# Patient Record
Sex: FEMALE | Race: WHITE | NOT HISPANIC OR LATINO | Employment: OTHER | URBAN - METROPOLITAN AREA
[De-identification: names, ages, dates, MRNs, and addresses within clinical notes are randomized per-mention and may not be internally consistent; named-entity substitution may affect disease eponyms.]

---

## 2017-01-31 ENCOUNTER — HOSPITAL ENCOUNTER (OUTPATIENT)
Dept: RADIOLOGY | Facility: MEDICAL CENTER | Age: 82
End: 2017-01-31
Attending: ORTHOPAEDIC SURGERY
Payer: MEDICARE

## 2017-01-31 DIAGNOSIS — M25.511 RIGHT SHOULDER PAIN, UNSPECIFIED CHRONICITY: ICD-10-CM

## 2017-01-31 PROCEDURE — 73221 MRI JOINT UPR EXTREM W/O DYE: CPT | Mod: RT

## 2017-03-09 ENCOUNTER — HOSPITAL ENCOUNTER (OUTPATIENT)
Dept: LAB | Facility: MEDICAL CENTER | Age: 82
End: 2017-03-09
Attending: FAMILY MEDICINE
Payer: MEDICARE

## 2017-03-09 LAB
APPEARANCE UR: ABNORMAL
BACTERIA #/AREA URNS HPF: ABNORMAL /HPF
BILIRUB UR QL STRIP.AUTO: NEGATIVE
COLOR UR AUTO: YELLOW
CULTURE IF INDICATED INDCX: YES UA CULTURE
EPITHELIAL CELLS 1715: ABNORMAL /HPF
GLUCOSE UR STRIP.AUTO-MCNC: NEGATIVE MG/DL
GRANULAR CASTS  1716G: ABNORMAL /LPF
HYALINE CAST   1831: ABNORMAL /LPF
KETONES UR STRIP.AUTO-MCNC: NEGATIVE MG/DL
LEUKOCYTE ESTERASE UR QL STRIP.AUTO: ABNORMAL
MICRO URNS: ABNORMAL
MUCOUS THREADS URNS QL MICRO: ABNORMAL /HPF
NITRITE UR QL STRIP.AUTO: NEGATIVE
PH UR: 6 [PH]
PROT UR QL STRIP: NEGATIVE MG/DL
RBC #/AREA URNS HPF: ABNORMAL /HPF
RBC UR QL AUTO: NEGATIVE
SP GR UR STRIP.AUTO: 1.01
WBC #/AREA URNS HPF: >150 /HPF

## 2017-03-09 PROCEDURE — 87086 URINE CULTURE/COLONY COUNT: CPT

## 2017-03-09 PROCEDURE — 87186 SC STD MICRODIL/AGAR DIL: CPT

## 2017-03-09 PROCEDURE — 87077 CULTURE AEROBIC IDENTIFY: CPT

## 2017-03-09 PROCEDURE — 81001 URINALYSIS AUTO W/SCOPE: CPT

## 2017-03-11 LAB
BACTERIA UR CULT: ABNORMAL
SIGNIFICANT IND 70042: ABNORMAL
SOURCE SOURCE: ABNORMAL

## 2017-04-10 ENCOUNTER — HOSPITAL ENCOUNTER (OUTPATIENT)
Facility: MEDICAL CENTER | Age: 82
End: 2017-04-10
Attending: ORTHOPAEDIC SURGERY | Admitting: ORTHOPAEDIC SURGERY
Payer: MEDICARE

## 2017-05-01 VITALS — WEIGHT: 129.41 LBS | HEIGHT: 66 IN | BODY MASS INDEX: 20.8 KG/M2

## 2017-05-01 DIAGNOSIS — Z01.812 PRE-OPERATIVE LABORATORY EXAMINATION: ICD-10-CM

## 2017-05-01 DIAGNOSIS — Z01.810 PRE-OPERATIVE CARDIOVASCULAR EXAMINATION: ICD-10-CM

## 2017-05-01 LAB
ANION GAP SERPL CALC-SCNC: 9 MMOL/L (ref 0–11.9)
APPEARANCE UR: ABNORMAL
BACTERIA #/AREA URNS HPF: ABNORMAL /HPF
BILIRUB UR QL STRIP.AUTO: NEGATIVE
BUN SERPL-MCNC: 30 MG/DL (ref 8–22)
CALCIUM SERPL-MCNC: 9.1 MG/DL (ref 8.5–10.5)
CHLORIDE SERPL-SCNC: 105 MMOL/L (ref 96–112)
CO2 SERPL-SCNC: 23 MMOL/L (ref 20–33)
COLOR UR: ABNORMAL
CREAT SERPL-MCNC: 0.96 MG/DL (ref 0.5–1.4)
EPI CELLS #/AREA URNS HPF: ABNORMAL /HPF
ERYTHROCYTE [DISTWIDTH] IN BLOOD BY AUTOMATED COUNT: 48 FL (ref 35.9–50)
GFR SERPL CREATININE-BSD FRML MDRD: 56 ML/MIN/1.73 M 2
GLUCOSE SERPL-MCNC: 85 MG/DL (ref 65–99)
GLUCOSE UR STRIP.AUTO-MCNC: NEGATIVE MG/DL
HCT VFR BLD AUTO: 40.3 % (ref 37–47)
HGB BLD-MCNC: 13.4 G/DL (ref 12–16)
KETONES UR STRIP.AUTO-MCNC: NEGATIVE MG/DL
LEUKOCYTE ESTERASE UR QL STRIP.AUTO: ABNORMAL
MCH RBC QN AUTO: 30.8 PG (ref 27–33)
MCHC RBC AUTO-ENTMCNC: 33.3 G/DL (ref 33.6–35)
MCV RBC AUTO: 92.6 FL (ref 81.4–97.8)
MICRO URNS: ABNORMAL
NITRITE UR QL STRIP.AUTO: POSITIVE
PH UR STRIP.AUTO: 6 [PH]
PLATELET # BLD AUTO: 384 K/UL (ref 164–446)
PMV BLD AUTO: 9.5 FL (ref 9–12.9)
POTASSIUM SERPL-SCNC: 3.7 MMOL/L (ref 3.6–5.5)
PROT UR QL STRIP: NEGATIVE MG/DL
RBC # BLD AUTO: 4.35 M/UL (ref 4.2–5.4)
RBC # URNS HPF: ABNORMAL /HPF
RBC UR QL AUTO: ABNORMAL
SCCMEC + MECA PNL NOSE NAA+PROBE: NEGATIVE
SCCMEC + MECA PNL NOSE NAA+PROBE: NEGATIVE
SODIUM SERPL-SCNC: 137 MMOL/L (ref 135–145)
SP GR UR STRIP.AUTO: 1.01
WBC # BLD AUTO: 8 K/UL (ref 4.8–10.8)
WBC #/AREA URNS HPF: >150 /HPF

## 2017-05-01 PROCEDURE — 81001 URINALYSIS AUTO W/SCOPE: CPT

## 2017-05-01 PROCEDURE — 87641 MR-STAPH DNA AMP PROBE: CPT

## 2017-05-01 PROCEDURE — 85027 COMPLETE CBC AUTOMATED: CPT

## 2017-05-01 PROCEDURE — 87186 SC STD MICRODIL/AGAR DIL: CPT

## 2017-05-01 PROCEDURE — 36415 COLL VENOUS BLD VENIPUNCTURE: CPT

## 2017-05-01 PROCEDURE — 93005 ELECTROCARDIOGRAM TRACING: CPT

## 2017-05-01 PROCEDURE — 93010 ELECTROCARDIOGRAM REPORT: CPT | Performed by: INTERNAL MEDICINE

## 2017-05-01 PROCEDURE — 87640 STAPH A DNA AMP PROBE: CPT | Mod: 59

## 2017-05-01 PROCEDURE — 87086 URINE CULTURE/COLONY COUNT: CPT

## 2017-05-01 PROCEDURE — 87077 CULTURE AEROBIC IDENTIFY: CPT

## 2017-05-01 PROCEDURE — 80048 BASIC METABOLIC PNL TOTAL CA: CPT

## 2017-05-01 RX ORDER — HYDROCODONE BITARTRATE AND ACETAMINOPHEN 5; 325 MG/1; MG/1
1-2 TABLET ORAL EVERY 4 HOURS PRN
Status: ON HOLD | COMMUNITY
End: 2017-11-11

## 2017-05-01 NOTE — OR NURSING
PreAdmit Appointment: Patient instructed to continue regularly prescribed medications through day before surgery. Instructed to take the following medications the day of surgery with a sip of water per Anesthesia protocol: Gabapentin, Levothyroxine, Norco if needed, Ultram if needed. CHG scrub given to patient along with instructions.

## 2017-05-02 LAB — EKG IMPRESSION: NORMAL

## 2017-05-03 LAB
BACTERIA UR CULT: ABNORMAL
BACTERIA UR CULT: ABNORMAL
SIGNIFICANT IND 70042: ABNORMAL
SITE SITE: ABNORMAL
SOURCE SOURCE: ABNORMAL

## 2017-05-08 DIAGNOSIS — Z01.812 PRE-OPERATIVE LABORATORY EXAMINATION: ICD-10-CM

## 2017-05-08 LAB
APPEARANCE UR: ABNORMAL
BACTERIA #/AREA URNS HPF: ABNORMAL /HPF
BILIRUB UR QL STRIP.AUTO: NEGATIVE
COLOR UR: ABNORMAL
EPI CELLS #/AREA URNS HPF: ABNORMAL /HPF
GLUCOSE UR STRIP.AUTO-MCNC: NEGATIVE MG/DL
KETONES UR STRIP.AUTO-MCNC: NEGATIVE MG/DL
LEUKOCYTE ESTERASE UR QL STRIP.AUTO: ABNORMAL
MICRO URNS: ABNORMAL
NITRITE UR QL STRIP.AUTO: POSITIVE
PH UR STRIP.AUTO: 6.5 [PH]
PROT UR QL STRIP: NEGATIVE MG/DL
RBC # URNS HPF: ABNORMAL /HPF
RBC UR QL AUTO: ABNORMAL
SP GR UR STRIP.AUTO: 1.01
WBC #/AREA URNS HPF: >150 /HPF

## 2017-05-08 PROCEDURE — 87186 SC STD MICRODIL/AGAR DIL: CPT

## 2017-05-08 PROCEDURE — 87086 URINE CULTURE/COLONY COUNT: CPT

## 2017-05-08 PROCEDURE — 81001 URINALYSIS AUTO W/SCOPE: CPT

## 2017-05-08 PROCEDURE — 87077 CULTURE AEROBIC IDENTIFY: CPT

## 2017-05-22 ENCOUNTER — HOSPITAL ENCOUNTER (OUTPATIENT)
Dept: LAB | Facility: MEDICAL CENTER | Age: 82
End: 2017-05-22
Attending: UROLOGY
Payer: MEDICARE

## 2017-05-22 PROCEDURE — 87086 URINE CULTURE/COLONY COUNT: CPT

## 2017-05-25 LAB
BACTERIA UR CULT: NORMAL
SIGNIFICANT IND 70042: NORMAL
SITE SITE: NORMAL
SOURCE SOURCE: NORMAL

## 2017-06-21 ENCOUNTER — HOSPITAL ENCOUNTER (OUTPATIENT)
Facility: MEDICAL CENTER | Age: 82
End: 2017-06-21
Attending: ORTHOPAEDIC SURGERY | Admitting: ORTHOPAEDIC SURGERY
Payer: MEDICARE

## 2017-06-26 VITALS — WEIGHT: 125.66 LBS | BODY MASS INDEX: 20.2 KG/M2 | HEIGHT: 66 IN

## 2017-06-26 DIAGNOSIS — Z01.812 PRE-OPERATIVE LABORATORY EXAMINATION: ICD-10-CM

## 2017-06-26 LAB
ANION GAP SERPL CALC-SCNC: 10 MMOL/L (ref 0–11.9)
APPEARANCE UR: ABNORMAL
BACTERIA #/AREA URNS HPF: ABNORMAL /HPF
BILIRUB UR QL STRIP.AUTO: NEGATIVE
BUN SERPL-MCNC: 40 MG/DL (ref 8–22)
CALCIUM SERPL-MCNC: 8.8 MG/DL (ref 8.5–10.5)
CHLORIDE SERPL-SCNC: 104 MMOL/L (ref 96–112)
CO2 SERPL-SCNC: 22 MMOL/L (ref 20–33)
COLOR UR: ABNORMAL
CREAT SERPL-MCNC: 1.06 MG/DL (ref 0.5–1.4)
EPI CELLS #/AREA URNS HPF: ABNORMAL /HPF
ERYTHROCYTE [DISTWIDTH] IN BLOOD BY AUTOMATED COUNT: 49.8 FL (ref 35.9–50)
GFR SERPL CREATININE-BSD FRML MDRD: 50 ML/MIN/1.73 M 2
GLUCOSE SERPL-MCNC: 83 MG/DL (ref 65–99)
GLUCOSE UR STRIP.AUTO-MCNC: NEGATIVE MG/DL
HCT VFR BLD AUTO: 40.8 % (ref 37–47)
HGB BLD-MCNC: 13.6 G/DL (ref 12–16)
KETONES UR STRIP.AUTO-MCNC: NEGATIVE MG/DL
LEUKOCYTE ESTERASE UR QL STRIP.AUTO: ABNORMAL
MCH RBC QN AUTO: 31 PG (ref 27–33)
MCHC RBC AUTO-ENTMCNC: 33.3 G/DL (ref 33.6–35)
MCV RBC AUTO: 92.9 FL (ref 81.4–97.8)
MICRO URNS: ABNORMAL
MUCOUS THREADS #/AREA URNS HPF: ABNORMAL /HPF
NITRITE UR QL STRIP.AUTO: POSITIVE
PH UR STRIP.AUTO: 7 [PH]
PLATELET # BLD AUTO: 347 K/UL (ref 164–446)
PMV BLD AUTO: 9.2 FL (ref 9–12.9)
POTASSIUM SERPL-SCNC: 4 MMOL/L (ref 3.6–5.5)
PROT UR QL STRIP: NEGATIVE MG/DL
RBC # BLD AUTO: 4.39 M/UL (ref 4.2–5.4)
RBC # URNS HPF: ABNORMAL /HPF
RBC UR QL AUTO: ABNORMAL
SODIUM SERPL-SCNC: 136 MMOL/L (ref 135–145)
SP GR UR STRIP.AUTO: 1.01
WBC # BLD AUTO: 9.2 K/UL (ref 4.8–10.8)
WBC #/AREA URNS HPF: ABNORMAL /HPF

## 2017-06-26 PROCEDURE — 36415 COLL VENOUS BLD VENIPUNCTURE: CPT

## 2017-06-26 PROCEDURE — 85027 COMPLETE CBC AUTOMATED: CPT

## 2017-06-26 PROCEDURE — 87086 URINE CULTURE/COLONY COUNT: CPT

## 2017-06-26 PROCEDURE — 87641 MR-STAPH DNA AMP PROBE: CPT

## 2017-06-26 PROCEDURE — 80048 BASIC METABOLIC PNL TOTAL CA: CPT

## 2017-06-26 PROCEDURE — 81001 URINALYSIS AUTO W/SCOPE: CPT

## 2017-06-26 PROCEDURE — 87640 STAPH A DNA AMP PROBE: CPT

## 2017-06-26 PROCEDURE — 87077 CULTURE AEROBIC IDENTIFY: CPT

## 2017-06-26 PROCEDURE — 87186 SC STD MICRODIL/AGAR DIL: CPT

## 2017-06-26 RX ORDER — COVID-19 ANTIGEN TEST
KIT MISCELLANEOUS PRN
COMMUNITY
End: 2018-01-12

## 2017-06-26 NOTE — OR NURSING
"Preadmit appointment: \" Preparing for your Procedure information\" sheet given to patient with verbal and written instructions. Patient instructed to continue prescribed medications through the day before surgery, instructed to take the following medications the day of surgery per anesthesia protocol: Levothyroxine, Gabapentin if needed, Hydrocodone if needed.   "

## 2017-06-27 LAB
SCCMEC + MECA PNL NOSE NAA+PROBE: NEGATIVE
SCCMEC + MECA PNL NOSE NAA+PROBE: NEGATIVE

## 2017-06-28 ENCOUNTER — HOSPITAL ENCOUNTER (OUTPATIENT)
Facility: MEDICAL CENTER | Age: 82
End: 2017-06-28
Attending: UROLOGY
Payer: MEDICARE

## 2017-06-28 PROCEDURE — 87077 CULTURE AEROBIC IDENTIFY: CPT

## 2017-06-28 PROCEDURE — 87086 URINE CULTURE/COLONY COUNT: CPT

## 2017-06-28 PROCEDURE — 87186 SC STD MICRODIL/AGAR DIL: CPT

## 2017-07-01 LAB
BACTERIA UR CULT: ABNORMAL
BACTERIA UR CULT: ABNORMAL
SIGNIFICANT IND 70042: ABNORMAL
SOURCE SOURCE: ABNORMAL

## 2017-07-20 ENCOUNTER — HOSPITAL ENCOUNTER (OUTPATIENT)
Facility: MEDICAL CENTER | Age: 82
End: 2017-07-20
Attending: UROLOGY
Payer: MEDICARE

## 2017-07-20 PROCEDURE — 87077 CULTURE AEROBIC IDENTIFY: CPT

## 2017-07-20 PROCEDURE — 87086 URINE CULTURE/COLONY COUNT: CPT

## 2017-07-20 PROCEDURE — 87186 SC STD MICRODIL/AGAR DIL: CPT

## 2017-07-22 LAB
BACTERIA UR CULT: ABNORMAL
SIGNIFICANT IND 70042: ABNORMAL
SOURCE SOURCE: ABNORMAL

## 2017-07-28 ENCOUNTER — HOSPITAL ENCOUNTER (OUTPATIENT)
Dept: LAB | Facility: MEDICAL CENTER | Age: 82
End: 2017-07-28
Attending: FAMILY MEDICINE
Payer: MEDICARE

## 2017-07-28 LAB
25(OH)D3 SERPL-MCNC: 40 NG/ML (ref 30–100)
ALBUMIN SERPL BCP-MCNC: 4.1 G/DL (ref 3.2–4.9)
ALBUMIN/GLOB SERPL: 1.3 G/DL
ALP SERPL-CCNC: 52 U/L (ref 30–99)
ALT SERPL-CCNC: 24 U/L (ref 2–50)
ANION GAP SERPL CALC-SCNC: 12 MMOL/L (ref 0–11.9)
APPEARANCE UR: ABNORMAL
AST SERPL-CCNC: 35 U/L (ref 12–45)
BACTERIA #/AREA URNS HPF: ABNORMAL /HPF
BASOPHILS # BLD AUTO: 1 % (ref 0–1.8)
BASOPHILS # BLD: 0.07 K/UL (ref 0–0.12)
BILIRUB SERPL-MCNC: 0.4 MG/DL (ref 0.1–1.5)
BILIRUB UR QL STRIP.AUTO: NEGATIVE
BUN SERPL-MCNC: 23 MG/DL (ref 8–22)
CALCIUM SERPL-MCNC: 10 MG/DL (ref 8.5–10.5)
CHLORIDE SERPL-SCNC: 107 MMOL/L (ref 96–112)
CHOLEST SERPL-MCNC: 208 MG/DL (ref 100–199)
CO2 SERPL-SCNC: 20 MMOL/L (ref 20–33)
COLOR UR: YELLOW
CREAT SERPL-MCNC: 0.93 MG/DL (ref 0.5–1.4)
CRP SERPL HS-MCNC: 3 MG/L (ref 0–7.5)
EOSINOPHIL # BLD AUTO: 0.4 K/UL (ref 0–0.51)
EOSINOPHIL NFR BLD: 5.6 % (ref 0–6.9)
EPI CELLS #/AREA URNS HPF: ABNORMAL /HPF
ERYTHROCYTE [DISTWIDTH] IN BLOOD BY AUTOMATED COUNT: 49.7 FL (ref 35.9–50)
EST. AVERAGE GLUCOSE BLD GHB EST-MCNC: 105 MG/DL
GFR SERPL CREATININE-BSD FRML MDRD: 58 ML/MIN/1.73 M 2
GLOBULIN SER CALC-MCNC: 3.2 G/DL (ref 1.9–3.5)
GLUCOSE SERPL-MCNC: 67 MG/DL (ref 65–99)
GLUCOSE UR STRIP.AUTO-MCNC: NEGATIVE MG/DL
HBA1C MFR BLD: 5.3 % (ref 0–5.6)
HCT VFR BLD AUTO: 42.4 % (ref 37–47)
HDLC SERPL-MCNC: 55 MG/DL
HGB BLD-MCNC: 14.1 G/DL (ref 12–16)
IMM GRANULOCYTES # BLD AUTO: 0.02 K/UL (ref 0–0.11)
IMM GRANULOCYTES NFR BLD AUTO: 0.3 % (ref 0–0.9)
KETONES UR STRIP.AUTO-MCNC: NEGATIVE MG/DL
LDLC SERPL CALC-MCNC: 131 MG/DL
LEUKOCYTE ESTERASE UR QL STRIP.AUTO: ABNORMAL
LYMPHOCYTES # BLD AUTO: 2.65 K/UL (ref 1–4.8)
LYMPHOCYTES NFR BLD: 36.8 % (ref 22–41)
MCH RBC QN AUTO: 31.3 PG (ref 27–33)
MCHC RBC AUTO-ENTMCNC: 33.3 G/DL (ref 33.6–35)
MCV RBC AUTO: 94 FL (ref 81.4–97.8)
MICRO URNS: ABNORMAL
MONOCYTES # BLD AUTO: 0.56 K/UL (ref 0–0.85)
MONOCYTES NFR BLD AUTO: 7.8 % (ref 0–13.4)
NEUTROPHILS # BLD AUTO: 3.5 K/UL (ref 2–7.15)
NEUTROPHILS NFR BLD: 48.5 % (ref 44–72)
NITRITE UR QL STRIP.AUTO: POSITIVE
NRBC # BLD AUTO: 0 K/UL
NRBC BLD AUTO-RTO: 0 /100 WBC
PH UR STRIP.AUTO: 6 [PH]
PLATELET # BLD AUTO: 313 K/UL (ref 164–446)
PMV BLD AUTO: 10.2 FL (ref 9–12.9)
POTASSIUM SERPL-SCNC: 3.9 MMOL/L (ref 3.6–5.5)
PROT SERPL-MCNC: 7.3 G/DL (ref 6–8.2)
PROT UR QL STRIP: NEGATIVE MG/DL
RBC # BLD AUTO: 4.51 M/UL (ref 4.2–5.4)
RBC # URNS HPF: ABNORMAL /HPF
RBC UR QL AUTO: ABNORMAL
SODIUM SERPL-SCNC: 139 MMOL/L (ref 135–145)
SP GR UR STRIP.AUTO: 1.01
TRIGL SERPL-MCNC: 112 MG/DL (ref 0–149)
TSH SERPL DL<=0.005 MIU/L-ACNC: 0.09 UIU/ML (ref 0.3–3.7)
URATE SERPL-MCNC: 9 MG/DL (ref 1.9–8.2)
WBC # BLD AUTO: 7.2 K/UL (ref 4.8–10.8)
WBC #/AREA URNS HPF: ABNORMAL /HPF

## 2017-07-28 PROCEDURE — 82306 VITAMIN D 25 HYDROXY: CPT | Mod: GA

## 2017-07-28 PROCEDURE — 80053 COMPREHEN METABOLIC PANEL: CPT

## 2017-07-28 PROCEDURE — 81001 URINALYSIS AUTO W/SCOPE: CPT

## 2017-07-28 PROCEDURE — 83036 HEMOGLOBIN GLYCOSYLATED A1C: CPT

## 2017-07-28 PROCEDURE — 84443 ASSAY THYROID STIM HORMONE: CPT

## 2017-07-28 PROCEDURE — 80061 LIPID PANEL: CPT

## 2017-07-28 PROCEDURE — 85025 COMPLETE CBC W/AUTO DIFF WBC: CPT

## 2017-07-28 PROCEDURE — 84550 ASSAY OF BLOOD/URIC ACID: CPT

## 2017-07-28 PROCEDURE — 86141 C-REACTIVE PROTEIN HS: CPT

## 2017-07-28 PROCEDURE — 36415 COLL VENOUS BLD VENIPUNCTURE: CPT

## 2017-08-11 ENCOUNTER — HOSPITAL ENCOUNTER (OUTPATIENT)
Facility: MEDICAL CENTER | Age: 82
End: 2017-08-11
Attending: UROLOGY
Payer: MEDICARE

## 2017-08-11 PROCEDURE — 87086 URINE CULTURE/COLONY COUNT: CPT

## 2017-08-13 ENCOUNTER — HOSPITAL ENCOUNTER (EMERGENCY)
Facility: MEDICAL CENTER | Age: 82
End: 2017-08-13
Attending: EMERGENCY MEDICINE
Payer: MEDICARE

## 2017-08-13 VITALS
HEART RATE: 72 BPM | WEIGHT: 119 LBS | OXYGEN SATURATION: 96 % | RESPIRATION RATE: 17 BRPM | TEMPERATURE: 98 F | BODY MASS INDEX: 19.13 KG/M2 | HEIGHT: 66 IN | SYSTOLIC BLOOD PRESSURE: 119 MMHG | DIASTOLIC BLOOD PRESSURE: 62 MMHG

## 2017-08-13 DIAGNOSIS — R10.84 GENERALIZED ABDOMINAL PAIN: ICD-10-CM

## 2017-08-13 DIAGNOSIS — K52.9 COLITIS: ICD-10-CM

## 2017-08-13 LAB
ALBUMIN SERPL BCP-MCNC: 3.3 G/DL (ref 3.2–4.9)
ALBUMIN/GLOB SERPL: 1.3 G/DL
ALP SERPL-CCNC: 40 U/L (ref 30–99)
ALT SERPL-CCNC: 50 U/L (ref 2–50)
ANION GAP SERPL CALC-SCNC: 11 MMOL/L (ref 0–11.9)
AST SERPL-CCNC: 62 U/L (ref 12–45)
BACTERIA UR CULT: NORMAL
BASOPHILS # BLD AUTO: 0.4 % (ref 0–1.8)
BASOPHILS # BLD: 0.03 K/UL (ref 0–0.12)
BILIRUB SERPL-MCNC: 0.9 MG/DL (ref 0.1–1.5)
BUN SERPL-MCNC: 33 MG/DL (ref 8–22)
CALCIUM SERPL-MCNC: 7.2 MG/DL (ref 8.4–10.2)
CHLORIDE SERPL-SCNC: 108 MMOL/L (ref 96–112)
CO2 SERPL-SCNC: 15 MMOL/L (ref 20–33)
CREAT SERPL-MCNC: 1.04 MG/DL (ref 0.5–1.4)
EOSINOPHIL # BLD AUTO: 0.09 K/UL (ref 0–0.51)
EOSINOPHIL NFR BLD: 1.2 % (ref 0–6.9)
ERYTHROCYTE [DISTWIDTH] IN BLOOD BY AUTOMATED COUNT: 52.2 FL (ref 35.9–50)
GFR SERPL CREATININE-BSD FRML MDRD: 51 ML/MIN/1.73 M 2
GLOBULIN SER CALC-MCNC: 2.6 G/DL (ref 1.9–3.5)
GLUCOSE SERPL-MCNC: 83 MG/DL (ref 65–99)
HCT VFR BLD AUTO: 39.2 % (ref 37–47)
HGB BLD-MCNC: 12.8 G/DL (ref 12–16)
IMM GRANULOCYTES # BLD AUTO: 0.01 K/UL (ref 0–0.11)
IMM GRANULOCYTES NFR BLD AUTO: 0.1 % (ref 0–0.9)
LACTATE BLD-SCNC: 1.97 MMOL/L (ref 0.5–2)
LIPASE SERPL-CCNC: 13 U/L (ref 7–58)
LYMPHOCYTES # BLD AUTO: 1.99 K/UL (ref 1–4.8)
LYMPHOCYTES NFR BLD: 25.8 % (ref 22–41)
MCH RBC QN AUTO: 31.8 PG (ref 27–33)
MCHC RBC AUTO-ENTMCNC: 32.7 G/DL (ref 33.6–35)
MCV RBC AUTO: 97.5 FL (ref 81.4–97.8)
MONOCYTES # BLD AUTO: 0.56 K/UL (ref 0–0.85)
MONOCYTES NFR BLD AUTO: 7.3 % (ref 0–13.4)
NEUTROPHILS # BLD AUTO: 5.02 K/UL (ref 2–7.15)
NEUTROPHILS NFR BLD: 65.2 % (ref 44–72)
NRBC # BLD AUTO: 0 K/UL
NRBC BLD AUTO-RTO: 0 /100 WBC
PLATELET # BLD AUTO: 313 K/UL (ref 164–446)
PMV BLD AUTO: 9.2 FL (ref 9–12.9)
POTASSIUM SERPL-SCNC: 3.6 MMOL/L (ref 3.6–5.5)
PROT SERPL-MCNC: 5.9 G/DL (ref 6–8.2)
RBC # BLD AUTO: 4.02 M/UL (ref 4.2–5.4)
SIGNIFICANT IND 70042: NORMAL
SODIUM SERPL-SCNC: 134 MMOL/L (ref 135–145)
SOURCE SOURCE: NORMAL
SPECIMEN DRAWN FROM PATIENT: NORMAL
WBC # BLD AUTO: 7.7 K/UL (ref 4.8–10.8)

## 2017-08-13 PROCEDURE — 700111 HCHG RX REV CODE 636 W/ 250 OVERRIDE (IP): Performed by: EMERGENCY MEDICINE

## 2017-08-13 PROCEDURE — 96376 TX/PRO/DX INJ SAME DRUG ADON: CPT

## 2017-08-13 PROCEDURE — 36415 COLL VENOUS BLD VENIPUNCTURE: CPT

## 2017-08-13 PROCEDURE — 96375 TX/PRO/DX INJ NEW DRUG ADDON: CPT

## 2017-08-13 PROCEDURE — 700102 HCHG RX REV CODE 250 W/ 637 OVERRIDE(OP)

## 2017-08-13 PROCEDURE — A9270 NON-COVERED ITEM OR SERVICE: HCPCS

## 2017-08-13 PROCEDURE — 96374 THER/PROPH/DIAG INJ IV PUSH: CPT

## 2017-08-13 PROCEDURE — 83605 ASSAY OF LACTIC ACID: CPT

## 2017-08-13 PROCEDURE — A9270 NON-COVERED ITEM OR SERVICE: HCPCS | Performed by: EMERGENCY MEDICINE

## 2017-08-13 PROCEDURE — 700105 HCHG RX REV CODE 258: Performed by: EMERGENCY MEDICINE

## 2017-08-13 PROCEDURE — 83690 ASSAY OF LIPASE: CPT

## 2017-08-13 PROCEDURE — 80053 COMPREHEN METABOLIC PANEL: CPT

## 2017-08-13 PROCEDURE — 85025 COMPLETE CBC W/AUTO DIFF WBC: CPT

## 2017-08-13 PROCEDURE — 700102 HCHG RX REV CODE 250 W/ 637 OVERRIDE(OP): Performed by: EMERGENCY MEDICINE

## 2017-08-13 PROCEDURE — 99285 EMERGENCY DEPT VISIT HI MDM: CPT

## 2017-08-13 PROCEDURE — 96361 HYDRATE IV INFUSION ADD-ON: CPT

## 2017-08-13 RX ORDER — SODIUM CHLORIDE 9 MG/ML
1000 INJECTION, SOLUTION INTRAVENOUS ONCE
Status: COMPLETED | OUTPATIENT
Start: 2017-08-13 | End: 2017-08-13

## 2017-08-13 RX ORDER — ONDANSETRON 2 MG/ML
4 INJECTION INTRAMUSCULAR; INTRAVENOUS ONCE
Status: COMPLETED | OUTPATIENT
Start: 2017-08-13 | End: 2017-08-13

## 2017-08-13 RX ORDER — DICYCLOMINE HCL 20 MG
20 TABLET ORAL EVERY 6 HOURS
Qty: 20 TAB | Refills: 0 | Status: SHIPPED | OUTPATIENT
Start: 2017-08-13 | End: 2017-12-27

## 2017-08-13 RX ADMIN — VANCOMYCIN 125 MG: KIT at 18:48

## 2017-08-13 RX ADMIN — ONDANSETRON 4 MG: 2 INJECTION INTRAMUSCULAR; INTRAVENOUS at 17:43

## 2017-08-13 RX ADMIN — HYDROMORPHONE HYDROCHLORIDE 0.5 MG: 1 INJECTION, SOLUTION INTRAMUSCULAR; INTRAVENOUS; SUBCUTANEOUS at 19:39

## 2017-08-13 RX ADMIN — HYDROMORPHONE HYDROCHLORIDE 0.5 MG: 1 INJECTION, SOLUTION INTRAMUSCULAR; INTRAVENOUS; SUBCUTANEOUS at 17:43

## 2017-08-13 RX ADMIN — LIDOCAINE HYDROCHLORIDE 30 ML: 20 SOLUTION OROPHARYNGEAL at 19:56

## 2017-08-13 RX ADMIN — SODIUM CHLORIDE 1000 ML: 9 INJECTION, SOLUTION INTRAVENOUS at 17:43

## 2017-08-13 ASSESSMENT — PAIN SCALES - GENERAL
PAINLEVEL_OUTOF10: 3
PAINLEVEL_OUTOF10: 0

## 2017-08-13 NOTE — ED AVS SNAPSHOT
8/13/2017    Elizabeth Celis  [address] [place] [address]    Dear Elizabeth:    [facility] wants to ensure your discharge home is safe and you or your loved ones have had all of your questions answered regarding your care after you leave the hospital.    Below is a list of resources and contact information should you have any questions regarding your hospital stay, follow-up instructions, or active medical symptoms.    Questions or Concerns Regarding… Contact   Medical Questions Related to Your Discharge  (7 days a week, 8am-5pm) Contact a Nurse Care Coordinator   [phone]   Medical Questions Not Related to Your Discharge  (24 hours a day / 7 days a week)  Contact the Nurse Health Line   [phone]    Medications or Discharge Instructions Refer to your discharge packet   or contact your [facility] Primary Care Provider   [phone]   Follow-up Appointment(s) Schedule your appointment via ITS Compliance   or contact Scheduling [phone]   Billing Review your statement via ITS Compliance  or contact Billing [phone]   Medical Records Review your records via ITS Compliance   or contact Medical Records [phone]     You may receive a telephone call within two days of discharge. This call is to make certain you understand your discharge instructions and have the opportunity to have any questions answered. You can also easily access your medical information, test results and upcoming appointments via the ITS Compliance free online health management tool. You can learn more and sign up at [employer]/ITS Compliance. For assistance setting up your ITS Compliance account, please call [phone].    Once again, we want to ensure your discharge home is safe and that you have a clear understanding of any next steps in your care. If you have any questions or concerns, please do not hesitate to contact us, we are here for you. Thank you for choosing [facility] for your healthcare needs.    Sincerely,    Your [facility] Healthcare Team

## 2017-08-13 NOTE — ED AVS SNAPSHOT
STEERads Access Code: Activation code not generated  Current STEERads Status: Patient Declined    [employer]  A secure, online tool to manage your health information     [employer]’s STEERads® is a secure, online tool that connects you to your personalized health information from the privacy of your home -- day or night - making it very easy for you to manage your healthcare. Once the activation process is completed, you can even access your medical information using the STEERads jenny, which is available for free in the Apple Jenny store or Google Play store.     STEERads provides the following levels of access (as shown below):   My Chart Features   [facility] Primary Care Doctor [facility]  Specialists [facility]  Urgent  Care Non-[facility]  Primary Care  Doctor   Email your healthcare team securely and privately 24/7 X X X X   Manage appointments: schedule your next appointment; view details of past/upcoming appointments X      Request prescription refills. X      View recent personal medical records, including lab and immunizations X X X X   View health record, including health history, allergies, medications X X X X   Read reports about your outpatient visits, procedures, consult and ER notes X X X X   See your discharge summary, which is a recap of your hospital and/or ER visit that includes your diagnosis, lab results, and care plan. X X       How to register for STEERads:  1. Go to  [email or web address].  2. Click on the Sign Up Now box, which takes you to the New Member Sign Up page. You will need to provide the following information:  a. Enter your STEERads Access Code exactly as it appears at the top of this page. (You will not need to use this code after you’ve completed the sign-up process. If you do not sign up before the expiration date, you must request a new code.)   b. Enter your date of birth.   c. Enter your home email address.   d. Click Submit, and follow the next screen’s instructions.  3. Create a STEERads ID.  This will be your [employer] login ID and cannot be changed, so think of one that is secure and easy to remember.  4. Create a [employer] password. You can change your password at any time.  5. Enter your Password Reset Question and Answer. This can be used at a later time if you forget your password.   6. Enter your e-mail address. This allows you to receive e-mail notifications when new information is available in [employer].  7. Click Sign Up. You can now view your health information.    For assistance activating your [employer] account, call [phone]

## 2017-08-13 NOTE — ED AVS SNAPSHOT
Home Care Instructions                                                                                                                Elizabeth Celis   MRN: [id number]    Department:  [facility], Emergency Dept   Date of Visit:  8/13/2017            [facility], Emergency Dept    [address] [place] [address]    Phone:  [phone]      You were seen by     Juan Carlos Bhat M.D.      Your Diagnosis Was     Generalized abdominal pain     R10.84       These are the medications you received during your hospitalization from 08/13/2017 1651 to 08/13/2017 2040     Date/Time Order Dose Route Action    08/13/2017 1743 NS infusion 1,000 mL 1,000 mL Intravenous New Bag    08/13/2017 1743 ondansetron (ZOFRAN) syringe/vial injection 4 mg 4 mg Intravenous Given    08/13/2017 1743 HYDROmorphone (DILAUDID) injection 0.5 mg 0.5 mg Intravenous Given    08/13/2017 1848 vancomycin 50 mg/ml (FIRST-VANCOMYCIN) oral solution 125 mg 125 mg Oral Given    08/13/2017 1848 vancomycin 50 mg/ml (FIRST-VANCOMYCIN) oral solution 125 mg 125 mg Oral Given    08/13/2017 1939 HYDROmorphone (DILAUDID) injection 0.5 mg 0.5 mg Intravenous Given    08/13/2017 1956 hyoscyamine-maalox plus-lidocaine viscous (GI COCKTAIL) oral susp 30 mL 30 mL Oral Given      Medication Information     Review all of your home medications and newly ordered medications with your primary doctor and/or pharmacist as soon as possible. Follow medication instructions as directed by your doctor and/or pharmacist.     Please keep your complete medication list with you and share with your physician. Update the information when medications are discontinued, doses are changed, or new medications (including over-the-counter products) are added; and carry medication information at all times in the event of emergency situations.               Medication List      ASK your doctor about these medications        Instructions    Morning Afternoon Evening Bedtime    ALEVE 220 MG Caps   Generic drug:  Naproxen Sodium        Take  by mouth as needed.                        citalopram 20 MG Tabs   Commonly known as:  CELEXA        Take 20 mg by mouth every day.   Dose:  20 mg                        Diclofenac Sodium 1 % Gel        Apply  to skin as directed as needed.                        EYE MOISTURIZING RELIEF OP        by Ophthalmic route as needed.                        furosemide 40 MG Tabs   Commonly known as:  LASIX        Take 40 mg by mouth every day.   Dose:  40 mg                        gabapentin 300 MG Caps   Commonly known as:  NEURONTIN        Take 300 mg by mouth 3 times a day as needed. Indications: Neurogenic Pain   Dose:  300 mg                        hydrocodone-acetaminophen 5-325 MG Tabs per tablet   Commonly known as:  NORCO        Take 1-2 Tabs by mouth every four hours as needed.   Dose:  1-2 Tab                        levothyroxine 112 MCG Tabs   Commonly known as:  SYNTHROID        Take 112 mcg by mouth Every morning on an empty stomach.   Dose:  112 mcg                        MULTIVITAMIN ADULT PO        Take  by mouth every day.                        PROLIA 60 MG/ML Soln   Generic drug:  denosumab        Inject 60 mg as instructed Once.   Dose:  60 mg                        RESTASIS OP        by Ophthalmic route 2 Times a Day. Both eyes                        spironolactone 25 MG Tabs   Commonly known as:  ALDACTONE        Take 25 mg by mouth every day.   Dose:  25 mg                        trazodone 50 MG Tabs   Commonly known as:  DESYREL        Take 50 mg by mouth every bedtime.   Dose:  50 mg                                Procedures and tests performed during your visit     CBC WITH DIFFERENTIAL    COMP METABOLIC PANEL    ESTIMATED GFR    IV Saline Lock    LACTIC ACID    LIPASE        Discharge Instructions       Colitis  Colitis is inflammation of the colon. Colitis can be a short-term or long-standing  (chronic) illness. Crohn's disease and ulcerative colitis are 2 types of colitis which are chronic. They usually require lifelong treatment.  CAUSES   There are many different causes of colitis, including:  · Viruses.  · Germs (bacteria).  · Medicine reactions.  SYMPTOMS   · Diarrhea.  · Intestinal bleeding.  · Pain.  · Fever.  · Throwing up (vomiting).  · Tiredness (fatigue).  · Weight loss.  · Bowel blockage.  DIAGNOSIS   The diagnosis of colitis is based on examination and stool or blood tests. X-rays, CT scan, and colonoscopy may also be needed.  TREATMENT   Treatment may include:  · Fluids given through the vein (intravenously).  · Bowel rest (nothing to eat or drink for a period of time).  · Medicine for pain and diarrhea.  · Medicines (antibiotics) that kill germs.  · Cortisone medicines.  · Surgery.  HOME CARE INSTRUCTIONS   · Get plenty of rest.  · Drink enough water and fluids to keep your urine clear or pale yellow.  · Eat a well-balanced diet.  · Call your caregiver for follow-up as recommended.  SEEK IMMEDIATE MEDICAL CARE IF:   · You develop chills.  · You have an oral temperature above 102° F (38.9° C), not controlled by medicine.  · You have extreme weakness, fainting, or dehydration.  · You have repeated vomiting.  · You develop severe belly (abdominal) pain or are passing bloody or tarry stools.  MAKE SURE YOU:   · Understand these instructions.  · Will watch your condition.  · Will get help right away if you are not doing well or get worse.     This information is not intended to replace advice given to you by your health care provider. Make sure you discuss any questions you have with your health care provider.     Document Released: 01/25/2006 Document Revised: 03/11/2013 Document Reviewed: 04/11/2016  [employer] Interactive Patient Education ©2016 [employer]            Patient Information     Patient Information    Following emergency treatment: all patient requiring follow-up care must return  either to a private physician or a clinic if your condition worsens before you are able to obtain further medical attention, please return to the emergency room.     Billing Information    At [facility], we work to make the billing process streamlined for our patients.  Our Representatives are here to answer any questions you may have regarding your hospital bill.  If you have insurance coverage and have supplied your insurance information to us, we will submit a claim to your insurer on your behalf.  Should you have any questions regarding your bill, we can be reached online or by phone as follows:  Online: You are able pay your bills online or live chat with our representatives about any billing questions you may have. We are here to help Monday - Friday from 8:00am to 7:30pm and 9:00am - 12:00pm on Saturdays.  Please visit [email or web address]  for more information.   Phone:  [phone] or [phone]    Please note that your emergency physician, surgeon, pathologist, radiologist, anesthesiologist, and other specialists are not employed by [facility] and will therefore bill separately for their services.  Please contact them directly for any questions concerning their bills at the numbers below:     Emergency Physician Services:  [phone]  [facility]:  [phone]  [facility]:  [phone]  [facility]:  [phone]    1. Your final bill may vary from the amount quoted upon discharge if all procedures are not complete at that time, or if your doctor has additional procedures of which we are not aware. You will receive an additional bill if you return to the Emergency Department at [facility] for suture removal regardless of the facility of which the sutures were placed.     2. Please arrange for settlement of this account at the emergency registration.    3. All self-pay accounts are due in full at the time of  treatment.  If you are unable to meet this obligation then payment is expected within 4-5 days.     4. If you have had radiology studies (CT, X-ray, Ultrasound, MRI), you have received a preliminary result during your emergency department visit. Please contact the radiology department [phone] to receive a copy of your final result. Please discuss the Final result with your primary physician or with the follow up physician provided.     Crisis Hotline:  [place] Crisis Hotline:  9-358-LBXYLRD or 1-108.445.7430  [place] Crisis Hotline:    1-922.797.9782 or 718-505-6168         ED Discharge Follow Up Questions    1. In order to provide you with very good care, we would like to follow up with a phone call in the next few days.  May we have your permission to contact you?     YES /  NO    2. What is the best phone number to call you? (       )_____-__________    3. What is the best time to call you?      Morning  /  Afternoon  /  Evening                   Patient Signature:  ____________________________________________________________    Date:  ____________________________________________________________      Your appointments     Aug 14, 2017 10:15 AM   BONE DENSITY (DEXASCAN) with CALLY HOUSER BD 1   [facility]    [facility] [place]  [address]  [address]  [place] [id number] [phone]           No calcium supplements 24 hours prior to exam, including antacids or multivitamins w/calcium.  Pt may eat and drink normally.  No injection procedures prior to Dexa on the same day.  No barium contrast, no CTs with IV or oral contrast, no Pet/CTs and no Nuc Med injections for 7 days prior to a Dexa (including Barium Swallow, Upper GI, Small Bowel Series).  If scheduling a Dexa on the same day as a CT with IV or oral contrast, any test with barium, Pet/CT or a Nuc Med with injection, always schedule the Dexa before the other study.

## 2017-08-14 ENCOUNTER — HOSPITAL ENCOUNTER (OUTPATIENT)
Dept: RADIOLOGY | Facility: MEDICAL CENTER | Age: 82
End: 2017-08-14
Attending: FAMILY MEDICINE
Payer: MEDICARE

## 2017-08-14 ENCOUNTER — PATIENT OUTREACH (OUTPATIENT)
Dept: HEALTH INFORMATION MANAGEMENT | Facility: OTHER | Age: 82
End: 2017-08-14

## 2017-08-14 DIAGNOSIS — M81.0 SENILE OSTEOPOROSIS: ICD-10-CM

## 2017-08-14 PROCEDURE — 77080 DXA BONE DENSITY AXIAL: CPT

## 2017-08-14 NOTE — ED NOTES
Pt pain free at this time, resting in [facility] reporting no distress. Provided names of possible rides home, will search for their phone numbers.

## 2017-08-14 NOTE — ED NOTES
Pt being treated with antibiotics for urinary E. Coli past 9 months. On Friday she began to experience n/v/d, burning epigastric pain, bloating. Pt has been unable to keep down any medication for her shoulder pain, usually takes Aleve, Gabapentin. Not keeping down much food/fluid. Lips dry.   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

## 2017-08-14 NOTE — ED PROVIDER NOTES
"ED Provider Note    CHIEF COMPLAINT  Chief Complaint   Patient presents with   • Abdominal Pain     x 48 hrs   • Nausea/Vomiting/Diarrhea     x 48 hrs       HPI  Elizabeth Celis is a 81 y.o. female who presents stating that the she had abdominal pain for the last 2 days with some vomiting but mostly diarrhea and had about 2 stools today and about 2-3 stools yesterday that were completely loose and runny. She has had a history of C. diff colitis in the past and recently this past Tuesday finished a weeklong intramuscular antibiotic regimen as she was failing oral management for E. coli urinary infection. She denies any urinary symptoms today and says that she feels overall better and the urinary tract but now feels like her GI tract is out of balance. Denies any other complaints such as fever    REVIEW OF SYSTEMS  See HPI for further details. All other systems are negative.     PAST MEDICAL HISTORY  Past Medical History   Diagnosis Date   • Elbow fracture, left    • Hypothyroid    • Depression    • History of anemia    • Stroke (CMS-HCC) 1990's?     \"mini\" no residual symptoms   • C. 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 difficile diarrhea 3/2016     fecal transplant       FAMILY HISTORY  Family History   Problem Relation Age of Onset   • Non-contributory Mother    • Non-contributory Father    • Anesthesia Paternal Grandfather        SOCIAL HISTORY   reports that she quit smoking about 32 years ago. Her smoking use included Cigarettes. She has a 20 pack-year smoking history. She has never used smokeless tobacco. She reports that she drinks alcohol. She reports " that she does not use illicit drugs.    SURGICAL HISTORY  Past Surgical History   Procedure Laterality Date   • Blepharoplasty  3/31/2011     Performed by ORACIO DIAZ at [facility]   • Cholecystectomy  2000   • Colonoscopy - endo N/A 3/7/2016     Procedure: COLONOSCOPY - ENDO;  Surgeon: Estiven Ayers M.D.;  Location: ENDOSCOPY [facility];  Service:    • Fecal transplant N/A 3/7/2016     Procedure: FECAL TRANSPLANT;  Surgeon: Estiven Ayers M.D.;  Location: ENDOSCOPY [facility];  Service:    • Hysterectomy radical  1985   • Other Bilateral 2010, 2008     feet- repair torn tendons   • Other orthopedic surgery Left 1970s     femur fx   • Other orthopedic surgery Left 2006     finger- removal of digit Left middle finger   • Other orthopedic surgery Left 2012     Left Elbow fx repair       CURRENT MEDICATIONS  Home Medications     Reviewed by Brooklynn Dc R.N. (Registered Nurse) on 08/13/17 at 1705  Med List Status: Not Addressed    Medication Last Dose Status    citalopram (CELEXA) 20 MG Tab  Active    CycloSPORINE (RESTASIS OP)  Active    denosumab (PROLIA) 60 MG/ML Solution  Active    Diclofenac Sodium 1 % Gel  Active    furosemide (LASIX) 40 MG Tab  Active    gabapentin (NEURONTIN) 300 MG Cap  Active    hydrocodone-acetaminophen (NORCO) 5-325 MG Tab per tablet  Active    levothyroxine (SYNTHROID) 112 MCG Tab  Active    Multiple Vitamins-Minerals (MULTIVITAMIN ADULT PO)  Active    Naproxen Sodium (ALEVE) 220 MG Cap  Active    spironolactone (ALDACTONE) 25 MG Tab  Active    Tetrahydrozoline-PEG (EYE MOISTURIZING RELIEF OP)  Active    trazodone (DESYREL) 50 MG Tab  Active                ALLERGIES  Allergies   Allergen Reactions   • Azithromycin Unspecified     Matti Johnsons syndrome   • Erythromycin Unspecified     Matti Johnsons syndrome   • Nitrofurantoin Vomiting and Nausea   • Pcn [Penicillins] Anaphylaxis, Shortness of Breath and Swelling   • Sulfa Drugs Swelling   •  "Cefuroxime    • Ciprofloxacin    • Clindamycin    • Codeine Itching   • Daptomycin      Matti colleen syndrome     • Flagyl [Metronidazole] Vomiting and Nausea   • Morphine Itching   • Premarin Unspecified     burning   • Rifampin      Matti colleen syndrome       PHYSICAL EXAM  VITAL SIGNS: /62 mmHg  Pulse 72  Temp(Src) 36.7 °C (98 °F)  Resp 18  Ht 1.676 m (5' 5.98\")  Wt 53.978 kg (119 lb)  BMI 19.22 kg/m2  SpO2 96%   Constitutional: Elderly female, No acute distress, Non-toxic appearance.   HENT: Normocephalic, Atraumatic, Bilateral external ears normal, Oropharynx is clear mucous membranes are mildly dry. No oral exudates or nasal discharge.   Eyes: Pupils are equal round and reactive, EOMI, Conjunctiva normal, No discharge.   Neck: Normal range of motion, No tenderness, Supple, No stridor. No meningismus.  Lymphatic: No lymphadenopathy noted.   Cardiovascular: Regular rate and rhythm without murmur rub or gallop.  Thorax & Lungs: Clear breath sounds bilaterally without wheezes, rhonchi or rales. There is no chest wall tenderness.   Abdomen: Soft with minimal diffuse discomfort on exam, the abdomen is mildly distended. There is no rebound or guarding. No organomegaly is appreciated. Bowel sounds are hyperactive with no high-pitched tinkles.  Skin: Normal without rash.   Back: No CVA or spinal tenderness.   Extremities: Intact distal pulses, No edema, No tenderness, No cyanosis, No clubbing. Capillary refill is less than 2 seconds.  Musculoskeletal: Good range of motion in all major joints. No tenderness to palpation or major deformities noted.   Neurologic: Alert & oriented x 3, Normal motor function, Normal sensory function, No focal deficits noted. Reflexes are normal.  Psychiatric: Affect normal, Judgment normal, Mood normal. There is no suicidal ideation or patient reported hallucinations.       COURSE & MEDICAL DECISION MAKING  Pertinent Labs & Imaging studies reviewed. (See chart for " details)  Patient presents with symptoms consistent with colitis versus gastroenteritis and less likely surgical illness given her benign abdomen on exam.    The patient's white blood cell count is normal with no significant shift. Hemoglobin is normal as well. She does have a mildly low bicarbonate 15 and couple with dry mucous membranes and posterior 1 L normal saline for rehydration. BUN was elevated at 33 consistent with dehydration. Lipase is normal and liver function was unremarkable    Patient was given small dose of opiate for pain relief. This seemed to help her quite a bit. We will transition her to Bentyl for bowel cramping as I believe her vital signs and laboratory evaluation    There is a chance that she has C. diff colitis and I sent her stool sample off for evaluation and this is pending. I don't think she requires admission to the hospital but rather requires 10 days of vancomycin therapy with follow-up to her doctor at the end of this treatment.    Patient is amenable to this plan of care and discharged in stable condition    FINAL IMPRESSION  1. Generalized abdominal pain    2. Colitis             Electronically signed by: Juan Carlos Bhat, 8/13/2017 8:31 PM

## 2017-08-14 NOTE — PROGRESS NOTES
· Placed discharge outreach phone call to patient s/p ER discharge 8/13/17.  Left voicemail providing my contact information and instructions to call with any questions or concerns.

## 2017-08-14 NOTE — DISCHARGE INSTRUCTIONS
Colitis  Colitis is inflammation of the colon. Colitis can be a short-term or long-standing (chronic) illness. Crohn's disease and ulcerative colitis are 2 types of colitis which are chronic. They usually require lifelong treatment.  CAUSES   There are many different causes of colitis, including:  · Viruses.  · Germs (bacteria).  · Medicine reactions.  SYMPTOMS   · Diarrhea.  · Intestinal bleeding.  · Pain.  · Fever.  · Throwing up (vomiting).  · Tiredness (fatigue).  · Weight loss.  · Bowel blockage.  DIAGNOSIS   The diagnosis of colitis is based on examination and stool or blood tests. X-rays, CT scan, and colonoscopy may also be needed.  TREATMENT   Treatment may include:  · Fluids given through the vein (intravenously).  · Bowel rest (nothing to eat or drink for a period of time).  · Medicine for pain and diarrhea.  · Medicines (antibiotics) that kill germs.  · Cortisone medicines.  · Surgery.  HOME CARE INSTRUCTIONS   · Get plenty of rest.  · Drink enough water and fluids to keep your urine clear or pale yellow.  · Eat a well-balanced diet.  · Call your caregiver for follow-up as recommended.  SEEK IMMEDIATE MEDICAL CARE IF:   · You develop chills.  · You have an oral temperature above 102° F (38.9° C), not controlled by medicine.  · You have extreme weakness, fainting, or dehydration.  · You have repeated vomiting.  · You develop severe belly (abdominal) pain or are passing bloody or tarry stools.  MAKE SURE YOU:   · Understand these instructions.  · Will watch your condition.  · Will get help right away if you are not doing well or get worse.     This information is not intended to replace advice given to you by your health care provider. Make sure you discuss any questions you have with your health care provider.     Document Released: 01/25/2006 Document Revised: 03/11/2013 Document Reviewed: 04/11/2016  [employer] Patient Education ©2016 [employer]

## 2017-08-14 NOTE — ED NOTES
Patient verbalized understanding of discharge instructions including order not to drive or drink alcohol for 6-12 hrs following narcotic use, no questions at this time. VS stable, patient will be wheeled to exit with d/c instructions and rx in hand.

## 2017-08-25 ENCOUNTER — HOSPITAL ENCOUNTER (OUTPATIENT)
Dept: LAB | Facility: MEDICAL CENTER | Age: 82
End: 2017-08-25
Attending: FAMILY MEDICINE
Payer: MEDICARE

## 2017-08-25 LAB
APPEARANCE UR: CLEAR
BACTERIA #/AREA URNS HPF: NEGATIVE /HPF
BILIRUB UR QL STRIP.AUTO: NEGATIVE
CAOX CRY #/AREA URNS HPF: ABNORMAL /HPF
COLOR UR: YELLOW
CULTURE IF INDICATED INDCX: YES UA CULTURE
EPI CELLS #/AREA URNS HPF: ABNORMAL /HPF
GLUCOSE UR STRIP.AUTO-MCNC: NEGATIVE MG/DL
HYALINE CASTS #/AREA URNS LPF: ABNORMAL /LPF
KETONES UR STRIP.AUTO-MCNC: NEGATIVE MG/DL
LEUKOCYTE ESTERASE UR QL STRIP.AUTO: ABNORMAL
MICRO URNS: ABNORMAL
NITRITE UR QL STRIP.AUTO: NEGATIVE
PH UR STRIP.AUTO: 6.5 [PH]
PROT UR QL STRIP: NEGATIVE MG/DL
RBC # URNS HPF: ABNORMAL /HPF
RBC UR QL AUTO: NEGATIVE
SP GR UR STRIP.AUTO: 1.01
TRANS CELLS #/AREA URNS HPF: ABNORMAL /HPF
UROBILINOGEN UR STRIP.AUTO-MCNC: 0.2 MG/DL
WBC #/AREA URNS HPF: ABNORMAL /HPF

## 2017-08-25 PROCEDURE — 87086 URINE CULTURE/COLONY COUNT: CPT

## 2017-08-25 PROCEDURE — 81001 URINALYSIS AUTO W/SCOPE: CPT

## 2017-08-27 LAB
BACTERIA UR CULT: NORMAL
SIGNIFICANT IND 70042: NORMAL
SOURCE SOURCE: NORMAL

## 2017-11-11 ENCOUNTER — HOSPITAL ENCOUNTER (INPATIENT)
Facility: MEDICAL CENTER | Age: 82
LOS: 3 days | DRG: 690 | End: 2017-11-14
Attending: EMERGENCY MEDICINE | Admitting: FAMILY MEDICINE
Payer: MEDICARE

## 2017-11-11 ENCOUNTER — RESOLUTE PROFESSIONAL BILLING HOSPITAL PROF FEE (OUTPATIENT)
Dept: HOSPITALIST | Facility: MEDICAL CENTER | Age: 82
End: 2017-11-11
Payer: MEDICARE

## 2017-11-11 ENCOUNTER — APPOINTMENT (OUTPATIENT)
Dept: RADIOLOGY | Facility: MEDICAL CENTER | Age: 82
DRG: 690 | End: 2017-11-11
Attending: EMERGENCY MEDICINE
Payer: MEDICARE

## 2017-11-11 DIAGNOSIS — E87.6 HYPOKALEMIA: ICD-10-CM
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
## 2017-11-11 DIAGNOSIS — I73.00 RAYNAUD'S DISEASE WITHOUT GANGRENE: ICD-10-CM

## 2017-11-11 DIAGNOSIS — R53.1 WEAKNESS: ICD-10-CM

## 2017-11-11 DIAGNOSIS — I82.501 LEG DVT (DEEP VENOUS THROMBOEMBOLISM), CHRONIC, RIGHT (HCC): ICD-10-CM

## 2017-11-11 DIAGNOSIS — N18.30 CKD (CHRONIC KIDNEY DISEASE) STAGE 3, GFR 30-59 ML/MIN (HCC): ICD-10-CM

## 2017-11-11 DIAGNOSIS — N39.0 URINARY TRACT INFECTION WITHOUT HEMATURIA, SITE UNSPECIFIED: ICD-10-CM

## 2017-11-11 DIAGNOSIS — I95.89 OTHER SPECIFIED HYPOTENSION: ICD-10-CM

## 2017-11-11 DIAGNOSIS — N30.00 ACUTE CYSTITIS WITHOUT HEMATURIA: ICD-10-CM

## 2017-11-11 DIAGNOSIS — R79.89 PRERENAL AZOTEMIA: ICD-10-CM

## 2017-11-11 PROBLEM — E86.0 DEHYDRATION: Status: ACTIVE | Noted: 2017-11-11

## 2017-11-11 PROBLEM — R60.0 LEG EDEMA, RIGHT: Status: ACTIVE | Noted: 2017-11-11

## 2017-11-11 LAB
ANION GAP SERPL CALC-SCNC: 10 MMOL/L (ref 0–11.9)
APPEARANCE UR: ABNORMAL
BACTERIA #/AREA URNS HPF: ABNORMAL /HPF
BASOPHILS # BLD AUTO: 0 % (ref 0–1.8)
BASOPHILS # BLD: 0 K/UL (ref 0–0.12)
BILIRUB UR QL STRIP.AUTO: NEGATIVE
BUN SERPL-MCNC: 27 MG/DL (ref 8–22)
C DIFF DNA SPEC QL NAA+PROBE: NEGATIVE
C DIFF TOX A+B STL QL IA: NEGATIVE
C DIFF TOX GENS STL QL NAA+PROBE: NORMAL
CALCIUM SERPL-MCNC: 8.2 MG/DL (ref 8.4–10.2)
CHLORIDE SERPL-SCNC: 104 MMOL/L (ref 96–112)
CO2 SERPL-SCNC: 18 MMOL/L (ref 20–33)
COLOR UR: YELLOW
CREAT SERPL-MCNC: 1.18 MG/DL (ref 0.5–1.4)
CULTURE IF INDICATED INDCX: YES UA CULTURE
DOHLE BOD BLD QL SMEAR: NORMAL
EKG IMPRESSION: NORMAL
EOSINOPHIL # BLD AUTO: 0 K/UL (ref 0–0.51)
EOSINOPHIL NFR BLD: 0 % (ref 0–6.9)
EPI CELLS #/AREA URNS HPF: ABNORMAL /HPF
ERYTHROCYTE [DISTWIDTH] IN BLOOD BY AUTOMATED COUNT: 43.5 FL (ref 35.9–50)
GFR SERPL CREATININE-BSD FRML MDRD: 44 ML/MIN/1.73 M 2
GLUCOSE SERPL-MCNC: 98 MG/DL (ref 65–99)
GLUCOSE UR STRIP.AUTO-MCNC: NEGATIVE MG/DL
HCT VFR BLD AUTO: 34.5 % (ref 37–47)
HGB BLD-MCNC: 12 G/DL (ref 12–16)
KETONES UR STRIP.AUTO-MCNC: NEGATIVE MG/DL
LACTATE BLD-SCNC: 1.5 MMOL/L (ref 0.5–2)
LEUKOCYTE ESTERASE UR QL STRIP.AUTO: ABNORMAL
LG PLATELETS BLD QL SMEAR: NORMAL
LYMPHOCYTES # BLD AUTO: 0.5 K/UL (ref 1–4.8)
LYMPHOCYTES NFR BLD: 3 % (ref 22–41)
MANUAL DIFF BLD: ABNORMAL
MCH RBC QN AUTO: 31 PG (ref 27–33)
MCHC RBC AUTO-ENTMCNC: 34.8 G/DL (ref 33.6–35)
MCV RBC AUTO: 89.1 FL (ref 81.4–97.8)
METAMYELOCYTES NFR BLD MANUAL: 2 %
MICRO URNS: ABNORMAL
MONOCYTES # BLD AUTO: 0.67 K/UL (ref 0–0.85)
MONOCYTES NFR BLD AUTO: 4 % (ref 0–13.4)
MUCOUS THREADS #/AREA URNS HPF: ABNORMAL /HPF
NEUTROPHILS # BLD AUTO: 15.29 K/UL (ref 2–7.15)
NEUTROPHILS NFR BLD: 42 % (ref 44–72)
NEUTS BAND NFR BLD MANUAL: 49 % (ref 0–10)
NITRITE UR QL STRIP.AUTO: POSITIVE
NRBC # BLD AUTO: 0 K/UL
NRBC BLD AUTO-RTO: 0 /100 WBC
PH UR STRIP.AUTO: 5.5 [PH]
PLATELET # BLD AUTO: 290 K/UL (ref 164–446)
PLATELET BLD QL SMEAR: NORMAL
PMV BLD AUTO: 8.9 FL (ref 9–12.9)
POTASSIUM SERPL-SCNC: 3.4 MMOL/L (ref 3.6–5.5)
PROT UR QL STRIP: 30 MG/DL
RBC # BLD AUTO: 3.87 M/UL (ref 4.2–5.4)
RBC # URNS HPF: ABNORMAL /HPF
RBC BLD AUTO: NORMAL
RBC UR QL AUTO: ABNORMAL
SODIUM SERPL-SCNC: 132 MMOL/L (ref 135–145)
SP GR UR STRIP.AUTO: <=1.005
SPECIMEN DRAWN FROM PATIENT: NORMAL
TOXIC GRANULES BLD QL SMEAR: SLIGHT
WBC # BLD AUTO: 16.8 K/UL (ref 4.8–10.8)
WBC #/AREA URNS HPF: ABNORMAL /HPF
WBC STL QL MICRO: NORMAL

## 2017-11-11 PROCEDURE — 700111 HCHG RX REV CODE 636 W/ 250 OVERRIDE (IP): Performed by: EMERGENCY MEDICINE

## 2017-11-11 PROCEDURE — 36415 COLL VENOUS BLD VENIPUNCTURE: CPT

## 2017-11-11 PROCEDURE — 700102 HCHG RX REV CODE 250 W/ 637 OVERRIDE(OP): Performed by: HOSPITALIST

## 2017-11-11 PROCEDURE — 80048 BASIC METABOLIC PNL TOTAL CA: CPT

## 2017-11-11 PROCEDURE — 87493 C DIFF AMPLIFIED PROBE: CPT

## 2017-11-11 PROCEDURE — 99222 1ST HOSP IP/OBS MODERATE 55: CPT | Performed by: FAMILY MEDICINE

## 2017-11-11 PROCEDURE — 51701 INSERT BLADDER CATHETER: CPT

## 2017-11-11 PROCEDURE — A9270 NON-COVERED ITEM OR SERVICE: HCPCS | Performed by: HOSPITALIST

## 2017-11-11 PROCEDURE — 70450 CT HEAD/BRAIN W/O DYE: CPT

## 2017-11-11 PROCEDURE — 85007 BL SMEAR W/DIFF WBC COUNT: CPT

## 2017-11-11 PROCEDURE — 93005 ELECTROCARDIOGRAM TRACING: CPT | Performed by: EMERGENCY MEDICINE

## 2017-11-11 PROCEDURE — 700111 HCHG RX REV CODE 636 W/ 250 OVERRIDE (IP): Performed by: HOSPITALIST

## 2017-11-11 PROCEDURE — 87899 AGENT NOS ASSAY W/OPTIC: CPT

## 2017-11-11 PROCEDURE — 89055 LEUKOCYTE ASSESSMENT FECAL: CPT

## 2017-11-11 PROCEDURE — 71010 DX-CHEST-PORTABLE (1 VIEW): CPT

## 2017-11-11 PROCEDURE — 96365 THER/PROPH/DIAG IV INF INIT: CPT

## 2017-11-11 PROCEDURE — 87077 CULTURE AEROBIC IDENTIFY: CPT

## 2017-11-11 PROCEDURE — 81001 URINALYSIS AUTO W/SCOPE: CPT

## 2017-11-11 PROCEDURE — A9270 NON-COVERED ITEM OR SERVICE: HCPCS | Performed by: FAMILY MEDICINE

## 2017-11-11 PROCEDURE — 770006 HCHG ROOM/CARE - MED/SURG/GYN SEMI*

## 2017-11-11 PROCEDURE — 83605 ASSAY OF LACTIC ACID: CPT

## 2017-11-11 PROCEDURE — 700105 HCHG RX REV CODE 258: Performed by: HOSPITALIST

## 2017-11-11 PROCEDURE — 99285 EMERGENCY DEPT VISIT HI MDM: CPT

## 2017-11-11 PROCEDURE — 87186 SC STD MICRODIL/AGAR DIL: CPT

## 2017-11-11 PROCEDURE — 85027 COMPLETE CBC AUTOMATED: CPT

## 2017-11-11 PROCEDURE — 700102 HCHG RX REV CODE 250 W/ 637 OVERRIDE(OP): Performed by: FAMILY MEDICINE

## 2017-11-11 PROCEDURE — 87086 URINE CULTURE/COLONY COUNT: CPT

## 2017-11-11 PROCEDURE — 87046 STOOL CULTR AEROBIC BACT EA: CPT

## 2017-11-11 PROCEDURE — 87324 CLOSTRIDIUM AG IA: CPT

## 2017-11-11 PROCEDURE — 700105 HCHG RX REV CODE 258: Performed by: EMERGENCY MEDICINE

## 2017-11-11 PROCEDURE — 87045 FECES CULTURE AEROBIC BACT: CPT

## 2017-11-11 RX ORDER — CEFTRIAXONE 1 G/1
1 INJECTION, POWDER, FOR SOLUTION INTRAMUSCULAR; INTRAVENOUS ONCE
Status: COMPLETED | OUTPATIENT
Start: 2017-11-11 | End: 2017-11-11

## 2017-11-11 RX ORDER — SODIUM CHLORIDE 9 MG/ML
1000 INJECTION, SOLUTION INTRAVENOUS ONCE
Status: COMPLETED | OUTPATIENT
Start: 2017-11-11 | End: 2017-11-11

## 2017-11-11 RX ORDER — POTASSIUM CHLORIDE 20 MEQ/1
20 TABLET, EXTENDED RELEASE ORAL ONCE
Status: DISPENSED | OUTPATIENT
Start: 2017-11-11 | End: 2017-11-12

## 2017-11-11 RX ORDER — LEVOTHYROXINE SODIUM 112 UG/1
112 TABLET ORAL
Status: DISCONTINUED | OUTPATIENT
Start: 2017-11-11 | End: 2017-11-14 | Stop reason: HOSPADM

## 2017-11-11 RX ORDER — POTASSIUM CHLORIDE 20 MEQ/1
60 TABLET, EXTENDED RELEASE ORAL ONCE
Status: COMPLETED | OUTPATIENT
Start: 2017-11-11 | End: 2017-11-11

## 2017-11-11 RX ORDER — TRAZODONE HYDROCHLORIDE 50 MG/1
50 TABLET ORAL
Status: DISCONTINUED | OUTPATIENT
Start: 2017-11-11 | End: 2017-11-14 | Stop reason: HOSPADM

## 2017-11-11 RX ORDER — GABAPENTIN 300 MG/1
300 CAPSULE ORAL 3 TIMES DAILY PRN
Status: DISCONTINUED | OUTPATIENT
Start: 2017-11-11 | End: 2017-11-14 | Stop reason: HOSPADM

## 2017-11-11 RX ORDER — HEPARIN SODIUM 5000 [USP'U]/ML
5000 INJECTION, SOLUTION INTRAVENOUS; SUBCUTANEOUS EVERY 12 HOURS
Status: DISCONTINUED | OUTPATIENT
Start: 2017-11-11 | End: 2017-11-14 | Stop reason: HOSPADM

## 2017-11-11 RX ORDER — CEFTRIAXONE 2 G/1
2 INJECTION, POWDER, FOR SOLUTION INTRAMUSCULAR; INTRAVENOUS DAILY
Status: DISCONTINUED | OUTPATIENT
Start: 2017-11-11 | End: 2017-11-11

## 2017-11-11 RX ORDER — HYDROCODONE BITARTRATE AND ACETAMINOPHEN 5; 325 MG/1; MG/1
1-2 TABLET ORAL EVERY 4 HOURS PRN
Status: DISCONTINUED | OUTPATIENT
Start: 2017-11-11 | End: 2017-11-12

## 2017-11-11 RX ORDER — DICYCLOMINE HCL 20 MG
20 TABLET ORAL EVERY 6 HOURS
Status: DISCONTINUED | OUTPATIENT
Start: 2017-11-11 | End: 2017-11-14 | Stop reason: HOSPADM

## 2017-11-11 RX ORDER — CITALOPRAM 20 MG/1
20 TABLET ORAL DAILY
Status: DISCONTINUED | OUTPATIENT
Start: 2017-11-11 | End: 2017-11-14 | Stop reason: HOSPADM

## 2017-11-11 RX ORDER — WITCH HAZEL 50 %
2 PADS, MEDICATED (EA) TOPICAL
Status: DISCONTINUED | OUTPATIENT
Start: 2017-11-11 | End: 2017-11-14 | Stop reason: HOSPADM

## 2017-11-11 RX ORDER — SODIUM CHLORIDE 9 MG/ML
INJECTION, SOLUTION INTRAVENOUS CONTINUOUS
Status: DISCONTINUED | OUTPATIENT
Start: 2017-11-11 | End: 2017-11-14 | Stop reason: HOSPADM

## 2017-11-11 RX ADMIN — CEFTRIAXONE SODIUM 1000 MG: 1 INJECTION, POWDER, FOR SOLUTION INTRAMUSCULAR; INTRAVENOUS at 10:13

## 2017-11-11 RX ADMIN — POTASSIUM CHLORIDE 60 MEQ: 1500 TABLET, EXTENDED RELEASE ORAL at 09:34

## 2017-11-11 RX ADMIN — HEPARIN SODIUM 5000 UNITS: 5000 INJECTION, SOLUTION INTRAVENOUS; SUBCUTANEOUS at 12:31

## 2017-11-11 RX ADMIN — HEPARIN SODIUM 5000 UNITS: 5000 INJECTION, SOLUTION INTRAVENOUS; SUBCUTANEOUS at 21:13

## 2017-11-11 RX ADMIN — SODIUM CHLORIDE 1000 ML: 900 INJECTION, SOLUTION INTRAVENOUS at 03:10

## 2017-11-11 RX ADMIN — SODIUM CHLORIDE 1000 ML: 9 INJECTION, SOLUTION INTRAVENOUS at 05:18

## 2017-11-11 RX ADMIN — GABAPENTIN 300 MG: 300 CAPSULE ORAL at 15:51

## 2017-11-11 RX ADMIN — LEVOTHYROXINE SODIUM 112 MCG: 112 TABLET ORAL at 09:33

## 2017-11-11 RX ADMIN — CEFTRIAXONE SODIUM 1 G: 1 INJECTION, POWDER, FOR SOLUTION INTRAMUSCULAR; INTRAVENOUS at 04:44

## 2017-11-11 RX ADMIN — SODIUM CHLORIDE: 9 INJECTION, SOLUTION INTRAVENOUS at 09:32

## 2017-11-11 RX ADMIN — CITALOPRAM HYDROBROMIDE 20 MG: 20 TABLET ORAL at 09:34

## 2017-11-11 RX ADMIN — HYDROCODONE BITARTRATE AND ACETAMINOPHEN 1 TABLET: 5; 325 TABLET ORAL at 17:23

## 2017-11-11 RX ADMIN — LACTOBACILLUS TAB 2 TABLET: TAB at 12:31

## 2017-11-11 RX ADMIN — TRAZODONE HYDROCHLORIDE 50 MG: 50 TABLET ORAL at 21:13

## 2017-11-11 RX ADMIN — HYDROCODONE BITARTRATE AND ACETAMINOPHEN 1 TABLET: 5; 325 TABLET ORAL at 12:31

## 2017-11-11 RX ADMIN — LACTOBACILLUS TAB 2 TABLET: TAB at 17:23

## 2017-11-11 ASSESSMENT — PAIN SCALES - WONG BAKER: WONGBAKER_NUMERICALRESPONSE: HURTS A LITTLE MORE

## 2017-11-11 ASSESSMENT — PAIN SCALES - GENERAL
PAINLEVEL_OUTOF10: 0
PAINLEVEL_OUTOF10: 7
PAINLEVEL_OUTOF10: 8

## 2017-11-11 ASSESSMENT — LIFESTYLE VARIABLES
TOTAL SCORE: 0
DO YOU DRINK ALCOHOL: YES
EVER FELT BAD OR GUILTY ABOUT YOUR DRINKING: NO
EVER HAD A DRINK FIRST THING IN THE MORNING TO STEADY YOUR NERVES TO GET RID OF A HANGOVER: NO
HAVE PEOPLE ANNOYED YOU BY CRITICIZING YOUR DRINKING: NO
CONSUMPTION TOTAL: NEGATIVE
HAVE YOU EVER FELT YOU SHOULD CUT DOWN ON YOUR DRINKING: NO
TOTAL SCORE: 0
ON A TYPICAL DAY WHEN YOU DRINK ALCOHOL HOW MANY DRINKS DO YOU HAVE: 1
TOTAL SCORE: 0
AVERAGE NUMBER OF DAYS PER WEEK YOU HAVE A DRINK CONTAINING ALCOHOL: 5
HOW MANY TIMES IN THE PAST YEAR HAVE YOU HAD 5 OR MORE DRINKS IN A DAY: 0

## 2017-11-11 ASSESSMENT — ENCOUNTER SYMPTOMS
VOMITING: 0
CHILLS: 1
MYALGIAS: 1
NAUSEA: 0
FEVER: 0

## 2017-11-11 NOTE — DISCHARGE PLANNING
Medical Social Work    Referral: SHEFALI reviewed the chart this AM.      Intervention: No SS or DC needs at this time.      Plan: SHEFALI Vicenta available to assist with any d.c planning.     Care Transition Team Assessment         Readmission Evaluation  Is this a readmission?: No              Discharge Preparedness  What is your plan after discharge?: Uncertain - pending medical team collaboration  What are your discharge supports?: Child, Other (comment) (Friend)                        Advance Directive  Advance Directive?: None

## 2017-11-11 NOTE — ED NOTES
Straight cath performed using sterile technique, pt cleaned and new diapers placed, repositioned in bed for comfort. No more needs stated, will cont. Monitoring.

## 2017-11-11 NOTE — PROGRESS NOTES
Patient arrived to floor via cart from ED and was transferred to bed with slide board.  Patient resting comfortably in bed and denies needs.  Patient verbalized understanding of needing to provide stool sample to r/o c-diff.  Bed alarm remains on and call light is in reach.

## 2017-11-11 NOTE — ED PROVIDER NOTES
ER Provider Note         CHIEF COMPLAINT  Chief Complaint   Patient presents with   • Fall     off cammode in bathroom       HPI  Elizabeth Celis is a 81 y.o. female who presents to the Emergency Department Fall from the commode in the bathroom. The patient says she was transferring over to  the bathroom and her legs felt weak and she fell. She landed on her bottom and then rolled back and struck her head. She did not lose consciousness but did feel nauseated afterwards. The patient does have a history of urinary tract infections. The patient denies any vomiting. The patient does have a remote history of C. diff and has recently been on antibiotics. The patient says she's been having one slightly soft stool per day. The patient also mentions a decreased fluid intake. The patient denies any chest pain or palpitations associated with the fall. The patient says that she has been feeling slightly more weak over the past 2 days. The patient denies any back pain or neck pain. The patient denies any numbness or weakness in her legs at this time but says she feels globally low in energy.    REVIEW OF SYSTEMS  See HPI for further details. All other systems are negative.     PAST MEDICAL HISTORY   has a past medical history of Arthritis; C. difficile diarrhea (2/16); C. difficile diarrhea (3/2016); Cancer (CMS-HCC) (1947); Chronic back pain; Depression; Elbow fracture, left; Heart burn; History of anemia; Hypothyroid; MRSA (methicillin resistant Staphylococcus aureus); MRSA (methicillin resistant Staphylococcus aureus) (2012); Pain; Stroke (CMS-HCC) (1990's?); and Urinary incontinence.    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    SOCIAL HISTORY  Social History   Substance Use Topics   • Smoking status: Former Smoker     Packs/day: 1.00     Years: 20.00     Types: Cigarettes     Quit date: 1/1/1985   • Smokeless tobacco: Never Used   • Alcohol use Yes      Comment: 1-2 per week      History   Drug Use No       FAMILY HISTORY  Family History   Problem Relation Age of Onset   • Non-contributory Mother    • Non-contributory Father    • Anesthesia Paternal Grandfather        CURRENT MEDICATIONS  Home Medications    **Home medications have not yet been reviewed for this encounter**         ALLERGIES  Allergies   Allergen Reactions   • Azithromycin Unspecified     Matti Johnsons syndrome   • Erythromycin Unspecified     Matti Johnsons syndrome   • Nitrofurantoin Vomiting and Nausea   • Pcn [Penicillins] Anaphylaxis, Shortness of Breath and Swelling   • Sulfa Drugs Swelling   • Cefuroxime    • Ciprofloxacin    • Clindamycin    • Codeine Itching   • Daptomycin      Matti colleen syndrome     • Flagyl [Metronidazole] Vomiting and Nausea   • Morphine Itching   • Premarin Unspecified     burning   • Rifampin      Matti colleen syndrome       PHYSICAL EXAM  VITAL SIGNS: /46   Pulse 75   Temp 36.6 °C (97.8 °F)   Resp (!) 27   Wt 62.5 kg (137 lb 14.4 oz)   SpO2 98%   BMI 22.27 kg/m²      Constitutional: Alert in no apparent distress.  HENT:Contusion noted to the back of the head., Bilateral external ears normal, Nose normal.   Eyes: Pupils are equal and reactive, Conjunctiva normal, Non-icteric.   Neck: Normal range of motion, No tenderness, Supple, No stridor.   Lymphatic: No lymphadenopathy noted.   Cardiovascular: Regular rate and rhythm, no murmurs.   Thorax & Lungs: Normal breath sounds, No respiratory distress, No wheezing, No chest tenderness.   Abdomen: Bowel sounds normal, Soft, No tenderness, No masses, No pulsatile masses. No peritoneal signs.  Skin: Warm, Dry, No erythema, No rash.    Back: No bony tenderness, No CVA tenderness.   Extremities: Intact distal pulses, No edema, No tenderness, No cyanosis.  Musculoskeletal: Good range of motion in all major joints. No tenderness to palpation or major deformities noted. 5/5 strength in flexion and extension of hips, knees as well as 5 out of 5 strength in dorsiflexion and plantar flexion. The patient has sensation intact to light touch throughout his lower extremities as well as sensation to light touch in the saddle region.  Neurologic: Alert , Normal motor function, Normal sensory function, No focal deficits noted.   Psychiatric: Affect normal, Judgment normal, Mood normal.     DIAGNOSTIC STUDIES / PROCEDURES    EKG Interpretation:  Interpreted by me  Normal sinus rhythm with no ST-T wave changes and no ectopy.       LABS  Results for orders placed or performed during the hospital encounter of 11/11/17   CBC WITH DIFFERENTIAL   Result Value Ref Range    WBC 16.8 (H) 4.8 - 10.8 K/uL    RBC 3.87 (L) 4.20 - 5.40 M/uL    Hemoglobin 12.0 12.0 - 16.0 g/dL    Hematocrit 34.5 (L) 37.0 - 47.0 %    MCV 89.1 81.4 - 97.8 fL    MCH 31.0 27.0 - 33.0 pg    MCHC 34.8 33.6 - 35.0 g/dL    RDW 43.5 35.9 - 50.0 fL    Platelet Count 290 164 - 446 K/uL    MPV 8.9 (L) 9.0 - 12.9 fL    Neutrophils-Polys 42.00 (L) 44.00 - 72.00 %    Lymphocytes 3.00 (L) 22.00 - 41.00 %    Monocytes 4.00 0.00 - 13.40 %    Eosinophils 0.00 0.00 - 6.90 %    Basophils 0.00 0.00 - 1.80 %    Nucleated RBC 0.00 /100 WBC    Neutrophils (Absolute) 15.29 (H) 2.00 - 7.15 K/uL    Lymphs (Absolute) 0.50 (L) 1.00 - 4.80 K/uL    Monos (Absolute) 0.67 0.00 - 0.85 K/uL    Eos (Absolute) 0.00 0.00 - 0.51 K/uL    Baso (Absolute) 0.00 0.00 - 0.12 K/uL    NRBC (Absolute) 0.00 K/uL   BASIC METABOLIC PANEL   Result Value Ref Range    Sodium 132 (L) 135 - 145 mmol/L    Potassium 3.4 (L) 3.6 - 5.5 mmol/L    Chloride 104 96 - 112 mmol/L    Co2 18 (L) 20 - 33 mmol/L    Glucose 98 65 - 99 mg/dL    Bun 27 (H) 8 - 22  mg/dL    Creatinine 1.18 0.50 - 1.40 mg/dL    Calcium 8.2 (L) 8.4 - 10.2 mg/dL    Anion Gap 10.0 0.0 - 11.9   LACTIC ACID   Result Value Ref Range    Lactic Acid 1.50 0.50 - 2.00 mmol/L    Specimen Venous    URINALYSIS CULTURE, IF INDICATED   Result Value Ref Range    Color Yellow     Character Hazy (A)     Specific Gravity <=1.005 <1.035    Ph 5.5 5.0 - 8.0    Glucose Negative Negative mg/dL    Ketones Negative Negative mg/dL    Protein 30 (A) Negative mg/dL    Bilirubin Negative Negative    Nitrite Positive (A) Negative    Leukocyte Esterase Moderate (A) Negative    Occult Blood Moderate (A) Negative    Micro Urine Req Microscopic     Culture Indicated Yes UA Culture   ESTIMATED GFR   Result Value Ref Range    GFR If  53 (A) >60 mL/min/1.73 m 2    GFR If Non  44 (A) >60 mL/min/1.73 m 2   DIFFERENTIAL MANUAL   Result Value Ref Range    Bands-Stabs 49.00 (H) 0.00 - 10.00 %    Metamyelocytes 2.00 %    Manual Diff Status PERFORMED    PLATELET ESTIMATE   Result Value Ref Range    Plt Estimation Normal    MORPHOLOGY   Result Value Ref Range    RBC Morphology Normal     Large Platelets 1+     Toxic Gran Slight     Dohle Bodies Few    URINE MICROSCOPIC (W/UA)   Result Value Ref Range    WBC 20-50 (A) /hpf    RBC 2-5 (A) /hpf    Bacteria Many (A) None /hpf    Epithelial Cells Rare Few /hpf    Mucous Threads Few /hpf       All labs reviewed by me.    RADIOLOGY  CT-HEAD W/O   Final Result      1.  No acute intracranial findings.      2.  Diffuse atrophy         DX-CHEST-PORTABLE (1 VIEW)   Final Result      Blunting of the costophrenic angles, possibly related to scarring or small pleural effusions.          The radiologist's interpretation of all radiological studies have been reviewed by me.    COURSE & MEDICAL DECISION MAKING  Pertinent Labs & Imaging studies reviewed. (See chart for details)    This is a 81 y.o. female that presents a fall and striking her head in the bathroom. The patient  is mildly hypotensive at this time. I'm concerned this could represent sepsis given her hypotension versus urinary tract infection versus dehydration secondary to volume loss from loose stools and poor by mouth intake. Given the patient's trauma to the back of her head and CT scan her head. Given her fall and also obtain an EKG to assure there is no arrhythmia related to her fall. Additionally placed on the cardiac monitor. I will check her CBC looked for evidence of infection in the form of leukocytosis or anemia. Father will look at the patient's electrolytes for a stretcher arrangement as a constant patient's weakness. No obvious motor weakness on exam. Suggested a chest x-ray looking for evidence of pneumonia given the patient's cough and generalized weakness. At this point the patient has an unimpressive stool history. She does have a bowel movement in the emergency department I will send it for culture.     3:00- AM - Patient seen and examined at bedside.    3:40 AM  Patient found to have no pneumonia on her x-ray of her chest. In the position the patient is found to have a mild hypokalemia as well as mild AKA. Fluids are given to the patient and her blood pressure is now stable. In addition the patient's lactate is within normal limits. On reassessment the patient's mental status is good and CT had as well as urinalysis is pending.    4:35 AM  The patient was found to have a UTI as well as a negative CT head. I had a discussion with the pharmacist about antibiotic choice as well as the hospitalist given the patient's weakness a GI and UTI and need for admission. At this point the hospitals I agreed that we should give the patient ceftriaxone given the unspecified reaction that she had to cephalosporin in the past. We will treat the allergic reaction if need be but the patient does have a very limited antibiotic choice at this time. She will be admitted to the hospitalist in guarded condition. I will defer  treatment for the patient's hypokalemia to the hospitalist. EKG nonischemic        FINAL IMPRESSION  1. Acute cystitis without hematuria    2. Other specified hypotension    3. Weakness    4. Hypokalemia              Electronically signed by: Jacoby Garcia, 11/11/2017

## 2017-11-11 NOTE — PROGRESS NOTES
[facility] Hospitalist Progress Note    Date of Service: 2017    Chief Complaint  81 y.o. female admitted 2017 with fall from commode and finding of UTI.    Interval Problem Update  Feeling chilled.  Hands turning purpule.    Consultants/Specialty        Review of Systems   Constitutional: Positive for chills. Negative for fever.   Gastrointestinal: Negative for nausea and vomiting.   Musculoskeletal: Positive for myalgias.   All other systems reviewed and are negative.     Physical Exam  Laboratory/Imaging   Hemodynamics  Temp (24hrs), Av.4 °C (97.5 °F), Min:36.2 °C (97.2 °F), Max:36.6 °C (97.8 °F)   Temperature: 36.2 °C (97.2 °F)  Pulse  Av.3  Min: 64  Max: 90 Heart Rate (Monitored): 71  Blood Pressure : 103/51, NIBP: 103/49      Respiratory      Respiration: 20, Pulse Oximetry: 99 %             Fluids    Intake/Output Summary (Last 24 hours) at 17 1152  Last data filed at 17 0415   Gross per 24 hour   Intake                0 ml   Output               30 ml   Net              -30 ml       Nutrition  Orders Placed This Encounter   Procedures   • DIET ORDER     Standing Status:   Standing     Number of Occurrences:   1     Order Specific Question:   Diet:     Answer:   Cardiac [6]     Physical Exam  Nursing note and vitals reviewed.  Constitutional: She is oriented to person, place, and time. She appears well-developed and   well-nourished.   HENT:   Head: Normocephalic and atraumatic.   Right Ear: External ear normal.   Left Ear: External ear normal.   Nose: Nose normal.   Mouth/Throat: Oropharynx is small with Mallanpati score of 2-3.  Mucosa is clear and moist.   Eyes: Conjunctivae and extraocular motions are normal. Pupils are equal, round, and reactive to light.   Neck: Normal range of motion. Neck supple.   Cardiovascular: Normal rate, regular rhythm, normal heart sounds and intact distal pulses. Bilateral hand cyanosis.   Pulmonary/Chest: Effort normal and breath sounds normal.    Abdominal: Soft. Bowel sounds are normal.   Musculoskeletal: Normal range of motion.   Neurological: She is alert and oriented to person, place, and time.   Skin: Skin is cool and dry.        Recent Labs      11/11/17   0300   WBC  16.8*   RBC  3.87*   HEMOGLOBIN  12.0   HEMATOCRIT  34.5*   MCV  89.1   MCH  31.0   MCHC  34.8   RDW  43.5   PLATELETCT  290   MPV  8.9*     Recent Labs      11/11/17   0300   SODIUM  132*   POTASSIUM  3.4*   CHLORIDE  104   CO2  18*   GLUCOSE  98   BUN  27*   CREATININE  1.18   CALCIUM  8.2*                      Assessment/Plan     Falls, initial encounter   Assessment & Plan    PT eval and increase activity.        CKD (chronic kidney disease) stage 3, GFR 30-59 ml/min   Assessment & Plan    Avoid nephrotoxic med's, cont IVF and follow.        Raynaud disease   Assessment & Plan    Avoid cold (i.e.  DC cold fluids).        UTI (urinary tract infection)   Assessment & Plan    IV ROCEPHIN.  Follow UCX.        Dehydration   Assessment & Plan    DC Lasix and start IVF.        Hypokalemia- (present on admission)   Assessment & Plan    Supplement and check Mg.        Hyponatremia- (present on admission)   Assessment & Plan    Follow c NS IVF.          Stable issues - med hx (chronic back pain, RA, depression, hypothyroid),    Preventives - IS, Vax, stool soft, DVTP.    Dispo - complex/guarded.      Reviewed items::  EKG reviewed, Radiology images reviewed, Labs reviewed and Medications reviewed  Rao catheter::  No Rao  DVT prophylaxis pharmacological::  Heparin  Ulcer Prophylaxis::  Not indicated  Antibiotics:  Treating active infection/contamination beyond 24 hours perioperative coverage

## 2017-11-11 NOTE — H&P
DATE OF ADMISSION:  11/11/2017    HISTORY OF PRESENT ILLNESS:  This is an 81-year-old female who had come to the   emergency room after a fall from a commode.  Patient is seen at the bedside,   she was found to be very poor historian.  However, history was taken from her   and also one of the ED physicians.  Apparently, patient was trying to    the bathroom from a sitting position and she stated that her legs gave out.    She stated that she fell on her bottom.  Patient also states that she has had   weakness and fatigue for the past couple of days as well.  Patient also states   that she has had fever and fever is subjective.  In the ED, patient was   diagnosed with UTI, being admitted to the hospital for further management.    PAST MEDICAL HISTORY:  Patient has a past medical history of C. diff colitis   and also hypothyroidism, hyponatremia, hypokalemia, depression, rheumatoid   arthritis.    SOCIAL HISTORY:  Patient denies any tobacco or alcohol use.    FAMILY HISTORY:  Reviewed, not significant.    MEDICATIONS:  On the day of admission, medications are multivitamin 1 tab p.o.   daily, naproxen p.r.n., Restasis ophthalmic drops 2 times a day both eyes,   Prolia injection 60 mg instructed as once, furosemide 40 mg p.o. daily,   spironolactone 25 mg p.o. daily, Bentyl 20 mg p.o. q. 6 hours, Norco p.r.n.,   Celexa 20 mg p.o. daily, trazodone 50 mg p.o. at bedtime, levothyroxine 112   mcg p.o. daily, Neurontin 300 mg p.o. 3 times a day.    PHYSICAL EXAMINATION:  VITAL SIGNS:  Temperature of 36.6, pulse of 75, respiratory rate of 27, blood   pressure 108/48, and O2 saturation of 98%.  GENERAL APPEARANCE:  Patient is awake, pleasant, in no acute distress.  HEAD AND NECK:  Neck is supple.  Lips are dry.  No jugular venous distention   noted.  CARDIOVASCULAR:  S1, S2 regular.  LUNGS:  Decreased breath sounds, otherwise clear.  ABDOMEN:  Soft.  There is mild tenderness noted with deep palpation.  No   rebound or  guarding noted.  EXTREMITIES:  Lower extremity, there is abrasion noted both legs.    LABORATORY DATA:  WBC of 16.8, H and H of 12 and 34.5, platelets of 190,000.    Sodium is 132, potassium of 3.4, chloride 104, bicarbonate of 18, glucose of   98, BUN of 27, creatinine of 1.18.  UA also shows wbc of 2250, leukocyte   esterase is moderate, nitrite is positive.  Head CT was done, no acute   intracranial findings, diffuse atrophy noted.  Chest x-ray was also done and   blunting of the costophrenic angles, possibly related to scarring with small   pleural effusion.    ASSESSMENT AND PLAN:  This is an 81-year-old female with urinary tract   infection:  1.  Rocephin IV daily.    For dehydration:  At this time, I will hold Lasix.    For hypothyroidism:  Continue levothyroxine.    For deep venous thrombosis prophylaxis, patient is on compression boot.    For hypokalemia:  We will give the patient K-Dur 20 p.o. x1.       ____________________________________     Hannah Macias MD    [facility]    DD:  11/11/2017 06:03:38  DT:  11/11/2017 07:34:59    D#:  [id number]  Job#:  [id number]

## 2017-11-12 ENCOUNTER — APPOINTMENT (OUTPATIENT)
Dept: RADIOLOGY | Facility: MEDICAL CENTER | Age: 82
DRG: 690 | End: 2017-11-12
Attending: HOSPITALIST
Payer: MEDICARE

## 2017-11-12 PROBLEM — E83.42 HYPOMAGNESEMIA: Status: ACTIVE | Noted: 2017-11-12

## 2017-11-12 PROBLEM — R79.89 PRERENAL AZOTEMIA: Status: ACTIVE | Noted: 2017-11-12

## 2017-11-12 LAB
ALBUMIN SERPL BCP-MCNC: 2 G/DL (ref 3.2–4.9)
ALBUMIN/GLOB SERPL: 0.7 G/DL
ALP SERPL-CCNC: 143 U/L (ref 30–99)
ALT SERPL-CCNC: 27 U/L (ref 2–50)
ANION GAP SERPL CALC-SCNC: 6 MMOL/L (ref 0–11.9)
AST SERPL-CCNC: 35 U/L (ref 12–45)
BASOPHILS # BLD AUTO: 0 % (ref 0–1.8)
BASOPHILS # BLD: 0 K/UL (ref 0–0.12)
BILIRUB SERPL-MCNC: 0.3 MG/DL (ref 0.1–1.5)
BUN SERPL-MCNC: 28 MG/DL (ref 8–22)
CALCIUM SERPL-MCNC: 7.5 MG/DL (ref 8.4–10.2)
CHLORIDE SERPL-SCNC: 111 MMOL/L (ref 96–112)
CO2 SERPL-SCNC: 17 MMOL/L (ref 20–33)
CREAT SERPL-MCNC: 1.19 MG/DL (ref 0.5–1.4)
E COLI SXT1+2 STL IA: NORMAL
EOSINOPHIL # BLD AUTO: 0.5 K/UL (ref 0–0.51)
EOSINOPHIL NFR BLD: 3 % (ref 0–6.9)
ERYTHROCYTE [DISTWIDTH] IN BLOOD BY AUTOMATED COUNT: 48.5 FL (ref 35.9–50)
GFR SERPL CREATININE-BSD FRML MDRD: 43 ML/MIN/1.73 M 2
GLOBULIN SER CALC-MCNC: 2.7 G/DL (ref 1.9–3.5)
GLUCOSE SERPL-MCNC: 105 MG/DL (ref 65–99)
HCT VFR BLD AUTO: 30.9 % (ref 37–47)
HGB BLD-MCNC: 10.1 G/DL (ref 12–16)
LYMPHOCYTES # BLD AUTO: 1.49 K/UL (ref 1–4.8)
LYMPHOCYTES NFR BLD: 9 % (ref 22–41)
MAGNESIUM SERPL-MCNC: 1.7 MG/DL (ref 1.5–2.5)
MANUAL DIFF BLD: ABNORMAL
MCH RBC QN AUTO: 30.6 PG (ref 27–33)
MCHC RBC AUTO-ENTMCNC: 32.7 G/DL (ref 33.6–35)
MCV RBC AUTO: 93.6 FL (ref 81.4–97.8)
MONOCYTES # BLD AUTO: 0.83 K/UL (ref 0–0.85)
MONOCYTES NFR BLD AUTO: 5 % (ref 0–13.4)
NEUTROPHILS # BLD AUTO: 13.78 K/UL (ref 2–7.15)
NEUTROPHILS NFR BLD: 65 % (ref 44–72)
NEUTS BAND NFR BLD MANUAL: 18 % (ref 0–10)
NRBC # BLD AUTO: 0 K/UL
NRBC BLD AUTO-RTO: 0 /100 WBC
PLATELET # BLD AUTO: 286 K/UL (ref 164–446)
PMV BLD AUTO: 9.5 FL (ref 9–12.9)
POTASSIUM SERPL-SCNC: 4.1 MMOL/L (ref 3.6–5.5)
PROT SERPL-MCNC: 4.7 G/DL (ref 6–8.2)
RBC # BLD AUTO: 3.3 M/UL (ref 4.2–5.4)
SIGNIFICANT IND 70042: NORMAL
SITE SITE: NORMAL
SODIUM SERPL-SCNC: 134 MMOL/L (ref 135–145)
SOURCE SOURCE: NORMAL
WBC # BLD AUTO: 16.6 K/UL (ref 4.8–10.8)

## 2017-11-12 PROCEDURE — 700105 HCHG RX REV CODE 258: Performed by: HOSPITALIST

## 2017-11-12 PROCEDURE — 83735 ASSAY OF MAGNESIUM: CPT

## 2017-11-12 PROCEDURE — A9270 NON-COVERED ITEM OR SERVICE: HCPCS | Performed by: FAMILY MEDICINE

## 2017-11-12 PROCEDURE — 700102 HCHG RX REV CODE 250 W/ 637 OVERRIDE(OP): Performed by: FAMILY MEDICINE

## 2017-11-12 PROCEDURE — A9270 NON-COVERED ITEM OR SERVICE: HCPCS | Performed by: HOSPITALIST

## 2017-11-12 PROCEDURE — 93971 EXTREMITY STUDY: CPT | Mod: RT

## 2017-11-12 PROCEDURE — 80053 COMPREHEN METABOLIC PANEL: CPT

## 2017-11-12 PROCEDURE — 85027 COMPLETE CBC AUTOMATED: CPT

## 2017-11-12 PROCEDURE — 770001 HCHG ROOM/CARE - MED/SURG/GYN PRIV*

## 2017-11-12 PROCEDURE — 700111 HCHG RX REV CODE 636 W/ 250 OVERRIDE (IP): Performed by: HOSPITALIST

## 2017-11-12 PROCEDURE — 700102 HCHG RX REV CODE 250 W/ 637 OVERRIDE(OP): Performed by: HOSPITALIST

## 2017-11-12 PROCEDURE — 36415 COLL VENOUS BLD VENIPUNCTURE: CPT

## 2017-11-12 PROCEDURE — 85007 BL SMEAR W/DIFF WBC COUNT: CPT

## 2017-11-12 PROCEDURE — 99233 SBSQ HOSP IP/OBS HIGH 50: CPT | Performed by: HOSPITALIST

## 2017-11-12 RX ORDER — MAGNESIUM SULFATE HEPTAHYDRATE 40 MG/ML
2 INJECTION, SOLUTION INTRAVENOUS ONCE
Status: COMPLETED | OUTPATIENT
Start: 2017-11-12 | End: 2017-11-12

## 2017-11-12 RX ORDER — ONDANSETRON 2 MG/ML
4 INJECTION INTRAMUSCULAR; INTRAVENOUS EVERY 6 HOURS PRN
Status: DISCONTINUED | OUTPATIENT
Start: 2017-11-12 | End: 2017-11-14 | Stop reason: HOSPADM

## 2017-11-12 RX ORDER — HYDROCODONE BITARTRATE AND ACETAMINOPHEN 5; 325 MG/1; MG/1
1-2 TABLET ORAL EVERY 6 HOURS PRN
Status: DISCONTINUED | OUTPATIENT
Start: 2017-11-12 | End: 2017-11-14

## 2017-11-12 RX ORDER — ACETAMINOPHEN 325 MG/1
650 TABLET ORAL EVERY 6 HOURS PRN
Status: DISCONTINUED | OUTPATIENT
Start: 2017-11-12 | End: 2017-11-14 | Stop reason: HOSPADM

## 2017-11-12 RX ORDER — ONDANSETRON 4 MG/1
4 TABLET, ORALLY DISINTEGRATING ORAL EVERY 6 HOURS PRN
Status: DISCONTINUED | OUTPATIENT
Start: 2017-11-12 | End: 2017-11-14 | Stop reason: HOSPADM

## 2017-11-12 RX ADMIN — ONDANSETRON 4 MG: 2 INJECTION, SOLUTION INTRAMUSCULAR; INTRAVENOUS at 18:06

## 2017-11-12 RX ADMIN — SODIUM CHLORIDE: 9 INJECTION, SOLUTION INTRAVENOUS at 03:14

## 2017-11-12 RX ADMIN — CITALOPRAM HYDROBROMIDE 20 MG: 20 TABLET ORAL at 08:27

## 2017-11-12 RX ADMIN — CEFTRIAXONE SODIUM 2000 MG: 2 INJECTION, POWDER, FOR SOLUTION INTRAMUSCULAR; INTRAVENOUS at 08:27

## 2017-11-12 RX ADMIN — HEPARIN SODIUM 5000 UNITS: 5000 INJECTION, SOLUTION INTRAVENOUS; SUBCUTANEOUS at 08:27

## 2017-11-12 RX ADMIN — TRAZODONE HYDROCHLORIDE 50 MG: 50 TABLET ORAL at 20:25

## 2017-11-12 RX ADMIN — LEVOTHYROXINE SODIUM 112 MCG: 112 TABLET ORAL at 06:09

## 2017-11-12 RX ADMIN — DICYCLOMINE HYDROCHLORIDE 20 MG: 20 TABLET ORAL at 16:37

## 2017-11-12 RX ADMIN — HEPARIN SODIUM 5000 UNITS: 5000 INJECTION, SOLUTION INTRAVENOUS; SUBCUTANEOUS at 20:25

## 2017-11-12 RX ADMIN — HYDROCODONE BITARTRATE AND ACETAMINOPHEN 1 TABLET: 5; 325 TABLET ORAL at 08:31

## 2017-11-12 RX ADMIN — MAGNESIUM SULFATE IN WATER 2 G: 40 INJECTION, SOLUTION INTRAVENOUS at 16:31

## 2017-11-12 RX ADMIN — LACTOBACILLUS TAB 2 TABLET: TAB at 16:37

## 2017-11-12 RX ADMIN — LACTOBACILLUS TAB 2 TABLET: TAB at 08:27

## 2017-11-12 RX ADMIN — LACTOBACILLUS TAB 2 TABLET: TAB at 12:20

## 2017-11-12 ASSESSMENT — ENCOUNTER SYMPTOMS
FEVER: 0
NAUSEA: 0
WEAKNESS: 1
VOMITING: 0
ABDOMINAL PAIN: 0
CHILLS: 0
DIARRHEA: 0

## 2017-11-12 ASSESSMENT — PAIN SCALES - GENERAL
PAINLEVEL_OUTOF10: 0
PAINLEVEL_OUTOF10: 5
PAINLEVEL_OUTOF10: 0

## 2017-11-12 NOTE — PROGRESS NOTES
Straight cath done with assistance of charge nurse u.  Patient tolerated well, states her bladder feels better. 550 ml dione urine out with straight cath.

## 2017-11-12 NOTE — PROGRESS NOTES
[facility]    Date of Service: 2017    Chief Complaint  81 y.o. female admitted 2017 with GLF and UTI.    Interval Problem Update  Feeling better.  Warm liquids helping c her hand not turning purple.    Consultants/Specialty      Review of Systems   Constitutional: Negative for chills and fever.   Gastrointestinal: Negative for abdominal pain, diarrhea, nausea and vomiting.   Neurological: Positive for weakness.   All other systems reviewed and are negative.     Physical Exam  Laboratory/Imaging   Hemodynamics  Temp (24hrs), Av.8 °C (98.2 °F), Min:36.7 °C (98 °F), Max:36.8 °C (98.3 °F)   Temperature: 36.8 °C (98.3 °F)  Pulse  Av.8  Min: 64  Max: 95    Blood Pressure : 110/49      Respiratory      Respiration: 18, Pulse Oximetry: 98 %             Fluids    Intake/Output Summary (Last 24 hours) at 17 1415  Last data filed at 17 1009   Gross per 24 hour   Intake              240 ml   Output              750 ml   Net             -510 ml       Nutrition  Orders Placed This Encounter   Procedures   • DIET ORDER     Standing Status:   Standing     Number of Occurrences:   1     Order Specific Question:   Diet:     Answer:   Cardiac [6]     Physical Exam  Nursing note and vitals reviewed.  Constitutional: She is oriented to person, place, and time. She appears well-developed and   well-nourished.   HENT:   Head: Normocephalic and atraumatic.   Right Ear: External ear normal.   Left Ear: External ear normal.   Nose: Nose normal.   Mouth/Throat: Oropharynx is small with Mallanpati score of 3-4.  Mucosa is clear and moist.   Eyes: Conjunctivae and extraocular motions are normal. Pupils are equal, round, and reactive to light.   Neck: Normal range of motion. Neck supple.   Cardiovascular: Normal rate, regular rhythm, normal heart sounds and intact distal pulses.    Pulmonary/Chest: Effort normal and breath sounds normal.   Abdominal: Soft. Bowel sounds are normal.    Musculoskeletal: Normal range of motion.   Neurological: She is alert and oriented to person, place, and time.   Skin: Skin is warm and dry.       Recent Labs      11/11/17   0300  11/12/17   0452   WBC  16.8*  16.6*   RBC  3.87*  3.30*   HEMOGLOBIN  12.0  10.1*   HEMATOCRIT  34.5*  30.9*   MCV  89.1  93.6   MCH  31.0  30.6   MCHC  34.8  32.7*   RDW  43.5  48.5   PLATELETCT  290  286   MPV  8.9*  9.5     Recent Labs      11/11/17   0300  11/12/17   0452   SODIUM  132*  134*   POTASSIUM  3.4*  4.1   CHLORIDE  104  111   CO2  18*  17*   GLUCOSE  98  105*   BUN  27*  28*   CREATININE  1.18  1.19   CALCIUM  8.2*  7.5*                      Assessment/Plan     Prerenal azotemia   Assessment & Plan    Follow c NS IVF.        Hypomagnesemia   Assessment & Plan    Supplement and follow.        Leg edema, right   Assessment & Plan    Await DVT eval.        Falls, initial encounter   Assessment & Plan    PT eval and increase activity.        CKD (chronic kidney disease) stage 3, GFR 30-59 ml/min   Assessment & Plan    Avoid nephrotoxic med's, cont IVF and follow.        Raynaud disease   Assessment & Plan    Improving c warm fluids.        UTI (urinary tract infection)   Assessment & Plan    IV ROCEPHIN.  Await UCX.        Dehydration   Assessment & Plan    Cont IVF and follow.        Hyponatremia- (present on admission)   Assessment & Plan    Improving.  Follow c NS IVF.          Stable issues - med hx (chronic back pain RA, depression, hypothyroid and recent ERV for colitis),    Preventives - IS, Vax, stool soft, DVTP.    Dispo - complex/guarded.      Reviewed items::  EKG reviewed, Radiology images reviewed, Labs reviewed and Medications reviewed  Rao catheter::  No Rao  DVT prophylaxis pharmacological::  Heparin  Ulcer Prophylaxis::  Not indicated

## 2017-11-12 NOTE — PROGRESS NOTES
Patient had incontinent stool and patient unsure if she urinated.  Assisted patient to the bed pan.  Patient urinated 100.  Bladder scan showed 553ml.  Dr. Macias was notified; order received to straight cath.

## 2017-11-13 PROBLEM — E83.42 HYPOMAGNESEMIA: Status: RESOLVED | Noted: 2017-11-12 | Resolved: 2017-11-13

## 2017-11-13 PROBLEM — I82.501 LEG DVT (DEEP VENOUS THROMBOEMBOLISM), CHRONIC, RIGHT (HCC): Status: ACTIVE | Noted: 2017-11-13

## 2017-11-13 LAB
BACTERIA UR CULT: ABNORMAL
BACTERIA UR CULT: ABNORMAL
ERYTHROCYTE [DISTWIDTH] IN BLOOD BY AUTOMATED COUNT: 48.3 FL (ref 35.9–50)
HCT VFR BLD AUTO: 29.6 % (ref 37–47)
HGB BLD-MCNC: 10 G/DL (ref 12–16)
MCH RBC QN AUTO: 31.2 PG (ref 27–33)
MCHC RBC AUTO-ENTMCNC: 33.8 G/DL (ref 33.6–35)
MCV RBC AUTO: 92.2 FL (ref 81.4–97.8)
PLATELET # BLD AUTO: 329 K/UL (ref 164–446)
PMV BLD AUTO: 8.8 FL (ref 9–12.9)
RBC # BLD AUTO: 3.21 M/UL (ref 4.2–5.4)
SIGNIFICANT IND 70042: ABNORMAL
SITE SITE: ABNORMAL
SOURCE SOURCE: ABNORMAL
WBC # BLD AUTO: 14.1 K/UL (ref 4.8–10.8)

## 2017-11-13 PROCEDURE — 97165 OT EVAL LOW COMPLEX 30 MIN: CPT

## 2017-11-13 PROCEDURE — 97161 PT EVAL LOW COMPLEX 20 MIN: CPT

## 2017-11-13 PROCEDURE — A9270 NON-COVERED ITEM OR SERVICE: HCPCS | Performed by: FAMILY MEDICINE

## 2017-11-13 PROCEDURE — 700105 HCHG RX REV CODE 258: Performed by: HOSPITALIST

## 2017-11-13 PROCEDURE — 85027 COMPLETE CBC AUTOMATED: CPT

## 2017-11-13 PROCEDURE — G8988 SELF CARE GOAL STATUS: HCPCS | Mod: CI

## 2017-11-13 PROCEDURE — 36415 COLL VENOUS BLD VENIPUNCTURE: CPT

## 2017-11-13 PROCEDURE — G8987 SELF CARE CURRENT STATUS: HCPCS | Mod: CJ

## 2017-11-13 PROCEDURE — G8978 MOBILITY CURRENT STATUS: HCPCS | Mod: CJ

## 2017-11-13 PROCEDURE — 700102 HCHG RX REV CODE 250 W/ 637 OVERRIDE(OP): Performed by: FAMILY MEDICINE

## 2017-11-13 PROCEDURE — 99232 SBSQ HOSP IP/OBS MODERATE 35: CPT | Performed by: HOSPITALIST

## 2017-11-13 PROCEDURE — 700111 HCHG RX REV CODE 636 W/ 250 OVERRIDE (IP): Performed by: HOSPITALIST

## 2017-11-13 PROCEDURE — G8979 MOBILITY GOAL STATUS: HCPCS | Mod: CI

## 2017-11-13 PROCEDURE — 700102 HCHG RX REV CODE 250 W/ 637 OVERRIDE(OP): Performed by: HOSPITALIST

## 2017-11-13 PROCEDURE — 97535 SELF CARE MNGMENT TRAINING: CPT

## 2017-11-13 PROCEDURE — 770006 HCHG ROOM/CARE - MED/SURG/GYN SEMI*

## 2017-11-13 PROCEDURE — A9270 NON-COVERED ITEM OR SERVICE: HCPCS | Performed by: HOSPITALIST

## 2017-11-13 RX ORDER — CALCIUM CARBONATE 500 MG/1
500 TABLET, CHEWABLE ORAL PRN
Status: DISCONTINUED | OUTPATIENT
Start: 2017-11-13 | End: 2017-11-14 | Stop reason: HOSPADM

## 2017-11-13 RX ORDER — CALCIUM CARBONATE 500 MG/1
1000 TABLET, CHEWABLE ORAL PRN
Status: DISCONTINUED | OUTPATIENT
Start: 2017-11-13 | End: 2017-11-14 | Stop reason: HOSPADM

## 2017-11-13 RX ADMIN — LEVOTHYROXINE SODIUM 112 MCG: 112 TABLET ORAL at 06:28

## 2017-11-13 RX ADMIN — HEPARIN SODIUM 5000 UNITS: 5000 INJECTION, SOLUTION INTRAVENOUS; SUBCUTANEOUS at 20:02

## 2017-11-13 RX ADMIN — HEPARIN SODIUM 5000 UNITS: 5000 INJECTION, SOLUTION INTRAVENOUS; SUBCUTANEOUS at 09:11

## 2017-11-13 RX ADMIN — DICYCLOMINE HYDROCHLORIDE 20 MG: 20 TABLET ORAL at 18:18

## 2017-11-13 RX ADMIN — CEFTRIAXONE SODIUM 2000 MG: 2 INJECTION, POWDER, FOR SOLUTION INTRAMUSCULAR; INTRAVENOUS at 09:09

## 2017-11-13 RX ADMIN — ONDANSETRON 4 MG: 2 INJECTION, SOLUTION INTRAMUSCULAR; INTRAVENOUS at 20:00

## 2017-11-13 RX ADMIN — CITALOPRAM HYDROBROMIDE 20 MG: 20 TABLET ORAL at 09:10

## 2017-11-13 RX ADMIN — DICYCLOMINE HYDROCHLORIDE 20 MG: 20 TABLET ORAL at 12:51

## 2017-11-13 RX ADMIN — ASPIRIN 81 MG: 81 TABLET, COATED ORAL at 12:51

## 2017-11-13 RX ADMIN — DICYCLOMINE HYDROCHLORIDE 20 MG: 20 TABLET ORAL at 23:05

## 2017-11-13 RX ADMIN — ONDANSETRON 4 MG: 2 INJECTION, SOLUTION INTRAMUSCULAR; INTRAVENOUS at 10:47

## 2017-11-13 RX ADMIN — SODIUM CHLORIDE: 9 INJECTION, SOLUTION INTRAVENOUS at 18:18

## 2017-11-13 RX ADMIN — CALCIUM CARBONATE (ANTACID) CHEW TAB 500 MG 1000 MG: 500 CHEW TAB at 23:03

## 2017-11-13 RX ADMIN — LACTOBACILLUS TAB 2 TABLET: TAB at 18:18

## 2017-11-13 RX ADMIN — LACTOBACILLUS TAB 2 TABLET: TAB at 12:51

## 2017-11-13 RX ADMIN — LACTOBACILLUS TAB 2 TABLET: TAB at 09:09

## 2017-11-13 RX ADMIN — HYDROCODONE BITARTRATE AND ACETAMINOPHEN 1 TABLET: 5; 325 TABLET ORAL at 20:05

## 2017-11-13 RX ADMIN — TRAZODONE HYDROCHLORIDE 50 MG: 50 TABLET ORAL at 20:05

## 2017-11-13 RX ADMIN — DICYCLOMINE HYDROCHLORIDE 20 MG: 20 TABLET ORAL at 06:28

## 2017-11-13 ASSESSMENT — GAIT ASSESSMENTS
DEVIATION: SHUFFLED GAIT
ASSISTIVE DEVICE: FRONT WHEEL WALKER
GAIT LEVEL OF ASSIST: CONTACT GUARD ASSIST
DISTANCE (FEET): 150

## 2017-11-13 ASSESSMENT — ENCOUNTER SYMPTOMS
ABDOMINAL PAIN: 0
FEVER: 0
VOMITING: 0
NAUSEA: 0
WEAKNESS: 1
CHILLS: 0
DIARRHEA: 0
SHORTNESS OF BREATH: 0

## 2017-11-13 ASSESSMENT — PAIN SCALES - GENERAL: PAINLEVEL_OUTOF10: 8

## 2017-11-13 ASSESSMENT — ACTIVITIES OF DAILY LIVING (ADL): TOILETING: INDEPENDENT

## 2017-11-13 NOTE — THERAPY
"Physical Therapy Evaluation completed.   Bed Mobility:  Supine to Sit:  (NT, pt sitting up in chair)  Transfers: Sit to Stand: Minimal Assist  Gait: Level Of Assist: Contact Guard Assist with Front-Wheel Walker       Plan of Care: Will benefit from Physical Therapy 5 times per week  Discharge Recommendations: Equipment: Will Continue to Assess for Equipment Needs. Post-acute therapy Discharge to a transitional care facility for continued skilled therapy services.    See \"Rehab Therapy-Acute\" Patient Summary Report for complete documentation.     "

## 2017-11-13 NOTE — CARE PLAN
Problem: Infection  Goal: Will remain free from infection  On IV ancef. Awaiting Final urine CX.     Problem: Bowel/Gastric:  Goal: Normal bowel function is maintained or improved  LBM today.

## 2017-11-13 NOTE — PROGRESS NOTES
Pt resting in bed. No signs of distress noted. VSS, slight temp. Denies pain. Discussed POC for the day. IV site intact. CLIP. Bed alarm on.

## 2017-11-13 NOTE — CARE PLAN
Problem: Nutritional:  Goal: Achieve adequate nutritional intake  Patient will consume >50% of meals  Outcome: PROGRESSING AS EXPECTED  PO intake >50% x 2 recorded meals so far

## 2017-11-13 NOTE — PROGRESS NOTES
[facility]ist Progress Note    Date of Service: 2017    Chief Complaint  81 y.o. female admitted 2017 with GLF and UTI.    Interval Problem Update  Feeling better.  Warm liquids helping c her hand not turning purple.  Anxious to leave the hospital.    Consultants/Specialty      Review of Systems   Constitutional: Negative for chills and fever.   Respiratory: Negative for shortness of breath.    Cardiovascular: Negative for chest pain.   Gastrointestinal: Negative for abdominal pain, diarrhea, nausea and vomiting.   Neurological: Positive for weakness.   All other systems reviewed and are negative.     Physical Exam  Laboratory/Imaging   Hemodynamics  Temp (24hrs), Av °C (98.6 °F), Min:36.7 °C (98 °F), Max:37.3 °C (99.2 °F)   Temperature: 36.8 °C (98.3 °F)  Pulse  Av.2  Min: 64  Max: 95    Blood Pressure : 114/44      Respiratory      Respiration: 18, Pulse Oximetry: 92 %             Fluids    Intake/Output Summary (Last 24 hours) at 17 1240  Last data filed at 17 0930   Gross per 24 hour   Intake             1140 ml   Output              850 ml   Net              290 ml       Nutrition  Orders Placed This Encounter   Procedures   • DIET ORDER     Standing Status:   Standing     Number of Occurrences:   1     Order Specific Question:   Diet:     Answer:   Cardiac [6]     Physical Exam    Nursing note and vitals reviewed.  Constitutional: She is oriented to person, place, and time. She appears well-developed and   well-nourished.   HENT:   Head: Normocephalic and atraumatic.   Right Ear: External ear normal.   Left Ear: External ear normal.   Nose: Nose normal.   Eyes: Conjunctivae and extraocular motions are normal.    Neck: Normal range of motion. Neck supple.   Cardiovascular: Normal rate.    Pulmonary/Chest: Effort normal.   Abdominal: Soft. Bowel sounds are normal.   Musculoskeletal: Normal range of motion.   Neurological: She is alert and oriented to person, place, and time.    Skin: Skin is warm and dry.           Recent Labs      11/11/17   0300  11/12/17   0452  11/13/17   0527   WBC  16.8*  16.6*  14.1*   RBC  3.87*  3.30*  3.21*   HEMOGLOBIN  12.0  10.1*  10.0*   HEMATOCRIT  34.5*  30.9*  29.6*   MCV  89.1  93.6  92.2   MCH  31.0  30.6  31.2   MCHC  34.8  32.7*  33.8   RDW  43.5  48.5  48.3   PLATELETCT  290  286  329   MPV  8.9*  9.5  8.8*     Recent Labs      11/11/17   0300  11/12/17   0452   SODIUM  132*  134*   POTASSIUM  3.4*  4.1   CHLORIDE  104  111   CO2  18*  17*   GLUCOSE  98  105*   BUN  27*  28*   CREATININE  1.18  1.19   CALCIUM  8.2*  7.5*                      Assessment/Plan     Leg DVT (deep venous thromboembolism), chronic, right (CMS-HCC)   Assessment & Plan    Non-obstructive.  In light of increased falls, no anticoag except low dose ASA.        Prerenal azotemia   Assessment & Plan    W/O change.  Follow c NS IVF.        Falls, initial encounter   Assessment & Plan    PT eval and increase activity.        CKD (chronic kidney disease) stage 3, GFR 30-59 ml/min   Assessment & Plan    Avoid nephrotoxic med's, cont IVF and follow.        Raynaud disease   Assessment & Plan    Improving c warm fluids.        UTI (urinary tract infection)   Assessment & Plan    IV ROCEPHIN.  Await UCX.        Dehydration   Assessment & Plan    Cont IVF and follow.        Hyponatremia- (present on admission)   Assessment & Plan    Improving.  Follow c NS IVF.          Stable issues - med hx (chronic back pain RA, depression, hypothyroid and recent ERV for colitis), preventives.    Dispo - complex/guarded.      Reviewed items::  Labs reviewed and Medications reviewed  Rao catheter::  No Rao  DVT prophylaxis pharmacological::  Heparin  Ulcer Prophylaxis::  Not indicated

## 2017-11-13 NOTE — DISCHARGE PLANNING
Medical Social Work     SW intern gave patient choice form for SNF. Patient plans to return home not SNF. PT will try to accommodate friend to stay with her before signing choice form. SW to follow up in 1 hour.     This note has been reviewed by Michelle BARRIOS.

## 2017-11-13 NOTE — DIETARY
NUTRITION SERVICES - Poor PO PTA Consult - Pt is a 82 yo female who comes in with an admitting Dx of UTI and dehydration. H/o hypothyroid, anemia, and heart burn. BMI = 22.27.    Pertinent Labs: WBC 14.1 (H)    Pertinent Meds: Rocephin, (PRN: Norco, Zofran)    Fluids: NS infusion @ 60mL/hr (1440mL per 24 hours)    GI: last BM on 11/13 per chart review    Skin: no skin breakdown noted in the chart    UBW = 120-125# (long term stable wt, per pt report)    The pt was seen today to discuss poor PO PTA per the nutrition admit screening. The pt confirms having a poor appetite for the week prior to admission d/t having low energy and not feeling well. When asked about wt loss during this time, the pt denies knowledge of wt loss. The pt states that she has a good appetite and remarks that 'this is the best hospital food that she has had'. ADL records reflect PO intake >50% x 2 meals so far. The pt was encouraged to continue eating well while in the hospital and to contact nutrition services if she would like to make any changes to her meals.    Plan/Rec:  1) Continue to record PO intake as % of meals eaten  2) Nutrition rep to see pt daily for meal preferences  3) Monitor for consistent PO intake    RD monitoring.

## 2017-11-14 VITALS
BODY MASS INDEX: 22.16 KG/M2 | RESPIRATION RATE: 18 BRPM | HEART RATE: 71 BPM | SYSTOLIC BLOOD PRESSURE: 127 MMHG | DIASTOLIC BLOOD PRESSURE: 56 MMHG | TEMPERATURE: 98.1 F | OXYGEN SATURATION: 92 % | WEIGHT: 137.9 LBS | HEIGHT: 66 IN

## 2017-11-14 LAB
BACTERIA STL CULT: NORMAL
E COLI SXT1+2 STL IA: NORMAL
SIGNIFICANT IND 70042: NORMAL
SITE SITE: NORMAL
SOURCE SOURCE: NORMAL

## 2017-11-14 PROCEDURE — 99239 HOSP IP/OBS DSCHRG MGMT >30: CPT | Performed by: HOSPITALIST

## 2017-11-14 PROCEDURE — 97535 SELF CARE MNGMENT TRAINING: CPT

## 2017-11-14 PROCEDURE — A9270 NON-COVERED ITEM OR SERVICE: HCPCS | Performed by: HOSPITALIST

## 2017-11-14 PROCEDURE — A9270 NON-COVERED ITEM OR SERVICE: HCPCS | Performed by: FAMILY MEDICINE

## 2017-11-14 PROCEDURE — 700105 HCHG RX REV CODE 258: Performed by: HOSPITALIST

## 2017-11-14 PROCEDURE — 700102 HCHG RX REV CODE 250 W/ 637 OVERRIDE(OP): Performed by: FAMILY MEDICINE

## 2017-11-14 PROCEDURE — G8988 SELF CARE GOAL STATUS: HCPCS | Mod: CI

## 2017-11-14 PROCEDURE — 97112 NEUROMUSCULAR REEDUCATION: CPT

## 2017-11-14 PROCEDURE — 700102 HCHG RX REV CODE 250 W/ 637 OVERRIDE(OP): Performed by: HOSPITALIST

## 2017-11-14 PROCEDURE — 700111 HCHG RX REV CODE 636 W/ 250 OVERRIDE (IP): Performed by: HOSPITALIST

## 2017-11-14 PROCEDURE — G8989 SELF CARE D/C STATUS: HCPCS | Mod: CI

## 2017-11-14 RX ORDER — WITCH HAZEL 50 %
2 PADS, MEDICATED (EA) TOPICAL
COMMUNITY
Start: 2017-11-14 | End: 2017-12-27

## 2017-11-14 RX ORDER — HYDROCODONE BITARTRATE AND ACETAMINOPHEN 5; 325 MG/1; MG/1
1-2 TABLET ORAL EVERY 8 HOURS PRN
Status: DISCONTINUED | OUTPATIENT
Start: 2017-11-14 | End: 2017-11-14 | Stop reason: HOSPADM

## 2017-11-14 RX ORDER — ACETAMINOPHEN 325 MG/1
650 TABLET ORAL EVERY 6 HOURS PRN
Qty: 30 TAB | Refills: 0 | COMMUNITY
Start: 2017-11-14 | End: 2018-01-12

## 2017-11-14 RX ORDER — ASPIRIN 81 MG/1
81 TABLET ORAL DAILY
Qty: 30 TAB | COMMUNITY
Start: 2017-11-14 | End: 2017-12-27

## 2017-11-14 RX ADMIN — ASPIRIN 81 MG: 81 TABLET, COATED ORAL at 07:39

## 2017-11-14 RX ADMIN — LEVOTHYROXINE SODIUM 112 MCG: 112 TABLET ORAL at 06:25

## 2017-11-14 RX ADMIN — HEPARIN SODIUM 5000 UNITS: 5000 INJECTION, SOLUTION INTRAVENOUS; SUBCUTANEOUS at 07:37

## 2017-11-14 RX ADMIN — CITALOPRAM HYDROBROMIDE 20 MG: 20 TABLET ORAL at 07:39

## 2017-11-14 RX ADMIN — DICYCLOMINE HYDROCHLORIDE 20 MG: 20 TABLET ORAL at 06:25

## 2017-11-14 RX ADMIN — CEFTRIAXONE SODIUM 2000 MG: 2 INJECTION, POWDER, FOR SOLUTION INTRAMUSCULAR; INTRAVENOUS at 09:51

## 2017-11-14 RX ADMIN — LACTOBACILLUS TAB 2 TABLET: TAB at 07:39

## 2017-11-14 ASSESSMENT — PAIN SCALES - GENERAL: PAINLEVEL_OUTOF10: 0

## 2017-11-14 NOTE — THERAPY
"Occupational Therapy Evaluation completed.   Functional Status:  A&Ox4. Motivated for activity. B hands with RA. Sup<>sit with Sup. STS with SBA. Dons and ties shoes with SBA. Grooming with SBA. Walks ~100' with SBA, FWW.       Plan of Care: Plan to complete next treatment by 11/14.  Discharge Recommendations: Equipment: TBD. Pt has Life Alert, 4WW, SPC, sh seat, grab bars, LH shoe horn, reacher, sock-aide, button hook. Discharge to home with home health for additional skilled therapy services.  Lives alone. Friend able to stay with pt upon d/c.   See \"Rehab Therapy-Acute\" Patient Summary Report for complete documentation.    "

## 2017-11-14 NOTE — CARE PLAN
Problem: Safety  Goal: Will remain free from injury  Able to demo call light & within reach, non-skid socks in place, personal belongings within reach, room floor free of clutter, bed alarm on through out shift    Problem: Pain Management  Goal: Pain level will decrease to patient's comfort goal  PRN norco given - see MAR

## 2017-11-14 NOTE — DISCHARGE PLANNING
SHEFALI met with this patient to complete IMM and HH choice.  Patient chose [facility].  SHEFALI faxed completed choice form to NAYLA Olson for referral follow up.

## 2017-11-14 NOTE — DISCHARGE INSTRUCTIONS
Discharge Instructions    Discharged to home by car with friend. Discharged via wheelchair, hospital escort: Yes.  Special equipment needed: Walker    Be sure to schedule a follow-up appointment with your primary care doctor or any specialists as instructed.     Discharge Plan:   Diet Plan: Discussed  Activity Level: Discussed  Confirmed Follow up Appointment: Patient to Call and Schedule Appointment  Confirmed Symptoms Management: Discussed  Medication Reconciliation Updated: Yes  Influenza Vaccine Indication: Not indicated: Previously immunized this influenza season and > 8 years of age    I understand that a diet low in cholesterol, fat, and sodium is recommended for good health. Unless I have been given specific instructions below for another diet, I accept this instruction as my diet prescription.   Other diet: Regular, no cold liquids    --gradually increase in activity.     Special Instructions: None    · Is patient discharged on Warfarin / Coumadin?   No     · Is patient Post Blood Transfusion?  No    Depression / Suicide Risk    As you are discharged from [facility], it is important to learn how to keep safe from harming yourself.    Recognize the warning signs:  · Abrupt changes in personality, positive or negative- including increase in energy   · Giving away possessions  · Change in eating patterns- significant weight changes-  positive or negative  · Change in sleeping patterns- unable to sleep or sleeping all the time   · Unwillingness or inability to communicate  · Depression  · Unusual sadness, discouragement and loneliness  · Talk of wanting to die  · Neglect of personal appearance   · Rebelliousness- reckless behavior  · Withdrawal from people/activities they love  · Confusion- inability to concentrate     If you or a loved one observes any of these behaviors or has concerns about self-harm, here's what you can do:  · Talk about it- your feelings and reasons for harming  yourself  · Remove any means that you might use to hurt yourself (examples: pills, rope, extension cords, firearm)  · Get professional help from the community (Mental Health, Substance Abuse, psychological counseling)  · Do not be alone:Call your Safe Contact- someone whom you trust who will be there for you.  · Call your local CRISIS HOTLINE 549-4040 or 724-099-3096  · Call your local Children's Mobile Crisis Response Team [place] (652) 861-5400 or [employer]  · Call the toll free National Suicide Prevention Hotlines   · National Suicide Prevention Lifeline 793-861-ARXD (5791)  · [employer] Hope Line Network 800-SUICIDE (299-1374)      Urinary Tract Infection    A urinary tract infection (UTI) can occur any place along the urinary tract. The tract includes the kidneys, ureters, bladder, and urethra. A type of germ called bacteria often causes a UTI. UTIs are often helped with antibiotic medicine.   HOME CARE   · If given, take antibiotics as told by your doctor. Finish them even if you start to feel better.  · Drink enough fluids to keep your pee (urine) clear or pale yellow.  · Avoid tea, drinks with caffeine, and bubbly (carbonated) drinks.  · Pee often. Avoid holding your pee in for a long time.  · Pee before and after having sex (intercourse).  · Wipe from front to back after you poop (bowel movement) if you are a woman. Use each tissue only once.  GET HELP RIGHT AWAY IF:   · You have back pain.  · You have lower belly (abdominal) pain.  · You have chills.  · You feel sick to your stomach (nauseous).  · You throw up (vomit).  · Your burning or discomfort with peeing does not go away.  · You have a fever.  · Your symptoms are not better in 3 days.  MAKE SURE YOU:   · Understand these instructions.  · Will watch your condition.  · Will get help right away if you are not doing well or get worse.     This information is not intended to replace advice given to you by your health care provider. Make  sure you discuss any questions you have with your health care provider.     Document Released: 06/05/2009 Document Revised: 01/08/2016 Document Reviewed: 07/18/2013  [employer] Interactive Patient Education ©2016 [employer]

## 2017-11-14 NOTE — PROGRESS NOTES
Bedside report completed. Assumed pt care. Pt A&O 4, resting in bed comfortably with no signs of labored breathing on RA. Pt is medical. Pt call light within reach, bed in low position, upper bed rails up, non skid socks in place. Pt denies any pain or other distress at this time. Patient was updated on the plan of care for the day. All fall precautions in place. Will continue to monitor.

## 2017-11-14 NOTE — PROGRESS NOTES
Discharge instructions given to patient at bedside, verbalizes understanding and states plans for follow-up with doctors. New and home medication review, post-discharge activity level and worsening of symptoms needing follow-up care discussed. IV cathlon removed. All belongings accounted for, all questions answered at this time. Patient wheelchair escorted out by this RN to friends car.

## 2017-11-14 NOTE — FACE TO FACE
Face to Face Supporting Documentation - Home Health    The encounter with this patient was in whole or in part the primary reason for home health admission.    Date of encounter:   Patient:                    MRN:                       YOB: 2017  Elizabeth Celis  [id number]  1935     Home health to see patient for:  Skilled Nursing care for assessment, interventions & education    Skilled need for:  New Onset Medical Diagnosis UTI, falls    Skilled nursing interventions to include:  Comment: Pt education, PT/OT    Homebound status evidenced by:  Needs the assistance of another person in order to leave the home. Leaving home requires a considerable and taxing effort. There is a normal inability to leave the home.    Community Physician to provide follow up care: Christen Shields M.D.     Optional Interventions? No      I certify the face to face encounter for this home health care referral meets the CMS requirements and the encounter/clinical assessment with the patient was, in whole, or in part, for the medical condition(s) listed above, which is the primary reason for home health care. Based on my clinical findings: the service(s) are medically necessary, support the need for home health care, and the homebound criteria are met.  I certify that this patient has had a face to face encounter by myself.  Josh Norton M.D. - NPI: [id number]

## 2017-11-14 NOTE — DISCHARGE PLANNING
SW intern met with patient bedside and reported to this SW that this Patient states that she is not going to go to SNF.  She states that her friend Monica Ng will be staying with her for the first night or two and that her friend will provide support after that if needed.

## 2017-11-15 NOTE — DISCHARGE SUMMARY
DATE OF ADMISSION:  11/11/2017    DATE OF DISCHARGE:  11/14/2017    DISCHARGE DIAGNOSES:  Urinary tract infection, azotemia, nonocclusive right   lower extremity deep vein thrombosis, ground level fall, hyponatremia, and   hypokalemia.    OTHER DIAGNOSES:  Recent emergency room visit for colitis, hypothyroidism,   depression, rheumatoid arthritis, chronic back pain, and chronic kidney   disease, stage III.    ADMISSION HISTORY:  Please refer to admission note of 11/11/2017, for details.    HOSPITAL COURSE:  The patient is an 81-year-old female with a history of   chronic kidney disease, stage III and recent ER visit for colitis.  Patient   now presents with episode of ground level fall off of her commode.  Upon   admission, the patient was evaluated with EKG with findings of sinus rhythm.    Chest x-ray showed coastal blunting, otherwise no acute process.  Head CT   showed atrophy, but no acute process.  Urinary analysis was indicative for   infection.  Patient was empirically started on ceftriaxone.  Patient's   electrolytes were repleted without difficulty.  Patient's C. diff toxin study   was negative.  Patient's urine culture grew out E. coli that was sensitive to   the ceftriaxone.  Because of her debility, PT/OT evaluation was obtained.  It   was recommended that patient would benefit from skilled nursing facility.    However, patient was adamant about not going to a skilled nursing facility.    She states that she had adequate support at home.  With this in mind, the   patient was provided home health.  At the time of the dictation, patient was   feeling much better, eating and drinking well, having good bowel movements,   and was hemodynamically stable.    CONSULTATIONS OBTAINED:  None.    PROCEDURES:  Chest x-ray and head CT on 11/11/2010, right lower extremity   ultrasound on 11/12/2017.    DISCHARGE MEDICATIONS:  1.  Acetaminophen 650 mg q. 6 hours p.r.n. for moderate pain.  2.  Lactinex 2 tabs t.i.d.  3.   Aspirin 81 mg daily.  4.  Citalopram 20 mg daily.  5.  Cyclosporine ophthalmic b.i.d. to both eyes.  6.  Denosumab 60 mg per home regimen.  7.  Diclofenac sodium gel to skin p.r.n.  8.  Dicyclomine 20 mg q. 6h.  9.  Gabapentin ____ mg t.i.d.  10.  Levothyroxine 112 mcg daily.  11.  Multivitamin with minerals 1 tab daily.  12.  Naproxen 220 mg p.r.n.  13.  Tetrahydrozoline ophthalmic p.r.n.  14.  Trazodone 50 mg at bedtime.    DISCHARGE DIET:  Regular diet.    DISCHARGE ACTIVITY:  Gradual increase in activity.    DISCHARGE FOLLOWUP:  With Dr. Christen Shields, the patient's primary care   provider in approximately 2-3 weeks.    Total discharge time process 34 minutes.       ____________________________________     MD ELIGIO WHITE / [facility]    DD:  11/14/2017 14:05:27  DT:  11/14/2017 22:30:52    D#:  [id number]  Job#:  [id number]    cc: Milly Shields MD

## 2017-11-17 NOTE — DISCHARGE PLANNING
Spoke with Mikayla at [facility], referral was never received.  Referral has been resent to [facility]. CCT [facility] has been advised.

## 2017-12-04 ENCOUNTER — HOSPITAL ENCOUNTER (OUTPATIENT)
Facility: MEDICAL CENTER | Age: 82
End: 2017-12-04
Attending: FAMILY MEDICINE
Payer: MEDICARE

## 2017-12-04 LAB
APPEARANCE UR: CLEAR
BILIRUB UR QL STRIP.AUTO: NEGATIVE
COLOR UR: YELLOW
CULTURE IF INDICATED INDCX: NO UA CULTURE
GLUCOSE UR STRIP.AUTO-MCNC: NEGATIVE MG/DL
KETONES UR STRIP.AUTO-MCNC: NEGATIVE MG/DL
LEUKOCYTE ESTERASE UR QL STRIP.AUTO: NEGATIVE
MICRO URNS: NORMAL
NITRITE UR QL STRIP.AUTO: NEGATIVE
PH UR STRIP.AUTO: 6.5 [PH]
PROT UR QL STRIP: NEGATIVE MG/DL
RBC UR QL AUTO: NEGATIVE
SP GR UR STRIP.AUTO: 1.01
UROBILINOGEN UR STRIP.AUTO-MCNC: 0.2 MG/DL

## 2017-12-04 PROCEDURE — 81003 URINALYSIS AUTO W/O SCOPE: CPT

## 2017-12-04 PROCEDURE — 87493 C DIFF AMPLIFIED PROBE: CPT

## 2017-12-04 PROCEDURE — 87324 CLOSTRIDIUM AG IA: CPT

## 2017-12-05 LAB
C DIFF DNA SPEC QL NAA+PROBE: NEGATIVE
C DIFF TOX A+B STL QL IA: POSITIVE
C DIFF TOX GENS STL QL NAA+PROBE: NORMAL

## 2017-12-27 DIAGNOSIS — Z01.812 PRE-OPERATIVE LABORATORY EXAMINATION: ICD-10-CM

## 2017-12-27 PROCEDURE — 85027 COMPLETE CBC AUTOMATED: CPT

## 2017-12-27 PROCEDURE — 36415 COLL VENOUS BLD VENIPUNCTURE: CPT

## 2017-12-27 PROCEDURE — 87086 URINE CULTURE/COLONY COUNT: CPT

## 2017-12-27 PROCEDURE — 80048 BASIC METABOLIC PNL TOTAL CA: CPT

## 2017-12-27 PROCEDURE — 81003 URINALYSIS AUTO W/O SCOPE: CPT

## 2017-12-27 PROCEDURE — 87641 MR-STAPH DNA AMP PROBE: CPT

## 2017-12-27 PROCEDURE — 87640 STAPH A DNA AMP PROBE: CPT | Mod: 59

## 2017-12-27 NOTE — OR NURSING
"Preadmit appointment: \" Preparing for your Procedure information\" sheet given to patient with verbal and written instructions. Patient instructed to continue prescribed medications through the day before surgery, instructed to take the following medications the day of surgery per anesthesia protocol: Gabapentin if needed, Levothyroxine  "

## 2017-12-27 NOTE — DISCHARGE PLANNING
DISCHARGE PLANNING NOTE - TOTAL JOINT     Procedure: Procedure(s):  SHOULDER ARTHROPLASTY TOTAL - REVERSE  Procedure Date: 1/9/2018  Insurance:  Payor: MEDICARE / Plan: MEDICARE PART A & B / Product Type: *No Product type* /   Equipment currently available at home? cane  Steps into the home? N/A  Steps within the home? N/A  Toilet height? N/A  Type of shower? N/A  Who will be with you during your recovery? daughter  Is Outpatient Physical Therapy set up after surgery? Yes   Did you take the Total Joint Class and where? Yes, at [facility]     Plan:

## 2017-12-28 LAB
ANION GAP SERPL CALC-SCNC: 12 MMOL/L (ref 0–11.9)
APPEARANCE UR: CLEAR
BILIRUB UR QL STRIP.AUTO: NEGATIVE
BUN SERPL-MCNC: 53 MG/DL (ref 8–22)
CALCIUM SERPL-MCNC: 11 MG/DL (ref 8.5–10.5)
CHLORIDE SERPL-SCNC: 101 MMOL/L (ref 96–112)
CO2 SERPL-SCNC: 21 MMOL/L (ref 20–33)
COLOR UR: YELLOW
CREAT SERPL-MCNC: 1.49 MG/DL (ref 0.5–1.4)
ERYTHROCYTE [DISTWIDTH] IN BLOOD BY AUTOMATED COUNT: 47.7 FL (ref 35.9–50)
GFR SERPL CREATININE-BSD FRML MDRD: 34 ML/MIN/1.73 M 2
GLUCOSE SERPL-MCNC: 80 MG/DL (ref 65–99)
GLUCOSE UR STRIP.AUTO-MCNC: NEGATIVE MG/DL
HCT VFR BLD AUTO: 36.5 % (ref 37–47)
HGB BLD-MCNC: 12.2 G/DL (ref 12–16)
KETONES UR STRIP.AUTO-MCNC: NEGATIVE MG/DL
LEUKOCYTE ESTERASE UR QL STRIP.AUTO: NEGATIVE
MCH RBC QN AUTO: 30.5 PG (ref 27–33)
MCHC RBC AUTO-ENTMCNC: 33.4 G/DL (ref 33.6–35)
MCV RBC AUTO: 91.3 FL (ref 81.4–97.8)
MICRO URNS: NORMAL
NITRITE UR QL STRIP.AUTO: NEGATIVE
PH UR STRIP.AUTO: 6 [PH]
PLATELET # BLD AUTO: 433 K/UL (ref 164–446)
PMV BLD AUTO: 9.1 FL (ref 9–12.9)
POTASSIUM SERPL-SCNC: 3.9 MMOL/L (ref 3.6–5.5)
PROT UR QL STRIP: NEGATIVE MG/DL
RBC # BLD AUTO: 4 M/UL (ref 4.2–5.4)
RBC UR QL AUTO: NEGATIVE
SCCMEC + MECA PNL NOSE NAA+PROBE: NEGATIVE
SCCMEC + MECA PNL NOSE NAA+PROBE: NEGATIVE
SODIUM SERPL-SCNC: 134 MMOL/L (ref 135–145)
SP GR UR STRIP.AUTO: 1.01
UROBILINOGEN UR STRIP.AUTO-MCNC: 0.2 MG/DL
WBC # BLD AUTO: 6.5 K/UL (ref 4.8–10.8)

## 2017-12-30 LAB
BACTERIA UR CULT: NORMAL
SIGNIFICANT IND 70042: NORMAL
SITE SITE: NORMAL
SOURCE SOURCE: NORMAL

## 2018-01-09 ENCOUNTER — APPOINTMENT (OUTPATIENT)
Dept: RADIOLOGY | Facility: MEDICAL CENTER | Age: 83
DRG: 483 | End: 2018-01-09
Attending: ORTHOPAEDIC SURGERY
Payer: MEDICARE

## 2018-01-09 ENCOUNTER — HOSPITAL ENCOUNTER (INPATIENT)
Facility: MEDICAL CENTER | Age: 83
LOS: 1 days | DRG: 483 | End: 2018-01-10
Attending: ORTHOPAEDIC SURGERY | Admitting: ORTHOPAEDIC SURGERY
Payer: MEDICARE

## 2018-01-09 PROCEDURE — 700111 HCHG RX REV CODE 636 W/ 250 OVERRIDE (IP): Performed by: ORTHOPAEDIC SURGERY

## 2018-01-09 PROCEDURE — 700111 HCHG RX REV CODE 636 W/ 250 OVERRIDE (IP)

## 2018-01-09 PROCEDURE — 700105 HCHG RX REV CODE 258: Performed by: ORTHOPAEDIC SURGERY

## 2018-01-09 PROCEDURE — 160029 HCHG SURGERY MINUTES - 1ST 30 MINS LEVEL 4: Performed by: ORTHOPAEDIC SURGERY

## 2018-01-09 PROCEDURE — 160002 HCHG RECOVERY MINUTES (STAT): Performed by: ORTHOPAEDIC SURGERY

## 2018-01-09 PROCEDURE — 73020 X-RAY EXAM OF SHOULDER: CPT | Mod: RT

## 2018-01-09 PROCEDURE — 770006 HCHG ROOM/CARE - MED/SURG/GYN SEMI*

## 2018-01-09 PROCEDURE — 700101 HCHG RX REV CODE 250

## 2018-01-09 PROCEDURE — A4450 NON-WATERPROOF TAPE: HCPCS | Performed by: ORTHOPAEDIC SURGERY

## 2018-01-09 PROCEDURE — 500122 HCHG BOVIE, BLADE: Performed by: ORTHOPAEDIC SURGERY

## 2018-01-09 PROCEDURE — 160009 HCHG ANES TIME/MIN: Performed by: ORTHOPAEDIC SURGERY

## 2018-01-09 PROCEDURE — 700101 HCHG RX REV CODE 250: Performed by: ORTHOPAEDIC SURGERY

## 2018-01-09 PROCEDURE — 500891 HCHG PACK, ORTHO MAJOR: Performed by: ORTHOPAEDIC SURGERY

## 2018-01-09 PROCEDURE — 94760 N-INVAS EAR/PLS OXIMETRY 1: CPT

## 2018-01-09 PROCEDURE — 160048 HCHG OR STATISTICAL LEVEL 1-5: Performed by: ORTHOPAEDIC SURGERY

## 2018-01-09 PROCEDURE — 160041 HCHG SURGERY MINUTES - EA ADDL 1 MIN LEVEL 4: Performed by: ORTHOPAEDIC SURGERY

## 2018-01-09 PROCEDURE — A9270 NON-COVERED ITEM OR SERVICE: HCPCS | Performed by: ORTHOPAEDIC SURGERY

## 2018-01-09 PROCEDURE — 700102 HCHG RX REV CODE 250 W/ 637 OVERRIDE(OP): Performed by: ORTHOPAEDIC SURGERY

## 2018-01-09 PROCEDURE — 160022 HCHG BLOCK: Performed by: ORTHOPAEDIC SURGERY

## 2018-01-09 PROCEDURE — 502000 HCHG MISC OR IMPLANTS RC 0278: Performed by: ORTHOPAEDIC SURGERY

## 2018-01-09 PROCEDURE — 160035 HCHG PACU - 1ST 60 MINS PHASE I: Performed by: ORTHOPAEDIC SURGERY

## 2018-01-09 PROCEDURE — 500562 HCHG FIBERWIRE: Performed by: ORTHOPAEDIC SURGERY

## 2018-01-09 PROCEDURE — 0RRJ00Z REPLACEMENT OF RIGHT SHOULDER JOINT WITH REVERSE BALL AND SOCKET SYNTHETIC SUBSTITUTE, OPEN APPROACH: ICD-10-PCS | Performed by: ORTHOPAEDIC SURGERY

## 2018-01-09 PROCEDURE — L3670 SO ACRO/CLAV CAN WEB PRE OTS: HCPCS | Performed by: ORTHOPAEDIC SURGERY

## 2018-01-09 PROCEDURE — 501838 HCHG SUTURE GENERAL: Performed by: ORTHOPAEDIC SURGERY

## 2018-01-09 PROCEDURE — A6222 GAUZE <=16 IN NO W/SAL W/O B: HCPCS | Performed by: ORTHOPAEDIC SURGERY

## 2018-01-09 PROCEDURE — 700105 HCHG RX REV CODE 258

## 2018-01-09 DEVICE — IMPLANTABLE DEVICE: Type: IMPLANTABLE DEVICE | Status: FUNCTIONAL

## 2018-01-09 RX ORDER — SCOLOPAMINE TRANSDERMAL SYSTEM 1 MG/1
1 PATCH, EXTENDED RELEASE TRANSDERMAL
Status: DISCONTINUED | OUTPATIENT
Start: 2018-01-09 | End: 2018-01-10 | Stop reason: HOSPADM

## 2018-01-09 RX ORDER — VANCOMYCIN HCL 900 MCG/MG
POWDER (GRAM) MISCELLANEOUS
Status: DISCONTINUED | OUTPATIENT
Start: 2018-01-09 | End: 2018-01-09 | Stop reason: HOSPADM

## 2018-01-09 RX ORDER — ACETAMINOPHEN 500 MG
1000 TABLET ORAL EVERY 6 HOURS
Status: DISCONTINUED | OUTPATIENT
Start: 2018-01-09 | End: 2018-01-10 | Stop reason: HOSPADM

## 2018-01-09 RX ORDER — GABAPENTIN 300 MG/1
300 CAPSULE ORAL 3 TIMES DAILY PRN
Status: DISCONTINUED | OUTPATIENT
Start: 2018-01-09 | End: 2018-01-10 | Stop reason: HOSPADM

## 2018-01-09 RX ORDER — CITALOPRAM 20 MG/1
20 TABLET ORAL DAILY
Status: DISCONTINUED | OUTPATIENT
Start: 2018-01-09 | End: 2018-01-10 | Stop reason: HOSPADM

## 2018-01-09 RX ORDER — TRAZODONE HYDROCHLORIDE 50 MG/1
50 TABLET ORAL
Status: DISCONTINUED | OUTPATIENT
Start: 2018-01-09 | End: 2018-01-10 | Stop reason: HOSPADM

## 2018-01-09 RX ORDER — POLYETHYLENE GLYCOL 3350 17 G/17G
1 POWDER, FOR SOLUTION ORAL 2 TIMES DAILY PRN
Status: DISCONTINUED | OUTPATIENT
Start: 2018-01-09 | End: 2018-01-10 | Stop reason: HOSPADM

## 2018-01-09 RX ORDER — BISACODYL 10 MG
10 SUPPOSITORY, RECTAL RECTAL
Status: DISCONTINUED | OUTPATIENT
Start: 2018-01-09 | End: 2018-01-10 | Stop reason: HOSPADM

## 2018-01-09 RX ORDER — DEXAMETHASONE SODIUM PHOSPHATE 4 MG/ML
4 INJECTION, SOLUTION INTRA-ARTICULAR; INTRALESIONAL; INTRAMUSCULAR; INTRAVENOUS; SOFT TISSUE
Status: DISCONTINUED | OUTPATIENT
Start: 2018-01-09 | End: 2018-01-10 | Stop reason: HOSPADM

## 2018-01-09 RX ORDER — SPIRONOLACTONE 25 MG/1
25 TABLET ORAL DAILY
Status: DISCONTINUED | OUTPATIENT
Start: 2018-01-09 | End: 2018-01-10 | Stop reason: HOSPADM

## 2018-01-09 RX ORDER — DOCUSATE SODIUM 100 MG/1
100 CAPSULE, LIQUID FILLED ORAL 2 TIMES DAILY
Status: DISCONTINUED | OUTPATIENT
Start: 2018-01-09 | End: 2018-01-10 | Stop reason: HOSPADM

## 2018-01-09 RX ORDER — FUROSEMIDE 20 MG/1
40 TABLET ORAL DAILY
COMMUNITY
End: 2019-03-27 | Stop reason: CLARIF

## 2018-01-09 RX ORDER — LEVOTHYROXINE SODIUM 112 UG/1
112 TABLET ORAL
Status: DISCONTINUED | OUTPATIENT
Start: 2018-01-10 | End: 2018-01-10 | Stop reason: HOSPADM

## 2018-01-09 RX ORDER — POLYVINYL ALCOHOL 14 MG/ML
1-2 SOLUTION/ DROPS OPHTHALMIC
Status: DISCONTINUED | OUTPATIENT
Start: 2018-01-09 | End: 2018-01-10 | Stop reason: HOSPADM

## 2018-01-09 RX ORDER — DIPHENHYDRAMINE HYDROCHLORIDE 50 MG/ML
25 INJECTION INTRAMUSCULAR; INTRAVENOUS EVERY 6 HOURS PRN
Status: DISCONTINUED | OUTPATIENT
Start: 2018-01-09 | End: 2018-01-10 | Stop reason: HOSPADM

## 2018-01-09 RX ORDER — LIDOCAINE HYDROCHLORIDE 10 MG/ML
INJECTION, SOLUTION INFILTRATION; PERINEURAL
Status: COMPLETED
Start: 2018-01-09 | End: 2018-01-09

## 2018-01-09 RX ORDER — HALOPERIDOL 5 MG/ML
1 INJECTION INTRAMUSCULAR EVERY 6 HOURS PRN
Status: DISCONTINUED | OUTPATIENT
Start: 2018-01-09 | End: 2018-01-10 | Stop reason: HOSPADM

## 2018-01-09 RX ORDER — ONDANSETRON 2 MG/ML
4 INJECTION INTRAMUSCULAR; INTRAVENOUS EVERY 4 HOURS PRN
Status: DISCONTINUED | OUTPATIENT
Start: 2018-01-09 | End: 2018-01-10 | Stop reason: HOSPADM

## 2018-01-09 RX ORDER — AMOXICILLIN 250 MG
1 CAPSULE ORAL NIGHTLY
Status: DISCONTINUED | OUTPATIENT
Start: 2018-01-09 | End: 2018-01-10 | Stop reason: HOSPADM

## 2018-01-09 RX ORDER — ALUMINUM ZIRCONIUM OCTACHLOROHYDREX GLY 16 G/100G
4 GEL TOPICAL DAILY
Status: DISCONTINUED | OUTPATIENT
Start: 2018-01-09 | End: 2018-01-09

## 2018-01-09 RX ORDER — OXYCODONE HYDROCHLORIDE 5 MG/1
5 TABLET ORAL
Status: DISCONTINUED | OUTPATIENT
Start: 2018-01-09 | End: 2018-01-10 | Stop reason: HOSPADM

## 2018-01-09 RX ORDER — DEXTROSE MONOHYDRATE, SODIUM CHLORIDE, AND POTASSIUM CHLORIDE 50; 1.49; 4.5 G/1000ML; G/1000ML; G/1000ML
INJECTION, SOLUTION INTRAVENOUS CONTINUOUS
Status: DISCONTINUED | OUTPATIENT
Start: 2018-01-09 | End: 2018-01-10 | Stop reason: HOSPADM

## 2018-01-09 RX ORDER — AMOXICILLIN 250 MG
1 CAPSULE ORAL
Status: DISCONTINUED | OUTPATIENT
Start: 2018-01-09 | End: 2018-01-10 | Stop reason: HOSPADM

## 2018-01-09 RX ORDER — SPIRONOLACTONE 25 MG/1
25 TABLET ORAL DAILY
COMMUNITY
End: 2019-06-12 | Stop reason: SDUPTHER

## 2018-01-09 RX ORDER — ENEMA 19; 7 G/133ML; G/133ML
1 ENEMA RECTAL
Status: DISCONTINUED | OUTPATIENT
Start: 2018-01-09 | End: 2018-01-10 | Stop reason: HOSPADM

## 2018-01-09 RX ORDER — CYCLOSPORINE 0.5 MG/ML
1 EMULSION OPHTHALMIC 2 TIMES DAILY
Status: DISCONTINUED | OUTPATIENT
Start: 2018-01-09 | End: 2018-01-09

## 2018-01-09 RX ORDER — FUROSEMIDE 20 MG/1
20 TABLET ORAL DAILY
Status: DISCONTINUED | OUTPATIENT
Start: 2018-01-09 | End: 2018-01-10 | Stop reason: HOSPADM

## 2018-01-09 RX ORDER — SODIUM CHLORIDE, SODIUM LACTATE, POTASSIUM CHLORIDE, CALCIUM CHLORIDE 600; 310; 30; 20 MG/100ML; MG/100ML; MG/100ML; MG/100ML
INJECTION, SOLUTION INTRAVENOUS
Status: DISCONTINUED | OUTPATIENT
Start: 2018-01-09 | End: 2018-01-10 | Stop reason: HOSPADM

## 2018-01-09 RX ORDER — OXYCODONE HYDROCHLORIDE 10 MG/1
10 TABLET ORAL
Status: DISCONTINUED | OUTPATIENT
Start: 2018-01-09 | End: 2018-01-10 | Stop reason: HOSPADM

## 2018-01-09 RX ORDER — MIDAZOLAM HYDROCHLORIDE 1 MG/ML
INJECTION INTRAMUSCULAR; INTRAVENOUS
Status: DISPENSED
Start: 2018-01-09 | End: 2018-01-09

## 2018-01-09 RX ADMIN — TRAZODONE HYDROCHLORIDE 50 MG: 50 TABLET ORAL at 21:50

## 2018-01-09 RX ADMIN — VANCOMYCIN 1 G: 1 INJECTION, SOLUTION INTRAVENOUS at 09:23

## 2018-01-09 RX ADMIN — VANCOMYCIN HYDROCHLORIDE 800 MG: 5 INJECTION, POWDER, LYOPHILIZED, FOR SOLUTION INTRAVENOUS at 21:50

## 2018-01-09 RX ADMIN — POTASSIUM CHLORIDE, DEXTROSE MONOHYDRATE AND SODIUM CHLORIDE: 150; 5; 450 INJECTION, SOLUTION INTRAVENOUS at 13:49

## 2018-01-09 RX ADMIN — LIDOCAINE HYDROCHLORIDE 0.2 ML: 10 INJECTION, SOLUTION INFILTRATION; PERINEURAL at 09:15

## 2018-01-09 RX ADMIN — SODIUM CHLORIDE, SODIUM LACTATE, POTASSIUM CHLORIDE, CALCIUM CHLORIDE: 600; 310; 30; 20 INJECTION, SOLUTION INTRAVENOUS at 09:15

## 2018-01-09 RX ADMIN — ONDANSETRON 4 MG: 2 INJECTION INTRAMUSCULAR; INTRAVENOUS at 22:01

## 2018-01-09 RX ADMIN — DOCUSATE SODIUM AND SENNOSIDES 1 TABLET: 8.6; 5 TABLET, FILM COATED ORAL at 21:50

## 2018-01-09 RX ADMIN — ACETAMINOPHEN 1000 MG: 500 TABLET, COATED ORAL at 17:28

## 2018-01-09 ASSESSMENT — LIFESTYLE VARIABLES
DO YOU DRINK ALCOHOL: YES
EVER_SMOKED: YES
HAVE YOU EVER FELT YOU SHOULD CUT DOWN ON YOUR DRINKING: NO
ON A TYPICAL DAY WHEN YOU DRINK ALCOHOL HOW MANY DRINKS DO YOU HAVE: 1
TOTAL SCORE: 0
TOTAL SCORE: 0
AVERAGE NUMBER OF DAYS PER WEEK YOU HAVE A DRINK CONTAINING ALCOHOL: 7
EVER FELT BAD OR GUILTY ABOUT YOUR DRINKING: NO
TOTAL SCORE: 0
HAVE PEOPLE ANNOYED YOU BY CRITICIZING YOUR DRINKING: NO
EVER HAD A DRINK FIRST THING IN THE MORNING TO STEADY YOUR NERVES TO GET RID OF A HANGOVER: NO
CONSUMPTION TOTAL: NEGATIVE
HOW MANY TIMES IN THE PAST YEAR HAVE YOU HAD 5 OR MORE DRINKS IN A DAY: 0

## 2018-01-09 ASSESSMENT — PAIN SCALES - GENERAL
PAINLEVEL_OUTOF10: 4
PAINLEVEL_OUTOF10: 0
PAINLEVEL_OUTOF10: ASSUMED PAIN PRESENT
PAINLEVEL_OUTOF10: 0
PAINLEVEL_OUTOF10: ASSUMED PAIN PRESENT
PAINLEVEL_OUTOF10: 0
PAINLEVEL_OUTOF10: ASSUMED PAIN PRESENT
PAINLEVEL_OUTOF10: 0

## 2018-01-09 NOTE — PROGRESS NOTES
Pt from PACU @ 1300. Awake. No signs of distress noted. VSS. DSG to right shoulder CDI. Able to wiggle fingers, states arm is numb. Discussed POC for the day. Pt very [place]. This RN and her daughter unable to insert hearing aid. Pt able to make needs known to staff.

## 2018-01-09 NOTE — OR NURSING
1132 To PACU from OR via bed, sleeping, respirations spontaneous and non-labored via LMA.Icepack applied over c/d/i right shoulder surgical dressings.  1154 LMA dc'd at this time. VSS  1200 PT denies pain and nausea at this time. VSS  1210 Imaging at bedside   1215 Pt meets criteria to transfer to room at this time. VSS   1230 Pt resting with eyes closed. Pt denies pain and nausea

## 2018-01-09 NOTE — OR NURSING
5929 PT TO PRE OP TO ASSUME CARE.  9120 Patient allergies and NPO status verified, home medication reconciliation completed and belongings secured. Patient verbalizes understanding of pain scale, expected course of stay and plan of care. Surgical site verified with patient. IV access established. Sequentials placed on legs

## 2018-01-09 NOTE — OP REPORT
DATE OF SERVICE:  01/09/2018    PREOPERATIVE DIAGNOSES:  Right shoulder rotator cuff tear arthropathy,   rheumatoid arthritis.    POSTOPERATIVE DIAGNOSES:  Right shoulder rotator cuff tear arthropathy,   rheumatoid arthritis.    PROCEDURE:  Right reverse total shoulder arthroplasty.    SURGEON:  Daniella Camargo MD    ASSISTANT:  Austen Mandujano CFA    ANESTHESIOLOGIST:  Jignesh Bustamante MD    TYPE OF ANESTHESIA:  General, with preoperative interscalene nerve block.    IV FLUID:  1 liter crystalloid.    ESTIMATED BLOOD LOSS:  100 mL.    DRAINS:  None.    SPECIMENS:  None.    COMPLICATIONS:  None.    IMPLANTS:  A Tornier size 4B Ascend Flex stem with +6 high offset metaphyseal   tray, 9 mm polyethylene insert, 25 mm baseplate with 4 screws (30 mm central   screw), 36 mm standard glenosphere.    REASON FOR PROCEDURE:  The patient is an 82-year-old female with severe   rheumatoid arthritis, with a rotator cuff tear, pain, and weakness in the   right shoulder that has failed nonoperative treatment measures.  We decided to   proceed with a reverse total shoulder arthroplasty.    OPERATION:  The patient was given a right interscalene nerve block by the   anesthesiologist before surgery.  Once back in the operating room, a breathing   tube was placed.  She was given 1 g of vancomycin.  The right upper extremity   was then prepped with ChloraPrep and draped in standard sterile fashion.  She   was placed in the beach-chair position and the right upper extremity was   placed into the spider articulating arm sharma.  A deltopectoral incision was   made.  The cephalic vein was identified and retracted laterally.  The thin,   but intact subscapularis tendon was tenotomized.  This was tagged with a   FiberWire suture.  There was no superior rotator cuff.  The humeral head was   subluxated into the wound.  A saw blade was then used to make an anatomical   humeral neck cut.  The central awl was used.  The bone was noted to be    significantly soft.  The sounders were used followed by the broaches and a 4B   was set into place with the planer used.  Next, cup protector was placed.    Retractors were then placed circumferentially around the glenoid.  The glenoid   was noted to have significant surrounding thin osteophytes, which were   removed with an osteotome.  The central drill was then used.  A 25 mm   baseplate was prepared for and the  was used first followed by the   central drill and the through and through drill with the central set screw   measuring 30 mm in length.  The wound was irrigated copiously with dilute   Betadine as well as sterile saline.  A 25 mm standard base plate was placed   with a 30 mm central screw with an excellent bite noted.  Three peripheral   screws were then placed superiorly, inferiorly, and posteriorly.  There was   little to no bone anteriorly due to the osteophyte that was removed.  Next, a   36 mm glenosphere was placed.  This was after using the [employer] reamers to   clear off any surrounding bone and soft tissue from the baseplate.  The 36 mm   glenosphere was impacted with the set screw tightened.  Next, attention was   turned to the proximal humerus.  The cup protector was removed and the   polyethylenes were trialed.  Both the 6 and 9 were too loose, and thus the   standard high offset metaphyseal plate was changed out for a +6.  This allowed   for a 9 mm polyethylene to have appropriate soft tissue tension and stability   with range of motion for this patient.  The shoulder was dislocated with the   proximal humeral trial components removed.  Two drill holes were placed   through the lesser tuberosity with copious irrigation of the humeral canal   with dilute Betadine as well as sterile saline.  A size 4B Ascend Flex stem   was opened with a +6 high offset metaphyseal tray dialed in to appropriate   eccentricity.  The metaphyseal tray and stem were then impacted on the back   table and  dropped into the proximal humeral canal.  A +9 polyethylene implant   was then impacted.  The shoulder was reduced.  It was taken through a range of   motion with good soft tissue tension noted.  What remained of the thin, but   intact subscapularis tendon was then repaired using multiple modified Miki   Judah sutures with the FiberWire sutures had been placed through the lesser   tuberosity.  Next, the wound was irrigated copiously with dilute Betadine as   well as sterile saline.  A total of 1 gram of vancomycin powder was placed in   the deep and superficial soft tissues.  The deltopectoral interval was closed   with 0 Vicryl.  A 2-0 Vicryl was placed in the subcutaneous tissue and staples   on the skin.  A sterile dressing was applied.  All drapes were removed.  The   arm was carefully placed into a shoulder abduction sling.  Patient was placed   supine on a stretcher and taken to recovery room, in stable condition.       ____________________________________     MD KATHY ELLISON / [facility]    DD:  01/09/2018 11:49:35  DT:  01/09/2018 12:22:23    D#:  [id number]  Job#:  [id number]

## 2018-01-09 NOTE — OR SURGEON
Immediate Post OP Note    PreOp Diagnosis: RIGHT RC tear arthropathy, OA    PostOp Diagnosis: SAME     Procedure(s): RIGHT   SHOULDER ARTHROPLASTY TOTAL - REVERSE - Wound Class: Clean    Surgeon(s):  Daniella Camargo M.D.    Anesthesiologist/Type of Anesthesia:  Anesthesiologist: Jignesh Bustamante M.D./General    Surgical Staff:  Circulator: Tata Guerrero R.N.  Limb Martinez: Josesito Stewart  Scrub Person: Lucy Aguilar  Private Scrub: SERGEY PerezNJANICE    Specimens:  * No specimens in log *    Estimated Blood Loss: 100 cc    Findings: as above    Complications: none    Kary        1/9/2018 11:42 AM Daniella Camargo

## 2018-01-10 VITALS
BODY MASS INDEX: 19.38 KG/M2 | WEIGHT: 120.59 LBS | TEMPERATURE: 98.1 F | HEIGHT: 66 IN | DIASTOLIC BLOOD PRESSURE: 43 MMHG | HEART RATE: 65 BPM | SYSTOLIC BLOOD PRESSURE: 100 MMHG | RESPIRATION RATE: 16 BRPM | OXYGEN SATURATION: 96 %

## 2018-01-10 LAB
ANION GAP SERPL CALC-SCNC: 7 MMOL/L (ref 0–11.9)
BUN SERPL-MCNC: 35 MG/DL (ref 8–22)
CALCIUM SERPL-MCNC: 8.4 MG/DL (ref 8.4–10.2)
CHLORIDE SERPL-SCNC: 109 MMOL/L (ref 96–112)
CO2 SERPL-SCNC: 18 MMOL/L (ref 20–33)
CREAT SERPL-MCNC: 1.28 MG/DL (ref 0.5–1.4)
GLUCOSE SERPL-MCNC: 121 MG/DL (ref 65–99)
HCT VFR BLD AUTO: 26.5 % (ref 37–47)
HGB BLD-MCNC: 8.9 G/DL (ref 12–16)
POTASSIUM SERPL-SCNC: 4.1 MMOL/L (ref 3.6–5.5)
SODIUM SERPL-SCNC: 134 MMOL/L (ref 135–145)

## 2018-01-10 PROCEDURE — 36415 COLL VENOUS BLD VENIPUNCTURE: CPT

## 2018-01-10 PROCEDURE — 97162 PT EVAL MOD COMPLEX 30 MIN: CPT

## 2018-01-10 PROCEDURE — 80048 BASIC METABOLIC PNL TOTAL CA: CPT

## 2018-01-10 PROCEDURE — 700112 HCHG RX REV CODE 229: Performed by: ORTHOPAEDIC SURGERY

## 2018-01-10 PROCEDURE — G8987 SELF CARE CURRENT STATUS: HCPCS | Mod: CK

## 2018-01-10 PROCEDURE — A9270 NON-COVERED ITEM OR SERVICE: HCPCS | Performed by: ORTHOPAEDIC SURGERY

## 2018-01-10 PROCEDURE — 97166 OT EVAL MOD COMPLEX 45 MIN: CPT

## 2018-01-10 PROCEDURE — 700101 HCHG RX REV CODE 250

## 2018-01-10 PROCEDURE — 85014 HEMATOCRIT: CPT

## 2018-01-10 PROCEDURE — 700111 HCHG RX REV CODE 636 W/ 250 OVERRIDE (IP)

## 2018-01-10 PROCEDURE — 700102 HCHG RX REV CODE 250 W/ 637 OVERRIDE(OP): Performed by: ORTHOPAEDIC SURGERY

## 2018-01-10 PROCEDURE — G8980 MOBILITY D/C STATUS: HCPCS | Mod: CJ

## 2018-01-10 PROCEDURE — G8989 SELF CARE D/C STATUS: HCPCS | Mod: CK

## 2018-01-10 PROCEDURE — 85018 HEMOGLOBIN: CPT

## 2018-01-10 PROCEDURE — G8979 MOBILITY GOAL STATUS: HCPCS | Mod: CJ

## 2018-01-10 PROCEDURE — G8988 SELF CARE GOAL STATUS: HCPCS | Mod: CK

## 2018-01-10 PROCEDURE — G8978 MOBILITY CURRENT STATUS: HCPCS | Mod: CJ

## 2018-01-10 RX ORDER — LIDOCAINE HYDROCHLORIDE 10 MG/ML
INJECTION, SOLUTION INFILTRATION; PERINEURAL
Status: COMPLETED
Start: 2018-01-10 | End: 2018-01-10

## 2018-01-10 RX ADMIN — ACETAMINOPHEN 1000 MG: 500 TABLET, COATED ORAL at 12:42

## 2018-01-10 RX ADMIN — DOCUSATE SODIUM 100 MG: 100 CAPSULE, LIQUID FILLED ORAL at 08:49

## 2018-01-10 RX ADMIN — ASPIRIN TAB DELAYED RELEASE 81 MG 81 MG: 81 TABLET DELAYED RESPONSE at 00:53

## 2018-01-10 RX ADMIN — CITALOPRAM HYDROBROMIDE 20 MG: 20 TABLET ORAL at 08:49

## 2018-01-10 RX ADMIN — ACETAMINOPHEN 1000 MG: 500 TABLET, COATED ORAL at 00:53

## 2018-01-10 RX ADMIN — OXYCODONE HYDROCHLORIDE 5 MG: 5 TABLET ORAL at 06:26

## 2018-01-10 RX ADMIN — ACETAMINOPHEN 1000 MG: 500 TABLET, COATED ORAL at 06:26

## 2018-01-10 RX ADMIN — LEVOTHYROXINE SODIUM 112 MCG: 112 TABLET ORAL at 06:26

## 2018-01-10 ASSESSMENT — PAIN SCALES - GENERAL
PAINLEVEL_OUTOF10: 6
PAINLEVEL_OUTOF10: 0

## 2018-01-10 ASSESSMENT — COGNITIVE AND FUNCTIONAL STATUS - GENERAL
DRESSING REGULAR UPPER BODY CLOTHING: A LOT
DAILY ACTIVITIY SCORE: 16
TOILETING: A LOT
SUGGESTED CMS G CODE MODIFIER DAILY ACTIVITY: CK
DRESSING REGULAR LOWER BODY CLOTHING: A LOT
HELP NEEDED FOR BATHING: A LOT

## 2018-01-10 ASSESSMENT — GAIT ASSESSMENTS
ASSISTIVE DEVICE: HEMI-WALKER
GAIT LEVEL OF ASSIST: CONTACT GUARD ASSIST
DISTANCE (FEET): 150
DEVIATION: SHUFFLED GAIT

## 2018-01-10 ASSESSMENT — ACTIVITIES OF DAILY LIVING (ADL): TOILETING: INDEPENDENT

## 2018-01-10 NOTE — DISCHARGE INSTRUCTIONS
Discharge Instructions    Discharged to home by car with relative. Discharged via wheelchair, hospital escort: Yes.  Special equipment needed: Mihai-Walker    Be sure to schedule a follow-up appointment with your primary care doctor or any specialists as instructed.     Discharge Plan:   Diet Plan: Discussed  Activity Level: Discussed  Confirmed Follow up Appointment: Patient to Call and Schedule Appointment  Medication Reconciliation Updated: No (Comments)  Influenza Vaccine Indication: Patient Refuses    I understand that a diet low in cholesterol, fat, and sodium is recommended for good health. Unless I have been given specific instructions below for another diet, I accept this instruction as my diet prescription.   Other diet: regular    Special Instructions: None    · Is patient discharged on Warfarin / Coumadin?   No     · Is patient Post Blood Transfusion?  No    Depression / Suicide Risk    As you are discharged from this [facility] facility, it is important to learn how to keep safe from harming yourself.    Recognize the warning signs:  · Abrupt changes in personality, positive or negative- including increase in energy   · Giving away possessions  · Change in eating patterns- significant weight changes-  positive or negative  · Change in sleeping patterns- unable to sleep or sleeping all the time   · Unwillingness or inability to communicate  · Depression  · Unusual sadness, discouragement and loneliness  · Talk of wanting to die  · Neglect of personal appearance   · Rebelliousness- reckless behavior  · Withdrawal from people/activities they love  · Confusion- inability to concentrate     If you or a loved one observes any of these behaviors or has concerns about self-harm, here's what you can do:  · Talk about it- your feelings and reasons for harming yourself  · Remove any means that you might use to hurt yourself (examples: pills, rope, extension cords, firearm)  · Get professional help from the  community (Mental Health, Substance Abuse, psychological counseling)  · Do not be alone:Call your Safe Contact- someone whom you trust who will be there for you.  · Call your local CRISIS HOTLINE 249-4485 or 450-954-6668  · Call your local Children's Mobile Crisis Response Team [place] (850) 345-4498 or www.SeeControl  · Call the toll free National Suicide Prevention Hotlines   · National Suicide Prevention Lifeline 653-699-COEE (0743)  · National Hope Line Network 800-SUICIDE (438-7443)    Post-Operative Shoulder Instructions       Dressing and wound care: Keep your shoulder dressing clean and dry after surgery.  Be aware that some leaking of blood or fluid from your dressing can occur and is normal. You may remove your dressing 3 days after the operation.  Notice that you have a single incision and/or several small incisions that have been closed with stitches.  Cover each of these incisions with a light dressing or band-aids.  This keeps the surgical incisions clean, as well as preventing your clothes from spotting with blood or fluid.  Change band-aids or light dressing daily.     Shower / bathing: Keep the shoulder dry until staples out.     ** If you have had a shoulder replacement, then please wait until your staples are removed at 7-14 days post-op before allowing your incision to get wet.       Ice: Apply an ice pack to your shoulder (15 minutes on the shoulder, 15 minutes off the shoulder), as you feel necessary to help with the pain and swelling.         Sling / Shoulder Immobilizer: The sling should be on at all times, except when bathing and doing your demonstrated exercises.     Activity: It is important to move your shoulder, as well as your elbow, wrist, and hand several times daily, starting the day after surgery.  You may do pendulum exercises with your operative arm starting the day after surgery.  Pendulum (dangling [place]) exercises are encouraged 2-3 times daily.  The sling will  need to be removed for pendulum exercises.     Pain medication: Take your pain medicine, as needed and prescribed.  Do not drive or operate machinery while taking narcotic pain medication.   You may start or resume anti-inflammatory medication (i.e. ibuprofen, naproxen) anytime after surgery, which should be taken with food to avoid stomach irritation.     Problems:     If you are having problems with your shoulder (unexpected pain, excessive bleeding or discharge from your incisions, fevers/chills) do not hesitate to call the office or visit the nearest emergency room for evaluation.     Follow-up:     Make sure that you have an appointment 7-14 days following surgery.  Your stitches will be removed, and your procedure/rehabilitation will be discussed at that time.   Physical therapy may be prescribed at that time.     Shoulder Joint Replacement, Care After  Refer to this sheet in the next few weeks. These instructions provide you with information on caring for yourself after your procedure. Your health care provider may also give you more specific instructions. Your treatment has been planned according to current medical practices, but problems sometimes occur. Call your health care provider if you have any problems or questions after your procedure.  WHAT TO EXPECT AFTER THE PROCEDURE  After your procedure, your arm and shoulder will typically be stiff and bruised. This will improve over time.  HOME CARE INSTRUCTIONS   · You may resume your normal diet and activities as directed by your surgeon.  · You should regain full use of your shoulder in 6 weeks.  · Your arm will be in a sling. You will need to wear this for 4-6 weeks after surgery.  · Wear the sling every night for at least the first month, or as instructed by your surgeon.  · Do not use your arm to push yourself up in bed or from a chair. This requires too much muscle.  · Follow the program of home exercises suggested. Do the exercises 4-5 times a day  for a month or as directed.  · Try not to overuse your shoulder. Overusing the shoulder is easy to do if this is the first time you have been pain free in a long time. Early overuse of the shoulder may result in later problems.  · Do not lift anything heavier than a cup of coffee for the first 6 weeks after surgery.  · Ask for help. Your health care provider may be able to suggest a clinic or agency for this if you do not have home support.  · Do not participate in contact sports or do any heavy lifting (more than 10 lb [4.5 kg]) for at least 6 months, or as directed.  · Apply ice to the injured area for the first 2 days after surgery:  ¨ Put ice in a plastic bag.  ¨ Place a towel between your skin and the bag.  ¨ Leave the ice on for 20 minutes, 2-3 times a day.  · Change dressings if necessary or as directed.  · Only take over-the-counter or prescription medicines for pain, discomfort, or fever as directed by your health care provider.  · Keep all follow-up appointments as directed.  SEEK MEDICAL CARE IF:  · You have redness, swelling, or increasing pain in the wound.  · You see pus coming from the wound.  · You have a fever.  · You notice a bad smell coming from the wound or dressing.  · The edges of the wound break open after sutures or staples have been removed.  · You have increasing pain with movement of the shoulder.  SEEK IMMEDIATE MEDICAL CARE IF:   · You develop a rash.  · You have chest pain or shortness of breath.  · You have any reaction or side effects to medicine given.  MAKE SURE YOU:  · Understand these instructions.  · Will watch your condition.  · Will get help right away if you are not doing well or get worse.     This information is not intended to replace advice given to you by your health care provider. Make sure you discuss any questions you have with your health care provider.     Document Released: 07/07/2006 Document Revised: 12/23/2014 Document Reviewed: 07/17/2014  [employer]  Patient Education ©2016 [employer]

## 2018-01-10 NOTE — DIETARY
"Nutrition Services: Nutrition Admit Screen for poor oral intake PTA.    Dx: Rotator cuff arthropathy  PMHx: GERD, Skin Ca, Gout, Hypothyroid, Osteoporosis, Rheumatoid Arthritis, C.Diff  Diet Order: Regular, Finger Foods.  Recorded PO intake Breakfast 1/10 = 50-75%  Ht: 5'6\", Wt: 54.7 kg, BMI: 19.46  Medications and Labs reviewed  Skin: No skin breakdown noted.  Pt with surgical incision    Pt states she was not eating as well recently.  Feels she is doing well with meals currently.  Daughter at bedside and states she is planning to cook for her mother when she returns home.    PLAN/RECOMMEND -  Continue Regular diet with finger foods during admission.  Nutrition services to work with pt as needed for menu orders and food preferences.  RD available prn.  "

## 2018-01-10 NOTE — PROGRESS NOTES
Patient without new complaints. VSS  Received a block this morning.   RIGHT shoulder dsg dci  Sling on  Stable, POD #1  D/C home today with daughter  Wound care discussed  F/U 10 days  Take probiotic and resume all meds

## 2018-01-10 NOTE — THERAPY
Occupational Therapy Evaluation completed.     Pt and daughter were educated in detail on ADL's after Reverse Total Shoulder surgery.   Patient specifically educated on surgeon's post-operative precautions including pendulum exercises and PROM until cleared by physician.    Summary of education completed:  How to adjust sling/immobilizer for best fit.  · To keep sling positioned so hand is level or slightly above elbow to reduce pull of gravity on arm and maintain shoulder in neutral positioning.  How to don & doff sling/immobilizer safely and appropriately for dressing and bathing.  How to dress upper body while maintaining post surgical precautions.  How to shower safely.  · How to appropriately cover incision for showering if needed prior to removal of staples.    · Proper positioning of arm during showering.   · Encouraged use of hand held shower (using the non-operative extremity) to keep water away from the incision site.    · Safe shower and toilet transfers.  Proper positioning while sleeping either in bed or recliner  · Encouraged patient to support arm with pillows under forearm when sitting to reduce strain on the neck from the weight of the arm and immobilizer.   Proper bed mobility and transfer techniques while maintaining NWB on surgical arm.  Proper positioning of arm for gentle wrist/hand ROM and passive elbow extension.    Pain management education - around the clock.    Pt and her daughter verbalized and demonstrated understanding of education provided.    Pt is an 83 y/o female, admit for a reverse TSA. Pt lives alone , however, daughter will be staying with her to assist. Pt presents with decreased I with ADLS and functional mobility. Currently requires Max A for ADLS and CGA for functional transfers. Pt and daughter were educated regarding Codman's Pendulum exercises, ADLS and joint protection . Pt and her daughter were able to verbalize understanding and return demonstrate technique. Pt will be  seen for initial evaluation and treatment only, will be d/c this date.

## 2018-01-10 NOTE — DISCHARGE PLANNING
Care Transition Team Assessment    Information Source  Orientation : Oriented x 4  Information Given By: Patient  Informant's Name: Elizabeth  Who is responsible for making decisions for patient? : Patient    Readmission Evaluation  Is this a readmission?: No    Elopement Risk  Legal Hold: No  Ambulatory or Self Mobile in Wheelchair: No-Not an Elopement Risk  Elopement Risk: Not at Risk for Elopement    Interdisciplinary Discharge Planning  Does Admitting Nurse Feel This Could be a Complex Discharge?: No  Lives with - Patient's Self Care Capacity: Alone and Able to Care For Self  Patient or legal guardian wants to designate a caregiver (see row info): No  Support Systems: Family Member(s)  Housing / Facility: [address]  Do You Take your Prescribed Medications Regularly: Yes  Able to Return to Previous ADL's: Yes  Mobility Issues: No  Prior Services: None  Patient Expects to be Discharged to:: home with daughter to help  Assistance Needed: No  Durable Medical Equipment: Not Applicable    Discharge Preparedness  What is your plan after discharge?: Home with help  Prior Functional Level: Independent with Activities of Daily Living    Functional Assesment  Prior Functional Level: Independent with Activities of Daily Living    Finances  Financial Barriers to Discharge: No  Prescription Coverage: Yes    Vision / Hearing Impairment  Vision Impairment : Yes  Right Eye Vision: Impaired, Wears Glasses  Left Eye Vision: Impaired, Wears Glasses  Hearing Impairment: Both Ears, Hearing Device(s) Available    Values / Beliefs / Concerns  Spiritual Requests During Hospitalization: No    Advance Directive  Advance Directive?: None    Domestic Abuse  Have you ever been the victim of abuse or violence?: No  Physical Abuse or Sexual Abuse: No  Verbal Abuse or Emotional Abuse: No  Possible Abuse Reported to:: Not Applicable    Psychological Assessment  History of Substance Abuse: None  History of Psychiatric Problems: No    Discharge  Risks or Barriers  Discharge risks or barriers?: No    Anticipated Discharge Information  Anticipated discharge disposition: Home

## 2018-01-10 NOTE — PROGRESS NOTES
A&Ox4, R arm in immobilizer, able to move fingers, +pulse, Cap refill less than 3sec, numbness from elbow up, pain 0/10. Tolerating diet, -N/V, 96% on RA, IS encouraged.

## 2018-01-10 NOTE — DISCHARGE PLANNING
SHEFALI nofied by Floor RN that this will need a jo ann-walker for discharge. SHEFALI met with this patient and [facility] was chosen.   SHEFALI is awaiting and order from Dr. Camargo.

## 2018-01-10 NOTE — THERAPY
"Physical Therapy Evaluation completed.   Bed Mobility:  Supine to Sit: Contact Guard Assist (HOB elevated)  Transfers: Sit to Stand: Contact Guard Assist  Gait: Level Of Assist: Contact Guard Assist with Mihai-Walker       Plan of Care: Patient with no further skilled PT needs in the acute care setting at this time. Pt has some balance deficits at baseline, uses FWW for mobility but will be unable to use FWW now due to NWB R UE. Gait training performed with hemiwalker, pt did well, CGA. Pt will have assist of dtr at home who will be staying with her x 2 months. Reviewed how to re-adjust immobilizer/sling, exercises were reviewed by OT earlier. No further acute PT needs, DC PT.    Discharge Recommendations: Equipment: Mihai-Walker. Post-acute therapy Discharge to home with outpatient or home health for additional skilled therapy services.    See \"Rehab Therapy-Acute\" Patient Summary Report for complete documentation.     "

## 2018-01-10 NOTE — PROGRESS NOTES
Discharging patient home per physician order.  Discharged with daughter.  Demonstrated understanding of discharge instructions, follow up appointments, home medications, prescriptions, home care for surgical wound, and nursing care instructions for total shoulder.  Ambulating standby assistance with jo ann-walker, voiding without difficulty, pain well controlled, tolerating oral medications, oxygen saturation greater than 90% , tolerating diet.   Educational handouts for total shoulder given and discussed.  Verbalized understanding of discharge instructions and educational handouts.  All questions answered.  Belongings with patient at time of discharge.  IV removed prior to DC.

## 2018-01-10 NOTE — DISCHARGE PLANNING
SHEFALI called [facility] and was able to confirm that they received the order and that the  is out for delivery with the [facility]

## 2018-01-10 NOTE — PROGRESS NOTES
Total Joint Replacement Program Rounding     Inpatient bedside rounding completed to address quality of care provided by total joint replacement program IDT and overall patient experience at [facility].    Performance Measures addressed: None.   Patient Satisfaction addressed: Good pain control s/p reblocking of RUE this morning.   Additional notes: Pt uses a cane at home but may need a quad cane or hemiwalker now after surgery (will defer to PT recommendation). Pt requesting to have Medicare cover the cost of the device if possible. She will need a DME order from MD.  RN and LSW updated and aware.

## 2018-01-12 ENCOUNTER — APPOINTMENT (OUTPATIENT)
Dept: RADIOLOGY | Facility: MEDICAL CENTER | Age: 83
DRG: 372 | End: 2018-01-12
Attending: EMERGENCY MEDICINE
Payer: MEDICARE

## 2018-01-12 ENCOUNTER — HOSPITAL ENCOUNTER (INPATIENT)
Facility: MEDICAL CENTER | Age: 83
LOS: 10 days | DRG: 372 | End: 2018-01-22
Attending: EMERGENCY MEDICINE | Admitting: INTERNAL MEDICINE
Payer: MEDICARE

## 2018-01-12 ENCOUNTER — RESOLUTE PROFESSIONAL BILLING HOSPITAL PROF FEE (OUTPATIENT)
Dept: HOSPITALIST | Facility: MEDICAL CENTER | Age: 83
End: 2018-01-12
Payer: MEDICARE

## 2018-01-12 DIAGNOSIS — R19.7 DIARRHEA OF PRESUMED INFECTIOUS ORIGIN: ICD-10-CM

## 2018-01-12 DIAGNOSIS — R53.1 WEAKNESS: ICD-10-CM

## 2018-01-12 DIAGNOSIS — A04.72 C. DIFFICILE COLITIS: ICD-10-CM

## 2018-01-12 DIAGNOSIS — K52.9 COLITIS: ICD-10-CM

## 2018-01-12 PROBLEM — D64.9 ANEMIA: Status: ACTIVE | Noted: 2018-01-12

## 2018-01-12 PROBLEM — Z98.890 H/O SHOULDER SURGERY: Status: ACTIVE | Noted: 2018-01-12

## 2018-01-12 LAB
ALBUMIN SERPL BCP-MCNC: 2.5 G/DL (ref 3.2–4.9)
ALBUMIN/GLOB SERPL: 0.8 G/DL
ALP SERPL-CCNC: 140 U/L (ref 30–99)
ALT SERPL-CCNC: 58 U/L (ref 2–50)
ANION GAP SERPL CALC-SCNC: 13 MMOL/L (ref 0–11.9)
APPEARANCE UR: ABNORMAL
APTT PPP: 29.6 SEC (ref 24.7–36)
AST SERPL-CCNC: 59 U/L (ref 12–45)
BACTERIA #/AREA URNS HPF: ABNORMAL /HPF
BASOPHILS # BLD AUTO: 0 % (ref 0–1.8)
BASOPHILS # BLD: 0 K/UL (ref 0–0.12)
BILIRUB SERPL-MCNC: 1 MG/DL (ref 0.1–1.5)
BILIRUB UR QL STRIP.AUTO: NEGATIVE
BNP SERPL-MCNC: 372 PG/ML (ref 0–100)
BUN SERPL-MCNC: 30 MG/DL (ref 8–22)
CALCIUM SERPL-MCNC: 8.2 MG/DL (ref 8.4–10.2)
CASTS 1761B: ABNORMAL /LPF
CASTS URNS QL MICRO: ABNORMAL /LPF
CHLORIDE SERPL-SCNC: 105 MMOL/L (ref 96–112)
CO2 SERPL-SCNC: 15 MMOL/L (ref 20–33)
COLOR UR: YELLOW
CREAT SERPL-MCNC: 1.09 MG/DL (ref 0.5–1.4)
CULTURE IF INDICATED INDCX: NO UA CULTURE
EOSINOPHIL # BLD AUTO: 0 K/UL (ref 0–0.51)
EOSINOPHIL NFR BLD: 0 % (ref 0–6.9)
EPI CELLS #/AREA URNS HPF: ABNORMAL /HPF
ERYTHROCYTE [DISTWIDTH] IN BLOOD BY AUTOMATED COUNT: 48.4 FL (ref 35.9–50)
GLOBULIN SER CALC-MCNC: 3.2 G/DL (ref 1.9–3.5)
GLUCOSE SERPL-MCNC: 90 MG/DL (ref 65–99)
GLUCOSE UR STRIP.AUTO-MCNC: NEGATIVE MG/DL
HCT VFR BLD AUTO: 27.2 % (ref 37–47)
HGB BLD-MCNC: 9.1 G/DL (ref 12–16)
INR PPP: 1.28 (ref 0.87–1.13)
KETONES UR STRIP.AUTO-MCNC: NEGATIVE MG/DL
LACTATE BLD-SCNC: 2.56 MMOL/L (ref 0.5–2)
LEUKOCYTE ESTERASE UR QL STRIP.AUTO: NEGATIVE
LIPASE SERPL-CCNC: <10 U/L (ref 7–58)
LYMPHOCYTES # BLD AUTO: 0.77 K/UL (ref 1–4.8)
LYMPHOCYTES NFR BLD: 5 % (ref 22–41)
MANUAL DIFF BLD: ABNORMAL
MCH RBC QN AUTO: 31 PG (ref 27–33)
MCHC RBC AUTO-ENTMCNC: 33.5 G/DL (ref 33.6–35)
MCV RBC AUTO: 92.5 FL (ref 81.4–97.8)
METAMYELOCYTES NFR BLD MANUAL: 2 %
MICRO URNS: ABNORMAL
MONOCYTES # BLD AUTO: 0.31 K/UL (ref 0–0.85)
MONOCYTES NFR BLD AUTO: 2 % (ref 0–13.4)
MUCOUS THREADS #/AREA URNS HPF: ABNORMAL /HPF
NEUTROPHILS # BLD AUTO: 13.92 K/UL (ref 2–7.15)
NEUTROPHILS NFR BLD: 55 % (ref 44–72)
NEUTS BAND NFR BLD MANUAL: 36 % (ref 0–10)
NITRITE UR QL STRIP.AUTO: NEGATIVE
NRBC # BLD AUTO: 0 K/UL
NRBC BLD-RTO: 0 /100 WBC
PH UR STRIP.AUTO: 5.5 [PH]
PLATELET # BLD AUTO: 304 K/UL (ref 164–446)
PLATELET BLD QL SMEAR: NORMAL
PMV BLD AUTO: 9.1 FL (ref 9–12.9)
POTASSIUM SERPL-SCNC: 3.8 MMOL/L (ref 3.6–5.5)
PROT SERPL-MCNC: 5.7 G/DL (ref 6–8.2)
PROT UR QL STRIP: NEGATIVE MG/DL
PROTHROMBIN TIME: 15.6 SEC (ref 12–14.6)
RBC # BLD AUTO: 2.94 M/UL (ref 4.2–5.4)
RBC # URNS HPF: ABNORMAL /HPF
RBC BLD AUTO: NORMAL
RBC UR QL AUTO: NEGATIVE
SODIUM SERPL-SCNC: 133 MMOL/L (ref 135–145)
SP GR UR STRIP.AUTO: 1.01
SPECIMEN DRAWN FROM PATIENT: ABNORMAL
TROPONIN I SERPL-MCNC: <0.02 NG/ML (ref 0–0.04)
UNIDENT CRYS URNS QL MICRO: ABNORMAL /HPF
WBC # BLD AUTO: 15.3 K/UL (ref 4.8–10.8)
WBC STL QL MICRO: NORMAL

## 2018-01-12 PROCEDURE — 94760 N-INVAS EAR/PLS OXIMETRY 1: CPT

## 2018-01-12 PROCEDURE — 83690 ASSAY OF LIPASE: CPT

## 2018-01-12 PROCEDURE — 83605 ASSAY OF LACTIC ACID: CPT

## 2018-01-12 PROCEDURE — 700105 HCHG RX REV CODE 258: Performed by: INTERNAL MEDICINE

## 2018-01-12 PROCEDURE — 85610 PROTHROMBIN TIME: CPT

## 2018-01-12 PROCEDURE — 99285 EMERGENCY DEPT VISIT HI MDM: CPT

## 2018-01-12 PROCEDURE — 74176 CT ABD & PELVIS W/O CONTRAST: CPT

## 2018-01-12 PROCEDURE — 770006 HCHG ROOM/CARE - MED/SURG/GYN SEMI*

## 2018-01-12 PROCEDURE — 87046 STOOL CULTR AEROBIC BACT EA: CPT

## 2018-01-12 PROCEDURE — 83880 ASSAY OF NATRIURETIC PEPTIDE: CPT

## 2018-01-12 PROCEDURE — 87899 AGENT NOS ASSAY W/OPTIC: CPT

## 2018-01-12 PROCEDURE — 87040 BLOOD CULTURE FOR BACTERIA: CPT | Mod: 91

## 2018-01-12 PROCEDURE — 81001 URINALYSIS AUTO W/SCOPE: CPT

## 2018-01-12 PROCEDURE — 700102 HCHG RX REV CODE 250 W/ 637 OVERRIDE(OP)

## 2018-01-12 PROCEDURE — A9270 NON-COVERED ITEM OR SERVICE: HCPCS | Performed by: INTERNAL MEDICINE

## 2018-01-12 PROCEDURE — 85007 BL SMEAR W/DIFF WBC COUNT: CPT

## 2018-01-12 PROCEDURE — 87324 CLOSTRIDIUM AG IA: CPT

## 2018-01-12 PROCEDURE — 700105 HCHG RX REV CODE 258: Performed by: EMERGENCY MEDICINE

## 2018-01-12 PROCEDURE — 87493 C DIFF AMPLIFIED PROBE: CPT

## 2018-01-12 PROCEDURE — 93005 ELECTROCARDIOGRAM TRACING: CPT | Performed by: EMERGENCY MEDICINE

## 2018-01-12 PROCEDURE — 87045 FECES CULTURE AEROBIC BACT: CPT

## 2018-01-12 PROCEDURE — 89055 LEUKOCYTE ASSESSMENT FECAL: CPT

## 2018-01-12 PROCEDURE — 700102 HCHG RX REV CODE 250 W/ 637 OVERRIDE(OP): Performed by: INTERNAL MEDICINE

## 2018-01-12 PROCEDURE — 96361 HYDRATE IV INFUSION ADD-ON: CPT

## 2018-01-12 PROCEDURE — 99223 1ST HOSP IP/OBS HIGH 75: CPT | Mod: AI | Performed by: INTERNAL MEDICINE

## 2018-01-12 PROCEDURE — 700111 HCHG RX REV CODE 636 W/ 250 OVERRIDE (IP): Performed by: EMERGENCY MEDICINE

## 2018-01-12 PROCEDURE — 84484 ASSAY OF TROPONIN QUANT: CPT

## 2018-01-12 PROCEDURE — 80053 COMPREHEN METABOLIC PANEL: CPT

## 2018-01-12 PROCEDURE — A9270 NON-COVERED ITEM OR SERVICE: HCPCS

## 2018-01-12 PROCEDURE — 85027 COMPLETE CBC AUTOMATED: CPT

## 2018-01-12 PROCEDURE — 96365 THER/PROPH/DIAG IV INF INIT: CPT

## 2018-01-12 PROCEDURE — 36415 COLL VENOUS BLD VENIPUNCTURE: CPT

## 2018-01-12 PROCEDURE — 85730 THROMBOPLASTIN TIME PARTIAL: CPT

## 2018-01-12 PROCEDURE — 71045 X-RAY EXAM CHEST 1 VIEW: CPT

## 2018-01-12 RX ORDER — BISACODYL 10 MG
10 SUPPOSITORY, RECTAL RECTAL
Status: DISCONTINUED | OUTPATIENT
Start: 2018-01-12 | End: 2018-01-12

## 2018-01-12 RX ORDER — POLYVINYL ALCOHOL 14 MG/ML
1-2 SOLUTION/ DROPS OPHTHALMIC
Status: DISCONTINUED | OUTPATIENT
Start: 2018-01-12 | End: 2018-01-22 | Stop reason: HOSPADM

## 2018-01-12 RX ORDER — SODIUM CHLORIDE 9 MG/ML
1000 INJECTION, SOLUTION INTRAVENOUS ONCE
Status: COMPLETED | OUTPATIENT
Start: 2018-01-12 | End: 2018-01-12

## 2018-01-12 RX ORDER — TRAZODONE HYDROCHLORIDE 50 MG/1
50 TABLET ORAL
Status: DISCONTINUED | OUTPATIENT
Start: 2018-01-12 | End: 2018-01-22 | Stop reason: HOSPADM

## 2018-01-12 RX ORDER — ONDANSETRON 2 MG/ML
4 INJECTION INTRAMUSCULAR; INTRAVENOUS EVERY 4 HOURS PRN
Status: DISCONTINUED | OUTPATIENT
Start: 2018-01-12 | End: 2018-01-22 | Stop reason: HOSPADM

## 2018-01-12 RX ORDER — CYCLOSPORINE 0.5 MG/ML
1 EMULSION OPHTHALMIC 2 TIMES DAILY
Status: ON HOLD | COMMUNITY
End: 2020-01-10

## 2018-01-12 RX ORDER — AMOXICILLIN 250 MG
2 CAPSULE ORAL 2 TIMES DAILY
Status: DISCONTINUED | OUTPATIENT
Start: 2018-01-12 | End: 2018-01-12

## 2018-01-12 RX ORDER — CITALOPRAM 20 MG/1
20 TABLET ORAL
Status: DISCONTINUED | OUTPATIENT
Start: 2018-01-12 | End: 2018-01-22 | Stop reason: HOSPADM

## 2018-01-12 RX ORDER — ONDANSETRON 4 MG/1
4 TABLET, ORALLY DISINTEGRATING ORAL EVERY 4 HOURS PRN
Status: DISCONTINUED | OUTPATIENT
Start: 2018-01-12 | End: 2018-01-22 | Stop reason: HOSPADM

## 2018-01-12 RX ORDER — MORPHINE SULFATE 4 MG/ML
2 INJECTION, SOLUTION INTRAMUSCULAR; INTRAVENOUS
Status: DISCONTINUED | OUTPATIENT
Start: 2018-01-12 | End: 2018-01-22 | Stop reason: HOSPADM

## 2018-01-12 RX ORDER — SODIUM CHLORIDE 9 MG/ML
INJECTION, SOLUTION INTRAVENOUS CONTINUOUS
Status: DISCONTINUED | OUTPATIENT
Start: 2018-01-12 | End: 2018-01-21

## 2018-01-12 RX ORDER — HYDROCODONE BITARTRATE AND ACETAMINOPHEN 10; 325 MG/1; MG/1
0.5 TABLET ORAL EVERY 4 HOURS PRN
Status: DISCONTINUED | OUTPATIENT
Start: 2018-01-12 | End: 2018-01-22 | Stop reason: HOSPADM

## 2018-01-12 RX ORDER — HYDROCODONE BITARTRATE AND ACETAMINOPHEN 10; 325 MG/1; MG/1
0.5 TABLET ORAL EVERY 4 HOURS PRN
Status: ON HOLD | COMMUNITY
End: 2019-02-11

## 2018-01-12 RX ORDER — POLYETHYLENE GLYCOL 3350 17 G/17G
1 POWDER, FOR SOLUTION ORAL
Status: DISCONTINUED | OUTPATIENT
Start: 2018-01-12 | End: 2018-01-12

## 2018-01-12 RX ORDER — CYCLOSPORINE 0.5 MG/ML
1 EMULSION OPHTHALMIC 2 TIMES DAILY
Status: DISCONTINUED | OUTPATIENT
Start: 2018-01-12 | End: 2018-01-12

## 2018-01-12 RX ORDER — OXYCODONE HYDROCHLORIDE 5 MG/1
2.5 TABLET ORAL
Status: DISCONTINUED | OUTPATIENT
Start: 2018-01-12 | End: 2018-01-22 | Stop reason: HOSPADM

## 2018-01-12 RX ORDER — OXYCODONE HYDROCHLORIDE 5 MG/1
5 TABLET ORAL
Status: DISCONTINUED | OUTPATIENT
Start: 2018-01-12 | End: 2018-01-22 | Stop reason: HOSPADM

## 2018-01-12 RX ORDER — LEVOTHYROXINE SODIUM 112 UG/1
112 TABLET ORAL
Status: DISCONTINUED | OUTPATIENT
Start: 2018-01-13 | End: 2018-01-22 | Stop reason: HOSPADM

## 2018-01-12 RX ORDER — ACETAMINOPHEN 325 MG/1
650 TABLET ORAL EVERY 6 HOURS PRN
Status: DISCONTINUED | OUTPATIENT
Start: 2018-01-12 | End: 2018-01-22 | Stop reason: HOSPADM

## 2018-01-12 RX ORDER — L. ACIDOPHILUS/L.BULGARICUS 100MM CELL
1 GRANULES IN PACKET (EA) ORAL DAILY
Status: DISCONTINUED | OUTPATIENT
Start: 2018-01-12 | End: 2018-01-22 | Stop reason: HOSPADM

## 2018-01-12 RX ADMIN — SODIUM CHLORIDE: 9 INJECTION, SOLUTION INTRAVENOUS at 21:12

## 2018-01-12 RX ADMIN — VANCOMYCIN 125 MG: KIT at 21:13

## 2018-01-12 RX ADMIN — VANCOMYCIN 125 MG: KIT at 23:43

## 2018-01-12 RX ADMIN — CITALOPRAM HYDROBROMIDE 20 MG: 20 TABLET ORAL at 21:15

## 2018-01-12 RX ADMIN — TRAZODONE HYDROCHLORIDE 50 MG: 50 TABLET ORAL at 21:15

## 2018-01-12 RX ADMIN — SODIUM CHLORIDE 1000 ML: 9 INJECTION, SOLUTION INTRAVENOUS at 17:45

## 2018-01-12 RX ADMIN — LACTOBACILLUS ACIDOPHILUS / LACTOBACILLUS BULGARICUS 1 PACKET: 100 MILLION CFU STRENGTH GRANULES at 21:16

## 2018-01-12 RX ADMIN — VANCOMYCIN HYDROCHLORIDE 1300 MG: 5 INJECTION, POWDER, LYOPHILIZED, FOR SOLUTION INTRAVENOUS at 19:18

## 2018-01-12 RX ADMIN — HYDROCODONE BITARTRATE AND ACETAMINOPHEN 0.5 TABLET: 10; 325 TABLET ORAL at 21:14

## 2018-01-12 ASSESSMENT — ENCOUNTER SYMPTOMS
VOMITING: 1
NAUSEA: 1
DEPRESSION: 0
NERVOUS/ANXIOUS: 0
FLANK PAIN: 0
DIARRHEA: 1
DIZZINESS: 0
SENSORY CHANGE: 0
FEVER: 0
SHORTNESS OF BREATH: 0
COUGH: 0
MEMORY LOSS: 0
SPEECH CHANGE: 0
BLOOD IN STOOL: 0
HEARTBURN: 1
WEAKNESS: 1
HEADACHES: 0
BACK PAIN: 0
MYALGIAS: 1
ABDOMINAL PAIN: 1
DIAPHORESIS: 0
LOSS OF CONSCIOUSNESS: 0
FOCAL WEAKNESS: 0
PALPITATIONS: 0
CHILLS: 0

## 2018-01-12 ASSESSMENT — COPD QUESTIONNAIRES
COPD SCREENING SCORE: 4
DO YOU EVER COUGH UP ANY MUCUS OR PHLEGM?: NO/ONLY WITH OCCASIONAL COLDS OR INFECTIONS
HAVE YOU SMOKED AT LEAST 100 CIGARETTES IN YOUR ENTIRE LIFE: YES
DURING THE PAST 4 WEEKS HOW MUCH DID YOU FEEL SHORT OF BREATH: NONE/LITTLE OF THE TIME

## 2018-01-12 ASSESSMENT — LIFESTYLE VARIABLES
TOTAL SCORE: 0
EVER HAD A DRINK FIRST THING IN THE MORNING TO STEADY YOUR NERVES TO GET RID OF A HANGOVER: NO
CONSUMPTION TOTAL: NEGATIVE
EVER_SMOKED: YES
AVERAGE NUMBER OF DAYS PER WEEK YOU HAVE A DRINK CONTAINING ALCOHOL: 5
EVER_SMOKED: YES
HOW MANY TIMES IN THE PAST YEAR HAVE YOU HAD 5 OR MORE DRINKS IN A DAY: 0
ON A TYPICAL DAY WHEN YOU DRINK ALCOHOL HOW MANY DRINKS DO YOU HAVE: 1
TOTAL SCORE: 0
TOTAL SCORE: 0
EVER FELT BAD OR GUILTY ABOUT YOUR DRINKING: NO
ALCOHOL_USE: YES
HAVE PEOPLE ANNOYED YOU BY CRITICIZING YOUR DRINKING: NO
HAVE YOU EVER FELT YOU SHOULD CUT DOWN ON YOUR DRINKING: NO

## 2018-01-12 ASSESSMENT — PATIENT HEALTH QUESTIONNAIRE - PHQ9
SUM OF ALL RESPONSES TO PHQ9 QUESTIONS 1 AND 2: 0
1. LITTLE INTEREST OR PLEASURE IN DOING THINGS: NOT AT ALL
2. FEELING DOWN, DEPRESSED, IRRITABLE, OR HOPELESS: NOT AT ALL
SUM OF ALL RESPONSES TO PHQ QUESTIONS 1-9: 0

## 2018-01-12 ASSESSMENT — PAIN SCALES - GENERAL
PAINLEVEL_OUTOF10: 6
PAINLEVEL_OUTOF10: 0

## 2018-01-13 LAB
ALBUMIN SERPL BCP-MCNC: 2 G/DL (ref 3.2–4.9)
ALBUMIN/GLOB SERPL: 0.7 G/DL
ALP SERPL-CCNC: 130 U/L (ref 30–99)
ALT SERPL-CCNC: 53 U/L (ref 2–50)
ANION GAP SERPL CALC-SCNC: 7 MMOL/L (ref 0–11.9)
AST SERPL-CCNC: 47 U/L (ref 12–45)
BASOPHILS # BLD AUTO: 0 % (ref 0–1.8)
BASOPHILS # BLD: 0 K/UL (ref 0–0.12)
BILIRUB SERPL-MCNC: 0.9 MG/DL (ref 0.1–1.5)
BUN SERPL-MCNC: 33 MG/DL (ref 8–22)
C DIFF DNA SPEC QL NAA+PROBE: NEGATIVE
C DIFF TOX A+B STL QL IA: POSITIVE
C DIFF TOX GENS STL QL NAA+PROBE: NORMAL
CALCIUM SERPL-MCNC: 7.7 MG/DL (ref 8.4–10.2)
CHLORIDE SERPL-SCNC: 110 MMOL/L (ref 96–112)
CO2 SERPL-SCNC: 16 MMOL/L (ref 20–33)
CREAT SERPL-MCNC: 1.29 MG/DL (ref 0.5–1.4)
EOSINOPHIL # BLD AUTO: 0.27 K/UL (ref 0–0.51)
EOSINOPHIL NFR BLD: 2 % (ref 0–6.9)
ERYTHROCYTE [DISTWIDTH] IN BLOOD BY AUTOMATED COUNT: 49.4 FL (ref 35.9–50)
GLOBULIN SER CALC-MCNC: 2.8 G/DL (ref 1.9–3.5)
GLUCOSE SERPL-MCNC: 93 MG/DL (ref 65–99)
HCT VFR BLD AUTO: 26.3 % (ref 37–47)
HGB BLD-MCNC: 8.5 G/DL (ref 12–16)
LACTATE BLD-SCNC: 1.15 MMOL/L (ref 0.5–2)
LYMPHOCYTES # BLD AUTO: 2.26 K/UL (ref 1–4.8)
LYMPHOCYTES NFR BLD: 17 % (ref 22–41)
MANUAL DIFF BLD: NORMAL
MCH RBC QN AUTO: 29.6 PG (ref 27–33)
MCHC RBC AUTO-ENTMCNC: 32.3 G/DL (ref 33.6–35)
MCV RBC AUTO: 91.6 FL (ref 81.4–97.8)
METAMYELOCYTES NFR BLD MANUAL: 1 %
MONOCYTES # BLD AUTO: 0.27 K/UL (ref 0–0.85)
MONOCYTES NFR BLD AUTO: 2 % (ref 0–13.4)
NEUTROPHILS # BLD AUTO: 10.37 K/UL (ref 2–7.15)
NEUTROPHILS NFR BLD: 71 % (ref 44–72)
NEUTS BAND NFR BLD MANUAL: 7 % (ref 0–10)
NRBC # BLD AUTO: 0 K/UL
NRBC BLD-RTO: 0 /100 WBC
PLATELET # BLD AUTO: 347 K/UL (ref 164–446)
PLATELET BLD QL SMEAR: NORMAL
PMV BLD AUTO: 9 FL (ref 9–12.9)
POTASSIUM SERPL-SCNC: 3.7 MMOL/L (ref 3.6–5.5)
PROT SERPL-MCNC: 4.8 G/DL (ref 6–8.2)
RBC # BLD AUTO: 2.87 M/UL (ref 4.2–5.4)
RBC BLD AUTO: NORMAL
SODIUM SERPL-SCNC: 133 MMOL/L (ref 135–145)
SPECIMEN DRAWN FROM PATIENT: NORMAL
WBC # BLD AUTO: 13.3 K/UL (ref 4.8–10.8)

## 2018-01-13 PROCEDURE — A9270 NON-COVERED ITEM OR SERVICE: HCPCS | Performed by: INTERNAL MEDICINE

## 2018-01-13 PROCEDURE — 700111 HCHG RX REV CODE 636 W/ 250 OVERRIDE (IP): Performed by: INTERNAL MEDICINE

## 2018-01-13 PROCEDURE — 700102 HCHG RX REV CODE 250 W/ 637 OVERRIDE(OP): Performed by: INTERNAL MEDICINE

## 2018-01-13 PROCEDURE — 770006 HCHG ROOM/CARE - MED/SURG/GYN SEMI*

## 2018-01-13 PROCEDURE — 85007 BL SMEAR W/DIFF WBC COUNT: CPT

## 2018-01-13 PROCEDURE — G8988 SELF CARE GOAL STATUS: HCPCS | Mod: CJ

## 2018-01-13 PROCEDURE — 700105 HCHG RX REV CODE 258: Performed by: FAMILY MEDICINE

## 2018-01-13 PROCEDURE — G8987 SELF CARE CURRENT STATUS: HCPCS | Mod: CL

## 2018-01-13 PROCEDURE — 99233 SBSQ HOSP IP/OBS HIGH 50: CPT | Performed by: FAMILY MEDICINE

## 2018-01-13 PROCEDURE — 97167 OT EVAL HIGH COMPLEX 60 MIN: CPT

## 2018-01-13 PROCEDURE — 85027 COMPLETE CBC AUTOMATED: CPT

## 2018-01-13 PROCEDURE — 80053 COMPREHEN METABOLIC PANEL: CPT

## 2018-01-13 PROCEDURE — 83605 ASSAY OF LACTIC ACID: CPT

## 2018-01-13 RX ADMIN — ENOXAPARIN SODIUM 40 MG: 100 INJECTION SUBCUTANEOUS at 08:09

## 2018-01-13 RX ADMIN — VANCOMYCIN 125 MG: KIT at 17:19

## 2018-01-13 RX ADMIN — CITALOPRAM HYDROBROMIDE 20 MG: 20 TABLET ORAL at 20:11

## 2018-01-13 RX ADMIN — ONDANSETRON 4 MG: 4 TABLET, ORALLY DISINTEGRATING ORAL at 20:11

## 2018-01-13 RX ADMIN — HYDROCODONE BITARTRATE AND ACETAMINOPHEN 0.5 TABLET: 10; 325 TABLET ORAL at 20:12

## 2018-01-13 RX ADMIN — LACTOBACILLUS ACIDOPHILUS / LACTOBACILLUS BULGARICUS 1 PACKET: 100 MILLION CFU STRENGTH GRANULES at 08:09

## 2018-01-13 RX ADMIN — SODIUM CHLORIDE: 9 INJECTION, SOLUTION INTRAVENOUS at 16:09

## 2018-01-13 RX ADMIN — VANCOMYCIN 125 MG: KIT at 06:04

## 2018-01-13 RX ADMIN — LEVOTHYROXINE SODIUM 112 MCG: 112 TABLET ORAL at 06:03

## 2018-01-13 RX ADMIN — TRAZODONE HYDROCHLORIDE 50 MG: 50 TABLET ORAL at 20:11

## 2018-01-13 RX ADMIN — VANCOMYCIN 125 MG: KIT at 11:24

## 2018-01-13 ASSESSMENT — PAIN SCALES - GENERAL
PAINLEVEL_OUTOF10: 0
PAINLEVEL_OUTOF10: 5
PAINLEVEL_OUTOF10: 0

## 2018-01-13 ASSESSMENT — COGNITIVE AND FUNCTIONAL STATUS - GENERAL
DRESSING REGULAR LOWER BODY CLOTHING: TOTAL
DRESSING REGULAR UPPER BODY CLOTHING: A LOT
DAILY ACTIVITIY SCORE: 12
EATING MEALS: A LITTLE
SUGGESTED CMS G CODE MODIFIER DAILY ACTIVITY: CL
PERSONAL GROOMING: A LITTLE
HELP NEEDED FOR BATHING: A LOT
TOILETING: TOTAL

## 2018-01-13 ASSESSMENT — ENCOUNTER SYMPTOMS
PALPITATIONS: 0
BLURRED VISION: 0
BRUISES/BLEEDS EASILY: 0
MYALGIAS: 0
HEARTBURN: 0
HEADACHES: 0
NAUSEA: 0
FALLS: 1
FEVER: 0
DIZZINESS: 0
DOUBLE VISION: 0
COUGH: 0
CHILLS: 0
DEPRESSION: 0

## 2018-01-13 ASSESSMENT — ACTIVITIES OF DAILY LIVING (ADL): TOILETING: REQUIRES ASSIST

## 2018-01-13 NOTE — CARE PLAN
Problem: Safety  Goal: Will remain free from injury  Outcome: PROGRESSING AS EXPECTED    Intervention: Provide assistance with mobility  Generalized weakness, s/p shoulder arthroplasty with sling, call light within reach, bed alarm on at all times.      Problem: Infection  Goal: Will remain free from infection  Outcome: PROGRESSING AS EXPECTED    Intervention: Implement standard precautions and perform hand washing before and after patient contact  C diff precaution, on vanco po as ordered, special precaution in placed.      Problem: Pain Management  Goal: Pain level will decrease to patient's comfort goal  Outcome: PROGRESSING AS EXPECTED    Intervention: Educate and implement non-pharmacologic comfort measures. Examples: relaxation, distration, play therapy, activity therapy, massage, etc.  Pain assessment q 2 hours, medicated as needed, comfort measures provided.

## 2018-01-13 NOTE — CARE PLAN
Problem: Nutritional:  Goal: Achieve adequate nutritional intake  Diet advancement + PO intake >50%  Outcome: PROGRESSING AS EXPECTED  Clear liquid diet x1 day, PO intake <25% x1 meal recorded so far

## 2018-01-13 NOTE — ASSESSMENT & PLAN NOTE
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 diff colitis she will need a two-week course occurring straightened and a 4 week taper for total 6 weeks

## 2018-01-13 NOTE — PROGRESS NOTES
Alert and oriented  to person,place and time. on room air sat 96%. Complained of general body  pain 6/10 at this time.PRN pain med and due medication given per MAR. States understanding of POC.

## 2018-01-13 NOTE — ED PROVIDER NOTES
ED Provider Note    CHIEF COMPLAINT  Chief Complaint   Patient presents with   • Weakness     shoulder surgery 1/9/2018   • Diarrhea       HPI  Elizabethwilner Celis is a 82 y.o. female who presents complaining of weakness and diarrhea today. She is brought in by ambulance with her daughter. The patient had right shoulder replacement on Tuesday of last week. She has been doing well at home until today where she was more fatigued than usual. Her daughter states that she slept all day and when they got her up to go to the bathroom, her legs gave out beneath her. The patient did not fall. She has had some diarrhea and a history of C. difficile. She is not currently on any antibiotics at home. She feels generally weak. She denies any trouble with speech or unilateral weakness or numbness. She denies any chest pain, coughing, sore throat or shortness of breath. She has not had any leg swelling. She has been drinking water at home. She denies any abdominal pain at this time.    REVIEW OF SYSTEMS  HEENT:  No ear pain, congestion or sore throat   EYES: no discharge redness or vision changes  CARDIAC: no chest pain, palpitations    PULMONARY: no dyspnea, cough or congestion   GI: See history of present illness  : no dysuria, back pain or hematuria   Neuro: no weakness, numbness aphasia or headache  Musculoskeletal: no swelling deformity or pain no joint swelling  Endocrine: no fevers, sweating, weight loss   SKIN: no rash, erythema or contusions     See history of present illness all other systems are negative    PAST MEDICAL HISTORY  Past Medical History:   Diagnosis Date   • Arthritis     RA- hands, feet, shoulder   • C. difficile diarrhea 2/16   • C. 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    \"mini\" no residual symptoms   • Urinary incontinence     occasional       FAMILY HISTORY  Family History   Problem Relation Age of Onset   • Non-contributory Mother    • Non-contributory Father    • Anesthesia Paternal Grandfather      death       SOCIAL HISTORY  Social History     Social History   • Marital status:      Spouse name: N/A   • Number of children: N/A   • Years of education: N/A     Social History Main Topics   • Smoking status: Former Smoker     Packs/day: 1.00     Years: 20.00     Types: Cigarettes     Quit date: 1/1/1985   • Smokeless tobacco: Never Used   • Alcohol use Yes      Comment: 5 per week   • Drug use: No   • Sexual activity: Not on file     Other Topics Concern   • Not on file     Social History Narrative   • No narrative on file       SURGICAL HISTORY  Past Surgical History:   Procedure Laterality Date   • SHOULDER ARTHROPLASTY TOTAL Right 1/9/2018    Procedure: SHOULDER ARTHROPLASTY TOTAL - REVERSE;  Surgeon: Daniella Camargo M.D.;  Location: SURGERY [facility];  Service: Orthopedics   • COLONOSCOPY - ENDO N/A 3/7/2016    Procedure: COLONOSCOPY - ENDO;  Surgeon: Estiven Ayers M.D.;  Location: ENDOSCOPY [facility];  Service:    • FECAL TRANSPLANT N/A 3/7/2016    Procedure: FECAL TRANSPLANT;  Surgeon: Estiven Ayers M.D.;  Location: ENDOSCOPY [facility];  Service:    • OTHER ORTHOPEDIC SURGERY Left 2012    Left Elbow fx repair   • BLEPHAROPLASTY  3/31/2011    Performed by ORACIO DIAZ at [facility]   • FOOT SURGERY Right 2010   • OTHER ORTHOPEDIC SURGERY Left 2006    finger- removal of digit Left middle finger   • CHOLECYSTECTOMY  2000   • HYSTERECTOMY LAPAROSCOPY  2000   • HYSTERECTOMY RADICAL  1985   • OTHER Bilateral 2010, 2008    feet- repair torn tendons   • OTHER ORTHOPEDIC " "SURGERY Left 1970s    femur fx       CURRENT MEDICATIONS  Home Medications     Reviewed by Sandra Terry (Pharmacy Tech) on 01/12/18 at 1718  Med List Status: Complete   Medication Last Dose Status   citalopram (CELEXA) 20 MG Tab 1/11/2018 Active   cyclosporin (RESTASIS) 0.05 % ophthalmic emulsion 1/12/2018 Active   denosumab (PROLIA) 60 MG/ML Solution >6months Active   furosemide (LASIX) 20 MG Tab 1weekago Active   hydrocodone/acetaminophen (NORCO)  MG Tab 1/12/2018 Active   levothyroxine (SYNTHROID) 112 MCG Tab 1/12/2018 Active   Probiotic Cap 1/11/2018 Active   spironolactone (ALDACTONE) 25 MG Tab 1/8/2018 Active   trazodone (DESYREL) 50 MG Tab 1/8/2018 Active                ALLERGIES  Allergies   Allergen Reactions   • Azithromycin Unspecified     Matti Johnsons syndrome   • Erythromycin Unspecified     Matti Johnsons syndrome   • Nitrofurantoin Vomiting and Nausea   • Pcn [Penicillins] Anaphylaxis, Shortness of Breath and Swelling   • Sulfa Drugs Swelling   • Cefuroxime    • Ciprofloxacin    • Clindamycin    • Codeine Itching   • Daptomycin      Matti colleen syndrome     • Flagyl [Metronidazole] Vomiting and Nausea   • Morphine Itching   • Premarin Unspecified     burning   • Rifampin      Matti colleen syndrome       PHYSICAL EXAM  VITAL SIGNS: /46   Pulse 98   Temp 36.4 °C (97.6 °F)   Resp 16   Ht 1.676 m (5' 6\")   Wt 53.5 kg (118 lb)   SpO2 90%   BMI 19.05 kg/m²   Constitutional: Well developed, Well nourished, No acute distress, Non-toxic appearance.   HEENT: Normocephalic, Atraumatic,  external ears normal, pharynx pink,  Mucous  Membranes, dry with cracked lips, No rhinorrhea or mucosal edema  Eyes: PERRL, EOMI, Conjunctiva normal, No discharge.   Neck: Normal range of motion, No tenderness, Supple, No stridor.   Lymphatic: No lymphadenopathy    Cardiovascular: Regular Rate and Rhythm, No murmurs,  rubs, or gallops.   Thorax & Lungs: Lungs clear to auscultation bilaterally, " No respiratory distress, No wheezes, rhales or rhonchi, No chest wall tenderness.   Abdomen: Bowel sounds normal, Soft, non tender, non distended,  No pulsatile masses., no rebound guarding or peritoneal signs.   Skin: Warm, Dry, No erythema, No rash,   Back:  No CVA tenderness,  No spinal tenderness, bony crepitance step offs or instability.   Extremities: Equal, intact distal pulses, No cyanosis, clubbing or edema,  No tenderness.   Musculoskeletal: Good range of motion in all major joints. No tenderness to palpation or major deformities noted.   Neurologic: Alert & oriented x 3, Cranial nerves II-XII intact, Equal strength and sensation upper and lower extremities bilaterally,  No focal deficits noted.   Psychiatric: Affect normal, Judgment normal, Mood normal. No suicidal or homicidal ideation    EKG  EKG Interpretation    Interpreted by emergency department physician    Rhythm: normal sinus   Rate: 93 beats per minute  Axis: normal  Ectopy: none  Conduction: normal  ST Segments: normal  T Waves: normal  Q Waves: none    Clinical Impression: no acute changes    Unchanged from 11/11/2017    RADIOLOGY/PROCEDURES  CT-RENAL COLIC EVALUATION(A/P W/O)   Final Result      1.  Diffuse colonic wall thickening suggesting inflammatory or infectious colitis.   2.  No evidence of bowel obstruction or perforation.   3.  Minimal RIGHT pleural effusion with associated atelectasis.   4.  Probable chronic burst fracture of L1 with severe loss of height.      DX-CHEST-PORTABLE (1 VIEW)   Final Result      No acute cardiac or pulmonary abnormalities are identified.            COURSE & MEDICAL DECISION MAKING  Pertinent Labs & Imaging studies reviewed. (See chart for details)  Differential diagnosis: Dehydration, C. diff colitis, UTI    5:16 PM  an IV was started and the patient is given fluids for rehydration.    Results for orders placed or performed during the hospital encounter of 01/12/18   CBC WITH DIFFERENTIAL   Result Value  Ref Range    WBC 15.3 (H) 4.8 - 10.8 K/uL    RBC 2.94 (L) 4.20 - 5.40 M/uL    Hemoglobin 9.1 (L) 12.0 - 16.0 g/dL    Hematocrit 27.2 (L) 37.0 - 47.0 %    MCV 92.5 81.4 - 97.8 fL    MCH 31.0 27.0 - 33.0 pg    MCHC 33.5 (L) 33.6 - 35.0 g/dL    RDW 48.4 35.9 - 50.0 fL    Platelet Count 304 164 - 446 K/uL    MPV 9.1 9.0 - 12.9 fL    Nucleated RBC 0.00 /100 WBC    NRBC (Absolute) 0.00 K/uL    Neutrophils-Polys 55.00 44.00 - 72.00 %    Lymphocytes 5.00 (L) 22.00 - 41.00 %    Monocytes 2.00 0.00 - 13.40 %    Eosinophils 0.00 0.00 - 6.90 %    Basophils 0.00 0.00 - 1.80 %    Neutrophils (Absolute) 13.92 (H) 2.00 - 7.15 K/uL    Lymphs (Absolute) 0.77 (L) 1.00 - 4.80 K/uL    Monos (Absolute) 0.31 0.00 - 0.85 K/uL    Eos (Absolute) 0.00 0.00 - 0.51 K/uL    Baso (Absolute) 0.00 0.00 - 0.12 K/uL   COMP METABOLIC PANEL   Result Value Ref Range    Sodium 133 (L) 135 - 145 mmol/L    Potassium 3.8 3.6 - 5.5 mmol/L    Chloride 105 96 - 112 mmol/L    Co2 15 (L) 20 - 33 mmol/L    Anion Gap 13.0 (H) 0.0 - 11.9    Glucose 90 65 - 99 mg/dL    Bun 30 (H) 8 - 22 mg/dL    Creatinine 1.09 0.50 - 1.40 mg/dL    Calcium 8.2 (L) 8.4 - 10.2 mg/dL    AST(SGOT) 59 (H) 12 - 45 U/L    ALT(SGPT) 58 (H) 2 - 50 U/L    Alkaline Phosphatase 140 (H) 30 - 99 U/L    Total Bilirubin 1.0 0.1 - 1.5 mg/dL    Albumin 2.5 (L) 3.2 - 4.9 g/dL    Total Protein 5.7 (L) 6.0 - 8.2 g/dL    Globulin 3.2 1.9 - 3.5 g/dL    A-G Ratio 0.8 g/dL   LIPASE   Result Value Ref Range    Lipase <10 7 - 58 U/L   PROTHROMBIN TIME   Result Value Ref Range    PT 15.6 (H) 12.0 - 14.6 sec    INR 1.28 (H) 0.87 - 1.13   APTT   Result Value Ref Range    APTT 29.6 24.7 - 36.0 sec   TROPONIN   Result Value Ref Range    Troponin I <0.02 0.00 - 0.04 ng/mL   BTYPE NATRIURETIC PEPTIDE   Result Value Ref Range    B Natriuretic Peptide 372 (H) 0 - 100 pg/mL   URINALYSIS CULTURE, IF INDICATED   Result Value Ref Range    Color Yellow     Character Hazy (A)     Specific Gravity 1.015 <1.035    Ph 5.5 5.0  - 8.0    Glucose Negative Negative mg/dL    Ketones Negative Negative mg/dL    Protein Negative Negative mg/dL    Bilirubin Negative Negative    Nitrite Negative Negative    Leukocyte Esterase Negative Negative    Occult Blood Negative Negative    Micro Urine Req Microscopic     Culture Indicated No UA Culture   STOOL WBC'S   Result Value Ref Range    Stool WBC's None seen None seen   URINE MICROSCOPIC (W/UA)   Result Value Ref Range    RBC Rare /hpf    Bacteria Rare (A) None /hpf    Epithelial Cells Few Few /hpf    Mucous Threads Few /hpf    Urine Crystals Mod Amorphous /hpf    Urine Casts 0-2 Hyaline /lpf    Urine Casts 0-2 Granular /lpf   ESTIMATED GFR   Result Value Ref Range    GFR If  58 (A) >60 mL/min/1.73 m 2    GFR If Non  48 (A) >60 mL/min/1.73 m 2   DIFFERENTIAL MANUAL   Result Value Ref Range    Bands-Stabs 36.00 (H) 0.00 - 10.00 %    Metamyelocytes 2.00 %    Manual Diff Status PERFORMED    PLATELET ESTIMATE   Result Value Ref Range    Plt Estimation Normal    MORPHOLOGY   Result Value Ref Range    RBC Morphology Normal       5:56 PM  the patient appears dehydrated on her lab work as well as clinically. Her white blood cell count is elevated. We will start her on vancomycin to cover C. difficile and stool cultures have been sent. CT is pending.      5:56 PM  I spoke with the hospitalist  who has accepted the patient for admission.        FINAL IMPRESSION  1. Diarrhea of presumed infectious origin    2. Weakness    3. Colitis          PLAN/DISPOSITION  Admit          Electronically signed by: Smita Cherry, 1/12/2018 5:01 PM

## 2018-01-13 NOTE — THERAPY
"Occupational Therapy Evaluation completed.   Functional Status:  Pt is an 83 y/o female, admit with vomiting and diarrhea. Pt presents with significant weakness, decreased activity tolerance and pain . Pt with recent R TSA. Pt requires Mod to MAX a to complete functional transfers and Max A to total A for ADLS. Pt will benefit from Acute OT services to increase functional I and safety prior to d/c home.   Plan of Care: Will benefit from Occupational Therapy 3 times per week  Discharge Recommendations:  Equipment: Will Continue to Assess for Equipment Needs. Post-acute therapy Discharge to a transitional care facility for continued skilled therapy services.    See \"Rehab Therapy-Acute\" Patient Summary Report for complete documentation.    "

## 2018-01-13 NOTE — PROGRESS NOTES
Anjelica from Lab called with critical result ofstool for c diff positive at 1040. Critical lab result read back to Anjelica.   Dr. Payton notified of critical lab result at 1045.  Critical lab result read back by Dr. Payton no order made patient on po vanco q 6 hours.

## 2018-01-13 NOTE — H&P
Hospital Medicine History and Physical    Date of Service  1/12/2018    Chief Complaint  Chief Complaint   Patient presents with   • Weakness     shoulder surgery 1/9/2018   • Diarrhea       History of Presenting Illness  82 y.o. female with a past medical history of C. diff colitis in 2015 status post fecal transplant, chronic back pain, arthritis, status post shoulder arthroplasty postop day #2 presented 1/12/2018 with generalized weakness and diarrhea ×2 days. Patient does report mild generalized abdominal pain with diarrhea. Reports nausea and vomiting ×2 episodes.  Patient was back in by her daughter today, after she was noted to have increasing generalized weakness with inability to ambulate.    Patient is at high risk for recurrent C. diff colitis given her prior history of C diff 2015, which lasted over 6 months requiring fecal transplant at the time.    In the emergency room, patient has mild complaints of pain. Otherwise appears comfortable.                  Primary Care Physician  Christen Shields M.D.    Consultants  none    Code Status  Code: Full code    Review of Systems  Review of Systems   Constitutional: Negative for chills, diaphoresis, fever and malaise/fatigue.   HENT: Positive for hearing loss. Negative for congestion.    Respiratory: Negative for cough and shortness of breath.    Cardiovascular: Negative for chest pain, palpitations and leg swelling.   Gastrointestinal: Positive for abdominal pain, diarrhea, heartburn, nausea and vomiting. Negative for blood in stool and melena.   Genitourinary: Negative for dysuria, flank pain and urgency.   Musculoskeletal: Positive for joint pain and myalgias. Negative for back pain.   Neurological: Positive for weakness. Negative for dizziness, sensory change, speech change, focal weakness, loss of consciousness and headaches.   Psychiatric/Behavioral: Negative for depression and memory loss. The patient is not nervous/anxious.           Past Medical  "History  Past Medical History:   Diagnosis Date   • C. difficile diarrhea 3/2016    fecal transplant   • C. 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    \"mini\" no residual symptoms   • Urinary incontinence     occasional       Surgical History  Past Surgical History:   Procedure Laterality Date   • SHOULDER ARTHROPLASTY TOTAL Right 1/9/2018    Procedure: SHOULDER ARTHROPLASTY TOTAL - REVERSE;  Surgeon: Daniella Camargo M.D.;  Location: SURGERY [facility];  Service: Orthopedics   • COLONOSCOPY - ENDO N/A 3/7/2016    Procedure: COLONOSCOPY - ENDO;  Surgeon: Estiven Ayers M.D.;  Location: ENDOSCOPY [facility];  Service:    • FECAL TRANSPLANT N/A 3/7/2016    Procedure: FECAL TRANSPLANT;  Surgeon: Estiven Ayers M.D.;  Location: ENDOSCOPY [facility];  Service:    • OTHER ORTHOPEDIC SURGERY Left 2012    Left Elbow fx repair   • BLEPHAROPLASTY  3/31/2011    Performed by ORACIO DIAZ at [facility]   • FOOT SURGERY Right 2010   • OTHER ORTHOPEDIC SURGERY Left 2006    finger- removal of digit Left middle finger   • CHOLECYSTECTOMY  2000   • HYSTERECTOMY LAPAROSCOPY  2000   • HYSTERECTOMY RADICAL  1985   • OTHER Bilateral 2010, 2008    feet- repair torn tendons   • OTHER ORTHOPEDIC SURGERY Left 1970s    femur fx       Medications  No current facility-administered medications on file prior to encounter.      Current Outpatient Prescriptions on File Prior to Encounter   Medication Sig Dispense Refill   • furosemide (LASIX) 20 MG Tab Take  by mouth every day.     • spironolactone " (ALDACTONE) 25 MG Tab Take 25 mg by mouth every day.     • denosumab (PROLIA) 60 MG/ML Solution Inject 60 mg as instructed Once. Twice a year     • citalopram (CELEXA) 20 MG Tab Take 20 mg by mouth every bedtime.     • trazodone (DESYREL) 50 MG Tab Take 50 mg by mouth every bedtime.     • levothyroxine (SYNTHROID) 112 MCG Tab Take 112 mcg by mouth Every morning on an empty stomach.         Family History  Family History   Problem Relation Age of Onset   • Non-contributory Mother    • Non-contributory Father    • Anesthesia Paternal Grandfather      death       Social History  Social History   Substance Use Topics   • Smoking status: Former Smoker     Packs/day: 1.00     Years: 20.00     Types: Cigarettes     Quit date: 1985   • Smokeless tobacco: Never Used   • Alcohol use Yes      Comment: 5 per week       Allergies  Allergies   Allergen Reactions   • Azithromycin Unspecified     Matti Johnsons syndrome   • Erythromycin Unspecified     Matti Johnsons syndrome   • Nitrofurantoin Vomiting and Nausea   • Pcn [Penicillins] Anaphylaxis, Shortness of Breath and Swelling   • Sulfa Drugs Swelling   • Cefuroxime    • Ciprofloxacin    • Clindamycin    • Codeine Itching   • Daptomycin      Matti colleen syndrome     • Flagyl [Metronidazole] Vomiting and Nausea   • Morphine Itching   • Premarin Unspecified     burning   • Rifampin      Matti colleen syndrome        Physical Exam  Laboratory   Hemodynamics  Temp (24hrs), Av.4 °C (97.6 °F), Min:36.4 °C (97.6 °F), Max:36.4 °C (97.6 °F)   Temperature: 36.4 °C (97.6 °F)  Pulse  Av  Min: 98  Max: 98    Blood Pressure : 107/46      Respiratory      Respiration: 16, Pulse Oximetry: 90 %             Physical Exam   Constitutional: She is oriented to person, place, and time. She appears well-nourished. No distress.   Thin, elderly   HENT:   Head: Normocephalic and atraumatic.   Nose: Nose normal.   Eyes: EOM are normal. Pupils are equal, round, and reactive to light.  Right eye exhibits no discharge. Left eye exhibits no discharge. No scleral icterus.   Neck: Neck supple. No JVD present. No thyromegaly present.   Cardiovascular: Normal rate and intact distal pulses.    No murmur heard.  Pulmonary/Chest: Breath sounds normal. No respiratory distress. She has no wheezes.   Abdominal: Soft. Bowel sounds are normal. She exhibits no distension and no mass. There is tenderness.   Musculoskeletal: She exhibits no edema or tenderness.        Arms:  Neurological: She is alert and oriented to person, place, and time. No cranial nerve deficit. She exhibits normal muscle tone. Coordination normal.   Skin: Skin is warm and dry. No rash noted. She is not diaphoretic. No erythema. No pallor.   Psychiatric: She has a normal mood and affect. Her behavior is normal. Judgment and thought content normal.   Nursing note and vitals reviewed.        Assessment/Plan  * Diarrhea   Assessment & Plan    The patient will be admitted to these medical inpatient unit  History of C. diff colitis with recent antibiotic exposure  High risk for recurrence  Patient will be placed on contact isolation  Will follow up C. diff PCR  Follow-up stool cultures  Patient has been started on vancomycin  Continue IV fluid hydration  Pain control as needed  Urinalysis is negative   follow-up lactic acid        Dehydration   Assessment & Plan    Continue IV fluid hydration  We'll hold diuretics        Weakness   Assessment & Plan    Secondary to above  Monitor        H/O shoulder surgery   Assessment & Plan    Postop day #2  PT/OT evaluation  Consult ortho as needed.  Wound care for dressing changes        Anemia   Assessment & Plan    Secondary to chronic disease  We'll follow-up iron panel  Appears to be near baseline        Hyponatremia   Assessment & Plan    Plan as above  Mild            I anticipate this patient will require at least two midnights for appropriate medical management, necessitating inpatient  admission.    Prophylaxis: lovenox    Recent Labs      01/10/18   0511  01/12/18   1650   WBC   --   15.3*   RBC   --   2.94*   HEMOGLOBIN  8.9*  9.1*   HEMATOCRIT  26.5*  27.2*   MCV   --   92.5   MCH   --   31.0   MCHC   --   33.5*   RDW   --   48.4   PLATELETCT   --   304   MPV   --   9.1     Recent Labs      01/10/18   0511  01/12/18   1650   SODIUM  134*  133*   POTASSIUM  4.1  3.8   CHLORIDE  109  105   CO2  18*  15*   GLUCOSE  121*  90   BUN  35*  30*   CREATININE  1.28  1.09   CALCIUM  8.4  8.2*     Recent Labs      01/10/18   0511  01/12/18   1650   ALTSGPT   --   58*   ASTSGOT   --   59*   ALKPHOSPHAT   --   140*   TBILIRUBIN   --   1.0   LIPASE   --   <10   GLUCOSE  121*  90     Recent Labs      01/12/18   1727   APTT  29.6   INR  1.28*     Recent Labs      01/12/18   1650   BNPBTYPENAT  372*         Lab Results   Component Value Date    TROPONINI <0.02 01/12/2018       Imaging  CT-RENAL COLIC EVALUATION(A/P W/O)   Final Result      1.  Diffuse colonic wall thickening suggesting inflammatory or infectious colitis.   2.  No evidence of bowel obstruction or perforation.   3.  Minimal RIGHT pleural effusion with associated atelectasis.   4.  Probable chronic burst fracture of L1 with severe loss of height.      DX-CHEST-PORTABLE (1 VIEW)   Final Result      No acute cardiac or pulmonary abnormalities are identified.

## 2018-01-13 NOTE — FLOWSHEET NOTE
01/12/18 2614   Type of Assessment   Assessment Yes   Patient History   Pulmonary Diagnosis none   Surgical Procedures none   Home O2 No   Home Treatments/Frequency No   COPD Risk Screening   Do you have a history of COPD? No   COPD Population Screener   During the past 4 weeks, how much did you feel short of breath? 0   Do you ever cough up any mucus or phlegm? 0   In the past 12 months, you do less than you used to because of your breathing problems 0   Have you smoked at least 100 cigarettes in your entire life? 2   How old are you? 2   COPD Screening Score 4   Smoking History   Have you Ever Smoked Yes   Have you Smoked in the Last 12 Mos No   Confirm Quit Date 01/01/83   Level Of Consciousness   Level of Consciousness Alert   Respiratory WDL   Respiratory (WDL) X   Chest Exam   Respiration 18   Pulse 95   Breath Sounds   RUL Breath Sounds Clear   RML Breath Sounds Clear   RLL Breath Sounds Diminished   SHANTANU Breath Sounds Clear   LLL Breath Sounds Diminished   Oximetry   #Pulse Oximetry (Single Determination) Yes   Oxygen   Home O2 Use Prior To Admission? No   Pulse Oximetry 96 %   O2 Daily Delivery Respiratory  Room Air with O2 Available   Room Air Challenge Pass

## 2018-01-13 NOTE — PROGRESS NOTES
[facility] Hospitalist Progress Note    Date of Service: 2018    Chief Complaint  82 y.o. female admitted 2018 with C. diff colitis.    Interval Problem Update  Having abdominal pain. No nausea or vomiting. Still having loose stools. Denies any chest pain or shortness of breath.    Consultants/Specialty  None  Discuss plan of care with RN.    Disposition  To be determined        Review of Systems   Constitutional: Positive for malaise/fatigue. Negative for chills and fever.   HENT: Negative for hearing loss and tinnitus.    Eyes: Negative for blurred vision and double vision.   Respiratory: Negative for cough.    Cardiovascular: Negative for chest pain and palpitations.   Gastrointestinal: Negative for heartburn and nausea.   Genitourinary: Negative for dysuria and urgency.   Musculoskeletal: Positive for falls. Negative for myalgias.   Skin: Negative for rash.   Neurological: Negative for dizziness and headaches.   Endo/Heme/Allergies: Does not bruise/bleed easily.   Psychiatric/Behavioral: Negative for depression and suicidal ideas.      Physical Exam  Laboratory/Imaging   Hemodynamics  Temp (24hrs), Av.8 °C (98.3 °F), Min:36.4 °C (97.6 °F), Max:37.1 °C (98.8 °F)   Temperature: 37.1 °C (98.8 °F)  Pulse  Av  Min: 67  Max: 102    Blood Pressure : 125/46      Respiratory      Respiration: 18, Pulse Oximetry: 93 %, O2 Daily Delivery Respiratory : Room Air with O2 Available        RUL Breath Sounds: Clear, RML Breath Sounds: Clear, RLL Breath Sounds: Diminished, SHANTANU Breath Sounds: Clear, LLL Breath Sounds: Diminished    Fluids    Intake/Output Summary (Last 24 hours) at 18 1406  Last data filed at 18 1000   Gross per 24 hour   Intake              300 ml   Output                0 ml   Net              300 ml       Nutrition  Orders Placed This Encounter   Procedures   • DIET ORDER     Standing Status:   Standing     Number of Occurrences:   1     Order Specific Question:   Diet:     Answer:    Clear Liquid [10]     Physical Exam   Constitutional: She is oriented to person, place, and time. She appears well-developed. No distress.   HENT:   Head: Normocephalic and atraumatic.   Eyes: Pupils are equal, round, and reactive to light. No scleral icterus.   Neck: Neck supple. No thyromegaly present.   Cardiovascular: Normal rate and regular rhythm.    Pulmonary/Chest: Effort normal and breath sounds normal. No respiratory distress.   Abdominal: Soft. There is tenderness (there is mild tenderness on the epigastric area and upper quadrants as well). There is no rebound.   Musculoskeletal: She exhibits no edema.   Neurological: She is alert and oriented to person, place, and time.   Skin: Skin is warm. No erythema.   Psychiatric: She has a normal mood and affect. Her behavior is normal.       Recent Labs      01/12/18   1650  01/13/18   0428   WBC  15.3*  13.3*   RBC  2.94*  2.87*   HEMOGLOBIN  9.1*  8.5*   HEMATOCRIT  27.2*  26.3*   MCV  92.5  91.6   MCH  31.0  29.6   MCHC  33.5*  32.3*   RDW  48.4  49.4   PLATELETCT  304  347   MPV  9.1  9.0     Recent Labs      01/12/18   1650  01/13/18   0429   SODIUM  133*  133*   POTASSIUM  3.8  3.7   CHLORIDE  105  110   CO2  15*  16*   GLUCOSE  90  93   BUN  30*  33*   CREATININE  1.09  1.29   CALCIUM  8.2*  7.7*     Recent Labs      01/12/18   1727   APTT  29.6   INR  1.28*     Recent Labs      01/12/18   1650   BNPBTYPENAT  372*              Assessment/Plan     * Diarrhea   Assessment & Plan    The patient will be admitted to these medical inpatient unit  History of C. diff colitis in the past status post fecal implantation. Also patient has a history of recent antibiotic use.  C. diff PCR was positive.  Patient was on oral vancomycin.  IV fluids and encourage oral hydration.        Dehydration   Assessment & Plan    On IV fluids.        Weakness   Assessment & Plan    Secondary to above  Will likely benefit from PT/OT.        H/O shoulder surgery   Assessment & Plan     PT/OT evaluation  Wound care.        Anemia   Assessment & Plan    Monitor H&H.        Hyponatremia   Assessment & Plan    This is likely secondary to dehydration. When IV fluids with normal saline.            Reviewed items::  Labs reviewed, Medications reviewed and Radiology images reviewed  Rao catheter::  No Rao  DVT prophylaxis - mechanical:  SCDs  Antibiotics:  Treating active infection/contamination beyond 24 hours perioperative coverage

## 2018-01-13 NOTE — PROGRESS NOTES
2 RN skin assessment done; skin WDL.except  bruises on both shin  and surgical dressing on the right chest.

## 2018-01-13 NOTE — ED NOTES
Chief Complaint   Patient presents with   • Weakness     shoulder surgery 1/9/2018   • Diarrhea   Taken care of by daughter after her shoulder surgery, she is weak and he legs gave out thi am.

## 2018-01-13 NOTE — DIETARY
NUTRITION SERVICES - Poor PO PTA - Pt is a 81 yo female who comes in with an admitting Dx of diarrhea, dehydration, H/O shoulder surgery, hyponatremia, and anemia. H/o stroke, hypothyroid, and C diff diarrhea. BMI = 21.78.    Pertinent Labs: Sodium 133 (L), BUN 33 (H), Calcium 7.7 (L, adjusted = 9.3 WNL), Albumin 2.0 (L), AST 47 (H), ALT 53 (H), Alk Phos 130 (H)    Pertinent Meds: Lovenox, Lactobacillus granules, Synthroid, Trazodone, Vancomycin, (PRN: Norco, Zofran, Roxicodone, Morphine)    Fluids: NS infusion @ 75mL/hr    GI: last BM on 1/13    Skin: no skin breakdown, only surgical incision x1 (R shoulder), pt is POD #2 for arthroplasty    Diet order: clear liquid    Weight: 134# (61.2kg)    UBW = 120-125#, long term wt per the pt    The pt was seen today to interview for poor PO PTA per the nutrition admit screening. This writer is familiar with the pt from past interviews in November and during her last admission a few days ago. The pt confirms not eating well over the prior few days d/t weakness and diarrhea. She states that her daughter prepared dinner (pork chops, apple sauce) the night she went home from the hospital (on or around 1/10) and that she was able to tolerate this meal, but since then she has had only a smoothie because she has felt so poorly. Wt loss during this time is unknown. She confirms that her UBW is 120-125#. The pt was finishing lunch when this writer visited. Per the CNA, only a few bites of each meal item was eaten. Appetite continues to be poor. Discussed purpose of clear liquid diet and that she will likely be upgraded as she is feeling better and tolerating PO intake. The pt verbalized understanding of this.    Plan/Rec:   1) Continue to encourage good PO intake  2) Advance diet as medically feasible  3) Fluids per MD discretion    RD monitoring.

## 2018-01-13 NOTE — CARE PLAN
Problem: Safety  Goal: Will remain free from injury  Outcome: PROGRESSING AS EXPECTED  Hourly rounding.  Non-skid socks. Bed locked & in low position. Personal belongings within reach. .      Problem: Mobility  Goal: Risk for activity intolerance will decrease  Outcome: PROGRESSING AS EXPECTED  Patient instructed to turn & reposition every 2 hours,kept dry and clean. place pillows on bony prominences to prevent skin breakdown.

## 2018-01-13 NOTE — PROGRESS NOTES
Bedside report given to  Gricelda LAUREN.Pt  Resting in the bed.Safety precautions in place and all the lines remain patent.

## 2018-01-14 LAB
ALBUMIN SERPL BCP-MCNC: 1.7 G/DL (ref 3.2–4.9)
ALBUMIN/GLOB SERPL: 0.7 G/DL
ALP SERPL-CCNC: 133 U/L (ref 30–99)
ALT SERPL-CCNC: 45 U/L (ref 2–50)
ANION GAP SERPL CALC-SCNC: 7 MMOL/L (ref 0–11.9)
AST SERPL-CCNC: 48 U/L (ref 12–45)
BASOPHILS # BLD AUTO: 0 % (ref 0–1.8)
BASOPHILS # BLD: 0 K/UL (ref 0–0.12)
BILIRUB SERPL-MCNC: 0.4 MG/DL (ref 0.1–1.5)
BUN SERPL-MCNC: 29 MG/DL (ref 8–22)
CALCIUM SERPL-MCNC: 7.1 MG/DL (ref 8.4–10.2)
CHLORIDE SERPL-SCNC: 110 MMOL/L (ref 96–112)
CO2 SERPL-SCNC: 15 MMOL/L (ref 20–33)
CREAT SERPL-MCNC: 0.94 MG/DL (ref 0.5–1.4)
E COLI SXT1+2 STL IA: NORMAL
EOSINOPHIL # BLD AUTO: 0.17 K/UL (ref 0–0.51)
EOSINOPHIL NFR BLD: 1 % (ref 0–6.9)
ERYTHROCYTE [DISTWIDTH] IN BLOOD BY AUTOMATED COUNT: 49.2 FL (ref 35.9–50)
GLOBULIN SER CALC-MCNC: 2.6 G/DL (ref 1.9–3.5)
GLUCOSE SERPL-MCNC: 121 MG/DL (ref 65–99)
HCT VFR BLD AUTO: 26.1 % (ref 37–47)
HGB BLD-MCNC: 8.6 G/DL (ref 12–16)
LYMPHOCYTES # BLD AUTO: 0.66 K/UL (ref 1–4.8)
LYMPHOCYTES NFR BLD: 4 % (ref 22–41)
MANUAL DIFF BLD: ABNORMAL
MCH RBC QN AUTO: 29.8 PG (ref 27–33)
MCHC RBC AUTO-ENTMCNC: 33 G/DL (ref 33.6–35)
MCV RBC AUTO: 90.3 FL (ref 81.4–97.8)
MONOCYTES # BLD AUTO: 0.5 K/UL (ref 0–0.85)
MONOCYTES NFR BLD AUTO: 3 % (ref 0–13.4)
NEUTROPHILS # BLD AUTO: 15.27 K/UL (ref 2–7.15)
NEUTROPHILS NFR BLD: 81 % (ref 44–72)
NEUTS BAND NFR BLD MANUAL: 11 % (ref 0–10)
NRBC # BLD AUTO: 0.02 K/UL
NRBC BLD-RTO: 0.1 /100 WBC
PLATELET # BLD AUTO: 390 K/UL (ref 164–446)
PLATELET BLD QL SMEAR: NORMAL
PMV BLD AUTO: 9.2 FL (ref 9–12.9)
POLYCHROMASIA BLD QL SMEAR: NORMAL
POTASSIUM SERPL-SCNC: 3.3 MMOL/L (ref 3.6–5.5)
PROT SERPL-MCNC: 4.3 G/DL (ref 6–8.2)
RBC # BLD AUTO: 2.89 M/UL (ref 4.2–5.4)
RBC BLD AUTO: PRESENT
SIGNIFICANT IND 70042: NORMAL
SITE SITE: NORMAL
SMUDGE CELLS BLD QL SMEAR: NORMAL
SODIUM SERPL-SCNC: 132 MMOL/L (ref 135–145)
SOURCE SOURCE: NORMAL
WBC # BLD AUTO: 16.6 K/UL (ref 4.8–10.8)

## 2018-01-14 PROCEDURE — 87177 OVA AND PARASITES SMEARS: CPT

## 2018-01-14 PROCEDURE — 770006 HCHG ROOM/CARE - MED/SURG/GYN SEMI*

## 2018-01-14 PROCEDURE — 85007 BL SMEAR W/DIFF WBC COUNT: CPT

## 2018-01-14 PROCEDURE — A9270 NON-COVERED ITEM OR SERVICE: HCPCS | Performed by: INTERNAL MEDICINE

## 2018-01-14 PROCEDURE — 99233 SBSQ HOSP IP/OBS HIGH 50: CPT | Performed by: FAMILY MEDICINE

## 2018-01-14 PROCEDURE — 85027 COMPLETE CBC AUTOMATED: CPT

## 2018-01-14 PROCEDURE — 700102 HCHG RX REV CODE 250 W/ 637 OVERRIDE(OP): Performed by: INTERNAL MEDICINE

## 2018-01-14 PROCEDURE — 700111 HCHG RX REV CODE 636 W/ 250 OVERRIDE (IP): Performed by: INTERNAL MEDICINE

## 2018-01-14 PROCEDURE — 700105 HCHG RX REV CODE 258: Performed by: FAMILY MEDICINE

## 2018-01-14 PROCEDURE — 80053 COMPREHEN METABOLIC PANEL: CPT

## 2018-01-14 RX ADMIN — SODIUM CHLORIDE: 9 INJECTION, SOLUTION INTRAVENOUS at 05:32

## 2018-01-14 RX ADMIN — CITALOPRAM HYDROBROMIDE 20 MG: 20 TABLET ORAL at 20:22

## 2018-01-14 RX ADMIN — HYDROCODONE BITARTRATE AND ACETAMINOPHEN 0.5 TABLET: 10; 325 TABLET ORAL at 20:29

## 2018-01-14 RX ADMIN — VANCOMYCIN 125 MG: KIT at 05:32

## 2018-01-14 RX ADMIN — VANCOMYCIN 125 MG: KIT at 00:11

## 2018-01-14 RX ADMIN — LACTOBACILLUS ACIDOPHILUS / LACTOBACILLUS BULGARICUS 1 PACKET: 100 MILLION CFU STRENGTH GRANULES at 08:38

## 2018-01-14 RX ADMIN — VANCOMYCIN 125 MG: KIT at 17:00

## 2018-01-14 RX ADMIN — VANCOMYCIN 125 MG: KIT at 12:56

## 2018-01-14 RX ADMIN — HYDROCODONE BITARTRATE AND ACETAMINOPHEN 0.5 TABLET: 10; 325 TABLET ORAL at 13:01

## 2018-01-14 RX ADMIN — TRAZODONE HYDROCHLORIDE 50 MG: 50 TABLET ORAL at 20:22

## 2018-01-14 RX ADMIN — SODIUM CHLORIDE: 9 INJECTION, SOLUTION INTRAVENOUS at 18:50

## 2018-01-14 RX ADMIN — ENOXAPARIN SODIUM 40 MG: 100 INJECTION SUBCUTANEOUS at 08:38

## 2018-01-14 RX ADMIN — LEVOTHYROXINE SODIUM 112 MCG: 112 TABLET ORAL at 05:32

## 2018-01-14 ASSESSMENT — ENCOUNTER SYMPTOMS
BRUISES/BLEEDS EASILY: 0
NECK PAIN: 0
HEMOPTYSIS: 0
PALPITATIONS: 0
PHOTOPHOBIA: 0
HEADACHES: 0
FALLS: 1
TINGLING: 0
VOMITING: 0
FEVER: 0
NAUSEA: 0
DOUBLE VISION: 0
ORTHOPNEA: 0

## 2018-01-14 ASSESSMENT — PAIN SCALES - GENERAL
PAINLEVEL_OUTOF10: 8
PAINLEVEL_OUTOF10: 1
PAINLEVEL_OUTOF10: 5

## 2018-01-14 ASSESSMENT — LIFESTYLE VARIABLES: SUBSTANCE_ABUSE: 0

## 2018-01-14 NOTE — PROGRESS NOTES
Received bedside report from Esperanza LAUREN. Assumed care of pt who is resting in bed, RR even/unlabored. Fall protocol in place. CLIP. Will continue to monitor.

## 2018-01-14 NOTE — CARE PLAN
Problem: Infection  Goal: Will remain free from infection  Outcome: PROGRESSING AS EXPECTED  Pt is being treated for c-diff with antibiotics and IV fluids. Isolation precautions in place.    Problem: Pain Management  Goal: Pain level will decrease to patient's comfort goal  Outcome: PROGRESSING AS EXPECTED  Pt provided with pain medication as needed. Pt did request to take a dose of Norco for right shoulder pain.

## 2018-01-14 NOTE — CARE PLAN
Problem: Pain Management  Goal: Pain level will decrease to patient's comfort goal   01/13/18 2000 01/14/18 0838   OTHER   Nurse Pain Scale 0 - 10  --  1   Non Verbal Scale  Calm;Unlabored Breathing --      Pain medication provided.     Problem: Skin Integrity  Goal: Risk for impaired skin integrity will decrease    Intervention: Implement precautions to protect skin integrity in collaboration with the interdisciplinary team   01/14/18 0838 01/14/18 0858   OTHER   Skin Preventative Measures --  Pillows in Use for Support / Positioning   Bed Types --  Pressure Redistribution Mattress (Atmosair)   Moisturizers --  Moisturizer    PT / OT Involved in Care Physical Therapy Involved --    Activity  Bed --    Patient Turns / Repositioning --  Patient Declines and Understands Importance   Assistance / Tolerance for Turning/Repositioning --  Assistance of One   Patient is Receiving Nutrition Oral Intake Adequate --      Patient educated on skin integrity and risk for skin breakdown. Patient refusing to turn at this time. Will continue to monitor.

## 2018-01-14 NOTE — DISCHARGE PLANNING
Care Transition Team Assessment    Patient a recent readmit post shoulder surgery 1-9-18.  Patient was discharged home with support from her daughter and friends.  The discharge plan is the same at this time.  No current SS needs noted.  SS will continue to monitor and assist as needed.     Information Source  Orientation : Oriented x 4  Information Given By: Patient  Informant's Name: Elizabeth  Who is responsible for making decisions for patient? : Patient    Readmission Evaluation  Is this a readmission?: Yes - unplanned readmission  Why do you think you were readmitted?: weakness and Diarrhea    Elopement Risk  Legal Hold: No  Ambulatory or Self Mobile in Wheelchair: No-Not an Elopement Risk  Elopement Risk: Not at Risk for Elopement    Interdisciplinary Discharge Planning  Does Admitting Nurse Feel This Could be a Complex Discharge?: No  Primary Care Physician: Chantale Flores  Lives with - Patient's Self Care Capacity: Alone and Able to Care For Self  Patient or legal guardian wants to designate a caregiver (see row info): No  Support Systems: Family Member(s)  Housing / Facility: [address]  Do You Take your Prescribed Medications Regularly: Yes  Able to Return to Previous ADL's: Future Time w/Therapy  Mobility Issues: Yes  Prior Services: Intermittent Physical Support for ADL Per Family  Patient Expects to be Discharged to:: Home  Assistance Needed: Unknown at this Time  Durable Medical Equipment: Walker    Discharge Preparedness  What is your plan after discharge?: Home with help    Finances  Financial Barriers to Discharge: No  Prescription Coverage: Yes    Vision / Hearing Impairment  Vision Impairment : Yes  Right Eye Vision: Impaired, Wears Glasses  Left Eye Vision: Impaired, Wears Glasses  Hearing Impairment : Yes  Hearing Impairment: Both Ears, Hearing Device(s) Available, Right Ear    Values / Beliefs / Concerns  Values / Beliefs Concerns : No    Advance Directive  Advance Directive?:  None    Domestic Abuse  Have you ever been the victim of abuse or violence?: No    Psychological Assessment  History of Substance Abuse: None  History of Psychiatric Problems: No    Discharge Risks or Barriers  Discharge risks or barriers?: No    Anticipated Discharge Information  Anticipated discharge disposition: Home

## 2018-01-15 LAB
ALBUMIN SERPL BCP-MCNC: 1.6 G/DL (ref 3.2–4.9)
ALBUMIN/GLOB SERPL: 0.6 G/DL
ALP SERPL-CCNC: 123 U/L (ref 30–99)
ALT SERPL-CCNC: 39 U/L (ref 2–50)
ANION GAP SERPL CALC-SCNC: 5 MMOL/L (ref 0–11.9)
AST SERPL-CCNC: 37 U/L (ref 12–45)
BASOPHILS # BLD AUTO: 0 % (ref 0–1.8)
BASOPHILS # BLD: 0 K/UL (ref 0–0.12)
BILIRUB SERPL-MCNC: 0.4 MG/DL (ref 0.1–1.5)
BUN SERPL-MCNC: 25 MG/DL (ref 8–22)
CALCIUM SERPL-MCNC: 7.6 MG/DL (ref 8.4–10.2)
CHLORIDE SERPL-SCNC: 116 MMOL/L (ref 96–112)
CO2 SERPL-SCNC: 15 MMOL/L (ref 20–33)
CREAT SERPL-MCNC: 0.93 MG/DL (ref 0.5–1.4)
EOSINOPHIL # BLD AUTO: 0.35 K/UL (ref 0–0.51)
EOSINOPHIL NFR BLD: 2 % (ref 0–6.9)
ERYTHROCYTE [DISTWIDTH] IN BLOOD BY AUTOMATED COUNT: 51.7 FL (ref 35.9–50)
GLOBULIN SER CALC-MCNC: 2.9 G/DL (ref 1.9–3.5)
GLUCOSE SERPL-MCNC: 86 MG/DL (ref 65–99)
HCT VFR BLD AUTO: 28 % (ref 37–47)
HGB BLD-MCNC: 9.1 G/DL (ref 12–16)
LYMPHOCYTES # BLD AUTO: 0.87 K/UL (ref 1–4.8)
LYMPHOCYTES NFR BLD: 5 % (ref 22–41)
MANUAL DIFF BLD: ABNORMAL
MCH RBC QN AUTO: 30.1 PG (ref 27–33)
MCHC RBC AUTO-ENTMCNC: 32.5 G/DL (ref 33.6–35)
MCV RBC AUTO: 92.7 FL (ref 81.4–97.8)
MONOCYTES # BLD AUTO: 0.52 K/UL (ref 0–0.85)
MONOCYTES NFR BLD AUTO: 3 % (ref 0–13.4)
NEUTROPHILS # BLD AUTO: 15.66 K/UL (ref 2–7.15)
NEUTROPHILS NFR BLD: 79 % (ref 44–72)
NEUTS BAND NFR BLD MANUAL: 11 % (ref 0–10)
NRBC # BLD AUTO: 0.04 K/UL
NRBC BLD-RTO: 0.2 /100 WBC
PLATELET # BLD AUTO: 347 K/UL (ref 164–446)
PLATELET BLD QL SMEAR: NORMAL
PMV BLD AUTO: 9.4 FL (ref 9–12.9)
POLYCHROMASIA BLD QL SMEAR: NORMAL
POTASSIUM SERPL-SCNC: 3.5 MMOL/L (ref 3.6–5.5)
PROT SERPL-MCNC: 4.5 G/DL (ref 6–8.2)
RBC # BLD AUTO: 3.02 M/UL (ref 4.2–5.4)
RBC BLD AUTO: PRESENT
SMUDGE CELLS BLD QL SMEAR: NORMAL
SODIUM SERPL-SCNC: 136 MMOL/L (ref 135–145)
WBC # BLD AUTO: 17.4 K/UL (ref 4.8–10.8)

## 2018-01-15 PROCEDURE — 80053 COMPREHEN METABOLIC PANEL: CPT

## 2018-01-15 PROCEDURE — 700111 HCHG RX REV CODE 636 W/ 250 OVERRIDE (IP): Performed by: INTERNAL MEDICINE

## 2018-01-15 PROCEDURE — 97162 PT EVAL MOD COMPLEX 30 MIN: CPT

## 2018-01-15 PROCEDURE — 700105 HCHG RX REV CODE 258: Performed by: FAMILY MEDICINE

## 2018-01-15 PROCEDURE — 97110 THERAPEUTIC EXERCISES: CPT

## 2018-01-15 PROCEDURE — 99233 SBSQ HOSP IP/OBS HIGH 50: CPT | Performed by: FAMILY MEDICINE

## 2018-01-15 PROCEDURE — G8978 MOBILITY CURRENT STATUS: HCPCS | Mod: CL

## 2018-01-15 PROCEDURE — 85027 COMPLETE CBC AUTOMATED: CPT

## 2018-01-15 PROCEDURE — 770006 HCHG ROOM/CARE - MED/SURG/GYN SEMI*

## 2018-01-15 PROCEDURE — 85007 BL SMEAR W/DIFF WBC COUNT: CPT

## 2018-01-15 PROCEDURE — 700102 HCHG RX REV CODE 250 W/ 637 OVERRIDE(OP): Performed by: INTERNAL MEDICINE

## 2018-01-15 PROCEDURE — A9270 NON-COVERED ITEM OR SERVICE: HCPCS | Performed by: INTERNAL MEDICINE

## 2018-01-15 PROCEDURE — G8979 MOBILITY GOAL STATUS: HCPCS | Mod: CJ

## 2018-01-15 RX ADMIN — ONDANSETRON 4 MG: 4 TABLET, ORALLY DISINTEGRATING ORAL at 17:12

## 2018-01-15 RX ADMIN — LACTOBACILLUS ACIDOPHILUS / LACTOBACILLUS BULGARICUS 1 PACKET: 100 MILLION CFU STRENGTH GRANULES at 10:19

## 2018-01-15 RX ADMIN — VANCOMYCIN 125 MG: KIT at 18:16

## 2018-01-15 RX ADMIN — SODIUM CHLORIDE: 9 INJECTION, SOLUTION INTRAVENOUS at 05:57

## 2018-01-15 RX ADMIN — SODIUM CHLORIDE: 9 INJECTION, SOLUTION INTRAVENOUS at 10:19

## 2018-01-15 RX ADMIN — LEVOTHYROXINE SODIUM 112 MCG: 112 TABLET ORAL at 05:52

## 2018-01-15 RX ADMIN — HYDROCODONE BITARTRATE AND ACETAMINOPHEN 0.5 TABLET: 10; 325 TABLET ORAL at 11:37

## 2018-01-15 RX ADMIN — VANCOMYCIN 125 MG: KIT at 00:26

## 2018-01-15 RX ADMIN — CITALOPRAM HYDROBROMIDE 20 MG: 20 TABLET ORAL at 20:03

## 2018-01-15 RX ADMIN — ENOXAPARIN SODIUM 40 MG: 100 INJECTION SUBCUTANEOUS at 10:19

## 2018-01-15 RX ADMIN — VANCOMYCIN 125 MG: KIT at 05:52

## 2018-01-15 RX ADMIN — TRAZODONE HYDROCHLORIDE 50 MG: 50 TABLET ORAL at 20:03

## 2018-01-15 RX ADMIN — VANCOMYCIN 125 MG: KIT at 11:38

## 2018-01-15 ASSESSMENT — ENCOUNTER SYMPTOMS
FALLS: 1
SPUTUM PRODUCTION: 0
ABDOMINAL PAIN: 0
HEADACHES: 0
NECK PAIN: 0
ORTHOPNEA: 0
PHOTOPHOBIA: 0
DOUBLE VISION: 0
VOMITING: 0
HEMOPTYSIS: 0

## 2018-01-15 ASSESSMENT — PAIN SCALES - GENERAL
PAINLEVEL_OUTOF10: 0
PAINLEVEL_OUTOF10: 0

## 2018-01-15 ASSESSMENT — GAIT ASSESSMENTS: GAIT LEVEL OF ASSIST: UNABLE TO PARTICIPATE

## 2018-01-15 NOTE — PROGRESS NOTES
0845Report received, plan of care reviewed and discussed, assessment complete, oriented to room, bed alarm on, nonskid socks applied, advised to call for assistance.   1045 Discussed plan of care, encouraged and answered all questions, will continue to monitor.  1245 Up in bed, tolerating low fiber GI soft diet without complaint.  1445 Complaints of pain, medication administered per MAR.  1645 Up in bed, family at bedside.  Report given to next shift.

## 2018-01-15 NOTE — THERAPY
"Physical Therapy Evaluation completed.   Bed Mobility:  Supine to Sit: Moderate Assist  Transfers: Sit to Stand: Minimal Assist  Gait: Level Of Assist: Unable to Participate due to weakness  Plan of Care: Will benefit from Physical Therapy 5 times per week  Discharge Recommendations: Equipment: Will Continue to Assess for Equipment Needs. Post-acute therapy Discharge to a transitional care facility for continued skilled therapy services.    Pt is presenting with weakness, had a R TSA 1/9/18 is not functionally at baseline for mobility. Recommend continued acute PT, most likely will need further therapy prior to DC home.     See \"Rehab Therapy-Acute\" Patient Summary Report for complete documentation.     "

## 2018-01-15 NOTE — WOUND TEAM
"[facility]  Inpatient Services  Initial Wound & Skin Care Evaluation    Admission Date:  1/12/2018   HPI, PMH, SH: Reviewed  Unit where seen by Wound Team: 6156/00    WOUND CONSULT RELATED TO: R shoulder incision    SUBJECTIVE: I changed it Sat, like the Dr told me.\"     Self Report / Pain Level: no c/o pain      OBJECTIVE:drsg intact to shoulder    Pt has incision with staples to anterior R shoulder from surgery. There is xeroform with dry gauze and a tegaderm in place. Removed drsg, cleaned with NS, dried and applied xeroform over staples, applied dry gauze and secured with tegaderm. Nsg to do drsg changes every other day.  If wound worsens need to consult surgeon. Wound team not following pt. Please consult if needed.  "

## 2018-01-15 NOTE — CARE PLAN
Problem: Mobility  Goal: Risk for activity intolerance will decrease  Outcome: PROGRESSING SLOWER THAN EXPECTED  Physical therapy ordered. Pt hesitant to increase activity level despite the encouragement of staff.    Problem: Skin Integrity  Goal: Risk for impaired skin integrity will decrease  Outcome: PROGRESSING AS EXPECTED  Pt has been educated on the importance of repositioning while in bed to decrease the risk for breakdown. Pt allowed staff to assist with turning.

## 2018-01-16 LAB
BACTERIA STL CULT: NORMAL
E COLI SXT1+2 STL IA: NORMAL
O+P SPEC MICRO: NORMAL
SIGNIFICANT IND 70042: NORMAL
SIGNIFICANT IND 70042: NORMAL
SITE SITE: NORMAL
SITE SITE: NORMAL
SOURCE SOURCE: NORMAL
SOURCE SOURCE: NORMAL

## 2018-01-16 PROCEDURE — 700102 HCHG RX REV CODE 250 W/ 637 OVERRIDE(OP): Performed by: INTERNAL MEDICINE

## 2018-01-16 PROCEDURE — 700111 HCHG RX REV CODE 636 W/ 250 OVERRIDE (IP): Performed by: INTERNAL MEDICINE

## 2018-01-16 PROCEDURE — A9270 NON-COVERED ITEM OR SERVICE: HCPCS | Performed by: INTERNAL MEDICINE

## 2018-01-16 PROCEDURE — 99232 SBSQ HOSP IP/OBS MODERATE 35: CPT | Performed by: INTERNAL MEDICINE

## 2018-01-16 PROCEDURE — 770006 HCHG ROOM/CARE - MED/SURG/GYN SEMI*

## 2018-01-16 PROCEDURE — 97110 THERAPEUTIC EXERCISES: CPT

## 2018-01-16 PROCEDURE — 97535 SELF CARE MNGMENT TRAINING: CPT

## 2018-01-16 RX ORDER — FAMOTIDINE 20 MG/1
20 TABLET, FILM COATED ORAL EVERY EVENING
Status: DISCONTINUED | OUTPATIENT
Start: 2018-01-16 | End: 2018-01-22 | Stop reason: HOSPADM

## 2018-01-16 RX ORDER — FAMOTIDINE 20 MG/1
20 TABLET, FILM COATED ORAL 2 TIMES DAILY
Status: DISCONTINUED | OUTPATIENT
Start: 2018-01-16 | End: 2018-01-16

## 2018-01-16 RX ADMIN — LACTOBACILLUS ACIDOPHILUS / LACTOBACILLUS BULGARICUS 1 PACKET: 100 MILLION CFU STRENGTH GRANULES at 08:15

## 2018-01-16 RX ADMIN — ONDANSETRON 4 MG: 4 TABLET, ORALLY DISINTEGRATING ORAL at 20:13

## 2018-01-16 RX ADMIN — VANCOMYCIN 125 MG: KIT at 11:49

## 2018-01-16 RX ADMIN — TRAZODONE HYDROCHLORIDE 50 MG: 50 TABLET ORAL at 21:34

## 2018-01-16 RX ADMIN — VANCOMYCIN 125 MG: KIT at 17:23

## 2018-01-16 RX ADMIN — VANCOMYCIN 125 MG: KIT at 05:20

## 2018-01-16 RX ADMIN — ACETAMINOPHEN 650 MG: 325 TABLET, FILM COATED ORAL at 21:45

## 2018-01-16 RX ADMIN — ONDANSETRON 4 MG: 4 TABLET, ORALLY DISINTEGRATING ORAL at 11:34

## 2018-01-16 RX ADMIN — VANCOMYCIN 125 MG: KIT at 00:36

## 2018-01-16 RX ADMIN — ENOXAPARIN SODIUM 40 MG: 100 INJECTION SUBCUTANEOUS at 08:15

## 2018-01-16 RX ADMIN — FAMOTIDINE 20 MG: 20 TABLET, FILM COATED ORAL at 11:48

## 2018-01-16 RX ADMIN — CITALOPRAM HYDROBROMIDE 20 MG: 20 TABLET ORAL at 21:34

## 2018-01-16 RX ADMIN — FAMOTIDINE 20 MG: 20 TABLET, FILM COATED ORAL at 21:34

## 2018-01-16 RX ADMIN — LEVOTHYROXINE SODIUM 112 MCG: 112 TABLET ORAL at 05:20

## 2018-01-16 ASSESSMENT — ENCOUNTER SYMPTOMS
VOMITING: 0
COUGH: 0
FALLS: 1
BLURRED VISION: 0
MYALGIAS: 0
HEADACHES: 0
ABDOMINAL PAIN: 1
PALPITATIONS: 0
NECK PAIN: 0
NAUSEA: 1
FEVER: 0
CHILLS: 0
DIARRHEA: 1

## 2018-01-16 ASSESSMENT — PAIN SCALES - GENERAL
PAINLEVEL_OUTOF10: 0
PAINLEVEL_OUTOF10: 3

## 2018-01-16 NOTE — CARE PLAN
Problem: Bowel/Gastric:  Goal: Will not experience complications related to bowel motility    Intervention: Assess baseline bowel pattern  Due to watery stools patient oriented about drinking plenty fluids.

## 2018-01-16 NOTE — CARE PLAN
Problem: Nutritional:  Goal: Achieve adequate nutritional intake  Diet advancement + PO intake >50%   Outcome: PROGRESSING AS EXPECTED  Pt reports that appetite is improving but not back to baseline. Added items to meal trays per pt request.

## 2018-01-16 NOTE — CARE PLAN
Problem: Infection  Goal: Will remain free from infection  Outcome: PROGRESSING AS EXPECTED  Administering antiinfective (PO Vanco) as ordered and assessing for signs and symptoms of improvement    Problem: Knowledge Deficit  Goal: Knowledge of disease process/condition, treatment plan, diagnostic tests, and medications will improve  Outcome: PROGRESSING AS EXPECTED  Diagnosis and POC discussed with patient. Unit routine, MD orders, and medications reviewed. All questions and concerns addressed. Pt verbalizes understanding    Problem: Pain Management  Goal: Pain level will decrease to patient's comfort goal  Outcome: PROGRESSING AS EXPECTED  Assessing patient's pain, patient encouraged to verbalize experiencing pain. 0-10 pain scale explained, verbalized understanding. Administer pain meds as ordered.

## 2018-01-16 NOTE — CARE PLAN
Problem: Communication  Goal: The ability to communicate needs accurately and effectively will improve    Intervention: [place] patient and significant other/support system to call light to alert staff of needs  Patient oriented about plan of care and today's goals.

## 2018-01-16 NOTE — DISCHARGE PLANNING
SNF Referral sent to 1. [facility], and 2. [facility] per choice form.

## 2018-01-16 NOTE — PROGRESS NOTES
"1900: Bedside report received from Laura LAUREN, patient updated about POC and denies any needs or complaints, safety precautions in place, CLIP    2003: Assessment performed, patient is A&O x 4 at this time, patient denies any pain at this time but will alert RN if it does arise. Patient's PIV no longer patent, left upper arm swollen d/t infiltration, PIV discontinued, will call MD to obtain order for no IV tonight. Patient verbalizing willingness to consume fluids and \"is tired of getting poked\".     2126: MD paged to update, orders received for no IV access tonight from Dr. Elizabeth. Will keep administering fluids PO to patient to ensure adequate hydration    0036: Due medications administered per MAR, patient drank 200 mls of water with medication administration, patient denies any needs or complaints at this time    0300: Patient resting on and off in bed with no s/s of distress noted, respirations even and unlabored, safety and isolation precautions are in place, CLIP    0520: Due medications administered per MAR, patient cleaned and changed in bed, safety and isolation precautions in place, CLIP    0715: Bedside report given to Esperanza LAUREN, patient laying comfortably in bed and denies any needs or complaints, safety precautions in place, CLIP  "

## 2018-01-16 NOTE — PROGRESS NOTES
[facility] Hospitalist Progress Note    Date of Service: 1/15/2018    Chief Complaint  82 y.o. female admitted 2018 with C. diff colitis.    Interval Problem Update  Less abdominal pain today. Less frequent stools. Tolerating full liquid diet. Will advance to BRAT diet.     Consultants/Specialty  None  Discuss plan of care with RN.    Disposition  To be determined        Review of Systems   Constitutional: Positive for malaise/fatigue.   HENT: Negative for tinnitus.    Eyes: Negative for double vision and photophobia.   Respiratory: Negative for hemoptysis and sputum production.    Cardiovascular: Negative for orthopnea.   Gastrointestinal: Negative for abdominal pain and vomiting.   Genitourinary: Negative for frequency and hematuria.   Musculoskeletal: Positive for falls. Negative for neck pain.   Neurological: Negative for headaches.   Endo/Heme/Allergies: Negative for environmental allergies.   Psychiatric/Behavioral: Negative for suicidal ideas.      Physical Exam  Laboratory/Imaging   Hemodynamics  Temp (24hrs), Av.7 °C (98.1 °F), Min:36.6 °C (97.8 °F), Max:36.9 °C (98.5 °F)   Temperature: 36.7 °C (98.1 °F)  Pulse  Av.6  Min: 67  Max: 102    Blood Pressure : 120/56      Respiratory      Respiration: 18, Pulse Oximetry: 99 %        RUL Breath Sounds: Diminished, RML Breath Sounds: Diminished, RLL Breath Sounds: Diminished, SHANTANU Breath Sounds: Diminished, LLL Breath Sounds: Diminished    Fluids    Intake/Output Summary (Last 24 hours) at 01/15/18 1629  Last data filed at 01/15/18 0600   Gross per 24 hour   Intake              900 ml   Output                0 ml   Net              900 ml       Nutrition  Orders Placed This Encounter   Procedures   • DIET ORDER     Standing Status:   Standing     Number of Occurrences:   1     Order Specific Question:   Diet:     Answer:   Low Fiber(GI Soft) [2]     Physical Exam   Constitutional: She is oriented to person, place, and time. She appears well-developed.    HENT:   Head: Normocephalic and atraumatic.   Eyes: Pupils are equal, round, and reactive to light. No scleral icterus.   Neck: Neck supple. No thyromegaly present.   Cardiovascular: Normal rate and regular rhythm.  Exam reveals no friction rub.    Pulmonary/Chest: Effort normal and breath sounds normal. She has no wheezes.   Abdominal: Soft. There is tenderness (there is mild tenderness on the epigastric area and upper quadrants as well. Less tender than  yesterday. ). There is no rebound.   Musculoskeletal: She exhibits no edema.   Neurological: She is alert and oriented to person, place, and time.   Skin: Skin is warm. No erythema.   Psychiatric: She has a normal mood and affect.       Recent Labs      01/13/18   0428  01/14/18   1059  01/15/18   0422   WBC  13.3*  16.6*  17.4*   RBC  2.87*  2.89*  3.02*   HEMOGLOBIN  8.5*  8.6*  9.1*   HEMATOCRIT  26.3*  26.1*  28.0*   MCV  91.6  90.3  92.7   MCH  29.6  29.8  30.1   MCHC  32.3*  33.0*  32.5*   RDW  49.4  49.2  51.7*   PLATELETCT  347  390  347   MPV  9.0  9.2  9.4     Recent Labs      01/13/18   0429  01/14/18   1059  01/15/18   0422   SODIUM  133*  132*  136   POTASSIUM  3.7  3.3*  3.5*   CHLORIDE  110  110  116*   CO2  16*  15*  15*   GLUCOSE  93  121*  86   BUN  33*  29*  25*   CREATININE  1.29  0.94  0.93   CALCIUM  7.7*  7.1*  7.6*     Recent Labs      01/12/18   1727   APTT  29.6   INR  1.28*     Recent Labs      01/12/18   1650   BNPBTYPENAT  372*              Assessment/Plan     * C. difficile colitis   Assessment & Plan    The patient will be admitted to these medical inpatient unit  History of C. diff colitis in the past status post fecal implantation. Also patient has a history of recent antibiotic use.  C. diff PCR was positive.  Continue with oral vancomycin  Improving.         Dehydration   Assessment & Plan    On IV fluids. Improving        Weakness   Assessment & Plan    Secondary to above  Will likely benefit from PT/OT.        H/O shoulder  surgery   Assessment & Plan    PT/OT evaluation          Anemia   Assessment & Plan    Monitor H&H.        Hyponatremia   Assessment & Plan    This is likely secondary to dehydration. IV fluids with normal saline. Resolved.             Reviewed items::  Labs reviewed, Medications reviewed and Radiology images reviewed  Rao catheter::  No Rao  DVT prophylaxis - mechanical:  SCDs  Antibiotics:  Treating active infection/contamination beyond 24 hours perioperative coverage

## 2018-01-16 NOTE — DISCHARGE PLANNING
SHEFALI met with this patient and her daughter bedside to compete choice form for SNF.  SHEFALI faxed completed choice form to NAYLA mullins for referral follow up.  Patient chose [facility] and [facility] second.

## 2018-01-16 NOTE — DISCHARGE PLANNING
SW recieved phone call from Sudha with [facility], she stated that they are able to accept this patient and will let this SW know when they have a private isolation room available.

## 2018-01-17 LAB
ANION GAP SERPL CALC-SCNC: 6 MMOL/L (ref 0–11.9)
BACTERIA BLD CULT: NORMAL
BACTERIA BLD CULT: NORMAL
BASOPHILS # BLD AUTO: 0 % (ref 0–1.8)
BASOPHILS # BLD: 0 K/UL (ref 0–0.12)
BUN SERPL-MCNC: 22 MG/DL (ref 8–22)
CALCIUM SERPL-MCNC: 7.8 MG/DL (ref 8.4–10.2)
CHLORIDE SERPL-SCNC: 110 MMOL/L (ref 96–112)
CO2 SERPL-SCNC: 16 MMOL/L (ref 20–33)
CREAT SERPL-MCNC: 0.94 MG/DL (ref 0.5–1.4)
EOSINOPHIL # BLD AUTO: 0.62 K/UL (ref 0–0.51)
EOSINOPHIL NFR BLD: 3 % (ref 0–6.9)
ERYTHROCYTE [DISTWIDTH] IN BLOOD BY AUTOMATED COUNT: 50.6 FL (ref 35.9–50)
GLUCOSE SERPL-MCNC: 89 MG/DL (ref 65–99)
HCT VFR BLD AUTO: 27.1 % (ref 37–47)
HGB BLD-MCNC: 8.9 G/DL (ref 12–16)
LYMPHOCYTES # BLD AUTO: 2.67 K/UL (ref 1–4.8)
LYMPHOCYTES NFR BLD: 13 % (ref 22–41)
MAGNESIUM SERPL-MCNC: 1.8 MG/DL (ref 1.5–2.5)
MANUAL DIFF BLD: NORMAL
MCH RBC QN AUTO: 29.7 PG (ref 27–33)
MCHC RBC AUTO-ENTMCNC: 32.8 G/DL (ref 33.6–35)
MCV RBC AUTO: 90.3 FL (ref 81.4–97.8)
METAMYELOCYTES NFR BLD MANUAL: 1 %
MONOCYTES # BLD AUTO: 0.62 K/UL (ref 0–0.85)
MONOCYTES NFR BLD AUTO: 3 % (ref 0–13.4)
NEUTROPHILS # BLD AUTO: 16.4 K/UL (ref 2–7.15)
NEUTROPHILS NFR BLD: 78 % (ref 44–72)
NEUTS BAND NFR BLD MANUAL: 2 % (ref 0–10)
NRBC # BLD AUTO: 0 K/UL
NRBC BLD-RTO: 0 /100 WBC
PLATELET # BLD AUTO: 440 K/UL (ref 164–446)
PLATELET BLD QL SMEAR: NORMAL
PMV BLD AUTO: 8.8 FL (ref 9–12.9)
POLYCHROMASIA BLD QL SMEAR: NORMAL
POTASSIUM SERPL-SCNC: 3.1 MMOL/L (ref 3.6–5.5)
RBC # BLD AUTO: 3 M/UL (ref 4.2–5.4)
RBC BLD AUTO: PRESENT
SIGNIFICANT IND 70042: NORMAL
SIGNIFICANT IND 70042: NORMAL
SITE SITE: NORMAL
SITE SITE: NORMAL
SODIUM SERPL-SCNC: 132 MMOL/L (ref 135–145)
SOURCE SOURCE: NORMAL
SOURCE SOURCE: NORMAL
WBC # BLD AUTO: 20.5 K/UL (ref 4.8–10.8)

## 2018-01-17 PROCEDURE — A9270 NON-COVERED ITEM OR SERVICE: HCPCS | Performed by: INTERNAL MEDICINE

## 2018-01-17 PROCEDURE — 700102 HCHG RX REV CODE 250 W/ 637 OVERRIDE(OP): Performed by: INTERNAL MEDICINE

## 2018-01-17 PROCEDURE — 85027 COMPLETE CBC AUTOMATED: CPT

## 2018-01-17 PROCEDURE — 97535 SELF CARE MNGMENT TRAINING: CPT

## 2018-01-17 PROCEDURE — 700111 HCHG RX REV CODE 636 W/ 250 OVERRIDE (IP): Performed by: INTERNAL MEDICINE

## 2018-01-17 PROCEDURE — 97110 THERAPEUTIC EXERCISES: CPT

## 2018-01-17 PROCEDURE — 770006 HCHG ROOM/CARE - MED/SURG/GYN SEMI*

## 2018-01-17 PROCEDURE — 99233 SBSQ HOSP IP/OBS HIGH 50: CPT | Performed by: INTERNAL MEDICINE

## 2018-01-17 PROCEDURE — 83735 ASSAY OF MAGNESIUM: CPT

## 2018-01-17 PROCEDURE — 85007 BL SMEAR W/DIFF WBC COUNT: CPT

## 2018-01-17 PROCEDURE — 80048 BASIC METABOLIC PNL TOTAL CA: CPT

## 2018-01-17 RX ORDER — POTASSIUM CHLORIDE 20 MEQ/1
20 TABLET, EXTENDED RELEASE ORAL 3 TIMES DAILY
Status: COMPLETED | OUTPATIENT
Start: 2018-01-17 | End: 2018-01-20

## 2018-01-17 RX ORDER — POTASSIUM CHLORIDE 20 MEQ/1
20 TABLET, EXTENDED RELEASE ORAL 3 TIMES DAILY
Status: DISCONTINUED | OUTPATIENT
Start: 2018-01-17 | End: 2018-01-17

## 2018-01-17 RX ORDER — ALUMINA, MAGNESIA, AND SIMETHICONE 2400; 2400; 240 MG/30ML; MG/30ML; MG/30ML
15 SUSPENSION ORAL 4 TIMES DAILY PRN
Status: DISCONTINUED | OUTPATIENT
Start: 2018-01-17 | End: 2018-01-22 | Stop reason: HOSPADM

## 2018-01-17 RX ADMIN — VANCOMYCIN 125 MG: KIT at 00:13

## 2018-01-17 RX ADMIN — ONDANSETRON 4 MG: 4 TABLET, ORALLY DISINTEGRATING ORAL at 15:52

## 2018-01-17 RX ADMIN — VANCOMYCIN 125 MG: KIT at 18:29

## 2018-01-17 RX ADMIN — POTASSIUM CHLORIDE 20 MEQ: 1500 TABLET, EXTENDED RELEASE ORAL at 20:40

## 2018-01-17 RX ADMIN — POTASSIUM CHLORIDE 20 MEQ: 1500 TABLET, EXTENDED RELEASE ORAL at 09:37

## 2018-01-17 RX ADMIN — POTASSIUM CHLORIDE 20 MEQ: 1500 TABLET, EXTENDED RELEASE ORAL at 13:18

## 2018-01-17 RX ADMIN — CITALOPRAM HYDROBROMIDE 20 MG: 20 TABLET ORAL at 20:39

## 2018-01-17 RX ADMIN — TRAZODONE HYDROCHLORIDE 50 MG: 50 TABLET ORAL at 20:39

## 2018-01-17 RX ADMIN — VANCOMYCIN 125 MG: KIT at 23:01

## 2018-01-17 RX ADMIN — LEVOTHYROXINE SODIUM 112 MCG: 112 TABLET ORAL at 06:03

## 2018-01-17 RX ADMIN — FAMOTIDINE 20 MG: 20 TABLET, FILM COATED ORAL at 20:39

## 2018-01-17 RX ADMIN — LACTOBACILLUS ACIDOPHILUS / LACTOBACILLUS BULGARICUS 1 PACKET: 100 MILLION CFU STRENGTH GRANULES at 09:37

## 2018-01-17 RX ADMIN — VANCOMYCIN 125 MG: KIT at 13:18

## 2018-01-17 RX ADMIN — ENOXAPARIN SODIUM 40 MG: 100 INJECTION SUBCUTANEOUS at 09:37

## 2018-01-17 RX ADMIN — VANCOMYCIN 125 MG: KIT at 06:03

## 2018-01-17 RX ADMIN — ONDANSETRON 4 MG: 4 TABLET, ORALLY DISINTEGRATING ORAL at 20:48

## 2018-01-17 ASSESSMENT — ENCOUNTER SYMPTOMS
FALLS: 1
MYALGIAS: 0
NAUSEA: 0
FEVER: 0
BLURRED VISION: 0
NECK PAIN: 0
VOMITING: 0
ABDOMINAL PAIN: 1
COUGH: 0
DIZZINESS: 0
DIARRHEA: 1

## 2018-01-17 ASSESSMENT — PAIN SCALES - GENERAL
PAINLEVEL_OUTOF10: 2
PAINLEVEL_OUTOF10: 4

## 2018-01-17 NOTE — THERAPY
"Occupational Therapy Treatment completed with focus on ADLs and ADL transfers.  Functional Status:  Pt was seen for AMB ADLS. Able to AMB with jo ann walker to BR for toileting. Able to complete Codman's pendulum exercises with Min A. Pt with an increase in activity tolerance this date. Continues to benefit from Acute OT services.  Plan of Care: Will benefit from Occupational Therapy 3 times per week  Discharge Recommendations:  Equipment Will Continue to Assess for Equipment Needs. Post-acute therapy Discharge to a transitional care facility for continued skilled therapy services.    See \"Rehab Therapy-Acute\" Patient Summary Report for complete documentation.   "

## 2018-01-17 NOTE — DISCHARGE PLANNING
Transport was cancelled with Eunice at [facility] as requested by FERMIN Yadav. Pt is not ready to go yet.

## 2018-01-17 NOTE — DISCHARGE PLANNING
SHEFALI Yadav received a call from Sudha at [facility] stating they have an iso bed available and can accept pt today.  SHEFALI will meet with Dr. Moreno to see if pt is medically cleared to d.c today.

## 2018-01-17 NOTE — THERAPY
"Occupational Therapy Treatment completed with focus on ADL transfers and upper extremity function.  Functional Status: Pt was seen for Codmans Pendulum exercises, functional mobility and ADLS. Total A required to don brief at start of session. Pt with an increase in activity tolerance this date, was able to tolerate standing for exercises with Min A for maintaining balance. Pt is able to use Mihai walker with good safety . Pt will continue to benefit from OT services to increase functional I.    Plan of Care: Will benefit from Occupational Therapy 3 times per week  Discharge Recommendations:  Equipment Will Continue to Assess for Equipment Needs. Post-acute therapy Discharge to a transitional care facility for continued skilled therapy services.    See \"Rehab Therapy-Acute\" Patient Summary Report for complete documentation.   "

## 2018-01-17 NOTE — DISCHARGE PLANNING
Received Transport Communication form,     Transport has been arranged with Dinora at [facility] for 4:00 pm today. [facility] will be providing transport. [facility] Vicenta notified.

## 2018-01-17 NOTE — DISCHARGE PLANNING
SHEFALI Yadav informed by NAYLA Olson that pt is going to be picked up at 1600 to go to [facility].  SHEFALI informed CNA Clerk Shoemaker and Dr. Moreno. SHEFALI informed pt and pt's daughter.  Completed COBRA in the chart.

## 2018-01-17 NOTE — PROGRESS NOTES
[facility] Hospitalist Progress Note    Date of Service: 2018    Chief Complaint  82 y.o. female admitted 2018 with C. diff colitis.    Interval Problem Update  C. diff colitis-less abdominal pain today but continue mild nausea vomiting and less loose bowel movements. No IV access at this time but had some mild nausea that was resolved with oral disintegrating tablets of Zofran. Remains afebrile.    Consultants/Specialty  None  Discuss plan of care with RN.    Disposition  Skilled nursing facility        Review of Systems   Constitutional: Positive for malaise/fatigue (slowly improving). Negative for chills and fever.   HENT: Negative for hearing loss.    Eyes: Negative for blurred vision.   Respiratory: Negative for cough.    Cardiovascular: Negative for chest pain and palpitations.   Gastrointestinal: Positive for abdominal pain, diarrhea (improving) and nausea. Negative for vomiting.   Genitourinary: Negative for dysuria and urgency.   Musculoskeletal: Positive for falls. Negative for myalgias and neck pain.   Skin: Negative for itching and rash.   Neurological: Negative for headaches.   Endo/Heme/Allergies: Negative for environmental allergies.   Psychiatric/Behavioral: Negative for suicidal ideas.      Physical Exam  Laboratory/Imaging   Hemodynamics  Temp (24hrs), Av.6 °C (97.8 °F), Min:36.4 °C (97.6 °F), Max:36.7 °C (98 °F)   Temperature: 36.5 °C (97.7 °F)  Pulse  Av.4  Min: 67  Max: 102    Blood Pressure : 127/41      Respiratory      Respiration: 16, Pulse Oximetry: 95 %        RUL Breath Sounds: Clear, RML Breath Sounds: Clear, RLL Breath Sounds: Clear, SHANTANU Breath Sounds: Clear, LLL Breath Sounds: Clear    Fluids    Intake/Output Summary (Last 24 hours) at 18 1801  Last data filed at 01/15/18 2300   Gross per 24 hour   Intake              400 ml   Output                0 ml   Net              400 ml       Nutrition  Orders Placed This Encounter   Procedures   • DIET ORDER     Standing  Status:   Standing     Number of Occurrences:   1     Order Specific Question:   Diet:     Answer:   Low Fiber(GI Soft) [2]     Physical Exam   Constitutional: She is oriented to person, place, and time. She appears well-developed.   HENT:   Head: Normocephalic and atraumatic.   Eyes: Pupils are equal, round, and reactive to light. Right eye exhibits no discharge. Left eye exhibits no discharge.   Neck: Neck supple. No thyromegaly present.   Cardiovascular: Normal rate and regular rhythm.    No murmur heard.  Pulmonary/Chest: Effort normal and breath sounds normal. No stridor. No respiratory distress.   Abdominal: Soft. She exhibits no distension. There is tenderness (there is mild tenderness on the epigastric area and upper quadrants as well. Unchanged today). There is no rebound and no guarding.   Musculoskeletal: She exhibits no edema.   Neurological: She is alert and oriented to person, place, and time.   Skin: Skin is warm. No erythema.   Psychiatric: She has a normal mood and affect.       Recent Labs      01/14/18   1059  01/15/18   0422   WBC  16.6*  17.4*   RBC  2.89*  3.02*   HEMOGLOBIN  8.6*  9.1*   HEMATOCRIT  26.1*  28.0*   MCV  90.3  92.7   MCH  29.8  30.1   MCHC  33.0*  32.5*   RDW  49.2  51.7*   PLATELETCT  390  347   MPV  9.2  9.4     Recent Labs      01/14/18   1059  01/15/18   0422   SODIUM  132*  136   POTASSIUM  3.3*  3.5*   CHLORIDE  110  116*   CO2  15*  15*   GLUCOSE  121*  86   BUN  29*  25*   CREATININE  0.94  0.93   CALCIUM  7.1*  7.6*                      Assessment/Plan     * C. difficile colitis- (present on admission)   Assessment & Plan    -Slowly improving  -Given this is her 4th or 5th time having C. diff colitis she will need a two-week course occurring straightened and a 4 week taper for total 6 weeks  -Might benefit from a fecal transplantation in the future again        Dehydration- (present on admission)   Assessment & Plan    On IV fluids. Improving        Weakness- (present  on admission)   Assessment & Plan    Secondary to above  -Will need rehabilitation and skilled nursing facility        H/O shoulder surgery- (present on admission)   Assessment & Plan    PT/OT evaluation, [facility] as accepted the patient but waiting for isolated bed          Anemia- (present on admission)   Assessment & Plan    Monitor H&H.        Hyponatremia- (present on admission)   Assessment & Plan    This is likely secondary to dehydration. IV fluids with normal saline. Resolved.             Reviewed items::  Labs reviewed, Medications reviewed and Radiology images reviewed  Rao catheter::  No Rao  DVT prophylaxis - mechanical:  SCDs  Antibiotics:  Treating active infection/contamination beyond 24 hours perioperative coverage

## 2018-01-17 NOTE — CARE PLAN
Problem: Knowledge Deficit  Goal: Knowledge of disease process/condition, treatment plan, diagnostic tests, and medications will improve  Outcome: PROGRESSING AS EXPECTED  Pt acknowledges ongoing plan of care of treating c-diff infection with po vancomycin. Pt also recognizes importance of therapy in regaining strength.    Problem: Pain Management  Goal: Pain level will decrease to patient's comfort goal  Outcome: PROGRESSING AS EXPECTED  Pt recently had shoulder surgery, reports that her pain is now minimal. Pt provided with Tylenol.

## 2018-01-18 PROBLEM — D72.829 LEUKOCYTOSIS: Status: ACTIVE | Noted: 2018-01-18

## 2018-01-18 LAB
ANISOCYTOSIS BLD QL SMEAR: ABNORMAL
BASOPHILS # BLD AUTO: 0 % (ref 0–1.8)
BASOPHILS # BLD: 0 K/UL (ref 0–0.12)
EOSINOPHIL # BLD AUTO: 0.85 K/UL (ref 0–0.51)
EOSINOPHIL NFR BLD: 4 % (ref 0–6.9)
ERYTHROCYTE [DISTWIDTH] IN BLOOD BY AUTOMATED COUNT: 50.2 FL (ref 35.9–50)
HCT VFR BLD AUTO: 25.3 % (ref 37–47)
HGB BLD-MCNC: 8.4 G/DL (ref 12–16)
LYMPHOCYTES # BLD AUTO: 2.34 K/UL (ref 1–4.8)
LYMPHOCYTES NFR BLD: 11 % (ref 22–41)
MANUAL DIFF BLD: NORMAL
MCH RBC QN AUTO: 29.8 PG (ref 27–33)
MCHC RBC AUTO-ENTMCNC: 33.2 G/DL (ref 33.6–35)
MCV RBC AUTO: 89.7 FL (ref 81.4–97.8)
METAMYELOCYTES NFR BLD MANUAL: 2 %
MICROCYTES BLD QL SMEAR: ABNORMAL
MONOCYTES # BLD AUTO: 1.49 K/UL (ref 0–0.85)
MONOCYTES NFR BLD AUTO: 7 % (ref 0–13.4)
MYELOCYTES NFR BLD MANUAL: 4 %
NEUTROPHILS # BLD AUTO: 15.34 K/UL (ref 2–7.15)
NEUTROPHILS NFR BLD: 66 % (ref 44–72)
NEUTS BAND NFR BLD MANUAL: 6 % (ref 0–10)
NRBC # BLD AUTO: 0 K/UL
NRBC BLD-RTO: 0 /100 WBC
PLATELET # BLD AUTO: 481 K/UL (ref 164–446)
PLATELET BLD QL SMEAR: NORMAL
PMV BLD AUTO: 8.7 FL (ref 9–12.9)
POLYCHROMASIA BLD QL SMEAR: NORMAL
RBC # BLD AUTO: 2.82 M/UL (ref 4.2–5.4)
RBC BLD AUTO: PRESENT
WBC # BLD AUTO: 21.3 K/UL (ref 4.8–10.8)

## 2018-01-18 PROCEDURE — A9270 NON-COVERED ITEM OR SERVICE: HCPCS | Performed by: INTERNAL MEDICINE

## 2018-01-18 PROCEDURE — 99233 SBSQ HOSP IP/OBS HIGH 50: CPT | Performed by: INTERNAL MEDICINE

## 2018-01-18 PROCEDURE — 700111 HCHG RX REV CODE 636 W/ 250 OVERRIDE (IP): Performed by: INTERNAL MEDICINE

## 2018-01-18 PROCEDURE — 85027 COMPLETE CBC AUTOMATED: CPT

## 2018-01-18 PROCEDURE — 700102 HCHG RX REV CODE 250 W/ 637 OVERRIDE(OP): Performed by: INTERNAL MEDICINE

## 2018-01-18 PROCEDURE — 770006 HCHG ROOM/CARE - MED/SURG/GYN SEMI*

## 2018-01-18 PROCEDURE — 85007 BL SMEAR W/DIFF WBC COUNT: CPT

## 2018-01-18 RX ADMIN — VANCOMYCIN 125 MG: KIT at 06:14

## 2018-01-18 RX ADMIN — POTASSIUM CHLORIDE 20 MEQ: 1500 TABLET, EXTENDED RELEASE ORAL at 09:12

## 2018-01-18 RX ADMIN — FAMOTIDINE 20 MG: 20 TABLET, FILM COATED ORAL at 20:21

## 2018-01-18 RX ADMIN — POTASSIUM CHLORIDE 20 MEQ: 1500 TABLET, EXTENDED RELEASE ORAL at 20:21

## 2018-01-18 RX ADMIN — ONDANSETRON 4 MG: 4 TABLET, ORALLY DISINTEGRATING ORAL at 15:16

## 2018-01-18 RX ADMIN — LEVOTHYROXINE SODIUM 112 MCG: 112 TABLET ORAL at 06:14

## 2018-01-18 RX ADMIN — VANCOMYCIN 125 MG: KIT at 18:14

## 2018-01-18 RX ADMIN — LACTOBACILLUS ACIDOPHILUS / LACTOBACILLUS BULGARICUS 1 PACKET: 100 MILLION CFU STRENGTH GRANULES at 09:12

## 2018-01-18 RX ADMIN — TRAZODONE HYDROCHLORIDE 50 MG: 50 TABLET ORAL at 20:21

## 2018-01-18 RX ADMIN — ENOXAPARIN SODIUM 40 MG: 100 INJECTION SUBCUTANEOUS at 09:12

## 2018-01-18 RX ADMIN — VANCOMYCIN 125 MG: KIT at 12:58

## 2018-01-18 RX ADMIN — CITALOPRAM HYDROBROMIDE 20 MG: 20 TABLET ORAL at 20:21

## 2018-01-18 RX ADMIN — POTASSIUM CHLORIDE 20 MEQ: 1500 TABLET, EXTENDED RELEASE ORAL at 16:41

## 2018-01-18 ASSESSMENT — ENCOUNTER SYMPTOMS
PALPITATIONS: 0
SINUS PAIN: 0
ABDOMINAL PAIN: 1
CHILLS: 0
COUGH: 0
DEPRESSION: 0
DIARRHEA: 1
MYALGIAS: 0
VOMITING: 0
FALLS: 1
NAUSEA: 1
FEVER: 0
NECK PAIN: 0
BLURRED VISION: 0
HEADACHES: 0

## 2018-01-18 ASSESSMENT — PAIN SCALES - GENERAL
PAINLEVEL_OUTOF10: 0
PAINLEVEL_OUTOF10: 0

## 2018-01-18 NOTE — CARE PLAN
Problem: Safety  Goal: Will remain free from injury  Outcome: PROGRESSING AS EXPECTED  Remind patient to use call light and provide assistance. Bed in low position, bed locked, and appropriate alarms set. Patient wearing non-slip socks. Call light and personal belongings are within reach.     Problem: Knowledge Deficit  Goal: Knowledge of disease process/condition, treatment plan, diagnostic tests, and medications will improve  Outcome: PROGRESSING AS EXPECTED  Encourage patient to ask questions and be involved in plan of care. Provide education on treatment plan, diagnostic testing, and medications; have patient verbalize understanding.

## 2018-01-18 NOTE — PROGRESS NOTES
Gave bedside report to LEONIDAS Cabrera. Plan of care discussed. Safety precautions in place. Personal belongings and call light are with in reach. Patient has no additional needs at this time.

## 2018-01-18 NOTE — CONSULTS
INFECTIOUS DISEASES INPATIENT CONSULT NOTE     Date of Service: 01/18/18    Consult Requested By: Cedric Moreno M.D.    Reason for Consultation: Increasing leukocytosis and C difficile    History of Present Illness:   Elizabeth Celis is a 82 y.o. 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 Pt hospitalized last week and underwent a right reverse total shoulder arthroplasty for a rotator cuff tear arthropathy on 1/9/18. She received IV vancomycin perioperatively. She was discharged home the following day without any abx. Pt states she has not received any abx prior to her surgery. The next day after she returned home she developed a poor appetite and watery diarrhea. Her diarrhea was persistent but she denies any abdominal pain, nausea, vomiting, fevers or chills. Given her history of CDI and recurrent symptoms, she presented to the ED for further evaluation. On presentation, she was afebrile with a leukocytosis of 15.3. C diff was positive and she was started on oral vancomycin. CT revealed diffuse colonic wall thickening suggesting inflammatory or infectious colitis. Despite appropriate therapy, she has increasing leukocytosis. She denies any cough, shortness of breath, or dysuria. She has a dry throat. No abdominal pain. Her diarrhea has improved with decreasing frequency and she only had 2 bowel movements yesterday. ID consulted for further recommendations.    Review Of Systems:  All other ROS were reviewed and are negative except as noted above in the HPI.    PMH:   Past Medical History:   Diagnosis Date   • Arthritis     RA- hands, feet, shoulder   • C. difficile diarrhea 2/16   • C. difficile diarrhea 3/2016    fecal transplant   • Cancer (CMS-HCC) 1947    Tongue CA - Radiation   • Chronic back pain     hands, shoulders   •  "Depression    • Elbow fracture, left    • Heart burn    • History of anemia    • Hypothyroid    • MRSA (methicillin resistant Staphylococcus aureus)     Right foot   • MRSA (methicillin resistant Staphylococcus aureus) 2012   • Pain     shoulders, feet, back   • Stroke (CMS-[facility]) 1990's?    \"mini\" no residual symptoms   • Urinary incontinence     occasional         PSH:  Past Surgical History:   Procedure Laterality Date   • SHOULDER ARTHROPLASTY TOTAL Right 1/9/2018    Procedure: SHOULDER ARTHROPLASTY TOTAL - REVERSE;  Surgeon: Daniella Camargo M.D.;  Location: SURGERY [facility];  Service: Orthopedics   • COLONOSCOPY - ENDO N/A 3/7/2016    Procedure: COLONOSCOPY - ENDO;  Surgeon: Estiven Ayers M.D.;  Location: ENDOSCOPY [facility];  Service:    • FECAL TRANSPLANT N/A 3/7/2016    Procedure: FECAL TRANSPLANT;  Surgeon: Estiven Ayers M.D.;  Location: ENDOSCOPY [facility];  Service:    • OTHER ORTHOPEDIC SURGERY Left 2012    Left Elbow fx repair   • BLEPHAROPLASTY  3/31/2011    Performed by ORACIO DIAZ at [facility]   • FOOT SURGERY Right 2010   • OTHER ORTHOPEDIC SURGERY Left 2006    finger- removal of digit Left middle finger   • CHOLECYSTECTOMY  2000   • HYSTERECTOMY LAPAROSCOPY  2000   • HYSTERECTOMY RADICAL  1985   • OTHER Bilateral 2010, 2008    feet- repair torn tendons   • OTHER ORTHOPEDIC SURGERY Left 1970s    femur fx       FAMILY HX:  Family History   Problem Relation Age of Onset   • Non-contributory Mother    • Non-contributory Father    • Anesthesia Paternal Grandfather      death       SOCIAL HX:  Social History     Social History   • Marital status:      Spouse name: N/A   • Number of children: N/A   • Years of education: N/A     Occupational History   • Not on file.     Social History Main Topics   • Smoking status: Former Smoker     Packs/day: 1.00     Years: 20.00     Types: Cigarettes     Quit date: 1/1/1985   • Smokeless tobacco: Never Used   • Alcohol " use Yes      Comment: 5 per week   • Drug use: No   • Sexual activity: Not on file     Other Topics Concern   • Not on file     Social History Narrative   • No narrative on file     History   Smoking Status   • Former Smoker   • Packs/day: 1.00   • Years: 20.00   • Types: Cigarettes   • Quit date: 1/1/1985   Smokeless Tobacco   • Never Used     History   Alcohol Use   • Yes     Comment: 5 per week       Allergies/Intolerances:  Allergies   Allergen Reactions   • Azithromycin Unspecified     Matti Johnsons syndrome   • Erythromycin Unspecified     Matti Johnsons syndrome   • Nitrofurantoin Vomiting and Nausea   • Pcn [Penicillins] Anaphylaxis, Shortness of Breath and Swelling   • Sulfa Drugs Swelling   • Cefuroxime    • Ciprofloxacin    • Clindamycin    • Codeine Itching   • Daptomycin      Matti colleen syndrome     • Flagyl [Metronidazole] Vomiting and Nausea   • Morphine Itching   • Premarin Unspecified     burning   • Rifampin      Matti colleen syndrome       History reviewed with the patient    Other Current Medications:    Current Facility-Administered Medications:   •  potassium chloride SA (Kdur) tablet 20 mEq, 20 mEq, Oral, TID, Cedric Moreno M.D., 20 mEq at 01/17/18 2040  •  mag hydrox-al hydrox-simeth (MAALOX PLUS ES or MYLANTA DS) suspension 15 mL, 15 mL, Oral, 4X/DAY PRN, Reji Sainz D.O.  •  famotidine (PEPCID) tablet 20 mg, 20 mg, Oral, Q EVENING, Cedric Moreno M.D., 20 mg at 01/17/18 2039  •  [START ON 1/26/2018] vancomycin 50 mg/ml (FIRST-VANCOMYCIN) oral solution 125 mg, 125 mg, Oral, Q12HRS, Cedric Moreno M.D.  •  [START ON 2/3/2018] vancomycin 50 mg/ml (FIRST-VANCOMYCIN) oral solution 125 mg, 125 mg, Oral, Q24HRS, Cedric Moreno M.D.  •  [START ON 2/11/2018] vancomycin 50 mg/ml (FIRST-VANCOMYCIN) oral solution 125 mg, 125 mg, Oral, Q48HRS, Cedric Moreno M.D.  •  [START ON 2/20/2018] vancomycin 50 mg/ml (FIRST-VANCOMYCIN) oral solution 125 mg, 125 mg, Oral, Q72HRS, Cedric HILL  NAYAN Moreno.  •  citalopram (CELEXA) tablet 20 mg, 20 mg, Oral, QHS, Lucy Elizabeth D.O., 20 mg at 01/17/18 2039  •  hydrocodone/acetaminophen (NORCO)  MG per tablet 0.5 Tab, 0.5 Tab, Oral, Q4HRS PRN, Lucy Elizabeth D.O., 0.5 Tab at 01/15/18 1137  •  levothyroxine (SYNTHROID) tablet 112 mcg, 112 mcg, Oral, AM ES, Lucy Elizabeth D.O., 112 mcg at 01/18/18 0614  •  lactobacillus granules (LACTINEX/FLORANEX) packet 1 Packet, 1 Packet, Oral, DAILY, Lucy Elizabeth D.O., 1 Packet at 01/17/18 0937  •  trazodone (DESYREL) tablet 50 mg, 50 mg, Oral, QHS, Lucy Elizabeth D.O., 50 mg at 01/17/18 2039  •  Respiratory Care per Protocol, , Nebulization, Continuous RT, Lucy Elizabeth D.O.  •  NS infusion, , Intravenous, Continuous, Fredy Payton M.D., Stopped at 01/17/18 1930  •  enoxaparin (LOVENOX) inj 40 mg, 40 mg, Subcutaneous, DAILY, Lucy Elizabeth D.O., 40 mg at 01/17/18 0937  •  acetaminophen (TYLENOL) tablet 650 mg, 650 mg, Oral, Q6HRS PRN, Lucy Elizabeth D.O., 650 mg at 01/16/18 2145  •  Notify provider if pain remains uncontrolled, , , CONTINUOUS **AND** Use the numeric rating scale (NRS-11) on regular floors and Critical-Care Pain Observation Tool (CPOT) on ICUs/Trauma to assess pain, , , CONTINUOUS **AND** Pulse Ox (Oximetry), , , CONTINUOUS **AND** Pharmacy Consult Request ...Pain Management Review, , Other, PRN **AND** If patient difficult to arouse and/or has respiratory depression, stop any opiates that are currently infusing and call a Rapid Response., , , CONTINUOUS **AND** oxycodone immediate-release (ROXICODONE) tablet 2.5 mg, 2.5 mg, Oral, Q3HRS PRN **AND** oxycodone immediate-release (ROXICODONE) tablet 5 mg, 5 mg, Oral, Q3HRS PRN **AND** morphine (pf) 4 mg/ml injection 2 mg, 2 mg, Intravenous, Q3HRS PRN, HARDEEP KenO.  •  ondansetron (ZOFRAN) syringe/vial injection 4 mg, 4 mg, Intravenous, Q4HRS PRN, ANN Ken.O.  •  ondansetron (ZOFRAN ODT) dispertab 4 mg, 4 mg, Oral, Q4HRS PRN, Lucy Elizabeth,  "D.O., 4 mg at 18  •  vancomycin 50 mg/ml (FIRST-VANCOMYCIN) oral solution 125 mg, 125 mg, Oral, Q6HRS, Cedric Moreno M.D., 125 mg at 18 0614  •  artificial tears 1.4 % ophthalmic solution 1-2 Drop, 1-2 Drop, Both Eyes, Q2HRS PRN, Lucy Elizabeth D.O.  [unfilled]    Most Recent Vital Signs:  /44   Pulse 79   Temp 36.6 °C (97.9 °F)   Resp 18   Ht 1.676 m (5' 6\")   Wt 61.2 kg (134 lb 14.7 oz)   SpO2 98%   BMI 21.78 kg/m²   Temp  Av.7 °C (98 °F)  Min: 36.4 °C (97.6 °F)  Max: 37.3 °C (99.2 °F)    Physical Exam:  General: elderly but well-appearing, no acute distress, nontoxic  HEENT: sclera anicteric, PERRL, EOMI, MMM, no oral lesions  Neck: supple, no lymphadenopathy  Chest: CTAB, no r/r/w, normal work of breathing.  Cardiac: RRR, normal S1 S2, no m/r/g   Abdomen: + bowel sounds, soft, non-tender, non-distended, no HSM  Extremities: R shoulder surgical site dressed. RUE in sling  Skin: no rashes or worrisome lesions  Neuro: Alert and oriented times 3, non-focal exam, speech fluent, RUE with restricted ROM as in sling    Pertinent Lab Results:  Recent Labs      18   0515  18   0234   WBC  20.5*  21.3*      Recent Labs      18   0515  18   0234   HEMOGLOBIN  8.9*  8.4*   HEMATOCRIT  27.1*  25.3*   MCV  90.3  89.7   MCH  29.7  29.8   ANISOCYTOSIS   --   1+   PLATELETCT  440  481*         Recent Labs      1815   SODIUM  132*   POTASSIUM  3.1*   CHLORIDE  110   CO2  16*   CREATININE  0.94        No results for input(s): ALBUMIN in the last 72 hours.    Invalid input(s): AST, ALT, ALKPHOS, BILITOT, TOTALBILIRUB, BILIRUBINTOT, BILIRUBINDIR, BILIRUBININD, ALKALINEPHOS     Pertinent Micro:  Results     Procedure Component Value Units Date/Time    BLOOD CULTURE [[id number]] Collected:  18 1850    Order Status:  Completed Specimen:  Blood from Peripheral Updated:  18 3506     Significant Indicator NEG     Source BLD     Site PERIPHERAL     " "Blood Culture --     Blood culture testing and Gram stain, if indicated, are  performed at [facility], [facility], [place].  Positive blood cultures are  sent to [facility], [address] [place] [address], for organism identification and  susceptibility testing.  No growth after 5 days of incubation.      Narrative:       Special Contact Isolation  Per Hospital Policy: Only change Specimen Src: to \"Line\" if  specified by physician order.  Patient difficult stick; short sample    BLOOD CULTURE [[id number]] Collected:  01/12/18 1900    Order Status:  Completed Specimen:  Blood from Peripheral Updated:  01/17/18 2217     Significant Indicator NEG     Source BLD     Site PERIPHERAL     Blood Culture --     Blood culture testing and Gram stain, if indicated, are  performed at [facility], [facility]  [facility], [place].  Positive blood cultures are  sent to [facility], [address] [place] [address], for organism identification and  susceptibility testing.  No growth after 5 days of incubation.      Narrative:       Special Contact Isolation  Per Hospital Policy: Only change Specimen Src: to \"Line\" if  specified by physician order.  Patient difficult stick; short sample    CULTURE STOOL [[id number]] Collected:  01/12/18 1715    Order Status:  Completed Specimen:  Stool from Stool Updated:  01/16/18 1058     [facility] Negative for Shiga Toxin 1 and 2.     Significant Indicator NEG     Source STL     Site STOOL     Culture Result Stool --     No enteric pathogens isolated.  NOTE:  Stool cultures are screened for Shiga Toxins 1 and 2,  Salmonella, Shigella, Campylobacter, Aeromonas,  Plesiomonas, and Vibrio.      Narrative:       Previous comment was modified by KEYLA at 18:36 on 01/13/18.  Special Contact Isolation  Does this patient have risk factors for C-diff?->Yes  Has patient taken stool softeners or laxatives in " the last 5  days?->No  notified unit clerk evangelista need more stool 01/12/2018  17:18  notified rn larry need more stool fro comp. OP 01/13/2018  17:25  Special Contact Isolation  Does this patient have risk factors for C-diff?->Yes  Has patient taken stool softeners or laxatives in the last 5  days?->No    COMPLETE O&P [[id number]] Collected:  01/14/18 0540    Order Status:  Completed Specimen:  Stool Updated:  01/16/18 0836     Significant Indicator NEG     Source STL     Site --     Ova And Parasites --     No Ova or Parasites seen.  NOTE:  Ova and parasite exam of stool is generally not performed on  patients hospitalized for >3 days unless nosocomial  transmission of parasitic agents is suspected.  Screening for Cryptosporidium is not included in routine Ova  and Parasite examination.      Narrative:       Special Contact Isolation  Has the patient been out of the country recently?->No  Is the patient immuno-compromised?->Yes  ** recent surgery  notified unit clerk evangelista need more stool 01/12/2018  17:18  notified rn larry need more stool fro comp. OP 01/13/2018  17:25    [facility](SIGA TOXIN)DETECTION [[id number]] Collected:  01/12/18 1715    Order Status:  Completed Specimen:  Stool Updated:  01/14/18 1511     Significant Indicator NEG     Source STL     Site STOOL     [facility] Negative for Shiga Toxin 1 and 2.    Narrative:       Previous comment was modified by KEYLA at 18:36 on 01/13/18.  Special Contact Isolation  Does this patient have risk factors for C-diff?->Yes  Has patient taken stool softeners or laxatives in the last 5  days?->No  notified unit clerk evangelista need more stool 01/12/2018  17:18  notified rn larry need more stool fro comp. OP 01/13/2018  17:25  Special Contact Isolation  Does this patient have risk factors for C-diff?->Yes  Has patient taken stool softeners or laxatives in the last 5  days?->No    C DIFF TOXIN [[id number]]  (Abnormal) Collected:  01/12/18 1650    Order Status:  Completed  Updated:  01/13/18 1039     C.Diff Toxin A&B Positive (A)     Comment: TOXIN POSITIVE  Toxin detected by EIA; C. difficile detected by PCR.  If clinically correlated, treatment indicated per guidelines.  Test of cure is not recommended.         Narrative:       Special Contact Isolation    C Diff by PCR rflx Toxin [[id number]] Collected:  01/12/18 1650    Order Status:  Completed Updated:  01/13/18 1038     C Diff by PCR See Toxin     027-NAP1-BI Presumptive Negative     Comment: Presumptive 027/NAP1/BI target DNA sequences are NOT DETECTED.       Narrative:       Special Contact Isolation    URINALYSIS CULTURE, IF INDICATED [[id number]]  (Abnormal) Collected:  01/12/18 1700    Order Status:  Completed Specimen:  Urine Updated:  01/12/18 1747     Color Yellow     Character Hazy (A)     Specific Gravity 1.015     Ph 5.5     Glucose Negative mg/dL      Ketones Negative mg/dL      Protein Negative mg/dL      Bilirubin Negative     Nitrite Negative     Leukocyte Esterase Negative     Occult Blood Negative     Micro Urine Req Microscopic     Culture Indicated No UA Culture     Narrative:       Special Contact Isolation    COMPLETE O&P [[id number]] Collected:  01/12/18 1715    Order Status:  Canceled Specimen:  Other from Stool     CDIFF BY PCR WITH TOXIN [[id number]] Collected:  01/12/18 0000    Order Status:  Canceled Specimen:  Stool from Stool         Blood Culture   Date Value Ref Range Status   01/12/2018   Final    Blood culture testing and Gram stain, if indicated, are  performed at [facility], [address] [place] [address].  Positive blood cultures are  sent to [facility], [address] [place] [address], for organism identification and  susceptibility testing.  No growth after 5 days of incubation.          Studies:  CT reviewed with results noted in the HPI    IMPRESSION:   1. Clostridium difficile infection  2. Persistent leukocytosis  3. H/o CDI s/p fecal  transplant      PLAN:   Elizabeth Celis is a 82 y.o. 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 Suspect her perioperative abx triggered her recurrence. Clinically she is improving on oral vancomycin without any fevers or abdominal pain despite increasing leukocytosis. She has no signs or symptoms of new infection. Agree with continue PO vanco with vanco taper. Continue to monitor closely.       Plan of care discussed with JONA Moreno M.D.. Will continue to follow    Gregoria Yin M.D.

## 2018-01-18 NOTE — CARE PLAN
Problem: Nutritional:  Goal: Achieve adequate nutritional intake  PO intake of 50% of meals.  Outcome: PROGRESSING AS EXPECTED

## 2018-01-18 NOTE — PROGRESS NOTES
Completed assessment and administered scheduled medications. Bed in low position, locked, and appropriate alarms set. Personal belongings and call light are within reach. Patient has no additional needs at this time.

## 2018-01-18 NOTE — DIETARY
Nutrition Services follow-up:  Diet Rx:  Low Fiber/GI Soft.  Patient's PO intake continues to be the same.  She is eating between less than 25% to 25-50% of meals.  Sherbet and milk were previously added to meal trays per patient request.  Pertinent Labs:  Na 132, K 3.1  Pertinent Medications/Fluids:  NS at 75 ml/hr, Kdur  Plan/Recommendations:  1)  Nutrition Services to work with patient for food preferences.  2)  RD to monitor PO intake.

## 2018-01-18 NOTE — PROGRESS NOTES
Patient has been stable today, was up to the cardiac chair 2 times, no pain complaints, possible transfer to [facility] tomorrow.

## 2018-01-18 NOTE — PROGRESS NOTES
Assessment completed. Due meds given. Pt AAOx4, irritable. Denies pain, n/v. Repositioned, sitting up in bed for breakfast. Right arm in brace, dressing CDI. Family at bedside. Dr. Yin (ID) came to see pt. No other needs at this time, will monitor.

## 2018-01-18 NOTE — PROGRESS NOTES
Pt had another loose, mucousy BM. Cleaned and changed. Pt had episode of emesis, medicated for nausea per MAR. Daughter remains at bedside. No other needs/complaints, will CTM.

## 2018-01-18 NOTE — PROGRESS NOTES
[facility] Hospitalist Progress Note    Date of Service: 2018    Chief Complaint  82 y.o. female admitted 2018 with C. diff colitis.    Interval Problem Update  C. diff colitis-feels much better today, eating some food, less abdominal pain. But WBC karen, but remains afebrile.     Consultants/Specialty  None  Discuss plan of care with RN.    Disposition  Skilled nursing facility        Review of Systems   Constitutional: Positive for malaise/fatigue (much improved today). Negative for fever.   HENT: Negative for hearing loss.    Eyes: Negative for blurred vision.   Respiratory: Negative for cough.    Cardiovascular: Negative for leg swelling.   Gastrointestinal: Positive for abdominal pain (greatly decreased) and diarrhea (about the same). Negative for nausea and vomiting.   Genitourinary: Negative for dysuria and urgency.   Musculoskeletal: Positive for falls. Negative for myalgias and neck pain.   Skin: Negative for itching and rash.   Neurological: Negative for dizziness.   Endo/Heme/Allergies: Negative for environmental allergies.   Psychiatric/Behavioral: Negative for suicidal ideas.      Physical Exam  Laboratory/Imaging   Hemodynamics  Temp (24hrs), Av.6 °C (97.8 °F), Min:36.4 °C (97.6 °F), Max:36.7 °C (98.1 °F)   Temperature: 36.4 °C (97.6 °F)  Pulse  Av.5  Min: 67  Max: 102    Blood Pressure : 125/43      Respiratory      Respiration: 16, Pulse Oximetry: 95 %        RUL Breath Sounds: Clear, RML Breath Sounds: Clear, RLL Breath Sounds: Clear, SHANTANU Breath Sounds: Clear, LLL Breath Sounds: Clear    Fluids  No intake or output data in the 24 hours ending 18 1732    Nutrition  Orders Placed This Encounter   Procedures   • DIET ORDER     Standing Status:   Standing     Number of Occurrences:   1     Order Specific Question:   Diet:     Answer:   Low Fiber(GI Soft) [2]     Comments:   no chicken     Physical Exam   Constitutional: She is oriented to person, place, and time. She appears  well-developed.   HENT:   Head: Normocephalic and atraumatic.   Eyes: Pupils are equal, round, and reactive to light. Right eye exhibits no discharge. Left eye exhibits no discharge.   Neck: Neck supple. No thyromegaly present.   Cardiovascular: Normal rate and regular rhythm.    No murmur heard.  Pulmonary/Chest: Effort normal and breath sounds normal. No stridor. No respiratory distress.   Abdominal: Soft. She exhibits no distension. There is tenderness (there is mild tenderness on the epigastric area and upper quadrants as well. Unchanged today). There is no rebound and no guarding.   Musculoskeletal: She exhibits no edema.   Neurological: She is alert and oriented to person, place, and time.   Skin: Skin is warm. No erythema.   Psychiatric: She has a normal mood and affect.       Recent Labs      01/15/18   0422 01/17/18   0515   WBC  17.4*  20.5*   RBC  3.02*  3.00*   HEMOGLOBIN  9.1*  8.9*   HEMATOCRIT  28.0*  27.1*   MCV  92.7  90.3   MCH  30.1  29.7   MCHC  32.5*  32.8*   RDW  51.7*  50.6*   PLATELETCT  347  440   MPV  9.4  8.8*     Recent Labs      01/15/18   0422  01/17/18   0515   SODIUM  136  132*   POTASSIUM  3.5*  3.1*   CHLORIDE  116*  110   CO2  15*  16*   GLUCOSE  86  89   BUN  25*  22   CREATININE  0.93  0.94   CALCIUM  7.6*  7.8*                      Assessment/Plan     * C. 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 diff colitis she will need a two-week course occurring straightened and a 4 week taper for total 6 weeks  -If WBC continues to rise, will do reimaging and consider GI consult for fecal implantation?        Dehydration- (present on admission)   Assessment & Plan    On IV fluids. Improving        Weakness- (present on admission)   Assessment & Plan    Secondary to  above  -Will need rehabilitation and skilled nursing facility        H/O shoulder surgery- (present on admission)   Assessment & Plan    PT/OT evaluation, [facility] as accepted the patient but waiting for isolated bed          Anemia- (present on admission)   Assessment & Plan    Monitor H&H.        Hyponatremia- (present on admission)   Assessment & Plan    This is likely secondary to dehydration. IV fluids with normal saline. Resolved.             Reviewed items::  Labs reviewed, Medications reviewed and Radiology images reviewed  Rao catheter::  No Rao  DVT prophylaxis - mechanical:  SCDs  Antibiotics:  Treating active infection/contamination beyond 24 hours perioperative coverage

## 2018-01-18 NOTE — PROGRESS NOTES
Received bedside report from LEONIDAS Mata. Plan of care discussed. Safety precautions in place. Call light and personal belongings within reach. Patient has no needs at this time.

## 2018-01-18 NOTE — PROGRESS NOTES
Patient complained of acid reflux symptoms; paged Dr. Sainz for orders. New orders received and read back.

## 2018-01-18 NOTE — CARE PLAN
Problem: Infection  Goal: Will remain free from infection  Outcome: PROGRESSING SLOWER THAN EXPECTED  Pt WBC continues to rise despite ABX. ID consulting.  Special contact precautions utilized. Pt verbalizes understanding of infection prevention.    Problem: Bowel/Gastric:  Goal: Normal bowel function is maintained or improved  Outcome: PROGRESSING AS EXPECTED  Pt having less and less diarrhea. No abdominal pain noted. Pt on vanco PO. ID consulting. Pt verbalizes understanding of GI POC, including poss GI consults and fecal transplant.

## 2018-01-18 NOTE — PROGRESS NOTES
Bedside report received from Adrian LAUREN. Assumed care. POC discussed. Pt resting comfortably in bed. Safety precautions in place.

## 2018-01-18 NOTE — DISCHARGE PLANNING
Medical Social Work    Referral: SHEFALI reviewed the chart this AM.      Intervention: Per flowsheet, pt lives alone and expects to d.c to [facility].  Pt does not have home O2 and is not currently on O2.  [facility] accepted once pt is medically cleared.      Plan: SHEFALI Kaury available to assist with transfer to [facility] once pt is medically cleared.

## 2018-01-18 NOTE — PROGRESS NOTES
Pt changed and cleaned of incontinence. Small BM. Repositioned in bed. Due meds given. SCD's provided. Daughter at bedside. No other needs/complaints, will CTM.

## 2018-01-19 ENCOUNTER — APPOINTMENT (OUTPATIENT)
Dept: RADIOLOGY | Facility: MEDICAL CENTER | Age: 83
DRG: 372 | End: 2018-01-19
Attending: INTERNAL MEDICINE
Payer: MEDICARE

## 2018-01-19 LAB
ANION GAP SERPL CALC-SCNC: 4 MMOL/L (ref 0–11.9)
BASOPHILS # BLD AUTO: 2 % (ref 0–1.8)
BASOPHILS # BLD: 0.42 K/UL (ref 0–0.12)
BUN SERPL-MCNC: 17 MG/DL (ref 8–22)
CALCIUM SERPL-MCNC: 8.1 MG/DL (ref 8.4–10.2)
CHLORIDE SERPL-SCNC: 113 MMOL/L (ref 96–112)
CO2 SERPL-SCNC: 18 MMOL/L (ref 20–33)
CREAT SERPL-MCNC: 0.85 MG/DL (ref 0.5–1.4)
EOSINOPHIL # BLD AUTO: 0.84 K/UL (ref 0–0.51)
EOSINOPHIL NFR BLD: 4 % (ref 0–6.9)
ERYTHROCYTE [DISTWIDTH] IN BLOOD BY AUTOMATED COUNT: 52.1 FL (ref 35.9–50)
GLUCOSE SERPL-MCNC: 83 MG/DL (ref 65–99)
HCT VFR BLD AUTO: 25.2 % (ref 37–47)
HGB BLD-MCNC: 8.4 G/DL (ref 12–16)
LYMPHOCYTES # BLD AUTO: 3.57 K/UL (ref 1–4.8)
LYMPHOCYTES NFR BLD: 17 % (ref 22–41)
MAGNESIUM SERPL-MCNC: 1.8 MG/DL (ref 1.5–2.5)
MANUAL DIFF BLD: NORMAL
MCH RBC QN AUTO: 30.2 PG (ref 27–33)
MCHC RBC AUTO-ENTMCNC: 33.3 G/DL (ref 33.6–35)
MCV RBC AUTO: 90.6 FL (ref 81.4–97.8)
METAMYELOCYTES NFR BLD MANUAL: 3 %
MONOCYTES # BLD AUTO: 0.63 K/UL (ref 0–0.85)
MONOCYTES NFR BLD AUTO: 3 % (ref 0–13.4)
MYELOCYTES NFR BLD MANUAL: 4 %
NEUTROPHILS # BLD AUTO: 14.07 K/UL (ref 2–7.15)
NEUTROPHILS NFR BLD: 63 % (ref 44–72)
NEUTS BAND NFR BLD MANUAL: 4 % (ref 0–10)
NRBC # BLD AUTO: 0 K/UL
NRBC BLD-RTO: 0 /100 WBC
PLATELET # BLD AUTO: 478 K/UL (ref 164–446)
PLATELET BLD QL SMEAR: NORMAL
PMV BLD AUTO: 8.5 FL (ref 9–12.9)
POLYCHROMASIA BLD QL SMEAR: NORMAL
POTASSIUM SERPL-SCNC: 4 MMOL/L (ref 3.6–5.5)
RBC # BLD AUTO: 2.78 M/UL (ref 4.2–5.4)
RBC BLD AUTO: PRESENT
SODIUM SERPL-SCNC: 135 MMOL/L (ref 135–145)
WBC # BLD AUTO: 21 K/UL (ref 4.8–10.8)

## 2018-01-19 PROCEDURE — 71046 X-RAY EXAM CHEST 2 VIEWS: CPT

## 2018-01-19 PROCEDURE — 85027 COMPLETE CBC AUTOMATED: CPT

## 2018-01-19 PROCEDURE — 700111 HCHG RX REV CODE 636 W/ 250 OVERRIDE (IP): Performed by: INTERNAL MEDICINE

## 2018-01-19 PROCEDURE — 700102 HCHG RX REV CODE 250 W/ 637 OVERRIDE(OP): Performed by: INTERNAL MEDICINE

## 2018-01-19 PROCEDURE — 80048 BASIC METABOLIC PNL TOTAL CA: CPT

## 2018-01-19 PROCEDURE — 85007 BL SMEAR W/DIFF WBC COUNT: CPT

## 2018-01-19 PROCEDURE — A9270 NON-COVERED ITEM OR SERVICE: HCPCS | Performed by: INTERNAL MEDICINE

## 2018-01-19 PROCEDURE — 770006 HCHG ROOM/CARE - MED/SURG/GYN SEMI*

## 2018-01-19 PROCEDURE — 83735 ASSAY OF MAGNESIUM: CPT

## 2018-01-19 PROCEDURE — 99232 SBSQ HOSP IP/OBS MODERATE 35: CPT | Performed by: INTERNAL MEDICINE

## 2018-01-19 RX ORDER — NAPROXEN 250 MG/1
250 TABLET ORAL 2 TIMES DAILY
Status: DISCONTINUED | OUTPATIENT
Start: 2018-01-19 | End: 2018-01-22 | Stop reason: HOSPADM

## 2018-01-19 RX ADMIN — ACETAMINOPHEN 650 MG: 325 TABLET, FILM COATED ORAL at 20:22

## 2018-01-19 RX ADMIN — POTASSIUM CHLORIDE 20 MEQ: 1500 TABLET, EXTENDED RELEASE ORAL at 08:44

## 2018-01-19 RX ADMIN — ENOXAPARIN SODIUM 40 MG: 100 INJECTION SUBCUTANEOUS at 08:44

## 2018-01-19 RX ADMIN — VANCOMYCIN 125 MG: KIT at 23:21

## 2018-01-19 RX ADMIN — LACTOBACILLUS ACIDOPHILUS / LACTOBACILLUS BULGARICUS 1 PACKET: 100 MILLION CFU STRENGTH GRANULES at 08:44

## 2018-01-19 RX ADMIN — VANCOMYCIN 125 MG: KIT at 06:11

## 2018-01-19 RX ADMIN — TRAZODONE HYDROCHLORIDE 50 MG: 50 TABLET ORAL at 20:23

## 2018-01-19 RX ADMIN — VANCOMYCIN 125 MG: KIT at 12:29

## 2018-01-19 RX ADMIN — NAPROXEN 250 MG: 250 TABLET ORAL at 20:23

## 2018-01-19 RX ADMIN — CITALOPRAM HYDROBROMIDE 20 MG: 20 TABLET ORAL at 20:22

## 2018-01-19 RX ADMIN — NAPROXEN 250 MG: 250 TABLET ORAL at 13:56

## 2018-01-19 RX ADMIN — POTASSIUM CHLORIDE 20 MEQ: 1500 TABLET, EXTENDED RELEASE ORAL at 20:23

## 2018-01-19 RX ADMIN — VANCOMYCIN 125 MG: KIT at 00:27

## 2018-01-19 RX ADMIN — LEVOTHYROXINE SODIUM 112 MCG: 112 TABLET ORAL at 06:11

## 2018-01-19 RX ADMIN — POTASSIUM CHLORIDE 20 MEQ: 1500 TABLET, EXTENDED RELEASE ORAL at 13:58

## 2018-01-19 RX ADMIN — FAMOTIDINE 20 MG: 20 TABLET, FILM COATED ORAL at 20:23

## 2018-01-19 RX ADMIN — VANCOMYCIN 125 MG: KIT at 17:39

## 2018-01-19 RX ADMIN — ONDANSETRON 4 MG: 4 TABLET, ORALLY DISINTEGRATING ORAL at 13:56

## 2018-01-19 ASSESSMENT — ENCOUNTER SYMPTOMS
CHILLS: 0
SPUTUM PRODUCTION: 0
WEIGHT LOSS: 0
BLURRED VISION: 0
DIARRHEA: 1
WEAKNESS: 1
DIZZINESS: 0
ABDOMINAL PAIN: 1
FALLS: 1
VOMITING: 1
PALPITATIONS: 0
SHORTNESS OF BREATH: 0
NECK PAIN: 0
BRUISES/BLEEDS EASILY: 0
COUGH: 1
VOMITING: 0
NAUSEA: 0
NAUSEA: 1
DEPRESSION: 0
MYALGIAS: 0
FEVER: 0

## 2018-01-19 ASSESSMENT — PAIN SCALES - GENERAL
PAINLEVEL_OUTOF10: 0
PAINLEVEL_OUTOF10: 2

## 2018-01-19 NOTE — PROGRESS NOTES
0027 Vanco administered per MAR. Pt repositioned and resting comfortably in bed. No other needs at this time. Safety precautions in place.

## 2018-01-19 NOTE — PROGRESS NOTES
Infectious Disease Progress Note    Author: Gregoria Yin M.D. Date & Time of service: 2018  10:05 AM    Chief Complaint:  FU persistent leukocytosis and Cdiff    Interval History:   AF, WBC 21, has a cough but no shortness of breath, nausea and emesis x 1 yesterday, overall abd pain and diarrhea improved  Labs Reviewed, Medications Reviewed, Radiology Reviewed and Wound Reviewed.    Review of Systems:  Review of Systems   Constitutional: Negative for chills and fever.   Respiratory: Positive for cough. Negative for shortness of breath.    Gastrointestinal: Positive for abdominal pain, diarrhea, nausea and vomiting.   Musculoskeletal: Positive for joint pain.       Hemodynamics:  Temp (24hrs), Av.7 °C (98 °F), Min:36.5 °C (97.7 °F), Max:36.8 °C (98.3 °F)  Temperature: 36.5 °C (97.7 °F)  Pulse  Av.4  Min: 67  Max: 102   Blood Pressure : 127/44       Physical Exam:  Physical Exam   Constitutional: She is oriented to person, place, and time. She appears well-developed.   Elderly   HENT:   Head: Normocephalic and atraumatic.   Eyes: EOM are normal. Pupils are equal, round, and reactive to light.   Neck: Neck supple.   Cardiovascular: Normal rate and regular rhythm.    Pulmonary/Chest: Effort normal. She has no wheezes.   Abdominal: Soft. She exhibits no distension. There is no tenderness.   Musculoskeletal: She exhibits no edema.   R shoulder surgical site with staples well approximated. No drainage or surrounding erythema    RUE in sling    Bilateral hand arthritic deformities   Neurological: She is alert and oriented to person, place, and time.       Meds:    Current Facility-Administered Medications:   •  potassium chloride SA  •  mag hydrox-al hydrox-simeth  •  famotidine  •  [START ON 2018] vancomycin 50 mg/ml  •  [START ON 2/3/2018] vancomycin 50 mg/ml  •  [START ON 2018] vancomycin 50 mg/ml  •  [START ON 2018] vancomycin 50 mg/ml  •  citalopram  •   hydrocodone/acetaminophen  •  levothyroxine  •  lactobacillus granules  •  trazodone  •  Respiratory Care per Protocol  •  NS  •  enoxaparin  •  acetaminophen  •  Notify provider if pain remains uncontrolled **AND** Use the numeric rating scale (NRS-11) on regular floors and Critical-Care Pain Observation Tool (CPOT) on ICUs/Trauma to assess pain **AND** Pulse Ox (Oximetry) **AND** Pharmacy Consult Request **AND** If patient difficult to arouse and/or has respiratory depression, stop any opiates that are currently infusing and call a Rapid Response. **AND** oxycodone immediate-release **AND** oxycodone immediate-release **AND** morphine injection  •  ondansetron  •  ondansetron  •  vancomycin 50 mg/ml  •  artificial tears    Labs:  Recent Labs      01/17/18   0515  01/18/18   0234  01/19/18   0533   WBC  20.5*  21.3*  21.0*   RBC  3.00*  2.82*  2.78*   HEMOGLOBIN  8.9*  8.4*  8.4*   HEMATOCRIT  27.1*  25.3*  25.2*   MCV  90.3  89.7  90.6   MCH  29.7  29.8  30.2   RDW  50.6*  50.2*  52.1*   PLATELETCT  440  481*  478*   MPV  8.8*  8.7*  8.5*   NEUTSPOLYS  78.00*  66.00  63.00   LYMPHOCYTES  13.00*  11.00*  17.00*   MONOCYTES  3.00  7.00  3.00   EOSINOPHILS  3.00  4.00  4.00   BASOPHILS  0.00  0.00  2.00*   RBCMORPHOLO  Present  Present  Present     Recent Labs      01/17/18   0515  01/19/18   0533   SODIUM  132*  135   POTASSIUM  3.1*  4.0   CHLORIDE  110  113*   CO2  16*  18*   GLUCOSE  89  83   BUN  22  17     Recent Labs      01/17/18   0515  01/19/18   0533   CREATININE  0.94  0.85       Imaging:  Ct-renal Colic Evaluation(a/p W/o)    Result Date: 1/12/2018 1/12/2018 5:57 PM HISTORY/REASON FOR EXAM:  WEAKNESS DIARRHEA. TECHNIQUE/EXAM DESCRIPTION AND NUMBER OF VIEWS: CT scan renal/colic without contrast. 5 mm helical images of the abdomen and pelvis were obtained from the diaphragmatic domes through the pubic symphysis. Low dose optimization technique was utilized for this CT exam including automated exposure  control and adjustment of the mA and/or kV according to patient size. COMPARISON: None. FINDINGS: Visualized lung bases show mild dependent atelectasis.  Minimal RIGHT pleural fluid. Calcified nodule in the RIGHT middle lobe and lingula. The liver is unremarkable. The spleen is unremarkable. Adrenal glands are unremarkable. Probable LEFT kidney cyst measuring 3.6 cm. RIGHT kidney is unremarkable. Pancreas is atrophic. Bladder is absent. Bladder is unremarkable. Diffuse colonic wall thickening. Appendix has a normal appearance. No peritoneal fluid or pneumoperitoneum. Uterus is absent. Abdominal aorta shows diffuse atherosclerotic changes. Degenerative change of lumbar spine and hips.  Severe compression of L1 vertebral body with burst component, likely chronic. Chronic LEFT obturator ring fractures.     1.  Diffuse colonic wall thickening suggesting inflammatory or infectious colitis. 2.  No evidence of bowel obstruction or perforation. 3.  Minimal RIGHT pleural effusion with associated atelectasis. 4.  Probable chronic burst fracture of L1 with severe loss of height.    Dx-chest-portable (1 View)    Result Date: 1/12/2018 1/12/2018 5:13 PM HISTORY/REASON FOR EXAM:  Shortness of breath. TECHNIQUE/EXAM DESCRIPTION AND NUMBER OF VIEWS: Single portable view of the chest. COMPARISON: November 11, 2017 FINDINGS: Heart size is within normal limits.  A calcified granuloma once again noted medially in the right lung base. No pulmonary infiltrates or consolidations are noted. No pleural abnormalities are noted. Patient is status post right shoulder replacement surgery with surgical skin staples identified. There is degenerative change in the left shoulder.     No acute cardiac or pulmonary abnormalities are identified.    Dx-shoulder 1 View Right    Result Date: 1/9/2018 1/9/2018 11:59 AM HISTORY/REASON FOR EXAM: Right shoulder pain. TECHNIQUE/EXAM DESCRIPTION AND NUMBER OF VIEWS:  1 views of the RIGHT shoulder.  "COMPARISON: None FINDINGS: Bone mineralization is normal. There are surgical changes consistent with right shoulder reverse arthroplasty. There are surrounding bone fragments and gas within the soft tissues.     Post right shoulder reverse arthroplasty.      Micro:  Results     Procedure Component Value Units Date/Time    BLOOD CULTURE [[id number]] Collected:  01/12/18 1850    Order Status:  Completed Specimen:  Blood from Peripheral Updated:  01/17/18 2217     Significant Indicator NEG     Source BLD     Site PERIPHERAL     Blood Culture --     Blood culture testing and Gram stain, if indicated, are  performed at [facility], [facility]  [employer].[place], [place].  Positive blood cultures are  sent to [facility], [address] [place] [address], for organism identification and  susceptibility testing.  No growth after 5 days of incubation.      Narrative:       Special Contact Isolation  Per Hospital Policy: Only change Specimen Src: to \"Line\" if  specified by physician order.  Patient difficult stick; short sample    BLOOD CULTURE [[id number]] Collected:  01/12/18 1900    Order Status:  Completed Specimen:  Blood from Peripheral Updated:  01/17/18 2217     Significant Indicator NEG     Source BLD     Site PERIPHERAL     Blood Culture --     Blood culture testing and Gram stain, if indicated, are  performed at [facility], [address]  [address].[place], [place].  Positive blood cultures are  sent to [facility], [address] [place] [address], for organism identification and  susceptibility testing.  No growth after 5 days of incubation.      Narrative:       Special Contact Isolation  Per Hospital Policy: Only change Specimen Src: to \"Line\" if  specified by physician order.  Patient difficult stick; short sample    CULTURE STOOL [[id number]] Collected:  01/12/18 1715    Order Status:  Completed Specimen:  Stool from Stool Updated:  " 01/16/18 1058     [facility] Negative for Shiga Toxin 1 and 2.     Significant Indicator NEG     Source STL     Site STOOL     Culture Result Stool --     No enteric pathogens isolated.  NOTE:  Stool cultures are screened for Shiga Toxins 1 and 2,  Salmonella, Shigella, Campylobacter, Aeromonas,  Plesiomonas, and Vibrio.      Narrative:       Previous comment was modified by KEYLA at 18:36 on 01/13/18.  Special Contact Isolation  Does this patient have risk factors for C-diff?->Yes  Has patient taken stool softeners or laxatives in the last 5  days?->No  notified unit clerk evangelista need more stool 01/12/2018  17:18  notified rn larry need more stool fro comp. OP 01/13/2018  17:25  Special Contact Isolation  Does this patient have risk factors for C-diff?->Yes  Has patient taken stool softeners or laxatives in the last 5  days?->No    COMPLETE O&P [[id number]] Collected:  01/14/18 0540    Order Status:  Completed Specimen:  Stool Updated:  01/16/18 0836     Significant Indicator NEG     Source STL     Site --     Ova And Parasites --     No Ova or Parasites seen.  NOTE:  Ova and parasite exam of stool is generally not performed on  patients hospitalized for >3 days unless nosocomial  transmission of parasitic agents is suspected.  Screening for Cryptosporidium is not included in routine Ova  and Parasite examination.      Narrative:       Special Contact Isolation  Has the patient been out of the country recently?->No  Is the patient immuno-compromised?->Yes  ** recent surgery  notified unit clerk evangelista need more stool 01/12/2018  17:18  notified rn larry need more stool fro comp. OP 01/13/2018  17:25    [facility](SIGA TOXIN)DETECTION [[id number]] Collected:  01/12/18 1715    Order Status:  Completed Specimen:  Stool Updated:  01/14/18 1511     Significant Indicator NEG     Source STL     Site STOOL     [facility] Negative for Shiga Toxin 1 and 2.    Narrative:       Previous comment was modified by KEYLA at 18:36 on  01/13/18.  Special Contact Isolation  Does this patient have risk factors for C-diff?->Yes  Has patient taken stool softeners or laxatives in the last 5  days?->No  notified unit clewendy evangelista need more stool 01/12/2018  17:18  notified liz juarez need more stool fro comp. OP 01/13/2018  17:25  Special Contact Isolation  Does this patient have risk factors for C-diff?->Yes  Has patient taken stool softeners or laxatives in the last 5  days?->No    C DIFF TOXIN [[id number]]  (Abnormal) Collected:  01/12/18 1650    Order Status:  Completed Updated:  01/13/18 1039     C.Diff Toxin A&B Positive (A)     Comment: TOXIN POSITIVE  Toxin detected by EIA; C. difficile detected by PCR.  If clinically correlated, treatment indicated per guidelines.  Test of cure is not recommended.         Narrative:       Special Contact Isolation    C Diff by PCR rflx Toxin [[id number]] Collected:  01/12/18 1650    Order Status:  Completed Updated:  01/13/18 1038     C Diff by PCR See Toxin     027-NAP1-BI Presumptive Negative     Comment: Presumptive 027/NAP1/BI target DNA sequences are NOT DETECTED.       Narrative:       Special Contact Isolation    URINALYSIS CULTURE, IF INDICATED [[id number]]  (Abnormal) Collected:  01/12/18 1700    Order Status:  Completed Specimen:  Urine Updated:  01/12/18 1747     Color Yellow     Character Hazy (A)     Specific Gravity 1.015     Ph 5.5     Glucose Negative mg/dL      Ketones Negative mg/dL      Protein Negative mg/dL      Bilirubin Negative     Nitrite Negative     Leukocyte Esterase Negative     Occult Blood Negative     Micro Urine Req Microscopic     Culture Indicated No UA Culture     Narrative:       Special Contact Isolation    COMPLETE O&P [[id number]] Collected:  01/12/18 1715    Order Status:  Canceled Specimen:  Other from Stool           Assessment:  Active Hospital Problems    Diagnosis   • *C. difficile colitis [A04.72]   • Dehydration [E86.0]   • Leukocytosis [D72.829]   • Anemia [D64.9]    • H/O shoulder surgery [Z98.890]   • Weakness [R53.1]   • Hyponatremia [E87.1]       Plan:  Clostridium difficile colitis  Afebrile  Leukocytosis  Likely 2/2 to perioperative abx given for recent shoulder surgery  H/o CDI s/p fecal transplant in 2015  Decreased frequency of stools and decreased abd pain  CT +colitis  Continue vanco followed by prolonged vanco taper  Repeat CT a/p if worsening symptoms and increasing leukocytosis    Leukocytosis - persistent  2/2 above  Repeat CXR today given ongoing cough to rule out PNA  On abx above    Cough  ? Bronchitis versus PNA  Encourage SMI  Repeat CXR today    Recent right shoulder reverse arthroplasty on 1/9/18    Discussed with internal medicine/Dr Moreno and RN

## 2018-01-19 NOTE — PROGRESS NOTES
Bedside report given to Jose LAUREN. POC discussed. Pt resting comfortably in bed. Safety precautions in place.

## 2018-01-19 NOTE — PROGRESS NOTES
0704 Bedside report given to day shift RNDeborah. POC discussed. Pt resting comfortably in bed. Call light and personal belongings within reach. Hourly rounding and safety precautions in place.

## 2018-01-19 NOTE — PROGRESS NOTES
Bedside report received from Jose LAUREN. Assumed care. POC discussed. Pt resting comfortably in bed. Safety precautions in place.

## 2018-01-19 NOTE — PROGRESS NOTES
Assessment completed. Due meds given. Pt AAOx4, no c/o pain, n/v at this time. Pt changed and cleaned of incontinence. Up to cardiac chair for breakfast. Pt performed PT ROM exercises for right arm/shoulder. Sling reapplied. ID MD at bedside, examined right shoulder wound. Dressing changed per orders. Bed linens changed. No other needs/complaints, call light in reach, will monitor.

## 2018-01-19 NOTE — PROGRESS NOTES
1904 Bedside report received from day shift RNDeborah. POC discussed. Pt resting comfortably in bed. Call light and personal belongings within reach. Hourly rounding and safety precautions in place.

## 2018-01-19 NOTE — CARE PLAN
Problem: Infection  Goal: Will remain free from infection  Outcome: PROGRESSING SLOWER THAN EXPECTED  Pt WBC high but trending down. ID consulting. Pt on PO vanco. Special contact precautions used. Pt verbalizes understanding of current infection POC.    Problem: Mobility  Goal: Risk for activity intolerance will decrease  Outcome: PROGRESSING AS EXPECTED  Pt up to cardiac chair for meals with x1-2 assist. Provided with rest @ EOB before mobilizing. Pt encouraged to ambulate/mobilize as tolerated. Pt verbalizes understanding.

## 2018-01-19 NOTE — PROGRESS NOTES
[facility]ist Progress Note    Date of Service: 2018    Chief Complaint  82 y.o. female admitted 2018 with C. diff colitis.    Interval Problem Update  C. diff colitis-feels a little worse today, had some intense epigastric pain in the middle of the night that was self limited.    WBC-continues to rise, but remains afebrile. Denies any skin issues. No increase in diarrhea. Denies any sinusitis symptoms. Eating some food. Consulted ID.     Consultants/Specialty  ID    Disposition  Skilled nursing facility        Review of Systems   Constitutional: Positive for malaise/fatigue (about the same today). Negative for chills and fever.   HENT: Negative for congestion, nosebleeds, sinus pain and tinnitus.    Eyes: Negative for blurred vision.   Respiratory: Negative for cough.    Cardiovascular: Negative for chest pain, palpitations and leg swelling.   Gastrointestinal: Positive for abdominal pain (unchanged today), diarrhea (feels it is better) and nausea. Negative for vomiting.   Genitourinary: Negative for urgency.   Musculoskeletal: Positive for falls. Negative for myalgias and neck pain.   Skin: Negative for rash.   Neurological: Negative for headaches.   Endo/Heme/Allergies: Negative for environmental allergies.   Psychiatric/Behavioral: Negative for depression and suicidal ideas.      Physical Exam  Laboratory/Imaging   Hemodynamics  Temp (24hrs), Av.7 °C (98.1 °F), Min:36.6 °C (97.9 °F), Max:36.8 °C (98.3 °F)   Temperature: 36.8 °C (98.2 °F)  Pulse  Av.9  Min: 67  Max: 102    Blood Pressure : 128/53      Respiratory      Respiration: 18, Pulse Oximetry: 95 %        RUL Breath Sounds: Clear, RML Breath Sounds: Clear;Diminished, RLL Breath Sounds: Clear;Diminished, SHANTANU Breath Sounds: Clear, LLL Breath Sounds: Clear;Diminished    Fluids    Intake/Output Summary (Last 24 hours) at 18 1640  Last data filed at 18 1400   Gross per 24 hour   Intake              480 ml   Output                 0 ml   Net              480 ml       Nutrition  Orders Placed This Encounter   Procedures   • DIET ORDER     Standing Status:   Standing     Number of Occurrences:   1     Order Specific Question:   Diet:     Answer:   Low Fiber(GI Soft) [2]     Comments:   no chicken     Physical Exam   Constitutional: She is oriented to person, place, and time. She appears well-developed and well-nourished.   HENT:   Head: Normocephalic and atraumatic.   No sinus ttp appreciated   Eyes: Pupils are equal, round, and reactive to light. No scleral icterus.   Neck: Normal range of motion. Neck supple.   Cardiovascular: Normal rate and regular rhythm.    No murmur heard.  Pulmonary/Chest: Effort normal and breath sounds normal. No stridor. She has no wheezes.   Abdominal: Soft. Bowel sounds are normal. She exhibits no distension. There is no tenderness.   Musculoskeletal: She exhibits no edema or tenderness.   Neurological: She is alert and oriented to person, place, and time.   Skin: Skin is warm. No erythema. There is pallor.   No appreciable skin breakdown anywhere in her body   Psychiatric: She has a normal mood and affect.       Recent Labs      01/17/18   0515  01/18/18   0234   WBC  20.5*  21.3*   RBC  3.00*  2.82*   HEMOGLOBIN  8.9*  8.4*   HEMATOCRIT  27.1*  25.3*   MCV  90.3  89.7   MCH  29.7  29.8   MCHC  32.8*  33.2*   RDW  50.6*  50.2*   PLATELETCT  440  481*   MPV  8.8*  8.7*     Recent Labs      01/17/18   0515   SODIUM  132*   POTASSIUM  3.1*   CHLORIDE  110   CO2  16*   GLUCOSE  89   BUN  22   CREATININE  0.94   CALCIUM  7.8*                      Assessment/Plan     * C. difficile colitis- (present on admission)   Assessment & Plan    -diarrhea is better, mild nausea today, no change in abdominal exam, but rising WBC  -C diff resistant to oral vanco, if worsens tomorrow, will get repeat CTAP with GI consult, fecal transplant?  -Given this is her 4th or 5th time having C. diff colitis she will need a two-week course  occurring straightened and a 4 week taper for total 6 weeks        Dehydration- (present on admission)   Assessment & Plan    On IV fluids. Improving        Leukocytosis- (present on admission)   Assessment & Plan    -continues to rise, ID consulted, no change in abx's, no other clear sources of infection at this time  -trend closely, see above        Weakness- (present on admission)   Assessment & Plan    Secondary to above  -Will need rehabilitation and skilled nursing facility        H/O shoulder surgery- (present on admission)   Assessment & Plan    PT/OT evaluation, [facility] as [facility] the patient but waiting for isolated bed          Anemia- (present on admission)   Assessment & Plan    Monitor H&H.        Hyponatremia- (present on admission)   Assessment & Plan    This is likely secondary to dehydration. IV fluids with normal saline. Resolved.             Reviewed items::  Labs reviewed, Medications reviewed and Radiology images reviewed  Rao catheter::  No Rao  DVT prophylaxis - mechanical:  SCDs  Antibiotics:  Treating active infection/contamination beyond 24 hours perioperative coverage

## 2018-01-19 NOTE — ASSESSMENT & PLAN NOTE
-continues to down-trend, no e/o of any infection elsewhere, continue oral vanco, very hesitant to add additional abx's given severity of her C diff  -trend closely, see above, if continues to down-trend, can likely go to SNF in 24-48 hours

## 2018-01-19 NOTE — PROGRESS NOTES
2021 All due medications given. Assessment completed. Pt A&Ox4. Declines any pain, SOB, or N/V. Generalized edema noted in the UE. 2+ BLE edema noted. Dressing to R shoulder clean, dry, and intact. Pt in sling with pillows for support. POC discussed. Pt resting comfortably in bed. No other needs at this time. Safety precautions in place, bed alarm on.

## 2018-01-20 LAB
ANION GAP SERPL CALC-SCNC: 3 MMOL/L (ref 0–11.9)
ANISOCYTOSIS BLD QL SMEAR: ABNORMAL
BASOPHILS # BLD AUTO: 0 % (ref 0–1.8)
BASOPHILS # BLD: 0 K/UL (ref 0–0.12)
BUN SERPL-MCNC: 17 MG/DL (ref 8–22)
CALCIUM SERPL-MCNC: 8.2 MG/DL (ref 8.4–10.2)
CHLORIDE SERPL-SCNC: 115 MMOL/L (ref 96–112)
CO2 SERPL-SCNC: 18 MMOL/L (ref 20–33)
CREAT SERPL-MCNC: 0.96 MG/DL (ref 0.5–1.4)
EKG IMPRESSION: NORMAL
EOSINOPHIL # BLD AUTO: 0.38 K/UL (ref 0–0.51)
EOSINOPHIL NFR BLD: 2 % (ref 0–6.9)
ERYTHROCYTE [DISTWIDTH] IN BLOOD BY AUTOMATED COUNT: 52.6 FL (ref 35.9–50)
GLUCOSE SERPL-MCNC: 80 MG/DL (ref 65–99)
HCT VFR BLD AUTO: 25.5 % (ref 37–47)
HGB BLD-MCNC: 8.6 G/DL (ref 12–16)
LYMPHOCYTES # BLD AUTO: 2.88 K/UL (ref 1–4.8)
LYMPHOCYTES NFR BLD: 15 % (ref 22–41)
MACROCYTES BLD QL SMEAR: ABNORMAL
MAGNESIUM SERPL-MCNC: 1.8 MG/DL (ref 1.5–2.5)
MANUAL DIFF BLD: NORMAL
MCH RBC QN AUTO: 30.3 PG (ref 27–33)
MCHC RBC AUTO-ENTMCNC: 33.7 G/DL (ref 33.6–35)
MCV RBC AUTO: 89.8 FL (ref 81.4–97.8)
METAMYELOCYTES NFR BLD MANUAL: 3 %
MONOCYTES # BLD AUTO: 0.19 K/UL (ref 0–0.85)
MONOCYTES NFR BLD AUTO: 1 % (ref 0–13.4)
MYELOCYTES NFR BLD MANUAL: 3 %
NEUTROPHILS # BLD AUTO: 14.59 K/UL (ref 2–7.15)
NEUTROPHILS NFR BLD: 73 % (ref 44–72)
NEUTS BAND NFR BLD MANUAL: 3 % (ref 0–10)
NRBC # BLD AUTO: 0 K/UL
NRBC BLD-RTO: 0 /100 WBC
PLATELET # BLD AUTO: 500 K/UL (ref 164–446)
PLATELET BLD QL SMEAR: NORMAL
PMV BLD AUTO: 8.6 FL (ref 9–12.9)
POLYCHROMASIA BLD QL SMEAR: NORMAL
POTASSIUM SERPL-SCNC: 4.6 MMOL/L (ref 3.6–5.5)
RBC # BLD AUTO: 2.84 M/UL (ref 4.2–5.4)
RBC BLD AUTO: PRESENT
SODIUM SERPL-SCNC: 136 MMOL/L (ref 135–145)
WBC # BLD AUTO: 19.2 K/UL (ref 4.8–10.8)

## 2018-01-20 PROCEDURE — 700102 HCHG RX REV CODE 250 W/ 637 OVERRIDE(OP): Performed by: INTERNAL MEDICINE

## 2018-01-20 PROCEDURE — 80048 BASIC METABOLIC PNL TOTAL CA: CPT

## 2018-01-20 PROCEDURE — 83735 ASSAY OF MAGNESIUM: CPT

## 2018-01-20 PROCEDURE — 85027 COMPLETE CBC AUTOMATED: CPT

## 2018-01-20 PROCEDURE — 700111 HCHG RX REV CODE 636 W/ 250 OVERRIDE (IP): Performed by: INTERNAL MEDICINE

## 2018-01-20 PROCEDURE — 770006 HCHG ROOM/CARE - MED/SURG/GYN SEMI*

## 2018-01-20 PROCEDURE — A9270 NON-COVERED ITEM OR SERVICE: HCPCS | Performed by: INTERNAL MEDICINE

## 2018-01-20 PROCEDURE — 85007 BL SMEAR W/DIFF WBC COUNT: CPT

## 2018-01-20 PROCEDURE — 99232 SBSQ HOSP IP/OBS MODERATE 35: CPT | Performed by: INTERNAL MEDICINE

## 2018-01-20 RX ADMIN — VANCOMYCIN 125 MG: KIT at 12:54

## 2018-01-20 RX ADMIN — TRAZODONE HYDROCHLORIDE 50 MG: 50 TABLET ORAL at 20:34

## 2018-01-20 RX ADMIN — ONDANSETRON 4 MG: 4 TABLET, ORALLY DISINTEGRATING ORAL at 20:35

## 2018-01-20 RX ADMIN — LACTOBACILLUS ACIDOPHILUS / LACTOBACILLUS BULGARICUS 1 PACKET: 100 MILLION CFU STRENGTH GRANULES at 09:35

## 2018-01-20 RX ADMIN — VANCOMYCIN 125 MG: KIT at 17:23

## 2018-01-20 RX ADMIN — CITALOPRAM HYDROBROMIDE 20 MG: 20 TABLET ORAL at 20:33

## 2018-01-20 RX ADMIN — HYDROCODONE BITARTRATE AND ACETAMINOPHEN 0.5 TABLET: 10; 325 TABLET ORAL at 20:33

## 2018-01-20 RX ADMIN — LEVOTHYROXINE SODIUM 112 MCG: 112 TABLET ORAL at 06:21

## 2018-01-20 RX ADMIN — NAPROXEN 250 MG: 250 TABLET ORAL at 20:35

## 2018-01-20 RX ADMIN — NAPROXEN 250 MG: 250 TABLET ORAL at 09:34

## 2018-01-20 RX ADMIN — VANCOMYCIN 125 MG: KIT at 23:54

## 2018-01-20 RX ADMIN — FAMOTIDINE 20 MG: 20 TABLET, FILM COATED ORAL at 20:34

## 2018-01-20 RX ADMIN — ENOXAPARIN SODIUM 40 MG: 100 INJECTION SUBCUTANEOUS at 09:35

## 2018-01-20 RX ADMIN — POTASSIUM CHLORIDE 20 MEQ: 1500 TABLET, EXTENDED RELEASE ORAL at 09:35

## 2018-01-20 RX ADMIN — VANCOMYCIN 125 MG: KIT at 06:21

## 2018-01-20 ASSESSMENT — ENCOUNTER SYMPTOMS
DIZZINESS: 0
VOMITING: 0
NAUSEA: 0
COUGH: 1
DEPRESSION: 0
WEIGHT LOSS: 0
ABDOMINAL PAIN: 0
ROS GI COMMENTS: DECREASED
FALLS: 1
SPUTUM PRODUCTION: 0
FEVER: 0
DIARRHEA: 1
WEAKNESS: 1
CHILLS: 0
BRUISES/BLEEDS EASILY: 0
SHORTNESS OF BREATH: 0
NECK PAIN: 0
MYALGIAS: 0
COUGH: 0
DOUBLE VISION: 0

## 2018-01-20 ASSESSMENT — PAIN SCALES - GENERAL
PAINLEVEL_OUTOF10: 3
PAINLEVEL_OUTOF10: 4

## 2018-01-20 NOTE — PROGRESS NOTES
Bedside report given to Inez LAUREN. POC discussed. Pt resting comfortably in bed. Safety precautions in place.

## 2018-01-20 NOTE — PROGRESS NOTES
[facility]ist Progress Note    Date of Service: 2018    Chief Complaint  82 y.o. female admitted 2018 with C. diff colitis.    Interval Problem Update  C. diff colitis-continues to improve slowly, no increased abdominal pain, eating food without difficulty and consistency of the stool remains the same as compared to yesterday.     WBC-decreased down to 19K today without fevers. No new symptoms.     Consultants/Specialty  ID    Disposition  Skilled nursing facility        Review of Systems   Constitutional: Negative for chills, fever and weight loss.   HENT: Negative for tinnitus.    Eyes: Negative for double vision.   Respiratory: Negative for cough, sputum production and shortness of breath.    Cardiovascular: Negative for chest pain and leg swelling.   Gastrointestinal: Positive for diarrhea (same solidity today). Negative for abdominal pain (unchanged today), nausea and vomiting.   Genitourinary: Negative for frequency, hematuria and urgency.   Musculoskeletal: Positive for falls. Negative for myalgias and neck pain.   Skin: Negative for rash.   Neurological: Positive for weakness (gradual improvement). Negative for dizziness.   Endo/Heme/Allergies: Does not bruise/bleed easily.   Psychiatric/Behavioral: Negative for depression and suicidal ideas.   All other systems reviewed and are negative.     Physical Exam  Laboratory/Imaging   Hemodynamics  Temp (24hrs), Av.4 °C (97.6 °F), Min:36.3 °C (97.3 °F), Max:36.7 °C (98.1 °F)   Temperature: 36.4 °C (97.5 °F)  Pulse  Av.2  Min: 66  Max: 102    Blood Pressure : 112/42      Respiratory      Respiration: 18, Pulse Oximetry: 92 %        RUL Breath Sounds: Clear, RML Breath Sounds: Clear;Diminished, RLL Breath Sounds: Clear;Diminished, SHANTANU Breath Sounds: Clear, LLL Breath Sounds: Clear;Diminished    Fluids    Intake/Output Summary (Last 24 hours) at 18 1259  Last data filed at 18 0800   Gross per 24 hour   Intake              580 ml    Output                0 ml   Net              580 ml       Nutrition  Orders Placed This Encounter   Procedures   • DIET ORDER     Standing Status:   Standing     Number of Occurrences:   1     Order Specific Question:   Diet:     Answer:   Low Fiber(GI Soft) [2]     Comments:   no chicken     Physical Exam   Constitutional: She is oriented to person, place, and time. She appears well-developed and well-nourished.   Sitting upright eating lunch, appears comfortable   HENT:   Head: Normocephalic and atraumatic.   No sinus ttp appreciated   Eyes: EOM are normal. Pupils are equal, round, and reactive to light. No scleral icterus.   Neck: Normal range of motion. Neck supple.   Cardiovascular: Normal rate and regular rhythm.    No murmur heard.  Pulmonary/Chest: Effort normal and breath sounds normal. No respiratory distress.   Abdominal: Soft. Bowel sounds are normal. She exhibits no distension. There is no tenderness.   Musculoskeletal: She exhibits no edema or tenderness.   Neurological: She is alert and oriented to person, place, and time.   Skin: Skin is warm. No erythema. There is pallor.   No appreciable skin breakdown anywhere in her body, remains unchanged today   Psychiatric: She has a normal mood and affect.       Recent Labs      01/18/18   0234  01/19/18   0533  01/20/18   0451   WBC  21.3*  21.0*  19.2*   RBC  2.82*  2.78*  2.84*   HEMOGLOBIN  8.4*  8.4*  8.6*   HEMATOCRIT  25.3*  25.2*  25.5*   MCV  89.7  90.6  89.8   MCH  29.8  30.2  30.3   MCHC  33.2*  33.3*  33.7   RDW  50.2*  52.1*  52.6*   PLATELETCT  481*  478*  500*   MPV  8.7*  8.5*  8.6*     Recent Labs      01/19/18   0533  01/20/18   0451   SODIUM  135  136   POTASSIUM  4.0  4.6   CHLORIDE  113*  115*   CO2  18*  18*   GLUCOSE  83  80   BUN  17  17   CREATININE  0.85  0.96   CALCIUM  8.1*  8.2*                      Assessment/Plan     * C. difficile colitis- (present on admission)   Assessment & Plan    -stool is stable again today, no nausea,  and no change in belly pain today, monitor closely  -C diff resistant to oral vanco, if worsens tomorrow, will get repeat CTAP with GI consult, fecal transplant?  -Given this is her 4th or 5th time having C. diff colitis she will need a two-week course occurring straightened and a 4 week taper for total 6 weeks        Dehydration- (present on admission)   Assessment & Plan    On IV fluids. Improving        Leukocytosis- (present on admission)   Assessment & Plan    -finally coming down, no e/o of any infection elsewhere, continue oral vanco, very hesitant to add additional abx's given severity of her C diff  -trend closely, see above, if continues to down-trend, can likely go to SNF in 24-48 hours        Weakness- (present on admission)   Assessment & Plan    Secondary to above  -Will need rehabilitation and skilled nursing facility        H/O shoulder surgery- (present on admission)   Assessment & Plan    PT/OT evaluation, [facility] as accepted the patient but waiting for isolated bed          Anemia- (present on admission)   Assessment & Plan    Monitor H&H.        Hyponatremia- (present on admission)   Assessment & Plan    This is likely secondary to dehydration. IV fluids with normal saline. Resolved.             Reviewed items::  Labs reviewed, Medications reviewed and Radiology images reviewed  Rao catheter::  No Rao  DVT prophylaxis - mechanical:  SCDs  Antibiotics:  Treating active infection/contamination beyond 24 hours perioperative coverage

## 2018-01-20 NOTE — PROGRESS NOTES
[facility]ist Progress Note    Date of Service: 2018    Chief Complaint  82 y.o. female admitted 2018 with C. diff colitis.    Interval Problem Update  C. diff colitis-feels that her abdominal pain is a little better, frequency of stool is unchanged, but consistency is improving and she is able to eat more.    WBC-remains nearly the same today. Afebrile, no change in abx's. PA/lateral CXR is negative. Very mild persistent cough that is not productive.      Consultants/Specialty  ID    Disposition  Skilled nursing facility        Review of Systems   Constitutional: Negative for malaise/fatigue and weight loss.   HENT: Negative for hearing loss and tinnitus.    Eyes: Negative for blurred vision.   Respiratory: Positive for cough. Negative for sputum production and shortness of breath.    Cardiovascular: Negative for chest pain, palpitations and leg swelling.   Gastrointestinal: Positive for abdominal pain (unchanged today) and diarrhea (becoming more solid). Negative for nausea and vomiting.   Genitourinary: Negative for dysuria.   Musculoskeletal: Positive for falls. Negative for myalgias and neck pain.   Skin: Negative for itching.   Neurological: Positive for weakness (generalized). Negative for dizziness.   Endo/Heme/Allergies: Does not bruise/bleed easily.   Psychiatric/Behavioral: Negative for depression and suicidal ideas.   All other systems reviewed and are negative.     Physical Exam  Laboratory/Imaging   Hemodynamics  Temp (24hrs), Av.6 °C (97.8 °F), Min:36.3 °C (97.3 °F), Max:36.7 °C (98.1 °F)   Temperature: 36.3 °C (97.3 °F)  Pulse  Av.2  Min: 67  Max: 102    Blood Pressure : 134/47      Respiratory      Respiration: 18, Pulse Oximetry: 96 %        RUL Breath Sounds: Clear, RML Breath Sounds: Clear;Diminished, RLL Breath Sounds: Clear;Diminished, SHANTANU Breath Sounds: Clear, LLL Breath Sounds: Clear;Diminished    Fluids    Intake/Output Summary (Last 24 hours) at 18 8961  Last  data filed at 01/19/18 1400   Gross per 24 hour   Intake             1080 ml   Output                0 ml   Net             1080 ml       Nutrition  Orders Placed This Encounter   Procedures   • DIET ORDER     Standing Status:   Standing     Number of Occurrences:   1     Order Specific Question:   Diet:     Answer:   Low Fiber(GI Soft) [2]     Comments:   no chicken     Physical Exam   Constitutional: She is oriented to person, place, and time. She appears well-developed and well-nourished.   Sitting upright eating dinner   HENT:   Head: Normocephalic and atraumatic.   No sinus ttp appreciated   Eyes: Pupils are equal, round, and reactive to light. Right eye exhibits no discharge. Left eye exhibits no discharge.   Neck: Normal range of motion. Neck supple.   Cardiovascular: Normal rate and regular rhythm.    Pulmonary/Chest: Effort normal and breath sounds normal. No stridor. No respiratory distress. She has no wheezes. She has no rales.   Abdominal: Soft. Bowel sounds are normal. There is no tenderness. There is no rebound and no guarding.   Musculoskeletal: She exhibits no edema or tenderness.   Neurological: She is alert and oriented to person, place, and time.   Skin: Skin is warm. No erythema. There is pallor.   No appreciable skin breakdown anywhere in her body, same again today   Psychiatric: She has a normal mood and affect.       Recent Labs      01/17/18   0515  01/18/18   0234  01/19/18   0533   WBC  20.5*  21.3*  21.0*   RBC  3.00*  2.82*  2.78*   HEMOGLOBIN  8.9*  8.4*  8.4*   HEMATOCRIT  27.1*  25.3*  25.2*   MCV  90.3  89.7  90.6   MCH  29.7  29.8  30.2   MCHC  32.8*  33.2*  33.3*   RDW  50.6*  50.2*  52.1*   PLATELETCT  440  481*  478*   MPV  8.8*  8.7*  8.5*     Recent Labs      01/17/18   0515  01/19/18   0533   SODIUM  132*  135   POTASSIUM  3.1*  4.0   CHLORIDE  110  113*   CO2  16*  18*   GLUCOSE  89  83   BUN  22  17   CREATININE  0.94  0.85   CALCIUM  7.8*  8.1*                       Assessment/Plan     * C. difficile colitis- (present on admission)   Assessment & Plan    -stool is becoming more solid, no nausea, and no change in belly pain today, monitor closely  -C diff resistant to oral vanco, if worsens tomorrow, will get repeat CTAP with GI consult, fecal transplant?  -Given this is her 4th or 5th time having C. diff colitis she will need a two-week course occurring straightened and a 4 week taper for total 6 weeks        Dehydration- (present on admission)   Assessment & Plan    On IV fluids. Improving        Leukocytosis- (present on admission)   Assessment & Plan    -same today, remains afebrile, CXR clear, no change in abx's for now, ID is following, very hesitant to add any additional abx's given her severe C diff and high recurrence rates  -trend closely, see above        Weakness- (present on admission)   Assessment & Plan    Secondary to above  -Will need rehabilitation and skilled nursing facility        H/O shoulder surgery- (present on admission)   Assessment & Plan    PT/OT evaluation, [facility] as accepted the patient but waiting for isolated bed          Anemia- (present on admission)   Assessment & Plan    Monitor H&H.        Hyponatremia- (present on admission)   Assessment & Plan    This is likely secondary to dehydration. IV fluids with normal saline. Resolved.             Reviewed items::  Labs reviewed, Medications reviewed and Radiology images reviewed  Rao catheter::  No Rao  DVT prophylaxis - mechanical:  SCDs  Antibiotics:  Treating active infection/contamination beyond 24 hours perioperative coverage

## 2018-01-20 NOTE — CARE PLAN
Problem: Safety  Goal: Will remain free from injury  Outcome: PROGRESSING AS EXPECTED  Hourly rounding.  Non-skid socks. Bed locked & in low position. Personal belongings within reach. .      Problem: Skin Integrity  Goal: Risk for impaired skin integrity will decrease  Outcome: PROGRESSING AS EXPECTED  Turned & repositioned every 2 hours,kept dry and clean. place pillows on bony prominences to prevent skin breakdown.

## 2018-01-20 NOTE — PROGRESS NOTES
Infectious Disease Progress Note    Author: Sarah Celeste M.D. Date & Time of service: 2018  2:14 PM    Chief Complaint:  FU persistent leukocytosis and Cdiff    Interval History:   AF, WBC 21, has a cough but no shortness of breath, nausea and emesis x 1 yesterday, overall abd pain and diarrhea improved   AF WBC 19.2 only one BM, mucus Tolerating PO vanco well-shoulder still sore  Labs Reviewed, Medications Reviewed, Radiology Reviewed and Wound Reviewed.    Review of Systems:  Review of Systems   Constitutional: Negative for chills and fever.   Respiratory: Positive for cough. Negative for shortness of breath.         Decreased   Gastrointestinal: Positive for diarrhea. Negative for abdominal pain, nausea and vomiting.        Decreased   Musculoskeletal: Positive for joint pain.       Hemodynamics:  Temp (24hrs), Av.5 °C (97.7 °F), Min:36.4 °C (97.5 °F), Max:36.7 °C (98.1 °F)  Temperature: 36.4 °C (97.5 °F)  Pulse  Av.2  Min: 66  Max: 102   Blood Pressure : 112/42       Physical Exam:  Physical Exam   Constitutional: She is oriented to person, place, and time. She appears well-developed. No distress.   Elderly   HENT:   Head: Normocephalic and atraumatic.   Mouth/Throat: No oropharyngeal exudate.   Eyes: EOM are normal. Pupils are equal, round, and reactive to light. No scleral icterus.   Neck: Neck supple.   Cardiovascular: Normal rate and regular rhythm.    Pulmonary/Chest: Effort normal. No respiratory distress. She has no wheezes.   Abdominal: Soft. Bowel sounds are normal. She exhibits no distension. There is no tenderness. There is no rebound and no guarding.   Musculoskeletal: She exhibits no edema.   R shoulder surgical dressed  RUE in sling  Bilateral hand arthritic deformities   Neurological: She is alert and oriented to person, place, and time.   Skin: No rash noted.   Nursing note and vitals reviewed.      Meds:    Current Facility-Administered Medications:   •  naproxen  •   mag hydrox-al hydrox-simeth  •  famotidine  •  [START ON 1/26/2018] vancomycin 50 mg/ml  •  [START ON 2/3/2018] vancomycin 50 mg/ml  •  [START ON 2/11/2018] vancomycin 50 mg/ml  •  [START ON 2/20/2018] vancomycin 50 mg/ml  •  citalopram  •  hydrocodone/acetaminophen  •  levothyroxine  •  lactobacillus granules  •  trazodone  •  Respiratory Care per Protocol  •  NS  •  enoxaparin  •  acetaminophen  •  Notify provider if pain remains uncontrolled **AND** Use the numeric rating scale (NRS-11) on regular floors and Critical-Care Pain Observation Tool (CPOT) on ICUs/Trauma to assess pain **AND** Pulse Ox (Oximetry) **AND** Pharmacy Consult Request **AND** If patient difficult to arouse and/or has respiratory depression, stop any opiates that are currently infusing and call a Rapid Response. **AND** oxycodone immediate-release **AND** oxycodone immediate-release **AND** morphine injection  •  ondansetron  •  ondansetron  •  vancomycin 50 mg/ml  •  artificial tears    Labs:  Recent Labs      01/18/18   0234  01/19/18   0533  01/20/18   0451   WBC  21.3*  21.0*  19.2*   RBC  2.82*  2.78*  2.84*   HEMOGLOBIN  8.4*  8.4*  8.6*   HEMATOCRIT  25.3*  25.2*  25.5*   MCV  89.7  90.6  89.8   MCH  29.8  30.2  30.3   RDW  50.2*  52.1*  52.6*   PLATELETCT  481*  478*  500*   MPV  8.7*  8.5*  8.6*   NEUTSPOLYS  66.00  63.00  73.00*   LYMPHOCYTES  11.00*  17.00*  15.00*   MONOCYTES  7.00  3.00  1.00   EOSINOPHILS  4.00  4.00  2.00   BASOPHILS  0.00  2.00*  0.00   RBCMORPHOLO  Present  Present  Present     Recent Labs      01/19/18   0533  01/20/18   0451   SODIUM  135  136   POTASSIUM  4.0  4.6   CHLORIDE  113*  115*   CO2  18*  18*   GLUCOSE  83  80   BUN  17  17     Recent Labs      01/19/18   0533  01/20/18   0451   CREATININE  0.85  0.96       Imaging:  Ct-renal Colic Evaluation(a/p W/o)    Result Date: 1/12/2018 1/12/2018 5:57 PM HISTORY/REASON FOR EXAM:  WEAKNESS DIARRHEA. TECHNIQUE/EXAM DESCRIPTION AND NUMBER OF VIEWS: CT scan  renal/colic without contrast. 5 mm helical images of the abdomen and pelvis were obtained from the diaphragmatic domes through the pubic symphysis. Low dose optimization technique was utilized for this CT exam including automated exposure control and adjustment of the mA and/or kV according to patient size. COMPARISON: None. FINDINGS: Visualized lung bases show mild dependent atelectasis.  Minimal RIGHT pleural fluid. Calcified nodule in the RIGHT middle lobe and lingula. The liver is unremarkable. The spleen is unremarkable. Adrenal glands are unremarkable. Probable LEFT kidney cyst measuring 3.6 cm. RIGHT kidney is unremarkable. Pancreas is atrophic. Bladder is absent. Bladder is unremarkable. Diffuse colonic wall thickening. Appendix has a normal appearance. No peritoneal fluid or pneumoperitoneum. Uterus is absent. Abdominal aorta shows diffuse atherosclerotic changes. Degenerative change of lumbar spine and hips.  Severe compression of L1 vertebral body with burst component, likely chronic. Chronic LEFT obturator ring fractures.     1.  Diffuse colonic wall thickening suggesting inflammatory or infectious colitis. 2.  No evidence of bowel obstruction or perforation. 3.  Minimal RIGHT pleural effusion with associated atelectasis. 4.  Probable chronic burst fracture of L1 with severe loss of height.    Dx-chest-portable (1 View)    Result Date: 1/12/2018 1/12/2018 5:13 PM HISTORY/REASON FOR EXAM:  Shortness of breath. TECHNIQUE/EXAM DESCRIPTION AND NUMBER OF VIEWS: Single portable view of the chest. COMPARISON: November 11, 2017 FINDINGS: Heart size is within normal limits.  A calcified granuloma once again noted medially in the right lung base. No pulmonary infiltrates or consolidations are noted. No pleural abnormalities are noted. Patient is status post right shoulder replacement surgery with surgical skin staples identified. There is degenerative change in the left shoulder.     No acute cardiac or pulmonary  "abnormalities are identified.    Dx-shoulder 1 View Right    Result Date: 1/9/2018 1/9/2018 11:59 AM HISTORY/REASON FOR EXAM: Right shoulder pain. TECHNIQUE/EXAM DESCRIPTION AND NUMBER OF VIEWS:  1 views of the RIGHT shoulder. COMPARISON: None FINDINGS: Bone mineralization is normal. There are surgical changes consistent with right shoulder reverse arthroplasty. There are surrounding bone fragments and gas within the soft tissues.     Post right shoulder reverse arthroplasty.      Micro:  Results     Procedure Component Value Units Date/Time    BLOOD CULTURE [[id number]] Collected:  01/12/18 1850    Order Status:  Completed Specimen:  Blood from Peripheral Updated:  01/17/18 2217     Significant Indicator NEG     Source BLD     Site PERIPHERAL     Blood Culture --     Blood culture testing and Gram stain, if indicated, are  performed at [facility], Formerly named [facility]  [employer] [facility].[place], [place].  Positive blood cultures are  sent to [facility], [address] [place] [address], for organism identification and  susceptibility testing.  No growth after 5 days of incubation.      Narrative:       Special Contact Isolation  Per Hospital Policy: Only change Specimen Src: to \"Line\" if  specified by physician order.  Patient difficult stick; short sample    BLOOD CULTURE [[id number]] Collected:  01/12/18 1900    Order Status:  Completed Specimen:  Blood from Peripheral Updated:  01/17/18 2217     Significant Indicator NEG     Source BLD     Site PERIPHERAL     Blood Culture --     Blood culture testing and Gram stain, if indicated, are  performed at [facility], Formerly named [facility]  [employer] [facility].[place], [place].  Positive blood cultures are  sent to [facility], [address] [place] [address], for organism identification and  susceptibility testing.  No growth after 5 days of incubation.      Narrative:       Special Contact Isolation  Per Hospital Policy: " "Only change Specimen Src: to \"Line\" if  specified by physician order.  Patient difficult stick; short sample    CULTURE STOOL [[id number]] Collected:  01/12/18 1715    Order Status:  Completed Specimen:  Stool from Stool Updated:  01/16/18 1058     [facility] Negative for Shiga Toxin 1 and 2.     Significant Indicator NEG     Source STL     Site STOOL     Culture Result Stool --     No enteric pathogens isolated.  NOTE:  Stool cultures are screened for Shiga Toxins 1 and 2,  Salmonella, Shigella, Campylobacter, Aeromonas,  Plesiomonas, and Vibrio.      Narrative:       Previous comment was modified by KEYLA at 18:36 on 01/13/18.  Special Contact Isolation  Does this patient have risk factors for C-diff?->Yes  Has patient taken stool softeners or laxatives in the last 5  days?->No  notified unit clerk cluadia need more stool 01/12/2018  17:18  notified rn larry need more stool fro comp. OP 01/13/2018  17:25  Special Contact Isolation  Does this patient have risk factors for C-diff?->Yes  Has patient taken stool softeners or laxatives in the last 5  days?->No    COMPLETE O&P [[id number]] Collected:  01/14/18 0540    Order Status:  Completed Specimen:  Stool Updated:  01/16/18 0836     Significant Indicator NEG     Source STL     Site --     Ova And Parasites --     No Ova or Parasites seen.  NOTE:  Ova and parasite exam of stool is generally not performed on  patients hospitalized for >3 days unless nosocomial  transmission of parasitic agents is suspected.  Screening for Cryptosporidium is not included in routine Ova  and Parasite examination.      Narrative:       Special Contact Isolation  Has the patient been out of the country recently?->No  Is the patient immuno-compromised?->Yes  ** recent surgery  notified unit clerk callydia need more stool 01/12/2018  17:18  notified rn larry need more stool fro comp. OP 01/13/2018  17:25    [facility](SIGA TOXIN)DETECTION [[id number]] Collected:  01/12/18 1715    Order Status:  Completed " Specimen:  Stool Updated:  01/14/18 1511     Significant Indicator NEG     Source STL     Site STOOL     [facility] Negative for Shiga Toxin 1 and 2.    Narrative:       Previous comment was modified by KEYLA at 18:36 on 01/13/18.  Special Contact Isolation  Does this patient have risk factors for C-diff?->Yes  Has patient taken stool softeners or laxatives in the last 5  days?->No  notified unit clerk jean carlos need more stool 01/12/2018  17:18  notified liz juarez need more stool fro comp. OP 01/13/2018  17:25  Special Contact Isolation  Does this patient have risk factors for C-diff?->Yes  Has patient taken stool softeners or laxatives in the last 5  days?->No          Assessment:  Active Hospital Problems    Diagnosis   • *C. difficile colitis [A04.72]   • Dehydration [E86.0]   • Leukocytosis [D72.829]   • Anemia [D64.9]   • H/O shoulder surgery [Z98.890]   • Weakness [R53.1]   • Hyponatremia [E87.1]       Plan:  Clostridium difficile colitis  Afebrile  Leukocytosis, persistent  H/o CDI s/p fecal transplant in 2015  Decreased frequency of stools and decreased abd pain  CT +colitis  Continue oral vanco followed by prolonged vanco taper  Repeat CT a/p if worsening symptoms and increasing leukocytosis    Leukocytosis - persistent  2/2 above  On abx above  CXR neg  Blood cxs neg    Cough  Bronchitis   Encourage SMI  Repeat CXR -no PNA        Discussed with internal medicine/Dr Moreno and RN

## 2018-01-20 NOTE — PROGRESS NOTES
Alert and oriented  to person,place and time.On room air. Denied pain at this time.Due medication given per MAR. States understanding of POC.

## 2018-01-21 LAB
ANISOCYTOSIS BLD QL SMEAR: ABNORMAL
BASOPHILS # BLD AUTO: 1 % (ref 0–1.8)
BASOPHILS # BLD: 0.18 K/UL (ref 0–0.12)
EOSINOPHIL # BLD AUTO: 0.18 K/UL (ref 0–0.51)
EOSINOPHIL NFR BLD: 1 % (ref 0–6.9)
ERYTHROCYTE [DISTWIDTH] IN BLOOD BY AUTOMATED COUNT: 55.1 FL (ref 35.9–50)
HCT VFR BLD AUTO: 23.4 % (ref 37–47)
HGB BLD-MCNC: 7.7 G/DL (ref 12–16)
LYMPHOCYTES # BLD AUTO: 4.34 K/UL (ref 1–4.8)
LYMPHOCYTES NFR BLD: 24 % (ref 22–41)
MACROCYTES BLD QL SMEAR: ABNORMAL
MANUAL DIFF BLD: NORMAL
MCH RBC QN AUTO: 30.4 PG (ref 27–33)
MCHC RBC AUTO-ENTMCNC: 32.9 G/DL (ref 33.6–35)
MCV RBC AUTO: 92.5 FL (ref 81.4–97.8)
METAMYELOCYTES NFR BLD MANUAL: 1 %
MONOCYTES # BLD AUTO: 0.54 K/UL (ref 0–0.85)
MONOCYTES NFR BLD AUTO: 3 % (ref 0–13.4)
NEUTROPHILS # BLD AUTO: 12.67 K/UL (ref 2–7.15)
NEUTROPHILS NFR BLD: 63 % (ref 44–72)
NEUTS BAND NFR BLD MANUAL: 7 % (ref 0–10)
NRBC # BLD AUTO: 0 K/UL
NRBC BLD-RTO: 0 /100 WBC
PLATELET # BLD AUTO: 465 K/UL (ref 164–446)
PMV BLD AUTO: 8.9 FL (ref 9–12.9)
POLYCHROMASIA BLD QL SMEAR: NORMAL
RBC # BLD AUTO: 2.53 M/UL (ref 4.2–5.4)
RBC BLD AUTO: PRESENT
SMUDGE CELLS BLD QL SMEAR: NORMAL
TOXIC GRANULES BLD QL SMEAR: SLIGHT
WBC # BLD AUTO: 18.1 K/UL (ref 4.8–10.8)

## 2018-01-21 PROCEDURE — 770006 HCHG ROOM/CARE - MED/SURG/GYN SEMI*

## 2018-01-21 PROCEDURE — 97110 THERAPEUTIC EXERCISES: CPT

## 2018-01-21 PROCEDURE — 700102 HCHG RX REV CODE 250 W/ 637 OVERRIDE(OP): Performed by: INTERNAL MEDICINE

## 2018-01-21 PROCEDURE — A9270 NON-COVERED ITEM OR SERVICE: HCPCS | Performed by: INTERNAL MEDICINE

## 2018-01-21 PROCEDURE — 85027 COMPLETE CBC AUTOMATED: CPT

## 2018-01-21 PROCEDURE — 97116 GAIT TRAINING THERAPY: CPT

## 2018-01-21 PROCEDURE — 700111 HCHG RX REV CODE 636 W/ 250 OVERRIDE (IP): Performed by: INTERNAL MEDICINE

## 2018-01-21 PROCEDURE — 85007 BL SMEAR W/DIFF WBC COUNT: CPT

## 2018-01-21 PROCEDURE — 99232 SBSQ HOSP IP/OBS MODERATE 35: CPT | Performed by: INTERNAL MEDICINE

## 2018-01-21 RX ORDER — FUROSEMIDE 20 MG/1
20 TABLET ORAL
Status: DISCONTINUED | OUTPATIENT
Start: 2018-01-21 | End: 2018-01-22 | Stop reason: HOSPADM

## 2018-01-21 RX ADMIN — LEVOTHYROXINE SODIUM 112 MCG: 112 TABLET ORAL at 05:33

## 2018-01-21 RX ADMIN — VANCOMYCIN 125 MG: KIT at 12:38

## 2018-01-21 RX ADMIN — VANCOMYCIN 125 MG: KIT at 18:03

## 2018-01-21 RX ADMIN — FAMOTIDINE 20 MG: 20 TABLET, FILM COATED ORAL at 20:29

## 2018-01-21 RX ADMIN — TRAZODONE HYDROCHLORIDE 50 MG: 50 TABLET ORAL at 20:29

## 2018-01-21 RX ADMIN — FUROSEMIDE 20 MG: 20 TABLET ORAL at 12:38

## 2018-01-21 RX ADMIN — VANCOMYCIN 125 MG: KIT at 05:33

## 2018-01-21 RX ADMIN — ENOXAPARIN SODIUM 40 MG: 100 INJECTION SUBCUTANEOUS at 09:58

## 2018-01-21 RX ADMIN — NAPROXEN 250 MG: 250 TABLET ORAL at 20:29

## 2018-01-21 RX ADMIN — NAPROXEN 250 MG: 250 TABLET ORAL at 09:58

## 2018-01-21 RX ADMIN — LACTOBACILLUS ACIDOPHILUS / LACTOBACILLUS BULGARICUS 1 PACKET: 100 MILLION CFU STRENGTH GRANULES at 09:58

## 2018-01-21 RX ADMIN — CITALOPRAM HYDROBROMIDE 20 MG: 20 TABLET ORAL at 20:29

## 2018-01-21 ASSESSMENT — ENCOUNTER SYMPTOMS
FALLS: 1
COUGH: 1
MYALGIAS: 0
DIZZINESS: 0
HEMOPTYSIS: 0
FEVER: 0
BRUISES/BLEEDS EASILY: 0
PALPITATIONS: 0
ABDOMINAL PAIN: 0
DIARRHEA: 1
VOMITING: 0
NAUSEA: 0
DOUBLE VISION: 0
SHORTNESS OF BREATH: 0
COUGH: 0
ROS GI COMMENTS: DECREASED
CHILLS: 0
WEAKNESS: 1

## 2018-01-21 ASSESSMENT — GAIT ASSESSMENTS
DISTANCE (FEET): 50
DEVIATION: ATAXIC;SHUFFLED GAIT;BRADYKINETIC
ASSISTIVE DEVICE: HAND HELD ASSIST
GAIT LEVEL OF ASSIST: MODERATE ASSIST

## 2018-01-21 ASSESSMENT — PAIN SCALES - GENERAL
PAINLEVEL_OUTOF10: 3
PAINLEVEL_OUTOF10: 3

## 2018-01-21 NOTE — PROGRESS NOTES
[facility] Hospitalist Progress Note    Date of Service: 2018    Chief Complaint  82 y.o. female admitted 2018 with C. diff colitis.    Interval Problem Update  C. diff colitis-diarrhea is a little better today, no abdominal pain, eating food without issue. Remains afebrile.      WBC-continues to decrease.    Consultants/Specialty  ID    Disposition  Skilled nursing facility        Review of Systems   Constitutional: Negative for fever.   HENT: Negative for hearing loss and tinnitus.    Eyes: Negative for double vision.   Respiratory: Negative for cough and hemoptysis.    Cardiovascular: Negative for palpitations and leg swelling.   Gastrointestinal: Positive for diarrhea (a little more solid today). Negative for abdominal pain.   Genitourinary: Negative for dysuria and urgency.   Musculoskeletal: Positive for falls. Negative for myalgias.   Skin: Negative for itching.   Neurological: Positive for weakness (slow steady improvement). Negative for dizziness.   Endo/Heme/Allergies: Does not bruise/bleed easily.   Psychiatric/Behavioral: Negative for suicidal ideas.   All other systems reviewed and are negative.     Physical Exam  Laboratory/Imaging   Hemodynamics  Temp (24hrs), Av.5 °C (97.7 °F), Min:36.3 °C (97.3 °F), Max:36.8 °C (98.3 °F)   Temperature: 36.4 °C (97.5 °F)  Pulse  Av.9  Min: 66  Max: 102    Blood Pressure : 112/52      Respiratory      Respiration: 18, Pulse Oximetry: 100 %        RUL Breath Sounds: Clear, RML Breath Sounds: Clear;Diminished, RLL Breath Sounds: Clear;Diminished, SHANTANU Breath Sounds: Clear, LLL Breath Sounds: Clear;Diminished    Fluids    Intake/Output Summary (Last 24 hours) at 18 1536  Last data filed at 18 1800   Gross per 24 hour   Intake              480 ml   Output                0 ml   Net              480 ml       Nutrition  Orders Placed This Encounter   Procedures   • DIET ORDER     Standing Status:   Standing     Number of Occurrences:   1     Order  Specific Question:   Diet:     Answer:   Low Fiber(GI Soft) [2]     Comments:   no chicken     Physical Exam   Constitutional: She is oriented to person, place, and time. She appears well-developed and well-nourished.   Sitting upright eating lunch, appears comfortable   HENT:   Head: Normocephalic and atraumatic.   Eyes: EOM are normal. Pupils are equal, round, and reactive to light. Right eye exhibits no discharge. Left eye exhibits no discharge.   Neck: Normal range of motion. Neck supple.   Cardiovascular: Normal rate and regular rhythm.    Pulmonary/Chest: Effort normal and breath sounds normal. No stridor. She has no wheezes. She has no rales.   Abdominal: Soft. Bowel sounds are normal. There is no tenderness.   Musculoskeletal: She exhibits no edema.   Neurological: She is alert and oriented to person, place, and time.   Skin: Skin is warm. No erythema. There is pallor.   No appreciable skin breakdown anywhere in her body, same again today   Psychiatric: She has a normal mood and affect.       Recent Labs      01/19/18   0533  01/20/18   0451  01/21/18   0435   WBC  21.0*  19.2*  18.1*   RBC  2.78*  2.84*  2.53*   HEMOGLOBIN  8.4*  8.6*  7.7*   HEMATOCRIT  25.2*  25.5*  23.4*   MCV  90.6  89.8  92.5   MCH  30.2  30.3  30.4   MCHC  33.3*  33.7  32.9*   RDW  52.1*  52.6*  55.1*   PLATELETCT  478*  500*  465*   MPV  8.5*  8.6*  8.9*     Recent Labs      01/19/18   0533  01/20/18   0451   SODIUM  135  136   POTASSIUM  4.0  4.6   CHLORIDE  113*  115*   CO2  18*  18*   GLUCOSE  83  80   BUN  17  17   CREATININE  0.85  0.96   CALCIUM  8.1*  8.2*                      Assessment/Plan     * C. difficile colitis- (present on admission)   Assessment & Plan    -stool remains about the same today, no nausea, and no change in belly pain today, monitor closely  -Given this is her 4th or 5th time having C. diff colitis she will need a two-week course occurring straightened and a 4 week taper for total 6 weeks         Dehydration- (present on admission)   Assessment & Plan    On IV fluids. Improving        Leukocytosis- (present on admission)   Assessment & Plan    -continues to down-trend, no e/o of any infection elsewhere, continue oral vanco, very hesitant to add additional abx's given severity of her C diff  -trend closely, see above, if continues to down-trend, can likely go to SNF in 24-48 hours        Weakness- (present on admission)   Assessment & Plan    Secondary to above  -Will need rehabilitation and skilled nursing facility        H/O shoulder surgery- (present on admission)   Assessment & Plan    PT/OT evaluation, [facility] the patient but waiting for isolated bed          Anemia- (present on admission)   Assessment & Plan    Monitor H&H.        Hyponatremia- (present on admission)   Assessment & Plan    This is likely secondary to dehydration. IV fluids with normal saline. Resolved.             Reviewed items::  Labs reviewed, Medications reviewed and Radiology images reviewed  Rao catheter::  No Rao  DVT prophylaxis - mechanical:  SCDs  Antibiotics:  Treating active infection/contamination beyond 24 hours perioperative coverage

## 2018-01-21 NOTE — PROGRESS NOTES
Infectious Disease Progress Note    Author: Sarha Celeste M.D. Date & Time of service: 2018  10:10 AM    Chief Complaint:  FU persistent leukocytosis and Cdiff    Interval History:   AF, WBC 21, has a cough but no shortness of breath, nausea and emesis x 1 yesterday, overall abd pain and diarrhea improved   AF WBC 19.2 only one BM, mucus Tolerating PO vanco well-shoulder still sore   AF WBC 18.1 3 BM yesterday-no abd pain. Eating OK  Labs Reviewed, Medications Reviewed, Radiology Reviewed and Wound Reviewed.    Review of Systems:  Review of Systems   Constitutional: Negative for chills and fever.   Respiratory: Positive for cough. Negative for shortness of breath.         Decreased   Gastrointestinal: Positive for diarrhea. Negative for abdominal pain, nausea and vomiting.        Decreased   Musculoskeletal: Positive for joint pain.       Hemodynamics:  Temp (24hrs), Av.5 °C (97.7 °F), Min:36.3 °C (97.3 °F), Max:36.8 °C (98.3 °F)  Temperature: 36.4 °C (97.6 °F)  Pulse  Av.1  Min: 66  Max: 102   Blood Pressure : 132/52       Physical Exam:  Physical Exam   Constitutional: She is oriented to person, place, and time. She appears well-developed. No distress.   Elderly   HENT:   Head: Normocephalic and atraumatic.   Mouth/Throat: No oropharyngeal exudate.   Eyes: EOM are normal. Pupils are equal, round, and reactive to light. No scleral icterus.   Neck: Neck supple.   Cardiovascular: Normal rate and regular rhythm.    Pulmonary/Chest: Effort normal. No respiratory distress. She has no wheezes.   Abdominal: Soft. Bowel sounds are normal. She exhibits no distension. There is no tenderness. There is no rebound and no guarding.   Musculoskeletal: She exhibits no edema.   R shoulder surgical dressed  RUE in sling  Bilateral hand arthritic deformities   Neurological: She is alert and oriented to person, place, and time.   Skin: No rash noted.   Nursing note and vitals  reviewed.      Meds:    Current Facility-Administered Medications:   •  naproxen  •  mag hydrox-al hydrox-simeth  •  famotidine  •  [START ON 1/26/2018] vancomycin 50 mg/ml  •  [START ON 2/3/2018] vancomycin 50 mg/ml  •  [START ON 2/11/2018] vancomycin 50 mg/ml  •  [START ON 2/20/2018] vancomycin 50 mg/ml  •  citalopram  •  hydrocodone/acetaminophen  •  levothyroxine  •  lactobacillus granules  •  trazodone  •  Respiratory Care per Protocol  •  NS  •  enoxaparin  •  acetaminophen  •  Notify provider if pain remains uncontrolled **AND** Use the numeric rating scale (NRS-11) on regular floors and Critical-Care Pain Observation Tool (CPOT) on ICUs/Trauma to assess pain **AND** Pulse Ox (Oximetry) **AND** Pharmacy Consult Request **AND** If patient difficult to arouse and/or has respiratory depression, stop any opiates that are currently infusing and call a Rapid Response. **AND** oxycodone immediate-release **AND** oxycodone immediate-release **AND** morphine injection  •  ondansetron  •  ondansetron  •  vancomycin 50 mg/ml  •  artificial tears    Labs:  Recent Labs      01/19/18   0533  01/20/18 0451  01/21/18   0435   WBC  21.0*  19.2*  18.1*   RBC  2.78*  2.84*  2.53*   HEMOGLOBIN  8.4*  8.6*  7.7*   HEMATOCRIT  25.2*  25.5*  23.4*   MCV  90.6  89.8  92.5   MCH  30.2  30.3  30.4   RDW  52.1*  52.6*  55.1*   PLATELETCT  478*  500*  465*   MPV  8.5*  8.6*  8.9*   NEUTSPOLYS  63.00  73.00*  63.00   LYMPHOCYTES  17.00*  15.00*  24.00   MONOCYTES  3.00  1.00  3.00   EOSINOPHILS  4.00  2.00  1.00   BASOPHILS  2.00*  0.00  1.00   RBCMORPHOLO  Present  Present  Present     Recent Labs      01/19/18   0533  01/20/18   0451   SODIUM  135  136   POTASSIUM  4.0  4.6   CHLORIDE  113*  115*   CO2  18*  18*   GLUCOSE  83  80   BUN  17  17     Recent Labs      01/19/18   0533  01/20/18   0451   CREATININE  0.85  0.96       Imaging:  Ct-renal Colic Evaluation(a/p W/o)    Result Date: 1/12/2018 1/12/2018 5:57 PM HISTORY/REASON FOR  EXAM:  WEAKNESS DIARRHEA. TECHNIQUE/EXAM DESCRIPTION AND NUMBER OF VIEWS: CT scan renal/colic without contrast. 5 mm helical images of the abdomen and pelvis were obtained from the diaphragmatic domes through the pubic symphysis. Low dose optimization technique was utilized for this CT exam including automated exposure control and adjustment of the mA and/or kV according to patient size. COMPARISON: None. FINDINGS: Visualized lung bases show mild dependent atelectasis.  Minimal RIGHT pleural fluid. Calcified nodule in the RIGHT middle lobe and lingula. The liver is unremarkable. The spleen is unremarkable. Adrenal glands are unremarkable. Probable LEFT kidney cyst measuring 3.6 cm. RIGHT kidney is unremarkable. Pancreas is atrophic. Bladder is absent. Bladder is unremarkable. Diffuse colonic wall thickening. Appendix has a normal appearance. No peritoneal fluid or pneumoperitoneum. Uterus is absent. Abdominal aorta shows diffuse atherosclerotic changes. Degenerative change of lumbar spine and hips.  Severe compression of L1 vertebral body with burst component, likely chronic. Chronic LEFT obturator ring fractures.     1.  Diffuse colonic wall thickening suggesting inflammatory or infectious colitis. 2.  No evidence of bowel obstruction or perforation. 3.  Minimal RIGHT pleural effusion with associated atelectasis. 4.  Probable chronic burst fracture of L1 with severe loss of height.    Dx-chest-portable (1 View)    Result Date: 1/12/2018 1/12/2018 5:13 PM HISTORY/REASON FOR EXAM:  Shortness of breath. TECHNIQUE/EXAM DESCRIPTION AND NUMBER OF VIEWS: Single portable view of the chest. COMPARISON: November 11, 2017 FINDINGS: Heart size is within normal limits.  A calcified granuloma once again noted medially in the right lung base. No pulmonary infiltrates or consolidations are noted. No pleural abnormalities are noted. Patient is status post right shoulder replacement surgery with surgical skin staples identified.  "There is degenerative change in the left shoulder.     No acute cardiac or pulmonary abnormalities are identified.    Dx-shoulder 1 View Right    Result Date: 1/9/2018 1/9/2018 11:59 AM HISTORY/REASON FOR EXAM: Right shoulder pain. TECHNIQUE/EXAM DESCRIPTION AND NUMBER OF VIEWS:  1 views of the RIGHT shoulder. COMPARISON: None FINDINGS: Bone mineralization is normal. There are surgical changes consistent with right shoulder reverse arthroplasty. There are surrounding bone fragments and gas within the soft tissues.     Post right shoulder reverse arthroplasty.      Micro:  Results     Procedure Component Value Units Date/Time    BLOOD CULTURE [[id number]] Collected:  01/12/18 1850    Order Status:  Completed Specimen:  Blood from Peripheral Updated:  01/17/18 2217     Significant Indicator NEG     Source BLD     Site PERIPHERAL     Blood Culture --     Blood culture testing and Gram stain, if indicated, are  performed at [facility], [place]  [facility].[place], [place].  Positive blood cultures are  sent to [facility], [address] [place] [address], for organism identification and  susceptibility testing.  No growth after 5 days of incubation.      Narrative:       Special Contact Isolation  Per Hospital Policy: Only change Specimen Src: to \"Line\" if  specified by physician order.  Patient difficult stick; short sample    BLOOD CULTURE [[id number]] Collected:  01/12/18 1900    Order Status:  Completed Specimen:  Blood from Peripheral Updated:  01/17/18 2217     Significant Indicator NEG     Source BLD     Site PERIPHERAL     Blood Culture --     Blood culture testing and Gram stain, if indicated, are  performed at [facility], [place]  [employer].[place], [place].  Positive blood cultures are  sent to [facility], [address] [place] [address], for organism identification and  susceptibility testing.  No growth after 5 days of " "incubation.      Narrative:       Special Contact Isolation  Per Hospital Policy: Only change Specimen Src: to \"Line\" if  specified by physician order.  Patient difficult stick; short sample    CULTURE STOOL [[id number]] Collected:  01/12/18 1715    Order Status:  Completed Specimen:  Stool from Stool Updated:  01/16/18 1058     [facility] Negative for Shiga Toxin 1 and 2.     Significant Indicator NEG     Source STL     Site STOOL     Culture Result Stool --     No enteric pathogens isolated.  NOTE:  Stool cultures are screened for Shiga Toxins 1 and 2,  Salmonella, Shigella, Campylobacter, Aeromonas,  Plesiomonas, and Vibrio.      Narrative:       Previous comment was modified by KEYLA at 18:36 on 01/13/18.  Special Contact Isolation  Does this patient have risk factors for C-diff?->Yes  Has patient taken stool softeners or laxatives in the last 5  days?->No  notified unit clerk cluadia need more stool 01/12/2018  17:18  notified rn larry need more stool fro comp. OP 01/13/2018  17:25  Special Contact Isolation  Does this patient have risk factors for C-diff?->Yes  Has patient taken stool softeners or laxatives in the last 5  days?->No    COMPLETE O&P [[id number]] Collected:  01/14/18 0540    Order Status:  Completed Specimen:  Stool Updated:  01/16/18 0836     Significant Indicator NEG     Source STL     Site --     Ova And Parasites --     No Ova or Parasites seen.  NOTE:  Ova and parasite exam of stool is generally not performed on  patients hospitalized for >3 days unless nosocomial  transmission of parasitic agents is suspected.  Screening for Cryptosporidium is not included in routine Ova  and Parasite examination.      Narrative:       Special Contact Isolation  Has the patient been out of the country recently?->No  Is the patient immuno-compromised?->Yes  ** recent surgery  notified unit clerk callydia need more stool 01/12/2018  17:18  notified rn larry need more stool fro comp. OP 01/13/2018  17:25    [facility](SIGA " TOXIN)DETECTION [[id number]] Collected:  01/12/18 2236    Order Status:  Completed Specimen:  Stool Updated:  01/14/18 1511     Significant Indicator NEG     Source STL     Site STOOL     [facility] Negative for Shiga Toxin 1 and 2.    Narrative:       Previous comment was modified by KEYLA at 18:36 on 01/13/18.  Special Contact Isolation  Does this patient have risk factors for C-diff?->Yes  Has patient taken stool softeners or laxatives in the last 5  days?->No  notified unit clewendy jean carlos need more stool 01/12/2018  17:18  notified liz francesca need more stool fro comp. OP 01/13/2018  17:25  Special Contact Isolation  Does this patient have risk factors for C-diff?->Yes  Has patient taken stool softeners or laxatives in the last 5  days?->No          Assessment:  Active Hospital Problems    Diagnosis   • *C. difficile colitis [A04.72]   • Dehydration [E86.0]   • Leukocytosis [D72.829]   • Anemia [D64.9]   • H/O shoulder surgery [Z98.890]   • Weakness [R53.1]   • Hyponatremia [E87.1]       Plan:  Clostridium difficile colitis  Afebrile  H/o CDI s/p fecal transplant in 2015  CT +colitis  Continue oral vanco followed by prolonged vanco taper  Repeat CT a/p if worsening symptoms or increasing leukocytosis-slowly improving so far    Leukocytosis - persistent  2/2 above  On abx above  CXR neg  Blood cxs neg    Cough  Bronchitis   Encourage SMI  Repeat CXR -no PNA    Discussed with  RN

## 2018-01-21 NOTE — CARE PLAN
Problem: Safety  Goal: Will remain free from falls  Outcome: PROGRESSING AS EXPECTED  Pt remains free from accidental falls.  Pt uses call light approprietly.  Fall precautions in place: anti slip socks on, bed in low position, call light/personal belongings w/in reach, hourly rounding, bed alarm on.    Problem: Venous Thromboembolism (VTW)/Deep Vein Thrombosis (DVT) Prevention:  Goal: Patient will participate in Venous Thrombosis (VTE)/Deep Vein Thrombosis (DVT)Prevention Measures  Pt receiving daily Lovenox, pt transfers to cardiac chair, pt teaching of active ROM in bed.

## 2018-01-21 NOTE — PROGRESS NOTES
Assessment completed, alert and oriented  to person,place and time.+2 edema BLE, on room air sat 99%.Complained of generalized body pain 4/10 at this time.PRN and due medication given per MAR. States understanding of POC.

## 2018-01-21 NOTE — CARE PLAN
Problem: Safety  Goal: Will remain free from injury  Outcome: PROGRESSING AS EXPECTED  Hourly rounding.  Non-skid socks. Bed locked & in low position. Personal belongings within reach. .      Problem: Pain Management  Goal: Pain level will decrease to patient's comfort goal  Outcome: PROGRESSING AS EXPECTED  Taught pt 0-10 pain scale and  non pharmacological method of pain mgt, encouraged to verbalize when in pain. Administered PRN pain med as needed.

## 2018-01-21 NOTE — THERAPY
"Physical Therapy Treatment completed.   Bed Mobility:  Supine to Sit:  (NT, pt sitting up in chair)  Transfers: Sit to Stand: Minimal Assist  Gait: Level Of Assist: Moderate Assist with hand held assist, unsteady on feet  Plan of Care: Will benefit from Physical Therapy 5 times per week  Discharge Recommendations: Equipment: Will Continue to Assess for Equipment Needs. Post-acute therapy Discharge to a transitional care facility for continued skilled therapy services.     See \"Rehab Therapy-Acute\" Patient Summary Report for complete documentation.       "

## 2018-01-21 NOTE — PROGRESS NOTES
Received bedside [facility] from [facility] nurseJeison. Discussed POC.  Pt resting in bed, semi-fowlers, eyes closed, no s/s of acute distress, personal belongings w/in reach, safety precautions in place, assumed pt care, will monitor.

## 2018-01-22 VITALS
RESPIRATION RATE: 16 BRPM | SYSTOLIC BLOOD PRESSURE: 115 MMHG | TEMPERATURE: 97.4 F | WEIGHT: 134.92 LBS | HEIGHT: 66 IN | HEART RATE: 63 BPM | BODY MASS INDEX: 21.68 KG/M2 | DIASTOLIC BLOOD PRESSURE: 41 MMHG | OXYGEN SATURATION: 97 %

## 2018-01-22 PROBLEM — D72.829 LEUKOCYTOSIS: Status: RESOLVED | Noted: 2018-01-18 | Resolved: 2018-01-22

## 2018-01-22 PROBLEM — D64.9 ANEMIA: Status: RESOLVED | Noted: 2018-01-12 | Resolved: 2018-01-22

## 2018-01-22 PROBLEM — E86.0 DEHYDRATION: Status: RESOLVED | Noted: 2017-11-11 | Resolved: 2018-01-22

## 2018-01-22 LAB
BASOPHILS # BLD AUTO: 0.7 % (ref 0–1.8)
BASOPHILS # BLD: 0.1 K/UL (ref 0–0.12)
EOSINOPHIL # BLD AUTO: 0.51 K/UL (ref 0–0.51)
EOSINOPHIL NFR BLD: 3.7 % (ref 0–6.9)
ERYTHROCYTE [DISTWIDTH] IN BLOOD BY AUTOMATED COUNT: 54.4 FL (ref 35.9–50)
HCT VFR BLD AUTO: 24.8 % (ref 37–47)
HGB BLD-MCNC: 8.2 G/DL (ref 12–16)
IMM GRANULOCYTES # BLD AUTO: 0.48 K/UL (ref 0–0.11)
IMM GRANULOCYTES NFR BLD AUTO: 3.5 % (ref 0–0.9)
LYMPHOCYTES # BLD AUTO: 2.61 K/UL (ref 1–4.8)
LYMPHOCYTES NFR BLD: 18.8 % (ref 22–41)
MCH RBC QN AUTO: 30.4 PG (ref 27–33)
MCHC RBC AUTO-ENTMCNC: 33.1 G/DL (ref 33.6–35)
MCV RBC AUTO: 91.9 FL (ref 81.4–97.8)
MONOCYTES # BLD AUTO: 0.86 K/UL (ref 0–0.85)
MONOCYTES NFR BLD AUTO: 6.2 % (ref 0–13.4)
NEUTROPHILS # BLD AUTO: 9.29 K/UL (ref 2–7.15)
NEUTROPHILS NFR BLD: 67.1 % (ref 44–72)
NRBC # BLD AUTO: 0 K/UL
NRBC BLD-RTO: 0 /100 WBC
PLATELET # BLD AUTO: 495 K/UL (ref 164–446)
PMV BLD AUTO: 8.6 FL (ref 9–12.9)
RBC # BLD AUTO: 2.7 M/UL (ref 4.2–5.4)
WBC # BLD AUTO: 13.9 K/UL (ref 4.8–10.8)

## 2018-01-22 PROCEDURE — 700111 HCHG RX REV CODE 636 W/ 250 OVERRIDE (IP): Performed by: INTERNAL MEDICINE

## 2018-01-22 PROCEDURE — A9270 NON-COVERED ITEM OR SERVICE: HCPCS | Performed by: INTERNAL MEDICINE

## 2018-01-22 PROCEDURE — 99239 HOSP IP/OBS DSCHRG MGMT >30: CPT | Performed by: INTERNAL MEDICINE

## 2018-01-22 PROCEDURE — 700102 HCHG RX REV CODE 250 W/ 637 OVERRIDE(OP): Performed by: INTERNAL MEDICINE

## 2018-01-22 PROCEDURE — 85025 COMPLETE CBC W/AUTO DIFF WBC: CPT

## 2018-01-22 RX ORDER — FAMOTIDINE 20 MG/1
20 TABLET, FILM COATED ORAL EVERY EVENING
Qty: 60 TAB
Start: 2018-01-22 | End: 2018-04-03

## 2018-01-22 RX ORDER — NAPROXEN 250 MG/1
250 TABLET ORAL 2 TIMES DAILY
Qty: 60 TAB | Status: ON HOLD
Start: 2018-01-22 | End: 2019-02-11

## 2018-01-22 RX ADMIN — ENOXAPARIN SODIUM 40 MG: 100 INJECTION SUBCUTANEOUS at 09:23

## 2018-01-22 RX ADMIN — VANCOMYCIN 125 MG: KIT at 00:27

## 2018-01-22 RX ADMIN — LEVOTHYROXINE SODIUM 112 MCG: 112 TABLET ORAL at 06:06

## 2018-01-22 RX ADMIN — VANCOMYCIN 125 MG: KIT at 06:06

## 2018-01-22 RX ADMIN — VANCOMYCIN 125 MG: KIT at 13:09

## 2018-01-22 RX ADMIN — NAPROXEN 250 MG: 250 TABLET ORAL at 09:24

## 2018-01-22 RX ADMIN — LACTOBACILLUS ACIDOPHILUS / LACTOBACILLUS BULGARICUS 1 PACKET: 100 MILLION CFU STRENGTH GRANULES at 09:24

## 2018-01-22 ASSESSMENT — PATIENT HEALTH QUESTIONNAIRE - PHQ9
1. LITTLE INTEREST OR PLEASURE IN DOING THINGS: NOT AT ALL
SUM OF ALL RESPONSES TO PHQ9 QUESTIONS 1 AND 2: 0
SUM OF ALL RESPONSES TO PHQ QUESTIONS 1-9: 0

## 2018-01-22 ASSESSMENT — PAIN SCALES - GENERAL: PAINLEVEL_OUTOF10: 2

## 2018-01-22 ASSESSMENT — ENCOUNTER SYMPTOMS
COUGH: 0
ABDOMINAL PAIN: 0
DIARRHEA: 0
CHILLS: 0
ROS GI COMMENTS: DECREASED
NAUSEA: 0
SHORTNESS OF BREATH: 0
FEVER: 0
VOMITING: 0

## 2018-01-22 NOTE — CARE PLAN
Problem: Discharge Barriers/Planning  Goal: Patient's continuum of care needs will be met    Intervention: Assess potential discharge barriers on admission and throughout hospital stay  Patient and her daughter oriented about transfering to [facility].

## 2018-01-22 NOTE — CARE PLAN
Problem: Nutritional:  Goal: Achieve adequate nutritional intake  PO intake of 50% of meals.   Outcome: MET Date Met: 01/22/18  PO intake 50-75% x last 4 recorded meals

## 2018-01-22 NOTE — PROGRESS NOTES
All surgery staples were removed, skin looks clean and dry no s/s of infection, sterii strips on place.

## 2018-01-22 NOTE — PROGRESS NOTES
@ 1945 - pt assisted back to bed from cardiac chair w/ CNA, partial linen change performed.    @ 2030 - Pt assessed, medications given per MAR, pt has no c/o discomfort, no IV access - MD aware, VSS; pt resting comfortably in bed with call light in reach

## 2018-01-22 NOTE — PROGRESS NOTES
[facility], no pain or other complaints.

## 2018-01-22 NOTE — DISCHARGE PLANNING
Received Transport Communication form,     Transport has been arranged with Eunice at [facility] for 2:30 p.m. today. [facility] will be providing transport. FERMIN Martinez notified.

## 2018-01-22 NOTE — CARE PLAN
Problem: Mobility  Goal: Risk for activity intolerance will decrease    Intervention: Encourage patient to increase activity level in collaboration with Interdisciplinary Team  Patient was able to get up into the cardiac chair and have a shower.

## 2018-01-22 NOTE — CARE PLAN
Problem: Safety  Goal: Will remain free from falls  Outcome: PROGRESSING AS EXPECTED  Pt educated on call light and bed alarm - verbalized understanding, all fall precautions in place and call light in reach    Problem: Knowledge Deficit  Goal: Knowledge of disease process/condition, treatment plan, diagnostic tests, and medications will improve  Outcome: PROGRESSING AS EXPECTED  POC discussed at the bedside, all pt questions answered.

## 2018-01-22 NOTE — PROGRESS NOTES
Report received from Mary Anne day-shift RN. Pt POC discussed, pt resting comfortably in bed, all safety precautions in place.

## 2018-01-22 NOTE — PROGRESS NOTES
Report given to Esperanza day-shift RN. Pt POC discussed, pt resting comfortably in bed, all safety precautions in place.

## 2018-01-22 NOTE — DISCHARGE SUMMARY
CHIEF COMPLAINT ON ADMISSION  Chief Complaint   Patient presents with   • Weakness     shoulder surgery 1/9/2018   • Diarrhea       CODE STATUS  Full Code    HPI & HOSPITAL COURSE  This is a 82 y.o. year old female here with weakness and diarrhea. Patient had voluminous diarrhea, was found to be c diff positive and placed on oral vancomycin. Her diarrhea eventually slowly improved and she will need a prolonged 6 week course given multiple recurrences. Her shoulder surgery went well and she will need additional rehab at the SNF. Her electrolytes all normalized. Infectious diseases was consulted since her leukocytosis was persistent but there was no evidence of additional infections at this time. Her WBC is down-trending and now 13K and has remained afebrile for the duration of the admission. Her outpatient PPI was stopped given concurrent C diff colitis.     Therefore, she is discharged in fair and stable condition for further post-acute management.     SPECIFIC OUTPATIENT FOLLOW-UP  -F/U with her PCP in 1-2 weeks    DISCHARGE PROBLEM LIST  Principal Problem (Resolved):    C. difficile colitis POA: Yes  Active Problems:    H/O shoulder surgery POA: Yes    Weakness POA: Yes  Resolved Problems:    Dehydration POA: Yes    Hyponatremia POA: Yes    Anemia POA: Yes    Leukocytosis POA: Yes      FOLLOW UP  No future appointments.  [facility] ([facility])  [address] [address]  [phone]        Christen Shields M.D.  [address] [facility] [facility] [address]  [phone]            MEDICATIONS ON DISCHARGE   Elizabeth Celis June   [place] Medication Instructions SHALA:[id number]    Printed on:01/22/18 1027   Medication Information                      citalopram (CELEXA) 20 MG Tab  Take 20 mg by mouth every bedtime.             cyclosporin (RESTASIS) 0.05 % ophthalmic emulsion  Place 1 Drop in both eyes 2 times a day.             denosumab (PROLIA) 60 MG/ML Solution  Inject 60 mg as instructed Once. Twice a  year             famotidine (PEPCID) 20 MG Tab  Take 1 Tab by mouth every evening.             furosemide (LASIX) 20 MG Tab  Take  by mouth every day.             hydrocodone/acetaminophen (NORCO)  MG Tab  Take 0.5 Tabs by mouth every four hours as needed.             levothyroxine (SYNTHROID) 112 MCG Tab  Take 112 mcg by mouth Every morning on an empty stomach.             naproxen (NAPROSYN) 250 MG Tab  Take 1 Tab by mouth 2 Times a Day.             Probiotic Cap  Take 1 Cap by mouth every day.             spironolactone (ALDACTONE) 25 MG Tab  Take 25 mg by mouth every day.             trazodone (DESYREL) 50 MG Tab  Take 50 mg by mouth every bedtime.             vancomycin 50 mg/ml (FIRST-VANCOMYCIN) 50 MG/ML Solution oral solution  Take 2.5 mL by mouth every 6 hours for 4 days.             vancomycin 50 mg/ml (FIRST-VANCOMYCIN) 50 MG/ML Solution oral solution  Take 2.5 mL by mouth every 12 hours for 7 days.             vancomycin 50 mg/ml (FIRST-VANCOMYCIN) 50 MG/ML Solution oral solution  Take 2.5 mL by mouth every 24 hours for 6 days.             vancomycin 50 mg/ml (FIRST-VANCOMYCIN) 50 MG/ML Solution oral solution  Take 2.5 mL by mouth every 48 hours for 6 days.             vancomycin 50 mg/ml (FIRST-VANCOMYCIN) 50 MG/ML Solution oral solution  Take 2.5 mL by mouth every 72 hours for 12 days.                 DIET  Orders Placed This Encounter   Procedures   • DIET ORDER     Standing Status:   Standing     Number of Occurrences:   1     Order Specific Question:   Diet:     Answer:   Low Fiber(GI Soft) [2]     Comments:   no chicken       ACTIVITY  As tolerated and directed by skilled nursing.  Class 1 - no symptoms of any kind, and for whom ordinary physical activity does not cause fatigue, palpitations, dyspnea, or anginal pain.    LINES, DRAINS, AND WOUNDS  This is an automated list. Peripheral IVs will be removed prior to discharge.       Surgical Incision  Incision Right Shoulder (Active)   Wound Bed  Other (comment) 1/21/2018  8:30 PM   Drainage  None 1/21/2018  8:30 PM   Periwound Skin Pink;Normal;Warm 1/21/2018  8:30 PM   Daily - Wound Closure Staples 1/21/2018  8:30 PM   Dressing Options Petroleum Gauze (clear);Dry Gauze 1/21/2018  8:30 PM   Dressing Status / Change Clean;Dry;Intact;Observed 1/21/2018  8:30 PM   Daily - Dressing Change Observed 1/21/2018  8:30 PM   Dressing Change Frequency Every 48 hrs 1/21/2018  8:30 PM   Next Dressing Change  01/23/18 1/21/2018  8:30 PM   Protocol Orders Initiated Yes 1/15/2018  7:00 AM   Time Spent with Patient (mins) 35 1/15/2018  7:00 AM                  MENTAL STATUS ON TRANSFER  Level of Consciousness: Alert  Orientation : Oriented x 4  Speech: Speech Clear    CONSULTATIONS  -ID    PROCEDURES  DX-CHEST-2 VIEWS   Final Result      No acute cardiopulmonary abnormality identified.      CT-RENAL COLIC EVALUATION(A/P W/O)   Final Result      1.  Diffuse colonic wall thickening suggesting inflammatory or infectious colitis.   2.  No evidence of bowel obstruction or perforation.   3.  Minimal RIGHT pleural effusion with associated atelectasis.   4.  Probable chronic burst fracture of L1 with severe loss of height.      DX-CHEST-PORTABLE (1 VIEW)   Final Result      No acute cardiac or pulmonary abnormalities are identified.            LABORATORY  Lab Results   Component Value Date/Time    SODIUM 136 01/20/2018 04:51 AM    POTASSIUM 4.6 01/20/2018 04:51 AM    CHLORIDE 115 (H) 01/20/2018 04:51 AM    CO2 18 (L) 01/20/2018 04:51 AM    GLUCOSE 80 01/20/2018 04:51 AM    BUN 17 01/20/2018 04:51 AM    CREATININE 0.96 01/20/2018 04:51 AM    CREATININE 1.1 06/29/2007 04:23 PM        Lab Results   Component Value Date/Time    WBC 13.9 (H) 01/22/2018 05:00 AM    HEMOGLOBIN 8.2 (L) 01/22/2018 05:00 AM    HEMATOCRIT 24.8 (L) 01/22/2018 05:00 AM    PLATELETCT 495 (H) 01/22/2018 05:00 AM        Total time of the discharge process exceeds 40 minutes.

## 2018-01-22 NOTE — PROGRESS NOTES
Gave bedside report to NOC nurseJavier.  Discussed POC.  Pt sitting in cardiac chair, watching TV, no s/s of acute distress, personal belongings w/in reach, safety precautions in place.

## 2018-03-21 ENCOUNTER — HOSPITAL ENCOUNTER (OUTPATIENT)
Dept: LAB | Facility: MEDICAL CENTER | Age: 83
End: 2018-03-21
Attending: FAMILY MEDICINE
Payer: MEDICARE

## 2018-03-21 PROCEDURE — 87324 CLOSTRIDIUM AG IA: CPT

## 2018-03-21 PROCEDURE — 87493 C DIFF AMPLIFIED PROBE: CPT

## 2018-03-22 LAB
C DIFF DNA SPEC QL NAA+PROBE: POSITIVE
C DIFF TOX A+B STL QL IA: POSITIVE
C DIFF TOX GENS STL QL NAA+PROBE: NORMAL

## 2018-04-03 ENCOUNTER — OUTPATIENT INFUSION SERVICES (OUTPATIENT)
Dept: ONCOLOGY | Facility: MEDICAL CENTER | Age: 83
End: 2018-04-03
Attending: FAMILY MEDICINE
Payer: MEDICARE

## 2018-04-03 VITALS
RESPIRATION RATE: 18 BRPM | SYSTOLIC BLOOD PRESSURE: 120 MMHG | WEIGHT: 126.98 LBS | OXYGEN SATURATION: 95 % | HEART RATE: 81 BPM | TEMPERATURE: 98.6 F | HEIGHT: 65 IN | BODY MASS INDEX: 21.16 KG/M2 | DIASTOLIC BLOOD PRESSURE: 65 MMHG

## 2018-04-03 LAB
CA-I BLD ISE-SCNC: 1.07 MMOL/L (ref 1.1–1.3)
CREAT BLD-MCNC: 1.2 MG/DL (ref 0.5–1.4)

## 2018-04-03 PROCEDURE — 82330 ASSAY OF CALCIUM: CPT

## 2018-04-03 PROCEDURE — 700111 HCHG RX REV CODE 636 W/ 250 OVERRIDE (IP): Mod: JG | Performed by: FAMILY MEDICINE

## 2018-04-03 PROCEDURE — 96401 CHEMO ANTI-NEOPL SQ/IM: CPT

## 2018-04-03 PROCEDURE — 82565 ASSAY OF CREATININE: CPT

## 2018-04-03 RX ORDER — TRAMADOL HYDROCHLORIDE 50 MG/1
50 TABLET ORAL EVERY 4 HOURS PRN
Status: ON HOLD | COMMUNITY
End: 2019-02-11

## 2018-04-03 RX ADMIN — DENOSUMAB 60 MG: 60 INJECTION SUBCUTANEOUS at 15:39

## 2018-04-03 ASSESSMENT — PAIN SCALES - GENERAL: PAINLEVEL: NO PAIN

## 2018-04-03 NOTE — PROGRESS NOTES
Pt to [facility] for Prolia injection.  Pt education provided re: need for lab test w/n 2 weeks. Pt w/ no s/s of infection, no recent dental surgeries/procedures, no complaints at this time.  Labs drawn from right hand with butterfly needle, ISTAT calcium run, pt ionized calcium 1.07, counselled need for Vitamin D and calcium supplementation .  Prolia injected into back of right upper arm, band-aid placed.  Pt left on foot in NAD.  Next Prolia appt made for pt prior to d/c.

## 2018-04-03 NOTE — PROGRESS NOTES
Pharmacy Note:    Ca = 1.07 mmol/L  SCr = 1.2 mg/dL, est CrCl ~33 mL/min  Last Dose 6/22/17 per note on order  Per pt, takes supplements at home, but was holding for/after shoulder surgery a few months ago. Counseled on sufficient amounts, Okay to receive Prolia today.    Amarilis Bang, KavitaD

## 2018-06-26 ENCOUNTER — HOSPITAL ENCOUNTER (OUTPATIENT)
Dept: LAB | Facility: MEDICAL CENTER | Age: 83
End: 2018-06-26
Attending: INTERNAL MEDICINE
Payer: MEDICARE

## 2018-07-10 ENCOUNTER — HOSPITAL ENCOUNTER (OUTPATIENT)
Dept: LAB | Facility: MEDICAL CENTER | Age: 83
End: 2018-07-10
Attending: INTERNAL MEDICINE
Payer: MEDICARE

## 2018-07-10 LAB
APPEARANCE UR: ABNORMAL
BACTERIA #/AREA URNS HPF: ABNORMAL /HPF
BILIRUB UR QL STRIP.AUTO: NEGATIVE
COLOR UR: YELLOW
EPI CELLS #/AREA URNS HPF: NEGATIVE /HPF
GLUCOSE UR STRIP.AUTO-MCNC: NEGATIVE MG/DL
HYALINE CASTS #/AREA URNS LPF: ABNORMAL /LPF
KETONES UR STRIP.AUTO-MCNC: NEGATIVE MG/DL
LEUKOCYTE ESTERASE UR QL STRIP.AUTO: ABNORMAL
MICRO URNS: ABNORMAL
NITRITE UR QL STRIP.AUTO: NEGATIVE
PH UR STRIP.AUTO: 6.5 [PH]
PROT UR QL STRIP: NEGATIVE MG/DL
RBC # URNS HPF: ABNORMAL /HPF
RBC UR QL AUTO: ABNORMAL
SP GR UR STRIP.AUTO: 1.01
UROBILINOGEN UR STRIP.AUTO-MCNC: 0.2 MG/DL
WBC #/AREA URNS HPF: ABNORMAL /HPF

## 2018-07-10 PROCEDURE — 81001 URINALYSIS AUTO W/SCOPE: CPT

## 2018-07-10 PROCEDURE — 87186 SC STD MICRODIL/AGAR DIL: CPT

## 2018-07-10 PROCEDURE — 87086 URINE CULTURE/COLONY COUNT: CPT

## 2018-08-06 ENCOUNTER — HOSPITAL ENCOUNTER (OUTPATIENT)
Dept: LAB | Facility: MEDICAL CENTER | Age: 83
End: 2018-08-06
Attending: INTERNAL MEDICINE
Payer: MEDICARE

## 2018-08-06 LAB
APPEARANCE UR: ABNORMAL
BACTERIA #/AREA URNS HPF: ABNORMAL /HPF
BILIRUB UR QL STRIP.AUTO: NEGATIVE
COLOR UR: YELLOW
EPI CELLS #/AREA URNS HPF: ABNORMAL /HPF
GLUCOSE UR STRIP.AUTO-MCNC: NEGATIVE MG/DL
HYALINE CASTS #/AREA URNS LPF: ABNORMAL /LPF
KETONES UR STRIP.AUTO-MCNC: NEGATIVE MG/DL
LEUKOCYTE ESTERASE UR QL STRIP.AUTO: ABNORMAL
MICRO URNS: ABNORMAL
NITRITE UR QL STRIP.AUTO: POSITIVE
PH UR STRIP.AUTO: 6 [PH]
PROT UR QL STRIP: NEGATIVE MG/DL
RBC # URNS HPF: ABNORMAL /HPF
RBC UR QL AUTO: ABNORMAL
SP GR UR STRIP.AUTO: 1.02
UROBILINOGEN UR STRIP.AUTO-MCNC: 0.2 MG/DL
WBC #/AREA URNS HPF: ABNORMAL /HPF

## 2018-08-06 PROCEDURE — 87086 URINE CULTURE/COLONY COUNT: CPT

## 2018-08-06 PROCEDURE — 81001 URINALYSIS AUTO W/SCOPE: CPT

## 2018-08-06 PROCEDURE — 87077 CULTURE AEROBIC IDENTIFY: CPT

## 2018-08-06 PROCEDURE — 87186 SC STD MICRODIL/AGAR DIL: CPT

## 2018-08-08 ENCOUNTER — HOSPITAL ENCOUNTER (OUTPATIENT)
Dept: LAB | Facility: MEDICAL CENTER | Age: 83
End: 2018-08-08
Attending: FAMILY MEDICINE
Payer: MEDICARE

## 2018-08-08 LAB
25(OH)D3 SERPL-MCNC: 45 NG/ML (ref 30–100)
ALBUMIN SERPL BCP-MCNC: 4.6 G/DL (ref 3.2–4.9)
ALBUMIN/GLOB SERPL: 1.5 G/DL
ALP SERPL-CCNC: 64 U/L (ref 30–99)
ALT SERPL-CCNC: 22 U/L (ref 2–50)
ANION GAP SERPL CALC-SCNC: 14 MMOL/L (ref 0–11.9)
AST SERPL-CCNC: 32 U/L (ref 12–45)
BACTERIA UR CULT: ABNORMAL
BACTERIA UR CULT: ABNORMAL
BASOPHILS # BLD AUTO: 1 % (ref 0–1.8)
BASOPHILS # BLD: 0.07 K/UL (ref 0–0.12)
BILIRUB SERPL-MCNC: 0.4 MG/DL (ref 0.1–1.5)
BUN SERPL-MCNC: 39 MG/DL (ref 8–22)
CALCIUM SERPL-MCNC: 9.9 MG/DL (ref 8.5–10.5)
CHLORIDE SERPL-SCNC: 106 MMOL/L (ref 96–112)
CHOLEST SERPL-MCNC: 205 MG/DL (ref 100–199)
CO2 SERPL-SCNC: 16 MMOL/L (ref 20–33)
CREAT SERPL-MCNC: 1.12 MG/DL (ref 0.5–1.4)
EOSINOPHIL # BLD AUTO: 0.26 K/UL (ref 0–0.51)
EOSINOPHIL NFR BLD: 3.5 % (ref 0–6.9)
ERYTHROCYTE [DISTWIDTH] IN BLOOD BY AUTOMATED COUNT: 57 FL (ref 35.9–50)
GLOBULIN SER CALC-MCNC: 3.1 G/DL (ref 1.9–3.5)
GLUCOSE SERPL-MCNC: 57 MG/DL (ref 65–99)
HCT VFR BLD AUTO: 38.8 % (ref 37–47)
HDLC SERPL-MCNC: 62 MG/DL
HGB BLD-MCNC: 12.3 G/DL (ref 12–16)
IMM GRANULOCYTES # BLD AUTO: 0.04 K/UL (ref 0–0.11)
IMM GRANULOCYTES NFR BLD AUTO: 0.5 % (ref 0–0.9)
LDLC SERPL CALC-MCNC: 128 MG/DL
LYMPHOCYTES # BLD AUTO: 2.75 K/UL (ref 1–4.8)
LYMPHOCYTES NFR BLD: 37.5 % (ref 22–41)
MCH RBC QN AUTO: 29.3 PG (ref 27–33)
MCHC RBC AUTO-ENTMCNC: 31.7 G/DL (ref 33.6–35)
MCV RBC AUTO: 92.4 FL (ref 81.4–97.8)
MONOCYTES # BLD AUTO: 0.39 K/UL (ref 0–0.85)
MONOCYTES NFR BLD AUTO: 5.3 % (ref 0–13.4)
NEUTROPHILS # BLD AUTO: 3.83 K/UL (ref 2–7.15)
NEUTROPHILS NFR BLD: 52.2 % (ref 44–72)
NRBC # BLD AUTO: 0 K/UL
NRBC BLD-RTO: 0 /100 WBC
PLATELET # BLD AUTO: 360 K/UL (ref 164–446)
PMV BLD AUTO: 9.9 FL (ref 9–12.9)
POTASSIUM SERPL-SCNC: 4.7 MMOL/L (ref 3.6–5.5)
PROT SERPL-MCNC: 7.7 G/DL (ref 6–8.2)
RBC # BLD AUTO: 4.2 M/UL (ref 4.2–5.4)
SIGNIFICANT IND 70042: ABNORMAL
SITE SITE: ABNORMAL
SODIUM SERPL-SCNC: 136 MMOL/L (ref 135–145)
SOURCE SOURCE: ABNORMAL
T4 SERPL-MCNC: 5.6 UG/DL (ref 4–12)
TRIGL SERPL-MCNC: 77 MG/DL (ref 0–149)
TSH SERPL DL<=0.005 MIU/L-ACNC: 0.41 UIU/ML (ref 0.38–5.33)
WBC # BLD AUTO: 7.3 K/UL (ref 4.8–10.8)

## 2018-08-08 PROCEDURE — 80053 COMPREHEN METABOLIC PANEL: CPT

## 2018-08-08 PROCEDURE — 84436 ASSAY OF TOTAL THYROXINE: CPT

## 2018-08-08 PROCEDURE — 36415 COLL VENOUS BLD VENIPUNCTURE: CPT

## 2018-08-08 PROCEDURE — 84443 ASSAY THYROID STIM HORMONE: CPT

## 2018-08-08 PROCEDURE — 82306 VITAMIN D 25 HYDROXY: CPT | Mod: GA

## 2018-08-08 PROCEDURE — 80061 LIPID PANEL: CPT

## 2018-08-08 PROCEDURE — 85025 COMPLETE CBC W/AUTO DIFF WBC: CPT

## 2018-09-13 ENCOUNTER — HOSPITAL ENCOUNTER (OUTPATIENT)
Dept: LAB | Facility: MEDICAL CENTER | Age: 83
End: 2018-09-13
Attending: INTERNAL MEDICINE
Payer: COMMERCIAL

## 2018-10-02 ENCOUNTER — OUTPATIENT INFUSION SERVICES (OUTPATIENT)
Dept: ONCOLOGY | Facility: MEDICAL CENTER | Age: 83
End: 2018-10-02
Attending: FAMILY MEDICINE
Payer: MEDICARE

## 2018-10-02 VITALS
RESPIRATION RATE: 18 BRPM | HEIGHT: 66 IN | DIASTOLIC BLOOD PRESSURE: 59 MMHG | WEIGHT: 127.87 LBS | OXYGEN SATURATION: 91 % | HEART RATE: 72 BPM | SYSTOLIC BLOOD PRESSURE: 138 MMHG | TEMPERATURE: 98 F | BODY MASS INDEX: 20.55 KG/M2

## 2018-10-02 LAB
CA-I BLD ISE-SCNC: 1.28 MMOL/L (ref 1.1–1.3)
CREAT BLD-MCNC: 1.3 MG/DL (ref 0.5–1.4)

## 2018-10-02 PROCEDURE — 82565 ASSAY OF CREATININE: CPT

## 2018-10-02 PROCEDURE — 96401 CHEMO ANTI-NEOPL SQ/IM: CPT

## 2018-10-02 PROCEDURE — 700111 HCHG RX REV CODE 636 W/ 250 OVERRIDE (IP): Mod: JG | Performed by: FAMILY MEDICINE

## 2018-10-02 PROCEDURE — 82330 ASSAY OF CALCIUM: CPT

## 2018-10-02 RX ADMIN — DENOSUMAB 60 MG: 60 INJECTION SUBCUTANEOUS at 15:38

## 2018-10-02 ASSESSMENT — PAIN SCALES - GENERAL: PAINLEVEL_OUTOF10: 4

## 2018-10-02 NOTE — PROGRESS NOTES
Pt arrived ambulatory to [facility] for q 6 month Prolia injection.  POC discussed, pt denies active infections or recent/upcoming invasive dental procedures.  Labs drawn from RFA without complication.  Site covered with gauze and coban.  Results reviewed, pt meets parameters for Prolia today.  Medication administered as ordered to back of R arm, site covered with adhesive bandage.  Pt tolerated well.  Pt to see MD prior to next appointment being scheduled.  Pt discharged from IS in NAD under self care.

## 2018-10-02 NOTE — PROGRESS NOTES
Pharmacy note  Cr = 1.3, CrCl ~ 31 ml/min  Ionized Ca = 1.28  OK for denosumab (Prolia) 60 mg SQ today

## 2019-01-01 NOTE — PROGRESS NOTES
Infectious Disease Progress Note    Author: Sarah Celeste M.D. Date & Time of service: 2018  12:39 PM    Chief Complaint:  FU persistent leukocytosis and Cdiff    Interval History:   AF, WBC 21, has a cough but no shortness of breath, nausea and emesis x 1 yesterday, overall abd pain and diarrhea improved   AF WBC 19.2 only one BM, mucus Tolerating PO vanco well-shoulder still sore   AF WBC 18.1 3 BM yesterday-no abd pain. Eating OK   AF WBC 13.9 feeling better-wondering about another fecal transplant  Labs Reviewed, Medications Reviewed, Radiology Reviewed and Wound Reviewed.    Review of Systems:  Review of Systems   Constitutional: Negative for chills and fever.   Respiratory: Negative for cough and shortness of breath.         Decreased   Gastrointestinal: Negative for abdominal pain, diarrhea, nausea and vomiting.        Decreased   Musculoskeletal: Positive for joint pain.       Hemodynamics:  Temp (24hrs), Av.7 °C (98 °F), Min:36.4 °C (97.5 °F), Max:36.8 °C (98.2 °F)  Temperature: 36.7 °C (98.1 °F)  Pulse  Av.1  Min: 66  Max: 102   Blood Pressure : 109/41       Physical Exam:  Physical Exam   Constitutional: She is oriented to person, place, and time. She appears well-developed. No distress.   Elderly   HENT:   Head: Normocephalic and atraumatic.   Mouth/Throat: No oropharyngeal exudate.   Eyes: EOM are normal. Pupils are equal, round, and reactive to light. No scleral icterus.   Neck: Neck supple.   Cardiovascular: Normal rate and regular rhythm.    Pulmonary/Chest: Effort normal. No respiratory distress. She has no wheezes.   Abdominal: Soft. Bowel sounds are normal. She exhibits no distension. There is no tenderness. There is no rebound and no guarding.   Musculoskeletal: She exhibits no edema.   R shoulder surgical dressed  RUE in sling  Bilateral hand arthritic deformities   Neurological: She is alert and oriented to person, place, and time.   Skin: No rash noted.  Provider updated on BP      Nursing note and vitals reviewed.      Meds:    Current Facility-Administered Medications:   •  furosemide  •  naproxen  •  mag hydrox-al hydrox-simeth  •  famotidine  •  [START ON 1/26/2018] vancomycin 50 mg/ml  •  [START ON 2/3/2018] vancomycin 50 mg/ml  •  [START ON 2/11/2018] vancomycin 50 mg/ml  •  [START ON 2/20/2018] vancomycin 50 mg/ml  •  citalopram  •  hydrocodone/acetaminophen  •  levothyroxine  •  lactobacillus granules  •  trazodone  •  Respiratory Care per Protocol  •  enoxaparin  •  acetaminophen  •  Notify provider if pain remains uncontrolled **AND** Use the numeric rating scale (NRS-11) on regular floors and Critical-Care Pain Observation Tool (CPOT) on ICUs/Trauma to assess pain **AND** Pulse Ox (Oximetry) **AND** Pharmacy Consult Request **AND** If patient difficult to arouse and/or has respiratory depression, stop any opiates that are currently infusing and call a Rapid Response. **AND** oxycodone immediate-release **AND** oxycodone immediate-release **AND** morphine injection  •  ondansetron  •  ondansetron  •  vancomycin 50 mg/ml  •  artificial tears    Labs:  Recent Labs      01/20/18   0451  01/21/18   0435  01/22/18   0500   WBC  19.2*  18.1*  13.9*   RBC  2.84*  2.53*  2.70*   HEMOGLOBIN  8.6*  7.7*  8.2*   HEMATOCRIT  25.5*  23.4*  24.8*   MCV  89.8  92.5  91.9   MCH  30.3  30.4  30.4   RDW  52.6*  55.1*  54.4*   PLATELETCT  500*  465*  495*   MPV  8.6*  8.9*  8.6*   NEUTSPOLYS  73.00*  63.00  67.10   LYMPHOCYTES  15.00*  24.00  18.80*   MONOCYTES  1.00  3.00  6.20   EOSINOPHILS  2.00  1.00  3.70   BASOPHILS  0.00  1.00  0.70   RBCMORPHOLO  Present  Present   --      Recent Labs      01/20/18   0451   SODIUM  136   POTASSIUM  4.6   CHLORIDE  115*   CO2  18*   GLUCOSE  80   BUN  17     Recent Labs      01/20/18   0451   CREATININE  0.96       Imaging:  Ct-renal Colic Evaluation(a/p W/o)    Result Date: 1/12/2018 1/12/2018 5:57 PM HISTORY/REASON FOR EXAM:  WEAKNESS DIARRHEA.  TECHNIQUE/EXAM DESCRIPTION AND NUMBER OF VIEWS: CT scan renal/colic without contrast. 5 mm helical images of the abdomen and pelvis were obtained from the diaphragmatic domes through the pubic symphysis. Low dose optimization technique was utilized for this CT exam including automated exposure control and adjustment of the mA and/or kV according to patient size. COMPARISON: None. FINDINGS: Visualized lung bases show mild dependent atelectasis.  Minimal RIGHT pleural fluid. Calcified nodule in the RIGHT middle lobe and lingula. The liver is unremarkable. The spleen is unremarkable. Adrenal glands are unremarkable. Probable LEFT kidney cyst measuring 3.6 cm. RIGHT kidney is unremarkable. Pancreas is atrophic. Bladder is absent. Bladder is unremarkable. Diffuse colonic wall thickening. Appendix has a normal appearance. No peritoneal fluid or pneumoperitoneum. Uterus is absent. Abdominal aorta shows diffuse atherosclerotic changes. Degenerative change of lumbar spine and hips.  Severe compression of L1 vertebral body with burst component, likely chronic. Chronic LEFT obturator ring fractures.     1.  Diffuse colonic wall thickening suggesting inflammatory or infectious colitis. 2.  No evidence of bowel obstruction or perforation. 3.  Minimal RIGHT pleural effusion with associated atelectasis. 4.  Probable chronic burst fracture of L1 with severe loss of height.    Dx-chest-portable (1 View)    Result Date: 1/12/2018 1/12/2018 5:13 PM HISTORY/REASON FOR EXAM:  Shortness of breath. TECHNIQUE/EXAM DESCRIPTION AND NUMBER OF VIEWS: Single portable view of the chest. COMPARISON: November 11, 2017 FINDINGS: Heart size is within normal limits.  A calcified granuloma once again noted medially in the right lung base. No pulmonary infiltrates or consolidations are noted. No pleural abnormalities are noted. Patient is status post right shoulder replacement surgery with surgical skin staples identified. There is degenerative change  "in the left shoulder.     No acute cardiac or pulmonary abnormalities are identified.    Dx-shoulder 1 View Right    Result Date: 1/9/2018 1/9/2018 11:59 AM HISTORY/REASON FOR EXAM: Right shoulder pain. TECHNIQUE/EXAM DESCRIPTION AND NUMBER OF VIEWS:  1 views of the RIGHT shoulder. COMPARISON: None FINDINGS: Bone mineralization is normal. There are surgical changes consistent with right shoulder reverse arthroplasty. There are surrounding bone fragments and gas within the soft tissues.     Post right shoulder reverse arthroplasty.      Micro:  Results     Procedure Component Value Units Date/Time    BLOOD CULTURE [[id number]] Collected:  01/12/18 1850    Order Status:  Completed Specimen:  Blood from Peripheral Updated:  01/17/18 2217     Significant Indicator NEG     Source BLD     Site PERIPHERAL     Blood Culture --     Blood culture testing and Gram stain, if indicated, are  performed at [facility], [facility]  [employer] [place], [place].  Positive blood cultures are  sent to [facility], [address] [place] [address], for organism identification and  susceptibility testing.  No growth after 5 days of incubation.      Narrative:       Special Contact Isolation  Per Hospital Policy: Only change Specimen Src: to \"Line\" if  specified by physician order.  Patient difficult stick; short sample    BLOOD CULTURE [[id number]] Collected:  01/12/18 1900    Order Status:  Completed Specimen:  Blood from Peripheral Updated:  01/17/18 2217     Significant Indicator NEG     Source BLD     Site PERIPHERAL     Blood Culture --     Blood culture testing and Gram stain, if indicated, are  performed at [facility], [facility]  [employer] [facility].[place], [place].  Positive blood cultures are  sent to [facility], [address] [place] [address], for organism identification and  susceptibility testing.  No growth after 5 days of incubation.      Narrative:   " "    Special Contact Isolation  Per Hospital Policy: Only change Specimen Src: to \"Line\" if  specified by physician order.  Patient difficult stick; short sample    CULTURE STOOL [[id number]] Collected:  01/12/18 1715    Order Status:  Completed Specimen:  Stool from Stool Updated:  01/16/18 1058     [facility] Negative for Shiga Toxin 1 and 2.     Significant Indicator NEG     Source STL     Site STOOL     Culture Result Stool --     No enteric pathogens isolated.  NOTE:  Stool cultures are screened for Shiga Toxins 1 and 2,  Salmonella, Shigella, Campylobacter, Aeromonas,  Plesiomonas, and Vibrio.      Narrative:       Previous comment was modified by KEYLA at 18:36 on 01/13/18.  Special Contact Isolation  Does this patient have risk factors for C-diff?->Yes  Has patient taken stool softeners or laxatives in the last 5  days?->No  notified unit clerk cluadia need more stool 01/12/2018  17:18  notified rn larry need more stool fro comp. OP 01/13/2018  17:25  Special Contact Isolation  Does this patient have risk factors for C-diff?->Yes  Has patient taken stool softeners or laxatives in the last 5  days?->No    COMPLETE O&P [[id number]] Collected:  01/14/18 0540    Order Status:  Completed Specimen:  Stool Updated:  01/16/18 0836     Significant Indicator NEG     Source STL     Site --     Ova And Parasites --     No Ova or Parasites seen.  NOTE:  Ova and parasite exam of stool is generally not performed on  patients hospitalized for >3 days unless nosocomial  transmission of parasitic agents is suspected.  Screening for Cryptosporidium is not included in routine Ova  and Parasite examination.      Narrative:       Special Contact Isolation  Has the patient been out of the country recently?->No  Is the patient immuno-compromised?->Yes  ** recent surgery  notified unit clerk callydia need more stool 01/12/2018  17:18  notified rn larry need more stool fro comp. OP 01/13/2018  17:25          Assessment:  Active Hospital " Problems    Diagnosis   • *C. difficile colitis [A04.72]   • Dehydration [E86.0]   • Leukocytosis [D72.829]   • Anemia [D64.9]   • H/O shoulder surgery [Z98.890]   • Weakness [R53.1]   • Hyponatremia [E87.1]       Plan:  Clostridium difficile colitis  Afebrile  H/o CDI s/p fecal transplant in 2015  CT +colitis  Continue oral vanco followed by prolonged vanco taper  Repeat CT a/p if worsening symptoms or increasing leukocytosis- improving   FU GI for eval repeat fecal transplant    Leukocytosis - persistent, decreasing  2/2 above  On abx above  CXR neg  Blood cxs neg    Cough  Bronchitis   Encourage SMI  CXR -no PNA    Discussed with RN

## 2019-01-22 ENCOUNTER — HOSPITAL ENCOUNTER (OUTPATIENT)
Dept: RADIOLOGY | Facility: MEDICAL CENTER | Age: 84
End: 2019-01-22
Attending: ORTHOPAEDIC SURGERY
Payer: MEDICARE

## 2019-01-22 DIAGNOSIS — M25.512 LEFT SHOULDER PAIN, UNSPECIFIED CHRONICITY: ICD-10-CM

## 2019-01-22 PROCEDURE — 73200 CT UPPER EXTREMITY W/O DYE: CPT | Mod: LT

## 2019-01-31 ENCOUNTER — HOSPITAL ENCOUNTER (OUTPATIENT)
Dept: LAB | Facility: MEDICAL CENTER | Age: 84
End: 2019-01-31
Attending: FAMILY MEDICINE
Payer: MEDICARE

## 2019-01-31 LAB
APPEARANCE UR: ABNORMAL
BACTERIA #/AREA URNS HPF: ABNORMAL /HPF
BILIRUB UR QL STRIP.AUTO: NEGATIVE
COLOR UR: YELLOW
EPI CELLS #/AREA URNS HPF: NEGATIVE /HPF
GLUCOSE UR STRIP.AUTO-MCNC: NEGATIVE MG/DL
HYALINE CASTS #/AREA URNS LPF: ABNORMAL /LPF
KETONES UR STRIP.AUTO-MCNC: NEGATIVE MG/DL
LEUKOCYTE ESTERASE UR QL STRIP.AUTO: ABNORMAL
MICRO URNS: ABNORMAL
NITRITE UR QL STRIP.AUTO: POSITIVE
PH UR STRIP.AUTO: 6.5 [PH]
PROT UR QL STRIP: NEGATIVE MG/DL
RBC # URNS HPF: ABNORMAL /HPF
RBC UR QL AUTO: NEGATIVE
SP GR UR STRIP.AUTO: 1.01
UROBILINOGEN UR STRIP.AUTO-MCNC: 0.2 MG/DL
WBC #/AREA URNS HPF: ABNORMAL /HPF

## 2019-01-31 PROCEDURE — 81001 URINALYSIS AUTO W/SCOPE: CPT

## 2019-01-31 PROCEDURE — 87086 URINE CULTURE/COLONY COUNT: CPT

## 2019-01-31 PROCEDURE — 87077 CULTURE AEROBIC IDENTIFY: CPT

## 2019-01-31 PROCEDURE — 87186 SC STD MICRODIL/AGAR DIL: CPT

## 2019-02-10 ENCOUNTER — HOSPITAL ENCOUNTER (INPATIENT)
Facility: MEDICAL CENTER | Age: 84
LOS: 1 days | DRG: 069 | End: 2019-02-12
Attending: EMERGENCY MEDICINE | Admitting: HOSPITALIST
Payer: MEDICARE

## 2019-02-10 ENCOUNTER — APPOINTMENT (OUTPATIENT)
Dept: RADIOLOGY | Facility: MEDICAL CENTER | Age: 84
DRG: 069 | End: 2019-02-10
Attending: EMERGENCY MEDICINE
Payer: MEDICARE

## 2019-02-10 DIAGNOSIS — R29.90 STROKE-LIKE SYMPTOM: ICD-10-CM

## 2019-02-10 DIAGNOSIS — E87.1 HYPONATREMIA: ICD-10-CM

## 2019-02-10 DIAGNOSIS — E87.20 METABOLIC ACIDOSIS: ICD-10-CM

## 2019-02-10 DIAGNOSIS — R33.9 URINARY RETENTION: ICD-10-CM

## 2019-02-10 DIAGNOSIS — R53.1 WEAKNESS: ICD-10-CM

## 2019-02-10 DIAGNOSIS — R47.81 SLURRED SPEECH: ICD-10-CM

## 2019-02-10 DIAGNOSIS — M06.9 RHEUMATOID ARTHRITIS, INVOLVING UNSPECIFIED SITE, UNSPECIFIED RHEUMATOID FACTOR PRESENCE: ICD-10-CM

## 2019-02-10 DIAGNOSIS — E87.6 HYPOKALEMIA: ICD-10-CM

## 2019-02-10 DIAGNOSIS — E43 PROTEIN-CALORIE MALNUTRITION, SEVERE (HCC): ICD-10-CM

## 2019-02-10 PROBLEM — E03.9 HYPOTHYROIDISM: Status: ACTIVE | Noted: 2019-02-10

## 2019-02-10 PROBLEM — R11.2 NAUSEA AND VOMITING: Status: ACTIVE | Noted: 2019-02-10

## 2019-02-10 LAB
ALBUMIN SERPL BCP-MCNC: 3.8 G/DL (ref 3.2–4.9)
ALBUMIN/GLOB SERPL: 1.4 G/DL
ALP SERPL-CCNC: 38 U/L (ref 30–99)
ALT SERPL-CCNC: 34 U/L (ref 2–50)
ANION GAP SERPL CALC-SCNC: 9 MMOL/L (ref 0–11.9)
AST SERPL-CCNC: 49 U/L (ref 12–45)
BASOPHILS # BLD AUTO: 0.2 % (ref 0–1.8)
BASOPHILS # BLD: 0.03 K/UL (ref 0–0.12)
BILIRUB SERPL-MCNC: 0.9 MG/DL (ref 0.1–1.5)
BUN SERPL-MCNC: 41 MG/DL (ref 8–22)
CALCIUM SERPL-MCNC: 8.4 MG/DL (ref 8.4–10.2)
CHLORIDE SERPL-SCNC: 101 MMOL/L (ref 96–112)
CO2 SERPL-SCNC: 19 MMOL/L (ref 20–33)
CREAT SERPL-MCNC: 1.26 MG/DL (ref 0.5–1.4)
EKG IMPRESSION: NORMAL
EOSINOPHIL # BLD AUTO: 0.22 K/UL (ref 0–0.51)
EOSINOPHIL NFR BLD: 1.8 % (ref 0–6.9)
ERYTHROCYTE [DISTWIDTH] IN BLOOD BY AUTOMATED COUNT: 49.8 FL (ref 35.9–50)
GLOBULIN SER CALC-MCNC: 2.8 G/DL (ref 1.9–3.5)
GLUCOSE SERPL-MCNC: 115 MG/DL (ref 65–99)
HCT VFR BLD AUTO: 33.6 % (ref 37–47)
HGB BLD-MCNC: 11.4 G/DL (ref 12–16)
IMM GRANULOCYTES # BLD AUTO: 0.04 K/UL (ref 0–0.11)
IMM GRANULOCYTES NFR BLD AUTO: 0.3 % (ref 0–0.9)
LIPASE SERPL-CCNC: 30 U/L (ref 7–58)
LYMPHOCYTES # BLD AUTO: 0.44 K/UL (ref 1–4.8)
LYMPHOCYTES NFR BLD: 3.7 % (ref 22–41)
MCH RBC QN AUTO: 30.6 PG (ref 27–33)
MCHC RBC AUTO-ENTMCNC: 33.9 G/DL (ref 33.6–35)
MCV RBC AUTO: 90.1 FL (ref 81.4–97.8)
MONOCYTES # BLD AUTO: 0.43 K/UL (ref 0–0.85)
MONOCYTES NFR BLD AUTO: 3.6 % (ref 0–13.4)
NEUTROPHILS # BLD AUTO: 10.89 K/UL (ref 2–7.15)
NEUTROPHILS NFR BLD: 90.4 % (ref 44–72)
NRBC # BLD AUTO: 0 K/UL
NRBC BLD-RTO: 0 /100 WBC
PLATELET # BLD AUTO: 243 K/UL (ref 164–446)
PMV BLD AUTO: 8.7 FL (ref 9–12.9)
POTASSIUM SERPL-SCNC: 2.9 MMOL/L (ref 3.6–5.5)
PROT SERPL-MCNC: 6.6 G/DL (ref 6–8.2)
RBC # BLD AUTO: 3.73 M/UL (ref 4.2–5.4)
SODIUM SERPL-SCNC: 129 MMOL/L (ref 135–145)
TROPONIN I SERPL-MCNC: <0.02 NG/ML (ref 0–0.04)
WBC # BLD AUTO: 12.1 K/UL (ref 4.8–10.8)

## 2019-02-10 PROCEDURE — 84484 ASSAY OF TROPONIN QUANT: CPT

## 2019-02-10 PROCEDURE — 99220 PR INITIAL OBSERVATION CARE,LEVL III: CPT | Performed by: INTERNAL MEDICINE

## 2019-02-10 PROCEDURE — 71046 X-RAY EXAM CHEST 2 VIEWS: CPT

## 2019-02-10 PROCEDURE — G0378 HOSPITAL OBSERVATION PER HR: HCPCS

## 2019-02-10 PROCEDURE — 83036 HEMOGLOBIN GLYCOSYLATED A1C: CPT

## 2019-02-10 PROCEDURE — 93005 ELECTROCARDIOGRAM TRACING: CPT | Performed by: EMERGENCY MEDICINE

## 2019-02-10 PROCEDURE — 83690 ASSAY OF LIPASE: CPT

## 2019-02-10 PROCEDURE — 85025 COMPLETE CBC W/AUTO DIFF WBC: CPT

## 2019-02-10 PROCEDURE — 99285 EMERGENCY DEPT VISIT HI MDM: CPT

## 2019-02-10 PROCEDURE — 80053 COMPREHEN METABOLIC PANEL: CPT

## 2019-02-10 PROCEDURE — 70450 CT HEAD/BRAIN W/O DYE: CPT

## 2019-02-10 RX ORDER — ASPIRIN 325 MG
325 TABLET ORAL DAILY
Status: DISCONTINUED | OUTPATIENT
Start: 2019-02-11 | End: 2019-02-12

## 2019-02-10 RX ORDER — GABAPENTIN 100 MG/1
100 CAPSULE ORAL PRN
Status: ON HOLD | COMMUNITY
End: 2019-02-11

## 2019-02-10 RX ORDER — LABETALOL HYDROCHLORIDE 5 MG/ML
10 INJECTION, SOLUTION INTRAVENOUS EVERY 4 HOURS PRN
Status: DISCONTINUED | OUTPATIENT
Start: 2019-02-10 | End: 2019-02-12 | Stop reason: HOSPADM

## 2019-02-10 RX ORDER — HYDROCODONE BITARTRATE AND ACETAMINOPHEN 10; 325 MG/1; MG/1
0.5 TABLET ORAL EVERY 4 HOURS PRN
Status: DISCONTINUED | OUTPATIENT
Start: 2019-02-10 | End: 2019-02-11

## 2019-02-10 RX ORDER — ASPIRIN 81 MG/1
324 TABLET, CHEWABLE ORAL DAILY
Status: DISCONTINUED | OUTPATIENT
Start: 2019-02-11 | End: 2019-02-12

## 2019-02-10 RX ORDER — HYDRALAZINE HYDROCHLORIDE 20 MG/ML
10 INJECTION INTRAMUSCULAR; INTRAVENOUS
Status: DISCONTINUED | OUTPATIENT
Start: 2019-02-10 | End: 2019-02-12 | Stop reason: HOSPADM

## 2019-02-10 RX ORDER — ATORVASTATIN CALCIUM 40 MG/1
40 TABLET, FILM COATED ORAL EVERY EVENING
Status: DISCONTINUED | OUTPATIENT
Start: 2019-02-10 | End: 2019-02-12 | Stop reason: HOSPADM

## 2019-02-10 RX ORDER — ONDANSETRON 2 MG/ML
4 INJECTION INTRAMUSCULAR; INTRAVENOUS EVERY 4 HOURS PRN
Status: DISCONTINUED | OUTPATIENT
Start: 2019-02-10 | End: 2019-02-12 | Stop reason: HOSPADM

## 2019-02-10 RX ORDER — POLYETHYLENE GLYCOL 3350 17 G/17G
1 POWDER, FOR SOLUTION ORAL
Status: DISCONTINUED | OUTPATIENT
Start: 2019-02-10 | End: 2019-02-12 | Stop reason: HOSPADM

## 2019-02-10 RX ORDER — CYCLOSPORINE 0.5 MG/ML
1 EMULSION OPHTHALMIC 2 TIMES DAILY
Status: DISCONTINUED | OUTPATIENT
Start: 2019-02-10 | End: 2019-02-11

## 2019-02-10 RX ORDER — BISACODYL 10 MG
10 SUPPOSITORY, RECTAL RECTAL
Status: DISCONTINUED | OUTPATIENT
Start: 2019-02-10 | End: 2019-02-12 | Stop reason: HOSPADM

## 2019-02-10 RX ORDER — LEVOTHYROXINE SODIUM 112 UG/1
112 TABLET ORAL
Status: DISCONTINUED | OUTPATIENT
Start: 2019-02-11 | End: 2019-02-12 | Stop reason: HOSPADM

## 2019-02-10 RX ORDER — ASPIRIN 600 MG/1
300 SUPPOSITORY RECTAL DAILY
Status: DISCONTINUED | OUTPATIENT
Start: 2019-02-11 | End: 2019-02-12

## 2019-02-10 RX ORDER — ACETAMINOPHEN 325 MG/1
650 TABLET ORAL EVERY 6 HOURS PRN
Status: DISCONTINUED | OUTPATIENT
Start: 2019-02-10 | End: 2019-02-12 | Stop reason: HOSPADM

## 2019-02-10 RX ORDER — ONDANSETRON 4 MG/1
4 TABLET, ORALLY DISINTEGRATING ORAL EVERY 4 HOURS PRN
Status: DISCONTINUED | OUTPATIENT
Start: 2019-02-10 | End: 2019-02-12 | Stop reason: HOSPADM

## 2019-02-10 RX ORDER — CITALOPRAM 20 MG/1
20 TABLET ORAL
Status: DISCONTINUED | OUTPATIENT
Start: 2019-02-10 | End: 2019-02-12 | Stop reason: HOSPADM

## 2019-02-10 RX ORDER — AMOXICILLIN 250 MG
2 CAPSULE ORAL 2 TIMES DAILY
Status: DISCONTINUED | OUTPATIENT
Start: 2019-02-10 | End: 2019-02-12 | Stop reason: HOSPADM

## 2019-02-10 RX ORDER — TRAZODONE HYDROCHLORIDE 50 MG/1
25 TABLET ORAL NIGHTLY
COMMUNITY
End: 2019-07-30 | Stop reason: SDUPTHER

## 2019-02-10 ASSESSMENT — ENCOUNTER SYMPTOMS
WEAKNESS: 1
HEADACHES: 0
DIZZINESS: 0
DEPRESSION: 0
TINGLING: 0
MYALGIAS: 0
CHILLS: 0
LOSS OF CONSCIOUSNESS: 0
CONSTIPATION: 0
SHORTNESS OF BREATH: 0
FALLS: 0
DIARRHEA: 0
SPEECH CHANGE: 1
STRIDOR: 0
ABDOMINAL PAIN: 0
VOMITING: 1
FEVER: 0
PALPITATIONS: 0
NAUSEA: 1
SPUTUM PRODUCTION: 0
COUGH: 0

## 2019-02-10 ASSESSMENT — COPD QUESTIONNAIRES
COPD SCREENING SCORE: 2
DO YOU EVER COUGH UP ANY MUCUS OR PHLEGM?: NO/ONLY WITH OCCASIONAL COLDS OR INFECTIONS
HAVE YOU SMOKED AT LEAST 100 CIGARETTES IN YOUR ENTIRE LIFE: NO/DON'T KNOW
DURING THE PAST 4 WEEKS HOW MUCH DID YOU FEEL SHORT OF BREATH: NONE/LITTLE OF THE TIME
IN THE PAST 12 MONTHS DO YOU DO LESS THAN YOU USED TO BECAUSE OF YOUR BREATHING PROBLEMS: DISAGREE/UNSURE

## 2019-02-10 ASSESSMENT — PATIENT HEALTH QUESTIONNAIRE - PHQ9
2. FEELING DOWN, DEPRESSED, IRRITABLE, OR HOPELESS: NOT AT ALL
SUM OF ALL RESPONSES TO PHQ9 QUESTIONS 1 AND 2: 0
2. FEELING DOWN, DEPRESSED, IRRITABLE, OR HOPELESS: NOT AT ALL
SUM OF ALL RESPONSES TO PHQ9 QUESTIONS 1 AND 2: 0
1. LITTLE INTEREST OR PLEASURE IN DOING THINGS: NOT AT ALL
1. LITTLE INTEREST OR PLEASURE IN DOING THINGS: NOT AT ALL

## 2019-02-10 ASSESSMENT — LIFESTYLE VARIABLES
EVER HAD A DRINK FIRST THING IN THE MORNING TO STEADY YOUR NERVES TO GET RID OF A HANGOVER: NO
HOW MANY TIMES IN THE PAST YEAR HAVE YOU HAD 5 OR MORE DRINKS IN A DAY: 0
TOTAL SCORE: 0
CONSUMPTION TOTAL: NEGATIVE
TOTAL SCORE: 0
HAVE PEOPLE ANNOYED YOU BY CRITICIZING YOUR DRINKING: NO
EVER FELT BAD OR GUILTY ABOUT YOUR DRINKING: NO
AVERAGE NUMBER OF DAYS PER WEEK YOU HAVE A DRINK CONTAINING ALCOHOL: 7
HAVE YOU EVER FELT YOU SHOULD CUT DOWN ON YOUR DRINKING: NO
TOTAL SCORE: 0
ON A TYPICAL DAY WHEN YOU DRINK ALCOHOL HOW MANY DRINKS DO YOU HAVE: 1
EVER_SMOKED: YES
ALCOHOL_USE: YES

## 2019-02-10 ASSESSMENT — COGNITIVE AND FUNCTIONAL STATUS - GENERAL
SUGGESTED CMS G CODE MODIFIER DAILY ACTIVITY: CJ
TOILETING: A LITTLE
STANDING UP FROM CHAIR USING ARMS: A LITTLE
MOBILITY SCORE: 19
DRESSING REGULAR LOWER BODY CLOTHING: A LITTLE
WALKING IN HOSPITAL ROOM: A LITTLE
HELP NEEDED FOR BATHING: A LITTLE
SUGGESTED CMS G CODE MODIFIER MOBILITY: CK
DAILY ACTIVITIY SCORE: 20
CLIMB 3 TO 5 STEPS WITH RAILING: A LITTLE
DRESSING REGULAR UPPER BODY CLOTHING: A LITTLE
MOVING FROM LYING ON BACK TO SITTING ON SIDE OF FLAT BED: A LITTLE
MOVING TO AND FROM BED TO CHAIR: A LITTLE

## 2019-02-11 ENCOUNTER — APPOINTMENT (OUTPATIENT)
Dept: RADIOLOGY | Facility: MEDICAL CENTER | Age: 84
DRG: 069 | End: 2019-02-11
Attending: INTERNAL MEDICINE
Payer: MEDICARE

## 2019-02-11 PROBLEM — R33.9 URINARY RETENTION: Status: ACTIVE | Noted: 2019-02-11

## 2019-02-11 PROBLEM — N17.9 ARF (ACUTE RENAL FAILURE) (HCC): Status: ACTIVE | Noted: 2019-02-11

## 2019-02-11 PROBLEM — E87.20 METABOLIC ACIDOSIS: Status: ACTIVE | Noted: 2019-02-11

## 2019-02-11 LAB
ANION GAP SERPL CALC-SCNC: 11 MMOL/L (ref 0–11.9)
APPEARANCE UR: CLEAR
BACTERIA #/AREA URNS HPF: NEGATIVE /HPF
BILIRUB UR QL STRIP.AUTO: NEGATIVE
BUN SERPL-MCNC: 41 MG/DL (ref 8–22)
CALCIUM SERPL-MCNC: 8.3 MG/DL (ref 8.4–10.2)
CASTS URNS QL MICRO: ABNORMAL /LPF
CHLORIDE SERPL-SCNC: 100 MMOL/L (ref 96–112)
CHOLEST SERPL-MCNC: 147 MG/DL (ref 100–199)
CO2 SERPL-SCNC: 18 MMOL/L (ref 20–33)
COLOR UR: YELLOW
CREAT SERPL-MCNC: 1.33 MG/DL (ref 0.5–1.4)
EPI CELLS #/AREA URNS HPF: ABNORMAL /HPF
ERYTHROCYTE [DISTWIDTH] IN BLOOD BY AUTOMATED COUNT: 52.2 FL (ref 35.9–50)
EST. AVERAGE GLUCOSE BLD GHB EST-MCNC: 111 MG/DL
GLUCOSE SERPL-MCNC: 108 MG/DL (ref 65–99)
GLUCOSE UR STRIP.AUTO-MCNC: NEGATIVE MG/DL
HBA1C MFR BLD: 5.5 % (ref 0–5.6)
HCT VFR BLD AUTO: 33.8 % (ref 37–47)
HDLC SERPL-MCNC: 44 MG/DL
HGB BLD-MCNC: 11.1 G/DL (ref 12–16)
KETONES UR STRIP.AUTO-MCNC: NEGATIVE MG/DL
LDLC SERPL CALC-MCNC: 80 MG/DL
LEUKOCYTE ESTERASE UR QL STRIP.AUTO: NEGATIVE
MCH RBC QN AUTO: 30.5 PG (ref 27–33)
MCHC RBC AUTO-ENTMCNC: 32.8 G/DL (ref 33.6–35)
MCV RBC AUTO: 92.9 FL (ref 81.4–97.8)
MICRO URNS: ABNORMAL
MUCOUS THREADS #/AREA URNS HPF: ABNORMAL /HPF
NITRITE UR QL STRIP.AUTO: NEGATIVE
PH UR STRIP.AUTO: 6 [PH]
PLATELET # BLD AUTO: 239 K/UL (ref 164–446)
PMV BLD AUTO: 9.6 FL (ref 9–12.9)
POTASSIUM SERPL-SCNC: 3.3 MMOL/L (ref 3.6–5.5)
PROT UR QL STRIP: NEGATIVE MG/DL
RBC # BLD AUTO: 3.64 M/UL (ref 4.2–5.4)
RBC # URNS HPF: ABNORMAL /HPF
RBC UR QL AUTO: ABNORMAL
SODIUM SERPL-SCNC: 129 MMOL/L (ref 135–145)
SP GR UR STRIP.AUTO: 1.01
TRIGL SERPL-MCNC: 115 MG/DL (ref 0–149)
UNIDENT CRYS URNS QL MICRO: ABNORMAL /HPF
WBC # BLD AUTO: 11 K/UL (ref 4.8–10.8)
WBC #/AREA URNS HPF: ABNORMAL /HPF

## 2019-02-11 PROCEDURE — A9270 NON-COVERED ITEM OR SERVICE: HCPCS | Performed by: INTERNAL MEDICINE

## 2019-02-11 PROCEDURE — 51798 US URINE CAPACITY MEASURE: CPT

## 2019-02-11 PROCEDURE — 700111 HCHG RX REV CODE 636 W/ 250 OVERRIDE (IP): Performed by: HOSPITALIST

## 2019-02-11 PROCEDURE — 96374 THER/PROPH/DIAG INJ IV PUSH: CPT

## 2019-02-11 PROCEDURE — 770020 HCHG ROOM/CARE - TELE (206)

## 2019-02-11 PROCEDURE — 80061 LIPID PANEL: CPT

## 2019-02-11 PROCEDURE — 97165 OT EVAL LOW COMPLEX 30 MIN: CPT

## 2019-02-11 PROCEDURE — 700102 HCHG RX REV CODE 250 W/ 637 OVERRIDE(OP): Performed by: HOSPITALIST

## 2019-02-11 PROCEDURE — 97161 PT EVAL LOW COMPLEX 20 MIN: CPT

## 2019-02-11 PROCEDURE — 85027 COMPLETE CBC AUTOMATED: CPT

## 2019-02-11 PROCEDURE — 70551 MRI BRAIN STEM W/O DYE: CPT

## 2019-02-11 PROCEDURE — 99233 SBSQ HOSP IP/OBS HIGH 50: CPT | Performed by: HOSPITALIST

## 2019-02-11 PROCEDURE — 700105 HCHG RX REV CODE 258: Performed by: HOSPITALIST

## 2019-02-11 PROCEDURE — 700102 HCHG RX REV CODE 250 W/ 637 OVERRIDE(OP): Performed by: INTERNAL MEDICINE

## 2019-02-11 PROCEDURE — A9270 NON-COVERED ITEM OR SERVICE: HCPCS | Performed by: HOSPITALIST

## 2019-02-11 PROCEDURE — 80048 BASIC METABOLIC PNL TOTAL CA: CPT

## 2019-02-11 PROCEDURE — 81001 URINALYSIS AUTO W/SCOPE: CPT

## 2019-02-11 PROCEDURE — 700111 HCHG RX REV CODE 636 W/ 250 OVERRIDE (IP): Performed by: INTERNAL MEDICINE

## 2019-02-11 PROCEDURE — 92610 EVALUATE SWALLOWING FUNCTION: CPT

## 2019-02-11 PROCEDURE — 700101 HCHG RX REV CODE 250: Performed by: HOSPITALIST

## 2019-02-11 RX ORDER — TAMSULOSIN HYDROCHLORIDE 0.4 MG/1
0.4 CAPSULE ORAL
Status: DISCONTINUED | OUTPATIENT
Start: 2019-02-11 | End: 2019-02-11

## 2019-02-11 RX ORDER — TAMSULOSIN HYDROCHLORIDE 0.4 MG/1
0.4 CAPSULE ORAL
Status: DISCONTINUED | OUTPATIENT
Start: 2019-02-11 | End: 2019-02-12 | Stop reason: HOSPADM

## 2019-02-11 RX ORDER — NAPROXEN SODIUM 220 MG
440 TABLET ORAL PRN
COMMUNITY
End: 2019-03-27

## 2019-02-11 RX ORDER — GABAPENTIN 300 MG/1
300 CAPSULE ORAL PRN
COMMUNITY
End: 2019-04-19

## 2019-02-11 RX ORDER — POTASSIUM CHLORIDE 7.45 MG/ML
10 INJECTION INTRAVENOUS
Status: COMPLETED | OUTPATIENT
Start: 2019-02-11 | End: 2019-02-11

## 2019-02-11 RX ORDER — LACTOBACILLUS RHAMNOSUS GG 10B CELL
1 CAPSULE ORAL
Status: DISCONTINUED | OUTPATIENT
Start: 2019-02-11 | End: 2019-02-12 | Stop reason: HOSPADM

## 2019-02-11 RX ORDER — NITROFURANTOIN MACROCRYSTALS 50 MG/1
50 CAPSULE ORAL EVERY 6 HOURS
Status: ON HOLD | COMMUNITY
Start: 2019-02-09 | End: 2019-02-12

## 2019-02-11 RX ORDER — POTASSIUM CHLORIDE 29.8 MG/ML
INJECTION INTRAVENOUS
Status: COMPLETED
Start: 2019-02-11 | End: 2019-02-11

## 2019-02-11 RX ORDER — POLYVINYL ALCOHOL 14 MG/ML
1 SOLUTION/ DROPS OPHTHALMIC
Status: DISCONTINUED | OUTPATIENT
Start: 2019-02-11 | End: 2019-02-12 | Stop reason: HOSPADM

## 2019-02-11 RX ORDER — NITROFURANTOIN 25; 75 MG/1; MG/1
100 CAPSULE ORAL 2 TIMES DAILY
Status: ON HOLD | COMMUNITY
Start: 2019-02-07 | End: 2019-02-11

## 2019-02-11 RX ORDER — SODIUM CHLORIDE AND POTASSIUM CHLORIDE 150; 900 MG/100ML; MG/100ML
INJECTION, SOLUTION INTRAVENOUS CONTINUOUS
Status: DISCONTINUED | OUTPATIENT
Start: 2019-02-11 | End: 2019-02-11

## 2019-02-11 RX ADMIN — POTASSIUM CHLORIDE 10 MEQ: 7.46 INJECTION, SOLUTION INTRAVENOUS at 03:10

## 2019-02-11 RX ADMIN — CITALOPRAM HYDROBROMIDE 20 MG: 20 TABLET ORAL at 20:08

## 2019-02-11 RX ADMIN — POTASSIUM CHLORIDE 10 MEQ: 7.46 INJECTION, SOLUTION INTRAVENOUS at 06:22

## 2019-02-11 RX ADMIN — ACETAMINOPHEN 650 MG: 325 TABLET, FILM COATED ORAL at 20:08

## 2019-02-11 RX ADMIN — FAMOTIDINE 20 MG: 10 INJECTION INTRAVENOUS at 02:44

## 2019-02-11 RX ADMIN — POTASSIUM CHLORIDE 10 MEQ: 7.46 INJECTION, SOLUTION INTRAVENOUS at 04:20

## 2019-02-11 RX ADMIN — ASPIRIN 300 MG: 600 SUPPOSITORY RECTAL at 04:22

## 2019-02-11 RX ADMIN — POTASSIUM CHLORIDE 10 MEQ: 7.46 INJECTION, SOLUTION INTRAVENOUS at 05:29

## 2019-02-11 RX ADMIN — ATORVASTATIN CALCIUM 40 MG: 40 TABLET, FILM COATED ORAL at 17:39

## 2019-02-11 RX ADMIN — POTASSIUM CHLORIDE AND SODIUM CHLORIDE: 900; 150 INJECTION, SOLUTION INTRAVENOUS at 11:13

## 2019-02-11 RX ADMIN — Medication 1 CAPSULE: at 17:39

## 2019-02-11 RX ADMIN — ONDANSETRON 4 MG: 2 INJECTION INTRAMUSCULAR; INTRAVENOUS at 02:43

## 2019-02-11 RX ADMIN — POTASSIUM CHLORIDE: 149 INJECTION, SOLUTION, CONCENTRATE INTRAVENOUS at 02:15

## 2019-02-11 RX ADMIN — STANDARDIZED SENNA CONCENTRATE AND DOCUSATE SODIUM 2 TABLET: 8.6; 5 TABLET, FILM COATED ORAL at 17:39

## 2019-02-11 ASSESSMENT — ENCOUNTER SYMPTOMS
DIZZINESS: 0
DEPRESSION: 0
INSOMNIA: 0
SHORTNESS OF BREATH: 0
COUGH: 0
WEAKNESS: 1
EYE PAIN: 0
BLURRED VISION: 0
BACK PAIN: 0
NAUSEA: 0
TINGLING: 0
SORE THROAT: 0
PALPITATIONS: 0
NECK PAIN: 0
FEVER: 0
ABDOMINAL PAIN: 0
CHILLS: 0
HEADACHES: 0
VOMITING: 0

## 2019-02-11 ASSESSMENT — GAIT ASSESSMENTS
ASSISTIVE DEVICE: FRONT WHEEL WALKER
GAIT LEVEL OF ASSIST: CONTACT GUARD ASSIST
DISTANCE (FEET): 150
DEVIATION: BRADYKINETIC

## 2019-02-11 ASSESSMENT — ACTIVITIES OF DAILY LIVING (ADL): TOILETING: INDEPENDENT

## 2019-02-11 NOTE — PROGRESS NOTES
Pt oriented to room. Admit profile, med rec, and assessment completed. Pt reports no pain or discomfort. POC discussed. Questions answered. Failed bedside swallow due to slurred speech. Pt NPO and speech eval ordered. Stroke educated given. Home meds sent to safekeeping. Non-skid socks in use. Safety precautions in place. Call light in reach. Bed alarm on. Pt instructed to call for assistance.

## 2019-02-11 NOTE — DISCHARGE PLANNING
Anticipated Discharge Disposition: TBD    Action: Pt lives alone, daughter is available for support but does not live locally.  Pt has been at [facility] and has used [facility] in the past.      Barriers to Discharge: placement, medical clearance    Plan: Follow up with Physiatry consult or [facility]    Care Transition Team Assessment    Information Source  Orientation : Oriented x 4  Information Given By: Patient  Who is responsible for making decisions for patient? : Patient    Readmission Evaluation  Is this a readmission?: No    Elopement Risk  Legal Hold: No  Ambulatory or Self Mobile in Wheelchair: No-Not an Elopement Risk  Elopement Risk: Not at Risk for Elopement    Interdisciplinary Discharge Planning  Lives with - Patient's Self Care Capacity: Alone and Able to Care For Self  Patient or legal guardian wants to designate a caregiver (see row info): No  Housing / Facility: [address]    Discharge Preparedness  What is your plan after discharge?: Uncertain - pending medical team collaboration  What are your discharge supports?: Child, Other (comment) (friend)  Prior Functional Level: Ambulatory, Independent with Activities of Daily Living, Needs Assist with Medication Management, Uses Walker  Difficulity with ADLs: None  Difficulity with IADLs: None    Functional Assesment  Prior Functional Level: Ambulatory, Independent with Activities of Daily Living, Needs Assist with Medication Management, Uses Walker    Finances  Financial Barriers to Discharge: No  Prescription Coverage: Yes    Vision / Hearing Impairment  Vision Impairment : Yes  Right Eye Vision: Impaired, Wears Glasses  Left Eye Vision: Impaired, Wears Glasses  Hearing Impairment : Yes  Hearing Impairment: Both Ears, Hearing Device(s) Available  Does Pt Need Special Equipment for the Hearing Impaired?: No         Advance Directive  Advance Directive?: DPOA for Health Care  Durable Power of  Name and Contact : Sarah    Domestic Abuse  Have you  ever been the victim of abuse or violence?: No  Physical Abuse or Sexual Abuse: No  Verbal Abuse or Emotional Abuse: No  Possible Abuse Reported to:: Not Applicable    Psychological Assessment  History of Substance Abuse: None  History of Psychiatric Problems: No    Discharge Risks or Barriers  Discharge risks or barriers?: No  Patient risk factors: Cognitive / sensory / physical deficit, Vulnerable adult    Anticipated Discharge Information  Anticipated discharge disposition: Acute rehab

## 2019-02-11 NOTE — ASSESSMENT & PLAN NOTE
Resolved. She thinks this is macrodantin related. I am not so sure but certainly possible. However multiple med allergies in the past suggesting she may focus on this issue. ?meds or substance at home? ongoing PT/OT

## 2019-02-11 NOTE — PROGRESS NOTES
Dr Omalley notified that pt has not urinated since admit. Bladder scan reveals 400 mL and pt still has no urge to urinate. New orders received. Day nurse Rhonda LAUREN made aware of newly received orders.

## 2019-02-11 NOTE — PROGRESS NOTES
Complete neuro check with pt and removed IV. Pt stating taking lasix at home. Pt currently has garcia in place with scheduled Flomax, pt educated about medication. Pt stating understanding.

## 2019-02-11 NOTE — ASSESSMENT & PLAN NOTE
No further events.   ?med vs recent uti related.   No further infection noted an doff recent macrodantin.

## 2019-02-11 NOTE — PROGRESS NOTES
Report given to Rhonda LAUREN. POC discussed. Pt resting comfortably in bed. Safety precautions in place.

## 2019-02-11 NOTE — THERAPY
"Physical Therapy Evaluation completed.   Bed Mobility:  Supine to Sit: Stand by Assist  Transfers: Sit to Stand: Contact Guard Assist  Gait: Level Of Assist: Contact Guard Assist with Front-Wheel Walker       Plan of Care: Will benefit from Physical Therapy 4 times per week  Discharge Recommendations: Equipment: Will Continue to Assess for Equipment Needs. Post-acute therapy Discharge to home with outpatient or home health for additional skilled therapy services vs SNF pending progress.    Pt is a 82 yo female with diagnosis of stroke like symptoms of slurred speech. This appears to have resolved, pt is presenting with impaired balance and mild weakness requiring use of FWW for balance and CGA for safety when ambulating. Pt lives alone and therefore mobility will need to improve prior to DC home otherwise will need short SNF stay.    See \"Rehab Therapy-Acute\" Patient Summary Report for complete documentation.     "

## 2019-02-11 NOTE — H&P
Hospital Medicine History & Physical Note    Date of Service  2/10/2019    Primary Care Physician  Christen Shields M.D.    Consultants  None    Code Status  Full    Chief Complaint  Weakness    History of Presenting Illness  83 y.o. female who presented 2/10/2019 with weakness.  Patient states symptoms started Saturday night with nausea and vomiting, she vomited again today.  Because of this she felt fatigued, slept all day today whenever she went to get up she was unable to.  Patient then called 911.  Upon arrival by [facility], they noted that she had slurred speech which she then noted.  Slurred speech has persisted.  Patient states one reason she went to bed is because everything hurt, that does seem to have improved.  She is a bit of a poor historian.    Review of Systems  Review of Systems   Constitutional: Positive for malaise/fatigue. Negative for chills and fever.   HENT: Negative for congestion.    Respiratory: Negative for cough, sputum production, shortness of breath and stridor.    Cardiovascular: Negative for chest pain, palpitations and leg swelling.   Gastrointestinal: Positive for nausea and vomiting. Negative for abdominal pain, constipation and diarrhea.   Genitourinary: Negative for dysuria and urgency.   Musculoskeletal: Negative for falls and myalgias.   Neurological: Positive for speech change and weakness. Negative for dizziness, tingling, loss of consciousness and headaches.   Psychiatric/Behavioral: Negative for depression and suicidal ideas.   All other systems reviewed and are negative.      Past Medical History   has a past medical history of Arthritis; C. difficile diarrhea (2/16); C. difficile diarrhea (3/2016); Cancer ([facility]) (1947); Chronic back pain; Depression; Elbow fracture, left; Heart burn; History of anemia; Hypothyroid; MRSA (methicillin resistant Staphylococcus aureus); MRSA (methicillin resistant Staphylococcus aureus) (2012); Pain; Stroke ([facility]) (1990's?); and Urinary  incontinence.    Surgical History   has a past surgical history that includes blepharoplasty (3/31/2011); cholecystectomy (2000); colonoscopy - endo (N/A, 3/7/2016); fecal transplant (N/A, 3/7/2016); hysterectomy radical (1985); other (Bilateral, 2010, 2008); other orthopedic surgery (Left, 1970s); other orthopedic surgery (Left, 2006); other orthopedic surgery (Left, 2012); hysterectomy laparoscopy (2000); foot surgery (Right, 2010); and shoulder arthroplasty total (Right, 1/9/2018).     Family History  Reviewed, noncontributory    Social History   reports that she quit smoking about 34 years ago. Her smoking use included Cigarettes. She has a 20.00 pack-year smoking history. She has never used smokeless tobacco. She reports that she drinks alcohol. She reports that she does not use drugs.    Allergies  Allergies   Allergen Reactions   • Azithromycin Unspecified     Matti Johnsons syndrome   • Erythromycin Unspecified     Matti Johnsons syndrome   • Nitrofurantoin Vomiting and Nausea   • Pcn [Penicillins] Anaphylaxis, Shortness of Breath and Swelling   • Sulfa Drugs Swelling   • Cefuroxime    • Ciprofloxacin      Does not recall   • Clindamycin    • Codeine Itching   • Daptomycin      Matti colleen syndrome     • Flagyl [Metronidazole] Vomiting and Nausea   • Morphine Itching   • Premarin Unspecified     burning   • Rifampin      Matti colleen syndrome       Medications  Prior to Admission Medications   Prescriptions Last Dose Informant Patient Reported? Taking?   Probiotic Cap   Yes No   Sig: Take 1 Cap by mouth every day.   citalopram (CELEXA) 20 MG Tab   Yes No   Sig: Take 20 mg by mouth every bedtime.   cyclosporin (RESTASIS) 0.05 % ophthalmic emulsion   Yes No   Sig: Place 1 Drop in both eyes 2 times a day.   denosumab (PROLIA) 60 MG/ML Solution   Yes No   Sig: Inject 60 mg as instructed Once. Twice a year   furosemide (LASIX) 20 MG Tab   Yes No   Sig: Take  by mouth every day.   hydrocodone/acetaminophen  (NORCO)  MG Tab   Yes No   Sig: Take 0.5 Tabs by mouth every four hours as needed.   levothyroxine (SYNTHROID) 112 MCG Tab   Yes No   Sig: Take 112 mcg by mouth Every morning on an empty stomach.   naproxen (NAPROSYN) 250 MG Tab   No No   Sig: Take 1 Tab by mouth 2 Times a Day.   spironolactone (ALDACTONE) 25 MG Tab   Yes No   Sig: Take 25 mg by mouth every day.   tramadol (ULTRAM) 50 MG Tab   Yes No   Sig: Take 50 mg by mouth every four hours as needed.      Facility-Administered Medications: None       Physical Exam  Temp:  [36.7 °C (98 °F)] 36.7 °C (98 °F)  Pulse:  [45-97] 97  Resp:  [18] 18  BP: (96)/(51) 96/51  SpO2:  [90 %] 90 %    Physical Exam   Constitutional: She is oriented to person, place, and time. She appears cachectic. She is cooperative. No distress.   HENT:   Head: Normocephalic and atraumatic. Not macrocephalic and not microcephalic. Head is without raccoon's eyes and without Cash's sign.   Right Ear: External ear normal.   Left Ear: External ear normal.   Mouth/Throat: Mucous membranes are dry. No oropharyngeal exudate.   Eyes: Conjunctivae are normal. Right eye exhibits no discharge. Left eye exhibits no discharge. No scleral icterus.   Neck: Neck supple. No tracheal deviation present.   Cardiovascular: Normal rate, regular rhythm and intact distal pulses.  Exam reveals no gallop, no distant heart sounds and no friction rub.    No murmur heard.  Pulmonary/Chest: Effort normal and breath sounds normal. No accessory muscle usage or stridor. No tachypnea and no bradypnea. No respiratory distress. She has no decreased breath sounds. She has no wheezes. She has no rhonchi. She has no rales. She exhibits no tenderness.   Abdominal: Soft. Bowel sounds are normal. She exhibits no distension. There is no hepatosplenomegaly, splenomegaly or hepatomegaly. There is no tenderness. There is no rebound and no guarding.   Musculoskeletal: Normal range of motion. She exhibits no edema or tenderness.    Lymphadenopathy:     She has no cervical adenopathy.   Neurological: She is alert and oriented to person, place, and time. She displays no seizure activity.   Slurred speech    Skin: Skin is warm, dry and intact. No rash noted. She is not diaphoretic. No erythema. No pallor.   Psychiatric: Her speech is normal and behavior is normal. Judgment and thought content normal. Cognition and memory are normal.   Odd affect   Nursing note and vitals reviewed.      Laboratory:          No results for input(s): ALTSGPT, ASTSGOT, ALKPHOSPHAT, TBILIRUBIN, DBILIRUBIN, GAMMAGT, AMYLASE, LIPASE, ALB, PREALBUMIN, GLUCOSE in the last 72 hours.              No results for input(s): TROPONINI in the last 72 hours.    Urinalysis:    No results found     Imaging:  DX-CHEST-2 VIEWS   Final Result      No acute cardiopulmonary abnormality.               INTERPRETING LOCATION: [address] [place] [address], [facility]      CT-HEAD W/O    (Results Pending)   MR-BRAIN-W/O    (Results Pending)         Assessment/Plan:  I anticipate this patient is appropriate for observation status at this time.    * Stroke-like symptom   Assessment & Plan    -Has equal strength on exam but does have slurred speech  -Obtain MRI of the brain  -If the MRI of the brain does show stroke, will complete the workup  -At this point in time this is been going on long enough that TPA certainly would not be an option, NIH score would be low regardless  -Permissive hypertension  -Obtain PT/OT     Hypothyroidism   Assessment & Plan    -Continue Synthroid at 112 mcg  -Most recent TSH was normal at 0.41, approximately 6 months ago     Nausea and vomiting   Assessment & Plan    -Seems improved currently  -Symptomatic care with Zofran     Protein-calorie malnutrition, severe (HCC)   Assessment & Plan    -Bedside swallow evaluation first, then monitor oral intake  -May need boost     Depression- (present on admission)   Assessment & Plan    -Continue Celexa         VTE prophylaxis: SCDs

## 2019-02-11 NOTE — CARE PLAN
Problem: Safety  Goal: Will remain free from injury  Outcome: PROGRESSING AS EXPECTED  Non-skid socks in use. Call light in reach. Pt instructed to call for help. Hourly rounding complete. Bed locked and in low position. Bed alarm on. Pt verbalized understanding of safety care plan.      Problem: Knowledge Deficit  Goal: Knowledge of disease process/condition, treatment plan, diagnostic tests, and medications will improve  Outcome: PROGRESSING AS EXPECTED  Pt knowledge level assessed. Pt educated on disease process/condition, POC, diagnostic tests, and medications. Stroke education given and reviewed. Pt verbalized understanding of care plan.

## 2019-02-11 NOTE — THERAPY
"Speech Language Therapy Clinical Swallow Evaluation completed.    Functional Status: Pt was seen at bedside for CSE. Pt was AAOx4 and denied baseline swallowing difficulties, hx of PNA and/or other swallowing difficulties. Pt typically consumes a regular diet at baseline. Oral mech revealed slightly reduced labial strength and reduced laryngeal elevation, and Pt reported some pain/discomfort under her tongue and around the edges, due to mouth sores. Pt with intact dentition. Pt agreeable to PO trials.     Pt was administered PO trials of ice chips, thin liquids (tsps, single sips), NTL (single sips), pureed solids, soft/mixed solids and dry/regular textures. Pt demonstrated no subtle nor overt clinical s/sx of aspiration/penetration across trials/textures. Pt demonstrated appropriate bolus size and feeding rate independently, and denied globus sensation. Pt was receptive to safe swallow precautions and verbalized understanding. Given adherence to safe swallow precautions, Pt is recommended for PO diet of Regular solids, Thin liquids and pills per tolerance/preference. Pt with slightly slurred/imprecise speech; however, this may be due to swollen/irritated tongue secondary to mouth sores. Will f/u to ensure tolerance of diet. RN updated and aware. SLP following. Thank you for the consult.     Recommendations - Diet: Regular, Thin Liquid                          Strategies: Monitor during meals and Head of Bed at 90 Degrees                          Medication Administration: Whole with Liquid Wash    Plan of Care: Will benefit from Speech Therapy 2 times per week  Post-Acute Therapy: Recommend outpatient or home health transitional care services for continued speech therapy services    See \"Rehab Therapy-Acute\" Patient Summary Report for complete documentation.   "

## 2019-02-11 NOTE — PROGRESS NOTES
· 2 RN skin check complete.  · Completed with Ana  · Devices in place: none  · Skin assessed under devices: n/a  · The following interventions in place: Pt turns self side to side, Pillows for support.  · Entire trunk is red/flushed but blanches. Otherwise, skin loose but WDL.

## 2019-02-11 NOTE — PROGRESS NOTES
Assumed care of pt at 0740. A/Ox4, discussed plan of care. Pt on room air, pt strict NPO, up with 1 person assist. Pt Q4 neuro, pt slow to answer RNs question this AM. All needs met at this time. Bed in lowest position, treaded socks on, personal belongings and call light within reach, instructed to call for any assistance.

## 2019-02-11 NOTE — CARE PLAN
Problem: Communication  Goal: The ability to communicate needs accurately and effectively will improve  Outcome: PROGRESSING AS EXPECTED  Bladder scan done, 580 in bladder. Pt stating unable to urinate at this time. Orders to insert garcia. Garcia placed, pt educated.     Problem: Safety  Goal: Will remain free from injury  Outcome: PROGRESSING AS EXPECTED  Pt has bed alarm in place, pt educated to call staff for help if wanting to ambulate. Pt stating understanding.

## 2019-02-11 NOTE — ED TRIAGE NOTES
"Chief Complaint   Patient presents with   • Weakness     Pt c/o generalized weakness and slurred speech, pt believes it is due to her UTI medication \"macrodantin\"     BP (!) 96/51   Pulse (!) 45   Temp 36.7 °C (98 °F) (Temporal)   Resp 18   Ht 1.676 m (5' 6\")   Wt 65 kg (143 lb 4.8 oz)   BMI 23.13 kg/m²     Pt BIB [facility] from home, pt originally called for help after slipping out of chair, EMS noticed slurred speech and advised pt to come to ER for check. Pt has GCS of 15 and negative NIHS per EMS.  "

## 2019-02-11 NOTE — PROGRESS NOTES
Med rec updated and complete  Allergies reviewed.  Pt was not sure what antibiotic she was on.  Called [facility] @ [phone] to verify her antibiotic.

## 2019-02-11 NOTE — PROGRESS NOTES
EKG Report:    MA:0.16  QRS:0.06  QT:0.36  Rhythm: SR-ST, 90s-110s    Ectopy:freq PVC/bigem/trigem

## 2019-02-11 NOTE — PROGRESS NOTES
Hospital Medicine Daily Progress Note    Date of Service  2/11/2019    Chief Complaint  83 y.o. female admitted 2/10/2019 with nausea and vomiting followed by slurred speech.     Hospital Course    She was admitted with a concern for a possible CVA and also found to have dehydration and urinary retention. She had a normal MRI and believes that her symptoms were from a recent UTi and macrodantin. However she says she has multiple allergies in the past as well. She was observed and improved through her hospital course aside from being very weak.        Interval Problem Update  I reviewed her MRi with her at the bedside. PT is recommending SNF Vs HH. She is very weak.     Consultants/Specialty  none    Code Status  FULL- family needs to be involved in any discussion about this. They were not here today.     Disposition  Home with HH vs SNF given weakness.     Review of Systems  Review of Systems   Constitutional: Negative for chills and fever.   HENT: Negative for sore throat.    Eyes: Negative for blurred vision and pain.   Respiratory: Negative for cough and shortness of breath.    Cardiovascular: Negative for chest pain and palpitations.   Gastrointestinal: Negative for abdominal pain, nausea and vomiting.   Genitourinary: Negative for dysuria and urgency.   Musculoskeletal: Negative for back pain and neck pain.   Skin: Negative for itching and rash.   Neurological: Positive for weakness. Negative for dizziness, tingling and headaches.   Psychiatric/Behavioral: Negative for depression. The patient does not have insomnia.    All other systems reviewed and are negative.       Physical Exam  Temp:  [36.6 °C (97.8 °F)-38.1 °C (100.5 °F)] 36.6 °C (97.8 °F)  Pulse:  [45-99] 86  Resp:  [18-20] 20  BP: ()/(47-51) 113/47  SpO2:  [52 %-90 %] 89 %    Physical Exam   Constitutional: She is oriented to person, place, and time. She appears well-developed and well-nourished. No distress.   Patient seen and  examined  Discussed plan with RN   HENT:   Right Ear: External ear normal.   Left Ear: External ear normal.   Nose: Nose normal.   Eyes: Conjunctivae are normal. Right eye exhibits no discharge. Left eye exhibits no discharge.   Neck: No JVD present.   Cardiovascular: Regular rhythm and normal heart sounds.    No murmur heard.  Cap refill 2sec  Pulses 2+ throughout     Pulmonary/Chest: Effort normal and breath sounds normal. No stridor. No respiratory distress. She has no wheezes. She has no rales.   Abdominal: Soft. Bowel sounds are normal. She exhibits no distension. There is no tenderness.   Musculoskeletal: She exhibits no edema or tenderness.   Neurological: She is alert and oriented to person, place, and time.   Poor memory.    Skin: Skin is warm and dry. She is not diaphoretic. No erythema.   Normal skin  Color.    Psychiatric: She has a normal mood and affect. Her behavior is normal.   Nursing note and vitals reviewed.      Fluids  No intake or output data in the 24 hours ending 02/11/19 1001    Laboratory  Recent Labs      02/10/19   2222  02/11/19   0425   WBC  12.1*  11.0*   RBC  3.73*  3.64*   HEMOGLOBIN  11.4*  11.1*   HEMATOCRIT  33.6*  33.8*   MCV  90.1  92.9   MCH  30.6  30.5   MCHC  33.9  32.8*   RDW  49.8  52.2*   PLATELETCT  243  239   MPV  8.7*  9.6     Recent Labs      02/10/19   2222  02/11/19   0425   SODIUM  129*  129*   POTASSIUM  2.9*  3.3*   CHLORIDE  101  100   CO2  19*  18*   GLUCOSE  115*  108*   BUN  41*  41*   CREATININE  1.26  1.33   CALCIUM  8.4  8.3*             Recent Labs      02/11/19   0425   TRIGLYCERIDE  115   HDL  44   LDL  80       Imaging  MR-BRAIN-W/O   Final Result      1.  No evidence of acute territorial infarct, intracranial hemorrhage or mass lesion.   2.  Chronic left cerebellar lacunar infarct.   3.  Moderate diffuse cerebral substance loss.   4.  Mild microangiopathic ischemic change versus demyelination or gliosis.      CT-HEAD W/O   Final Result      1.  No  acute intracranial abnormality.   2.  Age-consistent atrophy.               INTERPRETING LOCATION:  [address], [place], [address]      DX-CHEST-2 VIEWS   Final Result      No acute cardiopulmonary abnormality.               INTERPRETING LOCATION: [address], [place], [address]           Assessment/Plan  * Stroke-like symptom   Assessment & Plan    Resolved. She thinks this is macrodantin related. I am not so sure but certainly possible. However multiple med allergies in the past suggesting she may focus on this issue. ?meds or substance at home? ongoing PT/OT     Dehydration- (present on admission)   Assessment & Plan    Iv fluids ongoing. Recheck labs in am.      Urinary retention   Assessment & Plan    Rao had to be placed. Will add flomax. Consider removal in am.      ARF (acute renal failure) (HCC)   Assessment & Plan    Worse. Add iv fluids and trend.      Metabolic acidosis   Assessment & Plan    Iv fluids and trend. Not improve.d      Hypothyroidism   Assessment & Plan    -Continue Synthroid at 112 mcg  -Most recent TSH was normal at 0.41, approximately 6 months ago     Nausea and vomiting   Assessment & Plan    No further events.   ?med vs recent uti related.   No further infection noted an doff recent macrodantin.      Protein-calorie malnutrition, severe ([facility])   Assessment & Plan    Nutrition following.      Anemia- (present on admission)   Assessment & Plan    Fe, b12,folate ordered       CKD (chronic kidney disease) stage 3, GFR 30-59 ml/min ([facility])- (present on admission)   Assessment & Plan    Trend creat with fluids .     Depression- (present on admission)   Assessment & Plan    -Continue Celexa     Hypokalemia- (present on admission)   Assessment & Plan    Replace and trend     Hyponatremia- (present on admission)   Assessment & Plan    Iv fluids and trend     Rheumatoid arthritis (HCC)- (present on admission)   Assessment & Plan    Appears stable.           VTE prophylaxis: scd

## 2019-02-11 NOTE — ED PROVIDER NOTES
"ED Provider Note      Means of Arrival: EMS  History obtained from: Patient, medical record      CHIEF COMPLAINT  Chief Complaint   Patient presents with   • Weakness     Pt c/o generalized weakness and slurred speech, pt believes it is due to her UTI medication \"macrodantin\"       HPI  Elizabeth Celis is a 83 y.o. female who presents with weakness.  Patient reports that yesterday she began feeling weak and had an episode of vomiting.  She also vomited again today and was very weak to where she could not get out of a chair.  She reports that she had a recent positive urinalysis and was started on an antibiotic for this.  The patient reports that years ago when she had the same antibiotic, she had episodes of vomiting, however did not have the weakness.  Patient also reports slurred speech.  She denies any focal weakness in any of her extremities.  Denies any chest pain.  She does report intermittent cough.  Denies abdominal pain, dysuria, hematuria, flank pain.  Denies any visual changes.    REVIEW OF SYSTEMS  CONSTITUTIONAL:  No fever.  CARDIOVASCULAR:  No chest discomfort.  RESPIRATORY:  No pleuritic chest pain.  GASTROINTESTINAL:  No abdominal pain.  GENITOURINARY:   No dysuria.  MUSCULOSKELETAL:  No arthralgia.  SKIN:  No rash or suspicious lesions.  NEUROLOGIC:   No headache.  ENDOCRINE:  No facial edema.  HEMATOLOGIC:  No abnormal bleeding.       See HPI for further details.   All other systems are negative.     PAST MEDICAL HISTORY  Past Medical History:   Diagnosis Date   • Arthritis     RA- hands, feet, shoulder   • C. difficile diarrhea 2/16   • C. 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    \"mini\" no residual symptoms   • Urinary incontinence     occasional       FAMILY HISTORY  Family History   Problem Relation Age of Onset   • Non-contributory Mother    • Non-contributory Father    • Anesthesia Paternal Grandfather         death       SOCIAL HISTORY   reports that she quit smoking about 34 years ago. Her smoking use included Cigarettes. She has a 20.00 pack-year smoking history. She has never used smokeless tobacco. She reports that she drinks alcohol. She reports that she does not use drugs.    SURGICAL HISTORY  Past Surgical History:   Procedure Laterality Date   • SHOULDER ARTHROPLASTY TOTAL Right 1/9/2018    Procedure: SHOULDER ARTHROPLASTY TOTAL - REVERSE;  Surgeon: Daniella Camargo M.D.;  Location: SURGERY [facility];  Service: Orthopedics   • COLONOSCOPY - ENDO N/A 3/7/2016    Procedure: COLONOSCOPY - ENDO;  Surgeon: Estiven Ayers M.D.;  Location: ENDOSCOPY [facility];  Service:    • FECAL TRANSPLANT N/A 3/7/2016    Procedure: FECAL TRANSPLANT;  Surgeon: Estiven Ayers M.D.;  Location: ENDOSCOPY [facility];  Service:    • OTHER ORTHOPEDIC SURGERY Left 2012    Left Elbow fx repair   • BLEPHAROPLASTY  3/31/2011    Performed by ORACIO DIAZ at [facility]   • FOOT SURGERY Right 2010   • OTHER ORTHOPEDIC SURGERY Left 2006    finger- removal of digit Left middle finger   • CHOLECYSTECTOMY  2000   • HYSTERECTOMY LAPAROSCOPY  2000   • HYSTERECTOMY RADICAL  1985   • OTHER Bilateral 2010, 2008    feet- repair torn tendons   • OTHER ORTHOPEDIC SURGERY Left 1970s    femur fx       CURRENT MEDICATIONS  Home Medications    **Home medications have not yet been reviewed for this encounter**         ALLERGIES  Allergies   Allergen Reactions   • Azithromycin Unspecified     Matti Johnsons syndrome   • Erythromycin Unspecified     Matti Johnsons syndrome   • Nitrofurantoin Vomiting and Nausea   • Pcn " "[Penicillins] Anaphylaxis, Shortness of Breath and Swelling   • Sulfa Drugs Swelling   • Cefuroxime    • Ciprofloxacin      Does not recall   • Clindamycin    • Codeine Itching   • Daptomycin      Matti colleen syndrome     • Flagyl [Metronidazole] Vomiting and Nausea   • Morphine Itching   • Premarin Unspecified     burning   • Rifampin      Matti colleen syndrome       PHYSICAL EXAM  VITAL SIGNS: BP (!) 96/51   Pulse (!) 56   Temp 36.7 °C (98 °F) (Temporal)   Resp 18   Ht 1.676 m (5' 6\")   Wt 65 kg (143 lb 4.8 oz)   SpO2 (!) 87%   BMI 23.13 kg/m²    Gen: Alert  HENT: ATNC, dry mucous membranes  Eyes: Normal conjunctiva  Neck: trachea midline  Resp: no respiratory distress, clear to auscultation  CV: No JVD, regular rate and rhythm, no murmurs, rubs, gallops  Abd: non-distended, nontender  : No CVA tenderness  Ext: No deformities  Psych: normal mood  Neuro: Slight slurring of speech, difficulty with listing months in reverse.  Alert and oriented by 4.  No pronator or leg drift.  Symmetric smile.      RADIOLOGY/PROCEDURES  CT-HEAD W/O   Final Result      1.  No acute intracranial abnormality.   2.  Age-consistent atrophy.               INTERPRETING LOCATION:  [address] [place] [address], [address]      DX-CHEST-2 VIEWS   Final Result      No acute cardiopulmonary abnormality.               INTERPRETING LOCATION: [address] [place] [address], [address]      MR-BRAIN-W/O    (Results Pending)       LABS  Results for orders placed or performed during the hospital encounter of 02/10/19   CBC WITH DIFFERENTIAL   Result Value Ref Range    WBC 12.1 (H) 4.8 - 10.8 K/uL    RBC 3.73 (L) 4.20 - 5.40 M/uL    Hemoglobin 11.4 (L) 12.0 - 16.0 g/dL    Hematocrit 33.6 (L) 37.0 - 47.0 %    MCV 90.1 81.4 - 97.8 fL    MCH 30.6 27.0 - 33.0 pg    MCHC 33.9 33.6 - 35.0 g/dL    RDW 49.8 35.9 - 50.0 fL    Platelet Count 243 164 - 446 K/uL    MPV 8.7 (L) 9.0 - 12.9 fL    Neutrophils-Polys 90.40 (H) 44.00 - 72.00 %    Lymphocytes 3.70 (L) 22.00 - 41.00 " %    Monocytes 3.60 0.00 - 13.40 %    Eosinophils 1.80 0.00 - 6.90 %    Basophils 0.20 0.00 - 1.80 %    Immature Granulocytes 0.30 0.00 - 0.90 %    Nucleated RBC 0.00 /100 WBC    Neutrophils (Absolute) 10.89 (H) 2.00 - 7.15 K/uL    Lymphs (Absolute) 0.44 (L) 1.00 - 4.80 K/uL    Monos (Absolute) 0.43 0.00 - 0.85 K/uL    Eos (Absolute) 0.22 0.00 - 0.51 K/uL    Baso (Absolute) 0.03 0.00 - 0.12 K/uL    Immature Granulocytes (abs) 0.04 0.00 - 0.11 K/uL    NRBC (Absolute) 0.00 K/uL   COMP METABOLIC PANEL   Result Value Ref Range    Sodium 129 (L) 135 - 145 mmol/L    Potassium 2.9 (L) 3.6 - 5.5 mmol/L    Chloride 101 96 - 112 mmol/L    Co2 19 (L) 20 - 33 mmol/L    Anion Gap 9.0 0.0 - 11.9    Glucose 115 (H) 65 - 99 mg/dL    Bun 41 (H) 8 - 22 mg/dL    Creatinine 1.26 0.50 - 1.40 mg/dL    Calcium 8.4 8.4 - 10.2 mg/dL    AST(SGOT) 49 (H) 12 - 45 U/L    ALT(SGPT) 34 2 - 50 U/L    Alkaline Phosphatase 38 30 - 99 U/L    Total Bilirubin 0.9 0.1 - 1.5 mg/dL    Albumin 3.8 3.2 - 4.9 g/dL    Total Protein 6.6 6.0 - 8.2 g/dL    Globulin 2.8 1.9 - 3.5 g/dL    A-G Ratio 1.4 g/dL   LIPASE   Result Value Ref Range    Lipase 30 7 - 58 U/L   TROPONIN   Result Value Ref Range    Troponin I <0.02 0.00 - 0.04 ng/mL   ESTIMATED GFR   Result Value Ref Range    GFR If  49 (A) >60 mL/min/1.73 m 2    GFR If Non  41 (A) >60 mL/min/1.73 m 2   EKG (NOW)   Result Value Ref Range    Report       [facility]    Test Date:  2019-02-10  Pt Name:    RODO CABELLO             Department: [facility]  MRN:        [id number]                      Room:       [facility]  Gender:     Female                       Technician: SINGH  :        [id number]                   Requested By:CHAPARRO WILKINSON  Order #:    [id number]                    Reading MD:    Measurements  Intervals                                Axis  Rate:       88                           P:          52  HI:         164                           QRS:        47  QRSD:       84                           T:          77  QT:         383  QTc:        464    Interpretive Statements  Sinus rhythm  Borderline low voltage, extremity leads  Compared to ECG 01/12/2018 17:38:45  No significant changes           COURSE & MEDICAL DECISION MAKING  Pertinent Labs & Imaging studies reviewed. (See chart for details)    Patient presents with weakness, slurred speech.  Possible sequelae of urinary tract infection inadequately treated, pneumonia, stroke, occult MI.  Will obtain troponins, EKG, chest x-ray, CT scan of the head, urinalysis, labs.  Low suspicion for ascending cholangitis or other intra-abdominal infection given the patient's reassuring abdominal exam.  Patient has no focal findings to suggest stroke aside from slurred speech and difficulty with attention.  She is outside of the window for TPA or embolectomy.    10:53 PM  Patient has mild hyponatremia, hypokalemia, non-anion gap metabolic acidosis with mild uremia.  Unclear etiology as the patient does not appear to have significant diarrhea.  Possible renal tubular acidosis.  Patient's chest x-ray and CT scan of the head did not demonstrate any clear etiologies.  Troponin is negative.  Continued unclear etiology of the patient's weakness.  Patient will be admitted for weakness and further workup.      FINAL IMPRESSION  1. Weakness    2. Slurred speech    3. Hypokalemia    4. Hyponatremia    5. Metabolic acidosis

## 2019-02-11 NOTE — PROGRESS NOTES
Dr. Omalley notified of pt's potassium of 2.9, sodium of 129, and pt c/o heartburn. New orders received.

## 2019-02-11 NOTE — CARE PLAN
Problem: Nutritional:  Goal: Achieve adequate nutritional intake  Advance diet beyond CLD per SLP eval. Patient will consume 50% or greater of meals  Outcome: NOT MET

## 2019-02-12 VITALS
WEIGHT: 149.47 LBS | RESPIRATION RATE: 18 BRPM | HEART RATE: 92 BPM | BODY MASS INDEX: 23.46 KG/M2 | DIASTOLIC BLOOD PRESSURE: 49 MMHG | TEMPERATURE: 98.3 F | OXYGEN SATURATION: 92 % | HEIGHT: 67 IN | SYSTOLIC BLOOD PRESSURE: 126 MMHG

## 2019-02-12 LAB
ALBUMIN SERPL BCP-MCNC: 2.8 G/DL (ref 3.2–4.9)
ALBUMIN/GLOB SERPL: 1.1 G/DL
ALP SERPL-CCNC: 44 U/L (ref 30–99)
ALT SERPL-CCNC: 31 U/L (ref 2–50)
ANION GAP SERPL CALC-SCNC: 7 MMOL/L (ref 0–11.9)
AST SERPL-CCNC: 39 U/L (ref 12–45)
BASOPHILS # BLD AUTO: 0.2 % (ref 0–1.8)
BASOPHILS # BLD: 0.01 K/UL (ref 0–0.12)
BILIRUB SERPL-MCNC: 0.6 MG/DL (ref 0.1–1.5)
BUN SERPL-MCNC: 39 MG/DL (ref 8–22)
CALCIUM SERPL-MCNC: 8.2 MG/DL (ref 8.4–10.2)
CHLORIDE SERPL-SCNC: 104 MMOL/L (ref 96–112)
CO2 SERPL-SCNC: 20 MMOL/L (ref 20–33)
CREAT SERPL-MCNC: 1.21 MG/DL (ref 0.5–1.4)
EOSINOPHIL # BLD AUTO: 0.54 K/UL (ref 0–0.51)
EOSINOPHIL NFR BLD: 8.3 % (ref 0–6.9)
ERYTHROCYTE [DISTWIDTH] IN BLOOD BY AUTOMATED COUNT: 52.8 FL (ref 35.9–50)
GLOBULIN SER CALC-MCNC: 2.6 G/DL (ref 1.9–3.5)
GLUCOSE SERPL-MCNC: 89 MG/DL (ref 65–99)
HCT VFR BLD AUTO: 32.8 % (ref 37–47)
HGB BLD-MCNC: 10.8 G/DL (ref 12–16)
IMM GRANULOCYTES # BLD AUTO: 0.02 K/UL (ref 0–0.11)
IMM GRANULOCYTES NFR BLD AUTO: 0.3 % (ref 0–0.9)
LYMPHOCYTES # BLD AUTO: 0.75 K/UL (ref 1–4.8)
LYMPHOCYTES NFR BLD: 11.5 % (ref 22–41)
MAGNESIUM SERPL-MCNC: 2.1 MG/DL (ref 1.5–2.5)
MCH RBC QN AUTO: 30.5 PG (ref 27–33)
MCHC RBC AUTO-ENTMCNC: 32.9 G/DL (ref 33.6–35)
MCV RBC AUTO: 92.7 FL (ref 81.4–97.8)
MONOCYTES # BLD AUTO: 0.34 K/UL (ref 0–0.85)
MONOCYTES NFR BLD AUTO: 5.2 % (ref 0–13.4)
NEUTROPHILS # BLD AUTO: 4.84 K/UL (ref 2–7.15)
NEUTROPHILS NFR BLD: 74.5 % (ref 44–72)
NRBC # BLD AUTO: 0 K/UL
NRBC BLD-RTO: 0 /100 WBC
PLATELET # BLD AUTO: 231 K/UL (ref 164–446)
PMV BLD AUTO: 9.4 FL (ref 9–12.9)
POTASSIUM SERPL-SCNC: 3.7 MMOL/L (ref 3.6–5.5)
PROT SERPL-MCNC: 5.4 G/DL (ref 6–8.2)
RBC # BLD AUTO: 3.54 M/UL (ref 4.2–5.4)
SODIUM SERPL-SCNC: 131 MMOL/L (ref 135–145)
WBC # BLD AUTO: 6.5 K/UL (ref 4.8–10.8)

## 2019-02-12 PROCEDURE — A9270 NON-COVERED ITEM OR SERVICE: HCPCS | Performed by: HOSPITALIST

## 2019-02-12 PROCEDURE — 99239 HOSP IP/OBS DSCHRG MGMT >30: CPT | Performed by: INTERNAL MEDICINE

## 2019-02-12 PROCEDURE — A9270 NON-COVERED ITEM OR SERVICE: HCPCS | Performed by: INTERNAL MEDICINE

## 2019-02-12 PROCEDURE — 83735 ASSAY OF MAGNESIUM: CPT

## 2019-02-12 PROCEDURE — 700102 HCHG RX REV CODE 250 W/ 637 OVERRIDE(OP): Performed by: INTERNAL MEDICINE

## 2019-02-12 PROCEDURE — 85025 COMPLETE CBC W/AUTO DIFF WBC: CPT

## 2019-02-12 PROCEDURE — 700102 HCHG RX REV CODE 250 W/ 637 OVERRIDE(OP): Performed by: HOSPITALIST

## 2019-02-12 PROCEDURE — 80053 COMPREHEN METABOLIC PANEL: CPT

## 2019-02-12 RX ORDER — ASPIRIN 81 MG/1
81 TABLET, CHEWABLE ORAL DAILY
Status: DISCONTINUED | OUTPATIENT
Start: 2019-02-12 | End: 2019-02-12 | Stop reason: HOSPADM

## 2019-02-12 RX ORDER — ATORVASTATIN CALCIUM 40 MG/1
40 TABLET, FILM COATED ORAL EVERY EVENING
Qty: 30 TAB | Refills: 4 | Status: SHIPPED | OUTPATIENT
Start: 2019-02-12 | End: 2019-03-27

## 2019-02-12 RX ORDER — TAMSULOSIN HYDROCHLORIDE 0.4 MG/1
0.4 CAPSULE ORAL
Qty: 7 CAP | Refills: 0 | Status: SHIPPED | OUTPATIENT
Start: 2019-02-13 | End: 2019-03-27

## 2019-02-12 RX ADMIN — LEVOTHYROXINE SODIUM 112 MCG: 112 TABLET ORAL at 05:05

## 2019-02-12 RX ADMIN — ASPIRIN 325 MG ORAL TABLET 325 MG: 325 PILL ORAL at 05:05

## 2019-02-12 RX ADMIN — TAMSULOSIN HYDROCHLORIDE 0.4 MG: 0.4 CAPSULE ORAL at 08:25

## 2019-02-12 RX ADMIN — ASPIRIN 81 MG CHEWABLE TABLET 81 MG: 81 TABLET CHEWABLE at 10:10

## 2019-02-12 RX ADMIN — Medication 1 CAPSULE: at 08:25

## 2019-02-12 NOTE — PROGRESS NOTES
Tele strip at 1849 shows SR w/ HR of 93    TN 0.16  QRS 0.08  QT 0.34          Telemetry Summary    Rhythm NSR, Asymptomatic ST  Rate 60s - low 100s  Ectopy Occasional PVCs, Rare trigeminy, per DUNG Collier       Telemetry monitoring strips placed in patient's chart.

## 2019-02-12 NOTE — PROGRESS NOTES
Pt aox4, resting in bed.  Ambulated to the chair w/ FWW, very steady.  Denies any pain or discomfort at this time. Discussed POC, verbalized udnerstanding. Will continue to monitor.

## 2019-02-12 NOTE — DISCHARGE PLANNING
Received Choice form at [id number]  Agency/Facility Name: [facility]  Referral sent per Choice form @ [id number]

## 2019-02-12 NOTE — PROGRESS NOTES
"Pt back to bed.  Pt appears to be comfortable, pt reports \"feeling better.\" Will continue to monitor.  "

## 2019-02-12 NOTE — THERAPY
"Occupational Therapy Evaluation completed.   Functional Status: Admitted with slurred speech, which has since resolved. Generalized weakness. Pleasant and cooperative; motivated for OOB activity. Performs ADL transfers with CGA, FWW. Donns socks/shoes with SBA. Rao in place. Grooming at sink with SBA. Tolerates UIC post session.       Plan of Care: Will benefit from Occupational Therapy 3 times per week  Discharge Recommendations:  Equipment: Will Continue to Assess for Equipment Needs.  Discharge to home with home health vs snf for additional skilled therapy services. Lives alone and pt would need additional Sup currently.    See \"Rehab Therapy-Acute\" Patient Summary Report for complete documentation.    "

## 2019-02-12 NOTE — DISCHARGE PLANNING
Anticipated Discharge Disposition: HHC     Action: Received orders for HHC. LSW spoke to pt and she would like to use [facility] as she has used them before. Received verbal from pt for choice form and faxed to [facility].     Called [facility], unfortunately they are having many issues with their fax machines and not getting referrals. Rita requested pt's referral be faxed to fax#[phone]. Rita stated that they will not be able to confirm acceptance until tomorrow. Updated attending MD and pt will likely be d/c home today once she is able to void. Per attending MD, she is fine with pt d/c home without HHC and per MD pt has a steady gait and is up self. LSW discussed with bedside RN and confirmed pt is ambulating and up self and pt is wanting to go home.     Barriers to Discharge: N/A.     Plan: As above, re-faxing HHC referral to [facility]. Pt may d/c home this evening and attending MD ok with pt not having HHC. LSW or [facility] will f/u with [facility] in the morning to see if they can accept pt.     Needs:   Acceptance to [facility]. MD ok with pt d/c home and not having HHC services.

## 2019-02-12 NOTE — PROGRESS NOTES
Bedside report received from LEONIDAS Otoole. Patient sitting up in chair, eating dinner. Denies any pain or discomfort. Plan of care discussed and questions answered. Needs addressed. No PIV in place and MD aware. Falls precautions remain in place and bed/chair alarms on. Call light within reach.

## 2019-02-12 NOTE — PROGRESS NOTES
Telemetry Shift Summary    Rhythm SR  HR Range 70's-80's  Ectopy occ PVC  Measurements .18/.08/.38        Normal Values  Rhythm SR  HR Range    Measurements 0.12-0.20 / 0.06-0.10  / 0.30-0.52

## 2019-02-12 NOTE — FACE TO FACE
Face to Face Supporting Documentation - Home Health    The encounter with this patient was in whole or in part the primary reason for home health admission.    Date of encounter:   Patient:                    MRN:                       YOB: 2019  Elizabeth Celis  [id number]  1935     Home health to see patient for:  Skilled Nursing care for assessment, interventions & education, Physical Therapy evaluation and treatment and Occupational therapy evaluation and treatment    Skilled need for:  Recent Deterioration of Health Status deconditioning and debility post TIA    Skilled nursing interventions to include:  Comment: assessment and monitoring    Homebound status evidenced by:  Need the aid of supportive devices such as crutches, canes, wheelchairs or walkers or Needs the assistance of another person in order to leave the home. Leaving home requires a considerable and taxing effort. There is a normal inability to leave the home.    Community Physician to provide follow up care: Christen Shields M.D.     Optional Interventions? No      I certify the face to face encounter for this home health care referral meets the CMS requirements and the encounter/clinical assessment with the patient was, in whole, or in part, for the medical condition(s) listed above, which is the primary reason for home health care. Based on my clinical findings: the service(s) are medically necessary, support the need for home health care, and the homebound criteria are met.  I certify that this patient has had a face to face encounter by myself.  Nicole Anderson M.D. - NPI: [id number]

## 2019-02-12 NOTE — CARE PLAN
Problem: Safety  Goal: Will remain free from falls  Outcome: PROGRESSING AS EXPECTED  No falls this shift. Ambulated with 1PA and FWW; gait steady. Calling appropriately for assistance. Falls precautions in place: bed in lowest   and locked position, side rails raised x 2, room near nurses station, call light within reach, bed alarm on, nonslip socks on, purposeful hourly rounding.       Problem: Hemodynamic Status  Goal: Stable Vital Signs and Fluid Balance  Outcome: PROGRESSING AS EXPECTED  No neurologic deficits noted overnight. NIHSS = 0. Hemodynamically stable with HR NSR on telemetry. SpO2 WNL on RA. Neuro checks done q4h.

## 2019-02-12 NOTE — CARE PLAN
Problem: Safety  Goal: Will remain free from falls  Outcome: PROGRESSING AS EXPECTED  Fall precautions in place, call light w/in reach.  Will continue to monitor.    Problem: Infection  Goal: Will remain free from infection  Outcome: PROGRESSING AS EXPECTED  Discussed proper hand hygiene, garcia care.  Verbalized understanding.

## 2019-02-13 NOTE — PROGRESS NOTES
Pt tolerated ambulating in evans using FWW w/ standby assisst.  Pt steady, denied any dizziness, no SOB. Will continue to monitor.

## 2019-02-13 NOTE — DISCHARGE PLANNING
Agency/Facility Name: [facility]  Spoke To: Janet  Outcome: Attempted to get status on referral, however, they stated that they never received it. Resent referral.

## 2019-02-13 NOTE — PROGRESS NOTES
Pt aox4, discharge instructions given, pt verbalized understanding.  All needs met at this time. Wheeled to the  w/ escort.

## 2019-02-13 NOTE — PROGRESS NOTES
Per MD, pt OK to d/c without HH. Can f/u with PCP for HH referral if patient decides she wants HH after d/c. Pt is ambulating without difficulty. Voided after garcia removal, 150 ml, 24ml PVR.

## 2019-02-13 NOTE — DISCHARGE INSTRUCTIONS
Discharge Instructions    Discharged to home by car with friend. Discharged via wheelchair, hospital escort: Yes.  Special equipment needed: none    Be sure to schedule a follow-up appointment with your primary care doctor or any specialists as instructed.     Discharge Plan:   Diet Plan: Discussed  Activity Level: Discussed  Confirmed Follow up Appointment: Patient to Call and Schedule Appointment  Confirmed Symptoms Management: Discussed  Medication Reconciliation Updated: Yes  Influenza Vaccine Indication: Not indicated: Previously immunized this influenza season and > 8 years of age    I understand that a diet low in cholesterol, fat, and sodium is recommended for good health. Unless I have been given specific instructions below for another diet, I accept this instruction as my diet prescription.   Other diet: Regular    Special Instructions: None      · Is patient discharged on Warfarin / Coumadin?   No     Depression / Suicide Risk    As you are discharged from this [facility] facility, it is important to learn how to keep safe from harming yourself.    Recognize the warning signs:  · Abrupt changes in personality, positive or negative- including increase in energy   · Giving away possessions  · Change in eating patterns- significant weight changes-  positive or negative  · Change in sleeping patterns- unable to sleep or sleeping all the time   · Unwillingness or inability to communicate  · Depression  · Unusual sadness, discouragement and loneliness  · Talk of wanting to die  · Neglect of personal appearance   · Rebelliousness- reckless behavior  · Withdrawal from people/activities they love  · Confusion- inability to concentrate     If you or a loved one observes any of these behaviors or has concerns about self-harm, here's what you can do:  · Talk about it- your feelings and reasons for harming yourself  · Remove any means that you might use to hurt yourself (examples: pills, rope, extension cords,  firearm)  · Get professional help from the community (Mental Health, Substance Abuse, psychological counseling)  · Do not be alone:Call your Safe Contact- someone whom you trust who will be there for you.  · Call your local CRISIS HOTLINE 722-3195 or 478-000-1447  · Call your local Children's Mobile Crisis Response Team [place] (359) 031-6429 or www.[employer]  · Call the toll free National Suicide Prevention Hotlines   · National Suicide Prevention Lifeline 112-726-JKLO (8805)  · [employer] Hope Line Network 800-SUICIDE (524-1086)          Dehydration, Adult  Dehydration is when there is not enough fluid or water in your body. This happens when you lose more fluids than you take in. Dehydration can range from mild to very bad. It should be treated right away to keep it from getting very bad.  Symptoms of mild dehydration may include:  · Thirst.  · Dry lips.  · Slightly dry mouth.  · Dry, warm skin.  · Dizziness.  Symptoms of moderate dehydration may include:  · Very dry mouth.  · Muscle cramps.  · Dark pee (urine). Pee may be the color of tea.  · Your body making less pee.  · Your eyes making fewer tears.  · Heartbeat that is uneven or faster than normal (palpitations).  · Headache.  · Light-headedness, especially when you stand up from sitting.  · Fainting (syncope).  Symptoms of very bad dehydration may include:  · Changes in skin, such as:  ¨ Cold and clammy skin.  ¨ Blotchy (mottled) or pale skin.  ¨ Skin that does not quickly return to normal after being lightly pinched and let go (poor skin turgor).  · Changes in body fluids, such as:  ¨ Feeling very thirsty.  ¨ Your eyes making fewer tears.  ¨ Not sweating when body temperature is high, such as in hot weather.  ¨ Your body making very little pee.  · Changes in vital signs, such as:  ¨ Weak pulse.  ¨ Pulse that is more than 100 beats a minute when you are sitting still.  ¨ Fast breathing.  ¨ Low blood pressure.  · Other changes, such as:  ¨ Sunken  eyes.  ¨ Cold hands and feet.  ¨ Confusion.  ¨ Lack of energy (lethargy).  ¨ Trouble waking up from sleep.  ¨ Short-term weight loss.  ¨ Unconsciousness.  Follow these instructions at home:  · If told by your doctor, drink an ORS:  ¨ Make an ORS by using instructions on the package.  ¨ Start by drinking small amounts, about ½ cup (120 mL) every 5-10 minutes.  ¨ Slowly drink more until you have had the amount that your doctor said to have.  · Drink enough clear fluid to keep your pee clear or pale yellow. If you were told to drink an ORS, finish the ORS first, then start slowly drinking clear fluids. Drink fluids such as:  ¨ Water. Do not drink only water by itself. Doing that can make the salt (sodium) level in your body get too low (hyponatremia).  ¨ Ice chips.  ¨ Fruit juice that you have added water to (diluted).  ¨ Low-calorie sports drinks.  · Avoid:  ¨ Alcohol.  ¨ Drinks that have a lot of sugar. These include high-calorie sports drinks, fruit juice that does not have water added, and soda.  ¨ Caffeine.  ¨ Foods that are greasy or have a lot of fat or sugar.  · Take over-the-counter and prescription medicines only as told by your doctor.  · Do not take salt tablets. Doing that can make the salt level in your body get too high (hypernatremia).  · Eat foods that have minerals (electrolytes). Examples include bananas, oranges, potatoes, tomatoes, and spinach.  · Keep all follow-up visits as told by your doctor. This is important.  Contact a doctor if:  · You have belly (abdominal) pain that:  ¨ Gets worse.  ¨ Stays in one area (localizes).  · You have a rash.  · You have a stiff neck.  · You get angry or annoyed more easily than normal (irritability).  · You are more sleepy than normal.  · You have a harder time waking up than normal.  · You feel:  ¨ Weak.  ¨ Dizzy.  ¨ Very thirsty.  · You have peed (urinated) only a small amount of very dark pee during 6-8 hours.  Get help right away if:  · You have symptoms of  very bad dehydration.  · You cannot drink fluids without throwing up (vomiting).  · Your symptoms get worse with treatment.  · You have a fever.  · You have a very bad headache.  · You are throwing up or having watery poop (diarrhea) and it:  ¨ Gets worse.  ¨ Does not go away.  · You have blood or something green (bile) in your throw-up.  · You have blood in your poop (stool). This may cause poop to look black and tarry.  · You have not peed in 6-8 hours.  · You pass out (faint).  · Your heart rate when you are sitting still is more than 100 beats a minute.  · You have trouble breathing.  This information is not intended to replace advice given to you by your health care provider. Make sure you discuss any questions you have with your health care provider.  Document Released: 10/14/2010 Document Revised: 07/07/2017 Document Reviewed: 02/10/2017  [employer] Interactive Patient Education © 2017 [employer]      Acute Urinary Retention, Female  Urinary retention means you are unable to pee completely or at all (empty your bladder).  Follow these instructions at home:  · Drink enough fluids to keep your pee (urine) clear or pale yellow.  · If you are sent home with a tube that drains the bladder (catheter), there will be a drainage bag attached to it. There are two types of bags. One is big that you can wear at night without having to empty it. One is smaller and needs to be emptied more often.  ¨ Keep the drainage bag emptied.  ¨ Keep the drainage bag lower than the tube.  · Only take medicine as told by your doctor.  Contact a doctor if:  · You have a low-grade fever.  · You have spasms or you are leaking pee when you have spasms.  Get help right away if:  · You have chills or a fever.  · Your catheter stops draining pee.  · Your catheter falls out.  · You have increased bleeding that does not stop after you have rested and increased the amount of fluids you had been drinking.  This information is not intended to  replace advice given to you by your health care provider. Make sure you discuss any questions you have with your health care provider.  Document Released: 06/05/2009 Document Revised: 05/25/2017 Document Reviewed: 05/29/2014  [employer] Interactive Patient Education © 2017 [employer]        Ischemic Stroke  An ischemic stroke is the sudden death of brain tissue. Blood carries oxygen to all areas of the body. This type of stroke happens when your blood does not flow to your brain like normal. Your brain cannot get the oxygen it needs. This is an emergency. It must be treated right away.  Symptoms of a stroke usually happen all of a sudden. You may notice them when you wake up. They can include:  · Weakness or loss of feeling in your face, arm, or leg. This often happens on one side of the body.  · Trouble walking.  · Trouble moving your arms or legs.  · Loss of balance or coordination.  · Feeling confused.  · Trouble talking or understanding what people are saying.  · Slurred speech.  · Trouble seeing.  · Seeing two of one object (double vision).  · Feeling dizzy.  · Feeling sick to your stomach (nauseous) and throwing up (vomiting).  · A very bad headache for no reason.  Get help as soon as any of these problems start. This is important. Some treatments work better if they are given right away. These include:  · Aspirin.  · Medicines to control blood pressure.  · A shot (injection) of medicine to break up the blood clot.  · Treatments given in the blood vessel (artery) to take out the clot or break it up.  Other treatments may include:  · Oxygen.  · Fluids given through an IV tube.  · Medicines to thin out your blood.  · Procedures to help your blood flow better.  What increases the risk?  Certain things may make you more likely to have a stroke. Some of these are things that you can change, such as:  · Being very overweight (obesity).  · Smoking.  · Taking birth control pills.  · Not being active.  · Drinking too  much alcohol.  · Using drugs.  Other risk factors include:  · High blood pressure.  · High cholesterol.  · Diabetes.  · Heart disease.  · Being , , , or .  · Being over age 60.  · Family history of stroke.  · Having had blood clots, stroke, or warning stroke (transient ischemic attack, TIA) in the past.  · Sickle cell disease.  · Being a woman with a history of high blood pressure in pregnancy (preeclampsia).  · Migraine headache.  · Sleep apnea.  · Having an irregular heartbeat (atrial fibrillation).  · Long-term (chronic) diseases that cause soreness and swelling (inflammation).  · Disorders that affect how your blood clots.  Follow these instructions at home:  Medicines  · Take over-the-counter and prescription medicines only as told by your doctor.  · If you were told to take aspirin or another medicine to thin your blood, take it exactly as told by your doctor.  ¨ Taking too much of the medicine can cause bleeding.  ¨ If you do not take enough, it may not work as well.  · Know the side effects of your medicines. If you are taking a blood thinner, make sure you:  ¨ Hold pressure over any cuts for longer than usual.  ¨ Tell your dentist and other doctors that you take this medicine.  ¨ Avoid activities that may cause damage or injury to your body.  Eating and drinking  · Follow instructions from your doctor about what you cannot eat or drink.  · Eat healthy foods.  · If you have trouble with swallowing, do these things to avoid choking:  ¨ Take small bites when eating.  ¨ Eat foods that are soft or pureed.  Safety  · Follow instructions from your health care team about physical activity.  · Use a walker or cane as told by your doctor.  · Keep your home safe so you do not fall. This may include:  ¨ Having experts look at your home to make sure it is safe.  ¨ Putting grab bars in the bedroom and bathroom.  ¨ Using raised toilets.  ¨ Putting a seat in the  shower.  General instructions  · Do not use any tobacco products.  ¨ Examples of these are cigarettes, chewing tobacco, and e-cigarettes.  ¨ If you need help quitting, ask your doctor.  · Limit how much alcohol you drink. This means no more than 1 drink a day for nonpregnant women and 2 drinks a day for men. One drink equals 12 oz of beer, 5 oz of wine, or 1½ oz of hard liquor.  · If you need help to stop using drugs or alcohol, ask your doctor to refer you to a program or specialist.  · Stay active. Exercise as told by your doctor.  · Keep all follow-up visits as told by your doctor. This is important.  Get help right away if:  · You suddenly:  ¨ Have weakness or loss of feeling in your face, arm, or leg.  ¨ Feel confused.  ¨ Have trouble talking or understanding what people are saying.  ¨ Have trouble seeing.  ¨ Have trouble walking.  ¨ Have trouble moving your arms or legs.  ¨ Feel dizzy.  ¨ Lose your balance or coordination.  ¨ Have a very bad headache and you do not know why.  · You pass out (lose consciousness) or almost pass out.  · You have jerky movements that you cannot control (seizure).  These symptoms may be an emergency. Do not wait to see if the symptoms will go away. Get medical help right away. Call your local emergency services (911 in the [place] Do not drive yourself to the hospital.   This information is not intended to replace advice given to you by your health care provider. Make sure you discuss any questions you have with your health care provider.  Document Released: 12/06/2012 Document Revised: 05/30/2017 Document Reviewed: 03/15/2017  [employer] Interactive Patient Education © 2017 [employer]      Atorvastatin tablets  What is this medicine?  ATORVASTATIN (a TORE va sta tin) is known as a HMG-CoA reductase inhibitor or 'statin'. It lowers the level of cholesterol and triglycerides in the blood. This drug may also reduce the risk of heart attack, stroke, or other health problems in  patients with risk factors for heart disease. Diet and lifestyle changes are often used with this drug.  This medicine may be used for other purposes; ask your health care provider or pharmacist if you have questions.  COMMON BRAND NAME(S): Lipitor  What should I tell my health care provider before I take this medicine?  They need to know if you have any of these conditions:  -frequently drink alcoholic beverages  -history of stroke, TIA  -kidney disease  -liver disease  -muscle aches or weakness  -other medical condition  -an unusual or allergic reaction to atorvastatin, other medicines, foods, dyes, or preservatives  -pregnant or trying to get pregnant  -breast-feeding  How should I use this medicine?  Take this medicine by mouth with a glass of water. Follow the directions on the prescription label. You can take this medicine with or without food. Take your doses at regular intervals. Do not take your medicine more often than directed.  Talk to your pediatrician regarding the use of this medicine in children. While this drug may be prescribed for children as young as 10 years old for selected conditions, precautions do apply.  Overdosage: If you think you have taken too much of this medicine contact a poison control center or emergency room at once.  NOTE: This medicine is only for you. Do not share this medicine with others.  What if I miss a dose?  If you miss a dose, take it as soon as you can. If it is almost time for your next dose, take only that dose. Do not take double or extra doses.  What may interact with this medicine?  Do not take this medicine with any of the following medications:  -red yeast rice  -telaprevir  -telithromycin  -voriconazole  This medicine may also interact with the following medications:  -alcohol  -antiviral medicines for HIV or AIDS  -boceprevir  -certain antibiotics like clarithromycin, erythromycin, troleandomycin  -certain medicines for cholesterol like fenofibrate or  gemfibrozil  -cimetidine  -clarithromycin  -colchicine  -cyclosporine  -digoxin  -female hormones, like estrogens or progestins and birth control pills  -grapefruit juice  -medicines for fungal infections like fluconazole, itraconazole, ketoconazole  -niacin  -rifampin  -spironolactone  This list may not describe all possible interactions. Give your health care provider a list of all the medicines, herbs, non-prescription drugs, or dietary supplements you use. Also tell them if you smoke, drink alcohol, or use illegal drugs. Some items may interact with your medicine.  What should I watch for while using this medicine?  Visit your doctor or health care professional for regular check-ups. You may need regular tests to make sure your liver is working properly.  Tell your doctor or health care professional right away if you get any unexplained muscle pain, tenderness, or weakness, especially if you also have a fever and tiredness. Your doctor or health care professional may tell you to stop taking this medicine if you develop muscle problems. If your muscle problems do not go away after stopping this medicine, contact your health care professional.  This drug is only part of a total heart-health program. Your doctor or a dietician can suggest a low-cholesterol and low-fat diet to help. Avoid alcohol and smoking, and keep a proper exercise schedule.  Do not use this drug if you are pregnant or breast-feeding. Serious side effects to an unborn child or to an infant are possible. Talk to your doctor or pharmacist for more information.  This medicine may affect blood sugar levels. If you have diabetes, check with your doctor or health care professional before you change your diet or the dose of your diabetic medicine.  If you are going to have surgery tell your health care professional that you are taking this drug.  What side effects may I notice from receiving this medicine?  Side effects that you should report to your  doctor or health care professional as soon as possible:  -allergic reactions like skin rash, itching or hives, swelling of the face, lips, or tongue  -dark urine  -fever  -joint pain  -muscle cramps, pain  -redness, blistering, peeling or loosening of the skin, including inside the mouth  -trouble passing urine or change in the amount of urine  -unusually weak or tired  -yellowing of eyes or skin  Side effects that usually do not require medical attention (report to your doctor or health care professional if they continue or are bothersome):  -constipation  -heartburn  -stomach gas, pain, upset  This list may not describe all possible side effects. Call your doctor for medical advice about side effects. You may report side effects to FDA at 1-851-FDA-3524.  Where should I keep my medicine?  Keep out of the reach of children.  Store at room temperature between 20 to 25 degrees C (68 to 77 degrees F). Throw away any unused medicine after the expiration date.  NOTE: This sheet is a summary. It may not cover all possible information. If you have questions about this medicine, talk to your doctor, pharmacist, or health care provider.  © 2018 [employer] (2012-11-06 09:18:24)      Tamsulosin capsules  What is this medicine?  TAMSULOSIN (mclean BEN anna sin) is used to treat enlargement of the prostate gland in men, a condition called benign prostatic hyperplasia or BPH. It is not for use in women. It works by relaxing muscles in the prostate and bladder neck. This improves urine flow and reduces BPH symptoms.  This medicine may be used for other purposes; ask your health care provider or pharmacist if you have questions.  COMMON BRAND NAME(S): Flomax  What should I tell my health care provider before I take this medicine?  They need to know if you have any of the following conditions:  -advanced kidney disease  -advanced liver disease  -low blood pressure  -prostate cancer  -an unusual or allergic reaction to  tamsulosin, sulfa drugs, other medicines, foods, dyes, or preservatives  -pregnant or trying to get pregnant  -breast-feeding  How should I use this medicine?  Take this medicine by mouth about 30 minutes after the same meal every day. Follow the directions on the prescription label. Swallow the capsules whole with a glass of water. Do not crush, chew, or open capsules. Do not take your medicine more often than directed. Do not stop taking your medicine unless your doctor tells you to.  Talk to your pediatrician regarding the use of this medicine in children. Special care may be needed.  Overdosage: If you think you have taken too much of this medicine contact a poison control center or emergency room at once.  NOTE: This medicine is only for you. Do not share this medicine with others.  What if I miss a dose?  If you miss a dose, take it as soon as you can. If it is almost time for your next dose, take only that dose. Do not take double or extra doses. If you stop taking your medicine for several days or more, ask your doctor or health care professional what dose you should start back on.  What may interact with this medicine?  -cimetidine  -fluoxetine  -ketoconazole  -medicines for erectile disfunction like sildenafil, tadalafil, vardenafil  -medicines for high blood pressure  -other alpha-blockers like alfuzosin, doxazosin, phentolamine, phenoxybenzamine, prazosin, terazosin  -warfarin  This list may not describe all possible interactions. Give your health care provider a list of all the medicines, herbs, non-prescription drugs, or dietary supplements you use. Also tell them if you smoke, drink alcohol, or use illegal drugs. Some items may interact with your medicine.  What should I watch for while using this medicine?  Visit your doctor or health care professional for regular check ups. You will need lab work done before you start this medicine and regularly while you are taking it. Check your blood pressure as  directed. Ask your health care professional what your blood pressure should be, and when you should contact him or her.  This medicine may make you feel dizzy or lightheaded. This is more likely to happen after the first dose, after an increase in dose, or during hot weather or exercise. Drinking alcohol and taking some medicines can make this worse. Do not drive, use machinery, or do anything that needs mental alertness until you know how this medicine affects you. Do not sit or stand up quickly. If you begin to feel dizzy, sit down until you feel better. These effects can decrease once your body adjusts to the medicine.  Contact your doctor or health care professional right away if you have an erection that lasts longer than 4 hours or if it becomes painful. This may be a sign of a serious problem and must be treated right away to prevent permanent damage.  If you are thinking of having cataract surgery, tell your eye surgeon that you have taken this medicine.  What side effects may I notice from receiving this medicine?  Side effects that you should report to your doctor or health care professional as soon as possible:  -allergic reactions like skin rash or itching, hives, swelling of the lips, mouth, tongue, or throat  -breathing problems  -change in vision  -feeling faint or lightheaded  -irregular heartbeat  -prolonged or painful erection  -weakness  Side effects that usually do not require medical attention (report to your doctor or health care professional if they continue or are bothersome):  -back pain  -change in sex drive or performance  -constipation, nausea or vomiting  -cough  -drowsy  -runny or stuffy nose  -trouble sleeping  This list may not describe all possible side effects. Call your doctor for medical advice about side effects. You may report side effects to FDA at 3-685-FDA-8294.  Where should I keep my medicine?  Keep out of the reach of children.  Store at room temperature between 15 and 30  degrees C (59 and 86 degrees F). Throw away any unused medicine after the expiration date.  NOTE: This sheet is a summary. It may not cover all possible information. If you have questions about this medicine, talk to your doctor, pharmacist, or health care provider.  © 2018 [employer] (2013-12-18 14:11:34)

## 2019-02-13 NOTE — DISCHARGE PLANNING
Anticipated Discharge Disposition: Home with [facility].     Action: [facility] updated that [facility] has accepted pt. LSW called pt and left message for her updating that [facility] will be following.     Barriers to Discharge: N/A.     Plan: As above, [facility] has accepted.

## 2019-02-14 NOTE — DISCHARGE SUMMARY
"Discharge Summary    CHIEF COMPLAINT ON ADMISSION  Chief Complaint   Patient presents with   • Weakness     Pt c/o generalized weakness and slurred speech, pt believes it is due to her UTI medication \"macrodantin\"       Reason for Admission  EMS     Admission Date  2/10/2019    CODE STATUS  Prior    HPI & HOSPITAL COURSE  This is a 83 y.o. female here with strokelike symptoms and worsening weakness.  Her MRI however did not reveal any evidence of acute stroke, symptoms however remain concerning for possible TIA.  For the first several days she remained fairly weak and debilitated and was recommended for skilled nursing, however on the date of discharge she was observed ambulating in the evans with walker and contact-guard assist and she felt near her baseline and wanted to go home with home health.  She was referred to [facility] and accepted.    She had some transient problems with urinary retention and Rao catheter was placed short-term, she was started on Flomax, Rao was removed and she was able to demonstrate adequate voiding.  We will continue the Flomax for a short time  post post discharge.     Therefore, she is discharged in good and stable condition to home with organized home healthcare and close outpatient follow-up.    The patient met 2-midnight criteria for an inpatient stay at the time of discharge.    Discharge Date  2/12/2019    FOLLOW UP ITEMS POST DISCHARGE  PCP    DISCHARGE DIAGNOSES  Principal Problem:    Stroke-like symptom POA: Unknown  Active Problems:    Dehydration POA: Yes    Rheumatoid arthritis ([facility]) POA: Yes    Hyponatremia POA: Yes    Hypokalemia POA: Yes    Depression POA: Yes    CKD (chronic kidney disease) stage 3, GFR 30-59 ml/min ([facility]) POA: Yes    Anemia POA: Yes    Protein-calorie malnutrition, severe ([facility]) POA: Unknown    Nausea and vomiting POA: Unknown    Hypothyroidism POA: Unknown    Metabolic acidosis POA: Unknown    ARF (acute renal failure) ([facility]) POA: Unknown   "  Urinary retention POA: Unknown  Resolved Problems:    * No resolved hospital problems. *      FOLLOW UP  No future appointments.  Christen Shields M.D.  [address]  [place] [address]  [phone]      The hospital  left a voicemail with the office to call you directly to schedule a follow up appointment. If you do not receive a call within 2 days please call to arrange an appointment.     [facility] ([facility])  [address] [phone]          MEDICATIONS ON DISCHARGE     Medication List      START taking these medications      Instructions   atorvastatin 40 MG Tabs  Commonly known as:  LIPITOR   Take 1 Tab by mouth every evening.  Dose:  40 mg     tamsulosin 0.4 MG capsule  Commonly known as:  FLOMAX   Take 1 Cap by mouth ONE-HALF HOUR AFTER BREAKFAST.  Dose:  0.4 mg        CONTINUE taking these medications      Instructions   ALEVE 220 MG tablet  Generic drug:  naproxen   Take 440 mg by mouth as needed.  Dose:  440 mg     citalopram 20 MG Tabs  Commonly known as:  CELEXA   Take 20 mg by mouth every bedtime.  Dose:  20 mg     furosemide 20 MG Tabs  Commonly known as:  LASIX   Take 20 mg by mouth every day.  Dose:  20 mg     gabapentin 300 MG Caps  Commonly known as:  NEURONTIN   Take 300 mg by mouth as needed (Pt reports for pain).  Dose:  300 mg     levothyroxine 112 MCG Tabs  Commonly known as:  SYNTHROID   Take 112 mcg by mouth Every morning on an empty stomach.  Dose:  112 mcg     Probiotic Caps   Take 1 Cap by mouth every day.  Dose:  1 Cap     PROLIA 60 MG/ML Soln  Generic drug:  denosumab   Inject 60 mg as instructed every 6 months. Pt last dose was on 10/2018  Dose:  60 mg     RESTASIS 0.05 % ophthalmic emulsion  Generic drug:  cyclosporin   Place 1 Drop in both eyes every day.  Dose:  1 Drop     spironolactone 25 MG Tabs  Commonly known as:  ALDACTONE   Take 25 mg by mouth every day.  Dose:  25 mg     traZODone 50 MG Tabs  Commonly known as:   DESYREL   Take 25 mg by mouth every evening.  Dose:  25 mg        STOP taking these medications    nitrofurantoin 50 MG Caps  Commonly known as:  MACRODANTIN            Allergies  Allergies   Allergen Reactions   • Azithromycin Unspecified     Matti Johnsons syndrome   • Erythromycin Unspecified     Matti Johnsons syndrome   • Nitrofurantoin Vomiting and Nausea   • Pcn [Penicillins] Anaphylaxis, Shortness of Breath and Swelling   • Sulfa Drugs Swelling   • Cefuroxime      Pt does not recall what happens, but knows that she has an allergie to this medication.     • Ciprofloxacin      Does not recall   • Clindamycin      Pt does not recall what happens, but knows that she has an allergie to this medication.   • Codeine Itching   • Daptomycin      Matti colleen syndrome     • Flagyl [Metronidazole] Vomiting and Nausea   • Morphine Itching   • Premarin Unspecified     burning   • Rifampin      Matti colleen syndrome       DIET  Cardiac    ACTIVITY  Up with walker    CONSULTATIONS  None    PROCEDURES  None    LABORATORY  Lab Results   Component Value Date    SODIUM 131 (L) 02/12/2019    POTASSIUM 3.7 02/12/2019    CHLORIDE 104 02/12/2019    CO2 20 02/12/2019    GLUCOSE 89 02/12/2019    BUN 39 (H) 02/12/2019    CREATININE 1.21 02/12/2019    CREATININE 1.1 06/29/2007        Lab Results   Component Value Date    WBC 6.5 02/12/2019    HEMOGLOBIN 10.8 (L) 02/12/2019    HEMATOCRIT 32.8 (L) 02/12/2019    PLATELETCT 231 02/12/2019        Total time of the discharge process exceeds 44 minutes.

## 2019-02-26 NOTE — ADDENDUM NOTE
Encounter addended by: Nicole Anderson M.D. on: 2/26/2019  8:32 AM<BR>    Actions taken: Sign clinical note

## 2019-02-26 NOTE — DOCUMENTATION QUERY
[facility]                                                                         Query Response Note      PATIENT:               RODO CABELLO  ACCT #:                  [id number]  MRN:                       [id number]  :                       1935  ADMIT DATE:       2/10/2019 8:10 PM  DISCH DATE:        2019 6:30 PM  RESPONDING  PROVIDER #:        [id number]           RESPONSE TEXT:    Mild major depression, recurrent/current episode    QUERY TEXT:    Depression Type [id number]    Depression is documented in the Medical Record.  Please specify the type.    NOTE:  If an appropriate response is not listed below, please respond with a new note.          The patient's Clinical Indicators include:  Depression listed as diagnosis in PMH, H & P and Discharge Summary.  Patient is on Celexa.  Query created by: Cecile Barrett on 2019 3:14 PM        Electronically signed by:  SHEEBA PATEL MD 2019 8:30 AM

## 2019-03-27 ENCOUNTER — OFFICE VISIT (OUTPATIENT)
Dept: INTERNAL MEDICINE | Facility: IMAGING CENTER | Age: 84
End: 2019-03-27
Payer: MEDICARE

## 2019-03-27 ENCOUNTER — HOSPITAL ENCOUNTER (OUTPATIENT)
Facility: MEDICAL CENTER | Age: 84
End: 2019-03-27
Attending: INTERNAL MEDICINE
Payer: MEDICARE

## 2019-03-27 VITALS
DIASTOLIC BLOOD PRESSURE: 60 MMHG | SYSTOLIC BLOOD PRESSURE: 120 MMHG | HEART RATE: 95 BPM | HEIGHT: 65 IN | RESPIRATION RATE: 14 BRPM | BODY MASS INDEX: 21.33 KG/M2 | WEIGHT: 128 LBS | TEMPERATURE: 97.5 F

## 2019-03-27 DIAGNOSIS — Z87.19 HISTORY OF ORAL APHTHOUS ULCERS: ICD-10-CM

## 2019-03-27 DIAGNOSIS — N18.30 CKD (CHRONIC KIDNEY DISEASE) STAGE 3, GFR 30-59 ML/MIN (HCC): ICD-10-CM

## 2019-03-27 DIAGNOSIS — Z88.1 ALLERGY TO MULTIPLE ANTIBIOTICS: ICD-10-CM

## 2019-03-27 DIAGNOSIS — D64.9 ANEMIA, UNSPECIFIED TYPE: ICD-10-CM

## 2019-03-27 DIAGNOSIS — E03.9 HYPOTHYROIDISM (ACQUIRED): ICD-10-CM

## 2019-03-27 DIAGNOSIS — R30.0 DYSURIA: ICD-10-CM

## 2019-03-27 DIAGNOSIS — Z79.899 LONG TERM USE OF DRUG: ICD-10-CM

## 2019-03-27 DIAGNOSIS — E87.1 HYPONATREMIA: ICD-10-CM

## 2019-03-27 DIAGNOSIS — E78.00 HYPERCHOLESTEROLEMIA: ICD-10-CM

## 2019-03-27 DIAGNOSIS — Z87.440 HISTORY OF RECURRENT UTIS: ICD-10-CM

## 2019-03-27 DIAGNOSIS — M06.9 RHEUMATOID ARTHRITIS INVOLVING MULTIPLE SITES, UNSPECIFIED RHEUMATOID FACTOR PRESENCE: ICD-10-CM

## 2019-03-27 DIAGNOSIS — M81.0 AGE RELATED OSTEOPOROSIS, UNSPECIFIED PATHOLOGICAL FRACTURE PRESENCE: ICD-10-CM

## 2019-03-27 DIAGNOSIS — E55.9 VITAMIN D DEFICIENCY: ICD-10-CM

## 2019-03-27 PROBLEM — R53.1 WEAKNESS: Status: RESOLVED | Noted: 2018-01-12 | Resolved: 2019-03-27

## 2019-03-27 PROBLEM — E86.0 DEHYDRATION: Status: RESOLVED | Noted: 2017-11-11 | Resolved: 2019-03-27

## 2019-03-27 PROBLEM — N39.0 UTI (URINARY TRACT INFECTION): Status: RESOLVED | Noted: 2017-11-11 | Resolved: 2019-03-27

## 2019-03-27 PROBLEM — D72.829 LEUKOCYTOSIS: Status: RESOLVED | Noted: 2018-01-18 | Resolved: 2019-03-27

## 2019-03-27 PROBLEM — N17.9 ARF (ACUTE RENAL FAILURE) (HCC): Status: RESOLVED | Noted: 2019-02-11 | Resolved: 2019-03-27

## 2019-03-27 LAB
APPEARANCE UR: ABNORMAL
BACTERIA #/AREA URNS HPF: ABNORMAL /HPF
BILIRUB UR QL STRIP.AUTO: NEGATIVE
COLOR UR: YELLOW
EPI CELLS #/AREA URNS HPF: NEGATIVE /HPF
GLUCOSE UR STRIP.AUTO-MCNC: NEGATIVE MG/DL
HYALINE CASTS #/AREA URNS LPF: ABNORMAL /LPF
KETONES UR STRIP.AUTO-MCNC: NEGATIVE MG/DL
LEUKOCYTE ESTERASE UR QL STRIP.AUTO: ABNORMAL
MICRO URNS: ABNORMAL
NITRITE UR QL STRIP.AUTO: POSITIVE
PH UR STRIP.AUTO: 6.5 [PH]
PROT UR QL STRIP: NEGATIVE MG/DL
RBC # URNS HPF: ABNORMAL /HPF
RBC UR QL AUTO: ABNORMAL
SP GR UR STRIP.AUTO: 1.01
UROBILINOGEN UR STRIP.AUTO-MCNC: 0.2 MG/DL
WBC #/AREA URNS HPF: ABNORMAL /HPF

## 2019-03-27 PROCEDURE — 81001 URINALYSIS AUTO W/SCOPE: CPT

## 2019-03-27 PROCEDURE — 87086 URINE CULTURE/COLONY COUNT: CPT

## 2019-03-27 PROCEDURE — 87077 CULTURE AEROBIC IDENTIFY: CPT

## 2019-03-27 PROCEDURE — 87186 SC STD MICRODIL/AGAR DIL: CPT

## 2019-03-27 PROCEDURE — 99204 OFFICE O/P NEW MOD 45 MIN: CPT | Performed by: INTERNAL MEDICINE

## 2019-03-27 RX ORDER — FUROSEMIDE 40 MG/1
40 TABLET ORAL DAILY
COMMUNITY
End: 2019-08-06 | Stop reason: SDUPTHER

## 2019-03-27 ASSESSMENT — ENCOUNTER SYMPTOMS
DIZZINESS: 0
PALPITATIONS: 0
DEPRESSION: 0
SHORTNESS OF BREATH: 0
HEARTBURN: 1
HEADACHES: 0

## 2019-03-27 NOTE — PROGRESS NOTES
"New Patient Note      HPI:        Elizabeth is here today to establish as a new patient. She is currently taking medication for hypothyroid and hyperlipidemia. She has been noted to have CKD stage 3. She has been diagnosed by rheumatologist in past as having rheumatoid arthritis; she has not seen specialist in 5 years. When she was seeing rheumatologist she had been on infusions of remicade; she does not remember having taken any other DMARDs such as MTX, sulfasalazine, plaquenil, enbrel, humira or orencia. She does have joint pain and states her energy level is low. She does get swelling around ankles. She has some deformities in fingers and toes she states has worsened over past 5 years while not receiving treatment for her RA. She takes spironolactone and lasix for edema in legs. She was hospitalized recently; she was noted to have hyponatremia while in hospital. She was also noted to be anemic in hospital.    Past Medical History:   Diagnosis Date   • Arthritis     RA- hands, feet, shoulder   • C. 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    \"mini\" no residual symptoms   • Urinary incontinence     occasional       Family History   Problem Relation Age of Onset   • Non-contributory Mother    • Non-contributory Father    • Anesthesia Paternal Grandfather         death       Social History   Substance Use Topics   • Smoking status: Former Smoker     Packs/day: 1.00     Years: 20.00     Types: Cigarettes     Quit date: 1/1/1985   • Smokeless tobacco: Never Used   • Alcohol use Yes      Comment: occasional "       History   Alcohol Use   • Yes     Comment: occasional       History   Drug Use No       Current Outpatient Prescriptions   Medication Sig Dispense Refill   • tamsulosin (FLOMAX) 0.4 MG capsule Take 1 Cap by mouth ONE-HALF HOUR AFTER BREAKFAST. 7 Cap 0   • naproxen (ALEVE) 220 MG tablet Take 440 mg by mouth as needed.     • gabapentin (NEURONTIN) 300 MG Cap Take 300 mg by mouth as needed (Pt reports for pain).     • traZODone (DESYREL) 50 MG Tab Take 25 mg by mouth every evening.     • cyclosporin (RESTASIS) 0.05 % ophthalmic emulsion Place 1 Drop in both eyes every day.     • Probiotic Cap Take 1 Cap by mouth every day.     • furosemide (LASIX) 20 MG Tab Take 20 mg by mouth every day.     • spironolactone (ALDACTONE) 25 MG Tab Take 25 mg by mouth every day.     • denosumab (PROLIA) 60 MG/ML Solution Inject 60 mg as instructed every 6 months. Pt last dose was on 10/2018     • citalopram (CELEXA) 20 MG Tab Take 20 mg by mouth every bedtime.     • levothyroxine (SYNTHROID) 112 MCG Tab Take 112 mcg by mouth Every morning on an empty stomach.       No current facility-administered medications for this visit.        Allergies   Allergen Reactions   • Azithromycin Unspecified     Matti Johnsons syndrome   • Erythromycin Unspecified     Matti Johnsons syndrome   • Nitrofurantoin Vomiting and Nausea   • Pcn [Penicillins] Anaphylaxis, Shortness of Breath and Swelling   • Sulfa Drugs Swelling   • Cefuroxime      Pt does not recall what happens, but knows that she has an allergie to this medication.     • Ciprofloxacin      Does not recall   • Clindamycin      Pt does not recall what happens, but knows that she has an allergie to this medication.   • Codeine Itching   • Daptomycin      Matti colleen syndrome     • Flagyl [Metronidazole] Vomiting and Nausea   • Morphine Itching   • Premarin Unspecified     burning   • Rifampin      Matti colleen syndrome       Review of Systems   Constitutional: Negative for  "malaise/fatigue.   HENT:        She states she has had some small ulcers in mouth for past month.   Respiratory: Negative for shortness of breath.    Cardiovascular: Positive for leg swelling. Negative for chest pain and palpitations.   Gastrointestinal: Positive for heartburn.        She will take tums or pepto-bismol for heartburn; she will get heartburn 1-2 days a week at this time.   Genitourinary:        She states she has been getting urine infections regularly.   Neurological: Negative for dizziness and headaches.        She states she does have some mild imbalance.   Psychiatric/Behavioral: Negative for depression.       Physical Exam   Constitutional: She is well-developed, well-nourished, and in no distress. No distress.   HENT:   Small oral aphthous ulcers in mouth   Neck: Neck supple. No JVD present. No thyromegaly present.   Cardiovascular: Normal rate, regular rhythm and normal heart sounds.  Exam reveals no gallop and no friction rub.    No murmur heard.  No carotid bruits   Pulmonary/Chest: Effort normal and breath sounds normal. She has no wheezes. She has no rales.   Abdominal: Soft. She exhibits no distension and no mass. There is no tenderness. There is no guarding.   Genitourinary:   Genitourinary Comments: No flank tenderness bilaterally   Musculoskeletal: She exhibits edema.   Tenderness with palpation multiple PIP, MCP and MTP joints along with tenderness in wrists, elbows and ankles. There is swan neck deformities in some fingers along with ulnar deviation.   Neurological: She is alert. No cranial nerve deficit. Gait normal.   Skin: She is not diaphoretic.   Psychiatric: Mood and affect normal.       Ambulatory Vitals  /60 (BP Location: Left arm, Patient Position: Sitting, BP Cuff Size: Adult)   Pulse 95   Temp 36.4 °C (97.5 °F) (Temporal)   Resp 14   Ht 1.651 m (5' 5\")   Wt 58.1 kg (128 lb)   BMI 21.30 kg/m²     Assessment and Plan:    1. Rheumatoid arthritis involving multiple " sites, unspecified rheumatoid factor presence (HCC)  CRP HIGH SENSITIVE (CARDIAC)    WESTERGREN SED RATE    RHEUMATOID FACTOR QUANT    CCP ANTIBODY   2. Anemia, unspecified type  VITAMIN B12    CBC WITH DIFFERENTIAL    FERRITIN    IRON/TOTAL IRON BIND    JOSE AND PE, SERUM    FOLATES RBC (WITH HCT)    CRP HIGH SENSITIVE (CARDIAC)    METABOLIC PANEL,COMPREHENSIVE   3. Hyponatremia  METABOLIC PANEL,COMPREHENSIVE   4. Age related osteoporosis, unspecified pathological fracture presence  PTH INTACT (PTH ONLY)    METABOLIC PANEL,COMPREHENSIVE    VITAMIN D,25 HYDROXY   5. Hypothyroidism (acquired)  TSH    TRIIDOTHYRONINE   6. CKD (chronic kidney disease) stage 3, GFR 30-59 ml/min (HCC)  METABOLIC PANEL,COMPREHENSIVE    URINALYSIS   7. Long term use of drug  METABOLIC PANEL,COMPREHENSIVE   8. History of oral aphthous ulcers     9. Hypercholesterolemia  CRP HIGH SENSITIVE (CARDIAC)    Lipid Profile   10. Vitamin D deficiency  VITAMIN D,25 HYDROXY   11. History of recurrent UTIs        Refer to ENT for apthous ulcers. She is scheduled to have surgery on left shoulder for replacement but she states she cannot undergo surgery until urinary infection has been cleared. Since she has potential multiple allergies to antibiotics she may benefit from seeing allergist to have testing done for antibiotic allergies. Obtain updated blood work. I have discussed that if her cholesterol is even slightly high she should probably be on statin Rx since having RA places her at risk for CV events due to inflammation. Follow up 3 weeks to review results. I have discussed she consider starting xeljanz for her RA since this is oral and may be easier than doing self injectables. Renew order for her prolia since she was noted to have significant osteoprosis in forearm on DEXA in August 2017; she will need updated DEXA this coming August.    Ramone Iraheta M.D.

## 2019-03-28 ENCOUNTER — NON-PROVIDER VISIT (OUTPATIENT)
Dept: INTERNAL MEDICINE | Facility: IMAGING CENTER | Age: 84
End: 2019-03-28
Payer: MEDICARE

## 2019-03-28 ENCOUNTER — HOSPITAL ENCOUNTER (OUTPATIENT)
Facility: MEDICAL CENTER | Age: 84
End: 2019-03-28
Attending: INTERNAL MEDICINE
Payer: MEDICARE

## 2019-03-28 ENCOUNTER — TELEPHONE (OUTPATIENT)
Dept: INTERNAL MEDICINE | Facility: IMAGING CENTER | Age: 84
End: 2019-03-28

## 2019-03-28 DIAGNOSIS — M06.9 RHEUMATOID ARTHRITIS INVOLVING MULTIPLE SITES, UNSPECIFIED RHEUMATOID FACTOR PRESENCE: ICD-10-CM

## 2019-03-28 DIAGNOSIS — Z01.89 ENCOUNTER FOR ROUTINE LABORATORY TESTING: ICD-10-CM

## 2019-03-28 DIAGNOSIS — E87.1 HYPONATREMIA: ICD-10-CM

## 2019-03-28 DIAGNOSIS — E55.9 VITAMIN D DEFICIENCY: ICD-10-CM

## 2019-03-28 DIAGNOSIS — D64.9 ANEMIA, UNSPECIFIED TYPE: ICD-10-CM

## 2019-03-28 DIAGNOSIS — M81.0 AGE RELATED OSTEOPOROSIS, UNSPECIFIED PATHOLOGICAL FRACTURE PRESENCE: ICD-10-CM

## 2019-03-28 DIAGNOSIS — R31.9 URINARY TRACT INFECTION WITH HEMATURIA, SITE UNSPECIFIED: ICD-10-CM

## 2019-03-28 DIAGNOSIS — E03.9 HYPOTHYROIDISM (ACQUIRED): ICD-10-CM

## 2019-03-28 DIAGNOSIS — N39.0 URINARY TRACT INFECTION WITH HEMATURIA, SITE UNSPECIFIED: ICD-10-CM

## 2019-03-28 DIAGNOSIS — Z79.899 LONG TERM USE OF DRUG: ICD-10-CM

## 2019-03-28 DIAGNOSIS — N18.30 CKD (CHRONIC KIDNEY DISEASE) STAGE 3, GFR 30-59 ML/MIN (HCC): ICD-10-CM

## 2019-03-28 DIAGNOSIS — E78.00 HYPERCHOLESTEROLEMIA: ICD-10-CM

## 2019-03-28 LAB
25(OH)D3 SERPL-MCNC: 56 NG/ML (ref 30–100)
ALBUMIN SERPL BCP-MCNC: 4.4 G/DL (ref 3.2–4.9)
ALBUMIN/GLOB SERPL: 1.4 G/DL
ALP SERPL-CCNC: 53 U/L (ref 30–99)
ALT SERPL-CCNC: 119 U/L (ref 2–50)
ANION GAP SERPL CALC-SCNC: 11 MMOL/L (ref 0–11.9)
AST SERPL-CCNC: 101 U/L (ref 12–45)
BASOPHILS # BLD AUTO: 1.3 % (ref 0–1.8)
BASOPHILS # BLD: 0.08 K/UL (ref 0–0.12)
BILIRUB SERPL-MCNC: 0.5 MG/DL (ref 0.1–1.5)
BUN SERPL-MCNC: 29 MG/DL (ref 8–22)
CALCIUM SERPL-MCNC: 9.9 MG/DL (ref 8.5–10.5)
CHLORIDE SERPL-SCNC: 104 MMOL/L (ref 96–112)
CO2 SERPL-SCNC: 24 MMOL/L (ref 20–33)
CREAT SERPL-MCNC: 0.93 MG/DL (ref 0.5–1.4)
CRP SERPL HS-MCNC: 5.8 MG/L (ref 0–7.5)
EOSINOPHIL # BLD AUTO: 0.39 K/UL (ref 0–0.51)
EOSINOPHIL NFR BLD: 6.6 % (ref 0–6.9)
ERYTHROCYTE [DISTWIDTH] IN BLOOD BY AUTOMATED COUNT: 53.9 FL (ref 35.9–50)
ERYTHROCYTE [SEDIMENTATION RATE] IN BLOOD BY WESTERGREN METHOD: 25 MM/HOUR (ref 0–30)
FERRITIN SERPL-MCNC: 32.5 NG/ML (ref 10–291)
GLOBULIN SER CALC-MCNC: 3.1 G/DL (ref 1.9–3.5)
GLUCOSE SERPL-MCNC: 75 MG/DL (ref 65–99)
HCT VFR BLD AUTO: 37.5 % (ref 37–47)
HCT VFR BLD AUTO: 40.7 % (ref 37–47)
HGB BLD-MCNC: 13.2 G/DL (ref 12–16)
IMM GRANULOCYTES # BLD AUTO: 0 K/UL (ref 0–0.11)
IMM GRANULOCYTES NFR BLD AUTO: 0 % (ref 0–0.9)
IRON SATN MFR SERPL: 19 % (ref 15–55)
IRON SERPL-MCNC: 77 UG/DL (ref 40–170)
LYMPHOCYTES # BLD AUTO: 2.36 K/UL (ref 1–4.8)
LYMPHOCYTES NFR BLD: 39.8 % (ref 22–41)
MCH RBC QN AUTO: 30.4 PG (ref 27–33)
MCHC RBC AUTO-ENTMCNC: 32.4 G/DL (ref 33.6–35)
MCV RBC AUTO: 93.8 FL (ref 81.4–97.8)
MONOCYTES # BLD AUTO: 0.51 K/UL (ref 0–0.85)
MONOCYTES NFR BLD AUTO: 8.6 % (ref 0–13.4)
NEUTROPHILS # BLD AUTO: 2.59 K/UL (ref 2–7.15)
NEUTROPHILS NFR BLD: 43.7 % (ref 44–72)
NRBC # BLD AUTO: 0 K/UL
NRBC BLD-RTO: 0 /100 WBC
PLATELET # BLD AUTO: 361 K/UL (ref 164–446)
PMV BLD AUTO: 9.7 FL (ref 9–12.9)
POTASSIUM SERPL-SCNC: 3.2 MMOL/L (ref 3.6–5.5)
PROT SERPL-MCNC: 7.5 G/DL (ref 6–8.2)
PTH-INTACT SERPL-MCNC: 26.4 PG/ML (ref 14–72)
RBC # BLD AUTO: 4.34 M/UL (ref 4.2–5.4)
RHEUMATOID FACT SER IA-ACNC: <10 IU/ML (ref 0–14)
SODIUM SERPL-SCNC: 139 MMOL/L (ref 135–145)
T3 SERPL-MCNC: 74.1 NG/DL (ref 60–181)
TIBC SERPL-MCNC: 413 UG/DL (ref 250–450)
TSH SERPL DL<=0.005 MIU/L-ACNC: 0.07 UIU/ML (ref 0.38–5.33)
VIT B12 SERPL-MCNC: 1480 PG/ML (ref 211–911)
WBC # BLD AUTO: 5.9 K/UL (ref 4.8–10.8)

## 2019-03-28 PROCEDURE — 82607 VITAMIN B-12: CPT

## 2019-03-28 PROCEDURE — 86200 CCP ANTIBODY: CPT

## 2019-03-28 PROCEDURE — 84165 PROTEIN E-PHORESIS SERUM: CPT

## 2019-03-28 PROCEDURE — 82728 ASSAY OF FERRITIN: CPT

## 2019-03-28 PROCEDURE — 96372 THER/PROPH/DIAG INJ SC/IM: CPT | Performed by: INTERNAL MEDICINE

## 2019-03-28 PROCEDURE — 85652 RBC SED RATE AUTOMATED: CPT

## 2019-03-28 PROCEDURE — 84160 ASSAY OF PROTEIN ANY SOURCE: CPT

## 2019-03-28 PROCEDURE — 84480 ASSAY TRIIODOTHYRONINE (T3): CPT

## 2019-03-28 PROCEDURE — 86141 C-REACTIVE PROTEIN HS: CPT

## 2019-03-28 PROCEDURE — 83970 ASSAY OF PARATHORMONE: CPT

## 2019-03-28 PROCEDURE — 85014 HEMATOCRIT: CPT

## 2019-03-28 PROCEDURE — 84443 ASSAY THYROID STIM HORMONE: CPT

## 2019-03-28 PROCEDURE — 85025 COMPLETE CBC W/AUTO DIFF WBC: CPT

## 2019-03-28 PROCEDURE — 83540 ASSAY OF IRON: CPT

## 2019-03-28 PROCEDURE — 83550 IRON BINDING TEST: CPT

## 2019-03-28 PROCEDURE — 80053 COMPREHEN METABOLIC PANEL: CPT

## 2019-03-28 PROCEDURE — 86431 RHEUMATOID FACTOR QUANT: CPT

## 2019-03-28 PROCEDURE — 82306 VITAMIN D 25 HYDROXY: CPT

## 2019-03-28 PROCEDURE — 82747 ASSAY OF FOLIC ACID RBC: CPT

## 2019-03-28 RX ORDER — GENTAMICIN SULFATE 40 MG/ML
80 INJECTION, SOLUTION INTRAMUSCULAR; INTRAVENOUS ONCE
Qty: 2 ML | Refills: 0 | OUTPATIENT
Start: 2019-03-28 | End: 2019-03-28

## 2019-03-28 RX ORDER — LEVOFLOXACIN 250 MG/1
250 TABLET, FILM COATED ORAL DAILY
Qty: 7 TAB | Refills: 0 | Status: SHIPPED | OUTPATIENT
Start: 2019-03-28 | End: 2019-04-04

## 2019-03-28 NOTE — NON-PROVIDER
Gentamicin 80mg/2.0 ml administered IM right dorsogluteal muscle.  Verbal order from Dr Ramirez.  Patient monitored for 30 minutes after injection.  No respiratory symptoms, no rashes, no hives.  Injection site without redness or induration.

## 2019-03-29 ENCOUNTER — NON-PROVIDER VISIT (OUTPATIENT)
Dept: INTERNAL MEDICINE | Facility: IMAGING CENTER | Age: 84
End: 2019-03-29
Payer: MEDICARE

## 2019-03-29 DIAGNOSIS — R31.9 URINARY TRACT INFECTION WITH HEMATURIA, SITE UNSPECIFIED: ICD-10-CM

## 2019-03-29 DIAGNOSIS — N39.0 URINARY TRACT INFECTION WITH HEMATURIA, SITE UNSPECIFIED: ICD-10-CM

## 2019-03-29 PROCEDURE — 96372 THER/PROPH/DIAG INJ SC/IM: CPT | Performed by: INTERNAL MEDICINE

## 2019-03-29 RX ORDER — GENTAMICIN SULFATE 40 MG/ML
80 INJECTION, SOLUTION INTRAMUSCULAR; INTRAVENOUS ONCE
Qty: 2 ML | Refills: 0 | OUTPATIENT
Start: 2019-03-29 | End: 2019-03-29

## 2019-03-29 NOTE — NON-PROVIDER
Gentamicin 80 mg/2 ml injected into left dorsogluteal muscle.  NDC 8672-6447-75  Lot# [id number]  Exp 04/01/2020

## 2019-03-30 LAB — FOLATE RBC-MCNC: NORMAL NG/ML

## 2019-03-31 LAB
ALBUMIN SERPL-MCNC: 4.49 G/DL (ref 3.75–5.01)
ALPHA1 GLOB SERPL ELPH-MCNC: 0.29 G/DL (ref 0.19–0.46)
ALPHA2 GLOB SERPL ELPH-MCNC: 0.96 G/DL (ref 0.48–1.05)
B-GLOBULIN SERPL ELPH-MCNC: 0.9 G/DL (ref 0.48–1.1)
CCP IGG SERPL-ACNC: 3 UNITS (ref 0–19)
GAMMA GLOB SERPL ELPH-MCNC: 1.17 G/DL (ref 0.62–1.51)
INTERPRETATION SERPL IFE-IMP: NORMAL
MONOCLON BAND OBS SERPL: NORMAL
PATHOLOGY STUDY: NORMAL
PROT SERPL-MCNC: 7.8 G/DL (ref 6–8.3)

## 2019-04-09 ENCOUNTER — OUTPATIENT INFUSION SERVICES (OUTPATIENT)
Dept: ONCOLOGY | Facility: MEDICAL CENTER | Age: 84
End: 2019-04-09
Attending: INTERNAL MEDICINE
Payer: MEDICARE

## 2019-04-09 VITALS
SYSTOLIC BLOOD PRESSURE: 143 MMHG | OXYGEN SATURATION: 95 % | RESPIRATION RATE: 18 BRPM | BODY MASS INDEX: 21.85 KG/M2 | HEART RATE: 71 BPM | TEMPERATURE: 97.8 F | HEIGHT: 65 IN | WEIGHT: 131.17 LBS | DIASTOLIC BLOOD PRESSURE: 51 MMHG

## 2019-04-09 PROCEDURE — 700111 HCHG RX REV CODE 636 W/ 250 OVERRIDE (IP): Mod: JG | Performed by: INTERNAL MEDICINE

## 2019-04-09 PROCEDURE — 96401 CHEMO ANTI-NEOPL SQ/IM: CPT

## 2019-04-09 RX ADMIN — DENOSUMAB 60 MG: 60 INJECTION SUBCUTANEOUS at 17:21

## 2019-04-09 NOTE — LETTER
In[facility]   [address] [place] [id number]  Phone: [phone]  Fax: [phone]              Dear Dr. Iraheta,    Your patient, Elizabeth Celis (: 1935), was scheduled at [facility].  Elizabeth's encounter diagnosis is:  No diagnosis found.  She arrived for her appointment, and  the scheduled treatment was given. These medications were administered to the patient: We administered denosumab..  Elizabeth tolerated treatment. In addition, the following labs were drawn  No results found for this or any previous visit (from the past 24 hour(s)).         Her next appointment is to be scheduled in 6 months .    For more information, you may review the nurse's progress notes in chart review under the notes section.       Sincerely,  [facility]

## 2019-04-10 ENCOUNTER — NON-PROVIDER VISIT (OUTPATIENT)
Dept: INTERNAL MEDICINE | Facility: IMAGING CENTER | Age: 84
End: 2019-04-10
Payer: COMMERCIAL

## 2019-04-10 ENCOUNTER — HOSPITAL ENCOUNTER (OUTPATIENT)
Facility: MEDICAL CENTER | Age: 84
End: 2019-04-10
Attending: INTERNAL MEDICINE
Payer: MEDICARE

## 2019-04-10 DIAGNOSIS — R33.9 URINARY RETENTION: ICD-10-CM

## 2019-04-10 LAB
APPEARANCE UR: CLEAR
BILIRUB UR QL STRIP.AUTO: NEGATIVE
COLOR UR: YELLOW
GLUCOSE UR STRIP.AUTO-MCNC: NEGATIVE MG/DL
KETONES UR STRIP.AUTO-MCNC: NEGATIVE MG/DL
LEUKOCYTE ESTERASE UR QL STRIP.AUTO: NEGATIVE
MICRO URNS: NORMAL
NITRITE UR QL STRIP.AUTO: NEGATIVE
PH UR STRIP.AUTO: 6 [PH]
PROT UR QL STRIP: NEGATIVE MG/DL
RBC UR QL AUTO: NEGATIVE
SP GR UR STRIP.AUTO: 1.01
UROBILINOGEN UR STRIP.AUTO-MCNC: 0.2 MG/DL

## 2019-04-10 PROCEDURE — 81003 URINALYSIS AUTO W/O SCOPE: CPT

## 2019-04-10 NOTE — PROGRESS NOTES
Pt to infusion services ambulatory per self.  Pt here for scheduled Prolia injection.  Plan of care reviewed.  Pt verbalizes understanding.  Pt tells me she has received Prolia in the past and has tolerated well.  Pt denies any s/sx of infection today (reports treated recent UTI; no current s/sx).  Pt denies any recent or upcoming invasive dental procedures or oral surgery.  Pt reports taking calcium and vitamin d daily.  Labs done 03/28/19; calcium = 9.9.  Discussed with pharmacy; okay for Prolia today.  Prolia administered per MD orders to L back of arm via subcutaneous injection.  Pt tolerated injection well and without incident.  Band aid applied to injection site.  Pt left infusion services in no apparent distress after completion of treatment, ambulatory.  Pt to return in 6 months; message left with scheduling to please schedule.

## 2019-04-10 NOTE — PROGRESS NOTES
Pharmacy Note:    Ca = 9.9 mg/dL  SCr = 0.93 mg/dL, est CrCl ~43 mL/min  Last Dose 10/2/18  Okay to receive Prolia today.    Amarilis Bang, PharmD, BCOP

## 2019-04-17 ENCOUNTER — OFFICE VISIT (OUTPATIENT)
Dept: INTERNAL MEDICINE | Facility: IMAGING CENTER | Age: 84
End: 2019-04-17
Payer: MEDICARE

## 2019-04-17 VITALS
SYSTOLIC BLOOD PRESSURE: 120 MMHG | BODY MASS INDEX: 21.66 KG/M2 | OXYGEN SATURATION: 100 % | WEIGHT: 130 LBS | HEIGHT: 65 IN | HEART RATE: 71 BPM | RESPIRATION RATE: 14 BRPM | TEMPERATURE: 97.5 F | DIASTOLIC BLOOD PRESSURE: 60 MMHG

## 2019-04-17 DIAGNOSIS — E03.9 HYPOTHYROIDISM (ACQUIRED): ICD-10-CM

## 2019-04-17 DIAGNOSIS — R74.8 ELEVATED LIVER ENZYMES: ICD-10-CM

## 2019-04-17 DIAGNOSIS — Z87.440 HISTORY OF RECURRENT UTIS: ICD-10-CM

## 2019-04-17 DIAGNOSIS — E87.6 HYPOKALEMIA: ICD-10-CM

## 2019-04-17 DIAGNOSIS — M06.9 RHEUMATOID ARTHRITIS INVOLVING MULTIPLE SITES, UNSPECIFIED RHEUMATOID FACTOR PRESENCE: ICD-10-CM

## 2019-04-17 PROCEDURE — 99214 OFFICE O/P EST MOD 30 MIN: CPT | Performed by: INTERNAL MEDICINE

## 2019-04-17 RX ORDER — LEVOTHYROXINE SODIUM 100 MCG
100 TABLET ORAL
Qty: 90 TAB | Refills: 3 | Status: ON HOLD
Start: 2019-04-17 | End: 2020-01-24 | Stop reason: SDUPTHER

## 2019-04-17 RX ORDER — ESTRADIOL 0.1 MG/G
CREAM VAGINAL DAILY
COMMUNITY
End: 2019-07-29 | Stop reason: SDUPTHER

## 2019-04-17 RX ORDER — POTASSIUM CHLORIDE 750 MG/1
10 TABLET, FILM COATED, EXTENDED RELEASE ORAL DAILY
Qty: 90 TAB | Refills: 3 | Status: SHIPPED | OUTPATIENT
Start: 2019-04-17 | End: 2019-04-19

## 2019-04-17 NOTE — PROGRESS NOTES
"Established Patient Note   HPI:        Elizabeth is here today to follow up hypothyroid and recurrent UTI. She has had blood work done recently for which she is here to review. She stopped taking gabapentin this week due to side effect of feeling \"foggy headed\". She has been taking furosemide and spironolactone for edema; she was placed on these years ago. No known CHF or cirrhosis. She has started using estrogen cream vaginally recently due to vaginal dryness. She has recently received her prolia shot. She gets occasional heartburn on average once a week that is controlled with rolaids; she denies any symptoms that suggest dysphagia of solid or liquid.    Past Medical History:   Diagnosis Date   • Arthritis     RA- hands, feet, shoulder   • C. difficile diarrhea 2/16   • C. 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    \"mini\" no residual symptoms   • Urinary incontinence     occasional       Current Outpatient Prescriptions   Medication Sig Dispense Refill   • estradiol (ESTRACE VAGINAL) 0.1 MG/GM vaginal cream Insert  in vagina every day.     • SYNTHROID 100 MCG Tab Take 1 Tab by mouth Every morning on an empty stomach. 90 Tab 3   • potassium chloride ER (KLOR-CON) 10 MEQ tablet Take 1 Tab by mouth every day. 90 Tab 3   • furosemide (LASIX) 40 MG Tab Take 40 mg by mouth every day.     • CALCIUM-VITAMIN D PO Take  by mouth.     • traZODone (DESYREL) 50 MG Tab Take 50 mg by mouth every evening.     • cyclosporin (RESTASIS) 0.05 % ophthalmic emulsion Place 1 Drop in both eyes 2 times a day.     • spironolactone (ALDACTONE) 25 MG Tab Take 25 mg " "by mouth every day.     • denosumab (PROLIA) 60 MG/ML Solution Inject 60 mg as instructed every 6 months. Pt last dose was on 10/2018     • citalopram (CELEXA) 20 MG Tab Take 20 mg by mouth every bedtime.     • Diclofenac Sodium 1 % Gel Apply  to skin as directed.     • multivitamin (THERAGRAN) Tab Take 1 Tab by mouth every day.     • gabapentin (NEURONTIN) 300 MG Cap Take 300 mg by mouth as needed (Pt reports for pain).       No current facility-administered medications for this visit.          Allergies   Allergen Reactions   • Azithromycin Unspecified     Matti Johnsons syndrome   • Erythromycin Unspecified     Matti Johnsons syndrome   • Nitrofurantoin Vomiting and Nausea   • Pcn [Penicillins] Anaphylaxis, Shortness of Breath and Swelling   • Sulfa Drugs Swelling   • Cefuroxime      Pt does not recall what happens, but knows that she has an allergie to this medication.     • Ciprofloxacin      Does not recall   • Clindamycin      Pt does not recall what happens, but knows that she has an allergie to this medication.   • Codeine Itching   • Daptomycin      Matti colleen syndrome     • Flagyl [Metronidazole] Vomiting and Nausea   • Macrodantin  [Nitrofurantoin Macrocrystal]      Other reaction(s): Decreased Blood Pressure   • Morphine Itching   • Premarin Unspecified     burning   • Rifampin      Matti colleen syndrome         Social History   Substance Use Topics   • Smoking status: Former Smoker     Packs/day: 1.00     Years: 20.00     Types: Cigarettes     Quit date: 1/1/1985   • Smokeless tobacco: Never Used   • Alcohol use Yes      Comment: occasional     History   Alcohol Use   • Yes     Comment: occasional     History   Drug Use No       ROS    Ambulatory Vitals  /60 (BP Location: Left arm, Patient Position: Sitting, BP Cuff Size: Adult)   Pulse 71   Temp 36.4 °C (97.5 °F) (Temporal)   Resp 14   Ht 1.651 m (5' 5\")   Wt 59 kg (130 lb)   SpO2 100%   BMI 21.63 kg/m²     Physical Exam "   Constitutional: She is well-developed, well-nourished, and in no distress. No distress.   Cardiovascular: Normal rate, regular rhythm and normal heart sounds.  Exam reveals no gallop and no friction rub.    No murmur heard.  Pulmonary/Chest: Effort normal and breath sounds normal. She has no wheezes. She has no rales.   Musculoskeletal: She exhibits no edema.   Skin: She is not diaphoretic.       Component      Latest Ref Rng & Units 3/28/2019 3/28/2019 4/10/2019          10:40 AM 10:40 AM    WBC      4.8 - 10.8 K/uL  5.9    RBC      4.20 - 5.40 M/uL  4.34    Hemoglobin      12.0 - 16.0 g/dL  13.2    Hematocrit      37.0 - 47.0 % 37.5 40.7    MCV      81.4 - 97.8 fL  93.8    MCH      27.0 - 33.0 pg  30.4    MCHC      33.6 - 35.0 g/dL  32.4 (L)    RDW      35.9 - 50.0 fL  53.9 (H)    Platelet Count      164 - 446 K/uL  361    MPV      9.0 - 12.9 fL  9.7    Neutrophils-Polys      44.00 - 72.00 %  43.70 (L)    Lymphocytes      22.00 - 41.00 %  39.80    Monocytes      0.00 - 13.40 %  8.60    Eosinophils      0.00 - 6.90 %  6.60    Basophils      0.00 - 1.80 %  1.30    Immature Granulocytes      0.00 - 0.90 %  0.00    Nucleated RBC      /100 WBC  0.00    Neutrophils (Absolute)      2.00 - 7.15 K/uL  2.59    Lymphs (Absolute)      1.00 - 4.80 K/uL  2.36    Monos (Absolute)      0.00 - 0.85 K/uL  0.51    Eos (Absolute)      0.00 - 0.51 K/uL  0.39    Baso (Absolute)      0.00 - 0.12 K/uL  0.08    Immature Granulocytes (abs)      0.00 - 0.11 K/uL  0.00    NRBC (Absolute)      K/uL  0.00    Sodium      135 - 145 mmol/L 139     Potassium      3.6 - 5.5 mmol/L 3.2 (L)     Chloride      96 - 112 mmol/L 104     Co2      20 - 33 mmol/L 24     Anion Gap      0.0 - 11.9 11.0     Glucose      65 - 99 mg/dL 75     Bun      8 - 22 mg/dL 29 (H)     Creatinine      0.50 - 1.40 mg/dL 0.93     Calcium      8.5 - 10.5 mg/dL 9.9     AST(SGOT)      12 - 45 U/L 101 (H)     ALT(SGPT)      2 - 50 U/L 119 (H)     Alkaline Phosphatase      30 -  99 U/L 53     Total Bilirubin      0.1 - 1.5 mg/dL 0.5     Albumin      3.2 - 4.9 g/dL 4.4     Total Protein      6.0 - 8.2 g/dL 7.5     Globulin      1.9 - 3.5 g/dL 3.1     A-G Ratio      g/dL 1.4     Color         Yellow   Character         Clear   Specific Gravity      <1.035   1.007   Ph      5.0 - 8.0   6.0   Glucose      Negative mg/dL   Negative   Ketones      Negative mg/dL   Negative   Protein      Negative mg/dL   Negative   Bilirubin      Negative   Negative   Urobilinogen, Urine      Negative   0.2   Nitrite      Negative   Negative   Leukocyte Esterase      Negative   Negative   Occult Blood      Negative   Negative   Micro Urine Req         see below   Albumin      3.75 - 5.01 g/dL 4.49     Alpha-1 Globulin      0.19 - 0.46 g/dL 0.29     Alpha-2 Globulin      0.48 - 1.05 g/dL 0.96     Beta Globulin      0.48 - 1.10 g/dL 0.90     Gamma Globulin      0.62 - 1.51 g/dL 1.17     Interpretation       See Note     JOSE Reflex       Not Done     Total Protein      6.00 - 8.30 g/dL 7.80     EER Serum Prot. Electro. Reflex       See Note     Significant Indicator            Source            Site            Urine Culture            WBC      /hpf      RBC      /hpf      Bacteria      None /hpf      Epithelial Cells      /hpf      Hyaline Cast      /lpf      Iron      40 - 170 ug/dL 77     Total Iron Binding      250 - 450 ug/dL 413     % Saturation      15 - 55 % 19     GFR If African American      >60 mL/min/1.73 m 2 >60     GFR If Non African American      >60 mL/min/1.73 m 2 58 (A)     Red Blood Cell Folate      >=366 ng/mL See Note     Ccp Antibodies      0 - 19 Units 3     Sed Rate Westergren      0 - 30 mm/hour 25     Pth, Intact      14.0 - 72.0 pg/mL 26.4     T3      60.0 - 181.0 ng/dL 74.1     TSH      0.380 - 5.330 uIU/mL 0.070 (L)     Ferritin      10.0 - 291.0 ng/mL 32.5     Vitamin B12 -True Cobalamin      211 - 911 pg/mL 1480 (H)     C Reactive Protein High Sensitive      0.0 - 7.5 mg/L 5.8      25-Hydroxy   Vitamin D 25      30 - 100 ng/mL 56     Rheumatoid Factor -Neph-      0 - 14 IU/mL <10       Assessment and Plan:     1. Hypothyroidism (acquired)  SYNTHROID 100 MCG Tab    TSH    TRIIDOTHYRONINE   2. Hypokalemia  potassium chloride ER (KLOR-CON) 10 MEQ tablet    METABOLIC PANEL,COMPREHENSIVE   3. History of recurrent UTIs  METABOLIC PANEL,COMPREHENSIVE    URINALYSIS   4. Elevated liver enzymes  METABOLIC PANEL,COMPREHENSIVE   5. Rheumatoid arthritis involving multiple sites, unspecified rheumatoid factor presence (HCC)  REFERRAL TO RHEUMATOLOGY     Decrease synthroid to 100 mcg qam. Start KCl 10 meq qd for hypokalemia. Repeat CMP in 4-6 weeks to evaluate LFTs and K+. Follow up in 3 months to repeat CMP, TSH/T3 and urinalysis.      Ramone Iraheta M.D.

## 2019-04-18 DIAGNOSIS — M06.9 RHEUMATOID ARTHRITIS INVOLVING MULTIPLE SITES, UNSPECIFIED RHEUMATOID FACTOR PRESENCE: ICD-10-CM

## 2019-04-19 ENCOUNTER — HOSPITAL ENCOUNTER (OUTPATIENT)
Dept: RADIOLOGY | Facility: MEDICAL CENTER | Age: 84
End: 2019-04-19
Attending: INTERNAL MEDICINE | Admitting: ORTHOPAEDIC SURGERY
Payer: MEDICARE

## 2019-04-19 DIAGNOSIS — M06.9 RHEUMATOID ARTHRITIS INVOLVING MULTIPLE SITES, UNSPECIFIED RHEUMATOID FACTOR PRESENCE: ICD-10-CM

## 2019-04-19 DIAGNOSIS — Z01.812 PRE-OPERATIVE LABORATORY EXAMINATION: ICD-10-CM

## 2019-04-19 PROCEDURE — 80053 COMPREHEN METABOLIC PANEL: CPT

## 2019-04-19 PROCEDURE — 77077 JOINT SURVEY SINGLE VIEW: CPT

## 2019-04-19 PROCEDURE — 81001 URINALYSIS AUTO W/SCOPE: CPT

## 2019-04-19 PROCEDURE — 87086 URINE CULTURE/COLONY COUNT: CPT

## 2019-04-19 PROCEDURE — 85027 COMPLETE CBC AUTOMATED: CPT

## 2019-04-19 PROCEDURE — 36415 COLL VENOUS BLD VENIPUNCTURE: CPT

## 2019-04-19 PROCEDURE — 87641 MR-STAPH DNA AMP PROBE: CPT

## 2019-04-19 PROCEDURE — 87640 STAPH A DNA AMP PROBE: CPT | Mod: XU

## 2019-04-19 NOTE — DISCHARGE PLANNING
DISCHARGE PLANNING NOTE - TOTAL JOINT     Procedure: Procedure(s):  ARTHROPLASTY, SHOULDER, TOTAL - REVERSE  Procedure Date: 4/30/2019  Insurance:  Payor: MEDICARE / Plan: MEDICARE PART A & B / Product Type: *No Product type* /   Equipment currently available at home? raised toilet seat, shower chair, reacher, sock aide, ice packs and will receive sling from Dr. Camargo's office  Steps into the home? none  Steps within the home? none  Toilet height? ADA  Type of shower? walk-in shower with hose  Who will be with you during your recovery? DaughterGuilherme  Is Outpatient Physical Therapy set up after surgery? No   Did you take the Total Joint Class and where? Yes, at Dr. Camargo's with previous surgery of Right TSA in 01/2018     Plan: Met with patient during preadmission appointment.  Reviewed Equipment Resource list and provided a copy to the patient.  Provided and reviewed Home Safety Checklist.  Quiet Hours information given and reviewed.  There are no identified discharge needs at this time.  Anticipate discharge home with family.  No Dme ordered/needed at this time.

## 2019-04-19 NOTE — OR NURSING
Pre admit apt: Pt. Instructed to continue regularly prescribed medications through day before surgery.  Instructed to take the following medications, the day of surgery, with a sip of water per anesthesia protocol: restasis eye drops, synthroid.    Pt does not take any meds for RA, pt states she can flex head backwards without difficulty

## 2019-04-20 LAB
ALBUMIN SERPL BCP-MCNC: 4.1 G/DL (ref 3.2–4.9)
ALBUMIN/GLOB SERPL: 1.5 G/DL
ALP SERPL-CCNC: 43 U/L (ref 30–99)
ALT SERPL-CCNC: 37 U/L (ref 2–50)
ANION GAP SERPL CALC-SCNC: 9 MMOL/L (ref 0–11.9)
APPEARANCE UR: CLEAR
AST SERPL-CCNC: 39 U/L (ref 12–45)
BACTERIA #/AREA URNS HPF: NEGATIVE /HPF
BILIRUB SERPL-MCNC: 0.5 MG/DL (ref 0.1–1.5)
BILIRUB UR QL STRIP.AUTO: NEGATIVE
BUN SERPL-MCNC: 40 MG/DL (ref 8–22)
CALCIUM SERPL-MCNC: 9 MG/DL (ref 8.5–10.5)
CHLORIDE SERPL-SCNC: 107 MMOL/L (ref 96–112)
CO2 SERPL-SCNC: 19 MMOL/L (ref 20–33)
COLOR UR: YELLOW
CREAT SERPL-MCNC: 1.11 MG/DL (ref 0.5–1.4)
EPI CELLS #/AREA URNS HPF: ABNORMAL /HPF
ERYTHROCYTE [DISTWIDTH] IN BLOOD BY AUTOMATED COUNT: 53.4 FL (ref 35.9–50)
GLOBULIN SER CALC-MCNC: 2.8 G/DL (ref 1.9–3.5)
GLUCOSE SERPL-MCNC: 138 MG/DL (ref 65–99)
GLUCOSE UR STRIP.AUTO-MCNC: NEGATIVE MG/DL
HCT VFR BLD AUTO: 39.1 % (ref 37–47)
HGB BLD-MCNC: 12.4 G/DL (ref 12–16)
HYALINE CASTS #/AREA URNS LPF: ABNORMAL /LPF
KETONES UR STRIP.AUTO-MCNC: NEGATIVE MG/DL
LEUKOCYTE ESTERASE UR QL STRIP.AUTO: ABNORMAL
MCH RBC QN AUTO: 30.7 PG (ref 27–33)
MCHC RBC AUTO-ENTMCNC: 31.7 G/DL (ref 33.6–35)
MCV RBC AUTO: 96.8 FL (ref 81.4–97.8)
MICRO URNS: ABNORMAL
NITRITE UR QL STRIP.AUTO: NEGATIVE
PH UR STRIP.AUTO: 6 [PH]
PLATELET # BLD AUTO: 314 K/UL (ref 164–446)
PMV BLD AUTO: 9.9 FL (ref 9–12.9)
POTASSIUM SERPL-SCNC: 3.5 MMOL/L (ref 3.6–5.5)
PROT SERPL-MCNC: 6.9 G/DL (ref 6–8.2)
PROT UR QL STRIP: NEGATIVE MG/DL
RBC # BLD AUTO: 4.04 M/UL (ref 4.2–5.4)
RBC # URNS HPF: ABNORMAL /HPF
RBC UR QL AUTO: NEGATIVE
SCCMEC + MECA PNL NOSE NAA+PROBE: NEGATIVE
SCCMEC + MECA PNL NOSE NAA+PROBE: NEGATIVE
SODIUM SERPL-SCNC: 135 MMOL/L (ref 135–145)
SP GR UR STRIP.AUTO: 1.01
UROBILINOGEN UR STRIP.AUTO-MCNC: 0.2 MG/DL
WBC # BLD AUTO: 7.4 K/UL (ref 4.8–10.8)
WBC #/AREA URNS HPF: ABNORMAL /HPF

## 2019-04-22 LAB
BACTERIA UR CULT: NORMAL
SIGNIFICANT IND 70042: NORMAL
SITE SITE: NORMAL
SOURCE SOURCE: NORMAL

## 2019-04-30 ENCOUNTER — ANESTHESIA (OUTPATIENT)
Dept: SURGERY | Facility: MEDICAL CENTER | Age: 84
DRG: 483 | End: 2019-04-30
Payer: MEDICARE

## 2019-04-30 ENCOUNTER — APPOINTMENT (OUTPATIENT)
Dept: RADIOLOGY | Facility: MEDICAL CENTER | Age: 84
DRG: 483 | End: 2019-04-30
Attending: ORTHOPAEDIC SURGERY
Payer: MEDICARE

## 2019-04-30 ENCOUNTER — HOSPITAL ENCOUNTER (INPATIENT)
Facility: MEDICAL CENTER | Age: 84
LOS: 1 days | DRG: 483 | End: 2019-05-01
Attending: ORTHOPAEDIC SURGERY | Admitting: ORTHOPAEDIC SURGERY
Payer: MEDICARE

## 2019-04-30 ENCOUNTER — ANESTHESIA EVENT (OUTPATIENT)
Dept: SURGERY | Facility: MEDICAL CENTER | Age: 84
DRG: 483 | End: 2019-04-30
Payer: MEDICARE

## 2019-04-30 PROCEDURE — 160036 HCHG PACU - EA ADDL 30 MINS PHASE I: Performed by: ORTHOPAEDIC SURGERY

## 2019-04-30 PROCEDURE — 700111 HCHG RX REV CODE 636 W/ 250 OVERRIDE (IP)

## 2019-04-30 PROCEDURE — 700102 HCHG RX REV CODE 250 W/ 637 OVERRIDE(OP): Performed by: ANESTHESIOLOGY

## 2019-04-30 PROCEDURE — 700101 HCHG RX REV CODE 250: Performed by: ANESTHESIOLOGY

## 2019-04-30 PROCEDURE — 770006 HCHG ROOM/CARE - MED/SURG/GYN SEMI*

## 2019-04-30 PROCEDURE — 160048 HCHG OR STATISTICAL LEVEL 1-5: Performed by: ORTHOPAEDIC SURGERY

## 2019-04-30 PROCEDURE — 501838 HCHG SUTURE GENERAL: Performed by: ORTHOPAEDIC SURGERY

## 2019-04-30 PROCEDURE — 73020 X-RAY EXAM OF SHOULDER: CPT | Mod: LT

## 2019-04-30 PROCEDURE — A9270 NON-COVERED ITEM OR SERVICE: HCPCS | Performed by: ORTHOPAEDIC SURGERY

## 2019-04-30 PROCEDURE — 502578 HCHG PACK, TOTAL HIP: Performed by: ORTHOPAEDIC SURGERY

## 2019-04-30 PROCEDURE — 700112 HCHG RX REV CODE 229: Performed by: ORTHOPAEDIC SURGERY

## 2019-04-30 PROCEDURE — 700102 HCHG RX REV CODE 250 W/ 637 OVERRIDE(OP): Performed by: ORTHOPAEDIC SURGERY

## 2019-04-30 PROCEDURE — 160022 HCHG BLOCK: Performed by: ORTHOPAEDIC SURGERY

## 2019-04-30 PROCEDURE — 94760 N-INVAS EAR/PLS OXIMETRY 1: CPT

## 2019-04-30 PROCEDURE — 700111 HCHG RX REV CODE 636 W/ 250 OVERRIDE (IP): Performed by: ANESTHESIOLOGY

## 2019-04-30 PROCEDURE — 500562 HCHG FIBERWIRE: Performed by: ORTHOPAEDIC SURGERY

## 2019-04-30 PROCEDURE — 700105 HCHG RX REV CODE 258: Performed by: ANESTHESIOLOGY

## 2019-04-30 PROCEDURE — 160009 HCHG ANES TIME/MIN: Performed by: ORTHOPAEDIC SURGERY

## 2019-04-30 PROCEDURE — 160035 HCHG PACU - 1ST 60 MINS PHASE I: Performed by: ORTHOPAEDIC SURGERY

## 2019-04-30 PROCEDURE — 160002 HCHG RECOVERY MINUTES (STAT): Performed by: ORTHOPAEDIC SURGERY

## 2019-04-30 PROCEDURE — 502000 HCHG MISC OR IMPLANTS RC 0278: Performed by: ORTHOPAEDIC SURGERY

## 2019-04-30 PROCEDURE — 700101 HCHG RX REV CODE 250

## 2019-04-30 PROCEDURE — A9270 NON-COVERED ITEM OR SERVICE: HCPCS | Performed by: ANESTHESIOLOGY

## 2019-04-30 PROCEDURE — 700101 HCHG RX REV CODE 250: Performed by: ORTHOPAEDIC SURGERY

## 2019-04-30 PROCEDURE — 0RRK00Z REPLACEMENT OF LEFT SHOULDER JOINT WITH REVERSE BALL AND SOCKET SYNTHETIC SUBSTITUTE, OPEN APPROACH: ICD-10-PCS | Performed by: ORTHOPAEDIC SURGERY

## 2019-04-30 PROCEDURE — 160029 HCHG SURGERY MINUTES - 1ST 30 MINS LEVEL 4: Performed by: ORTHOPAEDIC SURGERY

## 2019-04-30 PROCEDURE — 160041 HCHG SURGERY MINUTES - EA ADDL 1 MIN LEVEL 4: Performed by: ORTHOPAEDIC SURGERY

## 2019-04-30 DEVICE — IMPLANTABLE DEVICE: Type: IMPLANTABLE DEVICE | Site: SHOULDER | Status: FUNCTIONAL

## 2019-04-30 RX ORDER — SODIUM CHLORIDE, SODIUM LACTATE, POTASSIUM CHLORIDE, CALCIUM CHLORIDE 600; 310; 30; 20 MG/100ML; MG/100ML; MG/100ML; MG/100ML
INJECTION, SOLUTION INTRAVENOUS CONTINUOUS
Status: ACTIVE | OUTPATIENT
Start: 2019-04-30 | End: 2019-04-30

## 2019-04-30 RX ORDER — TRANEXAMIC ACID 100 MG/ML
INJECTION, SOLUTION INTRAVENOUS PRN
Status: DISCONTINUED | OUTPATIENT
Start: 2019-04-30 | End: 2019-04-30 | Stop reason: SURG

## 2019-04-30 RX ORDER — MEPERIDINE HYDROCHLORIDE 25 MG/ML
12.5 INJECTION INTRAMUSCULAR; INTRAVENOUS; SUBCUTANEOUS
Status: DISCONTINUED | OUTPATIENT
Start: 2019-04-30 | End: 2019-04-30 | Stop reason: HOSPADM

## 2019-04-30 RX ORDER — HYDROMORPHONE HYDROCHLORIDE 1 MG/ML
0.5 INJECTION, SOLUTION INTRAMUSCULAR; INTRAVENOUS; SUBCUTANEOUS
Status: DISCONTINUED | OUTPATIENT
Start: 2019-04-30 | End: 2019-05-01 | Stop reason: HOSPADM

## 2019-04-30 RX ORDER — DEXAMETHASONE SODIUM PHOSPHATE 4 MG/ML
INJECTION, SOLUTION INTRA-ARTICULAR; INTRALESIONAL; INTRAMUSCULAR; INTRAVENOUS; SOFT TISSUE PRN
Status: DISCONTINUED | OUTPATIENT
Start: 2019-04-30 | End: 2019-04-30 | Stop reason: SURG

## 2019-04-30 RX ORDER — DOCUSATE SODIUM 100 MG/1
100 CAPSULE, LIQUID FILLED ORAL 2 TIMES DAILY
Status: DISCONTINUED | OUTPATIENT
Start: 2019-04-30 | End: 2019-05-01 | Stop reason: HOSPADM

## 2019-04-30 RX ORDER — AMOXICILLIN 250 MG
1 CAPSULE ORAL NIGHTLY
Status: DISCONTINUED | OUTPATIENT
Start: 2019-04-30 | End: 2019-05-01 | Stop reason: HOSPADM

## 2019-04-30 RX ORDER — BUPIVACAINE HYDROCHLORIDE 5 MG/ML
INJECTION, SOLUTION EPIDURAL; INTRACAUDAL PRN
Status: DISCONTINUED | OUTPATIENT
Start: 2019-04-30 | End: 2019-04-30 | Stop reason: SURG

## 2019-04-30 RX ORDER — BISACODYL 10 MG
10 SUPPOSITORY, RECTAL RECTAL
Status: DISCONTINUED | OUTPATIENT
Start: 2019-04-30 | End: 2019-05-01 | Stop reason: HOSPADM

## 2019-04-30 RX ORDER — DIPHENHYDRAMINE HYDROCHLORIDE 50 MG/ML
12.5 INJECTION INTRAMUSCULAR; INTRAVENOUS
Status: DISCONTINUED | OUTPATIENT
Start: 2019-04-30 | End: 2019-04-30 | Stop reason: HOSPADM

## 2019-04-30 RX ORDER — POLYETHYLENE GLYCOL 3350 17 G/17G
1 POWDER, FOR SOLUTION ORAL 2 TIMES DAILY PRN
Status: DISCONTINUED | OUTPATIENT
Start: 2019-04-30 | End: 2019-05-01 | Stop reason: HOSPADM

## 2019-04-30 RX ORDER — ONDANSETRON 2 MG/ML
4 INJECTION INTRAMUSCULAR; INTRAVENOUS EVERY 4 HOURS PRN
Status: DISCONTINUED | OUTPATIENT
Start: 2019-04-30 | End: 2019-05-01 | Stop reason: HOSPADM

## 2019-04-30 RX ORDER — FUROSEMIDE 40 MG/1
40 TABLET ORAL DAILY
Status: DISCONTINUED | OUTPATIENT
Start: 2019-04-30 | End: 2019-05-01 | Stop reason: HOSPADM

## 2019-04-30 RX ORDER — DIPHENHYDRAMINE HYDROCHLORIDE 50 MG/ML
25 INJECTION INTRAMUSCULAR; INTRAVENOUS EVERY 6 HOURS PRN
Status: DISCONTINUED | OUTPATIENT
Start: 2019-04-30 | End: 2019-05-01 | Stop reason: HOSPADM

## 2019-04-30 RX ORDER — OXYCODONE HYDROCHLORIDE 5 MG/1
5 TABLET ORAL
Status: DISCONTINUED | OUTPATIENT
Start: 2019-04-30 | End: 2019-05-01 | Stop reason: HOSPADM

## 2019-04-30 RX ORDER — LEVOTHYROXINE SODIUM 0.1 MG/1
100 TABLET ORAL
Status: DISCONTINUED | OUTPATIENT
Start: 2019-05-01 | End: 2019-05-01 | Stop reason: HOSPADM

## 2019-04-30 RX ORDER — TRAMADOL HYDROCHLORIDE 50 MG/1
50 TABLET ORAL EVERY 4 HOURS PRN
Status: DISCONTINUED | OUTPATIENT
Start: 2019-04-30 | End: 2019-05-01 | Stop reason: HOSPADM

## 2019-04-30 RX ORDER — ONDANSETRON 2 MG/ML
4 INJECTION INTRAMUSCULAR; INTRAVENOUS
Status: DISCONTINUED | OUTPATIENT
Start: 2019-04-30 | End: 2019-04-30 | Stop reason: HOSPADM

## 2019-04-30 RX ORDER — OXYCODONE HYDROCHLORIDE 10 MG/1
10 TABLET ORAL
Status: DISCONTINUED | OUTPATIENT
Start: 2019-04-30 | End: 2019-05-01 | Stop reason: HOSPADM

## 2019-04-30 RX ORDER — ONDANSETRON 2 MG/ML
INJECTION INTRAMUSCULAR; INTRAVENOUS PRN
Status: DISCONTINUED | OUTPATIENT
Start: 2019-04-30 | End: 2019-04-30 | Stop reason: SURG

## 2019-04-30 RX ORDER — SODIUM CHLORIDE, SODIUM LACTATE, POTASSIUM CHLORIDE, CALCIUM CHLORIDE 600; 310; 30; 20 MG/100ML; MG/100ML; MG/100ML; MG/100ML
1000 INJECTION, SOLUTION INTRAVENOUS CONTINUOUS
Status: CANCELLED | OUTPATIENT
Start: 2019-04-30 | End: 2019-04-30

## 2019-04-30 RX ORDER — TRAZODONE HYDROCHLORIDE 50 MG/1
50 TABLET ORAL NIGHTLY
Status: DISCONTINUED | OUTPATIENT
Start: 2019-04-30 | End: 2019-05-01 | Stop reason: HOSPADM

## 2019-04-30 RX ORDER — CITALOPRAM 20 MG/1
20 TABLET ORAL
Status: DISCONTINUED | OUTPATIENT
Start: 2019-04-30 | End: 2019-05-01 | Stop reason: HOSPADM

## 2019-04-30 RX ORDER — DEXAMETHASONE SODIUM PHOSPHATE 4 MG/ML
4 INJECTION, SOLUTION INTRA-ARTICULAR; INTRALESIONAL; INTRAMUSCULAR; INTRAVENOUS; SOFT TISSUE
Status: DISCONTINUED | OUTPATIENT
Start: 2019-04-30 | End: 2019-05-01 | Stop reason: HOSPADM

## 2019-04-30 RX ORDER — AMOXICILLIN 250 MG
1 CAPSULE ORAL
Status: DISCONTINUED | OUTPATIENT
Start: 2019-04-30 | End: 2019-05-01 | Stop reason: HOSPADM

## 2019-04-30 RX ORDER — SPIRONOLACTONE 25 MG/1
25 TABLET ORAL DAILY
Status: DISCONTINUED | OUTPATIENT
Start: 2019-04-30 | End: 2019-05-01 | Stop reason: HOSPADM

## 2019-04-30 RX ORDER — HYDRALAZINE HYDROCHLORIDE 20 MG/ML
5 INJECTION INTRAMUSCULAR; INTRAVENOUS
Status: DISCONTINUED | OUTPATIENT
Start: 2019-04-30 | End: 2019-04-30 | Stop reason: HOSPADM

## 2019-04-30 RX ORDER — VANCOMYCIN HCL 900 MCG/MG
POWDER (GRAM) MISCELLANEOUS
Status: DISCONTINUED | OUTPATIENT
Start: 2019-04-30 | End: 2019-04-30 | Stop reason: HOSPADM

## 2019-04-30 RX ORDER — SCOLOPAMINE TRANSDERMAL SYSTEM 1 MG/1
1 PATCH, EXTENDED RELEASE TRANSDERMAL
Status: DISCONTINUED | OUTPATIENT
Start: 2019-04-30 | End: 2019-05-01 | Stop reason: HOSPADM

## 2019-04-30 RX ORDER — ENEMA 19; 7 G/133ML; G/133ML
1 ENEMA RECTAL
Status: DISCONTINUED | OUTPATIENT
Start: 2019-04-30 | End: 2019-05-01 | Stop reason: HOSPADM

## 2019-04-30 RX ORDER — HALOPERIDOL 5 MG/ML
1 INJECTION INTRAMUSCULAR EVERY 6 HOURS PRN
Status: DISCONTINUED | OUTPATIENT
Start: 2019-04-30 | End: 2019-05-01 | Stop reason: HOSPADM

## 2019-04-30 RX ORDER — ACETAMINOPHEN 325 MG/1
650 TABLET ORAL ONCE
Status: COMPLETED | OUTPATIENT
Start: 2019-04-30 | End: 2019-04-30

## 2019-04-30 RX ORDER — ACETAMINOPHEN 500 MG
1000 TABLET ORAL EVERY 6 HOURS
Status: DISCONTINUED | OUTPATIENT
Start: 2019-04-30 | End: 2019-05-01 | Stop reason: HOSPADM

## 2019-04-30 RX ORDER — POLYVINYL ALCOHOL 14 MG/ML
1 SOLUTION/ DROPS OPHTHALMIC
Status: DISCONTINUED | OUTPATIENT
Start: 2019-04-30 | End: 2019-05-01 | Stop reason: HOSPADM

## 2019-04-30 RX ORDER — CYCLOSPORINE 0.5 MG/ML
1 EMULSION OPHTHALMIC 2 TIMES DAILY
Status: DISCONTINUED | OUTPATIENT
Start: 2019-04-30 | End: 2019-04-30

## 2019-04-30 RX ADMIN — PROPOFOL 120 MG: 10 INJECTION, EMULSION INTRAVENOUS at 11:45

## 2019-04-30 RX ADMIN — TRAZODONE HYDROCHLORIDE 50 MG: 50 TABLET ORAL at 20:26

## 2019-04-30 RX ADMIN — EPHEDRINE SULFATE 10 MG: 50 INJECTION INTRAMUSCULAR; INTRAVENOUS; SUBCUTANEOUS at 12:25

## 2019-04-30 RX ADMIN — DOCUSATE SODIUM 100 MG: 100 CAPSULE, LIQUID FILLED ORAL at 17:58

## 2019-04-30 RX ADMIN — SODIUM CHLORIDE, SODIUM LACTATE, POTASSIUM CHLORIDE, CALCIUM CHLORIDE: 600; 310; 30; 20 INJECTION, SOLUTION INTRAVENOUS at 11:37

## 2019-04-30 RX ADMIN — FENTANYL CITRATE 25 MCG: 50 INJECTION, SOLUTION INTRAMUSCULAR; INTRAVENOUS at 13:43

## 2019-04-30 RX ADMIN — ROCURONIUM BROMIDE 50 MG: 10 INJECTION INTRAVENOUS at 11:45

## 2019-04-30 RX ADMIN — LIDOCAINE HYDROCHLORIDE 0.5 ML: 10 INJECTION, SOLUTION INFILTRATION; PERINEURAL at 10:20

## 2019-04-30 RX ADMIN — VANCOMYCIN HYDROCHLORIDE 1000 MG: 1 INJECTION, POWDER, LYOPHILIZED, FOR SOLUTION INTRAVENOUS at 10:46

## 2019-04-30 RX ADMIN — BUPIVACAINE HYDROCHLORIDE 5 ML: 5 INJECTION, SOLUTION EPIDURAL; INTRACAUDAL; PERINEURAL at 11:26

## 2019-04-30 RX ADMIN — ONDANSETRON 4 MG: 2 INJECTION INTRAMUSCULAR; INTRAVENOUS at 11:58

## 2019-04-30 RX ADMIN — SUGAMMADEX 120 MG: 100 INJECTION, SOLUTION INTRAVENOUS at 13:07

## 2019-04-30 RX ADMIN — SENNOSIDES, DOCUSATE SODIUM 1 TABLET: 50; 8.6 TABLET, FILM COATED ORAL at 20:26

## 2019-04-30 RX ADMIN — ACETAMINOPHEN 1000 MG: 500 TABLET, FILM COATED ORAL at 23:45

## 2019-04-30 RX ADMIN — ACETAMINOPHEN 650 MG: 325 TABLET, FILM COATED ORAL at 10:18

## 2019-04-30 RX ADMIN — ACETAMINOPHEN 1000 MG: 500 TABLET, FILM COATED ORAL at 17:57

## 2019-04-30 RX ADMIN — DEXAMETHASONE SODIUM PHOSPHATE 6 MG: 4 INJECTION, SOLUTION INTRAMUSCULAR; INTRAVENOUS at 11:58

## 2019-04-30 RX ADMIN — TRANEXAMIC ACID 1000 MG: 100 INJECTION, SOLUTION INTRAVENOUS at 11:51

## 2019-04-30 RX ADMIN — BUPIVACAINE 10 ML: 13.3 INJECTION, SUSPENSION, LIPOSOMAL INFILTRATION at 11:26

## 2019-04-30 ASSESSMENT — LIFESTYLE VARIABLES
TOTAL SCORE: 0
HAVE YOU EVER FELT YOU SHOULD CUT DOWN ON YOUR DRINKING: NO
ON A TYPICAL DAY WHEN YOU DRINK ALCOHOL HOW MANY DRINKS DO YOU HAVE: 1
ALCOHOL_USE: YES
TOTAL SCORE: 0
EVER HAD A DRINK FIRST THING IN THE MORNING TO STEADY YOUR NERVES TO GET RID OF A HANGOVER: NO
EVER FELT BAD OR GUILTY ABOUT YOUR DRINKING: NO
HAVE PEOPLE ANNOYED YOU BY CRITICIZING YOUR DRINKING: NO
TOTAL SCORE: 0
HOW MANY TIMES IN THE PAST YEAR HAVE YOU HAD 5 OR MORE DRINKS IN A DAY: 0
AVERAGE NUMBER OF DAYS PER WEEK YOU HAVE A DRINK CONTAINING ALCOHOL: 7
CONSUMPTION TOTAL: NEGATIVE

## 2019-04-30 ASSESSMENT — COGNITIVE AND FUNCTIONAL STATUS - GENERAL
MOVING TO AND FROM BED TO CHAIR: A LITTLE
SUGGESTED CMS G CODE MODIFIER DAILY ACTIVITY: CJ
SUGGESTED CMS G CODE MODIFIER MOBILITY: CK
MOVING FROM LYING ON BACK TO SITTING ON SIDE OF FLAT BED: A LITTLE
DRESSING REGULAR LOWER BODY CLOTHING: A LITTLE
TOILETING: A LITTLE
TURNING FROM BACK TO SIDE WHILE IN FLAT BAD: A LITTLE
WALKING IN HOSPITAL ROOM: A LITTLE
STANDING UP FROM CHAIR USING ARMS: A LITTLE
DAILY ACTIVITIY SCORE: 20
MOBILITY SCORE: 18
HELP NEEDED FOR BATHING: A LITTLE
DRESSING REGULAR UPPER BODY CLOTHING: A LITTLE
CLIMB 3 TO 5 STEPS WITH RAILING: A LITTLE

## 2019-04-30 ASSESSMENT — PATIENT HEALTH QUESTIONNAIRE - PHQ9
1. LITTLE INTEREST OR PLEASURE IN DOING THINGS: NOT AT ALL
2. FEELING DOWN, DEPRESSED, IRRITABLE, OR HOPELESS: NOT AT ALL
SUM OF ALL RESPONSES TO PHQ9 QUESTIONS 1 AND 2: 0

## 2019-04-30 ASSESSMENT — PAIN SCALES - GENERAL: PAIN_LEVEL: 4

## 2019-04-30 NOTE — ANESTHESIA PROCEDURE NOTES
Airway  Date/Time: 4/30/2019 11:49 AM  Performed by: JIM MENDOZA  Authorized by: JIM MENDOZA     Location:  OR  Urgency:  Elective  Difficult Airway: Yes     1 Attempt each with MAC 3/ HOBBS 2 unsuccessful; Glidescope 3 --> Gr. II view (anterior view); Able to place ETT #7.0 on first attempt. Minimal upper lip trauma noted post intubation.    Indications for Airway Management:  Anesthesia  Spontaneous Ventilation: absent    Sedation Level:  Deep  Preoxygenated: Yes    Patient Position:  Sniffing  Mask Difficulty Assessment:  1 - vent by mask  Final Airway Type:  Endotracheal airway  Final Endotracheal Airway:  ETT  Cuffed: Yes    Technique Used for Successful ETT Placement:  Video laryngoscopy  Insertion Site:  Oral  Blade Type:  Briana  Laryngoscope Blade/Videolaryngoscope Blade Size:  3  ETT Size (mm):  7.0  Leak Pressue (cm H2O):  22  Measured from:  Teeth  Placement Verified by: auscultation and capnometry    Cormack-Lehane Classification:  Grade IIb - view of arytenoids or posterior of glottis only  Number of Attempts at Approach:  1  Number of Other Approaches Attempted:  0

## 2019-04-30 NOTE — OR NURSING
"1318- Patient arrived from OR. Respirations spontaneous and unlabored. VSS, see flowsheets. Surgical dressing to L shoulder C/D/I with immobilizer in place. Pulses 2+ throughout. Fingers pink. L fingers and hand cool. Brisk cap refill.   1355- Patient reports tolerable pain. Declines need for additional pain medication. Continues to decline nausea. Patient states \"I just want to sleep.\" VSS, see flowsheets.   1432- Meets criteria to transfer to floor. Report to LEONIDAS Welch.   "

## 2019-04-30 NOTE — PROGRESS NOTES
Time out completed w RN, anesthesiologist, pt and daughter before nerve block in preop. RN at bedside for entire procedure.  No complications noted.

## 2019-04-30 NOTE — FLOWSHEET NOTE
04/30/19 1521   Education   Education Yes - Pt. / Family has been Instructed in use of Respiratory Equipment   Incentive Spirometry Group   Incentive Spirometry Instruction Yes   Breathing Exercises Yes   Incentive Spirometer Volume 1400 mL   Incentive Spirometer Date Last Changed 04/30/19   Oximetry   #Pulse Oximetry (Single Determination) Yes   Continuous Oximetry Yes   Oxygen   Pulse Oximetry 98 %   O2 (LPM) 0.75   O2 Daily Delivery Respiratory  Silicone Nasal Cannula

## 2019-04-30 NOTE — PROGRESS NOTES
Patient allergies and NPO status verified.  Home medication reconciliation completed.  Belongings secured with daughter. Patient verbalizes understanding of pain scale, expected course of stay and plan of care. Surgical site verified with patient. Sequentials & TEDs placed. Triple aim cleaning measures completed.

## 2019-04-30 NOTE — CARE PLAN
Problem: Safety  Goal: Will remain free from injury  Outcome: PROGRESSING AS EXPECTED  Fall precautions/hourly rounding maintained. Call light within reach and functioning, all items within reach, bed alarm active.      Problem: Knowledge Deficit  Goal: Knowledge of disease process/condition, treatment plan, diagnostic tests, and medications will improve  Outcome: PROGRESSING AS EXPECTED  poc explained to patient and family member, ?'s/concerns answered

## 2019-04-30 NOTE — ANESTHESIA POSTPROCEDURE EVALUATION
Patient: Elizabeth Celis    Procedure Summary     Date:  04/30/19 Room / Location:   OR  / SURGERY [facility]    Anesthesia Start:  1137 Anesthesia Stop:  1320    Procedure:  ARTHROPLASTY, SHOULDER, TOTAL - REVERSE (Left Shoulder) Diagnosis:  (M19.012, M12.012)    Surgeon:  Daniella Camargo M.D. Responsible Provider:  Jeanne Blanco M.D.    Anesthesia Type:  general, peripheral nerve block ASA Status:  2          Final Anesthesia Type: general, peripheral nerve block  Last vitals  BP   Blood Pressure : 112/47, NIBP: (!) 175/66    Temp   36.5 °C (97.7 °F)    Pulse   Pulse: 64, Heart Rate (Monitored): 71   Resp   18    SpO2   100 %      Anesthesia Post Evaluation    Patient location during evaluation: PACU  Patient participation: complete - patient participated  Level of consciousness: awake and alert  Pain score: 4    Airway patency: patent  Anesthetic complications: no  Cardiovascular status: hemodynamically stable  Respiratory status: acceptable  Hydration status: euvolemic    PONV: none    patient able to participate, but full recovery from regional anesthesia has not occurred and is not expected within the stipulated timeframe for the completion of the evaluation       Nurse Pain Score: 4 (NPRS)         26-Feb-2019 22:26

## 2019-04-30 NOTE — ANESTHESIA PREPROCEDURE EVALUATION
Relevant Problems   (+) CKD (chronic kidney disease) stage 3, GFR 30-59 ml/min ([facility])   (+) Depression   (+) History of Clostridium difficile   (+) Hypothyroidism   (+) Leg DVT (deep venous thromboembolism), chronic, right ([facility])   (+) Nausea and vomiting   (+) Rheumatoid arthritis ([facility])   (+) Stroke-like symptom      Within Normal Limits   ANESTHESIA       Physical Exam    Airway   Mallampati: II  TM distance: >3 FB  Neck ROM: full       Cardiovascular - normal exam  Rhythm: regular  Rate: normal  (-) murmur     Dental - normal exam         Pulmonary - normal exam  Breath sounds clear to auscultation     Abdominal    Neurological - normal exam                 Anesthesia Plan    ASA 2       Plan - general and peripheral nerve block     Peripheral nerve block will be post-op pain control  Airway plan will be ETT        Induction: intravenous    Postoperative Plan: Postoperative administration of opioids is intended.    Pertinent diagnostic labs and testing reviewed    Informed Consent:    Anesthetic plan and risks discussed with patient.    Use of blood products discussed with: patient whom consented to blood products.

## 2019-04-30 NOTE — OP REPORT
DATE OF SERVICE:  04/30/2019    PREOPERATIVE DIAGNOSES:  Left shoulder osteoarthritis, rotator cuff   arthropathy, glenoid medialization.    POSTOPERATIVE DIAGNOSES:  Left shoulder osteoarthritis, rotator cuff   arthropathy, glenoid medialization.    PROCEDURE:  Left reverse total shoulder arthroplasty using augmented glenoid.    SURGEON:  Daniella Camargo MD    ASSISTANT:  KARLI Myers    ANESTHESIOLOGIST:  Jeanne Blanco MD    TYPE OF ANESTHESIA:  General, with preoperative interscalene nerve block using   Exparel.    IV FLUID:  1 liter crystalloid.    ESTIMATED BLOOD LOSS:  100 mL    DRAINS:  None.    SPECIMENS:  None.    COMPLICATIONS:  None.    IMPLANTS:  Tornier size 4B Ascend Flex stem, +6 high offset metaphyseal tray,   9 mm retentive liner, size 39, full wedge size 25 baseplate with central screw   and 3 peripheral screws, size 39+3 glenosphere.    REASON FOR PROCEDURE:  The patient is an 83-year-old female with a   longstanding history of significant left shoulder pain.  Plain x-rays   demonstrated severe medialization.  She had pain and weakness and a thin   rotator cuff.  Due to her glenoid pathology, as well as clinical shoulder   picture, we decided to proceed with a reverse total shoulder arthroplasty,   with an augmented glenoid.    DESCRIPTION OF OPERATION:  The patient was given a left interscalene nerve   block using Exparel by the anesthesiologist before surgery.  Once back in the   operating room, a breathing tube was placed.  She was given 2 g of IV Ancef as   well as 1 g of tranexamic acid.  She was placed in the beach chair position.    The left upper extremity was prepped with ChloraPrep and draped in standard   sterile fashion.  The left upper extremity was then placed in the spider   articulating arm sharma.  A deltopectoral incision was made.  The cephalic   vein was identified and retracted laterally.  There was an intact   subscapularis tendon, which was tenotomized and  tagged with a #2 FiberWire   suture.  Using a peel-off technique, this was removed.  There was a thin   superior rotator cuff.  The humeral neck cut was then made in anatomical   inclination and version for this patient.  Bone quality was noted to be   extremely soft.  The central awl was used followed by the sounders and then   broaches.  A size 4B broach was noted to have the best fit for this patient.    The posterosuperior aspect of the greater tuberosity simply fractured off   after the component had been placed.  Some excess bone was removed.  The   humeral prosthesis, however, was stable.  A cup protector was placed.    Retractors were then placed circumferentially around the glenoid.  There was   significant medialization with a large concave sloped glenoid.  This had been   visualized on the patient's preoperative Blueprint CT.  This had been   templated for a size 25 full wedge.  This was prepared.  A central pin was   placed.  The offset 15-degree reamer was then used until witnessed marks were   seen.  The opening drill was then used followed by the through and through   drill with the central screw measured to 25 mm.  The wound was irrigated   copiously.  This was done with dilute Betadine as well as sterile saline.  A   full wedge size 25 baseplate was then impacted.  The central set screw was   noted to not have an excellent bite.  This was not surprising, as it   essentially went into the scapular spine instead of the distal cortex of the   glenoid due to the significant medialization.  It was impacted and simply   treated as a post.  The single compression screw was placed first.  This was   noted to have an excellent bite and nicely compressed the baseplate to the   scapula.  Two additional screws were placed.  Next, a size 39+3 glenosphere   was impacted with the central set screw was tightened.  Attention was then   turned to the humerus.  Trials were used with a +6 high offset tray, with a +9    mm polyethylene insert noting to have the best overall soft tissue tension   and stability for this patient.  All trial humeral implants were removed.  Two   drill holes were placed through the lesser tuberosity.  The central humeral   canal was then irrigated copiously with dilute Betadine as well as sterile   saline and dried.  On the back table, a size 4B Ascend Flex implant was opened   with a +6 high offset tray and a 39x9 mm retentive polyethylene insert.  The   stem and tray were then impacted and dropped into the prepared canal.  A +9 x   39 polyethylene implant was then impacted and the shoulder was reduced.  An   excellent fit was noted.  Excellent soft tissue tension was noted as well.    What remained of the subscapularis tendon was secured to the proximal humerus   with the #2 FiberWire suture.  The wound was irrigated copiously with 1 g of   vancomycin powder placed in the deep and superficial soft tissues.  0 Vicryl   was used on the deltopectoral interval with 2-0 Vicryl in the subcutaneous   tissue.  Staples were then applied.  A sterile dressing was applied.  All   drapes were removed.  The arm was carefully taken out of traction and placed   into a shoulder abduction sling.  The patient was placed supine on a stretcher   and taken to recovery room, in stable condition.       ____________________________________     MD KATHY ELLISON / [place]    DD:  04/30/2019 13:34:22  DT:  04/30/2019 13:58:47    D#:  [id number]  Job#:  [id number]

## 2019-04-30 NOTE — OR SURGEON
Immediate Post OP Note    PreOp Diagnosis: LEFT shoulder OA    PostOp Diagnosis:  SAME     Procedure(s): LEFT  ARTHROPLASTY, SHOULDER, TOTAL - REVERSE - Wound Class: Clean    Surgeon(s):  Daniella Camargo M.D.    Anesthesiologist/Type of Anesthesia:  Anesthesiologist: eJanne Blanco M.D.  Anesthesia Technician: Rachele Lindo/General    Surgical Staff:  Circulator: Ayse Lundy R.N.; Tata Guerrero R.N.  Scrub Person: Lucy Aguilar  Private Scrub: Austen Mandujano C.N.A.    Specimens removed if any:  * No specimens in log *    Estimated Blood Loss: 100 cc    Findings: as above    Complications: none    Kary        4/30/2019 1:25 PM Daniella Camargo M.D.

## 2019-04-30 NOTE — ANESTHESIA TIME REPORT
Anesthesia Start and Stop Event Times     Date Time Event    4/30/2019 1137 Anesthesia Start     1320 Anesthesia Stop        Responsible Staff  04/30/19    Name Role Begin End    Jeanne Blanco M.D. Anesth 1137 1320        Preop Diagnosis (Free Text):  Pre-op Diagnosis     M19.012, M12.012        Preop Diagnosis (Codes):  Diagnosis Information     Diagnosis Code(s):         Post op Diagnosis  Primary osteoarthritis, left shoulder      Premium Reason  Non-Premium    Comments: Left reverse TSA

## 2019-04-30 NOTE — ANESTHESIA PROCEDURE NOTES
Peripheral Block  Performed by: JIM MENDOZA  Authorized by: JIM MENDOZA     Patient Location:  Pre-op  Start Time:  4/30/2019 11:23 AM  End Time:  4/30/2019 12:27 PM  Reason for Block: at surgeon's request and post-op pain management    patient identified, IV checked, site marked, risks and benefits discussed, surgical consent, monitors and equipment checked, pre-op evaluation and timeout performed    Patient Position:  Supine  Prep: ChloraPrep    Monitoring:  Heart rate, continuous pulse ox and cardiac monitor  Block Region:  Upper Extremity  Upper Extremity - Block Type:  BRACHIAL PLEXUS block, Interscalene approach    Laterality:  Left  Procedures: ultrasound guided  Image captured, interpreted and electronically stored.  Local Infiltration:  Lidocaine  Block Type:  Single-shot  Needle Length:  100mm  Needle Gauge:  21 G  Needle Localization:  Ultrasound guidance  Injection Assessment:  Negative aspiration for heme, no paresthesia on injection, incremental injection and local visualized surrounding nerve on ultrasound  Evidence of intravascular injection: No     US Guided Interscalene Brachial Plexus Block   US transducer placed on the neck in oblique plane approximately at the level of C6.  Anterior and Middle Scalene (MSM) muscles identified with nerve trunks identified between the muscles.  Needle inserted lateral to probe and advanced under direct visualization through the MSM into a perineural position.  After negative aspiration, LA injected with ease and visualized surrounding the nerve trunks. All images stored digitally.

## 2019-05-01 VITALS
WEIGHT: 125.22 LBS | TEMPERATURE: 97.2 F | BODY MASS INDEX: 20.86 KG/M2 | HEIGHT: 65 IN | DIASTOLIC BLOOD PRESSURE: 49 MMHG | RESPIRATION RATE: 17 BRPM | OXYGEN SATURATION: 100 % | HEART RATE: 73 BPM | SYSTOLIC BLOOD PRESSURE: 115 MMHG

## 2019-05-01 PROCEDURE — A9270 NON-COVERED ITEM OR SERVICE: HCPCS | Performed by: ORTHOPAEDIC SURGERY

## 2019-05-01 PROCEDURE — 97535 SELF CARE MNGMENT TRAINING: CPT

## 2019-05-01 PROCEDURE — 97165 OT EVAL LOW COMPLEX 30 MIN: CPT

## 2019-05-01 PROCEDURE — 700102 HCHG RX REV CODE 250 W/ 637 OVERRIDE(OP): Performed by: ORTHOPAEDIC SURGERY

## 2019-05-01 RX ADMIN — FUROSEMIDE 40 MG: 40 TABLET ORAL at 06:02

## 2019-05-01 RX ADMIN — OXYCODONE HYDROCHLORIDE 5 MG: 5 TABLET ORAL at 06:03

## 2019-05-01 RX ADMIN — POLYVINYL ALCOHOL 1 DROP: 14 SOLUTION/ DROPS OPHTHALMIC at 06:09

## 2019-05-01 RX ADMIN — ACETAMINOPHEN 1000 MG: 500 TABLET, FILM COATED ORAL at 06:01

## 2019-05-01 RX ADMIN — SPIRONOLACTONE 25 MG: 25 TABLET, FILM COATED ORAL at 06:03

## 2019-05-01 RX ADMIN — OXYCODONE HYDROCHLORIDE 5 MG: 5 TABLET ORAL at 12:03

## 2019-05-01 RX ADMIN — LEVOTHYROXINE SODIUM 100 MCG: 100 TABLET ORAL at 06:02

## 2019-05-01 RX ADMIN — ASPIRIN 81 MG: 81 TABLET, COATED ORAL at 09:20

## 2019-05-01 ASSESSMENT — ACTIVITIES OF DAILY LIVING (ADL): TOILETING: INDEPENDENT

## 2019-05-01 NOTE — PROGRESS NOTES
Received report from Samantha LAUREN, assumed pt care. Pt transferred from PACU to [facility] room 216-2. Pt A&Ox4, sitting up in bed. Dressing to L shoulder CDI, +CMS, cap refill less than 3 seconds, pt able to wiggle fingers w/o difficulty. Polar ice to L shoulder. Pt d/t void post-op by 2100. No c/o pain at this time. Pt taught to inform nurse if experiencing pain above comfort goal, SOB, chest pain, ambulating out of bed, or for any further assistance. Fall precautions in place, call light within reach. Will continue with care.

## 2019-05-01 NOTE — PROGRESS NOTES
Report received at change of shift, care of pt assumed. Pt A&Ox4, initially sitting up in cardiac chair; assisted to bathroom and then back to bed. Surgical dressing is clean, dry, and in tact with shoulder immobilizer in place. Pt reporting minimal pain, continues to report slight numbness s/p block. 2+ radial pulse, able to wiggle fingers. Tolerating regular diet with no nausea or vomiting. Plan of care discussed and pt instructed to call for any needs or concerns, and prior to getting up out of bed. Safety precautions in place, will continue to monitor.

## 2019-05-01 NOTE — PROGRESS NOTES
Received report from Juliana and Sang RN. Assumed care. This pt is AOx4, reports mild pain in shoulder, will medicate per MAR, Patient and RN discussed plan of care including pain management, PT/OT, IS use, possible discharge today: questions answered. Chart reviewed. Call light in place, fall precautions in place, patient educated on importance of calling for assistance. No additional needs at this time.

## 2019-05-01 NOTE — PROGRESS NOTES
Discharging patient home per MD order.  Pt demonstrated understanding of discharge instructions, follow up appointments, home medications, prescriptions. Ambulating with hand held assistance, voiding without difficulty, pain well controlled, tolerating oral medications, tolerating diet. Pt verbalized understanding of discharge instructions and educational handouts, all questions answered.  Pt discharged off unit with hospital escort at 1215.

## 2019-05-01 NOTE — DISCHARGE INSTRUCTIONS
Post-Operative Shoulder Instructions       Dressing and wound care: Keep your shoulder dressing clean and dry after surgery.  Your tan postop dressing is waterproof.  You may shower with it, then remove it at 7 days postop.  Cover wound until staples out.     Shower / bathing: Keep the shoulder dry and covered until staples out.     ** If you have had a shoulder replacement, then please wait until your staples are removed at 7-14 days post-op before allowing your incision to get wet.       Ice: Apply an ice pack to your shoulder (15 minutes on the shoulder, 15 minutes off the shoulder), as you feel necessary to help with the pain and swelling.         Sling / Shoulder Immobilizer: The sling should be on at all times, except when bathing and doing your demonstrated exercises.     Activity: It is important to move your shoulder, as well as your elbow, wrist, and hand several times daily, starting the day after surgery.  You may do pendulum exercises with your operative arm starting the day after surgery.  Pendulum (dangling [place]) exercises are encouraged 2-3 times daily.  The sling will need to be removed for pendulum exercises.     Pain medication: Take your pain medicine, as needed and prescribed.  Do not drive or operate machinery while taking narcotic pain medication.   You may start or resume anti-inflammatory medication (i.e. ibuprofen, naproxen) anytime after surgery, which should be taken with food to avoid stomach irritation.     Problems:     If you are having problems with your shoulder (unexpected pain, excessive bleeding or discharge from your incisions, fevers/chills) do not hesitate to call the office or visit the nearest emergency room for evaluation.     Follow-up:     Make sure that you have an appointment 7-14 days following surgery.  Your stitches will be removed, and your procedure/rehabilitation will be discussed at that time.   Physical therapy may be prescribed at that time.         Daniella KWAN  MD [place]   [facility]   [phone]      Discharge Instructions    Discharged to home by car with relative. Discharged via wheelchair, hospital escort: Yes.  Special equipment needed: Immobilizer    Be sure to schedule a follow-up appointment with your primary care doctor or any specialists as instructed.     Discharge Plan:   Influenza Vaccine Indication: Not indicated: Previously immunized this influenza season and > 8 years of age    I understand that a diet low in cholesterol, fat, and sodium is recommended for good health. Unless I have been given specific instructions below for another diet, I accept this instruction as my diet prescription.   Other diet: regular    Special Instructions: Discharge instructions for the Orthopedic Patient    Follow up with Primary Care Physician within 2 weeks of discharge to home, regarding:  Review of medications and diagnostic testing.  Surveillance for medical complications.  Workup and treatment of osteoporosis, if appropriate.     -Is this a Joint Replacement patient? No    -Is this patient being discharged with medication to prevent blood clots?  Yes, Aspirin Aspirin, ASA oral tablets  What is this medicine?  ASPIRIN (AS pir in) is a pain reliever. It is used to treat mild pain and fever. This medicine is also used as directed by a doctor to prevent and to treat heart attacks, to prevent strokes, and to treat arthritis or inflammation.  This medicine may be used for other purposes; ask your health care provider or pharmacist if you have questions.  COMMON BRAND NAME(S): Aspir-Low, Aspir-Lindsay, Aspirtab, [place]a Advanced Aspirin, [place]a Aspirin, [place]a Aspirin Extra Strength, [place]a Aspirin Plus, [place]a Extra Strength, [place]a Extra Strength Plus, Louisa Genuine Aspirin, Louisa Womens Aspirin, Bufferin, Bufferin Extra Strength, Bufferin Low Dose  What should I tell my health care provider before I take this medicine?  They need to know if you have any of these  conditions:  -anemia  -asthma  -bleeding problems  -child with chickenpox, the flu, or other viral infection  -diabetes  -gout  -if you frequently drink alcohol containing drinks  -kidney disease  -liver disease  -low level of vitamin K  -lupus  -smoke tobacco  -stomach ulcers or other problems  -an unusual or allergic reaction to aspirin, tartrazine dye, other medicines, dyes, or preservatives  -pregnant or trying to get pregnant  -breast-feeding  How should I use this medicine?  Take this medicine by mouth with a glass of water. Follow the directions on the package or prescription label. You can take this medicine with or without food. If it upsets your stomach, take it with food. Do not take your medicine more often than directed.  Talk to your pediatrician regarding the use of this medicine in children. While this drug may be prescribed for children as young as 12 years of age for selected conditions, precautions do apply. Children and teenagers should not use this medicine to treat chicken pox or flu symptoms unless directed by a doctor.  Patients over 65 years old may have a stronger reaction and need a smaller dose.  Overdosage: If you think you have taken too much of this medicine contact a poison control center or emergency room at once.  NOTE: This medicine is only for you. Do not share this medicine with others.  What if I miss a dose?  If you are taking this medicine on a regular schedule and miss a dose, take it as soon as you can. If it is almost time for your next dose, take only that dose. Do not take double or extra doses.  What may interact with this medicine?  Do not take this medicine with any of the following medications:  -cidofovir  -ketorolac  -probenecid  This medicine may also interact with the following medications:  -alcohol  -alendronate  -bismuth subsalicylate  -flavocoxid  -herbal supplements like feverfew, garlic, mariela, ginkgo biloba, horse chestnut  -medicines for diabetes or  glaucoma like acetazolamide, methazolamide  -medicines for gout  -medicines that treat or prevent blood clots like enoxaparin, heparin, ticlopidine, warfarin  -other aspirin and aspirin-like medicines  -NSAIDs, medicines for pain and inflammation, like ibuprofen or naproxen  -pemetrexed  -sulfinpyrazone  -varicella live vaccine  This list may not describe all possible interactions. Give your health care provider a list of all the medicines, herbs, non-prescription drugs, or dietary supplements you use. Also tell them if you smoke, drink alcohol, or use illegal drugs. Some items may interact with your medicine.  What should I watch for while using this medicine?  If you are treating yourself for pain, tell your doctor or health care professional if the pain lasts more than 10 days, if it gets worse, or if there is a new or different kind of pain. Tell your doctor if you see redness or swelling. Also, check with your doctor if you have a fever that lasts for more than 3 days. Only take this medicine to prevent heart attacks or blood clotting if prescribed by your doctor or health care professional.  Do not take aspirin or aspirin-like medicines with this medicine. Too much aspirin can be dangerous. Always read the labels carefully.  This medicine can irritate your stomach or cause bleeding problems. Do not smoke cigarettes or drink alcohol while taking this medicine. Do not lie down for 30 minutes after taking this medicine to prevent irritation to your throat.  If you are scheduled for any medical or dental procedure, tell your healthcare provider that you are taking this medicine. You may need to stop taking this medicine before the procedure.  This medicine may be used to treat migraines. If you take migraine medicines for 10 or more days a month, your migraines may get worse. Keep a diary of headache days and medicine use. Contact your healthcare professional if your migraine attacks occur more frequently.  What  side effects may I notice from receiving this medicine?  Side effects that you should report to your doctor or health care professional as soon as possible:  -allergic reactions like skin rash, itching or hives, swelling of the face, lips, or tongue  -breathing problems  -changes in hearing, ringing in the ears  -confusion  -general ill feeling or flu-like symptoms  -pain on swallowing  -redness, blistering, peeling or loosening of the skin, including inside the mouth or nose  -signs and symptoms of bleeding such as bloody or black, tarry stools; red or dark-brown urine; spitting up blood or brown material that looks like coffee grounds; red spots on the skin; unusual bruising or bleeding from the eye, gums, or nose  -trouble passing urine or change in the amount of urine  -unusually weak or tired  -yellowing of the eyes or skin  Side effects that usually do not require medical attention (report to your doctor or health care professional if they continue or are bothersome):  -diarrhea or constipation  -headache  -nausea, vomiting  -stomach gas, heartburn  This list may not describe all possible side effects. Call your doctor for medical advice about side effects. You may report side effects to FDA at 1-347-FDA-1899.  Where should I keep my medicine?  Keep out of the reach of children.  Store at room temperature between 15 and 30 degrees C (59 and 86 degrees F). Protect from heat and moisture. Do not use this medicine if it has a strong vinegar smell. Throw away any unused medicine after the expiration date.  NOTE: This sheet is a summary. It may not cover all possible information. If you have questions about this medicine, talk to your doctor, pharmacist, or health care provider.  © 2018 Elsevier/Gold Standard (2014-08-19 11:30:31)      · Is patient discharged on Warfarin / Coumadin?   No     Depression / Suicide Risk    As you are discharged [facility] facility, it is important to learn how to keep  safe from harming yourself.    Recognize the warning signs:  · Abrupt changes in personality, positive or negative- including increase in energy   · Giving away possessions  · Change in eating patterns- significant weight changes-  positive or negative  · Change in sleeping patterns- unable to sleep or sleeping all the time   · Unwillingness or inability to communicate  · Depression  · Unusual sadness, discouragement and loneliness  · Talk of wanting to die  · Neglect of personal appearance   · Rebelliousness- reckless behavior  · Withdrawal from people/activities they love  · Confusion- inability to concentrate     If you or a loved one observes any of these behaviors or has concerns about self-harm, here's what you can do:  · Talk about it- your feelings and reasons for harming yourself  · Remove any means that you might use to hurt yourself (examples: pills, rope, extension cords, firearm)  · Get professional help from the community (Mental Health, Substance Abuse, psychological counseling)  · Do not be alone:Call your Safe Contact- someone whom you trust who will be there for you.  · Call your local CRISIS HOTLINE 453-3798 or 174-412-6667  · Call your local Children's Mobile Crisis Response Team [place] (684) 114-8450 or www.[employer]  · Call the toll free National Suicide Prevention Hotlines   · National Suicide Prevention Lifeline 952-004-ZNWS (2655)  · National Hope Line Network 800-SUICIDE (444-4053)      How to Use a Shoulder Immobilizer  Introduction  A shoulder immobilizer is a device that you may have to wear after a shoulder injury or surgery. This device keeps your arm from moving. This prevents additional pain or injury. It also supports your arm next to your body as your shoulder heals.  You may need to wear a shoulder immobilizer to treat a broken bone (fracture) in your shoulder. You may also need to wear one if you have an injury that moves your shoulder out of position (dislocation).  There are different types of shoulder immobilizers. The one that you get depends on your injury.  What are the risks?  Wearing a shoulder immobilizer in the wrong way can let your injured shoulder move around too much. This may delay healing and make your pain and swelling worse.  How to use your shoulder immobilizer  · The part of the immobilizer that goes around your neck (sling) should support your upper arm, with your elbow bent and your lower arm and hand across your chest.  · Make sure that your elbow:  ¨ Is snug against the back pocket of the sling.  ¨ Does not move away from your body.  · The strap of the immobilizer should go over your shoulder and support your arm and hand. Your hand should be slightly higher than your elbow. It should not hang loosely over the edge of the sling.  · If the long strap has a pad, place it where it is most comfortable on your neck.  · Carefully follow your health care provider's instructions for wearing your shoulder immobilizer. Your health care provider may want you to:  ¨ Loosen your immobilizer to straighten your elbow and move your wrist and fingers. You may have to do this several times each day. Ask your health care provider when you should do this and how often.  ¨ Remove your immobilizer once every day to shower, but limit the movement in your injured arm. Before putting the immobilizer back on, use a towel to dry the area under your arm completely.  ¨ Remove your immobilizer to do shoulder exercises at home as directed by your health care provider.  ¨ Wear your immobilizer while you sleep. You may sleep more comfortably if you have your upper body raised on pillows.  Contact a health care provider if:  · Your immobilizer is not supporting your arm properly.  · Your immobilizer gets damaged.  · You have worsening pain or swelling in your shoulder, arm, or hand.  · Your shoulder, arm, or hand changes color or temperature.  · You lose feeling in your shoulder, arm, or  hand.  This information is not intended to replace advice given to you by your health care provider. Make sure you discuss any questions you have with your health care provider.  Document Released: 01/25/2006 Document Revised: 05/25/2017 Document Reviewed: 11/25/2015  © 2017 Elsevier

## 2019-05-01 NOTE — CARE PLAN
Problem: Safety  Goal: Will remain free from injury  Outcome: PROGRESSING AS EXPECTED  Call light and personal belongings within reach, bed in low locked position, treaded slipper socks in place, room free of clutter. Pt asked to call for assistance prior to getting up and verbalizes/demonstrates understanding of all fall precautions. Hourly rounding in place to ensure pt safety and needs are met.         Problem: Venous Thromboembolism (VTW)/Deep Vein Thrombosis (DVT) Prevention:  Goal: Patient will participate in Venous Thrombosis (VTE)/Deep Vein Thrombosis (DVT)Prevention Measures  Outcome: PROGRESSING AS EXPECTED  SCDs and ADAM hose in place. Encouraged ambulation.

## 2019-05-01 NOTE — THERAPY
"Occupational Therapy Evaluation completed.   Functional Status:  S/p L Reverse TSA. NWB LUE. Immobilizer to be worn at all times except when bathing, dressing or performing pendulums. Pt is R hand dominant. Block is still in effect, thus pendulums demo'd. Handout issued for review. Pt did have a R TSA 1/18, so pt and dtr familiar. Pt tolerates AROM L hand,wrist and PROM L elbow. ModA with LB dressing. MaxA with UB dressing and brace. Daughter performs teach-back of doff/donning brace and shirt. Review of proper fit of brace, home safety during ADL's. Pt performs toileting and grooming with Sup. Walks ~100' with Mod Indep/HHA from daughter. Rec dtr provide HHA during functional mobility at home as well. Pt/dtr with no further questions at this time.           Plan of Care: Patient with no further skilled OT needs in the acute care setting at this time. Pt has no acute PT needs.  Discharge Recommendations:  Equipment: No Equipment Needed. Discharge to home with outpatient or home health for additional skilled therapy services. Lives alone. Daughter will stay with pt for 2 months. Caregiver training completed.  See \"Rehab Therapy-Acute\" Patient Summary Report for complete documentation.    "

## 2019-05-29 ENCOUNTER — NON-PROVIDER VISIT (OUTPATIENT)
Dept: INTERNAL MEDICINE | Facility: IMAGING CENTER | Age: 84
End: 2019-05-29
Payer: COMMERCIAL

## 2019-05-29 ENCOUNTER — HOSPITAL ENCOUNTER (OUTPATIENT)
Facility: MEDICAL CENTER | Age: 84
End: 2019-05-29
Attending: INTERNAL MEDICINE
Payer: MEDICARE

## 2019-05-29 DIAGNOSIS — E87.6 HYPOKALEMIA: ICD-10-CM

## 2019-05-29 PROCEDURE — 80053 COMPREHEN METABOLIC PANEL: CPT

## 2019-05-30 DIAGNOSIS — E87.6 HYPOKALEMIA: ICD-10-CM

## 2019-06-04 ENCOUNTER — HOSPITAL ENCOUNTER (OUTPATIENT)
Dept: LAB | Facility: MEDICAL CENTER | Age: 84
End: 2019-06-04
Attending: INTERNAL MEDICINE
Payer: MEDICARE

## 2019-06-04 DIAGNOSIS — E87.6 HYPOKALEMIA: ICD-10-CM

## 2019-06-04 LAB
ALBUMIN SERPL BCP-MCNC: 4.2 G/DL (ref 3.2–4.9)
ALBUMIN/GLOB SERPL: 1.4 G/DL
ALP SERPL-CCNC: 55 U/L (ref 30–99)
ALT SERPL-CCNC: 20 U/L (ref 2–50)
ANION GAP SERPL CALC-SCNC: 9 MMOL/L (ref 0–11.9)
AST SERPL-CCNC: 28 U/L (ref 12–45)
BILIRUB SERPL-MCNC: 0.6 MG/DL (ref 0.1–1.5)
BUN SERPL-MCNC: 23 MG/DL (ref 8–22)
CALCIUM SERPL-MCNC: 8.8 MG/DL (ref 8.5–10.5)
CHLORIDE SERPL-SCNC: 108 MMOL/L (ref 96–112)
CO2 SERPL-SCNC: 21 MMOL/L (ref 20–33)
CREAT SERPL-MCNC: 1.09 MG/DL (ref 0.5–1.4)
FASTING STATUS PATIENT QL REPORTED: NORMAL
GLOBULIN SER CALC-MCNC: 3 G/DL (ref 1.9–3.5)
GLUCOSE SERPL-MCNC: 74 MG/DL (ref 65–99)
POTASSIUM SERPL-SCNC: 4.3 MMOL/L (ref 3.6–5.5)
PROT SERPL-MCNC: 7.2 G/DL (ref 6–8.2)
SODIUM SERPL-SCNC: 138 MMOL/L (ref 135–145)

## 2019-06-04 PROCEDURE — 36415 COLL VENOUS BLD VENIPUNCTURE: CPT

## 2019-06-04 PROCEDURE — 80053 COMPREHEN METABOLIC PANEL: CPT

## 2019-06-12 RX ORDER — SPIRONOLACTONE 25 MG/1
25 TABLET ORAL DAILY
Qty: 90 TAB | Refills: 3 | Status: SHIPPED | OUTPATIENT
Start: 2019-06-12 | End: 2019-11-13

## 2019-07-22 ENCOUNTER — NON-PROVIDER VISIT (OUTPATIENT)
Dept: INTERNAL MEDICINE | Facility: IMAGING CENTER | Age: 84
End: 2019-07-22
Payer: COMMERCIAL

## 2019-07-22 ENCOUNTER — HOSPITAL ENCOUNTER (OUTPATIENT)
Facility: MEDICAL CENTER | Age: 84
End: 2019-07-22
Attending: INTERNAL MEDICINE
Payer: MEDICARE

## 2019-07-22 DIAGNOSIS — E78.00 HYPERCHOLESTEROLEMIA: ICD-10-CM

## 2019-07-22 DIAGNOSIS — E87.6 HYPOKALEMIA: ICD-10-CM

## 2019-07-22 DIAGNOSIS — Z87.440 HISTORY OF RECURRENT UTIS: ICD-10-CM

## 2019-07-22 DIAGNOSIS — Z01.89 ENCOUNTER FOR ROUTINE LABORATORY TESTING: ICD-10-CM

## 2019-07-22 DIAGNOSIS — R74.8 ELEVATED LIVER ENZYMES: ICD-10-CM

## 2019-07-22 DIAGNOSIS — E03.9 HYPOTHYROIDISM (ACQUIRED): ICD-10-CM

## 2019-07-22 LAB
ALBUMIN SERPL BCP-MCNC: 4.3 G/DL (ref 3.2–4.9)
ALBUMIN/GLOB SERPL: 1.3 G/DL
ALP SERPL-CCNC: 47 U/L (ref 30–99)
ALT SERPL-CCNC: 16 U/L (ref 2–50)
ANION GAP SERPL CALC-SCNC: 11 MMOL/L (ref 0–11.9)
APPEARANCE UR: CLEAR
AST SERPL-CCNC: 28 U/L (ref 12–45)
BACTERIA #/AREA URNS HPF: NEGATIVE /HPF
BILIRUB SERPL-MCNC: 0.5 MG/DL (ref 0.1–1.5)
BILIRUB UR QL STRIP.AUTO: NEGATIVE
BUN SERPL-MCNC: 35 MG/DL (ref 8–22)
CALCIUM SERPL-MCNC: 9.8 MG/DL (ref 8.5–10.5)
CHLORIDE SERPL-SCNC: 106 MMOL/L (ref 96–112)
CHOLEST SERPL-MCNC: 235 MG/DL (ref 100–199)
CO2 SERPL-SCNC: 23 MMOL/L (ref 20–33)
COLOR UR: YELLOW
CREAT SERPL-MCNC: 1.26 MG/DL (ref 0.5–1.4)
EPI CELLS #/AREA URNS HPF: NEGATIVE /HPF
GLOBULIN SER CALC-MCNC: 3.4 G/DL (ref 1.9–3.5)
GLUCOSE SERPL-MCNC: 82 MG/DL (ref 65–99)
GLUCOSE UR STRIP.AUTO-MCNC: NEGATIVE MG/DL
HDLC SERPL-MCNC: 56 MG/DL
HYALINE CASTS #/AREA URNS LPF: ABNORMAL /LPF
KETONES UR STRIP.AUTO-MCNC: NEGATIVE MG/DL
LDLC SERPL CALC-MCNC: 151 MG/DL
LEUKOCYTE ESTERASE UR QL STRIP.AUTO: ABNORMAL
MICRO URNS: ABNORMAL
NITRITE UR QL STRIP.AUTO: NEGATIVE
PH UR STRIP.AUTO: 6 [PH]
POTASSIUM SERPL-SCNC: 4 MMOL/L (ref 3.6–5.5)
PROT SERPL-MCNC: 7.7 G/DL (ref 6–8.2)
PROT UR QL STRIP: NEGATIVE MG/DL
RBC # URNS HPF: ABNORMAL /HPF
RBC UR QL AUTO: NEGATIVE
SODIUM SERPL-SCNC: 140 MMOL/L (ref 135–145)
SP GR UR STRIP.AUTO: 1.01
T3 SERPL-MCNC: 89.5 NG/DL (ref 60–181)
TRIGL SERPL-MCNC: 140 MG/DL (ref 0–149)
TSH SERPL DL<=0.005 MIU/L-ACNC: 0.15 UIU/ML (ref 0.38–5.33)
UROBILINOGEN UR STRIP.AUTO-MCNC: 0.2 MG/DL
WBC #/AREA URNS HPF: ABNORMAL /HPF

## 2019-07-22 PROCEDURE — 84480 ASSAY TRIIODOTHYRONINE (T3): CPT

## 2019-07-22 PROCEDURE — 81001 URINALYSIS AUTO W/SCOPE: CPT

## 2019-07-22 PROCEDURE — 80053 COMPREHEN METABOLIC PANEL: CPT

## 2019-07-22 PROCEDURE — 84443 ASSAY THYROID STIM HORMONE: CPT

## 2019-07-22 PROCEDURE — 80061 LIPID PANEL: CPT

## 2019-07-25 PROBLEM — R92.30 DENSE BREAST TISSUE ON MAMMOGRAM: Status: ACTIVE | Noted: 2019-07-25

## 2019-07-29 ENCOUNTER — OFFICE VISIT (OUTPATIENT)
Dept: INTERNAL MEDICINE | Facility: IMAGING CENTER | Age: 84
End: 2019-07-29
Payer: MEDICARE

## 2019-07-29 VITALS
BODY MASS INDEX: 21.33 KG/M2 | HEART RATE: 75 BPM | OXYGEN SATURATION: 98 % | DIASTOLIC BLOOD PRESSURE: 60 MMHG | WEIGHT: 128 LBS | SYSTOLIC BLOOD PRESSURE: 110 MMHG | RESPIRATION RATE: 14 BRPM | HEIGHT: 65 IN | TEMPERATURE: 98.2 F

## 2019-07-29 DIAGNOSIS — M81.6 LOCALIZED OSTEOPOROSIS WITHOUT CURRENT PATHOLOGICAL FRACTURE: ICD-10-CM

## 2019-07-29 DIAGNOSIS — Z79.899 LONG TERM USE OF DRUG: ICD-10-CM

## 2019-07-29 DIAGNOSIS — E78.00 HYPERCHOLESTEROLEMIA: ICD-10-CM

## 2019-07-29 DIAGNOSIS — Z87.440 HISTORY OF RECURRENT UTIS: ICD-10-CM

## 2019-07-29 DIAGNOSIS — E03.9 HYPOTHYROIDISM (ACQUIRED): ICD-10-CM

## 2019-07-29 DIAGNOSIS — M06.9 RHEUMATOID ARTHRITIS INVOLVING MULTIPLE SITES, UNSPECIFIED RHEUMATOID FACTOR PRESENCE: ICD-10-CM

## 2019-07-29 DIAGNOSIS — N95.2 ATROPHIC VAGINITIS: ICD-10-CM

## 2019-07-29 DIAGNOSIS — Z86.2 HISTORY OF ANEMIA: ICD-10-CM

## 2019-07-29 DIAGNOSIS — N18.30 CKD (CHRONIC KIDNEY DISEASE) STAGE 3, GFR 30-59 ML/MIN (HCC): ICD-10-CM

## 2019-07-29 PROBLEM — E87.20 METABOLIC ACIDOSIS: Status: RESOLVED | Noted: 2019-02-11 | Resolved: 2019-07-29

## 2019-07-29 PROBLEM — Z98.890 H/O SHOULDER SURGERY: Status: RESOLVED | Noted: 2018-01-12 | Resolved: 2019-07-29

## 2019-07-29 PROBLEM — Z87.891 HISTORY OF CIGARETTE SMOKING: Status: ACTIVE | Noted: 2019-07-29

## 2019-07-29 PROBLEM — R11.2 NAUSEA AND VOMITING: Status: RESOLVED | Noted: 2019-02-10 | Resolved: 2019-07-29

## 2019-07-29 PROBLEM — E43 PROTEIN-CALORIE MALNUTRITION, SEVERE (HCC): Status: RESOLVED | Noted: 2019-02-10 | Resolved: 2019-07-29

## 2019-07-29 PROCEDURE — 99214 OFFICE O/P EST MOD 30 MIN: CPT | Performed by: INTERNAL MEDICINE

## 2019-07-29 RX ORDER — HYDROXYCHLOROQUINE SULFATE 200 MG/1
200 TABLET, FILM COATED ORAL DAILY
Qty: 90 TAB | Refills: 3 | Status: ON HOLD
Start: 2019-07-29 | End: 2020-01-24

## 2019-07-29 RX ORDER — ESTRADIOL 0.1 MG/G
CREAM VAGINAL
Qty: 3 TUBE | Refills: 3 | Status: ON HOLD
Start: 2019-07-29 | End: 2020-01-10

## 2019-07-29 NOTE — PROGRESS NOTES
"Established Patient Note   HPI:        Elizabeth is here today to follow up hypothyroid, CKD and RA. She has had recent lab work done which she is here to review. She is due to DEXA next month to follow up osteoporosis. She has been on remicade infusions in past. She states her energy level is low and she will have morning stiffness that will last 30 minutes to an hour each morning. She denies much joint pain or swelling. She admits symptoms will worsen in cold weather. She takes spironolactone for edema.    Past Medical History:   Diagnosis Date   • Anesthesia     \"One time had a hard time waking me up\"   • C. difficile diarrhea 2/16   • C. 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 2017: T score -3.4 right forearm and -1.6 left hip   • Pain     shoulders, feet, back   • Rheumatoid arthritis ([facility]) 9/26/2014   • Stroke ([facility]) 1990's?    \"mini\" no residual symptoms   • Urinary incontinence     occasional       Current Outpatient Prescriptions   Medication Sig Dispense Refill   • Potassium Chloride Catrachita ER (KLOR-CON M10 PO) Take 10 mEq by mouth every day.     • hydroxychloroquine (PLAQUENIL) 200 MG Tab Take 1 Tab by mouth every day. 90 Tab 3   • estradiol (ESTRACE VAGINAL) 0.1 MG/GM vaginal cream Apply topically around urethra in vaginal region 3 Tube 3   • spironolactone (ALDACTONE) 25 MG Tab Take 1 Tab by mouth every day. 90 Tab 3   • SYNTHROID 100 MCG Tab Take 1 Tab by mouth Every morning on an empty stomach. 90 Tab 3   • furosemide (LASIX) 40 MG Tab Take 40 mg by mouth every day.     • CALCIUM-VITAMIN D PO Take  by " "mouth.     • cyclosporin (RESTASIS) 0.05 % ophthalmic emulsion Place 1 Drop in both eyes 2 times a day.     • denosumab (PROLIA) 60 MG/ML Solution Inject 60 mg as instructed every 6 months. Pt last dose was on 10/2018     • citalopram (CELEXA) 20 MG Tab Take 20 mg by mouth every bedtime.     • Diclofenac Sodium 1 % Gel Apply  to skin as directed.     • traZODone (DESYREL) 50 MG Tab Take 25 mg by mouth every evening.       No current facility-administered medications for this visit.          Allergies   Allergen Reactions   • Azithromycin Unspecified     Matti Johnsons syndrome   • Cefuroxime Itching and Vomiting     They gave me C-Diff   • Ciprofloxacin Itching and Vomiting     They gave me C-Diff   • Clindamycin Itching and Vomiting     They gave me c-diff   • Daptomycin      Matti colleen syndrome     • Erythromycin Unspecified     Matti Johnsons syndrome   • Nitrofurantoin Vomiting and Nausea   • Rifampin      Matti colleen syndrome   • Sulfa Drugs Swelling   • Codeine Itching   • Flagyl [Metronidazole] Vomiting and Nausea   • Macrodantin  [Nitrofurantoin Macrocrystal]      Other reaction(s): Decreased Blood Pressure   • Morphine Itching   • Premarin Unspecified     burning         Social History   Substance Use Topics   • Smoking status: Former Smoker     Packs/day: 1.00     Years: 20.00     Types: Cigarettes     Quit date: 1/1/1985   • Smokeless tobacco: Never Used   • Alcohol use Yes      Comment: 1 per day     History   Alcohol Use   • Yes     Comment: 1 per day     History   Drug Use No       ROS    Ambulatory Vitals  /60 (BP Location: Left arm, Patient Position: Sitting, BP Cuff Size: Adult)   Pulse 75   Temp 36.8 °C (98.2 °F) (Temporal)   Resp 14   Ht 1.651 m (5' 5\")   Wt 58.1 kg (128 lb)   SpO2 98%   BMI 21.30 kg/m²     Physical Exam   Constitutional: She is well-developed, well-nourished, and in no distress. No distress.   Cardiovascular: Normal rate, regular rhythm and normal heart " sounds.  Exam reveals no gallop and no friction rub.    No murmur heard.  Pulmonary/Chest: Effort normal. She has no wheezes. She has no rales.   Musculoskeletal: She exhibits no edema.   No significant joint pain in wrists, MCPs, knees.   Skin: She is not diaphoretic.       Component      Latest Ref Rng & Units 6/4/2019 7/22/2019   WBC      4.8 - 10.8 K/uL     RBC      4.20 - 5.40 M/uL     Hemoglobin      12.0 - 16.0 g/dL     Hematocrit      37.0 - 47.0 %     MCV      81.4 - 97.8 fL     MCH      27.0 - 33.0 pg     MCHC      33.6 - 35.0 g/dL     RDW      35.9 - 50.0 fL     Platelet Count      164 - 446 K/uL     MPV      9.0 - 12.9 fL     Neutrophils-Polys      44.00 - 72.00 %     Lymphocytes      22.00 - 41.00 %     Monocytes      0.00 - 13.40 %     Eosinophils      0.00 - 6.90 %     Basophils      0.00 - 1.80 %     Immature Granulocytes      0.00 - 0.90 %     Nucleated RBC      /100 WBC     Neutrophils (Absolute)      2.00 - 7.15 K/uL     Lymphs (Absolute)      1.00 - 4.80 K/uL     Monos (Absolute)      0.00 - 0.85 K/uL     Eos (Absolute)      0.00 - 0.51 K/uL     Baso (Absolute)      0.00 - 0.12 K/uL     Immature Granulocytes (abs)      0.00 - 0.11 K/uL     NRBC (Absolute)      K/uL     Sodium      135 - 145 mmol/L 138 140   Potassium      3.6 - 5.5 mmol/L 4.3 4.0   Chloride      96 - 112 mmol/L 108 106   Co2      20 - 33 mmol/L 21 23   Anion Gap      0.0 - 11.9 9.0 11.0   Glucose      65 - 99 mg/dL 74 82   Bun      8 - 22 mg/dL 23 (H) 35 (H)   Creatinine      0.50 - 1.40 mg/dL 1.09 1.26   Calcium      8.5 - 10.5 mg/dL 8.8 9.8   AST(SGOT)      12 - 45 U/L 28 28   ALT(SGPT)      2 - 50 U/L 20 16   Alkaline Phosphatase      30 - 99 U/L 55 47   Total Bilirubin      0.1 - 1.5 mg/dL 0.6 0.5   Albumin      3.2 - 4.9 g/dL 4.2 4.3   Total Protein      6.0 - 8.2 g/dL 7.2 7.7   Globulin      1.9 - 3.5 g/dL 3.0 3.4   A-G Ratio      g/dL 1.4 1.3   Color        Yellow   Character        Clear   Specific Gravity      <1.035   1.011   Ph      5.0 - 8.0  6.0   Glucose      Negative mg/dL  Negative   Ketones      Negative mg/dL  Negative   Protein      Negative mg/dL  Negative   Bilirubin      Negative  Negative   Urobilinogen, Urine      Negative  0.2   Nitrite      Negative  Negative   Leukocyte Esterase      Negative  Trace (A)   Occult Blood      Negative  Negative   Micro Urine Req        Microscopic   Albumin      3.75 - 5.01 g/dL     Alpha-1 Globulin      0.19 - 0.46 g/dL     Alpha-2 Globulin      0.48 - 1.05 g/dL     Beta Globulin      0.48 - 1.10 g/dL     Gamma Globulin      0.62 - 1.51 g/dL     Interpretation           JOSE Reflex           Total Protein      6.00 - 8.30 g/dL     EER Serum Prot. Electro. Reflex           WBC      /hpf  0-2   RBC      /hpf  5-10 (A)   Bacteria      None /hpf  Negative   Epithelial Cells      /hpf  Negative   Hyaline Cast      /lpf  0-2   Significant Indicator           Source           Site           Urine Culture           Cholesterol,Tot      100 - 199 mg/dL  235 (H)   Triglycerides      0 - 149 mg/dL  140   HDL      >=40 mg/dL  56   LDL      <100 mg/dL  151 (H)   Iron      40 - 170 ug/dL     Total Iron Binding      250 - 450 ug/dL     % Saturation      15 - 55 %     GFR If African American      >60 mL/min/1.73 m 2 58 (A) 49 (A)   GFR If Non African American      >60 mL/min/1.73 m 2 48 (A) 40 (A)   Staph aureus by PCR      Negative     MRSA by PCR      Negative     Red Blood Cell Folate      >=366 ng/mL     Ccp Antibodies      0 - 19 Units     Sed Rate Westergren      0 - 30 mm/hour     Pth, Intact      14.0 - 72.0 pg/mL     T3      60.0 - 181.0 ng/dL  89.5   TSH      0.380 - 5.330 uIU/mL  0.150 (L)   Ferritin      10.0 - 291.0 ng/mL     Vitamin B12 -True Cobalamin      211 - 911 pg/mL     C Reactive Protein High Sensitive      0.0 - 7.5 mg/L     25-Hydroxy   Vitamin D 25      30 - 100 ng/mL     Rheumatoid Factor -Neph-      0 - 14 IU/mL     Fasting Status       Fasting        Assessment and Plan:      1. Rheumatoid arthritis involving multiple sites, unspecified rheumatoid factor presence (HCC)  hydroxychloroquine (PLAQUENIL) 200 MG Tab    WESTERGREN SED RATE    CRP QUANTITIVE (NON-CARDIAC)   2. Hypothyroidism (acquired)     3. CKD (chronic kidney disease) stage 3, GFR 30-59 ml/min ([facility])  CBC WITH DIFFERENTIAL    Comp Metabolic Panel   4. Hypercholesterolemia  LipoFit by NMR   5. Localized osteoporosis without current pathological fracture  DS-BONE DENSITY STUDY (DEXA)   6. Atrophic vaginitis  estradiol (ESTRACE VAGINAL) 0.1 MG/GM vaginal cream   7. Long term use of drug     8. History of anemia  VITAMIN B12    CBC WITH DIFFERENTIAL    Comp Metabolic Panel   9. History of recurrent UTIs  Comp Metabolic Panel    URINALYSIS     Start plaquenil 200 mg qd for her RA. She is not having significant symptoms at this time with her RA that she wishes to treat with more aggressive Rx. She will remain on synthroid 100 mcg qam. Advised her to continue having yearly eye exams since she will be starting plaquenil. Follow up 3 months with blood work to be done 2 weeks before.    Ramone Iraheta M.D.

## 2019-07-30 DIAGNOSIS — F51.01 PRIMARY INSOMNIA: ICD-10-CM

## 2019-07-30 RX ORDER — TRAZODONE HYDROCHLORIDE 50 MG/1
25 TABLET ORAL EVERY EVENING
Qty: 45 TAB | Refills: 1 | Status: ON HOLD
Start: 2019-07-30 | End: 2020-01-24

## 2019-08-06 RX ORDER — FUROSEMIDE 40 MG/1
40 TABLET ORAL DAILY
Qty: 90 TAB | Refills: 3 | Status: ON HOLD
Start: 2019-08-06 | End: 2020-01-24 | Stop reason: SDUPTHER

## 2019-08-15 ENCOUNTER — HOSPITAL ENCOUNTER (OUTPATIENT)
Dept: RADIOLOGY | Facility: MEDICAL CENTER | Age: 84
End: 2019-08-15
Attending: INTERNAL MEDICINE
Payer: MEDICARE

## 2019-08-15 DIAGNOSIS — M81.6 LOCALIZED OSTEOPOROSIS WITHOUT CURRENT PATHOLOGICAL FRACTURE: ICD-10-CM

## 2019-08-15 PROCEDURE — 77080 DXA BONE DENSITY AXIAL: CPT

## 2019-10-10 ENCOUNTER — OUTPATIENT INFUSION SERVICES (OUTPATIENT)
Dept: ONCOLOGY | Facility: MEDICAL CENTER | Age: 84
End: 2019-10-10
Attending: INTERNAL MEDICINE
Payer: MEDICARE

## 2019-10-10 VITALS
TEMPERATURE: 97.4 F | BODY MASS INDEX: 22 KG/M2 | DIASTOLIC BLOOD PRESSURE: 53 MMHG | HEART RATE: 67 BPM | WEIGHT: 132.06 LBS | RESPIRATION RATE: 18 BRPM | SYSTOLIC BLOOD PRESSURE: 151 MMHG | OXYGEN SATURATION: 94 % | HEIGHT: 65 IN

## 2019-10-10 DIAGNOSIS — M81.6 LOCALIZED OSTEOPOROSIS WITHOUT CURRENT PATHOLOGICAL FRACTURE: ICD-10-CM

## 2019-10-10 LAB
CA-I BLD ISE-SCNC: 1.19 MMOL/L (ref 1.1–1.3)
CREAT BLD-MCNC: 1.5 MG/DL (ref 0.5–1.4)

## 2019-10-10 PROCEDURE — 700111 HCHG RX REV CODE 636 W/ 250 OVERRIDE (IP): Mod: JG | Performed by: INTERNAL MEDICINE

## 2019-10-10 PROCEDURE — 36415 COLL VENOUS BLD VENIPUNCTURE: CPT

## 2019-10-10 PROCEDURE — 82330 ASSAY OF CALCIUM: CPT

## 2019-10-10 PROCEDURE — 82565 ASSAY OF CREATININE: CPT | Mod: 91

## 2019-10-10 PROCEDURE — 96372 THER/PROPH/DIAG INJ SC/IM: CPT

## 2019-10-10 RX ADMIN — DENOSUMAB 60 MG: 60 INJECTION SUBCUTANEOUS at 17:14

## 2019-10-11 LAB — CREAT BLD-MCNC: 1.5 MG/DL (ref 0.5–1.4)

## 2019-10-11 NOTE — PROGRESS NOTES
Pharmacy note  Cr = 1.5, CrCl ~ 26.4 ml/min  Ionized Ca = 1.19  OK for denosumab (Prolia) 60 mg SQ today    Lizeth Pinedo, PharmD, BCOP

## 2019-10-11 NOTE — PROGRESS NOTES
Pt arrives to [facility] for Prolia injection.  Pt denies any s/sx of infection or recent/planned dental procedures.   ISTAT calcium and creatinine collected after 2 attempts.  ISTAT results with inconclusive ionized calcium result.  LEONIDAS Walters collected another sample and re-checked ISTAT results.  Labs reviewed and pt meets parameters for Prolia. Prolia given SC to back of [facility].  Confirmed next appt in 4/2020 with patient. Pt dc home to self care.

## 2019-10-30 ENCOUNTER — NON-PROVIDER VISIT (OUTPATIENT)
Dept: INTERNAL MEDICINE | Facility: IMAGING CENTER | Age: 84
End: 2019-10-30
Payer: COMMERCIAL

## 2019-10-30 ENCOUNTER — HOSPITAL ENCOUNTER (OUTPATIENT)
Dept: LAB | Facility: MEDICAL CENTER | Age: 84
End: 2019-10-30
Attending: INTERNAL MEDICINE
Payer: MEDICARE

## 2019-10-30 DIAGNOSIS — Z87.440 HISTORY OF RECURRENT UTIS: ICD-10-CM

## 2019-10-30 DIAGNOSIS — M06.9 RHEUMATOID ARTHRITIS INVOLVING MULTIPLE SITES, UNSPECIFIED RHEUMATOID FACTOR PRESENCE: ICD-10-CM

## 2019-10-30 DIAGNOSIS — N18.30 CKD (CHRONIC KIDNEY DISEASE) STAGE 3, GFR 30-59 ML/MIN (HCC): ICD-10-CM

## 2019-10-30 DIAGNOSIS — Z01.89 ENCOUNTER FOR ROUTINE LABORATORY TESTING: ICD-10-CM

## 2019-10-30 DIAGNOSIS — E78.00 HYPERCHOLESTEROLEMIA: ICD-10-CM

## 2019-10-30 DIAGNOSIS — Z86.2 HISTORY OF ANEMIA: ICD-10-CM

## 2019-10-30 LAB
BASOPHILS # BLD AUTO: 1.5 % (ref 0–1.8)
BASOPHILS # BLD: 0.07 K/UL (ref 0–0.12)
EOSINOPHIL # BLD AUTO: 0.28 K/UL (ref 0–0.51)
EOSINOPHIL NFR BLD: 5.9 % (ref 0–6.9)
ERYTHROCYTE [DISTWIDTH] IN BLOOD BY AUTOMATED COUNT: 49.3 FL (ref 35.9–50)
ERYTHROCYTE [SEDIMENTATION RATE] IN BLOOD BY WESTERGREN METHOD: 26 MM/HOUR (ref 0–30)
HCT VFR BLD AUTO: 41.5 % (ref 37–47)
HGB BLD-MCNC: 13.5 G/DL (ref 12–16)
IMM GRANULOCYTES # BLD AUTO: 0.01 K/UL (ref 0–0.11)
IMM GRANULOCYTES NFR BLD AUTO: 0.2 % (ref 0–0.9)
LYMPHOCYTES # BLD AUTO: 1.7 K/UL (ref 1–4.8)
LYMPHOCYTES NFR BLD: 35.6 % (ref 22–41)
MCH RBC QN AUTO: 32.1 PG (ref 27–33)
MCHC RBC AUTO-ENTMCNC: 32.5 G/DL (ref 33.6–35)
MCV RBC AUTO: 98.8 FL (ref 81.4–97.8)
MONOCYTES # BLD AUTO: 0.49 K/UL (ref 0–0.85)
MONOCYTES NFR BLD AUTO: 10.3 % (ref 0–13.4)
NEUTROPHILS # BLD AUTO: 2.23 K/UL (ref 2–7.15)
NEUTROPHILS NFR BLD: 46.5 % (ref 44–72)
NRBC # BLD AUTO: 0 K/UL
NRBC BLD-RTO: 0 /100 WBC
PLATELET # BLD AUTO: 305 K/UL (ref 164–446)
PMV BLD AUTO: 10 FL (ref 9–12.9)
RBC # BLD AUTO: 4.2 M/UL (ref 4.2–5.4)
WBC # BLD AUTO: 4.8 K/UL (ref 4.8–10.8)

## 2019-10-30 PROCEDURE — 80053 COMPREHEN METABOLIC PANEL: CPT

## 2019-10-30 PROCEDURE — 80061 LIPID PANEL: CPT | Mod: XU

## 2019-10-30 PROCEDURE — 83704 LIPOPROTEIN BLD QUAN PART: CPT

## 2019-10-30 PROCEDURE — 81001 URINALYSIS AUTO W/SCOPE: CPT

## 2019-10-30 PROCEDURE — 86140 C-REACTIVE PROTEIN: CPT

## 2019-10-30 PROCEDURE — 82607 VITAMIN B-12: CPT

## 2019-10-30 PROCEDURE — 85025 COMPLETE CBC W/AUTO DIFF WBC: CPT

## 2019-10-30 PROCEDURE — 36415 COLL VENOUS BLD VENIPUNCTURE: CPT

## 2019-10-30 PROCEDURE — 85652 RBC SED RATE AUTOMATED: CPT

## 2019-10-30 RX ORDER — CITALOPRAM 20 MG/1
20 TABLET ORAL
Qty: 90 TAB | Refills: 3 | Status: SHIPPED | OUTPATIENT
Start: 2019-10-30 | End: 2020-04-13 | Stop reason: SDUPTHER

## 2019-10-31 LAB
ALBUMIN SERPL BCP-MCNC: 4.7 G/DL (ref 3.2–4.9)
ALBUMIN/GLOB SERPL: 1.5 G/DL
ALP SERPL-CCNC: 41 U/L (ref 30–99)
ALT SERPL-CCNC: 23 U/L (ref 2–50)
ANION GAP SERPL CALC-SCNC: 14 MMOL/L (ref 0–11.9)
APPEARANCE UR: CLEAR
AST SERPL-CCNC: 59 U/L (ref 12–45)
BACTERIA #/AREA URNS HPF: NEGATIVE /HPF
BILIRUB SERPL-MCNC: 0.4 MG/DL (ref 0.1–1.5)
BILIRUB UR QL STRIP.AUTO: NEGATIVE
BUN SERPL-MCNC: 42 MG/DL (ref 8–22)
CALCIUM SERPL-MCNC: 10 MG/DL (ref 8.5–10.5)
CHLORIDE SERPL-SCNC: 105 MMOL/L (ref 96–112)
CO2 SERPL-SCNC: 15 MMOL/L (ref 20–33)
COLOR UR: YELLOW
CREAT SERPL-MCNC: 1.24 MG/DL (ref 0.5–1.4)
CRP SERPL HS-MCNC: 0.14 MG/DL (ref 0–0.75)
EPI CELLS #/AREA URNS HPF: NORMAL /HPF
GLOBULIN SER CALC-MCNC: 3.1 G/DL (ref 1.9–3.5)
GLUCOSE SERPL-MCNC: 74 MG/DL (ref 65–99)
GLUCOSE UR STRIP.AUTO-MCNC: NEGATIVE MG/DL
KETONES UR STRIP.AUTO-MCNC: NEGATIVE MG/DL
LEUKOCYTE ESTERASE UR QL STRIP.AUTO: ABNORMAL
MICRO URNS: ABNORMAL
NITRITE UR QL STRIP.AUTO: NEGATIVE
PH UR STRIP.AUTO: 6 [PH] (ref 5–8)
POTASSIUM SERPL-SCNC: 5.5 MMOL/L (ref 3.6–5.5)
PROT SERPL-MCNC: 7.8 G/DL (ref 6–8.2)
PROT UR QL STRIP: NEGATIVE MG/DL
RBC # URNS HPF: NORMAL /HPF
RBC UR QL AUTO: NEGATIVE
SODIUM SERPL-SCNC: 134 MMOL/L (ref 135–145)
SP GR UR STRIP.AUTO: 1.02
UROBILINOGEN UR STRIP.AUTO-MCNC: 0.2 MG/DL
VIT B12 SERPL-MCNC: 872 PG/ML (ref 211–911)
WBC #/AREA URNS HPF: NORMAL /HPF

## 2019-11-03 LAB
CHOLEST SERPL-MCNC: 240 MG/DL
HDL PARTICAL NO Q4363: 39.7 UMOL/L
HDL SIZE Q4361: 9.6 NM
HDLC SERPL-MCNC: 80 MG/DL (ref 40–59)
HLD.LARGE SERPL-SCNC: 10.7 UMOL/L
L VLDL PART NO Q4357: 5.9 NMOL/L
LDL SERPL QN: 21.5 NM
LDL SERPL-SCNC: 1000 NMOL/L
LDL SMALL SERPL-SCNC: 295 NMOL/L
LDLC SERPL CALC-MCNC: 138 MG/DL
PATHOLOGY STUDY: ABNORMAL
TRIGL SERPL-MCNC: 112 MG/DL (ref 30–149)
VLDL SIZE Q4362: 52.9 NM

## 2019-11-13 ENCOUNTER — OFFICE VISIT (OUTPATIENT)
Dept: INTERNAL MEDICINE | Facility: IMAGING CENTER | Age: 84
End: 2019-11-13
Payer: MEDICARE

## 2019-11-13 VITALS
RESPIRATION RATE: 14 BRPM | WEIGHT: 128 LBS | HEIGHT: 65 IN | SYSTOLIC BLOOD PRESSURE: 120 MMHG | BODY MASS INDEX: 21.33 KG/M2 | OXYGEN SATURATION: 92 % | TEMPERATURE: 98.2 F | HEART RATE: 64 BPM | DIASTOLIC BLOOD PRESSURE: 60 MMHG

## 2019-11-13 DIAGNOSIS — E78.00 HYPERCHOLESTEROLEMIA: ICD-10-CM

## 2019-11-13 DIAGNOSIS — E87.1 HYPONATREMIA: ICD-10-CM

## 2019-11-13 DIAGNOSIS — Z00.00 MEDICARE ANNUAL WELLNESS VISIT, SUBSEQUENT: ICD-10-CM

## 2019-11-13 DIAGNOSIS — Z91.89 OTHER SPECIFIED PERSONAL RISK FACTORS, NOT ELSEWHERE CLASSIFIED: ICD-10-CM

## 2019-11-13 DIAGNOSIS — M06.9 RHEUMATOID ARTHRITIS INVOLVING MULTIPLE SITES, UNSPECIFIED RHEUMATOID FACTOR PRESENCE: ICD-10-CM

## 2019-11-13 DIAGNOSIS — E03.9 HYPOTHYROIDISM (ACQUIRED): ICD-10-CM

## 2019-11-13 DIAGNOSIS — Z79.899 LONG TERM USE OF DRUG: ICD-10-CM

## 2019-11-13 DIAGNOSIS — R74.01 ELEVATED AST (SGOT): ICD-10-CM

## 2019-11-13 DIAGNOSIS — N18.30 CKD (CHRONIC KIDNEY DISEASE) STAGE 3, GFR 30-59 ML/MIN (HCC): ICD-10-CM

## 2019-11-13 PROBLEM — Z91.81 HISTORY OF FALLING: Status: ACTIVE | Noted: 2017-11-11

## 2019-11-13 PROCEDURE — G0439 PPPS, SUBSEQ VISIT: HCPCS | Performed by: INTERNAL MEDICINE

## 2019-11-13 RX ORDER — SPIRONOLACTONE 25 MG/1
12.5 TABLET ORAL DAILY
Qty: 45 TAB | Refills: 3 | Status: ON HOLD
Start: 2019-11-13 | End: 2020-01-10

## 2019-11-13 RX ORDER — IPRATROPIUM BROMIDE 42 UG/1
1 SPRAY, METERED NASAL 2 TIMES DAILY
Status: ON HOLD | COMMUNITY
End: 2020-01-10

## 2019-11-13 ASSESSMENT — PATIENT HEALTH QUESTIONNAIRE - PHQ9: CLINICAL INTERPRETATION OF PHQ2 SCORE: 0

## 2019-11-13 ASSESSMENT — ENCOUNTER SYMPTOMS: GENERAL WELL-BEING: EXCELLENT

## 2019-11-13 ASSESSMENT — ACTIVITIES OF DAILY LIVING (ADL): BATHING_REQUIRES_ASSISTANCE: 0

## 2019-11-13 NOTE — PROGRESS NOTES
"Established Patient Note   HPI:        Elizabeth is here today for medicare annual wellness and to follow CKD, history of prior anemia and history of recurrent UTIs. She has done recent blood work which she is here to review. She states her rheumatoid pain has increased over past 2-4 weeks mostly in right index MCP. She believes her energy level has decreased somewhat over past month but not significantly. Morning stiffness does not last more than about 5 minutes. She states she has had some swelling in index MCP of right hand past 2 days.    Past Medical History:   Diagnosis Date   • Anesthesia     \"One time had a hard time waking me up\"   • C. difficile diarrhea 2/16   • C. 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 2017: T score -3.4 right forearm and -1.6 left hip DEXA Aug. 2019: T score forearm -4.6 and hip -1.4   • Pain     shoulders, feet, back   • Rheumatoid arthritis ([facility]) 9/26/2014   • Stroke ([facility]) 1990's?    \"mini\" no residual symptoms   • Urinary incontinence     occasional       Current Outpatient Medications   Medication Sig Dispense Refill   • spironolactone (ALDACTONE) 25 MG Tab Take 0.5 Tabs by mouth every day. 45 Tab 3   • citalopram (CELEXA) 20 MG Tab Take 1 Tab by mouth every bedtime. 90 Tab 3   • furosemide (LASIX) 40 MG Tab Take 1 Tab by mouth every day. 90 Tab 3   • traZODone (DESYREL) 50 MG Tab Take 0.5 Tabs by mouth every evening for 180 days. 45 Tab 1   • Potassium Chloride Catrachita ER (KLOR-CON M10 PO) Take 10 mEq by mouth every day.     • hydroxychloroquine (PLAQUENIL) 200 MG Tab Take 1 Tab by " mouth every day. 90 Tab 3   • estradiol (ESTRACE VAGINAL) 0.1 MG/GM vaginal cream Apply topically around urethra in vaginal region 3 Tube 3   • SYNTHROID 100 MCG Tab Take 1 Tab by mouth Every morning on an empty stomach. 90 Tab 3   • CALCIUM-VITAMIN D PO Take 500 mg by mouth 2 times a day.     • cyclosporin (RESTASIS) 0.05 % ophthalmic emulsion Place 1 Drop in both eyes 2 times a day.     • denosumab (PROLIA) 60 MG/ML Solution Inject 60 mg as instructed every 6 months. Pt last dose was on 10/2018     • ipratropium (ATROVENT) 0.06 % Solution Spray 1 Spray in nose 2 Times a Day.       No current facility-administered medications for this visit.          Allergies   Allergen Reactions   • Azithromycin Unspecified     Matti Johnsons syndrome   • Cefuroxime Itching and Vomiting     They gave me C-Diff   • Ciprofloxacin Itching and Vomiting     They gave me C-Diff   • Clindamycin Itching and Vomiting     They gave me c-diff   • Daptomycin      Matti colleen syndrome     • Erythromycin Unspecified     Matti Johnsons syndrome   • Nitrofurantoin Vomiting and Nausea   • Rifampin      Matti colleen syndrome   • Sulfa Drugs Swelling   • Codeine Itching   • Flagyl [Metronidazole] Vomiting and Nausea   • Macrodantin  [Nitrofurantoin Macrocrystal]      Other reaction(s): Decreased Blood Pressure   • Morphine Itching   • Premarin Unspecified     burning         Social History     Tobacco Use   • Smoking status: Former Smoker     Packs/day: 1.00     Years: 20.00     Pack years: 20.00     Types: Cigarettes     Last attempt to quit: 1985     Years since quittin.8   • Smokeless tobacco: Never Used   Substance Use Topics   • Alcohol use: Yes     Comment: 1 per day   • Drug use: No       Past Surgical History:   Procedure Laterality Date   • PB RECONSTR TOTAL SHOULDER IMPLANT Left 2019    Procedure: ARTHROPLASTY, SHOULDER, TOTAL - REVERSE;  Surgeon: Daniella Camargo M.D.;  Location: SURGERY [facility];  Service:  Orthopedics   • SHOULDER ARTHROPLASTY TOTAL Right 1/9/2018    Procedure: SHOULDER ARTHROPLASTY TOTAL - REVERSE;  Surgeon: Daniella Camargo M.D.;  Location: SURGERY [facility];  Service: Orthopedics   • COLONOSCOPY - ENDO N/A 3/7/2016    Procedure: COLONOSCOPY - ENDO;  Surgeon: Estiven Ayers M.D.;  Location: ENDOSCOPY [facility];  Service:    • FECAL TRANSPLANT N/A 3/7/2016    Procedure: FECAL TRANSPLANT;  Surgeon: Estiven Ayers M.D.;  Location: ENDOSCOPY [facility];  Service:    • OTHER ORTHOPEDIC SURGERY Left 2012    Left Elbow fx repair   • BLEPHAROPLASTY  3/31/2011    Performed by ORACIO DIAZ at [facility]   • FOOT SURGERY Right 2010   • OTHER ORTHOPEDIC SURGERY Left 2006    finger- removal of digit Left middle finger   • CHOLECYSTECTOMY  2000   • HYSTERECTOMY LAPAROSCOPY  2000   • HYSTERECTOMY RADICAL  1985   • OTHER Bilateral 2010, 2008    feet- repair torn tendons   • OTHER ORTHOPEDIC SURGERY Left 1970s    femur fx        ROS    Depression Screening    Little interest or pleasure in doing things?  0 - not at all  Feeling down, depressed , or hopeless? 0 - not at all  Patient Health Questionnaire Score: 0     If depressive symptoms identified deferred to follow up visit unless specifically addressed in assessment and plan.    Interpretation of PHQ-9 Total Score   Score Severity   1-4 No Depression   5-9 Mild Depression   10-14 Moderate Depression   15-19 Moderately Severe Depression   20-27 Severe Depression    Screening for Cognitive Impairment    Three Minute Recall (village, kitchen, baby) 3/3    Eric clock face with all 12 numbers and set the hands to show 10 past 10.  Yes    Cognitive concerns identified deferred for follow up unless specifically addressed in assessment and plan.    Fall Risk Assessment    Has the patient had two or more falls in the last year or any fall with injury in the last year?  No    Safety Assessment    Throw rugs on floor.  No  Handrails on  all stairs.  Yes  Good lighting in all hallways.  Yes  Difficulty hearing.  Yes  Patient counseled about all safety risks that were identified.    Functional Assessment ADLs    Are there any barriers preventing you from cooking for yourself or meeting nutritional needs?  No.    Are there any barriers preventing you from driving safely or obtaining transportation?  No.    Are there any barriers preventing you from using a telephone or calling for help?  No.    Are there any barriers preventing you from shopping?  No.    Are there any barriers preventing you from taking care of your own finances?  No.    Are there any barriers preventing you from managing your medications?  No.    Are there any barriers preventing you from showering, bathing or dressing yourself?  No.    Are you currently engaging in any exercise or physical activity?  No.     What is your perception of your health?  Excellent.      Health Maintenance Summary                Annual Wellness Visit Overdue 1935     MAMMOGRAM Next Due 7/24/2020      Done 7/5/2017 Ext Proc: MA-SCREEN MAMMO UNI W/CAD     Patient has more history with this topic...    BONE DENSITY Next Due 8/15/2024      Done 8/15/2019 DS-BONE DENSITY STUDY (DEXA)     Patient has more history with this topic...    IMM DTaP/Tdap/Td Vaccine Next Due 1/11/2025      Done 1/11/2015 Imm Admin: Dtap Vaccine     Patient has more history with this topic...    COLONOSCOPY Next Due 3/7/2026      Done 3/7/2016 Surg:COLONOSCOPY - ENDO          Patient Care Team:  Ramone Iraheta M.D. as PCP - General (Internal Medicine)       Component      Latest Ref Rng & Units 10/30/2019             WBC      4.8 - 10.8 K/uL 4.8   RBC      4.20 - 5.40 M/uL 4.20   Hemoglobin      12.0 - 16.0 g/dL 13.5   Hematocrit      37.0 - 47.0 % 41.5   MCV      81.4 - 97.8 fL 98.8 (H)   MCH      27.0 - 33.0 pg 32.1   MCHC      33.6 - 35.0 g/dL 32.5 (L)   RDW      35.9 - 50.0 fL 49.3   Platelet Count      164 - 446 K/uL 305    MPV      9.0 - 12.9 fL 10.0   Neutrophils-Polys      44.00 - 72.00 % 46.50   Lymphocytes      22.00 - 41.00 % 35.60   Monocytes      0.00 - 13.40 % 10.30   Eosinophils      0.00 - 6.90 % 5.90   Basophils      0.00 - 1.80 % 1.50   Immature Granulocytes      0.00 - 0.90 % 0.20   Nucleated RBC      /100 WBC 0.00   Neutrophils (Absolute)      2.00 - 7.15 K/uL 2.23   Lymphs (Absolute)      1.00 - 4.80 K/uL 1.70   Monos (Absolute)      0.00 - 0.85 K/uL 0.49   Eos (Absolute)      0.00 - 0.51 K/uL 0.28   Baso (Absolute)      0.00 - 0.12 K/uL 0.07   Immature Granulocytes (abs)      0.00 - 0.11 K/uL 0.01   NRBC (Absolute)      K/uL 0.00   Sodium      135 - 145 mmol/L 134 (L)   Potassium      3.6 - 5.5 mmol/L 5.5   Chloride      96 - 112 mmol/L 105   Co2      20 - 33 mmol/L 15 (L)   Anion Gap      0.0 - 11.9 14.0 (H)   Glucose      65 - 99 mg/dL 74   Bun      8 - 22 mg/dL 42 (H)   Creatinine      0.50 - 1.40 mg/dL 1.24   Calcium      8.5 - 10.5 mg/dL 10.0   AST(SGOT)      12 - 45 U/L 59 (H)   ALT(SGPT)      2 - 50 U/L 23   Alkaline Phosphatase      30 - 99 U/L 41   Total Bilirubin      0.1 - 1.5 mg/dL 0.4   Albumin      3.2 - 4.9 g/dL 4.7   Total Protein      6.0 - 8.2 g/dL 7.8   Globulin      1.9 - 3.5 g/dL 3.1   A-G Ratio      g/dL 1.5   Color       Yellow   Character       Clear   Specific Gravity      <1.035 1.020   Ph      5.0 - 8.0 6.0   Glucose      Negative mg/dL Negative   Ketones      Negative mg/dL Negative   Protein      Negative mg/dL Negative   Bilirubin      Negative Negative   Urobilinogen, Urine      Negative 0.2   Nitrite      Negative Negative   Leukocyte Esterase      Negative Trace (A)   Occult Blood      Negative Negative   Micro Urine Req       Microscopic   Cholesterol,Tot      <=199 mg/dL 240 (H)   Triglycerides      30 - 149 mg/dL 112   HDL      40 - 59 mg/dL 80 (H)   LDL Cholesterol      <=129 mg/dL 138 (H)   LDL Particle      <=1135 nmol/L 1000   Small LDL      <=634 nmol/L 295   L-VLDL Particle  "No.      <=2.7 nmol/L 5.9 (H)   HDL Particle No.      >=33.0 umol/L 39.7   L-HDL Particle No.      >=4.2 umol/L 10.7   LDL Particle Size      >=20.7 nm 21.5   VLDL Size      <=46.7 nm 52.9 (H)   HDL Size      >=8.9 nm 9.6   EER LipoFit by NMR       See Note   WBC      /hpf 2-5   RBC      /hpf 0-2   Bacteria      None /hpf Negative   Epithelial Cells      /hpf Few   Hyaline Cast      /lpf    LDL      <100 mg/dL    GFR If African American      >60 mL/min/1.73 m 2 50 (A)   GFR If Non African American      >60 mL/min/1.73 m 2 41 (A)   Fasting Status          TSH      0.380 - 5.330 uIU/mL    T3      60.0 - 181.0 ng/dL    Istat Creatinine      0.5 - 1.4 mg/dL    Istat Ionized Calcium      1.10 - 1.30 mmol/L    Stat C-Reactive Protein      0.00 - 0.75 mg/dL 0.14   Sed Rate Westergren      0 - 30 mm/hour 26   Vitamin B12 -True Cobalamin      211 - 911 pg/mL 872       Ambulatory Vitals  /60 (BP Location: Left arm, Patient Position: Sitting, BP Cuff Size: Adult)   Pulse 64   Temp 36.8 °C (98.2 °F) (Temporal)   Resp 14   Ht 1.651 m (5' 5\")   Wt 58.1 kg (128 lb)   SpO2 92%   BMI 21.30 kg/m²     Physical Exam   Constitutional: She is well-developed, well-nourished, and in no distress. No distress.   Cardiovascular: Normal rate, regular rhythm and normal heart sounds. Exam reveals no gallop and no friction rub.   No murmur heard.  Pulmonary/Chest: Effort normal and breath sounds normal. She has no wheezes. She has no rales.   Musculoskeletal:         General: No edema.      Comments: Mild tenderness over right index MCP without synovitis. No wrist, knee or other MCP/PIP joint tenderness or synovitis.   Skin: She is not diaphoretic.         Assessment and Plan:     1. Medicare annual wellness visit, subsequent     2. CKD (chronic kidney disease) stage 3, GFR 30-59 ml/min ([facility])  CT-CARDIAC SCORING    Comp Metabolic Panel   3. Rheumatoid arthritis involving multiple sites, unspecified rheumatoid factor presence (HCC)  " WESTERGREN SED RATE    CRP QUANTITIVE (NON-CARDIAC)    REFERRAL TO RHEUMATOLOGY   4. Hyponatremia  spironolactone (ALDACTONE) 25 MG Tab    Comp Metabolic Panel   5. Hypothyroidism (acquired)  TSH    TRIIDOTHYRONINE   6. Long term use of drug  Comp Metabolic Panel   7. Other specified personal risk factors, not elsewhere classified  CT-CARDIAC SCORING   8. Hypercholesterolemia  CT-CARDIAC SCORING   9. Elevated AST (SGOT)  Comp Metabolic Panel     Will place referral to rheumatologist. I have discussed that since she is not having significant evidence of inflammation at this time I would not recommend starting additional DMARD as yet but if symptoms progress consider xeljanz since it may be simpler than injection. Due to hyponatremia without current edema will have her cut spironolactone to 12.5 mg qam. Discussed she may potentially benefit from low dose statin due to hypercholesterolemia since she does have inflammatory condition with her RA. Will evaluate with CAC and depending upon result may recommend low dose statin. Since her LDL-P and small LDL-P are favorable will not start statin as yet. Follow up 3 months to check CMP, TSH/T3, ESR, CRP.    Ramone Iraheta M.D.

## 2019-12-13 NOTE — CARE PLAN
Problem: Safety  Goal: Will remain free from injury  Outcome: PROGRESSING AS EXPECTED  Bed locked and low, controls on call light button and personal belongings within reach.  Pt calls appropriately for needs.  1 person assistance provided.  Shoulder elevated and positioned with extra pillows.     Problem: Infection  Goal: Will remain free from infection  Outcome: PROGRESSING AS EXPECTED  Remains afebrile, sx site c/d/i    Problem: Pain Management  Goal: Pain level will decrease to patient's comfort goal   01/09/18 2150   OTHER   Nurse Pain Scale 0 - 10  0   Comfort Goal Comfort at Rest;Comfort with Movement;Perform Activity;Sleep Comfortably   With BLOCK, still has numbness.  Will continue to give scheduled pain medications.       34.9

## 2019-12-28 ENCOUNTER — HOSPITAL ENCOUNTER (OUTPATIENT)
Dept: RADIOLOGY | Facility: MEDICAL CENTER | Age: 84
End: 2019-12-28
Attending: INTERNAL MEDICINE
Payer: MEDICARE

## 2019-12-28 DIAGNOSIS — M06.4 INFLAMMATORY POLYARTHROPATHY (HCC): ICD-10-CM

## 2019-12-28 PROCEDURE — 73120 X-RAY EXAM OF HAND: CPT | Mod: LT

## 2019-12-28 PROCEDURE — 73120 X-RAY EXAM OF HAND: CPT | Mod: RT

## 2019-12-29 ENCOUNTER — HOSPITAL ENCOUNTER (OUTPATIENT)
Facility: MEDICAL CENTER | Age: 84
End: 2019-12-29
Attending: INTERNAL MEDICINE
Payer: MEDICARE

## 2019-12-29 PROCEDURE — 87493 C DIFF AMPLIFIED PROBE: CPT

## 2019-12-31 LAB
C DIFF DNA SPEC QL NAA+PROBE: NEGATIVE
C DIFF TOX GENS STL QL NAA+PROBE: NEGATIVE

## 2020-01-09 ENCOUNTER — APPOINTMENT (OUTPATIENT)
Dept: RADIOLOGY | Facility: MEDICAL CENTER | Age: 85
DRG: 456 | End: 2020-01-09
Attending: EMERGENCY MEDICINE
Payer: MEDICARE

## 2020-01-09 ENCOUNTER — HOSPITAL ENCOUNTER (INPATIENT)
Facility: MEDICAL CENTER | Age: 85
LOS: 9 days | DRG: 456 | End: 2020-01-24
Attending: EMERGENCY MEDICINE | Admitting: HOSPITALIST
Payer: MEDICARE

## 2020-01-09 DIAGNOSIS — E03.9 HYPOTHYROIDISM (ACQUIRED): ICD-10-CM

## 2020-01-09 DIAGNOSIS — S32.009A CLOSED FRACTURE OF TRANSVERSE PROCESS OF LUMBAR VERTEBRA, INITIAL ENCOUNTER (HCC): ICD-10-CM

## 2020-01-09 DIAGNOSIS — M54.9 INTRACTABLE BACK PAIN: ICD-10-CM

## 2020-01-09 DIAGNOSIS — M48.062 SPINAL STENOSIS OF LUMBAR REGION WITH NEUROGENIC CLAUDICATION: ICD-10-CM

## 2020-01-09 PROCEDURE — 96374 THER/PROPH/DIAG INJ IV PUSH: CPT

## 2020-01-09 PROCEDURE — 700111 HCHG RX REV CODE 636 W/ 250 OVERRIDE (IP): Performed by: EMERGENCY MEDICINE

## 2020-01-09 PROCEDURE — 99285 EMERGENCY DEPT VISIT HI MDM: CPT

## 2020-01-09 PROCEDURE — 96375 TX/PRO/DX INJ NEW DRUG ADDON: CPT

## 2020-01-09 RX ORDER — ONDANSETRON 2 MG/ML
4 INJECTION INTRAMUSCULAR; INTRAVENOUS ONCE
Status: COMPLETED | OUTPATIENT
Start: 2020-01-09 | End: 2020-01-09

## 2020-01-09 RX ORDER — HYDROMORPHONE HYDROCHLORIDE 1 MG/ML
1 INJECTION, SOLUTION INTRAMUSCULAR; INTRAVENOUS; SUBCUTANEOUS ONCE
Status: COMPLETED | OUTPATIENT
Start: 2020-01-09 | End: 2020-01-09

## 2020-01-09 RX ADMIN — HYDROMORPHONE HYDROCHLORIDE 1 MG: 1 INJECTION, SOLUTION INTRAMUSCULAR; INTRAVENOUS; SUBCUTANEOUS at 23:27

## 2020-01-09 RX ADMIN — ONDANSETRON 4 MG: 2 INJECTION INTRAMUSCULAR; INTRAVENOUS at 23:27

## 2020-01-09 ASSESSMENT — LIFESTYLE VARIABLES: DO YOU DRINK ALCOHOL: NO

## 2020-01-10 PROBLEM — M54.50 ACUTE LOW BACK PAIN: Status: ACTIVE | Noted: 2020-01-10

## 2020-01-10 PROBLEM — M54.9 INTRACTABLE BACK PAIN: Status: ACTIVE | Noted: 2020-01-10

## 2020-01-10 LAB
ANION GAP SERPL CALC-SCNC: 18 MMOL/L (ref 0–11.9)
BASOPHILS # BLD AUTO: 0.6 % (ref 0–1.8)
BASOPHILS # BLD: 0.08 K/UL (ref 0–0.12)
BUN SERPL-MCNC: 27 MG/DL (ref 8–22)
CALCIUM SERPL-MCNC: 9.2 MG/DL (ref 8.5–10.5)
CHLORIDE SERPL-SCNC: 104 MMOL/L (ref 96–112)
CO2 SERPL-SCNC: 17 MMOL/L (ref 20–33)
CREAT SERPL-MCNC: 1.17 MG/DL (ref 0.5–1.4)
EOSINOPHIL # BLD AUTO: 0.04 K/UL (ref 0–0.51)
EOSINOPHIL NFR BLD: 0.3 % (ref 0–6.9)
ERYTHROCYTE [DISTWIDTH] IN BLOOD BY AUTOMATED COUNT: 48.1 FL (ref 35.9–50)
GLUCOSE SERPL-MCNC: 80 MG/DL (ref 65–99)
HCT VFR BLD AUTO: 38 % (ref 37–47)
HGB BLD-MCNC: 13.1 G/DL (ref 12–16)
IMM GRANULOCYTES # BLD AUTO: 0.1 K/UL (ref 0–0.11)
IMM GRANULOCYTES NFR BLD AUTO: 0.8 % (ref 0–0.9)
LYMPHOCYTES # BLD AUTO: 1.09 K/UL (ref 1–4.8)
LYMPHOCYTES NFR BLD: 8.7 % (ref 22–41)
MCH RBC QN AUTO: 33.3 PG (ref 27–33)
MCHC RBC AUTO-ENTMCNC: 34.5 G/DL (ref 33.6–35)
MCV RBC AUTO: 96.7 FL (ref 81.4–97.8)
MONOCYTES # BLD AUTO: 0.67 K/UL (ref 0–0.85)
MONOCYTES NFR BLD AUTO: 5.3 % (ref 0–13.4)
NEUTROPHILS # BLD AUTO: 10.61 K/UL (ref 2–7.15)
NEUTROPHILS NFR BLD: 84.3 % (ref 44–72)
NRBC # BLD AUTO: 0 K/UL
NRBC BLD-RTO: 0 /100 WBC
PLATELET # BLD AUTO: 285 K/UL (ref 164–446)
PMV BLD AUTO: 8.9 FL (ref 9–12.9)
POTASSIUM SERPL-SCNC: 3.3 MMOL/L (ref 3.6–5.5)
RBC # BLD AUTO: 3.93 M/UL (ref 4.2–5.4)
SODIUM SERPL-SCNC: 139 MMOL/L (ref 135–145)
WBC # BLD AUTO: 12.6 K/UL (ref 4.8–10.8)

## 2020-01-10 PROCEDURE — 72131 CT LUMBAR SPINE W/O DYE: CPT

## 2020-01-10 PROCEDURE — 99220 PR INITIAL OBSERVATION CARE,LEVL III: CPT | Performed by: HOSPITALIST

## 2020-01-10 PROCEDURE — 700102 HCHG RX REV CODE 250 W/ 637 OVERRIDE(OP): Performed by: FAMILY MEDICINE

## 2020-01-10 PROCEDURE — A9270 NON-COVERED ITEM OR SERVICE: HCPCS | Performed by: FAMILY MEDICINE

## 2020-01-10 PROCEDURE — 700101 HCHG RX REV CODE 250: Performed by: FAMILY MEDICINE

## 2020-01-10 PROCEDURE — 97162 PT EVAL MOD COMPLEX 30 MIN: CPT

## 2020-01-10 PROCEDURE — 96372 THER/PROPH/DIAG INJ SC/IM: CPT

## 2020-01-10 PROCEDURE — 85025 COMPLETE CBC W/AUTO DIFF WBC: CPT

## 2020-01-10 PROCEDURE — G0378 HOSPITAL OBSERVATION PER HR: HCPCS

## 2020-01-10 PROCEDURE — 96376 TX/PRO/DX INJ SAME DRUG ADON: CPT

## 2020-01-10 PROCEDURE — 72192 CT PELVIS W/O DYE: CPT

## 2020-01-10 PROCEDURE — 700102 HCHG RX REV CODE 250 W/ 637 OVERRIDE(OP): Performed by: HOSPITALIST

## 2020-01-10 PROCEDURE — 700111 HCHG RX REV CODE 636 W/ 250 OVERRIDE (IP): Performed by: EMERGENCY MEDICINE

## 2020-01-10 PROCEDURE — 96375 TX/PRO/DX INJ NEW DRUG ADDON: CPT

## 2020-01-10 PROCEDURE — A9270 NON-COVERED ITEM OR SERVICE: HCPCS | Performed by: HOSPITALIST

## 2020-01-10 PROCEDURE — 97165 OT EVAL LOW COMPLEX 30 MIN: CPT

## 2020-01-10 PROCEDURE — 80048 BASIC METABOLIC PNL TOTAL CA: CPT

## 2020-01-10 PROCEDURE — 700101 HCHG RX REV CODE 250: Performed by: HOSPITALIST

## 2020-01-10 PROCEDURE — 302151 K-PAD 14X20: Performed by: FAMILY MEDICINE

## 2020-01-10 PROCEDURE — 700111 HCHG RX REV CODE 636 W/ 250 OVERRIDE (IP): Performed by: HOSPITALIST

## 2020-01-10 PROCEDURE — 302131 K PAD MOTOR: Performed by: FAMILY MEDICINE

## 2020-01-10 RX ORDER — LEVOTHYROXINE SODIUM 0.03 MG/1
100 TABLET ORAL
Status: DISCONTINUED | OUTPATIENT
Start: 2020-01-10 | End: 2020-01-16

## 2020-01-10 RX ORDER — ONDANSETRON 2 MG/ML
4 INJECTION INTRAMUSCULAR; INTRAVENOUS EVERY 4 HOURS PRN
Status: DISCONTINUED | OUTPATIENT
Start: 2020-01-10 | End: 2020-01-16

## 2020-01-10 RX ORDER — MULTIVIT WITH CALCIUM,IRON,MIN 27MG-0.4MG
1 TABLET ORAL
Status: ON HOLD | COMMUNITY
End: 2020-01-24

## 2020-01-10 RX ORDER — POLYETHYLENE GLYCOL 3350 17 G/17G
1 POWDER, FOR SOLUTION ORAL
Status: DISCONTINUED | OUTPATIENT
Start: 2020-01-10 | End: 2020-01-16

## 2020-01-10 RX ORDER — OXYCODONE HYDROCHLORIDE 5 MG/1
2.5 TABLET ORAL
Status: DISCONTINUED | OUTPATIENT
Start: 2020-01-10 | End: 2020-01-10

## 2020-01-10 RX ORDER — TRAZODONE HYDROCHLORIDE 50 MG/1
25 TABLET ORAL EVERY EVENING
Status: DISCONTINUED | OUTPATIENT
Start: 2020-01-10 | End: 2020-01-16

## 2020-01-10 RX ORDER — HYDROMORPHONE HYDROCHLORIDE 1 MG/ML
0.25 INJECTION, SOLUTION INTRAMUSCULAR; INTRAVENOUS; SUBCUTANEOUS
Status: DISCONTINUED | OUTPATIENT
Start: 2020-01-10 | End: 2020-01-10

## 2020-01-10 RX ORDER — OXYCODONE HYDROCHLORIDE 5 MG/1
5-10 TABLET ORAL
Status: DISCONTINUED | OUTPATIENT
Start: 2020-01-10 | End: 2020-01-14

## 2020-01-10 RX ORDER — SPIRONOLACTONE 25 MG/1
25 TABLET ORAL DAILY
COMMUNITY
End: 2020-03-27 | Stop reason: SDUPTHER

## 2020-01-10 RX ORDER — ACETAMINOPHEN 325 MG/1
650 TABLET ORAL EVERY 6 HOURS PRN
Status: DISCONTINUED | OUTPATIENT
Start: 2020-01-10 | End: 2020-01-16

## 2020-01-10 RX ORDER — CITALOPRAM 20 MG/1
20 TABLET ORAL
Status: DISCONTINUED | OUTPATIENT
Start: 2020-01-10 | End: 2020-01-16

## 2020-01-10 RX ORDER — BISACODYL 10 MG
10 SUPPOSITORY, RECTAL RECTAL
Status: DISCONTINUED | OUTPATIENT
Start: 2020-01-10 | End: 2020-01-16

## 2020-01-10 RX ORDER — POTASSIUM CHLORIDE 20 MEQ/1
40 TABLET, EXTENDED RELEASE ORAL ONCE
Status: COMPLETED | OUTPATIENT
Start: 2020-01-10 | End: 2020-01-10

## 2020-01-10 RX ORDER — ONDANSETRON 4 MG/1
4 TABLET, ORALLY DISINTEGRATING ORAL EVERY 4 HOURS PRN
Status: DISCONTINUED | OUTPATIENT
Start: 2020-01-10 | End: 2020-01-16

## 2020-01-10 RX ORDER — FUROSEMIDE 20 MG/1
40 TABLET ORAL DAILY
Status: DISCONTINUED | OUTPATIENT
Start: 2020-01-10 | End: 2020-01-11

## 2020-01-10 RX ORDER — HYDROMORPHONE HYDROCHLORIDE 1 MG/ML
0.25 INJECTION, SOLUTION INTRAMUSCULAR; INTRAVENOUS; SUBCUTANEOUS
Status: DISCONTINUED | OUTPATIENT
Start: 2020-01-10 | End: 2020-01-14

## 2020-01-10 RX ORDER — IPRATROPIUM BROMIDE 42 UG/1
1 SPRAY, METERED NASAL 2 TIMES DAILY
Status: DISCONTINUED | OUTPATIENT
Start: 2020-01-10 | End: 2020-01-24 | Stop reason: HOSPADM

## 2020-01-10 RX ORDER — HYDROXYCHLOROQUINE SULFATE 200 MG/1
200 TABLET, FILM COATED ORAL DAILY
Status: DISCONTINUED | OUTPATIENT
Start: 2020-01-10 | End: 2020-01-16

## 2020-01-10 RX ORDER — GABAPENTIN 100 MG/1
100 CAPSULE ORAL 3 TIMES DAILY
Status: DISCONTINUED | OUTPATIENT
Start: 2020-01-10 | End: 2020-01-16

## 2020-01-10 RX ORDER — SPIRONOLACTONE 25 MG/1
12.5 TABLET ORAL DAILY
Status: DISCONTINUED | OUTPATIENT
Start: 2020-01-10 | End: 2020-01-11

## 2020-01-10 RX ORDER — LIDOCAINE 50 MG/G
2 PATCH TOPICAL EVERY 24 HOURS
Status: DISCONTINUED | OUTPATIENT
Start: 2020-01-10 | End: 2020-01-21

## 2020-01-10 RX ORDER — DEXAMETHASONE 4 MG/1
4 TABLET ORAL EVERY 8 HOURS
Status: DISCONTINUED | OUTPATIENT
Start: 2020-01-10 | End: 2020-01-12

## 2020-01-10 RX ORDER — OXYCODONE HYDROCHLORIDE 5 MG/1
5 TABLET ORAL
Status: DISCONTINUED | OUTPATIENT
Start: 2020-01-10 | End: 2020-01-10

## 2020-01-10 RX ORDER — HYDROMORPHONE HYDROCHLORIDE 1 MG/ML
0.5 INJECTION, SOLUTION INTRAMUSCULAR; INTRAVENOUS; SUBCUTANEOUS ONCE
Status: COMPLETED | OUTPATIENT
Start: 2020-01-10 | End: 2020-01-10

## 2020-01-10 RX ORDER — OMEPRAZOLE 20 MG/1
20 CAPSULE, DELAYED RELEASE ORAL DAILY
Status: DISCONTINUED | OUTPATIENT
Start: 2020-01-10 | End: 2020-01-16

## 2020-01-10 RX ORDER — AMOXICILLIN 250 MG
2 CAPSULE ORAL 2 TIMES DAILY
Status: DISCONTINUED | OUTPATIENT
Start: 2020-01-10 | End: 2020-01-16

## 2020-01-10 RX ORDER — KETOROLAC TROMETHAMINE 30 MG/ML
15 INJECTION, SOLUTION INTRAMUSCULAR; INTRAVENOUS EVERY 6 HOURS
Status: COMPLETED | OUTPATIENT
Start: 2020-01-10 | End: 2020-01-11

## 2020-01-10 RX ADMIN — POTASSIUM CHLORIDE 40 MEQ: 1500 TABLET, EXTENDED RELEASE ORAL at 03:37

## 2020-01-10 RX ADMIN — OXYCODONE HYDROCHLORIDE 5 MG: 5 TABLET ORAL at 03:02

## 2020-01-10 RX ADMIN — TRAZODONE HYDROCHLORIDE 25 MG: 50 TABLET ORAL at 21:00

## 2020-01-10 RX ADMIN — IPRATROPIUM BROMIDE 1 SPRAY: 42 SPRAY NASAL at 17:30

## 2020-01-10 RX ADMIN — SENNOSIDES AND DOCUSATE SODIUM 2 TABLET: 8.6; 5 TABLET ORAL at 06:27

## 2020-01-10 RX ADMIN — ONDANSETRON 4 MG: 4 TABLET, ORALLY DISINTEGRATING ORAL at 21:00

## 2020-01-10 RX ADMIN — OMEPRAZOLE 20 MG: 20 CAPSULE, DELAYED RELEASE ORAL at 17:29

## 2020-01-10 RX ADMIN — KETOROLAC TROMETHAMINE 15 MG: 30 INJECTION, SOLUTION INTRAMUSCULAR at 06:21

## 2020-01-10 RX ADMIN — SPIRONOLACTONE 12.5 MG: 25 TABLET ORAL at 06:22

## 2020-01-10 RX ADMIN — OXYCODONE HYDROCHLORIDE 5 MG: 5 TABLET ORAL at 16:10

## 2020-01-10 RX ADMIN — HYDROMORPHONE HYDROCHLORIDE 0.5 MG: 1 INJECTION, SOLUTION INTRAMUSCULAR; INTRAVENOUS; SUBCUTANEOUS at 01:17

## 2020-01-10 RX ADMIN — LIDOCAINE 2 PATCH: 50 PATCH CUTANEOUS at 12:45

## 2020-01-10 RX ADMIN — OXYCODONE HYDROCHLORIDE 5 MG: 5 TABLET ORAL at 21:06

## 2020-01-10 RX ADMIN — HYDROMORPHONE HYDROCHLORIDE 0.25 MG: 1 INJECTION, SOLUTION INTRAMUSCULAR; INTRAVENOUS; SUBCUTANEOUS at 09:02

## 2020-01-10 RX ADMIN — LEVOTHYROXINE SODIUM 100 MCG: 50 TABLET ORAL at 06:22

## 2020-01-10 RX ADMIN — GABAPENTIN 100 MG: 100 CAPSULE ORAL at 17:29

## 2020-01-10 RX ADMIN — CITALOPRAM HYDROBROMIDE 20 MG: 20 TABLET ORAL at 21:00

## 2020-01-10 RX ADMIN — GABAPENTIN 100 MG: 100 CAPSULE ORAL at 12:46

## 2020-01-10 RX ADMIN — OXYCODONE HYDROCHLORIDE 5 MG: 5 TABLET ORAL at 06:24

## 2020-01-10 RX ADMIN — IPRATROPIUM BROMIDE 1 SPRAY: 42 SPRAY NASAL at 06:23

## 2020-01-10 RX ADMIN — KETOROLAC TROMETHAMINE 15 MG: 30 INJECTION, SOLUTION INTRAMUSCULAR at 12:46

## 2020-01-10 RX ADMIN — HYDROMORPHONE HYDROCHLORIDE 0.25 MG: 1 INJECTION, SOLUTION INTRAMUSCULAR; INTRAVENOUS; SUBCUTANEOUS at 04:23

## 2020-01-10 RX ADMIN — DEXAMETHASONE 4 MG: 4 TABLET ORAL at 15:25

## 2020-01-10 RX ADMIN — SENNOSIDES AND DOCUSATE SODIUM 2 TABLET: 8.6; 5 TABLET ORAL at 17:29

## 2020-01-10 RX ADMIN — HYDROXYCHLOROQUINE SULFATE 200 MG: 200 TABLET ORAL at 06:00

## 2020-01-10 RX ADMIN — ONDANSETRON 4 MG: 2 INJECTION INTRAMUSCULAR; INTRAVENOUS at 12:56

## 2020-01-10 RX ADMIN — CITALOPRAM HYDROBROMIDE 20 MG: 20 TABLET ORAL at 03:37

## 2020-01-10 RX ADMIN — DEXAMETHASONE 4 MG: 4 TABLET ORAL at 21:00

## 2020-01-10 RX ADMIN — KETOROLAC TROMETHAMINE 15 MG: 30 INJECTION, SOLUTION INTRAMUSCULAR at 17:29

## 2020-01-10 RX ADMIN — ENOXAPARIN SODIUM 40 MG: 100 INJECTION SUBCUTANEOUS at 06:24

## 2020-01-10 RX ADMIN — ONDANSETRON 4 MG: 2 INJECTION INTRAMUSCULAR; INTRAVENOUS at 04:23

## 2020-01-10 RX ADMIN — FUROSEMIDE 40 MG: 20 TABLET ORAL at 06:21

## 2020-01-10 SDOH — HEALTH STABILITY: MENTAL HEALTH: HOW OFTEN DO YOU HAVE 6 OR MORE DRINKS ON ONE OCCASION?: DAILY OR ALMOST DAILY

## 2020-01-10 SDOH — ECONOMIC STABILITY: FOOD INSECURITY: WITHIN THE PAST 12 MONTHS, YOU WORRIED THAT YOUR FOOD WOULD RUN OUT BEFORE YOU GOT MONEY TO BUY MORE.: NEVER TRUE

## 2020-01-10 SDOH — ECONOMIC STABILITY: FOOD INSECURITY: WITHIN THE PAST 12 MONTHS, THE FOOD YOU BOUGHT JUST DIDN'T LAST AND YOU DIDN'T HAVE MONEY TO GET MORE.: NEVER TRUE

## 2020-01-10 SDOH — ECONOMIC STABILITY: TRANSPORTATION INSECURITY
IN THE PAST 12 MONTHS, HAS LACK OF TRANSPORTATION KEPT YOU FROM MEETINGS, WORK, OR FROM GETTING THINGS NEEDED FOR DAILY LIVING?: NO

## 2020-01-10 SDOH — HEALTH STABILITY: MENTAL HEALTH: HOW MANY STANDARD DRINKS CONTAINING ALCOHOL DO YOU HAVE ON A TYPICAL DAY?: 1 OR 2

## 2020-01-10 SDOH — ECONOMIC STABILITY: TRANSPORTATION INSECURITY
IN THE PAST 12 MONTHS, HAS THE LACK OF TRANSPORTATION KEPT YOU FROM MEDICAL APPOINTMENTS OR FROM GETTING MEDICATIONS?: NO

## 2020-01-10 ASSESSMENT — COGNITIVE AND FUNCTIONAL STATUS - GENERAL
DAILY ACTIVITIY SCORE: 16
SUGGESTED CMS G CODE MODIFIER DAILY ACTIVITY: CK
TURNING FROM BACK TO SIDE WHILE IN FLAT BAD: A LITTLE
DRESSING REGULAR LOWER BODY CLOTHING: A LITTLE
DRESSING REGULAR LOWER BODY CLOTHING: A LITTLE
DRESSING REGULAR UPPER BODY CLOTHING: A LITTLE
MOBILITY SCORE: 17
SUGGESTED CMS G CODE MODIFIER DAILY ACTIVITY: CI
TOILETING: A LOT
MOVING TO AND FROM BED TO CHAIR: A LITTLE
SUGGESTED CMS G CODE MODIFIER MOBILITY: CL
HELP NEEDED FOR BATHING: A LOT
WALKING IN HOSPITAL ROOM: A LOT
EATING MEALS: A LITTLE
MOVING FROM LYING ON BACK TO SITTING ON SIDE OF FLAT BED: UNABLE
PERSONAL GROOMING: A LITTLE
CLIMB 3 TO 5 STEPS WITH RAILING: A LITTLE
CLIMB 3 TO 5 STEPS WITH RAILING: A LOT
STANDING UP FROM CHAIR USING ARMS: A LITTLE
DAILY ACTIVITIY SCORE: 23
MOVING TO AND FROM BED TO CHAIR: A LITTLE
STANDING UP FROM CHAIR USING ARMS: A LOT
SUGGESTED CMS G CODE MODIFIER MOBILITY: CK
MOVING FROM LYING ON BACK TO SITTING ON SIDE OF FLAT BED: A LOT
WALKING IN HOSPITAL ROOM: A LITTLE
MOBILITY SCORE: 14

## 2020-01-10 ASSESSMENT — GAIT ASSESSMENTS
GAIT LEVEL OF ASSIST: MINIMAL ASSIST
ASSISTIVE DEVICE: FRONT WHEEL WALKER
DEVIATION: OTHER (COMMENT)
DISTANCE (FEET): 60

## 2020-01-10 ASSESSMENT — LIFESTYLE VARIABLES
HAVE PEOPLE ANNOYED YOU BY CRITICIZING YOUR DRINKING: NO
HOW MANY TIMES IN THE PAST YEAR HAVE YOU HAD 5 OR MORE DRINKS IN A DAY: 0
TOTAL SCORE: 0
ALCOHOL_USE: NO
AVERAGE NUMBER OF DAYS PER WEEK YOU HAVE A DRINK CONTAINING ALCOHOL: 7
EVER HAD A DRINK FIRST THING IN THE MORNING TO STEADY YOUR NERVES TO GET RID OF A HANGOVER: NO
ON A TYPICAL DAY WHEN YOU DRINK ALCOHOL HOW MANY DRINKS DO YOU HAVE: 1
TOTAL SCORE: 0
CONSUMPTION TOTAL: NEGATIVE
HAVE YOU EVER FELT YOU SHOULD CUT DOWN ON YOUR DRINKING: NO
DOES PATIENT WANT TO STOP DRINKING: NO
EVER_SMOKED: YES
EVER FELT BAD OR GUILTY ABOUT YOUR DRINKING: NO
TOTAL SCORE: 0

## 2020-01-10 ASSESSMENT — PATIENT HEALTH QUESTIONNAIRE - PHQ9
SUM OF ALL RESPONSES TO PHQ9 QUESTIONS 1 AND 2: 0
1. LITTLE INTEREST OR PLEASURE IN DOING THINGS: NOT AT ALL
2. FEELING DOWN, DEPRESSED, IRRITABLE, OR HOPELESS: NOT AT ALL

## 2020-01-10 ASSESSMENT — ENCOUNTER SYMPTOMS
BACK PAIN: 1
RESPIRATORY NEGATIVE: 1
EYES NEGATIVE: 1
NEUROLOGICAL NEGATIVE: 1
PSYCHIATRIC NEGATIVE: 1
CONSTITUTIONAL NEGATIVE: 1
GASTROINTESTINAL NEGATIVE: 1
CARDIOVASCULAR NEGATIVE: 1
FALLS: 1

## 2020-01-10 ASSESSMENT — ACTIVITIES OF DAILY LIVING (ADL): TOILETING: INDEPENDENT

## 2020-01-10 ASSESSMENT — COPD QUESTIONNAIRES
DURING THE PAST 4 WEEKS HOW MUCH DID YOU FEEL SHORT OF BREATH: NONE/LITTLE OF THE TIME
DO YOU EVER COUGH UP ANY MUCUS OR PHLEGM?: NO/ONLY WITH OCCASIONAL COLDS OR INFECTIONS
HAVE YOU SMOKED AT LEAST 100 CIGARETTES IN YOUR ENTIRE LIFE: NO/DON'T KNOW
IN THE PAST 12 MONTHS DO YOU DO LESS THAN YOU USED TO BECAUSE OF YOUR BREATHING PROBLEMS: DISAGREE/UNSURE
COPD SCREENING SCORE: 2

## 2020-01-10 NOTE — ED NOTES
Lab brianna blood and sent at 0000. Awaiting results. Discussed POC with Pt. Pt verbalizes understanding. Pt denies any needs at this time. Pt's call light is within reach and bed is in low locked position.

## 2020-01-10 NOTE — PROGRESS NOTES
Spiritual Care Note    Patient's Name: Elizabeth Celis   MRN: [id number]    YOB: 1935   Age and Gender: 84 y.o. female   Service Area: Neuro   Room (and Bed): [place] [address]   Ethnicity or Nationality: White   Primary Language: English   Oriental orthodox/Spiritual preference: Jewish   Place of Residence: [facility]   Family/Friends/Others Present: No   Clinical Team Present: No   Medical Diagnosis(-es)/Procedure(s): T-5000 GLF   Code Status: Full Code    Date of Admission: 1/9/2020   Length of Stay: 0 days        Spiritual Care Provider Information    Name of Spiritual Care Provider:  Leona Smiht  Title of Spiritual Care Provider:     Phone Number:     [phone]  E-mail:      [email or web address]  Total time :      10 minutes    Spiritual Screen Results    Gen Nursing    Is your spiritual health or inner well-being important to you as you cope with your medical condition?: Yes  Would you like to receive a visit from our Spiritual Care team or your own Presybeterian or spiritual leader?: Yes  Was spiritual care education provided to the patient?: Declined  Sources of Hope/Eboni: Family, Nature, Education, Companionship     Palliative Care    Was spiritual care education provided to the patient?: Declined      Encounter/Request Information    Visited With:      Patient  Nature of the Visit:     Initial, On shift  Continue Visiting:     Yes  General Visit:      Yes  Referral From/ Origin of Request:   [facility] nursing      Spiritual Assessment     Observations/Symptoms:    Accepting, Pain  Interacton/Conversation:    PT stated that she is a member of the [facility]       Religion and that her  would be visiting later today.  Assessment:      Need     Intended Effects: Build Relationship of Care and Support    Plan: Visit Upon Request    Notes:    Thank you for allowing Spiritual Care to support this patient and family. Contact [phone] for further assistance, with changes in patient's status, or  with any questions/concerns.

## 2020-01-10 NOTE — PROGRESS NOTES
Report received.  Assumed Care.  Pt in bed, T427 bed 1. AOx4, responds appropriately. Pt medicated per MAR for pain.  Denies SOB.  Plan of care discussed.  Explained importance of calling before getting OOB and pt verbalizes understanding.  Reviewed POC, pt oriented to room, call light and belongings within reach, treaded slipper socks on, bed alarm on, bed in lowest position.  Will monitor frequently.    2 Rn skin check complete with Sharad LAUREN  Generalized fragile skin  No break in skin integrity at this time, all bony prominences assessed

## 2020-01-10 NOTE — PROGRESS NOTES
Pt is A&O 4  Pain difficult to control on current regimen, pt medicated with PO oxycodone and IV dilaudid per MAR  + nausea, scant emesis.  Pt medicated with zofran per MAR  Currently ordered regular diet  + Urine Void   + Flatus  - BM Void  Pt currently refusing to get OOB at this time d/t pain   Bed alarm on, pt high fall risk per manny conley  Reviewed plan of care with patient, bed in lowest position and locked, pt resting comfortably now, call light within reach, all needs met at this time

## 2020-01-10 NOTE — PROGRESS NOTES
Admitted with intractable back pain after fall.  CT scan showed possible left L1 transverse process fracture.  Also complaining of posterior hip pain in the left sciatic area.  Start lidocaine patches, continue oxycodone, trial of Decadron and Neurontin. PT and OT evaluations.

## 2020-01-10 NOTE — CARE PLAN
Problem: Safety  Goal: Will remain free from falls  Outcome: PROGRESSING AS EXPECTED  Note:   Pt educated not to get OOB without staff present.  High fall risk per manny conley, bed alarm in place, all appropriate interventions in place     Problem: Pain Management  Goal: Pain level will decrease to patient's comfort goal  Outcome: PROGRESSING SLOWER THAN EXPECTED  Note:   Pain is difficult to control at this time.  Pt medicated per MAR with oxycodone and IV dilaudid

## 2020-01-10 NOTE — PROGRESS NOTES
Assumed care of patient  A&Ox4  C/o pain in left lower back   Medicated per MAR  Some nausea   Medicated per MAR  Sat patient up to eat lunch  Patient verbalized understanding to call before getting out of bed  +voiding  +flatus

## 2020-01-10 NOTE — THERAPY
"Occupational Therapy Evaluation completed.   Functional Status:  Min a with ADLs and txfs  Plan of Care: Will benefit from Occupational Therapy 3 times per week  Discharge Recommendations:  Equipment: Will Continue to Assess for Equipment Needs. Post-acute therapy Discharge to home with home health for additional skilled therapy services.    See \"Rehab Therapy-Acute\" Patient Summary Report for complete documentation.    "

## 2020-01-10 NOTE — PROGRESS NOTES
Bedside report received. Assessment completed.  Pt is A&O x4. Pt on room air.   Pain 8/10 Medicating PRN per MAR  Pt has some nausea, declining interventions at this time   - numbness, - tingling.  Pt has fragile skin, skin tear on L FA with dressing in place.   Last BM PTA. +flatus,   +void.  Regular diet. Tolerates well.   Pt up 1-2x assist. Pt does not tolerate well.   Call light within reach. All needs met at this time. Fall Precautions and hourly rounding in place.

## 2020-01-10 NOTE — ASSESSMENT & PLAN NOTE
Synthroid.  1/22 TSH 7.770.  Constipated, generally weak.  Will increase dose of levothyroxine from 100-112

## 2020-01-10 NOTE — H&P
Hospital Medicine History & Physical Note    Date of Service  1/10/2020    Primary Care Physician  Ramone Iraheta M.D.    Consultants  None    Code Status  Full code     Chief Complaint  Low back pain    History of Presenting Illness  84 y.o. female with history of rheumatoid arthritis which is controlled on current medication regimen, hypothyroidism on replacement therapy, and depression which is controlled, was in her usual state of health until the evening prior to admission.  She was attempting to sit on a chair, when the chair rolled out from underneath her, and she landed on the floor.  She subsequently developed acute sharp low back pain without radiation, which is made worse with any attempt at movement, and which kept her from walking.  She presented emergency department for further evaluation.  She denies headache, vision changes, chest pain or shortness of breath, abdominal pain, nausea vomiting, diarrhea or constipation.  No other complaints.    Review of Systems  Review of Systems   Constitutional: Negative.    HENT: Negative.    Eyes: Negative.    Respiratory: Negative.    Cardiovascular: Negative.    Gastrointestinal: Negative.    Genitourinary: Negative.    Musculoskeletal: Positive for back pain and falls.   Skin: Negative.    Neurological: Negative.    Endo/Heme/Allergies: Negative.    Psychiatric/Behavioral: Negative.        Past Medical History   has a past medical history of Anesthesia, C. difficile diarrhea (2/16), C. difficile diarrhea (3/2016), Cancer ([facility]) (1947), Chronic back pain, Dense breast tissue on mammogram (7/25/2019), Depression, Elbow fracture, left, Heart burn, History of anemia, Hypothyroid, MRSA (methicillin resistant Staphylococcus aureus), MRSA (methicillin resistant Staphylococcus aureus) (2012), Osteoporosis (3/27/2019), Pain, Rheumatoid arthritis ([facility]) (9/26/2014), Stroke ([facility]) (1990's?), and Urinary incontinence.    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     Family History  family history includes Anesthesia in her paternal grandfather; Non-contributory in her father and mother.     Social History   reports that she quit smoking about 35 years ago. Her smoking use included cigarettes. She has a 20.00 pack-year smoking history. She has never used smokeless tobacco. She reports current alcohol use. She reports that she does not use drugs.    Allergies  Allergies   Allergen Reactions   • Azithromycin Unspecified     Matti Johnsons syndrome   • Cefuroxime Itching and Vomiting     They gave me C-Diff   • Ciprofloxacin Itching and Vomiting     They gave me C-Diff   • Clindamycin Itching and Vomiting     They gave me c-diff   • Daptomycin      Matti colleen syndrome     • Erythromycin Unspecified     Matti Johnsons syndrome   • Nitrofurantoin Vomiting and Nausea   • Rifampin      Matti colleen syndrome   • Sulfa Drugs Swelling   • Codeine Itching   • Flagyl [Metronidazole] Vomiting and Nausea   • Macrodantin  [Nitrofurantoin Macrocrystal]      Other reaction(s): Decreased Blood Pressure   • Morphine Itching   • Premarin Unspecified     burning       Medications  Prior to Admission Medications   Prescriptions Last Dose Informant Patient Reported? Taking?   CALCIUM-VITAMIN D PO   Yes No   Sig: Take 500 mg by mouth 2 times a day.   Potassium Chloride Catrachita ER (KLOR-CON M10 PO)   Yes No   Sig: Take 10 mEq by mouth every day.   SYNTHROID 100 MCG Tab   No No   Sig: Take 1 Tab by mouth Every morning on an empty  stomach.   citalopram (CELEXA) 20 MG Tab   No No   Sig: Take 1 Tab by mouth every bedtime.   cyclosporin (RESTASIS) 0.05 % ophthalmic emulsion  Patient Yes No   Sig: Place 1 Drop in both eyes 2 times a day.   denosumab (PROLIA) 60 MG/ML Solution  Patient Yes No   Sig: Inject 60 mg as instructed every 6 months. Pt last dose was on 10/2018   estradiol (ESTRACE VAGINAL) 0.1 MG/GM vaginal cream   No No   Sig: Apply topically around urethra in vaginal region   furosemide (LASIX) 40 MG Tab   No No   Sig: Take 1 Tab by mouth every day.   hydroxychloroquine (PLAQUENIL) 200 MG Tab   No No   Sig: Take 1 Tab by mouth every day.   ipratropium (ATROVENT) 0.06 % Solution   Yes No   Sig: Spray 1 Spray in nose 2 Times a Day.   spironolactone (ALDACTONE) 25 MG Tab   No No   Sig: Take 0.5 Tabs by mouth every day.   traZODone (DESYREL) 50 MG Tab   No No   Sig: Take 0.5 Tabs by mouth every evening for 180 days.      Facility-Administered Medications: None       Physical Exam  Pulse:  [70-83] 80  BP: (116-140)/(56-68) 123/61  SpO2:  [91 %-100 %] 99 %    Physical Exam  Vitals signs and nursing note reviewed.   Constitutional:       Appearance: Normal appearance.   HENT:      Head: Normocephalic and atraumatic.      Nose: Nose normal.      Mouth/Throat:      Mouth: Mucous membranes are moist.      Pharynx: Oropharynx is clear.   Eyes:      Extraocular Movements: Extraocular movements intact.      Conjunctiva/sclera: Conjunctivae normal.      Pupils: Pupils are equal, round, and reactive to light.   Neck:      Musculoskeletal: Normal range of motion and neck supple.   Cardiovascular:      Rate and Rhythm: Normal rate and regular rhythm.      Pulses: Normal pulses.      Heart sounds: Normal heart sounds. No murmur. No friction rub. No gallop.    Pulmonary:      Effort: Pulmonary effort is normal. No respiratory distress.      Breath sounds: Normal breath sounds. No wheezing.   Abdominal:      General: Abdomen is flat. Bowel sounds are  normal. There is no distension.      Palpations: Abdomen is soft.      Tenderness: There is no tenderness.   Musculoskeletal: Normal range of motion.   Skin:     General: Skin is warm and dry.   Neurological:      General: No focal deficit present.      Mental Status: She is alert and oriented to person, place, and time.      Cranial Nerves: No cranial nerve deficit.      Motor: No weakness.   Psychiatric:         Mood and Affect: Mood normal.         Behavior: Behavior normal.         Laboratory:  Recent Labs     01/10/20  0022   WBC 12.6*   RBC 3.93*   HEMOGLOBIN 13.1   HEMATOCRIT 38.0   MCV 96.7   MCH 33.3*   MCHC 34.5   RDW 48.1   PLATELETCT 285   MPV 8.9*     Recent Labs     01/10/20  0022   SODIUM 139   POTASSIUM 3.3*   CHLORIDE 104   CO2 17*   GLUCOSE 80   BUN 27*   CREATININE 1.17   CALCIUM 9.2     Recent Labs     01/10/20  0022   GLUCOSE 80         No results for input(s): NTPROBNP in the last 72 hours.      No results for input(s): TROPONINT in the last 72 hours.    Urinalysis:    No results found     Imaging:  CT-PELVIS W/O PLUS RECONS   Final Result         1.  No acute abnormality.   2.  Atherosclerosis      CT-LSPINE W/O PLUS RECONS   Final Result         1.  Left L1 transverse process fracture, may be chronic, otherwise no acute traumatic lumbar spine injury identified   2.  Anterolisthesis L2 on L3 and L3 on L4, appears most likely secondary to severe facet arthrosis.   3.  Chronic appearing L1 vertebral plana with 8.3 mm retropulsion and large central Schmorl's node   4.  Atherosclerosis            Assessment/Plan:  I anticipate this patient is appropriate for observation status at this time.    * Acute low back pain  Assessment & Plan  Status post accidental ground level fall.  No radiographic evidence of fracture, but with inability to ambulate due to pain.  Plan for pain management, including scheduled toradol, PT and OT evaluations.     Hypothyroidism- (present on admission)  Assessment &  Plan  On replacement therapy which will be continued.      Depression- (present on admission)  Assessment & Plan  Stable with current medication regimen.  Monitor.      Rheumatoid arthritis (HCC)- (present on admission)  Assessment & Plan  Stable on current medication regimen.  Monitor.         VTE prophylaxis: SCD, lovenox

## 2020-01-10 NOTE — ED PROVIDER NOTES
ED Provider Note    CHIEF COMPLAINT  Chief Complaint   Patient presents with   • T-5000 GLF       HPI  Elizabeth Natalia Celis is a 84 y.o. female who presents to the emergency department chief complaint of low back pain after a ground-level fall.  The patient does have a history of arthritis chronic back pain and states that this evening she tried to sit back in her chair and the chair rolled out from under her and she landed directly on her tailbone and lower back.  She denies hitting her head.  She has severe pain currently an 8 out of 10 and worse with movement she denies any new weakness numbness or tingling she states she is been unable to walk secondary to pain.  She was given Versed and fentanyl as well as Zofran from EMS prior to arrival.  She states she was in her normal state of health until this event    REVIEW OF SYSTEMS  Positives as above. Pertinent negatives include nausea vomiting weakness numbness tingling easy bleeding or bruising head trauma loss consciousness chest pain shortness of breath  All other review of systems are negative    PAST MEDICAL HISTORY   has a past medical history of Anesthesia, C. difficile diarrhea (), C. difficile diarrhea (3/2016), Cancer ([facility]) (194), Chronic back pain, Dense breast tissue on mammogram (2019), Depression, Elbow fracture, left, Heart burn, History of anemia, Hypothyroid, MRSA (methicillin resistant Staphylococcus aureus), MRSA (methicillin resistant Staphylococcus aureus) (), Osteoporosis (3/27/2019), Pain, Rheumatoid arthritis ([facility]) (2014), Stroke ([facility]) (?), and Urinary incontinence.    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    CURRENT MEDICATIONS  Home Medications    **Home medications have not yet been reviewed for this encounter**         ALLERGIES  Allergies   Allergen Reactions   • Azithromycin Unspecified     Matti Johnsons syndrome   • Cefuroxime Itching and Vomiting     They gave me C-Diff   • Ciprofloxacin Itching and Vomiting     They gave me C-Diff   • Clindamycin Itching and Vomiting     They gave me c-diff   • Daptomycin      Matti colleen syndrome     • Erythromycin Unspecified     Matti Johnsons syndrome   • Nitrofurantoin Vomiting and Nausea   • Rifampin      Matti colleen syndrome   • Sulfa Drugs Swelling   • Codeine Itching   • Flagyl [Metronidazole] Vomiting and Nausea   • Macrodantin  [Nitrofurantoin Macrocrystal]      Other reaction(s): Decreased Blood Pressure   • Morphine Itching   • Premarin Unspecified     burning       PHYSICAL EXAM  VITAL SIGNS: Pulse 70   Ht 1.651 m (5' 5\")   Wt 58.1 kg (128 lb)   SpO2 91%   BMI 21.30 kg/m²    Pulse ox interpretation: I interpret this pulse ox as normal.  Constitutional: Alert in moderate distress lying on her side  HENT: Normocephalic atraumatic, MMM  Eyes: PER, Conjunctiva normal, Non-icteric.   Neck: Normal range of motion, No tenderness, Supple, No stridor.   Cardiovascular: Regular rate and rhythm, no murmurs.   Thorax & Lungs: Normal breath sounds, No respiratory distress, No wheezing, No chest tenderness. "   Abdomen: Bowel sounds normal, Soft, No tenderness, No pulsatile masses. No peritoneal signs.  Skin: Warm, Dry, No erythema, No rash.   Back: Tenderness to the midline of the L-spine the back bilateral sacroiliac joints mild pelvic tenderness as well no step-offs no swelling  Extremities/MSK: Intact distal pulses, No edema, No tenderness, No cyanosis, no major deformities noted  Neurologic: Alert and oriented x3, No focal deficits noted.       DIFFERENTIAL DIAGNOSIS AND WORK UP PLAN    This is a 84 y.o. female who presents with mechanical ground-level fall and midline L-spine tenderness and severe pain, will evaluate for underlying fracture compression or just acute on chronic pain secondary to the fall and muscle spasm.    DIAGNOSTIC STUDIES / PROCEDURES      LABS  Pertinent Lab Findings  White blood cell count with a left shift, as well as an acidosis with an anion gap she does have an elevated BUN likely secondary to dehydration      RADIOLOGY  CT-PELVIS W/O PLUS RECONS   Final Result         1.  No acute abnormality.   2.  Atherosclerosis      CT-LSPINE W/O PLUS RECONS   Final Result         1.  Left L1 transverse process fracture, may be chronic, otherwise no acute traumatic lumbar spine injury identified   2.  Anterolisthesis L2 on L3 and L3 on L4, appears most likely secondary to severe facet arthrosis.   3.  Chronic appearing L1 vertebral plana with 8.3 mm retropulsion and large central Schmorl's node   4.  Atherosclerosis        The radiologist's interpretation of all radiological studies have been reviewed by me.      COURSE & MEDICAL DECISION MAKING  Pertinent Labs & Imaging studies reviewed. (See chart for details)    1:02 AM  I reassessed patient the bedside she was feeling better after the first dose of Dilaudid states the pain is coming back, she feels like maybe it is more muscular in nature however she still unable to ambulate secondary to severe pain will be given another dose of pain management  and admitted for further evaluation.  Patient states she has had a prior fracture in her lower back before    1:16 AM  Spoke w Dr Heck for hospitalization and he has accepted the patient.  CT spine possible single transverse process fracture which not be surgically evaluated and is most likely old he understands the plan for hospitalization for pain control and physical therapy    DISPOSITION:  Patient will be evaluated for hospitalization by Dr Heck in guarded condition.      FINAL IMPRESSION  1. Fall  2. Low back pain  3. Facet arthroses         Electronically signed by: Michell Antoine, 1/9/2020 10:49 PM    This dictation has been created using voice recognition software and/or scribes. The accuracy of the dictation is limited by the abilities of the software and the expertise of the scribes. I expect there may be some errors of grammar and possibly content. I made every attempt to manually correct the errors within my dictation. However, errors related to voice recognition software and/or scribes may still exist and should be interpreted within the appropriate context.

## 2020-01-10 NOTE — ED NOTES
ERP to bedside.    Pt to be medicated and Scanned. Discussed POC with Pt. Pt verbalizes understanding. Pt denies any needs at this time. Pt's call light is within reach and bed is in low locked position.

## 2020-01-10 NOTE — ED TRIAGE NOTES
"Pt arrived to room via EMS. EMS reports \"Pt had mechanical fall in her house today and fell right on her tailbone. Her vitals are great.\" Pt reports \"my back hurts.\" EMS gave 150 mcg of fentanyl, 1.5 mg versed, and 4 mg of Zofran en route. Pt placed in gown. Pt attached to monitors. Primary assessment done.     "

## 2020-01-10 NOTE — ASSESSMENT & PLAN NOTE
Causing intractable back pain  L1 transverse process fracture on CT.    MRI showed severe lumbar stenosis.  S/p surgical repair by neurosurgery on 1/16  Pain management as per neurosurgery   PT and OT following    Acute rehab consult pending  Taper down Decadron

## 2020-01-11 ENCOUNTER — HOME HEALTH ADMISSION (OUTPATIENT)
Dept: HOME HEALTH SERVICES | Facility: HOME HEALTHCARE | Age: 85
End: 2020-01-11
Payer: MEDICARE

## 2020-01-11 LAB
ANION GAP SERPL CALC-SCNC: 10 MMOL/L (ref 0–11.9)
BASOPHILS # BLD AUTO: 0.2 % (ref 0–1.8)
BASOPHILS # BLD: 0.01 K/UL (ref 0–0.12)
BUN SERPL-MCNC: 34 MG/DL (ref 8–22)
CALCIUM SERPL-MCNC: 8 MG/DL (ref 8.5–10.5)
CHLORIDE SERPL-SCNC: 105 MMOL/L (ref 96–112)
CO2 SERPL-SCNC: 19 MMOL/L (ref 20–33)
CREAT SERPL-MCNC: 1.58 MG/DL (ref 0.5–1.4)
EOSINOPHIL # BLD AUTO: 0 K/UL (ref 0–0.51)
EOSINOPHIL NFR BLD: 0 % (ref 0–6.9)
ERYTHROCYTE [DISTWIDTH] IN BLOOD BY AUTOMATED COUNT: 51.2 FL (ref 35.9–50)
GLUCOSE SERPL-MCNC: 130 MG/DL (ref 65–99)
HCT VFR BLD AUTO: 34.5 % (ref 37–47)
HGB BLD-MCNC: 11.1 G/DL (ref 12–16)
IMM GRANULOCYTES # BLD AUTO: 0.03 K/UL (ref 0–0.11)
IMM GRANULOCYTES NFR BLD AUTO: 0.5 % (ref 0–0.9)
LYMPHOCYTES # BLD AUTO: 0.49 K/UL (ref 1–4.8)
LYMPHOCYTES NFR BLD: 8.9 % (ref 22–41)
MAGNESIUM SERPL-MCNC: 1.9 MG/DL (ref 1.5–2.5)
MCH RBC QN AUTO: 32.5 PG (ref 27–33)
MCHC RBC AUTO-ENTMCNC: 32.2 G/DL (ref 33.6–35)
MCV RBC AUTO: 100.9 FL (ref 81.4–97.8)
MONOCYTES # BLD AUTO: 0.19 K/UL (ref 0–0.85)
MONOCYTES NFR BLD AUTO: 3.4 % (ref 0–13.4)
NEUTROPHILS # BLD AUTO: 4.8 K/UL (ref 2–7.15)
NEUTROPHILS NFR BLD: 87 % (ref 44–72)
NRBC # BLD AUTO: 0 K/UL
NRBC BLD-RTO: 0 /100 WBC
PHOSPHATE SERPL-MCNC: 4.4 MG/DL (ref 2.5–4.5)
PLATELET # BLD AUTO: 219 K/UL (ref 164–446)
PMV BLD AUTO: 9 FL (ref 9–12.9)
POTASSIUM SERPL-SCNC: 4.8 MMOL/L (ref 3.6–5.5)
RBC # BLD AUTO: 3.42 M/UL (ref 4.2–5.4)
SODIUM SERPL-SCNC: 134 MMOL/L (ref 135–145)
WBC # BLD AUTO: 5.5 K/UL (ref 4.8–10.8)

## 2020-01-11 PROCEDURE — 99226 PR SUBSEQUENT OBSERVATION CARE,LEVEL III: CPT | Performed by: FAMILY MEDICINE

## 2020-01-11 PROCEDURE — 84100 ASSAY OF PHOSPHORUS: CPT

## 2020-01-11 PROCEDURE — 36415 COLL VENOUS BLD VENIPUNCTURE: CPT

## 2020-01-11 PROCEDURE — 80048 BASIC METABOLIC PNL TOTAL CA: CPT

## 2020-01-11 PROCEDURE — 96376 TX/PRO/DX INJ SAME DRUG ADON: CPT

## 2020-01-11 PROCEDURE — 83735 ASSAY OF MAGNESIUM: CPT

## 2020-01-11 PROCEDURE — 700101 HCHG RX REV CODE 250: Performed by: FAMILY MEDICINE

## 2020-01-11 PROCEDURE — 700111 HCHG RX REV CODE 636 W/ 250 OVERRIDE (IP): Performed by: HOSPITALIST

## 2020-01-11 PROCEDURE — 700102 HCHG RX REV CODE 250 W/ 637 OVERRIDE(OP): Performed by: FAMILY MEDICINE

## 2020-01-11 PROCEDURE — 700102 HCHG RX REV CODE 250 W/ 637 OVERRIDE(OP): Performed by: HOSPITALIST

## 2020-01-11 PROCEDURE — A9270 NON-COVERED ITEM OR SERVICE: HCPCS | Performed by: HOSPITALIST

## 2020-01-11 PROCEDURE — 96372 THER/PROPH/DIAG INJ SC/IM: CPT

## 2020-01-11 PROCEDURE — 85025 COMPLETE CBC W/AUTO DIFF WBC: CPT

## 2020-01-11 PROCEDURE — A9270 NON-COVERED ITEM OR SERVICE: HCPCS | Performed by: FAMILY MEDICINE

## 2020-01-11 PROCEDURE — G0378 HOSPITAL OBSERVATION PER HR: HCPCS

## 2020-01-11 RX ADMIN — GABAPENTIN 100 MG: 100 CAPSULE ORAL at 14:29

## 2020-01-11 RX ADMIN — KETOROLAC TROMETHAMINE 15 MG: 30 INJECTION, SOLUTION INTRAMUSCULAR at 00:16

## 2020-01-11 RX ADMIN — SENNOSIDES AND DOCUSATE SODIUM 2 TABLET: 8.6; 5 TABLET ORAL at 05:50

## 2020-01-11 RX ADMIN — LEVOTHYROXINE SODIUM 100 MCG: 50 TABLET ORAL at 05:50

## 2020-01-11 RX ADMIN — GABAPENTIN 100 MG: 100 CAPSULE ORAL at 05:50

## 2020-01-11 RX ADMIN — DEXAMETHASONE 4 MG: 4 TABLET ORAL at 14:29

## 2020-01-11 RX ADMIN — SPIRONOLACTONE 12.5 MG: 25 TABLET ORAL at 05:50

## 2020-01-11 RX ADMIN — HYDROXYCHLOROQUINE SULFATE 200 MG: 200 TABLET ORAL at 05:50

## 2020-01-11 RX ADMIN — Medication 400 MG: at 09:08

## 2020-01-11 RX ADMIN — GABAPENTIN 100 MG: 100 CAPSULE ORAL at 17:32

## 2020-01-11 RX ADMIN — ACETAMINOPHEN 650 MG: 325 TABLET, FILM COATED ORAL at 21:14

## 2020-01-11 RX ADMIN — DEXAMETHASONE 4 MG: 4 TABLET ORAL at 21:14

## 2020-01-11 RX ADMIN — CITALOPRAM HYDROBROMIDE 20 MG: 20 TABLET ORAL at 21:14

## 2020-01-11 RX ADMIN — DEXAMETHASONE 4 MG: 4 TABLET ORAL at 05:50

## 2020-01-11 RX ADMIN — ENOXAPARIN SODIUM 40 MG: 100 INJECTION SUBCUTANEOUS at 05:55

## 2020-01-11 RX ADMIN — OMEPRAZOLE 20 MG: 20 CAPSULE, DELAYED RELEASE ORAL at 17:33

## 2020-01-11 RX ADMIN — KETOROLAC TROMETHAMINE 15 MG: 30 INJECTION, SOLUTION INTRAMUSCULAR at 05:53

## 2020-01-11 RX ADMIN — TRAZODONE HYDROCHLORIDE 25 MG: 50 TABLET ORAL at 21:13

## 2020-01-11 RX ADMIN — Medication 400 MG: at 17:33

## 2020-01-11 RX ADMIN — LIDOCAINE 2 PATCH: 50 PATCH CUTANEOUS at 14:28

## 2020-01-11 RX ADMIN — SENNOSIDES AND DOCUSATE SODIUM 2 TABLET: 8.6; 5 TABLET ORAL at 17:33

## 2020-01-11 RX ADMIN — FUROSEMIDE 40 MG: 20 TABLET ORAL at 05:50

## 2020-01-11 ASSESSMENT — ENCOUNTER SYMPTOMS
SPEECH CHANGE: 0
WEAKNESS: 1
FEVER: 0
NAUSEA: 0
FOCAL WEAKNESS: 0
DIAPHORESIS: 0
VOMITING: 0
WHEEZING: 0
PALPITATIONS: 0
NERVOUS/ANXIOUS: 1
CHILLS: 0
COUGH: 0
DIZZINESS: 0
SHORTNESS OF BREATH: 0
SORE THROAT: 0
FLANK PAIN: 0
HEADACHES: 0
ABDOMINAL PAIN: 0
DIARRHEA: 0
SENSORY CHANGE: 0
BACK PAIN: 1
HEARTBURN: 0
BLURRED VISION: 0
NECK PAIN: 0

## 2020-01-11 NOTE — PROGRESS NOTES
Assumed care at 0700    Received report from NOC shift RN.    Reviewed recent lab results, notes, orders, and MAR  POC discussed and updated on care board  Bed is in the lowest and locked position, call light within reach      A&Ox4  RA  C/o pain in lower left back   Medicated per MAR  Patient ambulating stand by assist with FWW  Denies nausea   +voiding  Tolerating regular   +flatus   +bs  K Pad is providing comfort

## 2020-01-11 NOTE — PROGRESS NOTES
[facility] Medicine Daily Progress Note    Date of Service  1/11/2020    Chief Complaint  84 y.o. female admitted 1/9/2020 with intractable back pain.    Hospital Course  Admitted with intractable back pain.    Interval Problem Update  Back pain -better controlled today  CKD - crea went up to 1.5  Low Magnesium    Consultants/Specialty  None    Code Status  Full    Disposition  Home health    Review of Systems  Review of Systems   Constitutional: Negative for chills, diaphoresis, fever and malaise/fatigue.   HENT: Negative for hearing loss and sore throat.    Eyes: Negative for blurred vision.   Respiratory: Negative for cough, shortness of breath and wheezing.    Cardiovascular: Negative for chest pain, palpitations and leg swelling.   Gastrointestinal: Negative for abdominal pain, diarrhea, heartburn, nausea and vomiting.   Genitourinary: Negative for dysuria, flank pain and hematuria.   Musculoskeletal: Positive for back pain. Negative for neck pain.   Skin: Negative for rash.   Neurological: Positive for weakness. Negative for dizziness, sensory change, speech change, focal weakness and headaches.   Psychiatric/Behavioral: The patient is nervous/anxious.         Physical Exam  Temp:  [36.5 °C (97.7 °F)-37.2 °C (98.9 °F)] 36.5 °C (97.7 °F)  Pulse:  [70-71] 71  Resp:  [16-17] 17  BP: ()/(49-68) 118/68  SpO2:  [99 %-100 %] 100 %    Physical Exam  HENT:      Head: Normocephalic and atraumatic.      Nose: No congestion.      Mouth/Throat:      Mouth: Mucous membranes are moist.   Eyes:      Conjunctiva/sclera: Conjunctivae normal.      Pupils: Pupils are equal, round, and reactive to light.   Neck:      Musculoskeletal: No muscular tenderness.   Cardiovascular:      Rate and Rhythm: Normal rate and regular rhythm.   Pulmonary:      Effort: Pulmonary effort is normal.      Breath sounds: Normal breath sounds.   Abdominal:      General: Bowel sounds are normal. There is no distension.      Palpations: Abdomen is  soft.      Tenderness: There is no tenderness. There is no guarding or rebound.   Musculoskeletal:      Right lower leg: No edema.      Left lower leg: No edema.   Lymphadenopathy:      Cervical: No cervical adenopathy.   Skin:     General: Skin is warm and dry.   Neurological:      General: No focal deficit present.      Mental Status: She is alert and oriented to person, place, and time.      Cranial Nerves: No cranial nerve deficit.         Fluids    Intake/Output Summary (Last 24 hours) at 1/11/2020 1352  Last data filed at 1/11/2020 0920  Gross per 24 hour   Intake 480 ml   Output --   Net 480 ml       Laboratory  Recent Labs     01/10/20  0022 01/11/20  0434   WBC 12.6* 5.5   RBC 3.93* 3.42*   HEMOGLOBIN 13.1 11.1*   HEMATOCRIT 38.0 34.5*   MCV 96.7 100.9*   MCH 33.3* 32.5   MCHC 34.5 32.2*   RDW 48.1 51.2*   PLATELETCT 285 219   MPV 8.9* 9.0     Recent Labs     01/10/20  0022 01/11/20  0434   SODIUM 139 134*   POTASSIUM 3.3* 4.8   CHLORIDE 104 105   CO2 17* 19*   GLUCOSE 80 130*   BUN 27* 34*   CREATININE 1.17 1.58*   CALCIUM 9.2 8.0*                   Imaging  CT-PELVIS W/O PLUS RECONS   Final Result         1.  No acute abnormality.   2.  Atherosclerosis      CT-LSPINE W/O PLUS RECONS   Final Result         1.  Left L1 transverse process fracture, may be chronic, otherwise no acute traumatic lumbar spine injury identified   2.  Anterolisthesis L2 on L3 and L3 on L4, appears most likely secondary to severe facet arthrosis.   3.  Chronic appearing L1 vertebral plana with 8.3 mm retropulsion and large central Schmorl's node   4.  Atherosclerosis           Assessment/Plan  * Intractable back pain- (present on admission)  Assessment & Plan  L1 transverse process fracture?  Pain control - Lidocaine patch, Oxycodone  Sciatica?  Trial of Decadron and Neurontin  PT and OT      Hypothyroidism- (present on admission)  Assessment & Plan  Synthroid, check TSH    Leg DVT (deep venous thromboembolism), chronic, right  ([facility])- (present on admission)  Assessment & Plan  Lovenox prophylaxis    Hypomagnesemia- (present on admission)  Assessment & Plan  Start oral Mg    CKD (chronic kidney disease) stage 3, GFR 30-59 ml/min ([facility])- (present on admission)  Assessment & Plan  Follow bmp  Hold Lasix and Aldactone    Hypokalemia- (present on admission)  Assessment & Plan  Follow bmp    Rheumatoid arthritis ([facility])- (present on admission)  Assessment & Plan  Plaquenil    Depression- (present on admission)  Assessment & Plan  Citalopram         VTE prophylaxis: Lovenox

## 2020-01-11 NOTE — PROGRESS NOTES
Medication reconciliation updated and complete per pt at bedside & pt home pharmacy  Allergies have been verified and updated   No oral ABX within the last 14 days  Pt home pharmacy:[facility]

## 2020-01-11 NOTE — THERAPY
"Physical Therapy Evaluation completed.   Bed Mobility:  Supine to Sit: Modified Independent  Transfers: Sit to Stand: Modified Independent  Gait: Level Of Assist: Minimal Assist with Front-Wheel Walker       Plan of Care: Will benefit from Physical Therapy 3 times per week  Discharge Recommendations: Equipment: Will Continue to Assess for Equipment Needs. Pt reports has FWW; needs confirmation; see below    Pt presents to PT with impaired balance, gait and general locomotion associated with co-morbidities. She is able to demonstrate short distance ambulation with FWW with min A/CGA. She appears limited by pain and decreased tolerance to increasing levels of exertion/activity. She also may have baseline cognitive deficits that are exacerbating her risk for falls as well (tangential and somewhat impulsive at times). She will benefit from continued acute PT while here. Anticipate that she may be capable of progression to dc to home once medically cleared, though currently would have more concerns with pain/endurance and self care needs as anticipate pt would be currently unable to effectively care for self given current pain and endurance (though would defer dc recs in this regard to OT evaluation). Will continue to visit. If capable of I self care and dc to home would recommend home PT after dc to home for formal home assessment and progression/prevention of reinjury.       See \"Rehab Therapy-Acute\" Patient Summary Report for complete documentation.     "

## 2020-01-11 NOTE — DISCHARGE PLANNING
Received Choice form at [phone]  Agency/Facility Name: [facility]  Referral sent per Choice form @ 2216

## 2020-01-11 NOTE — PROGRESS NOTES
Received report & assumed care of patient at 1900. Assessment completed. Patient is A&Ox4, up x1 with FWW. R PIV patent, SL. Patient reports pain of 6/10, medication provided per MAR. Patient is on 2L oxygen via nasal cannula,  in use. POC discussed & questions answered. Bed locked and in lowest position, bed alarm is on, call light within reach, non-skid socks in place, hourly rounding. Patient reports no further needs at this time.

## 2020-01-11 NOTE — DISCHARGE PLANNING
ATTN: Case Management  RE: Referral for Home Health    Reason for referral denial: Unable to see patient in safe and timely matter              Unfortunately, we are not able to accept this referral for the reason listed above. If further clarity is needed, our Transitional Care Specialists are available to discuss any barriers to service at [phone].      We look forward to collaborating with you in the future,  [facility]

## 2020-01-11 NOTE — DISCHARGE PLANNING
Order for home health received. Met with patient at bedside and received choice for [facility]. She also informed me that she had [facility]. E mailed PFA. Declined by [facility]. Patient informed. Patient gave consent to send to [facility].

## 2020-01-11 NOTE — CARE PLAN
Problem: Communication  Goal: The ability to communicate needs accurately and effectively will improve  Outcome: PROGRESSING AS EXPECTED  Note:   Plan of care discussed with patient, all questions answered.     Problem: Safety  Goal: Will remain free from falls  Outcome: PROGRESSING AS EXPECTED  Note:   Call light within reach, non-skid socks in place, bed locked and in lowest position, bed alarm on, hourly rounding.

## 2020-01-11 NOTE — FACE TO FACE
Face to Face Supporting Documentation - Home Health    The encounter with this patient was in whole or in part the primary reason for home health admission.    Date of encounter:   Patient:                    MRN:                       YOB: 2020  Elizabeth Celis  [id number]  1935     Home health to see patient for:  Skilled Nursing care for assessment, interventions & education    Skilled need for:  New Onset Medical Diagnosis Intractable back pain    Skilled nursing interventions to include:  Comment: PT/OT    Homebound status evidenced by:  Needs the assistance of another person in order to leave the home. Leaving home requires a considerable and taxing effort. There is a normal inability to leave the home.    Community Physician to provide follow up care: Ramone Iraheta M.D.     Optional Interventions? No      I certify the face to face encounter for this home health care referral meets the CMS requirements and the encounter/clinical assessment with the patient was, in whole, or in part, for the medical condition(s) listed above, which is the primary reason for home health care. Based on my clinical findings: the service(s) are medically necessary, support the need for home health care, and the homebound criteria are met.  I certify that this patient has had a face to face encounter by myself.  Ronald Torres M.D. - NPI: [id number]

## 2020-01-11 NOTE — DISCHARGE PLANNING
Agency/Facility Name: [facility]  Spoke To: Karen  Outcome: Patient declined, at max for SCP.

## 2020-01-11 NOTE — DISCHARGE PLANNING
Met with patient. Aware [facility] declined. Patient gave consent to send to any HH that would accept insurance. [facility] aware.

## 2020-01-12 LAB
ANION GAP SERPL CALC-SCNC: 8 MMOL/L (ref 0–11.9)
BASOPHILS # BLD AUTO: 0.1 % (ref 0–1.8)
BASOPHILS # BLD: 0.01 K/UL (ref 0–0.12)
BUN SERPL-MCNC: 45 MG/DL (ref 8–22)
CALCIUM SERPL-MCNC: 8.6 MG/DL (ref 8.5–10.5)
CHLORIDE SERPL-SCNC: 105 MMOL/L (ref 96–112)
CO2 SERPL-SCNC: 21 MMOL/L (ref 20–33)
CREAT SERPL-MCNC: 1.45 MG/DL (ref 0.5–1.4)
EOSINOPHIL # BLD AUTO: 0 K/UL (ref 0–0.51)
EOSINOPHIL NFR BLD: 0 % (ref 0–6.9)
ERYTHROCYTE [DISTWIDTH] IN BLOOD BY AUTOMATED COUNT: 50.4 FL (ref 35.9–50)
GLUCOSE SERPL-MCNC: 132 MG/DL (ref 65–99)
HCT VFR BLD AUTO: 32.8 % (ref 37–47)
HGB BLD-MCNC: 11.1 G/DL (ref 12–16)
IMM GRANULOCYTES # BLD AUTO: 0.03 K/UL (ref 0–0.11)
IMM GRANULOCYTES NFR BLD AUTO: 0.3 % (ref 0–0.9)
LYMPHOCYTES # BLD AUTO: 0.65 K/UL (ref 1–4.8)
LYMPHOCYTES NFR BLD: 6.7 % (ref 22–41)
MCH RBC QN AUTO: 33.5 PG (ref 27–33)
MCHC RBC AUTO-ENTMCNC: 33.8 G/DL (ref 33.6–35)
MCV RBC AUTO: 99.1 FL (ref 81.4–97.8)
MONOCYTES # BLD AUTO: 0.69 K/UL (ref 0–0.85)
MONOCYTES NFR BLD AUTO: 7.1 % (ref 0–13.4)
NEUTROPHILS # BLD AUTO: 8.31 K/UL (ref 2–7.15)
NEUTROPHILS NFR BLD: 85.8 % (ref 44–72)
NRBC # BLD AUTO: 0 K/UL
NRBC BLD-RTO: 0 /100 WBC
PLATELET # BLD AUTO: 239 K/UL (ref 164–446)
PMV BLD AUTO: 9 FL (ref 9–12.9)
POTASSIUM SERPL-SCNC: 4.2 MMOL/L (ref 3.6–5.5)
RBC # BLD AUTO: 3.31 M/UL (ref 4.2–5.4)
SODIUM SERPL-SCNC: 134 MMOL/L (ref 135–145)
TSH SERPL DL<=0.005 MIU/L-ACNC: 2.79 UIU/ML (ref 0.38–5.33)
WBC # BLD AUTO: 9.7 K/UL (ref 4.8–10.8)

## 2020-01-12 PROCEDURE — A9270 NON-COVERED ITEM OR SERVICE: HCPCS | Performed by: FAMILY MEDICINE

## 2020-01-12 PROCEDURE — 80048 BASIC METABOLIC PNL TOTAL CA: CPT

## 2020-01-12 PROCEDURE — G0378 HOSPITAL OBSERVATION PER HR: HCPCS

## 2020-01-12 PROCEDURE — 36415 COLL VENOUS BLD VENIPUNCTURE: CPT

## 2020-01-12 PROCEDURE — 700101 HCHG RX REV CODE 250: Performed by: FAMILY MEDICINE

## 2020-01-12 PROCEDURE — 85025 COMPLETE CBC W/AUTO DIFF WBC: CPT

## 2020-01-12 PROCEDURE — A9270 NON-COVERED ITEM OR SERVICE: HCPCS | Performed by: HOSPITALIST

## 2020-01-12 PROCEDURE — 700111 HCHG RX REV CODE 636 W/ 250 OVERRIDE (IP): Performed by: FAMILY MEDICINE

## 2020-01-12 PROCEDURE — 84443 ASSAY THYROID STIM HORMONE: CPT

## 2020-01-12 PROCEDURE — 96372 THER/PROPH/DIAG INJ SC/IM: CPT

## 2020-01-12 PROCEDURE — 99226 PR SUBSEQUENT OBSERVATION CARE,LEVEL III: CPT | Performed by: FAMILY MEDICINE

## 2020-01-12 PROCEDURE — 700102 HCHG RX REV CODE 250 W/ 637 OVERRIDE(OP): Performed by: HOSPITALIST

## 2020-01-12 PROCEDURE — 700102 HCHG RX REV CODE 250 W/ 637 OVERRIDE(OP): Performed by: FAMILY MEDICINE

## 2020-01-12 PROCEDURE — 96376 TX/PRO/DX INJ SAME DRUG ADON: CPT

## 2020-01-12 RX ORDER — DEXAMETHASONE 4 MG/1
4 TABLET ORAL EVERY 12 HOURS
Status: DISCONTINUED | OUTPATIENT
Start: 2020-01-12 | End: 2020-01-16

## 2020-01-12 RX ADMIN — HYDROXYCHLOROQUINE SULFATE 200 MG: 200 TABLET ORAL at 05:24

## 2020-01-12 RX ADMIN — ENOXAPARIN SODIUM 30 MG: 100 INJECTION SUBCUTANEOUS at 05:25

## 2020-01-12 RX ADMIN — SENNOSIDES AND DOCUSATE SODIUM 2 TABLET: 8.6; 5 TABLET ORAL at 05:24

## 2020-01-12 RX ADMIN — GABAPENTIN 100 MG: 100 CAPSULE ORAL at 17:20

## 2020-01-12 RX ADMIN — LEVOTHYROXINE SODIUM 100 MCG: 50 TABLET ORAL at 05:24

## 2020-01-12 RX ADMIN — TRAZODONE HYDROCHLORIDE 25 MG: 50 TABLET ORAL at 17:20

## 2020-01-12 RX ADMIN — LIDOCAINE 2 PATCH: 50 PATCH CUTANEOUS at 12:13

## 2020-01-12 RX ADMIN — GABAPENTIN 100 MG: 100 CAPSULE ORAL at 05:24

## 2020-01-12 RX ADMIN — HYDROMORPHONE HYDROCHLORIDE 0.25 MG: 1 INJECTION, SOLUTION INTRAMUSCULAR; INTRAVENOUS; SUBCUTANEOUS at 14:20

## 2020-01-12 RX ADMIN — OXYCODONE HYDROCHLORIDE 5 MG: 5 TABLET ORAL at 12:13

## 2020-01-12 RX ADMIN — Medication 400 MG: at 05:24

## 2020-01-12 RX ADMIN — OXYCODONE HYDROCHLORIDE 10 MG: 5 TABLET ORAL at 16:03

## 2020-01-12 RX ADMIN — DEXAMETHASONE 4 MG: 4 TABLET ORAL at 17:20

## 2020-01-12 RX ADMIN — OXYCODONE HYDROCHLORIDE 5 MG: 5 TABLET ORAL at 05:24

## 2020-01-12 RX ADMIN — GABAPENTIN 100 MG: 100 CAPSULE ORAL at 12:12

## 2020-01-12 RX ADMIN — DEXAMETHASONE 4 MG: 4 TABLET ORAL at 05:24

## 2020-01-12 RX ADMIN — OMEPRAZOLE 20 MG: 20 CAPSULE, DELAYED RELEASE ORAL at 17:20

## 2020-01-12 RX ADMIN — SENNOSIDES AND DOCUSATE SODIUM 2 TABLET: 8.6; 5 TABLET ORAL at 17:20

## 2020-01-12 RX ADMIN — OXYCODONE HYDROCHLORIDE 10 MG: 5 TABLET ORAL at 20:39

## 2020-01-12 RX ADMIN — Medication 400 MG: at 17:20

## 2020-01-12 RX ADMIN — CITALOPRAM HYDROBROMIDE 20 MG: 20 TABLET ORAL at 20:40

## 2020-01-12 ASSESSMENT — ENCOUNTER SYMPTOMS
VOMITING: 0
WEAKNESS: 1
DIAPHORESIS: 0
FEVER: 0
FLANK PAIN: 0
DIARRHEA: 0
SPEECH CHANGE: 0
CHILLS: 0
DIZZINESS: 0
ABDOMINAL PAIN: 0
COUGH: 0
SENSORY CHANGE: 0
FOCAL WEAKNESS: 0
NAUSEA: 0
WHEEZING: 0
HEADACHES: 0
NERVOUS/ANXIOUS: 1
BACK PAIN: 1
BLURRED VISION: 0
SORE THROAT: 0
PALPITATIONS: 0
SHORTNESS OF BREATH: 0
HEARTBURN: 0
NECK PAIN: 0

## 2020-01-12 NOTE — PROGRESS NOTES
[facility] Medicine Daily Progress Note    Date of Service  1/12/2020    Chief Complaint  84 y.o. female admitted 1/9/2020 with intractable back pain.    Hospital Course  Admitted with intractable back pain.    Interval Problem Update  Back pain - better controlled, seen walking on the hallway with walker  CKD - crea 1.4    Consultants/Specialty  None    Code Status  Full    Disposition  Home health    Review of Systems  Review of Systems   Constitutional: Negative for chills, diaphoresis, fever and malaise/fatigue.   HENT: Negative for hearing loss and sore throat.    Eyes: Negative for blurred vision.   Respiratory: Negative for cough, shortness of breath and wheezing.    Cardiovascular: Negative for chest pain, palpitations and leg swelling.   Gastrointestinal: Negative for abdominal pain, diarrhea, heartburn, nausea and vomiting.   Genitourinary: Negative for dysuria, flank pain and hematuria.   Musculoskeletal: Positive for back pain. Negative for neck pain.   Skin: Negative for rash.   Neurological: Positive for weakness. Negative for dizziness, sensory change, speech change, focal weakness and headaches.   Psychiatric/Behavioral: The patient is nervous/anxious.         Physical Exam  Temp:  [36.4 °C (97.5 °F)-37 °C (98.6 °F)] 36.4 °C (97.5 °F)  Pulse:  [68-73] 68  Resp:  [16] 16  BP: ()/(58-65) 99/59  SpO2:  [95 %-96 %] 96 %    Physical Exam  HENT:      Head: Normocephalic and atraumatic.      Nose: No congestion.      Mouth/Throat:      Mouth: Mucous membranes are moist.   Eyes:      Conjunctiva/sclera: Conjunctivae normal.      Pupils: Pupils are equal, round, and reactive to light.   Neck:      Musculoskeletal: No muscular tenderness.   Cardiovascular:      Rate and Rhythm: Normal rate and regular rhythm.   Pulmonary:      Effort: Pulmonary effort is normal.      Breath sounds: Normal breath sounds.   Abdominal:      General: Bowel sounds are normal. There is no distension.      Palpations: Abdomen is  soft.      Tenderness: There is no tenderness. There is no guarding or rebound.   Musculoskeletal:      Right lower leg: No edema.      Left lower leg: No edema.   Lymphadenopathy:      Cervical: No cervical adenopathy.   Skin:     General: Skin is warm and dry.   Neurological:      General: No focal deficit present.      Mental Status: She is alert and oriented to person, place, and time.      Cranial Nerves: No cranial nerve deficit.         Fluids    Intake/Output Summary (Last 24 hours) at 1/12/2020 1250  Last data filed at 1/12/2020 0422  Gross per 24 hour   Intake 540 ml   Output --   Net 540 ml       Laboratory  Recent Labs     01/10/20  0022 01/11/20  0434 01/12/20 0417   WBC 12.6* 5.5 9.7   RBC 3.93* 3.42* 3.31*   HEMOGLOBIN 13.1 11.1* 11.1*   HEMATOCRIT 38.0 34.5* 32.8*   MCV 96.7 100.9* 99.1*   MCH 33.3* 32.5 33.5*   MCHC 34.5 32.2* 33.8   RDW 48.1 51.2* 50.4*   PLATELETCT 285 219 239   MPV 8.9* 9.0 9.0     Recent Labs     01/10/20  0022 01/11/20  0434 01/12/20 0417   SODIUM 139 134* 134*   POTASSIUM 3.3* 4.8 4.2   CHLORIDE 104 105 105   CO2 17* 19* 21   GLUCOSE 80 130* 132*   BUN 27* 34* 45*   CREATININE 1.17 1.58* 1.45*   CALCIUM 9.2 8.0* 8.6                   Imaging  CT-PELVIS W/O PLUS RECONS   Final Result         1.  No acute abnormality.   2.  Atherosclerosis      CT-LSPINE W/O PLUS RECONS   Final Result         1.  Left L1 transverse process fracture, may be chronic, otherwise no acute traumatic lumbar spine injury identified   2.  Anterolisthesis L2 on L3 and L3 on L4, appears most likely secondary to severe facet arthrosis.   3.  Chronic appearing L1 vertebral plana with 8.3 mm retropulsion and large central Schmorl's node   4.  Atherosclerosis           Assessment/Plan  * Intractable back pain- (present on admission)  Assessment & Plan  L1 transverse process fracture?  Pain control - Lidocaine patch, Oxycodone  Sciatica?  Neurontin, decrease Decadron to BID  PT and OT      Hypothyroidism-  (present on admission)  Assessment & Plan  Synthroid    Leg DVT (deep venous thromboembolism), chronic, right ([facility])- (present on admission)  Assessment & Plan  Lovenox prophylaxis    Hypomagnesemia- (present on admission)  Assessment & Plan  oral Mg    CKD (chronic kidney disease) stage 3, GFR 30-59 ml/min ([facility])- (present on admission)  Assessment & Plan  Follow bmp  Hold Lasix and Aldactone    Hypokalemia- (present on admission)  Assessment & Plan  Follow bmp    Rheumatoid arthritis ([facility])- (present on admission)  Assessment & Plan  Plaquenil    Depression- (present on admission)  Assessment & Plan  Citalopram       VTE prophylaxis: Lovenox

## 2020-01-12 NOTE — PROGRESS NOTES
"Bedside report received.  Assessment complete.  A&O x 4. Patient calls appropriately.  Patient ambulates with standby assist and front wheel walker. Bed alarm on.   Patient has 2/10 pain. Pain managed with prescribed medications.  Denies N&V. Tolerating regular diet.  + void, + flatus, + BM.  Patient denies SOB.  SCD's on.  Patient is calm and cooperative.  Review plan with of care with patient. Call light and personal belongings with in reach. Hourly rounding in place. All needs met at this time.  BP (!) 99/59 Comment: RN notified  Pulse 68   Temp 36.4 °C (97.5 °F) (Temporal)   Resp 16   Ht 1.651 m (5' 5\")   Wt 58.1 kg (128 lb)   SpO2 96%   BMI 21.30 kg/m²     "

## 2020-01-12 NOTE — PROGRESS NOTES
Bedside report received.  Assessment complete.  A&O x 4. Patient calls appropriately.  Patient ambulates with standby assist. Bed alarm on.   Patient has 4/10 pain. Pain managed with prescribed medications.  Denies N&V. Tolerating regular diet.  + void, + flatus, - BM.  Patient denies SOB.  SCD's refused.  Patient pleasant resting in bed.  Review plan with of care with patient. Call light and personal belongings with in reach. Hourly rounding in place. All needs met at this time.

## 2020-01-12 NOTE — CARE PLAN
Problem: Communication  Goal: The ability to communicate needs accurately and effectively will improve  Outcome: PROGRESSING AS EXPECTED     Problem: Safety  Goal: Will remain free from injury  Outcome: PROGRESSING AS EXPECTED  Goal: Will remain free from falls  Outcome: PROGRESSING AS EXPECTED     Problem: Knowledge Deficit  Goal: Knowledge of disease process/condition, treatment plan, diagnostic tests, and medications will improve  Outcome: PROGRESSING AS EXPECTED  Goal: Knowledge of the prescribed therapeutic regimen will improve  Outcome: PROGRESSING AS EXPECTED     Problem: Mobility  Goal: Risk for activity intolerance will decrease  Outcome: PROGRESSING AS EXPECTED     Problem: Pain Management  Goal: Pain level will decrease to patient's comfort goal  Outcome: PROGRESSING AS EXPECTED

## 2020-01-13 LAB
ANION GAP SERPL CALC-SCNC: 11 MMOL/L (ref 0–11.9)
BASOPHILS # BLD AUTO: 0.1 % (ref 0–1.8)
BASOPHILS # BLD: 0.01 K/UL (ref 0–0.12)
BUN SERPL-MCNC: 38 MG/DL (ref 8–22)
CALCIUM SERPL-MCNC: 8.1 MG/DL (ref 8.5–10.5)
CHLORIDE SERPL-SCNC: 107 MMOL/L (ref 96–112)
CO2 SERPL-SCNC: 19 MMOL/L (ref 20–33)
CREAT SERPL-MCNC: 1.08 MG/DL (ref 0.5–1.4)
EOSINOPHIL # BLD AUTO: 0 K/UL (ref 0–0.51)
EOSINOPHIL NFR BLD: 0 % (ref 0–6.9)
ERYTHROCYTE [DISTWIDTH] IN BLOOD BY AUTOMATED COUNT: 50.5 FL (ref 35.9–50)
GLUCOSE SERPL-MCNC: 96 MG/DL (ref 65–99)
HCT VFR BLD AUTO: 37.1 % (ref 37–47)
HGB BLD-MCNC: 12.2 G/DL (ref 12–16)
IMM GRANULOCYTES # BLD AUTO: 0.02 K/UL (ref 0–0.11)
IMM GRANULOCYTES NFR BLD AUTO: 0.3 % (ref 0–0.9)
LYMPHOCYTES # BLD AUTO: 1.05 K/UL (ref 1–4.8)
LYMPHOCYTES NFR BLD: 14.1 % (ref 22–41)
MCH RBC QN AUTO: 33.2 PG (ref 27–33)
MCHC RBC AUTO-ENTMCNC: 32.9 G/DL (ref 33.6–35)
MCV RBC AUTO: 101.1 FL (ref 81.4–97.8)
MONOCYTES # BLD AUTO: 0.61 K/UL (ref 0–0.85)
MONOCYTES NFR BLD AUTO: 8.2 % (ref 0–13.4)
NEUTROPHILS # BLD AUTO: 5.77 K/UL (ref 2–7.15)
NEUTROPHILS NFR BLD: 77.3 % (ref 44–72)
NRBC # BLD AUTO: 0 K/UL
NRBC BLD-RTO: 0 /100 WBC
PLATELET # BLD AUTO: 246 K/UL (ref 164–446)
PMV BLD AUTO: 9.1 FL (ref 9–12.9)
POTASSIUM SERPL-SCNC: 4.4 MMOL/L (ref 3.6–5.5)
RBC # BLD AUTO: 3.67 M/UL (ref 4.2–5.4)
SODIUM SERPL-SCNC: 137 MMOL/L (ref 135–145)
WBC # BLD AUTO: 7.5 K/UL (ref 4.8–10.8)

## 2020-01-13 PROCEDURE — 96376 TX/PRO/DX INJ SAME DRUG ADON: CPT

## 2020-01-13 PROCEDURE — 85025 COMPLETE CBC W/AUTO DIFF WBC: CPT

## 2020-01-13 PROCEDURE — A9270 NON-COVERED ITEM OR SERVICE: HCPCS | Performed by: HOSPITALIST

## 2020-01-13 PROCEDURE — A9270 NON-COVERED ITEM OR SERVICE: HCPCS | Performed by: FAMILY MEDICINE

## 2020-01-13 PROCEDURE — G0378 HOSPITAL OBSERVATION PER HR: HCPCS

## 2020-01-13 PROCEDURE — 96372 THER/PROPH/DIAG INJ SC/IM: CPT

## 2020-01-13 PROCEDURE — 36415 COLL VENOUS BLD VENIPUNCTURE: CPT

## 2020-01-13 PROCEDURE — 700102 HCHG RX REV CODE 250 W/ 637 OVERRIDE(OP): Performed by: HOSPITALIST

## 2020-01-13 PROCEDURE — 700111 HCHG RX REV CODE 636 W/ 250 OVERRIDE (IP): Performed by: FAMILY MEDICINE

## 2020-01-13 PROCEDURE — 99226 PR SUBSEQUENT OBSERVATION CARE,LEVEL III: CPT | Performed by: FAMILY MEDICINE

## 2020-01-13 PROCEDURE — 700101 HCHG RX REV CODE 250: Performed by: FAMILY MEDICINE

## 2020-01-13 PROCEDURE — 700102 HCHG RX REV CODE 250 W/ 637 OVERRIDE(OP): Performed by: FAMILY MEDICINE

## 2020-01-13 PROCEDURE — 80048 BASIC METABOLIC PNL TOTAL CA: CPT

## 2020-01-13 RX ADMIN — GABAPENTIN 100 MG: 100 CAPSULE ORAL at 12:10

## 2020-01-13 RX ADMIN — SENNOSIDES AND DOCUSATE SODIUM 2 TABLET: 8.6; 5 TABLET ORAL at 16:59

## 2020-01-13 RX ADMIN — OXYCODONE HYDROCHLORIDE 10 MG: 5 TABLET ORAL at 20:38

## 2020-01-13 RX ADMIN — HYDROMORPHONE HYDROCHLORIDE 0.25 MG: 1 INJECTION, SOLUTION INTRAMUSCULAR; INTRAVENOUS; SUBCUTANEOUS at 16:54

## 2020-01-13 RX ADMIN — Medication 400 MG: at 16:59

## 2020-01-13 RX ADMIN — CITALOPRAM HYDROBROMIDE 20 MG: 20 TABLET ORAL at 20:39

## 2020-01-13 RX ADMIN — TRAZODONE HYDROCHLORIDE 25 MG: 50 TABLET ORAL at 16:58

## 2020-01-13 RX ADMIN — HYDROXYCHLOROQUINE SULFATE 200 MG: 200 TABLET ORAL at 05:01

## 2020-01-13 RX ADMIN — DEXAMETHASONE 4 MG: 4 TABLET ORAL at 16:59

## 2020-01-13 RX ADMIN — Medication 400 MG: at 05:00

## 2020-01-13 RX ADMIN — LEVOTHYROXINE SODIUM 100 MCG: 50 TABLET ORAL at 05:00

## 2020-01-13 RX ADMIN — OXYCODONE HYDROCHLORIDE 10 MG: 5 TABLET ORAL at 15:33

## 2020-01-13 RX ADMIN — GABAPENTIN 100 MG: 100 CAPSULE ORAL at 16:59

## 2020-01-13 RX ADMIN — ENOXAPARIN SODIUM 30 MG: 100 INJECTION SUBCUTANEOUS at 05:01

## 2020-01-13 RX ADMIN — DEXAMETHASONE 4 MG: 4 TABLET ORAL at 05:01

## 2020-01-13 RX ADMIN — OXYCODONE HYDROCHLORIDE 10 MG: 5 TABLET ORAL at 12:09

## 2020-01-13 RX ADMIN — LIDOCAINE 2 PATCH: 50 PATCH CUTANEOUS at 12:09

## 2020-01-13 RX ADMIN — SENNOSIDES AND DOCUSATE SODIUM 2 TABLET: 8.6; 5 TABLET ORAL at 05:00

## 2020-01-13 RX ADMIN — OXYCODONE HYDROCHLORIDE 5 MG: 5 TABLET ORAL at 05:00

## 2020-01-13 RX ADMIN — OXYCODONE HYDROCHLORIDE 10 MG: 5 TABLET ORAL at 09:06

## 2020-01-13 RX ADMIN — OMEPRAZOLE 20 MG: 20 CAPSULE, DELAYED RELEASE ORAL at 16:59

## 2020-01-13 RX ADMIN — HYDROMORPHONE HYDROCHLORIDE 0.25 MG: 1 INJECTION, SOLUTION INTRAMUSCULAR; INTRAVENOUS; SUBCUTANEOUS at 23:01

## 2020-01-13 RX ADMIN — GABAPENTIN 100 MG: 100 CAPSULE ORAL at 05:01

## 2020-01-13 ASSESSMENT — ENCOUNTER SYMPTOMS
WEAKNESS: 1
HEARTBURN: 0
PALPITATIONS: 0
BACK PAIN: 1
DIARRHEA: 0
WHEEZING: 0
NERVOUS/ANXIOUS: 1
SORE THROAT: 0
VOMITING: 0
BLURRED VISION: 0
FLANK PAIN: 0
FOCAL WEAKNESS: 0
DIZZINESS: 0
FEVER: 0
HEADACHES: 0
ABDOMINAL PAIN: 0
DIAPHORESIS: 0
SHORTNESS OF BREATH: 0
SENSORY CHANGE: 0
CHILLS: 0
SPEECH CHANGE: 0
NAUSEA: 0
COUGH: 0
NECK PAIN: 0

## 2020-01-13 NOTE — CARE PLAN
Problem: Communication  Goal: The ability to communicate needs accurately and effectively will improve  Outcome: PROGRESSING AS EXPECTED     Problem: Safety  Goal: Will remain free from injury  Outcome: PROGRESSING AS EXPECTED  Goal: Will remain free from falls  Outcome: PROGRESSING AS EXPECTED     Problem: Knowledge Deficit  Goal: Knowledge of disease process/condition, treatment plan, diagnostic tests, and medications will improve  Outcome: PROGRESSING AS EXPECTED  Goal: Knowledge of the prescribed therapeutic regimen will improve  Outcome: PROGRESSING AS EXPECTED

## 2020-01-13 NOTE — DISCHARGE PLANNING
Anticipated Discharge Disposition: SNF    Action: SNF Order received.  LSW met with the patient at bedside for the purpose of issuing SNF Referral.  The patient requested the referral is sent to all the local skilled facilities, she will choose from the list of accepting facilities, choice form provided to [facility].    Barriers to Discharge: None.    Plan: SNF, pending accepting facility and transfer arrangements.

## 2020-01-13 NOTE — PROGRESS NOTES
"Bedside report received.  Assessment complete.  A&O x 4. Patient calls appropriately.  Patient ambulates with standby assist and front wheel walker. Bed alarm is on.   Patient has 8/10 pain. Pain managed with prescribed medications.  Denies N&V. Tolerating regular diet.  + void, + flatus, + BM.  Patient denies SOB.  SCD's on.  Patient is calm and cooperative.  Review plan with of care with patient. Call light and personal belongings with in reach. Hourly rounding in place. All needs met at this time.  /68   Pulse 66   Temp 36.6 °C (97.9 °F) (Temporal)   Resp 16   Ht 1.651 m (5' 5\")   Wt 58.1 kg (128 lb)   SpO2 95%   BMI 21.30 kg/m²     "

## 2020-01-13 NOTE — DISCHARGE PLANNING
Agency/Facility Name: [facility]  Spoke To: Dinora  Outcome: Pt accepted. Bed available 1/14    Agency/Facility Name: [facility]  Spoke To: Eunice  Outcome: Pt accepted. Beds available 1/14    Agency/Facility Name: [facility]  Spoke To: Shannan  Outcome: Pt accepted. Bed available 1/14      LSW informed

## 2020-01-13 NOTE — DISCHARGE PLANNING
Agency/Facility Name: [facility]  Spoke To: Gwen  Outcome: Pt accepted. Bed available 1/14      LSW informed

## 2020-01-13 NOTE — CARE PLAN
Problem: Bowel/Gastric:  Goal: Normal bowel function is maintained or improved  Outcome: PROGRESSING AS EXPECTED     Problem: Fluid Volume:  Goal: Will maintain balanced intake and output  Outcome: PROGRESSING AS EXPECTED

## 2020-01-13 NOTE — DISCHARGE PLANNING
Received Choice form at 1251  Agency/Facility Name: [facility]  Referral sent per Choice form @ [phone]

## 2020-01-13 NOTE — DISCHARGE PLANNING
Received Choice form at 0905  Agency/Facility Name: [facility]  Referral sent per Choice form @ 0907

## 2020-01-13 NOTE — DISCHARGE PLANNING
Anticipated Discharge Disposition: Home with [facility].    Action: LSW requested [facility] sends the referral to [facility].    Barriers to Discharge: Accepting [facility] Agency.    Plan: Home with [facility], pending acceptance from [facility].

## 2020-01-13 NOTE — DISCHARGE PLANNING
Agency/Facility Name: [facility]  Spoke To: Scotty  Outcome: pt accepted. Beds available 1/13    Agency/Facility Name: [facility]  Spoke To: Bee  Outcome: pt accepted. Bed available 1/13    LSW informed

## 2020-01-13 NOTE — PROGRESS NOTES
[facility] Medicine Daily Progress Note    Date of Service  1/13/2020    Chief Complaint  84 y.o. female admitted 1/9/2020 with intractable back pain.    Hospital Course  Admitted with intractable back pain.    Interval Problem Update  Back pain -patient states she is having more difficulty getting out of bed, wants PT and OT reevaluation's, per RN notes, she ambulates with a frontwheel walker with standby assist only.  CKD - crea 1    Consultants/Specialty  None    Code Status  Full    Disposition  Home health vs SNF    Review of Systems  Review of Systems   Constitutional: Negative for chills, diaphoresis, fever and malaise/fatigue.   HENT: Negative for hearing loss and sore throat.    Eyes: Negative for blurred vision.   Respiratory: Negative for cough, shortness of breath and wheezing.    Cardiovascular: Negative for chest pain, palpitations and leg swelling.   Gastrointestinal: Negative for abdominal pain, diarrhea, heartburn, nausea and vomiting.   Genitourinary: Negative for dysuria, flank pain and hematuria.   Musculoskeletal: Positive for back pain. Negative for neck pain.   Skin: Negative for rash.   Neurological: Positive for weakness. Negative for dizziness, sensory change, speech change, focal weakness and headaches.   Psychiatric/Behavioral: The patient is nervous/anxious.         Physical Exam  Temp:  [36.6 °C (97.8 °F)-36.7 °C (98 °F)] 36.6 °C (97.9 °F)  Pulse:  [66-75] 66  Resp:  [16-18] 16  BP: (132-153)/(68-76) 132/68  SpO2:  [92 %-100 %] 95 %    Physical Exam  HENT:      Head: Normocephalic and atraumatic.      Nose: No congestion.      Mouth/Throat:      Mouth: Mucous membranes are moist.   Eyes:      Conjunctiva/sclera: Conjunctivae normal.      Pupils: Pupils are equal, round, and reactive to light.   Neck:      Musculoskeletal: No muscular tenderness.   Cardiovascular:      Rate and Rhythm: Normal rate and regular rhythm.   Pulmonary:      Effort: Pulmonary effort is normal.      Breath sounds:  Normal breath sounds.   Abdominal:      General: Bowel sounds are normal. There is no distension.      Palpations: Abdomen is soft.      Tenderness: There is no tenderness. There is no guarding or rebound.   Musculoskeletal:      Right lower leg: No edema.      Left lower leg: No edema.   Lymphadenopathy:      Cervical: No cervical adenopathy.   Skin:     General: Skin is warm and dry.   Neurological:      General: No focal deficit present.      Mental Status: She is alert and oriented to person, place, and time.      Cranial Nerves: No cranial nerve deficit.         Fluids    Intake/Output Summary (Last 24 hours) at 1/13/2020 1145  Last data filed at 1/13/2020 0900  Gross per 24 hour   Intake 240 ml   Output --   Net 240 ml       Laboratory  Recent Labs     01/11/20 0434 01/12/20 0417 01/13/20 0429   WBC 5.5 9.7 7.5   RBC 3.42* 3.31* 3.67*   HEMOGLOBIN 11.1* 11.1* 12.2   HEMATOCRIT 34.5* 32.8* 37.1   .9* 99.1* 101.1*   MCH 32.5 33.5* 33.2*   MCHC 32.2* 33.8 32.9*   RDW 51.2* 50.4* 50.5*   PLATELETCT 219 239 246   MPV 9.0 9.0 9.1     Recent Labs     01/11/20 0434 01/12/20 0417 01/13/20 0429   SODIUM 134* 134* 137   POTASSIUM 4.8 4.2 4.4   CHLORIDE 105 105 107   CO2 19* 21 19*   GLUCOSE 130* 132* 96   BUN 34* 45* 38*   CREATININE 1.58* 1.45* 1.08   CALCIUM 8.0* 8.6 8.1*                   Imaging  CT-PELVIS W/O PLUS RECONS   Final Result         1.  No acute abnormality.   2.  Atherosclerosis      CT-LSPINE W/O PLUS RECONS   Final Result         1.  Left L1 transverse process fracture, may be chronic, otherwise no acute traumatic lumbar spine injury identified   2.  Anterolisthesis L2 on L3 and L3 on L4, appears most likely secondary to severe facet arthrosis.   3.  Chronic appearing L1 vertebral plana with 8.3 mm retropulsion and large central Schmorl's node   4.  Atherosclerosis           Assessment/Plan  * Intractable back pain- (present on admission)  Assessment & Plan  L1 transverse process  fracture?  Pain control - Lidocaine patch, Oxycodone  Sciatica?  Neurontin, Decadron  PT and OT reevaluation      Hypothyroidism- (present on admission)  Assessment & Plan  Synthroid    Leg DVT (deep venous thromboembolism), chronic, right ([facility])- (present on admission)  Assessment & Plan  Lovenox prophylaxis    Hypomagnesemia- (present on admission)  Assessment & Plan  oral Mg    CKD (chronic kidney disease) stage 3, GFR 30-59 ml/min ([facility])- (present on admission)  Assessment & Plan  Follow bmp  Hold Lasix and Aldactone    Hypokalemia- (present on admission)  Assessment & Plan  Follow bmp    Rheumatoid arthritis ([facility])- (present on admission)  Assessment & Plan  Plaquenil    Depression- (present on admission)  Assessment & Plan  Citalopram       VTE prophylaxis: Lovenox

## 2020-01-13 NOTE — DISCHARGE PLANNING
Care Transition Team Assessment    LSW met with the patient at bedside.  The patient is AAOX4. The patient is admitted for a GLF and is currently Observation Status, her insurance is [facility].  The patient indicates she lives alone, her friends are able to provide intermitent assistance only.  Mazin, patient's dtr/POA lives in [place].  Patient reports she was independent with ADLS, prior to admission.  According to this patient the only home services she had prior to admission was a .  The patient has a FWW and a cane.  The patient is current with her PCP Dr. Iraheta. The patient is aware of the [facility] referrals; however, she requested that a referral is submitted for SNF before returning home with [facility].  At this time this patient's anticipated discharge plan is SNF VS HHC, pending PTOT Reassessment.    Information Source  Orientation : Oriented x 4  Information Given By: Patient  Informant's Name: (Elizabeth)  Who is responsible for making decisions for patient? : Patient    Readmission Evaluation  Is this a readmission?: No    Elopement Risk  Legal Hold: No  Ambulatory or Self Mobile in Wheelchair: Yes  Disoriented: No  Psychiatric Symptoms: None  History of Wandering: No  Elopement this Admit: No  Vocalizing Wanting to Leave: No  Displays Behaviors, Body Language Wanting to Leave: No-Not at Risk for Elopement  Elopement Risk: Not at Risk for Elopement    Interdisciplinary Discharge Planning  Does Admitting Nurse Feel This Could be a Complex Discharge?: No  Primary Care Physician: Ramone Shaw  Lives with - Patient's Self Care Capacity: Alone and Able to Care For Self  Patient or legal guardian wants to designate a caregiver (see row info): No  Support Systems: Friends / Neighbors  Housing / Facility: 1 Story House  Do You Take your Prescribed Medications Regularly: Yes  Able to Return to Previous ADL's: Future Time w/Therapy  Mobility Issues: Yes  Prior Services: None  Patient Expects to be Discharged to::  Home  Assistance Needed: No  Durable Medical Equipment: Walker    Discharge Preparedness  What is your plan after discharge?: Uncertain - pending medical team collaboration, Skilled nursing facility, Home health care  Prior Functional Level: Independent with Activities of Daily Living    Functional Assesment  Prior Functional Level: Independent with Activities of Daily Living    Finances  Financial Barriers to Discharge: No  Prescription Coverage: Yes    Vision / Hearing Impairment  Vision Impairment : No  Hearing Impairment : Yes  Hearing Impairment: Both Ears, Hearing Device(s) Available  Does Pt Need Special Equipment for the Hearing Impaired?: No         Advance Directive  Advance Directive?: DPOA for Health Care  Durable Power of  Name and Contact : Marlee Loco/SHAYAN [phone]    Domestic Abuse  Have you ever been the victim of abuse or violence?: No  Physical Abuse or Sexual Abuse: No  Verbal Abuse or Emotional Abuse: No  Possible Abuse Reported to:: Not Applicable    Psychological Assessment  History of Substance Abuse: None  History of Psychiatric Problems: No    Discharge Risks or Barriers  Discharge risks or barriers?: Lives alone, no community support  Patient risk factors: Lack of outside supports    Anticipated Discharge Information  Anticipated discharge disposition: HHC, SNF

## 2020-01-13 NOTE — DISCHARGE PLANNING
Agency/Facility Name: [facility]  Spoke To: Eli  Outcome: Still under review. Eli says she will call when she knows.    DENIS informed

## 2020-01-13 NOTE — PROGRESS NOTES
Bedside report received.  Assessment complete.  A&O x 4. Patient calls appropriately.  Patient ambulates with one assist. Bed alarm on.   Patient has 9/10 pain. Pain managed with prescribed medications.  Denies N&V. Tolerating regular diet.  + void, + flatus, + BM.  Patient denies SOB.  SCD's on.  Patient pleasant resting in bed.  Review plan with of care with patient. Call light and personal belongings with in reach. Hourly rounding in place. All needs met at this time.

## 2020-01-14 PROBLEM — S32.009A LUMBAR TRANSVERSE PROCESS FRACTURE (HCC): Status: ACTIVE | Noted: 2020-01-14

## 2020-01-14 LAB
ANION GAP SERPL CALC-SCNC: 9 MMOL/L (ref 0–11.9)
BASOPHILS # BLD AUTO: 0.1 % (ref 0–1.8)
BASOPHILS # BLD: 0.01 K/UL (ref 0–0.12)
BUN SERPL-MCNC: 29 MG/DL (ref 8–22)
CALCIUM SERPL-MCNC: 8.1 MG/DL (ref 8.5–10.5)
CHLORIDE SERPL-SCNC: 105 MMOL/L (ref 96–112)
CO2 SERPL-SCNC: 22 MMOL/L (ref 20–33)
CREAT SERPL-MCNC: 1.01 MG/DL (ref 0.5–1.4)
EOSINOPHIL # BLD AUTO: 0.01 K/UL (ref 0–0.51)
EOSINOPHIL NFR BLD: 0.1 % (ref 0–6.9)
ERYTHROCYTE [DISTWIDTH] IN BLOOD BY AUTOMATED COUNT: 49.4 FL (ref 35.9–50)
GLUCOSE SERPL-MCNC: 90 MG/DL (ref 65–99)
HCT VFR BLD AUTO: 35.7 % (ref 37–47)
HGB BLD-MCNC: 11.9 G/DL (ref 12–16)
IMM GRANULOCYTES # BLD AUTO: 0.03 K/UL (ref 0–0.11)
IMM GRANULOCYTES NFR BLD AUTO: 0.4 % (ref 0–0.9)
LYMPHOCYTES # BLD AUTO: 1.7 K/UL (ref 1–4.8)
LYMPHOCYTES NFR BLD: 22.2 % (ref 22–41)
MCH RBC QN AUTO: 33.5 PG (ref 27–33)
MCHC RBC AUTO-ENTMCNC: 33.3 G/DL (ref 33.6–35)
MCV RBC AUTO: 100.6 FL (ref 81.4–97.8)
MONOCYTES # BLD AUTO: 0.72 K/UL (ref 0–0.85)
MONOCYTES NFR BLD AUTO: 9.4 % (ref 0–13.4)
NEUTROPHILS # BLD AUTO: 5.2 K/UL (ref 2–7.15)
NEUTROPHILS NFR BLD: 67.8 % (ref 44–72)
NRBC # BLD AUTO: 0 K/UL
NRBC BLD-RTO: 0 /100 WBC
PLATELET # BLD AUTO: 259 K/UL (ref 164–446)
PMV BLD AUTO: 9 FL (ref 9–12.9)
POTASSIUM SERPL-SCNC: 4.5 MMOL/L (ref 3.6–5.5)
RBC # BLD AUTO: 3.55 M/UL (ref 4.2–5.4)
SODIUM SERPL-SCNC: 136 MMOL/L (ref 135–145)
WBC # BLD AUTO: 7.7 K/UL (ref 4.8–10.8)

## 2020-01-14 PROCEDURE — 96372 THER/PROPH/DIAG INJ SC/IM: CPT

## 2020-01-14 PROCEDURE — 99225 PR SUBSEQUENT OBSERVATION CARE,LEVEL II: CPT | Performed by: HOSPITALIST

## 2020-01-14 PROCEDURE — 700102 HCHG RX REV CODE 250 W/ 637 OVERRIDE(OP): Performed by: FAMILY MEDICINE

## 2020-01-14 PROCEDURE — A9270 NON-COVERED ITEM OR SERVICE: HCPCS | Performed by: NURSE PRACTITIONER

## 2020-01-14 PROCEDURE — 97535 SELF CARE MNGMENT TRAINING: CPT

## 2020-01-14 PROCEDURE — 96376 TX/PRO/DX INJ SAME DRUG ADON: CPT

## 2020-01-14 PROCEDURE — 85025 COMPLETE CBC W/AUTO DIFF WBC: CPT

## 2020-01-14 PROCEDURE — 80048 BASIC METABOLIC PNL TOTAL CA: CPT

## 2020-01-14 PROCEDURE — 700111 HCHG RX REV CODE 636 W/ 250 OVERRIDE (IP): Performed by: FAMILY MEDICINE

## 2020-01-14 PROCEDURE — 700102 HCHG RX REV CODE 250 W/ 637 OVERRIDE(OP): Performed by: HOSPITALIST

## 2020-01-14 PROCEDURE — 36415 COLL VENOUS BLD VENIPUNCTURE: CPT

## 2020-01-14 PROCEDURE — 302131 K PAD MOTOR: Performed by: HOSPITALIST

## 2020-01-14 PROCEDURE — 302152 K-PAD 12X17: Performed by: HOSPITALIST

## 2020-01-14 PROCEDURE — A9270 NON-COVERED ITEM OR SERVICE: HCPCS | Performed by: FAMILY MEDICINE

## 2020-01-14 PROCEDURE — 700102 HCHG RX REV CODE 250 W/ 637 OVERRIDE(OP): Performed by: NURSE PRACTITIONER

## 2020-01-14 PROCEDURE — 700101 HCHG RX REV CODE 250: Performed by: FAMILY MEDICINE

## 2020-01-14 PROCEDURE — A9270 NON-COVERED ITEM OR SERVICE: HCPCS | Performed by: HOSPITALIST

## 2020-01-14 PROCEDURE — 302151 K-PAD 14X20: Performed by: HOSPITALIST

## 2020-01-14 PROCEDURE — G0378 HOSPITAL OBSERVATION PER HR: HCPCS

## 2020-01-14 RX ORDER — OXYCODONE HYDROCHLORIDE 5 MG/1
5-10 TABLET ORAL
Status: DISCONTINUED | OUTPATIENT
Start: 2020-01-14 | End: 2020-01-16

## 2020-01-14 RX ORDER — HYDROMORPHONE HYDROCHLORIDE 1 MG/ML
0.25 INJECTION, SOLUTION INTRAMUSCULAR; INTRAVENOUS; SUBCUTANEOUS EVERY 4 HOURS PRN
Status: DISCONTINUED | OUTPATIENT
Start: 2020-01-14 | End: 2020-01-16

## 2020-01-14 RX ORDER — METHOCARBAMOL 500 MG/1
500 TABLET, FILM COATED ORAL 4 TIMES DAILY
Status: DISCONTINUED | OUTPATIENT
Start: 2020-01-14 | End: 2020-01-16

## 2020-01-14 RX ADMIN — LEVOTHYROXINE SODIUM 100 MCG: 50 TABLET ORAL at 05:09

## 2020-01-14 RX ADMIN — GABAPENTIN 100 MG: 100 CAPSULE ORAL at 18:11

## 2020-01-14 RX ADMIN — TRAZODONE HYDROCHLORIDE 25 MG: 50 TABLET ORAL at 18:12

## 2020-01-14 RX ADMIN — GABAPENTIN 100 MG: 100 CAPSULE ORAL at 12:19

## 2020-01-14 RX ADMIN — HYDROMORPHONE HYDROCHLORIDE 0.25 MG: 1 INJECTION, SOLUTION INTRAMUSCULAR; INTRAVENOUS; SUBCUTANEOUS at 06:50

## 2020-01-14 RX ADMIN — SENNOSIDES AND DOCUSATE SODIUM 2 TABLET: 8.6; 5 TABLET ORAL at 18:12

## 2020-01-14 RX ADMIN — DEXAMETHASONE 4 MG: 4 TABLET ORAL at 05:09

## 2020-01-14 RX ADMIN — Medication 400 MG: at 18:11

## 2020-01-14 RX ADMIN — Medication 400 MG: at 05:10

## 2020-01-14 RX ADMIN — OXYCODONE HYDROCHLORIDE 10 MG: 5 TABLET ORAL at 08:26

## 2020-01-14 RX ADMIN — OXYCODONE HYDROCHLORIDE 10 MG: 5 TABLET ORAL at 05:10

## 2020-01-14 RX ADMIN — METHOCARBAMOL TABLETS 500 MG: 500 TABLET, COATED ORAL at 16:09

## 2020-01-14 RX ADMIN — METHOCARBAMOL TABLETS 500 MG: 500 TABLET, COATED ORAL at 20:08

## 2020-01-14 RX ADMIN — DEXAMETHASONE 4 MG: 4 TABLET ORAL at 18:11

## 2020-01-14 RX ADMIN — OXYCODONE HYDROCHLORIDE 10 MG: 5 TABLET ORAL at 12:19

## 2020-01-14 RX ADMIN — LIDOCAINE 2 PATCH: 50 PATCH CUTANEOUS at 12:15

## 2020-01-14 RX ADMIN — GABAPENTIN 100 MG: 100 CAPSULE ORAL at 05:09

## 2020-01-14 RX ADMIN — ENOXAPARIN SODIUM 40 MG: 100 INJECTION SUBCUTANEOUS at 05:10

## 2020-01-14 RX ADMIN — SENNOSIDES AND DOCUSATE SODIUM 2 TABLET: 8.6; 5 TABLET ORAL at 05:10

## 2020-01-14 RX ADMIN — HYDROXYCHLOROQUINE SULFATE 200 MG: 200 TABLET ORAL at 05:09

## 2020-01-14 RX ADMIN — OXYCODONE HYDROCHLORIDE 10 MG: 5 TABLET ORAL at 19:52

## 2020-01-14 RX ADMIN — CITALOPRAM HYDROBROMIDE 20 MG: 20 TABLET ORAL at 20:08

## 2020-01-14 RX ADMIN — OXYCODONE HYDROCHLORIDE 10 MG: 5 TABLET ORAL at 16:09

## 2020-01-14 RX ADMIN — OMEPRAZOLE 20 MG: 20 CAPSULE, DELAYED RELEASE ORAL at 18:12

## 2020-01-14 ASSESSMENT — ENCOUNTER SYMPTOMS
ABDOMINAL PAIN: 0
DIARRHEA: 0
DIZZINESS: 0
VOMITING: 0
PALPITATIONS: 0
WEAKNESS: 1
BLURRED VISION: 0
NAUSEA: 0
DOUBLE VISION: 0
MYALGIAS: 1
CHILLS: 0
FEVER: 0
HEADACHES: 0
COUGH: 0
SHORTNESS OF BREATH: 0
BACK PAIN: 1

## 2020-01-14 NOTE — PROGRESS NOTES
Spiritual Care Note    Patient's Name: Elizabeth Celis   MRN: [id number]    YOB: 1935   Age and Gender: 84 y.o. female   Service Area: [facility]   Room (and Bed): [place] [address]   Ethnicity or Nationality: White   Primary Language: English   Samaritan/Spiritual preference: Temple   Place of Residence: [facility]   Family/Friends/Others Present: No   Clinical Team Present: No   Medical Diagnosis(-es)/Procedure(s): T-5000 GLF   Code Status: Full Code    Date of Admission: 1/9/2020   Length of Stay: 0 days        Spiritual Care Provider Information    Name of Spiritual Care Provider:  Leona Smith  Title of Spiritual Care Provider:     Phone Number:     [phone]  E-mail:       [email or web address]  Total time :      20 minutes    Spiritual Screen Results    Gen Nursing    Is your spiritual health or inner well-being important to you as you cope with your medical condition?: Yes  Would you like to receive a visit from our Spiritual Care team or your own Amish or spiritual leader?: Yes  Was spiritual care education provided to the patient?: Declined  Sources of Hope/Eboni: Family, Nature, Education, Companionship     Palliative Care    Was spiritual care education provided to the patient?: Declined      Encounter/Request Information    Visited With:     Patient  Nature of the Visit:     Follow-up, On shift  Continue Visiting:     Yes  General Visit:      Yes  Referral From/ Origin of Request:   Epic nursing    Religous Needs/Values    Samaritan Needs:     Visit  Samaritan Needs:     Prayer    Spiritual Assessment     Observations/Symptoms:    Abrasive, Doubts, Frustration, Pain    Interacton/Conversation:    PT is in pain and believes that no one believs her.    Assessment:      Need, Distress, Despair   Need: Seeking Spiritual Assistance and Support   Distress: Coping, Overwhelmed, Suffering   Despair: Hopelessness/Despair, Lack of Serenity    Intended Effects:     Build Relationship of Care and  Support, Convey a Calming Presence, Demonstrate Caring and Concern, Lessen Anxiety, Preserve Dignity and Respect    Methods:      Non-anxious presence, empathetic listening, therapeudic touch, distracting conversation    Outcomes:      Coping, Value/Dignity/Respect    Plan:       Visit Upon Request    Notes:

## 2020-01-14 NOTE — PROGRESS NOTES
Bedside report received.  Assessment complete.  A&O x 4. Patient calls appropriately.  Patient ambulates with two assist. Bed alarm refused.   Patient has 8/10 pain. Pain managed with prescribed medications.  Denies N&V. Tolerating regular diet.  + void via purewick, + flatus, + BM.  Patient denies SOB.  SCD's on.  Patient is resting in bed.  Review plan with of care with patient. Call light and personal belongings with in reach. Hourly rounding in place. All needs met at this time.

## 2020-01-14 NOTE — PROGRESS NOTES
Hospital Medicine Daily Progress Note    Date of Service  1/14/2020    Chief Complaint  84 y.o. female admitted 1/9/2020 with back pain.     Hospital Course    This is an 84-year-old female with a past medical history of CKD, stroke, osteoporosis, rheumatoid arthritis, hypothyroidism, and depression admitted with low back pain after mechanical ground-level fall.  The patient was attempting to sit in a chair when the chair rolled out from underneath her causing her to land on the floor.  She developed acute low back pain leading her to present to the ED.    On admission CT L-spine did reveal left L1 transverse process fracture that is possibly chronic.  No other acute fracture noted.  CT pelvis was negative for acute abnormality.  She remained in the hospital for pain control and therapy.  She has been initiated on Neurontin, Decadron, and lidocaine patches.  Her pain is overall improving, although she still continues to need significant assistance with mobilizing secondary to her pain.    The patient also takes Spironolactone and Lasix as an outpatient for edema.  On admission she was noted to have worsening of her underlying CKD.  Her diuretics were held with improvement of her creatinine.  As the patient has no evidence of edema we will continue to hold these medications.      Interval Problem Update  1/14:  Patient sleepy this morning and requiring supplemental oxygen.  Suspect secondary to pain medication.  Decrease frequency of IV dilaudid.  Continues to complain of uncontrolled back pain.  Medications adjusted.    Consultants/Specialty  N/A    Code Status  FULL    Disposition  Pending SNF.      Review of Systems  Review of Systems   Constitutional: Positive for malaise/fatigue. Negative for chills and fever.   HENT: Negative for congestion.    Eyes: Negative for blurred vision and double vision.   Respiratory: Negative for cough and shortness of breath.    Cardiovascular: Negative for chest pain, palpitations  and leg swelling.   Gastrointestinal: Negative for abdominal pain, diarrhea, nausea and vomiting.   Genitourinary: Negative for dysuria.   Musculoskeletal: Positive for back pain, joint pain and myalgias.   Skin: Negative for itching and rash.   Neurological: Positive for weakness. Negative for dizziness and headaches.        Physical Exam  Temp:  [36.5 °C (97.7 °F)-37 °C (98.6 °F)] 36.8 °C (98.2 °F)  Pulse:  [70-86] 86  Resp:  [16-18] 17  BP: (119-132)/(60-69) 129/69  SpO2:  [95 %-100 %] 98 %    Physical Exam  Vitals signs and nursing note reviewed.   Constitutional:       General: She is not in acute distress.     Appearance: Normal appearance. She is not ill-appearing.   HENT:      Nose: Nose normal. No congestion.      Mouth/Throat:      Mouth: Mucous membranes are moist.      Pharynx: Oropharynx is clear.   Eyes:      Conjunctiva/sclera: Conjunctivae normal.   Neck:      Musculoskeletal: Normal range of motion. No neck rigidity or muscular tenderness.   Cardiovascular:      Rate and Rhythm: Normal rate and regular rhythm.      Heart sounds: Normal heart sounds. No murmur.   Pulmonary:      Effort: Pulmonary effort is normal. No respiratory distress.      Breath sounds: Normal breath sounds. No wheezing.   Abdominal:      General: Bowel sounds are normal. There is no distension.      Palpations: Abdomen is soft.      Tenderness: There is no tenderness.   Musculoskeletal:         General: Tenderness present.      Right lower leg: No edema.      Left lower leg: No edema.      Comments: Mobility limited by pain.    Low back tenderness.    Skin:     General: Skin is warm and dry.      Coloration: Skin is not jaundiced or pale.   Neurological:      General: No focal deficit present.      Mental Status: She is alert and oriented to person, place, and time. Mental status is at baseline.      Cranial Nerves: No cranial nerve deficit.   Psychiatric:         Mood and Affect: Mood normal.         Behavior: Behavior normal.          Thought Content: Thought content normal.         Judgment: Judgment normal.         Fluids    Intake/Output Summary (Last 24 hours) at 1/14/2020 1208  Last data filed at 1/14/2020 0745  Gross per 24 hour   Intake 410 ml   Output 100 ml   Net 310 ml       Laboratory  Recent Labs     01/12/20 0417 01/13/20 0429 01/14/20  0519   WBC 9.7 7.5 7.7   RBC 3.31* 3.67* 3.55*   HEMOGLOBIN 11.1* 12.2 11.9*   HEMATOCRIT 32.8* 37.1 35.7*   MCV 99.1* 101.1* 100.6*   MCH 33.5* 33.2* 33.5*   MCHC 33.8 32.9* 33.3*   RDW 50.4* 50.5* 49.4   PLATELETCT 239 246 259   MPV 9.0 9.1 9.0     Recent Labs     01/12/20 0417 01/13/20 0429 01/14/20  0449   SODIUM 134* 137 136   POTASSIUM 4.2 4.4 4.5   CHLORIDE 105 107 105   CO2 21 19* 22   GLUCOSE 132* 96 90   BUN 45* 38* 29*   CREATININE 1.45* 1.08 1.01   CALCIUM 8.6 8.1* 8.1*                   Imaging  CT-PELVIS W/O PLUS RECONS   Final Result         1.  No acute abnormality.   2.  Atherosclerosis      CT-LSPINE W/O PLUS RECONS   Final Result         1.  Left L1 transverse process fracture, may be chronic, otherwise no acute traumatic lumbar spine injury identified   2.  Anterolisthesis L2 on L3 and L3 on L4, appears most likely secondary to severe facet arthrosis.   3.  Chronic appearing L1 vertebral plana with 8.3 mm retropulsion and large central Schmorl's node   4.  Atherosclerosis           Assessment/Plan  * Intractable back pain- (present on admission)  Assessment & Plan  L1 transverse process fracture on CT.  Will discuss with NSG  ?sciatica  Pain not controlled well  Start robaxin  Continue lidocaine patches, neurontin, and decadron  Decrease dilaudid frequency  PT and OT following  Will need SNF at discharge.       Hypothyroidism- (present on admission)  Assessment & Plan  Synthroid    Leg DVT (deep venous thromboembolism), chronic, right ([facility])- (present on admission)  Assessment & Plan  Lovenox prophylaxis    Hypomagnesemia- (present on admission)  Assessment & Plan  oral  Mg    CKD (chronic kidney disease) stage 3, GFR 30-59 ml/min ([facility])- (present on admission)  Assessment & Plan  Creatinine much improved with IV hydration.  Hold Lasix and Aldactone    Hypokalemia- (present on admission)  Assessment & Plan  Resolved    Rheumatoid arthritis ([facility])- (present on admission)  Assessment & Plan  Plaquenil    Depression- (present on admission)  Assessment & Plan  Citalopram         VTE prophylaxis: Lovenox.

## 2020-01-14 NOTE — PROGRESS NOTES
This is an 84-year-old female with a past medical history of CKD, stroke, osteoporosis, rheumatoid arthritis, hypothyroidism, and depression admitted with low back pain after mechanical ground-level fall.  The patient was attempting to sit in a chair when the chair rolled out from underneath her causing her to land on the floor.  She developed acute low back pain leading her to present to the ED.    On admission CT L-spine did reveal left L1 transverse process fracture that is possibly chronic.  No other acute fracture noted.  CT pelvis was negative for acute abnormality.  She remained in the hospital for pain control and therapy.  She has been initiated on Neurontin, Decadron, and lidocaine patches.  Her pain is overall improving, although she still continues to need significant assistance with mobilizing secondary to her pain.    The patient also takes Spironolactone and Lasix as an outpatient for edema.  On admission she was noted to have worsening of her underlying CKD.  Her diuretics were held with improvement of her creatinine.  As the patient has no evidence of edema we will continue to hold these medications.

## 2020-01-14 NOTE — PROGRESS NOTES
Assumed care of patient. Patient is shaking and crying in pain. Medicated per MAR. Patient unable to ambulate or have much bed mobility due to pain. Staff assisting patient with turning and applying pillows to reposition her. No other needs at this time. Call light within reach.

## 2020-01-14 NOTE — CARE PLAN
Problem: Mobility  Goal: Risk for activity intolerance will decrease  Outcome: PROGRESSING SLOWER THAN EXPECTED  Patient refusing to ambulate due to increase in pain.    Problem: Pain Management  Goal: Pain level will decrease to patient's comfort goal  Outcome: PROGRESSING SLOWER THAN EXPECTED  Patient has severe pain despite medications and heat.

## 2020-01-14 NOTE — THERAPY
"Physical Therapy Contact Note    PT tx attempted. Upon entering the room pt was noted to be talking on the phone regarding her pain to a doctor however there was no one on the phone line. Pt had nasal cannula on her forehead and SpO2 56%. RN was notified and attended to patient. Pt stating that she has excrutiating pain and would like to be assessed by a \"spine doctor\". Mobility deferred 2/2 low oxygen saturation and significant pain. Will attempt PT tx session as able.     Rachele Ann, PT, DPT  "

## 2020-01-15 ENCOUNTER — APPOINTMENT (OUTPATIENT)
Dept: RADIOLOGY | Facility: MEDICAL CENTER | Age: 85
DRG: 456 | End: 2020-01-15
Attending: HOSPITALIST
Payer: MEDICARE

## 2020-01-15 PROBLEM — R60.9 PERIPHERAL EDEMA: Status: ACTIVE | Noted: 2020-01-15

## 2020-01-15 PROBLEM — R60.0 PERIPHERAL EDEMA: Status: ACTIVE | Noted: 2020-01-15

## 2020-01-15 LAB
ANION GAP SERPL CALC-SCNC: 8 MMOL/L (ref 0–11.9)
BASOPHILS # BLD AUTO: 0.1 % (ref 0–1.8)
BASOPHILS # BLD: 0.01 K/UL (ref 0–0.12)
BUN SERPL-MCNC: 26 MG/DL (ref 8–22)
CALCIUM SERPL-MCNC: 8.2 MG/DL (ref 8.5–10.5)
CHLORIDE SERPL-SCNC: 103 MMOL/L (ref 96–112)
CO2 SERPL-SCNC: 22 MMOL/L (ref 20–33)
CREAT SERPL-MCNC: 0.98 MG/DL (ref 0.5–1.4)
EOSINOPHIL # BLD AUTO: 0.04 K/UL (ref 0–0.51)
EOSINOPHIL NFR BLD: 0.6 % (ref 0–6.9)
ERYTHROCYTE [DISTWIDTH] IN BLOOD BY AUTOMATED COUNT: 48.7 FL (ref 35.9–50)
GLUCOSE SERPL-MCNC: 91 MG/DL (ref 65–99)
HCT VFR BLD AUTO: 36.8 % (ref 37–47)
HGB BLD-MCNC: 12.2 G/DL (ref 12–16)
IMM GRANULOCYTES # BLD AUTO: 0.05 K/UL (ref 0–0.11)
IMM GRANULOCYTES NFR BLD AUTO: 0.7 % (ref 0–0.9)
LYMPHOCYTES # BLD AUTO: 1.84 K/UL (ref 1–4.8)
LYMPHOCYTES NFR BLD: 26.9 % (ref 22–41)
MCH RBC QN AUTO: 33.4 PG (ref 27–33)
MCHC RBC AUTO-ENTMCNC: 33.2 G/DL (ref 33.6–35)
MCV RBC AUTO: 100.8 FL (ref 81.4–97.8)
MONOCYTES # BLD AUTO: 0.74 K/UL (ref 0–0.85)
MONOCYTES NFR BLD AUTO: 10.8 % (ref 0–13.4)
NEUTROPHILS # BLD AUTO: 4.16 K/UL (ref 2–7.15)
NEUTROPHILS NFR BLD: 60.9 % (ref 44–72)
NRBC # BLD AUTO: 0 K/UL
NRBC BLD-RTO: 0 /100 WBC
PLATELET # BLD AUTO: 240 K/UL (ref 164–446)
PMV BLD AUTO: 8.8 FL (ref 9–12.9)
POTASSIUM SERPL-SCNC: 4.9 MMOL/L (ref 3.6–5.5)
RBC # BLD AUTO: 3.65 M/UL (ref 4.2–5.4)
SODIUM SERPL-SCNC: 133 MMOL/L (ref 135–145)
WBC # BLD AUTO: 6.8 K/UL (ref 4.8–10.8)

## 2020-01-15 PROCEDURE — 770006 HCHG ROOM/CARE - MED/SURG/GYN SEMI*

## 2020-01-15 PROCEDURE — 700101 HCHG RX REV CODE 250: Performed by: FAMILY MEDICINE

## 2020-01-15 PROCEDURE — 700111 HCHG RX REV CODE 636 W/ 250 OVERRIDE (IP): Performed by: FAMILY MEDICINE

## 2020-01-15 PROCEDURE — A9270 NON-COVERED ITEM OR SERVICE: HCPCS | Performed by: HOSPITALIST

## 2020-01-15 PROCEDURE — A9270 NON-COVERED ITEM OR SERVICE: HCPCS | Performed by: FAMILY MEDICINE

## 2020-01-15 PROCEDURE — 85025 COMPLETE CBC W/AUTO DIFF WBC: CPT

## 2020-01-15 PROCEDURE — 700102 HCHG RX REV CODE 250 W/ 637 OVERRIDE(OP): Performed by: HOSPITALIST

## 2020-01-15 PROCEDURE — 36415 COLL VENOUS BLD VENIPUNCTURE: CPT

## 2020-01-15 PROCEDURE — 99233 SBSQ HOSP IP/OBS HIGH 50: CPT | Performed by: HOSPITALIST

## 2020-01-15 PROCEDURE — 700102 HCHG RX REV CODE 250 W/ 637 OVERRIDE(OP): Performed by: FAMILY MEDICINE

## 2020-01-15 PROCEDURE — 700102 HCHG RX REV CODE 250 W/ 637 OVERRIDE(OP): Performed by: NURSE PRACTITIONER

## 2020-01-15 PROCEDURE — A9270 NON-COVERED ITEM OR SERVICE: HCPCS | Performed by: NURSE PRACTITIONER

## 2020-01-15 PROCEDURE — 96372 THER/PROPH/DIAG INJ SC/IM: CPT

## 2020-01-15 PROCEDURE — 80048 BASIC METABOLIC PNL TOTAL CA: CPT

## 2020-01-15 PROCEDURE — 72148 MRI LUMBAR SPINE W/O DYE: CPT

## 2020-01-15 RX ORDER — SPIRONOLACTONE 25 MG/1
25 TABLET ORAL
Status: DISCONTINUED | OUTPATIENT
Start: 2020-01-15 | End: 2020-01-16

## 2020-01-15 RX ADMIN — DEXAMETHASONE 4 MG: 4 TABLET ORAL at 05:06

## 2020-01-15 RX ADMIN — METHOCARBAMOL TABLETS 500 MG: 500 TABLET, COATED ORAL at 12:07

## 2020-01-15 RX ADMIN — CITALOPRAM HYDROBROMIDE 20 MG: 20 TABLET ORAL at 20:33

## 2020-01-15 RX ADMIN — LIDOCAINE 2 PATCH: 50 PATCH CUTANEOUS at 12:03

## 2020-01-15 RX ADMIN — OMEPRAZOLE 20 MG: 20 CAPSULE, DELAYED RELEASE ORAL at 17:43

## 2020-01-15 RX ADMIN — TRAZODONE HYDROCHLORIDE 25 MG: 50 TABLET ORAL at 17:43

## 2020-01-15 RX ADMIN — OXYCODONE HYDROCHLORIDE 10 MG: 5 TABLET ORAL at 15:56

## 2020-01-15 RX ADMIN — DEXAMETHASONE 4 MG: 4 TABLET ORAL at 17:43

## 2020-01-15 RX ADMIN — METHOCARBAMOL TABLETS 500 MG: 500 TABLET, COATED ORAL at 07:27

## 2020-01-15 RX ADMIN — OXYCODONE HYDROCHLORIDE 10 MG: 5 TABLET ORAL at 01:46

## 2020-01-15 RX ADMIN — OXYCODONE HYDROCHLORIDE 10 MG: 5 TABLET ORAL at 10:44

## 2020-01-15 RX ADMIN — LEVOTHYROXINE SODIUM 100 MCG: 50 TABLET ORAL at 05:06

## 2020-01-15 RX ADMIN — SPIRONOLACTONE 25 MG: 50 TABLET ORAL at 12:27

## 2020-01-15 RX ADMIN — METHOCARBAMOL TABLETS 500 MG: 500 TABLET, COATED ORAL at 20:33

## 2020-01-15 RX ADMIN — ENOXAPARIN SODIUM 40 MG: 100 INJECTION SUBCUTANEOUS at 05:06

## 2020-01-15 RX ADMIN — GABAPENTIN 100 MG: 100 CAPSULE ORAL at 05:06

## 2020-01-15 RX ADMIN — SENNOSIDES AND DOCUSATE SODIUM 2 TABLET: 8.6; 5 TABLET ORAL at 05:06

## 2020-01-15 RX ADMIN — HYDROXYCHLOROQUINE SULFATE 200 MG: 200 TABLET ORAL at 05:06

## 2020-01-15 RX ADMIN — Medication 400 MG: at 17:43

## 2020-01-15 RX ADMIN — OXYCODONE HYDROCHLORIDE 10 MG: 5 TABLET ORAL at 05:06

## 2020-01-15 RX ADMIN — METHOCARBAMOL TABLETS 500 MG: 500 TABLET, COATED ORAL at 15:56

## 2020-01-15 RX ADMIN — GABAPENTIN 100 MG: 100 CAPSULE ORAL at 12:03

## 2020-01-15 RX ADMIN — Medication 400 MG: at 05:06

## 2020-01-15 RX ADMIN — GABAPENTIN 100 MG: 100 CAPSULE ORAL at 17:43

## 2020-01-15 ASSESSMENT — ENCOUNTER SYMPTOMS
CONSTIPATION: 0
NAUSEA: 0
HEADACHES: 0
TREMORS: 0
SORE THROAT: 0
TINGLING: 0
EYE DISCHARGE: 0
DIARRHEA: 0
BLURRED VISION: 0
DIZZINESS: 0
WEAKNESS: 1
FEVER: 0
BACK PAIN: 1
MYALGIAS: 1
VOMITING: 0
NECK PAIN: 0
ABDOMINAL PAIN: 0
SHORTNESS OF BREATH: 0

## 2020-01-15 NOTE — PROGRESS NOTES
Bedside report received.  Assessment complete.  A&O x 4. Patient calls appropriately.  Patient ambulates with two assist. Bed alarm off.   Patient has 6/10 pain. Pain managed with prescribed medications.  Denies N&V. Tolerating regular diet.  + void via strait cath, + flatus, - BM.  Patient denies SOB.  SCD's off.  Patient is calm and cooperative resting in bed.  Review plan with of care with patient. Call light and personal belongings with in reach. Hourly rounding in place. All needs met at this time.

## 2020-01-15 NOTE — PROGRESS NOTES
"Assumed care of patient. Patient smiling in bed and stating she \"feels pretty good\". Patient requesting to get \"washed up\". This RN will assess ability to stand up and take a real shower. Patient denying need to void at this time. Will bladder scan q6h. No other needs at this time. Call light within reach.   "

## 2020-01-15 NOTE — ASSESSMENT & PLAN NOTE
Neurosurgery following.  MRI of L-spine found retropulsed fractures of T12 and L1 patient was taken to surgery

## 2020-01-15 NOTE — CARE PLAN
Problem: Venous Thromboembolism (VTW)/Deep Vein Thrombosis (DVT) Prevention:  Goal: Patient will participate in Venous Thrombosis (VTE)/Deep Vein Thrombosis (DVT)Prevention Measures  Outcome: PROGRESSING SLOWER THAN EXPECTED  Patient refusing to ambulate despite education on risks.    Problem: Discharge Barriers/Planning  Goal: Patient's continuum of care needs will be met  Outcome: PROGRESSING SLOWER THAN EXPECTED  Patient unable and refusing to try to ambulate stating pain is too high. Patient would like to go home with .

## 2020-01-15 NOTE — PROGRESS NOTES
[facility] Medicine Daily Progress Note    Date of Service  1/15/2020    Chief Complaint  84 y.o. female admitted 1/9/2020 with back pain.     Hospital Course    This is an 84-year-old female with a past medical history of CKD, stroke, osteoporosis, rheumatoid arthritis, hypothyroidism, and depression admitted with low back pain after mechanical ground-level fall.  The patient was attempting to sit in a chair when the chair rolled out from underneath her causing her to land on the floor.  She developed acute low back pain leading her to present to the ED.    On admission CT L-spine did reveal left L1 transverse process fracture that is possibly chronic.  No other acute fracture noted.  CT pelvis was negative for acute abnormality.  She remained in the hospital for pain control and therapy.  She has been initiated on Neurontin, Decadron, and lidocaine patches.  Her pain is overall improving, although she still continues to need significant assistance with mobilizing secondary to her pain.    The patient also takes Spironolactone and Lasix as an outpatient for edema.  On admission she was noted to have worsening of her underlying CKD.  Her diuretics were held with improvement of her creatinine.  As the patient has no evidence of edema we will continue to hold these medications.      Interval Problem Update  1/14:  Patient sleepy this morning and requiring supplemental oxygen.  Suspect secondary to pain medication.  Decrease frequency of IV dilaudid.  Continues to complain of uncontrolled back pain.  Medications adjusted.  1/15: MRI lumbar spine showing severe lumbar stenosis.  Neurosurgery consulted.  Patient continues to have severe low back pain.  Continue current medication regimen.  She did have an episode of urinary retention requiring straight catheter overnight.  This appears to be resolved now.    Consultants/Specialty  Neurosurgery.    Code Status  FULL    Disposition  Await recommendations per  neurosurgery.    Review of Systems  Review of Systems   Constitutional: Positive for malaise/fatigue. Negative for fever.   HENT: Negative for congestion and sore throat.    Eyes: Negative for blurred vision and discharge.   Respiratory: Negative for shortness of breath.    Cardiovascular: Negative for chest pain and leg swelling.   Gastrointestinal: Negative for abdominal pain, constipation, diarrhea, nausea and vomiting.   Genitourinary: Negative for dysuria and hematuria.   Musculoskeletal: Positive for back pain, joint pain and myalgias. Negative for neck pain.   Skin: Negative for rash.   Neurological: Positive for weakness. Negative for dizziness, tingling, tremors and headaches.        Physical Exam  Temp:  [36.2 °C (97.2 °F)-37.6 °C (99.6 °F)] 36.3 °C (97.3 °F)  Pulse:  [67-82] 82  Resp:  [16-18] 18  BP: (107-144)/(54-73) 113/60  SpO2:  [96 %-99 %] 98 %    Physical Exam  Vitals signs and nursing note reviewed.   Constitutional:       General: She is not in acute distress.     Appearance: Normal appearance. She is not toxic-appearing.   HENT:      Head: Normocephalic and atraumatic.      Nose: Nose normal.      Mouth/Throat:      Mouth: Mucous membranes are moist.      Pharynx: Oropharynx is clear. No oropharyngeal exudate.   Eyes:      General:         Right eye: No discharge.         Left eye: No discharge.      Conjunctiva/sclera: Conjunctivae normal.   Neck:      Musculoskeletal: Normal range of motion. No neck rigidity.   Cardiovascular:      Rate and Rhythm: Normal rate and regular rhythm.      Heart sounds: Normal heart sounds.   Pulmonary:      Effort: Pulmonary effort is normal. No respiratory distress.      Breath sounds: Normal breath sounds. No wheezing or rales.   Abdominal:      General: Bowel sounds are normal. There is no distension.      Palpations: Abdomen is soft.      Tenderness: There is no tenderness. There is no guarding.   Musculoskeletal:         General: Tenderness present. No  swelling.      Right lower leg: No edema.      Left lower leg: No edema.      Comments: Mobility limited by pain.    Low back tenderness.    Skin:     General: Skin is warm and dry.      Coloration: Skin is not jaundiced.   Neurological:      General: No focal deficit present.      Mental Status: She is alert and oriented to person, place, and time. Mental status is at baseline.      Cranial Nerves: No cranial nerve deficit.      Sensory: No sensory deficit.   Psychiatric:         Mood and Affect: Mood normal.         Behavior: Behavior normal.         Thought Content: Thought content normal.         Judgment: Judgment normal.         Fluids    Intake/Output Summary (Last 24 hours) at 1/15/2020 1234  Last data filed at 1/15/2020 1100  Gross per 24 hour   Intake 420 ml   Output 1800 ml   Net -1380 ml       Laboratory  Recent Labs     01/13/20 0429 01/14/20  0519 01/15/20  0541   WBC 7.5 7.7 6.8   RBC 3.67* 3.55* 3.65*   HEMOGLOBIN 12.2 11.9* 12.2   HEMATOCRIT 37.1 35.7* 36.8*   .1* 100.6* 100.8*   MCH 33.2* 33.5* 33.4*   MCHC 32.9* 33.3* 33.2*   RDW 50.5* 49.4 48.7   PLATELETCT 246 259 240   MPV 9.1 9.0 8.8*     Recent Labs     01/13/20 0429 01/14/20  0449 01/15/20  0541   SODIUM 137 136 133*   POTASSIUM 4.4 4.5 4.9   CHLORIDE 107 105 103   CO2 19* 22 22   GLUCOSE 96 90 91   BUN 38* 29* 26*   CREATININE 1.08 1.01 0.98   CALCIUM 8.1* 8.1* 8.2*                   Imaging  MR-LUMBAR SPINE-W/O   Final Result      1.  Multilevel subluxations with L3-4 grade 1 spondylolisthesis, mild anterolisthesis L2-L3 and L4-L5. Retrolisthesis T12-L1 and L1-L2 which is difficult to quantify due to the retropulsed fractures at T12 and L1.   2.  T12 moderate recent or subacute insufficiency compression fracture with retropulsion. Moderate central stenosis at the T12 level.   3.  L1 essentially complete chronic collapse marked retropulsion and chronic sclerotic change seen on CT. No change from 1/12/2018. Moderate central stenosis  at the L1 level due to prominent retropulsion up to about 10 mm.   4.  Additional multilevel degenerative disc disease and spondylotic change with L3-4 severe central stenosis at L4-5 severe central stenosis.   5.  Multilevel foraminal stenoses most notable for L3-4 severe right foraminal stenosis.   6.  Details for each level above in the body of report.      CT-PELVIS W/O PLUS RECONS   Final Result         1.  No acute abnormality.   2.  Atherosclerosis      CT-LSPINE W/O PLUS RECONS   Final Result         1.  Left L1 transverse process fracture, may be chronic, otherwise no acute traumatic lumbar spine injury identified   2.  Anterolisthesis L2 on L3 and L3 on L4, appears most likely secondary to severe facet arthrosis.   3.  Chronic appearing L1 vertebral plana with 8.3 mm retropulsion and large central Schmorl's node   4.  Atherosclerosis           Assessment/Plan  * Intractable back pain- (present on admission)  Assessment & Plan  L1 transverse process fracture on CT.    MRI showing severe lumbar stenosis.  Neurosurgery consulted.  Will discuss surgery options with the patient.  Pain not controlled well  Continue Robaxin, lidocaine patches, neurontin, and decadron  Dilaudid as needed for breakthrough pain.  PT and OT following        Hypothyroidism- (present on admission)  Assessment & Plan  Synthroid    Leg DVT (deep venous thromboembolism), chronic, right ([facility])- (present on admission)  Assessment & Plan  Lovenox prophylaxis    Hypomagnesemia- (present on admission)  Assessment & Plan  oral Mg    CKD (chronic kidney disease) stage 3, GFR 30-59 ml/min ([facility])- (present on admission)  Assessment & Plan  Creatinine much improved with IV hydration.  Continue to hold Lasix.    Hypokalemia- (present on admission)  Assessment & Plan  Resolved    Rheumatoid arthritis ([facility])- (present on admission)  Assessment & Plan  Plaquenil    Peripheral edema  Assessment & Plan  Patient has a history of peripheral edema for which  she takes Lasix and Aldactone as an outpatient.  These medications were held secondary to her kidney function.  Her kidney function is now improved and the patient is requesting restarting of her diuretics.  No significant peripheral edema on physical exam.  Will restart Aldactone from for now.  Monitor kidney function.    Lumbar transverse process fracture (HCC)  Assessment & Plan  Neurosurgery following.    Depression- (present on admission)  Assessment & Plan  Citalopram       VTE prophylaxis: Lovenox.

## 2020-01-16 ENCOUNTER — ANESTHESIA EVENT (OUTPATIENT)
Dept: SURGERY | Facility: MEDICAL CENTER | Age: 85
DRG: 456 | End: 2020-01-16
Payer: MEDICARE

## 2020-01-16 ENCOUNTER — ANESTHESIA (OUTPATIENT)
Dept: SURGERY | Facility: MEDICAL CENTER | Age: 85
DRG: 456 | End: 2020-01-16
Payer: MEDICARE

## 2020-01-16 ENCOUNTER — HOSPITAL ENCOUNTER (OUTPATIENT)
Dept: RADIOLOGY | Facility: MEDICAL CENTER | Age: 85
End: 2020-01-16
Attending: PHYSICIAN ASSISTANT

## 2020-01-16 ENCOUNTER — APPOINTMENT (OUTPATIENT)
Dept: RADIOLOGY | Facility: MEDICAL CENTER | Age: 85
DRG: 456 | End: 2020-01-16
Attending: INTERNAL MEDICINE
Payer: MEDICARE

## 2020-01-16 ENCOUNTER — APPOINTMENT (OUTPATIENT)
Dept: RADIOLOGY | Facility: MEDICAL CENTER | Age: 85
DRG: 456 | End: 2020-01-16
Attending: NEUROLOGICAL SURGERY
Payer: MEDICARE

## 2020-01-16 PROBLEM — G93.40 ENCEPHALOPATHY ACUTE: Status: ACTIVE | Noted: 2020-01-16

## 2020-01-16 PROBLEM — R57.9 SHOCK (HCC): Status: ACTIVE | Noted: 2020-01-16

## 2020-01-16 PROBLEM — M48.062 SPINAL STENOSIS OF LUMBAR REGION WITH NEUROGENIC CLAUDICATION: Status: ACTIVE | Noted: 2020-01-10

## 2020-01-16 LAB
ACTION RANGE TRIGGERED IACRT: NO
ANION GAP SERPL CALC-SCNC: 13 MMOL/L (ref 0–11.9)
ANION GAP SERPL CALC-SCNC: 9 MMOL/L (ref 0–11.9)
APTT PPP: 26.6 SEC (ref 24.7–36)
BASE EXCESS BLDA CALC-SCNC: -11 MMOL/L (ref -4–3)
BASOPHILS # BLD AUTO: 0.1 % (ref 0–1.8)
BASOPHILS # BLD AUTO: 0.2 % (ref 0–1.8)
BASOPHILS # BLD: 0.01 K/UL (ref 0–0.12)
BASOPHILS # BLD: 0.04 K/UL (ref 0–0.12)
BODY TEMPERATURE: ABNORMAL DEGREES
BUN SERPL-MCNC: 24 MG/DL (ref 8–22)
BUN SERPL-MCNC: 26 MG/DL (ref 8–22)
CALCIUM SERPL-MCNC: 7.3 MG/DL (ref 8.5–10.5)
CALCIUM SERPL-MCNC: 8.2 MG/DL (ref 8.5–10.5)
CFT BLD TEG: 1.8 MIN (ref 5–10)
CHLORIDE SERPL-SCNC: 103 MMOL/L (ref 96–112)
CHLORIDE SERPL-SCNC: 104 MMOL/L (ref 96–112)
CLOT ANGLE BLD TEG: 79.3 DEGREES (ref 53–72)
CLOT LYSIS 30M P MA LENFR BLD TEG: 0.5 % (ref 0–8)
CO2 BLDA-SCNC: 13 MMOL/L (ref 20–33)
CO2 SERPL-SCNC: 15 MMOL/L (ref 20–33)
CO2 SERPL-SCNC: 20 MMOL/L (ref 20–33)
CREAT SERPL-MCNC: 0.95 MG/DL (ref 0.5–1.4)
CREAT SERPL-MCNC: 1.46 MG/DL (ref 0.5–1.4)
CT.EXTRINSIC BLD ROTEM: 0.8 MIN (ref 1–3)
EKG IMPRESSION: NORMAL
EOSINOPHIL # BLD AUTO: 0.01 K/UL (ref 0–0.51)
EOSINOPHIL # BLD AUTO: 0.06 K/UL (ref 0–0.51)
EOSINOPHIL NFR BLD: 0.1 % (ref 0–6.9)
EOSINOPHIL NFR BLD: 0.8 % (ref 0–6.9)
ERYTHROCYTE [DISTWIDTH] IN BLOOD BY AUTOMATED COUNT: 46.9 FL (ref 35.9–50)
ERYTHROCYTE [DISTWIDTH] IN BLOOD BY AUTOMATED COUNT: 50.3 FL (ref 35.9–50)
GLUCOSE SERPL-MCNC: 203 MG/DL (ref 65–99)
GLUCOSE SERPL-MCNC: 94 MG/DL (ref 65–99)
HCO3 BLDA-SCNC: 12.4 MMOL/L (ref 17–25)
HCT VFR BLD AUTO: 19.7 % (ref 37–47)
HCT VFR BLD AUTO: 23 % (ref 37–47)
HCT VFR BLD AUTO: 26 % (ref 37–47)
HCT VFR BLD AUTO: 35.4 % (ref 37–47)
HGB BLD-MCNC: 11.9 G/DL (ref 12–16)
HGB BLD-MCNC: 6.2 G/DL (ref 12–16)
HGB BLD-MCNC: 7.3 G/DL (ref 12–16)
HGB BLD-MCNC: 8.1 G/DL (ref 12–16)
HOROWITZ INDEX BLDA+IHG-RTO: 3160 MM[HG]
IMM GRANULOCYTES # BLD AUTO: 0.04 K/UL (ref 0–0.11)
IMM GRANULOCYTES # BLD AUTO: 0.43 K/UL (ref 0–0.11)
IMM GRANULOCYTES NFR BLD AUTO: 0.5 % (ref 0–0.9)
IMM GRANULOCYTES NFR BLD AUTO: 2.6 % (ref 0–0.9)
INR PPP: 1.07 (ref 0.87–1.13)
INR PPP: 1.29 (ref 0.87–1.13)
INST. QUALIFIED PATIENT IIQPT: YES
LACTATE BLD-SCNC: 7.5 MMOL/L (ref 0.5–2)
LACTATE BLD-SCNC: 9.3 MMOL/L (ref 0.5–2)
LYMPHOCYTES # BLD AUTO: 1.2 K/UL (ref 1–4.8)
LYMPHOCYTES # BLD AUTO: 1.65 K/UL (ref 1–4.8)
LYMPHOCYTES NFR BLD: 21.7 % (ref 22–41)
LYMPHOCYTES NFR BLD: 7.2 % (ref 22–41)
MCF BLD TEG: 71.3 MM (ref 50–70)
MCH RBC QN AUTO: 33.3 PG (ref 27–33)
MCH RBC QN AUTO: 33.4 PG (ref 27–33)
MCHC RBC AUTO-ENTMCNC: 31.7 G/DL (ref 33.6–35)
MCHC RBC AUTO-ENTMCNC: 33.6 G/DL (ref 33.6–35)
MCV RBC AUTO: 105 FL (ref 81.4–97.8)
MCV RBC AUTO: 99.4 FL (ref 81.4–97.8)
MONOCYTES # BLD AUTO: 0.76 K/UL (ref 0–0.85)
MONOCYTES # BLD AUTO: 1.47 K/UL (ref 0–0.85)
MONOCYTES NFR BLD AUTO: 10 % (ref 0–13.4)
MONOCYTES NFR BLD AUTO: 8.8 % (ref 0–13.4)
NEUTROPHILS # BLD AUTO: 13.55 K/UL (ref 2–7.15)
NEUTROPHILS # BLD AUTO: 5.08 K/UL (ref 2–7.15)
NEUTROPHILS NFR BLD: 66.9 % (ref 44–72)
NEUTROPHILS NFR BLD: 81.1 % (ref 44–72)
NRBC # BLD AUTO: 0 K/UL
NRBC # BLD AUTO: 0 K/UL
NRBC BLD-RTO: 0 /100 WBC
NRBC BLD-RTO: 0 /100 WBC
O2/TOTAL GAS SETTING VFR VENT: 5 %
PA AA BLD-ACNC: 13.3 %
PA ADP BLD-ACNC: 38.8 %
PCO2 BLDA: 20.8 MMHG (ref 26–37)
PCO2 TEMP ADJ BLDA: 20 MMHG (ref 26–37)
PH BLDA: 7.38 [PH] (ref 7.4–7.5)
PH TEMP ADJ BLDA: 7.4 [PH] (ref 7.4–7.5)
PLATELET # BLD AUTO: 239 K/UL (ref 164–446)
PLATELET # BLD AUTO: 250 K/UL (ref 164–446)
PMV BLD AUTO: 8.7 FL (ref 9–12.9)
PMV BLD AUTO: 9 FL (ref 9–12.9)
PO2 BLDA: 158 MMHG (ref 64–87)
PO2 TEMP ADJ BLDA: 153 MMHG (ref 64–87)
POTASSIUM SERPL-SCNC: 4.4 MMOL/L (ref 3.6–5.5)
POTASSIUM SERPL-SCNC: 5.4 MMOL/L (ref 3.6–5.5)
PROTHROMBIN TIME: 14.2 SEC (ref 12–14.6)
PROTHROMBIN TIME: 16.4 SEC (ref 12–14.6)
RBC # BLD AUTO: 2.19 M/UL (ref 4.2–5.4)
RBC # BLD AUTO: 3.56 M/UL (ref 4.2–5.4)
SAO2 % BLDA: 99 % (ref 93–99)
SODIUM SERPL-SCNC: 131 MMOL/L (ref 135–145)
SODIUM SERPL-SCNC: 133 MMOL/L (ref 135–145)
SPECIMEN DRAWN FROM PATIENT: ABNORMAL
TEG ALGORITHM TGALG: ABNORMAL
TROPONIN T SERPL-MCNC: 31 NG/L (ref 6–19)
WBC # BLD AUTO: 16.7 K/UL (ref 4.8–10.8)
WBC # BLD AUTO: 7.6 K/UL (ref 4.8–10.8)

## 2020-01-16 PROCEDURE — 85730 THROMBOPLASTIN TIME PARTIAL: CPT

## 2020-01-16 PROCEDURE — 84295 ASSAY OF SERUM SODIUM: CPT

## 2020-01-16 PROCEDURE — 36600 WITHDRAWAL OF ARTERIAL BLOOD: CPT

## 2020-01-16 PROCEDURE — 700111 HCHG RX REV CODE 636 W/ 250 OVERRIDE (IP): Performed by: HOSPITALIST

## 2020-01-16 PROCEDURE — 700101 HCHG RX REV CODE 250: Performed by: PHYSICIAN ASSISTANT

## 2020-01-16 PROCEDURE — C1713 ANCHOR/SCREW BN/BN,TIS/BN: HCPCS | Performed by: NEUROLOGICAL SURGERY

## 2020-01-16 PROCEDURE — 93010 ELECTROCARDIOGRAM REPORT: CPT | Mod: GZ | Performed by: INTERNAL MEDICINE

## 2020-01-16 PROCEDURE — 160009 HCHG ANES TIME/MIN: Performed by: NEUROLOGICAL SURGERY

## 2020-01-16 PROCEDURE — 700105 HCHG RX REV CODE 258: Performed by: ANESTHESIOLOGY

## 2020-01-16 PROCEDURE — 01NB0ZZ RELEASE LUMBAR NERVE, OPEN APPROACH: ICD-10-PCS | Performed by: NEUROLOGICAL SURGERY

## 2020-01-16 PROCEDURE — 700111 HCHG RX REV CODE 636 W/ 250 OVERRIDE (IP): Performed by: ANESTHESIOLOGY

## 2020-01-16 PROCEDURE — A9270 NON-COVERED ITEM OR SERVICE: HCPCS | Performed by: FAMILY MEDICINE

## 2020-01-16 PROCEDURE — 02HV33Z INSERTION OF INFUSION DEVICE INTO SUPERIOR VENA CAVA, PERCUTANEOUS APPROACH: ICD-10-PCS | Performed by: INTERNAL MEDICINE

## 2020-01-16 PROCEDURE — 0RGA071 FUSION OF THORACOLUMBAR VERTEBRAL JOINT WITH AUTOLOGOUS TISSUE SUBSTITUTE, POSTERIOR APPROACH, POSTERIOR COLUMN, OPEN APPROACH: ICD-10-PCS | Performed by: NEUROLOGICAL SURGERY

## 2020-01-16 PROCEDURE — 85347 COAGULATION TIME ACTIVATED: CPT

## 2020-01-16 PROCEDURE — 01N80ZZ RELEASE THORACIC NERVE, OPEN APPROACH: ICD-10-PCS | Performed by: NEUROLOGICAL SURGERY

## 2020-01-16 PROCEDURE — 700101 HCHG RX REV CODE 250: Performed by: NEUROLOGICAL SURGERY

## 2020-01-16 PROCEDURE — 700101 HCHG RX REV CODE 250: Performed by: INTERNAL MEDICINE

## 2020-01-16 PROCEDURE — 160042 HCHG SURGERY MINUTES - EA ADDL 1 MIN LEVEL 5: Performed by: NEUROLOGICAL SURGERY

## 2020-01-16 PROCEDURE — 700105 HCHG RX REV CODE 258: Performed by: INTERNAL MEDICINE

## 2020-01-16 PROCEDURE — 85576 BLOOD PLATELET AGGREGATION: CPT

## 2020-01-16 PROCEDURE — 82330 ASSAY OF CALCIUM: CPT

## 2020-01-16 PROCEDURE — 160031 HCHG SURGERY MINUTES - 1ST 30 MINS LEVEL 5: Performed by: NEUROLOGICAL SURGERY

## 2020-01-16 PROCEDURE — 502000 HCHG MISC OR IMPLANTS RC 0278: Performed by: NEUROLOGICAL SURGERY

## 2020-01-16 PROCEDURE — 500367 HCHG DRAIN KIT, HEMOVAC: Performed by: NEUROLOGICAL SURGERY

## 2020-01-16 PROCEDURE — L8699 PROSTHETIC IMPLANT NOS: HCPCS | Performed by: NEUROLOGICAL SURGERY

## 2020-01-16 PROCEDURE — 110371 HCHG SHELL REV 272: Performed by: NEUROLOGICAL SURGERY

## 2020-01-16 PROCEDURE — 83605 ASSAY OF LACTIC ACID: CPT

## 2020-01-16 PROCEDURE — 72080 X-RAY EXAM THORACOLMB 2/> VW: CPT

## 2020-01-16 PROCEDURE — A9270 NON-COVERED ITEM OR SERVICE: HCPCS | Performed by: NURSE PRACTITIONER

## 2020-01-16 PROCEDURE — 80048 BASIC METABOLIC PNL TOTAL CA: CPT | Mod: 91

## 2020-01-16 PROCEDURE — 82803 BLOOD GASES ANY COMBINATION: CPT

## 2020-01-16 PROCEDURE — 160035 HCHG PACU - 1ST 60 MINS PHASE I: Performed by: NEUROLOGICAL SURGERY

## 2020-01-16 PROCEDURE — 700102 HCHG RX REV CODE 250 W/ 637 OVERRIDE(OP): Performed by: FAMILY MEDICINE

## 2020-01-16 PROCEDURE — 700101 HCHG RX REV CODE 250: Performed by: ANESTHESIOLOGY

## 2020-01-16 PROCEDURE — 85384 FIBRINOGEN ACTIVITY: CPT

## 2020-01-16 PROCEDURE — 93005 ELECTROCARDIOGRAM TRACING: CPT | Performed by: INTERNAL MEDICINE

## 2020-01-16 PROCEDURE — 3E0U0GB INTRODUCTION OF RECOMBINANT BONE MORPHOGENETIC PROTEIN INTO JOINTS, OPEN APPROACH: ICD-10-PCS | Performed by: NEUROLOGICAL SURGERY

## 2020-01-16 PROCEDURE — 85014 HEMATOCRIT: CPT

## 2020-01-16 PROCEDURE — C1751 CATH, INF, PER/CENT/MIDLINE: HCPCS

## 2020-01-16 PROCEDURE — 00UT07Z SUPPLEMENT SPINAL MENINGES WITH AUTOLOGOUS TISSUE SUBSTITUTE, OPEN APPROACH: ICD-10-PCS | Performed by: NEUROLOGICAL SURGERY

## 2020-01-16 PROCEDURE — 71045 X-RAY EXAM CHEST 1 VIEW: CPT

## 2020-01-16 PROCEDURE — 501838 HCHG SUTURE GENERAL: Performed by: NEUROLOGICAL SURGERY

## 2020-01-16 PROCEDURE — 700101 HCHG RX REV CODE 250

## 2020-01-16 PROCEDURE — 110454 HCHG SHELL REV 250: Performed by: NEUROLOGICAL SURGERY

## 2020-01-16 PROCEDURE — 160048 HCHG OR STATISTICAL LEVEL 1-5: Performed by: NEUROLOGICAL SURGERY

## 2020-01-16 PROCEDURE — 82962 GLUCOSE BLOOD TEST: CPT

## 2020-01-16 PROCEDURE — 85018 HEMOGLOBIN: CPT

## 2020-01-16 PROCEDURE — 700102 HCHG RX REV CODE 250 W/ 637 OVERRIDE(OP): Performed by: NURSE PRACTITIONER

## 2020-01-16 PROCEDURE — 99233 SBSQ HOSP IP/OBS HIGH 50: CPT | Performed by: HOSPITALIST

## 2020-01-16 PROCEDURE — 99292 CRITICAL CARE ADDL 30 MIN: CPT | Mod: 25 | Performed by: INTERNAL MEDICINE

## 2020-01-16 PROCEDURE — A9270 NON-COVERED ITEM OR SERVICE: HCPCS | Performed by: HOSPITALIST

## 2020-01-16 PROCEDURE — 84132 ASSAY OF SERUM POTASSIUM: CPT

## 2020-01-16 PROCEDURE — 700111 HCHG RX REV CODE 636 W/ 250 OVERRIDE (IP): Performed by: PHYSICIAN ASSISTANT

## 2020-01-16 PROCEDURE — 0SG1071 FUSION OF 2 OR MORE LUMBAR VERTEBRAL JOINTS WITH AUTOLOGOUS TISSUE SUBSTITUTE, POSTERIOR APPROACH, POSTERIOR COLUMN, OPEN APPROACH: ICD-10-PCS | Performed by: NEUROLOGICAL SURGERY

## 2020-01-16 PROCEDURE — 700102 HCHG RX REV CODE 250 W/ 637 OVERRIDE(OP): Performed by: HOSPITALIST

## 2020-01-16 PROCEDURE — 160002 HCHG RECOVERY MINUTES (STAT): Performed by: NEUROLOGICAL SURGERY

## 2020-01-16 PROCEDURE — 502240 HCHG MISC OR SUPPLY RC 0272: Performed by: NEUROLOGICAL SURGERY

## 2020-01-16 PROCEDURE — 0RG7071 FUSION OF 2 TO 7 THORACIC VERTEBRAL JOINTS WITH AUTOLOGOUS TISSUE SUBSTITUTE, POSTERIOR APPROACH, POSTERIOR COLUMN, OPEN APPROACH: ICD-10-PCS | Performed by: NEUROLOGICAL SURGERY

## 2020-01-16 PROCEDURE — P9045 ALBUMIN (HUMAN), 5%, 250 ML: HCPCS | Mod: JG | Performed by: INTERNAL MEDICINE

## 2020-01-16 PROCEDURE — 36556 INSERT NON-TUNNEL CV CATH: CPT | Mod: RT | Performed by: INTERNAL MEDICINE

## 2020-01-16 PROCEDURE — 500864 HCHG NEEDLE, SPINAL 18G: Performed by: NEUROLOGICAL SURGERY

## 2020-01-16 PROCEDURE — 85610 PROTHROMBIN TIME: CPT | Mod: 91

## 2020-01-16 PROCEDURE — L0458 TLSO 2MOD SYMPHIS-XIPHO PRE: HCPCS

## 2020-01-16 PROCEDURE — 99291 CRITICAL CARE FIRST HOUR: CPT | Mod: 25 | Performed by: INTERNAL MEDICINE

## 2020-01-16 PROCEDURE — 84484 ASSAY OF TROPONIN QUANT: CPT

## 2020-01-16 PROCEDURE — 700111 HCHG RX REV CODE 636 W/ 250 OVERRIDE (IP): Performed by: INTERNAL MEDICINE

## 2020-01-16 PROCEDURE — 770022 HCHG ROOM/CARE - ICU (200)

## 2020-01-16 PROCEDURE — 160036 HCHG PACU - EA ADDL 30 MINS PHASE I: Performed by: NEUROLOGICAL SURGERY

## 2020-01-16 PROCEDURE — L0150 CERV SEMI-RIG ADJ MOLDED CHN: HCPCS

## 2020-01-16 PROCEDURE — 36556 INSERT NON-TUNNEL CV CATH: CPT

## 2020-01-16 PROCEDURE — 85025 COMPLETE CBC W/AUTO DIFF WBC: CPT | Mod: 91

## 2020-01-16 DEVICE — CROSSLINK TSRH 5.5 MULTI 1.75 - (3TX2=6) 1.75-2.15: Type: IMPLANTABLE DEVICE | Status: FUNCTIONAL

## 2020-01-16 DEVICE — SEALANT DURAL AUTOSPRAY ADHERUS (5EA/PK): Type: IMPLANTABLE DEVICE | Status: FUNCTIONAL

## 2020-01-16 DEVICE — ROD STRAIGHT TITANIUM ALLOY ROD 5.5 X 500MM (1TCX3+3TCX2=9): Type: IMPLANTABLE DEVICE | Status: FUNCTIONAL

## 2020-01-16 DEVICE — GRAPH BONE KIT INFUSE MEDIUM: Type: IMPLANTABLE DEVICE | Status: FUNCTIONAL

## 2020-01-16 DEVICE — DURASEAL SEALANT SYSTEM 5ML - (5/BX): Type: IMPLANTABLE DEVICE | Status: FUNCTIONAL

## 2020-01-16 DEVICE — MAGNIFUSE 1X5CM: Type: IMPLANTABLE DEVICE | Status: FUNCTIONAL

## 2020-01-16 DEVICE — BONE CEMENT & MIXER FOR KYPHO: Type: IMPLANTABLE DEVICE | Status: FUNCTIONAL

## 2020-01-16 DEVICE — IMPLANTABLE DEVICE: Type: IMPLANTABLE DEVICE | Status: FUNCTIONAL

## 2020-01-16 DEVICE — SCREW SOLERA SET SCREW (1TCX40+3TCX21+2TCX10=123): Type: IMPLANTABLE DEVICE | Status: FUNCTIONAL

## 2020-01-16 DEVICE — GRAFT BONE MAGNIFUSE 1X10CM: Type: IMPLANTABLE DEVICE | Status: FUNCTIONAL

## 2020-01-16 RX ORDER — METHOCARBAMOL 750 MG/1
750 TABLET, FILM COATED ORAL EVERY 8 HOURS PRN
Status: DISCONTINUED | OUTPATIENT
Start: 2020-01-16 | End: 2020-01-17

## 2020-01-16 RX ORDER — DEXAMETHASONE 4 MG/1
4 TABLET ORAL EVERY 12 HOURS
Status: DISCONTINUED | OUTPATIENT
Start: 2020-01-17 | End: 2020-01-17

## 2020-01-16 RX ORDER — DIPHENHYDRAMINE HCL 25 MG
25 TABLET ORAL EVERY 6 HOURS PRN
Status: DISCONTINUED | OUTPATIENT
Start: 2020-01-16 | End: 2020-01-16

## 2020-01-16 RX ORDER — CALCIUM CHLORIDE 100 MG/ML
1 INJECTION INTRAVENOUS; INTRAVENTRICULAR ONCE
Status: COMPLETED | OUTPATIENT
Start: 2020-01-16 | End: 2020-01-16

## 2020-01-16 RX ORDER — HYDROXYCHLOROQUINE SULFATE 200 MG/1
200 TABLET, FILM COATED ORAL DAILY
Status: DISCONTINUED | OUTPATIENT
Start: 2020-01-17 | End: 2020-01-17

## 2020-01-16 RX ORDER — PHENYLEPHRINE HCL IN 0.9% NACL 0.5 MG/5ML
100 SYRINGE (ML) INTRAVENOUS
Status: ACTIVE | OUTPATIENT
Start: 2020-01-16 | End: 2020-01-16

## 2020-01-16 RX ORDER — ONDANSETRON 2 MG/ML
4 INJECTION INTRAMUSCULAR; INTRAVENOUS EVERY 4 HOURS PRN
Status: DISCONTINUED | OUTPATIENT
Start: 2020-01-16 | End: 2020-01-16

## 2020-01-16 RX ORDER — OXYCODONE HYDROCHLORIDE 10 MG/1
10 TABLET ORAL
Status: DISCONTINUED | OUTPATIENT
Start: 2020-01-16 | End: 2020-01-16

## 2020-01-16 RX ORDER — OXYCODONE HCL 5 MG/5 ML
10 SOLUTION, ORAL ORAL
Status: DISCONTINUED | OUTPATIENT
Start: 2020-01-16 | End: 2020-01-16 | Stop reason: HOSPADM

## 2020-01-16 RX ORDER — AMOXICILLIN 250 MG
1 CAPSULE ORAL NIGHTLY
Status: DISCONTINUED | OUTPATIENT
Start: 2020-01-17 | End: 2020-01-17

## 2020-01-16 RX ORDER — CEFAZOLIN SODIUM 1 G/3ML
INJECTION, POWDER, FOR SOLUTION INTRAMUSCULAR; INTRAVENOUS PRN
Status: DISCONTINUED | OUTPATIENT
Start: 2020-01-16 | End: 2020-01-16 | Stop reason: SURG

## 2020-01-16 RX ORDER — SODIUM CHLORIDE, SODIUM LACTATE, POTASSIUM CHLORIDE, AND CALCIUM CHLORIDE .6; .31; .03; .02 G/100ML; G/100ML; G/100ML; G/100ML
500 INJECTION, SOLUTION INTRAVENOUS ONCE
Status: COMPLETED | OUTPATIENT
Start: 2020-01-16 | End: 2020-01-16

## 2020-01-16 RX ORDER — HYDRALAZINE HYDROCHLORIDE 20 MG/ML
10 INJECTION INTRAMUSCULAR; INTRAVENOUS
Status: DISCONTINUED | OUTPATIENT
Start: 2020-01-16 | End: 2020-01-24 | Stop reason: HOSPADM

## 2020-01-16 RX ORDER — DIPHENHYDRAMINE HCL 25 MG
25 TABLET ORAL EVERY 6 HOURS PRN
Status: DISCONTINUED | OUTPATIENT
Start: 2020-01-16 | End: 2020-01-17

## 2020-01-16 RX ORDER — SODIUM CHLORIDE, SODIUM LACTATE, POTASSIUM CHLORIDE, AND CALCIUM CHLORIDE .6; .31; .03; .02 G/100ML; G/100ML; G/100ML; G/100ML
1000 INJECTION, SOLUTION INTRAVENOUS ONCE
Status: COMPLETED | OUTPATIENT
Start: 2020-01-16 | End: 2020-01-16

## 2020-01-16 RX ORDER — DOCUSATE SODIUM 50 MG/5ML
100 LIQUID ORAL 2 TIMES DAILY
Status: DISCONTINUED | OUTPATIENT
Start: 2020-01-16 | End: 2020-01-17

## 2020-01-16 RX ORDER — LABETALOL HYDROCHLORIDE 5 MG/ML
10 INJECTION, SOLUTION INTRAVENOUS
Status: DISCONTINUED | OUTPATIENT
Start: 2020-01-16 | End: 2020-01-24 | Stop reason: HOSPADM

## 2020-01-16 RX ORDER — ONDANSETRON 4 MG/1
4 TABLET, ORALLY DISINTEGRATING ORAL EVERY 4 HOURS PRN
Status: DISCONTINUED | OUTPATIENT
Start: 2020-01-16 | End: 2020-01-16

## 2020-01-16 RX ORDER — AMOXICILLIN 250 MG
1 CAPSULE ORAL
Status: DISCONTINUED | OUTPATIENT
Start: 2020-01-16 | End: 2020-01-16

## 2020-01-16 RX ORDER — DEXAMETHASONE SODIUM PHOSPHATE 4 MG/ML
INJECTION, SOLUTION INTRA-ARTICULAR; INTRALESIONAL; INTRAMUSCULAR; INTRAVENOUS; SOFT TISSUE PRN
Status: DISCONTINUED | OUTPATIENT
Start: 2020-01-16 | End: 2020-01-16 | Stop reason: SURG

## 2020-01-16 RX ORDER — GABAPENTIN 100 MG/1
100 CAPSULE ORAL 3 TIMES DAILY
Status: DISCONTINUED | OUTPATIENT
Start: 2020-01-17 | End: 2020-01-17

## 2020-01-16 RX ORDER — DIAZEPAM 5 MG/1
5 TABLET ORAL EVERY 4 HOURS PRN
Status: DISCONTINUED | OUTPATIENT
Start: 2020-01-16 | End: 2020-01-16

## 2020-01-16 RX ORDER — CEFAZOLIN SODIUM 2 G/100ML
2 INJECTION, SOLUTION INTRAVENOUS EVERY 8 HOURS
Status: COMPLETED | OUTPATIENT
Start: 2020-01-16 | End: 2020-01-17

## 2020-01-16 RX ORDER — CITALOPRAM 20 MG/1
20 TABLET ORAL
Status: DISCONTINUED | OUTPATIENT
Start: 2020-01-17 | End: 2020-01-17

## 2020-01-16 RX ORDER — POLYETHYLENE GLYCOL 3350 17 G/17G
1 POWDER, FOR SOLUTION ORAL 2 TIMES DAILY PRN
Status: DISCONTINUED | OUTPATIENT
Start: 2020-01-16 | End: 2020-01-16

## 2020-01-16 RX ORDER — AMOXICILLIN 250 MG
1 CAPSULE ORAL
Status: DISCONTINUED | OUTPATIENT
Start: 2020-01-16 | End: 2020-01-17

## 2020-01-16 RX ORDER — CALCIUM CARBONATE 500 MG/1
500 TABLET, CHEWABLE ORAL 2 TIMES DAILY
Status: DISCONTINUED | OUTPATIENT
Start: 2020-01-16 | End: 2020-01-16

## 2020-01-16 RX ORDER — LIDOCAINE HYDROCHLORIDE 20 MG/ML
INJECTION, SOLUTION EPIDURAL; INFILTRATION; INTRACAUDAL; PERINEURAL PRN
Status: DISCONTINUED | OUTPATIENT
Start: 2020-01-16 | End: 2020-01-16 | Stop reason: SURG

## 2020-01-16 RX ORDER — MEPERIDINE HYDROCHLORIDE 25 MG/ML
6.25 INJECTION INTRAMUSCULAR; INTRAVENOUS; SUBCUTANEOUS
Status: DISCONTINUED | OUTPATIENT
Start: 2020-01-16 | End: 2020-01-16 | Stop reason: HOSPADM

## 2020-01-16 RX ORDER — HYDROMORPHONE HYDROCHLORIDE 1 MG/ML
0.4 INJECTION, SOLUTION INTRAMUSCULAR; INTRAVENOUS; SUBCUTANEOUS
Status: DISCONTINUED | OUTPATIENT
Start: 2020-01-16 | End: 2020-01-16 | Stop reason: HOSPADM

## 2020-01-16 RX ORDER — HALOPERIDOL 5 MG/ML
1 INJECTION INTRAMUSCULAR
Status: DISCONTINUED | OUTPATIENT
Start: 2020-01-16 | End: 2020-01-16 | Stop reason: HOSPADM

## 2020-01-16 RX ORDER — ACETAMINOPHEN 325 MG/1
650 TABLET ORAL EVERY 6 HOURS PRN
Status: DISCONTINUED | OUTPATIENT
Start: 2020-01-16 | End: 2020-01-17

## 2020-01-16 RX ORDER — DEXTROSE MONOHYDRATE, SODIUM CHLORIDE, AND POTASSIUM CHLORIDE 50; 1.49; 9 G/1000ML; G/1000ML; G/1000ML
INJECTION, SOLUTION INTRAVENOUS CONTINUOUS
Status: DISCONTINUED | OUTPATIENT
Start: 2020-01-16 | End: 2020-01-18

## 2020-01-16 RX ORDER — ENEMA 19; 7 G/133ML; G/133ML
1 ENEMA RECTAL
Status: COMPLETED | OUTPATIENT
Start: 2020-01-16 | End: 2020-01-21

## 2020-01-16 RX ORDER — CYCLOBENZAPRINE HCL 10 MG
10 TABLET ORAL EVERY 8 HOURS PRN
Status: DISCONTINUED | OUTPATIENT
Start: 2020-01-16 | End: 2020-01-17

## 2020-01-16 RX ORDER — PHENTOLAMINE MESYLATE 5 MG/1
1 INJECTION INTRAMUSCULAR; INTRAVENOUS PRN
Status: DISCONTINUED | OUTPATIENT
Start: 2020-01-16 | End: 2020-01-17

## 2020-01-16 RX ORDER — BISACODYL 10 MG
10 SUPPOSITORY, RECTAL RECTAL
Status: DISCONTINUED | OUTPATIENT
Start: 2020-01-16 | End: 2020-01-24 | Stop reason: HOSPADM

## 2020-01-16 RX ORDER — HYDRALAZINE HYDROCHLORIDE 20 MG/ML
5 INJECTION INTRAMUSCULAR; INTRAVENOUS
Status: DISCONTINUED | OUTPATIENT
Start: 2020-01-16 | End: 2020-01-16 | Stop reason: HOSPADM

## 2020-01-16 RX ORDER — ONDANSETRON 2 MG/ML
4 INJECTION INTRAMUSCULAR; INTRAVENOUS EVERY 4 HOURS PRN
Status: DISCONTINUED | OUTPATIENT
Start: 2020-01-16 | End: 2020-01-21

## 2020-01-16 RX ORDER — DOCUSATE SODIUM 100 MG/1
100 CAPSULE, LIQUID FILLED ORAL 2 TIMES DAILY
Status: DISCONTINUED | OUTPATIENT
Start: 2020-01-16 | End: 2020-01-16

## 2020-01-16 RX ORDER — DIPHENHYDRAMINE HYDROCHLORIDE 50 MG/ML
12.5 INJECTION INTRAMUSCULAR; INTRAVENOUS
Status: DISCONTINUED | OUTPATIENT
Start: 2020-01-16 | End: 2020-01-16 | Stop reason: HOSPADM

## 2020-01-16 RX ORDER — PHENYLEPHRINE HCL IN 0.9% NACL 0.5 MG/5ML
SYRINGE (ML) INTRAVENOUS PRN
Status: DISCONTINUED | OUTPATIENT
Start: 2020-01-16 | End: 2020-01-16 | Stop reason: SURG

## 2020-01-16 RX ORDER — BACITRACIN 50000 [IU]/1
INJECTION, POWDER, FOR SOLUTION INTRAMUSCULAR
Status: DISCONTINUED | OUTPATIENT
Start: 2020-01-16 | End: 2020-01-16 | Stop reason: HOSPADM

## 2020-01-16 RX ORDER — ONDANSETRON 4 MG/1
4 TABLET, ORALLY DISINTEGRATING ORAL EVERY 4 HOURS PRN
Status: DISCONTINUED | OUTPATIENT
Start: 2020-01-16 | End: 2020-01-17

## 2020-01-16 RX ORDER — HYDROMORPHONE HYDROCHLORIDE 1 MG/ML
0.1 INJECTION, SOLUTION INTRAMUSCULAR; INTRAVENOUS; SUBCUTANEOUS
Status: DISCONTINUED | OUTPATIENT
Start: 2020-01-16 | End: 2020-01-16 | Stop reason: HOSPADM

## 2020-01-16 RX ORDER — HYDROMORPHONE HYDROCHLORIDE 1 MG/ML
0.2 INJECTION, SOLUTION INTRAMUSCULAR; INTRAVENOUS; SUBCUTANEOUS
Status: DISCONTINUED | OUTPATIENT
Start: 2020-01-16 | End: 2020-01-16 | Stop reason: HOSPADM

## 2020-01-16 RX ORDER — AMOXICILLIN 250 MG
1 CAPSULE ORAL NIGHTLY
Status: DISCONTINUED | OUTPATIENT
Start: 2020-01-16 | End: 2020-01-16

## 2020-01-16 RX ORDER — NOREPINEPHRINE BITARTRATE 1 MG/ML
INJECTION, SOLUTION INTRAVENOUS
Status: COMPLETED
Start: 2020-01-16 | End: 2020-01-16

## 2020-01-16 RX ORDER — LABETALOL HYDROCHLORIDE 5 MG/ML
5 INJECTION, SOLUTION INTRAVENOUS
Status: DISCONTINUED | OUTPATIENT
Start: 2020-01-16 | End: 2020-01-16 | Stop reason: HOSPADM

## 2020-01-16 RX ORDER — BUPIVACAINE HYDROCHLORIDE AND EPINEPHRINE 5; 5 MG/ML; UG/ML
INJECTION, SOLUTION EPIDURAL; INTRACAUDAL; PERINEURAL
Status: DISCONTINUED | OUTPATIENT
Start: 2020-01-16 | End: 2020-01-16 | Stop reason: HOSPADM

## 2020-01-16 RX ORDER — OXYCODONE HYDROCHLORIDE 5 MG/1
5 TABLET ORAL
Status: DISCONTINUED | OUTPATIENT
Start: 2020-01-16 | End: 2020-01-17

## 2020-01-16 RX ORDER — DIAZEPAM 5 MG/1
5 TABLET ORAL EVERY 4 HOURS PRN
Status: DISCONTINUED | OUTPATIENT
Start: 2020-01-16 | End: 2020-01-17

## 2020-01-16 RX ORDER — PHENTOLAMINE MESYLATE 5 MG/1
5 INJECTION INTRAMUSCULAR; INTRAVENOUS ONCE
Status: DISCONTINUED | OUTPATIENT
Start: 2020-01-16 | End: 2020-01-16

## 2020-01-16 RX ORDER — DIPHENHYDRAMINE HYDROCHLORIDE 50 MG/ML
25 INJECTION INTRAMUSCULAR; INTRAVENOUS EVERY 6 HOURS PRN
Status: DISCONTINUED | OUTPATIENT
Start: 2020-01-16 | End: 2020-01-17

## 2020-01-16 RX ORDER — CYCLOBENZAPRINE HCL 10 MG
10 TABLET ORAL EVERY 8 HOURS PRN
Status: DISCONTINUED | OUTPATIENT
Start: 2020-01-16 | End: 2020-01-16

## 2020-01-16 RX ORDER — HYDROMORPHONE HYDROCHLORIDE 1 MG/ML
0.5 INJECTION, SOLUTION INTRAMUSCULAR; INTRAVENOUS; SUBCUTANEOUS
Status: DISCONTINUED | OUTPATIENT
Start: 2020-01-16 | End: 2020-01-16

## 2020-01-16 RX ORDER — LEVOTHYROXINE SODIUM 0.03 MG/1
100 TABLET ORAL
Status: DISCONTINUED | OUTPATIENT
Start: 2020-01-17 | End: 2020-01-17

## 2020-01-16 RX ORDER — METHOCARBAMOL 750 MG/1
750 TABLET, FILM COATED ORAL EVERY 8 HOURS PRN
Status: DISCONTINUED | OUTPATIENT
Start: 2020-01-16 | End: 2020-01-16

## 2020-01-16 RX ORDER — HYDROMORPHONE HYDROCHLORIDE 1 MG/ML
0.5 INJECTION, SOLUTION INTRAMUSCULAR; INTRAVENOUS; SUBCUTANEOUS
Status: DISCONTINUED | OUTPATIENT
Start: 2020-01-16 | End: 2020-01-17

## 2020-01-16 RX ORDER — ROCURONIUM BROMIDE 10 MG/ML
INJECTION, SOLUTION INTRAVENOUS PRN
Status: DISCONTINUED | OUTPATIENT
Start: 2020-01-16 | End: 2020-01-16 | Stop reason: SURG

## 2020-01-16 RX ORDER — METHOCARBAMOL 500 MG/1
500 TABLET, FILM COATED ORAL 4 TIMES DAILY
Status: DISCONTINUED | OUTPATIENT
Start: 2020-01-17 | End: 2020-01-17

## 2020-01-16 RX ORDER — CALCIUM CARBONATE 500 MG/1
500 TABLET, CHEWABLE ORAL 2 TIMES DAILY
Status: DISCONTINUED | OUTPATIENT
Start: 2020-01-17 | End: 2020-01-17

## 2020-01-16 RX ORDER — DIPHENHYDRAMINE HYDROCHLORIDE 50 MG/ML
25 INJECTION INTRAMUSCULAR; INTRAVENOUS EVERY 6 HOURS PRN
Status: DISCONTINUED | OUTPATIENT
Start: 2020-01-16 | End: 2020-01-16

## 2020-01-16 RX ORDER — OXYCODONE HYDROCHLORIDE 10 MG/1
10 TABLET ORAL
Status: DISCONTINUED | OUTPATIENT
Start: 2020-01-16 | End: 2020-01-17

## 2020-01-16 RX ORDER — SODIUM CHLORIDE, SODIUM LACTATE, POTASSIUM CHLORIDE, CALCIUM CHLORIDE 600; 310; 30; 20 MG/100ML; MG/100ML; MG/100ML; MG/100ML
INJECTION, SOLUTION INTRAVENOUS CONTINUOUS
Status: DISCONTINUED | OUTPATIENT
Start: 2020-01-16 | End: 2020-01-16 | Stop reason: HOSPADM

## 2020-01-16 RX ORDER — ALBUMIN, HUMAN INJ 5% 5 %
25 SOLUTION INTRAVENOUS ONCE
Status: COMPLETED | OUTPATIENT
Start: 2020-01-16 | End: 2020-01-16

## 2020-01-16 RX ORDER — OXYCODONE HYDROCHLORIDE 5 MG/1
5 TABLET ORAL
Status: DISCONTINUED | OUTPATIENT
Start: 2020-01-16 | End: 2020-01-16

## 2020-01-16 RX ORDER — ONDANSETRON 2 MG/ML
INJECTION INTRAMUSCULAR; INTRAVENOUS PRN
Status: DISCONTINUED | OUTPATIENT
Start: 2020-01-16 | End: 2020-01-16 | Stop reason: SURG

## 2020-01-16 RX ORDER — OXYCODONE HCL 5 MG/5 ML
5 SOLUTION, ORAL ORAL
Status: DISCONTINUED | OUTPATIENT
Start: 2020-01-16 | End: 2020-01-16 | Stop reason: HOSPADM

## 2020-01-16 RX ORDER — SODIUM CHLORIDE, SODIUM LACTATE, POTASSIUM CHLORIDE, CALCIUM CHLORIDE 600; 310; 30; 20 MG/100ML; MG/100ML; MG/100ML; MG/100ML
INJECTION, SOLUTION INTRAVENOUS
Status: DISCONTINUED | OUTPATIENT
Start: 2020-01-16 | End: 2020-01-16 | Stop reason: SURG

## 2020-01-16 RX ORDER — POLYETHYLENE GLYCOL 3350 17 G/17G
1 POWDER, FOR SOLUTION ORAL 2 TIMES DAILY PRN
Status: DISCONTINUED | OUTPATIENT
Start: 2020-01-16 | End: 2020-01-17

## 2020-01-16 RX ORDER — TRAZODONE HYDROCHLORIDE 50 MG/1
25 TABLET ORAL EVERY EVENING
Status: DISCONTINUED | OUTPATIENT
Start: 2020-01-17 | End: 2020-01-17

## 2020-01-16 RX ORDER — ONDANSETRON 2 MG/ML
4 INJECTION INTRAMUSCULAR; INTRAVENOUS
Status: DISCONTINUED | OUTPATIENT
Start: 2020-01-16 | End: 2020-01-16 | Stop reason: HOSPADM

## 2020-01-16 RX ADMIN — SODIUM CHLORIDE, POTASSIUM CHLORIDE, SODIUM LACTATE AND CALCIUM CHLORIDE: 600; 310; 30; 20 INJECTION, SOLUTION INTRAVENOUS at 16:01

## 2020-01-16 RX ADMIN — Medication 300 MCG: at 18:25

## 2020-01-16 RX ADMIN — EPHEDRINE SULFATE 10 MG: 50 INJECTION, SOLUTION INTRAVENOUS at 11:47

## 2020-01-16 RX ADMIN — DEXAMETHASONE 4 MG: 4 TABLET ORAL at 05:08

## 2020-01-16 RX ADMIN — FENTANYL CITRATE 25 MCG: 50 INJECTION, SOLUTION INTRAMUSCULAR; INTRAVENOUS at 10:25

## 2020-01-16 RX ADMIN — ONDANSETRON 4 MG: 2 INJECTION INTRAMUSCULAR; INTRAVENOUS at 18:46

## 2020-01-16 RX ADMIN — HYDROMORPHONE HYDROCHLORIDE 0.5 MG: 1 INJECTION, SOLUTION INTRAMUSCULAR; INTRAVENOUS; SUBCUTANEOUS at 08:47

## 2020-01-16 RX ADMIN — CEFAZOLIN SODIUM 2 G: 2 INJECTION, SOLUTION INTRAVENOUS at 22:25

## 2020-01-16 RX ADMIN — ROCURONIUM BROMIDE 10 MG: 10 INJECTION, SOLUTION INTRAVENOUS at 11:54

## 2020-01-16 RX ADMIN — NOREPINEPHRINE BITARTRATE 8 MG: 1 INJECTION INTRAVENOUS at 18:38

## 2020-01-16 RX ADMIN — SODIUM CHLORIDE, POTASSIUM CHLORIDE, SODIUM LACTATE AND CALCIUM CHLORIDE 1000 ML: 600; 310; 30; 20 INJECTION, SOLUTION INTRAVENOUS at 21:46

## 2020-01-16 RX ADMIN — Medication 300 MCG: at 18:35

## 2020-01-16 RX ADMIN — ROCURONIUM BROMIDE 10 MG: 10 INJECTION, SOLUTION INTRAVENOUS at 13:22

## 2020-01-16 RX ADMIN — FENTANYL CITRATE 50 MCG: 50 INJECTION, SOLUTION INTRAMUSCULAR; INTRAVENOUS at 15:08

## 2020-01-16 RX ADMIN — SODIUM CHLORIDE, POTASSIUM CHLORIDE, SODIUM LACTATE AND CALCIUM CHLORIDE: 600; 310; 30; 20 INJECTION, SOLUTION INTRAVENOUS at 10:04

## 2020-01-16 RX ADMIN — PROPOFOL 100 MG: 10 INJECTION, EMULSION INTRAVENOUS at 10:09

## 2020-01-16 RX ADMIN — FENTANYL CITRATE 50 MCG: 50 INJECTION, SOLUTION INTRAMUSCULAR; INTRAVENOUS at 14:29

## 2020-01-16 RX ADMIN — DEXAMETHASONE SODIUM PHOSPHATE 4 MG: 4 INJECTION, SOLUTION INTRA-ARTICULAR; INTRALESIONAL; INTRAMUSCULAR; INTRAVENOUS; SOFT TISSUE at 10:30

## 2020-01-16 RX ADMIN — FENTANYL CITRATE 50 MCG: 50 INJECTION, SOLUTION INTRAMUSCULAR; INTRAVENOUS at 15:48

## 2020-01-16 RX ADMIN — SENNOSIDES AND DOCUSATE SODIUM 2 TABLET: 8.6; 5 TABLET ORAL at 05:08

## 2020-01-16 RX ADMIN — FENTANYL CITRATE 50 MCG: 50 INJECTION, SOLUTION INTRAMUSCULAR; INTRAVENOUS at 12:22

## 2020-01-16 RX ADMIN — SODIUM CHLORIDE, POTASSIUM CHLORIDE, SODIUM LACTATE AND CALCIUM CHLORIDE 1000 ML: 600; 310; 30; 20 INJECTION, SOLUTION INTRAVENOUS at 18:50

## 2020-01-16 RX ADMIN — ONDANSETRON 4 MG: 2 INJECTION INTRAMUSCULAR; INTRAVENOUS at 16:12

## 2020-01-16 RX ADMIN — CEFAZOLIN 1 G: 330 INJECTION, POWDER, FOR SOLUTION INTRAMUSCULAR; INTRAVENOUS at 10:25

## 2020-01-16 RX ADMIN — SODIUM CHLORIDE, POTASSIUM CHLORIDE, SODIUM LACTATE AND CALCIUM CHLORIDE: 600; 310; 30; 20 INJECTION, SOLUTION INTRAVENOUS at 14:55

## 2020-01-16 RX ADMIN — GABAPENTIN 100 MG: 100 CAPSULE ORAL at 05:08

## 2020-01-16 RX ADMIN — SPIRONOLACTONE 25 MG: 50 TABLET ORAL at 05:09

## 2020-01-16 RX ADMIN — CEFAZOLIN 1 G: 330 INJECTION, POWDER, FOR SOLUTION INTRAMUSCULAR; INTRAVENOUS at 14:25

## 2020-01-16 RX ADMIN — ROCURONIUM BROMIDE 30 MG: 10 INJECTION, SOLUTION INTRAVENOUS at 10:09

## 2020-01-16 RX ADMIN — FENTANYL CITRATE 25 MCG: 50 INJECTION, SOLUTION INTRAMUSCULAR; INTRAVENOUS at 10:09

## 2020-01-16 RX ADMIN — FENTANYL CITRATE 50 MCG: 50 INJECTION, SOLUTION INTRAMUSCULAR; INTRAVENOUS at 10:51

## 2020-01-16 RX ADMIN — SODIUM CHLORIDE, POTASSIUM CHLORIDE, SODIUM LACTATE AND CALCIUM CHLORIDE 500 ML: 600; 310; 30; 20 INJECTION, SOLUTION INTRAVENOUS at 17:30

## 2020-01-16 RX ADMIN — LEVOTHYROXINE SODIUM 100 MCG: 50 TABLET ORAL at 05:09

## 2020-01-16 RX ADMIN — SODIUM CHLORIDE, POTASSIUM CHLORIDE, SODIUM LACTATE AND CALCIUM CHLORIDE: 600; 310; 30; 20 INJECTION, SOLUTION INTRAVENOUS at 13:01

## 2020-01-16 RX ADMIN — Medication 100 MCG: at 15:37

## 2020-01-16 RX ADMIN — POTASSIUM CHLORIDE, DEXTROSE MONOHYDRATE AND SODIUM CHLORIDE: 150; 5; 900 INJECTION, SOLUTION INTRAVENOUS at 21:19

## 2020-01-16 RX ADMIN — LIDOCAINE HYDROCHLORIDE 30 MG: 20 INJECTION, SOLUTION EPIDURAL; INFILTRATION; INTRACAUDAL at 10:09

## 2020-01-16 RX ADMIN — HYDROXYCHLOROQUINE SULFATE 200 MG: 200 TABLET ORAL at 05:08

## 2020-01-16 RX ADMIN — Medication 400 MG: at 05:08

## 2020-01-16 RX ADMIN — FENTANYL CITRATE 50 MCG: 50 INJECTION, SOLUTION INTRAMUSCULAR; INTRAVENOUS at 14:36

## 2020-01-16 RX ADMIN — FENTANYL CITRATE 50 MCG: 50 INJECTION, SOLUTION INTRAMUSCULAR; INTRAVENOUS at 16:12

## 2020-01-16 RX ADMIN — Medication 400 MCG: at 18:40

## 2020-01-16 RX ADMIN — CALCIUM CHLORIDE 1 G: 100 INJECTION INTRAVENOUS; INTRAVENTRICULAR at 21:47

## 2020-01-16 RX ADMIN — FENTANYL CITRATE 50 MCG: 50 INJECTION, SOLUTION INTRAMUSCULAR; INTRAVENOUS at 11:27

## 2020-01-16 RX ADMIN — ALBUMIN (HUMAN) 25 G: 2.5 SOLUTION INTRAVENOUS at 22:26

## 2020-01-16 RX ADMIN — NOREPINEPHRINE BITARTRATE 10 MCG/MIN: 1 INJECTION INTRAVENOUS at 20:00

## 2020-01-16 ASSESSMENT — PAIN SCALES - WONG BAKER
WONGBAKER_NUMERICALRESPONSE: HURTS A LITTLE MORE

## 2020-01-16 ASSESSMENT — ENCOUNTER SYMPTOMS
DIARRHEA: 0
BLURRED VISION: 0
DIZZINESS: 0
EYE DISCHARGE: 0
NECK PAIN: 0
HEADACHES: 0
NAUSEA: 0
WEAKNESS: 1
CONSTIPATION: 0
FEVER: 0
TINGLING: 0
ABDOMINAL PAIN: 0
BACK PAIN: 1
MYALGIAS: 1
VOMITING: 0
SHORTNESS OF BREATH: 0
SORE THROAT: 0
TREMORS: 0

## 2020-01-16 ASSESSMENT — PAIN SCALES - GENERAL: PAIN_LEVEL: 4

## 2020-01-16 NOTE — CARE PLAN
Problem: Venous Thromboembolism (VTW)/Deep Vein Thrombosis (DVT) Prevention:  Goal: Patient will participate in Venous Thrombosis (VTE)/Deep Vein Thrombosis (DVT)Prevention Measures  Outcome: PROGRESSING SLOWER THAN EXPECTED     Problem: Bowel/Gastric:  Goal: Normal bowel function is maintained or improved  Outcome: PROGRESSING SLOWER THAN EXPECTED  Goal: Will not experience complications related to bowel motility  Outcome: PROGRESSING SLOWER THAN EXPECTED     Problem: Discharge Barriers/Planning  Goal: Patient's continuum of care needs will be met  Outcome: PROGRESSING SLOWER THAN EXPECTED     Problem: Mobility  Goal: Risk for activity intolerance will decrease  Outcome: PROGRESSING SLOWER THAN EXPECTED     Problem: Pain Management  Goal: Pain level will decrease to patient's comfort goal  Outcome: PROGRESSING SLOWER THAN EXPECTED     Problem: Urinary Elimination:  Goal: Ability to reestablish a normal urinary elimination pattern will improve  Outcome: PROGRESSING SLOWER THAN EXPECTED

## 2020-01-16 NOTE — CARE PLAN
Problem: Skin Integrity  Goal: Risk for impaired skin integrity will decrease  Outcome: PROGRESSING AS EXPECTED     Problem: Psychosocial Needs:  Goal: Level of anxiety will decrease  Outcome: PROGRESSING AS EXPECTED

## 2020-01-16 NOTE — PROGRESS NOTES
Neurosurgery Progress Note    Subjective:  Persistent back & BLE pain, new BLE cramping    Exam:  A&O x3, pt appears uncomfortable  FS x4  sensation grossly intact      BP  Min: 134/70  Max: 153/81  Pulse  Av.8  Min: 69  Max: 76  Resp  Av.5  Min: 16  Max: 18  Temp  Av.8 °C (98.3 °F)  Min: 36.6 °C (97.9 °F)  Max: 36.9 °C (98.5 °F)  SpO2  Av %  Min: 98 %  Max: 100 %    No data recorded    Recent Labs     20  0519 01/15/20  0541 20  0410   WBC 7.7 6.8 7.6   RBC 3.55* 3.65* 3.56*   HEMOGLOBIN 11.9* 12.2 11.9*   HEMATOCRIT 35.7* 36.8* 35.4*   .6* 100.8* 99.4*   MCH 33.5* 33.4* 33.4*   MCHC 33.3* 33.2* 33.6   RDW 49.4 48.7 46.9   PLATELETCT 259 240 250   MPV 9.0 8.8* 8.7*     Recent Labs     20  0449 01/15/20  0541 20  0410   SODIUM 136 133* 133*   POTASSIUM 4.5 4.9 4.4   CHLORIDE 105 103 104   CO2 22 22 20   GLUCOSE 90 91 94   BUN 29* 26* 24*   CREATININE 1.01 0.98 0.95   CALCIUM 8.1* 8.2* 8.2*               Intake/Output       01/15/20 0700 - 20 0659 20 - 20 0659      1900-0659 Total 1603-9654 5597-9465 Total       Intake    P.O.  360  120 480  --  -- --    P.O. 360 120 480 -- -- --    Total Intake 360 120 480 -- -- --       Output    Urine  0  1225 1225  100  -- 100    Urine Void (mL) 0 1225 1225 100 -- 100    Stool  --  -- --  --  -- --    Number of Times Stooled 0 x -- 0 x 0 x -- 0 x    Total Output 0 1225 1225 100 -- 100       Net I/O     360 -1105 -745 -100 -- -100            Intake/Output Summary (Last 24 hours) at 2020 0908  Last data filed at 2020 0730  Gross per 24 hour   Intake 360 ml   Output 1325 ml   Net -965 ml            • HYDROmorphone  0.5 mg Q3HRS PRN   • diphenhydrAMINE  25 mg Q6HRS PRN   • spironolactone  25 mg Q DAY   • Pharmacy Consult Request  1 Each PHARMACY TO DOSE    And   • oxyCODONE immediate-release  5-10 mg Q3HRS PRN   • methocarbamol  500 mg 4X/DAY   • dexamethasone  4 mg Q12HRS   • magnesium  oxide  400 mg BID   • citalopram  20 mg QHS   • hydroxychloroquine  200 mg DAILY   • ipratropium  1 Spray BID   • levothyroxine  100 mcg AM ES   • traZODone  25 mg Q EVENING   • acetaminophen  650 mg Q6HRS PRN   • ondansetron  4 mg Q4HRS PRN   • ondansetron  4 mg Q4HRS PRN   • senna-docusate  2 Tab BID    And   • polyethylene glycol/lytes  1 Packet QDAY PRN    And   • magnesium hydroxide  30 mL QDAY PRN    And   • bisacodyl  10 mg QDAY PRN   • lidocaine  2 Patch Q24HR   • gabapentin  100 mg TID   • omeprazole  20 mg DAILY       Assessment and Plan:  Hospital day #2  POD #0   Prophylactic anticoagulation: no         Start date/time: tbd    Pt agreed to surgery   On OR schedule 0930 today  Pt has remained NPO  Labs ok, ordered stat coags  Pt will be seen by Dr Nelson in pre-op holding

## 2020-01-16 NOTE — ANESTHESIA PROCEDURE NOTES
Airway  Date/Time: 1/16/2020 10:13 AM  Performed by: Alexey Livingston M.D.  Authorized by: Alexey Livingston M.D.     Location:  OR  Urgency:  Elective  Difficult Airway: No    Indications for Airway Management:  Anesthesia  Spontaneous Ventilation: absent    Sedation Level:  Deep  Preoxygenated: Yes    Patient Position:  Sniffing  Mask Difficulty Assessment:  1 - vent by mask  Final Airway Type:  Endotracheal airway  Final Endotracheal Airway:  ETT  Cuffed: Yes    Technique Used for Successful ETT Placement:  Direct laryngoscopy  Devices/Methods Used in Placement:  Cricoid pressure and intubating stylet  Insertion Site:  Oral  Blade Type:  Briana  Laryngoscope Blade/Videolaryngoscope Blade Size:  3  ETT Size (mm):  7.0  Measured from:  Teeth  ETT to Teeth (cm):  23  Placement Verified by: auscultation and capnometry    Cormack-Lehane Classification:  Grade IIb - view of arytenoids or posterior of glottis only  Number of Attempts at Approach:  1

## 2020-01-16 NOTE — PROGRESS NOTES
"Patient stating she feels she needs to void and would like RN to \"drain bladder\".  Patient states she can not void and the neurosurgeon told her she wouldn't be able to. Bladder scan result was 647 ml. No orders to cath. MD paged.   "

## 2020-01-16 NOTE — ANESTHESIA PREPROCEDURE EVALUATION
Relevant Problems   PULMONARY   (+) History of Clostridium difficile   (+) History of recurrent UTIs      NEURO   (+) History of Clostridium difficile   (+) History of anemia   (+) History of falling   (+) History of recurrent UTIs      CARDIAC   (+) Leg DVT (deep venous thromboembolism), chronic, right (HCC)   (+) Raynaud disease         (+) CKD (chronic kidney disease) stage 3, GFR 30-59 ml/min (HCC)      ENDO   (+) Hypothyroidism      Other   (+) Rheumatoid arthritis (HCC)       Physical Exam    Airway   Mallampati: II  TM distance: >3 FB  Neck ROM: full       Cardiovascular - normal exam  Rhythm: regular  Rate: normal  (-) murmur     Dental - normal exam           Pulmonary - normal exam  Breath sounds clear to auscultation     Abdominal    Neurological - normal exam                 Anesthesia Plan    ASA 2       Plan - general       Airway plan will be ETT        Induction: intravenous    Postoperative Plan: Postoperative administration of opioids is intended.    Pertinent diagnostic labs and testing reviewed    Informed Consent:    Anesthetic plan and risks discussed with patient.    Use of blood products discussed with: patient whom consented to blood products.

## 2020-01-16 NOTE — PROGRESS NOTES
"Bedside report received.  Assessment complete.  A&O x 4. Patient calls appropriately.  Patient refusing to ambulate, stated \"the doctor told me I was to stay in bed\". Bed alarm off.   Patient has 3/10 pain. Pain managed with prescribed medications.  Denies N&V. Tolerating regular diet. NPO at 0000.  + void via garcia, + flatus, - BM.  Patient denies SOB.  SCD's off.  Patient is calm and resting in bed.  Review plan with of care with patient. Call light and personal belongings with in reach. Hourly rounding in place. All needs met at this time.  "

## 2020-01-16 NOTE — CONSULTS
DATE OF SERVICE:  01/15/2020    NEUROLOGY CONSULTATION    CHIEF COMPLAINT/REASON FOR CONSULTATION:  L1 burst fracture.    HISTORY OF PRESENT ILLNESS:  This is an 84-year-old woman with a history of   osteopenia who has minimal other medical problems who presented to [facility]   after having a ground level fall where she missed the chair and fell onto her   butt.  She subsequently had excruciating pain and reported to [facility] on   Thursday for medical evaluation due to intractable back pain.  She had a CT   scan of her thoracic and lumbar spine, which demonstrated a severe burst   fracture at L1 with significant retropulsion of bone fragments into the   central canal along with the bilateral neural foramen.  The patient had also   radicular symptoms down her legs.  She was originally attempting to get off   and she subsequently had some urinary incontinence when this happened.  It was   likely due to secondary muscular spasm due to severe back pain.  She   subsequently has had no issues with bowel or bladder incontinence.  She did   have some urinary retention, which was treated with a Rao yesterday and   subsequently has been removed and she has not had any subsequent issues.  She   has intractable back pain at present and is on the floor for medical treatment   for her back pain.  Additionally, the patient has an MRI, which demonstrated   severe central stenosis secondary to the retropulsed bone fragments.  She has   a minor kyphotic deformity and her bone shows approximately 90% flattening at   the L1 level with at least 50% retropulsion of fragments into the central   canal.    REVIEW OF SYSTEMS:  A 12-point review of systems as per HPI, the patient   denies any focal weakness in the lower extremities.  She does not have any   saddle anesthesia.  She has no upper extremity symptoms.  She has no shortness   of breath or chest pain.    PHYSICAL EXAMINATION:  GENERAL:  She is alert.  She is oriented x3.  She has a  nonfocal examination.  NEUROLOGIC:  Cranial nerves are grossly intact.  Upper extremities have no   deficit.  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  Aside from that, she   has got 5/5 strength, 2/2 sensation, and 2+ reflexes in bilateral upper   extremities.  The patient reports no saddle anesthesia.  We did not do a   formal examination of her perineum.  She has not had any other episodes of   urinary or bowel incontinence as well.  Her bilateral lower extremities have   severe radiating pain down to level of her feet going down her legs.  She has   full strength; however, is severely limited due to pain.  She does not   demonstrate any focal or dermatomal radicular weakness.    IMAGING:  The patient has a lumbar and thoracic CT scan, which demonstrates   almost complete crush of L1 with greater than 90% reduction in height with   more than 50% retropulsion of fractures segment at the L1 into the vertebral   canal.  She also has an MRI, which demonstrates multilevel degenerative disk   disease with bilateral neural foraminal stenosis from L1 through S1 and has   some degree of central stenosis.  In addition, she had at least 50% reduction   in the central canal size at the level of L1 with a retropulsed fragment.  She   has a slight kyphotic deformity over the burst fracture at L1.    ASSESSMENT AND PLAN:  This is an 84-year-old woman with osteopenia who had a   ground level fall with a 90% reduction in height of L1 due to burst fracture   with 50% reduction in the central canal and the patient has intractable back   pain as well as radicular symptoms likely from compression of her ____ only   has bilateral neural foraminal stenosis, which we will evaluate in more detail   prior to surgical intervention.  We had an extensive discussion with the   patient about intractable back pain in trying to treat this in a TLSO brace.    Based on typical guidelines for burst fractures given these extreme amounts of    loss of height, retropulsion of fragments, neurologic impact with intractable   pain, we feel that she would not be the most appropriate candidate for   treatment in a brace.  It would be very unlikely that she would tolerate   standing in the brace or that she would have any effective healing in the   brace given that she has had complete reduction in the height at L1.  We   therefore feel that she would be most appropriately treated with stabilization   using a posterolateral approach with an open reduction of her kyphotic   deformity and also an open reduction of the retropulsed fragments.  We did   have a discussion that was rather candid with both the patient and her   daughter over the phone about operating in an 84-year-old and our concerns for   both anesthesia for this as well as her perioperative treatment with the need   for narcotics likely the use of a skilled nursing facility to help her after   surgery for mobilization and our concerns that she could potentially have   complications from surgery as well as the perioperative period where she would   be at high risk for clots, pneumonias, infections, delirium, and in general   the sequela of operating on an advanced aged patient.  Both the patient and   the daughter expressed their concern as well as their understanding of the   situation and would like to proceed with moving forward with a T11 through L3   posterior lateral instrumentation and fusion with open reduction of the L1   burst fracture and kyphotic deformity.  We told him we will potentially use   fenestrated screws with cement and the risks associated with extrusion of   cement both in the canal as well as hardening the bone and causing sequential   fractures above and below the construct.  Additionally, we have requested the   use of BMP to help with her postoperative fusion.  She has a childhood history   of a tongue cancer, which the patient stated was nonmalignant and she has had   no  subsequent issues secondary to this and needed no further treatment   secondary to this after the age of 15.  We therefore feel comfortable using   BMP given that it is a stem cell stimulated and can cause concerns for   recurrence of cancer.  We will again discuss all these matters with the   patient tomorrow in detail and also evaluate her in a more detailed manner   after she is preoped and we were able to find the surgical timeframe.  Our   plan for right now is to operate tomorrow morning if possible or tomorrow   afternoon so that we can alleviate her pain and try to get her more   comfortable.       ____________________________________     MD JOE Wheatley / [facility]    DD:  01/15/2020 14:08:33  DT:  01/15/2020 20:25:45    D#:  [id number]  Job#:  [id number]

## 2020-01-17 PROBLEM — M79.2 NEUROPATHIC PAIN: Status: ACTIVE | Noted: 2020-01-17

## 2020-01-17 LAB
ABO + RH BLD: NORMAL
ABO GROUP BLD: NORMAL
ACTION RANGE TRIGGERED IACRT: NO
ANION GAP SERPL CALC-SCNC: 9 MMOL/L (ref 0–11.9)
BARCODED ABORH UBTYP: 6200
BARCODED ABORH UBTYP: 6200
BARCODED PRD CODE UBPRD: NORMAL
BARCODED PRD CODE UBPRD: NORMAL
BARCODED UNIT NUM UBUNT: NORMAL
BARCODED UNIT NUM UBUNT: NORMAL
BASE EXCESS BLDA CALC-SCNC: -12 MMOL/L (ref -4–3)
BASOPHILS # BLD AUTO: 0.1 % (ref 0–1.8)
BASOPHILS # BLD: 0.01 K/UL (ref 0–0.12)
BLD GP AB SCN SERPL QL: NORMAL
BODY TEMPERATURE: ABNORMAL DEGREES
BUN SERPL-MCNC: 29 MG/DL (ref 8–22)
CA-I BLD ISE-SCNC: 1.02 MMOL/L (ref 1.1–1.3)
CALCIUM SERPL-MCNC: 7.5 MG/DL (ref 8.5–10.5)
CHLORIDE SERPL-SCNC: 108 MMOL/L (ref 96–112)
CO2 BLDA-SCNC: 12 MMOL/L (ref 20–33)
CO2 SERPL-SCNC: 18 MMOL/L (ref 20–33)
COMPONENT R 8504R: NORMAL
COMPONENT R 8504R: NORMAL
CREAT SERPL-MCNC: 1.44 MG/DL (ref 0.5–1.4)
CRP SERPL HS-MCNC: 1.39 MG/DL (ref 0–0.75)
EOSINOPHIL # BLD AUTO: 0.01 K/UL (ref 0–0.51)
EOSINOPHIL NFR BLD: 0.1 % (ref 0–6.9)
ERYTHROCYTE [DISTWIDTH] IN BLOOD BY AUTOMATED COUNT: 48.3 FL (ref 35.9–50)
GLUCOSE BLD-MCNC: 175 MG/DL (ref 65–99)
GLUCOSE SERPL-MCNC: 159 MG/DL (ref 65–99)
HCO3 BLDA-SCNC: 11.2 MMOL/L (ref 17–25)
HCT VFR BLD AUTO: 13.7 % (ref 37–47)
HCT VFR BLD AUTO: 23 % (ref 37–47)
HCT VFR BLD AUTO: 23.7 % (ref 37–47)
HCT VFR BLD CALC: 25 % (ref 37–47)
HGB BLD-MCNC: 4.7 G/DL (ref 12–16)
HGB BLD-MCNC: 7.8 G/DL (ref 12–16)
HGB BLD-MCNC: 7.9 G/DL (ref 12–16)
HGB BLD-MCNC: 8.5 G/DL (ref 12–16)
HOROWITZ INDEX BLDA+IHG-RTO: 2940 MM[HG]
IMM GRANULOCYTES # BLD AUTO: 0.34 K/UL (ref 0–0.11)
IMM GRANULOCYTES NFR BLD AUTO: 2.6 % (ref 0–0.9)
INST. QUALIFIED PATIENT IIQPT: YES
LACTATE BLD-SCNC: 1.5 MMOL/L (ref 0.5–2)
LACTATE BLD-SCNC: 2.1 MMOL/L (ref 0.5–2)
LYMPHOCYTES # BLD AUTO: 1.33 K/UL (ref 1–4.8)
LYMPHOCYTES NFR BLD: 10.3 % (ref 22–41)
MCH RBC QN AUTO: 34.6 PG (ref 27–33)
MCHC RBC AUTO-ENTMCNC: 34.3 G/DL (ref 33.6–35)
MCV RBC AUTO: 100.7 FL (ref 81.4–97.8)
MONOCYTES # BLD AUTO: 0.97 K/UL (ref 0–0.85)
MONOCYTES NFR BLD AUTO: 7.5 % (ref 0–13.4)
NEUTROPHILS # BLD AUTO: 10.29 K/UL (ref 2–7.15)
NEUTROPHILS NFR BLD: 79.4 % (ref 44–72)
NRBC # BLD AUTO: 0 K/UL
NRBC BLD-RTO: 0 /100 WBC
O2/TOTAL GAS SETTING VFR VENT: 5 %
PCO2 BLDA: 17.9 MMHG (ref 26–37)
PCO2 TEMP ADJ BLDA: 17.2 MMHG (ref 26–37)
PH BLDA: 7.41 [PH] (ref 7.4–7.5)
PH TEMP ADJ BLDA: 7.42 [PH] (ref 7.4–7.5)
PLATELET # BLD AUTO: 143 K/UL (ref 164–446)
PMV BLD AUTO: 9.5 FL (ref 9–12.9)
PO2 BLDA: 147 MMHG (ref 64–87)
PO2 TEMP ADJ BLDA: 141 MMHG (ref 64–87)
POTASSIUM BLD-SCNC: 5.2 MMOL/L (ref 3.6–5.5)
POTASSIUM SERPL-SCNC: 4.6 MMOL/L (ref 3.6–5.5)
PREALB SERPL-MCNC: 13 MG/DL (ref 18–38)
PRODUCT TYPE UPROD: NORMAL
PRODUCT TYPE UPROD: NORMAL
RBC # BLD AUTO: 1.36 M/UL (ref 4.2–5.4)
RH BLD: NORMAL
SAO2 % BLDA: 99 % (ref 93–99)
SODIUM BLD-SCNC: 131 MMOL/L (ref 135–145)
SODIUM SERPL-SCNC: 135 MMOL/L (ref 135–145)
SPECIMEN DRAWN FROM PATIENT: ABNORMAL
UNIT STATUS USTAT: NORMAL
UNIT STATUS USTAT: NORMAL
WBC # BLD AUTO: 13 K/UL (ref 4.8–10.8)

## 2020-01-17 PROCEDURE — A9270 NON-COVERED ITEM OR SERVICE: HCPCS | Performed by: PSYCHIATRY & NEUROLOGY

## 2020-01-17 PROCEDURE — 700111 HCHG RX REV CODE 636 W/ 250 OVERRIDE (IP): Performed by: PHYSICIAN ASSISTANT

## 2020-01-17 PROCEDURE — 83605 ASSAY OF LACTIC ACID: CPT

## 2020-01-17 PROCEDURE — 700111 HCHG RX REV CODE 636 W/ 250 OVERRIDE (IP): Performed by: INTERNAL MEDICINE

## 2020-01-17 PROCEDURE — 80048 BASIC METABOLIC PNL TOTAL CA: CPT

## 2020-01-17 PROCEDURE — A9270 NON-COVERED ITEM OR SERVICE: HCPCS | Performed by: PHYSICIAN ASSISTANT

## 2020-01-17 PROCEDURE — 86850 RBC ANTIBODY SCREEN: CPT

## 2020-01-17 PROCEDURE — A9270 NON-COVERED ITEM OR SERVICE: HCPCS | Performed by: NURSE PRACTITIONER

## 2020-01-17 PROCEDURE — P9016 RBC LEUKOCYTES REDUCED: HCPCS | Mod: 91

## 2020-01-17 PROCEDURE — 86140 C-REACTIVE PROTEIN: CPT

## 2020-01-17 PROCEDURE — 700111 HCHG RX REV CODE 636 W/ 250 OVERRIDE (IP): Performed by: NEUROLOGICAL SURGERY

## 2020-01-17 PROCEDURE — 700101 HCHG RX REV CODE 250: Performed by: HOSPITALIST

## 2020-01-17 PROCEDURE — 85018 HEMOGLOBIN: CPT | Mod: 91

## 2020-01-17 PROCEDURE — 86900 BLOOD TYPING SEROLOGIC ABO: CPT

## 2020-01-17 PROCEDURE — 700102 HCHG RX REV CODE 250 W/ 637 OVERRIDE(OP): Performed by: PHYSICIAN ASSISTANT

## 2020-01-17 PROCEDURE — 36430 TRANSFUSION BLD/BLD COMPNT: CPT

## 2020-01-17 PROCEDURE — 700102 HCHG RX REV CODE 250 W/ 637 OVERRIDE(OP): Performed by: PSYCHIATRY & NEUROLOGY

## 2020-01-17 PROCEDURE — 700105 HCHG RX REV CODE 258: Performed by: PSYCHIATRY & NEUROLOGY

## 2020-01-17 PROCEDURE — A9270 NON-COVERED ITEM OR SERVICE: HCPCS | Performed by: HOSPITALIST

## 2020-01-17 PROCEDURE — A9270 NON-COVERED ITEM OR SERVICE: HCPCS | Performed by: FAMILY MEDICINE

## 2020-01-17 PROCEDURE — 84134 ASSAY OF PREALBUMIN: CPT

## 2020-01-17 PROCEDURE — 86901 BLOOD TYPING SEROLOGIC RH(D): CPT

## 2020-01-17 PROCEDURE — 770022 HCHG ROOM/CARE - ICU (200)

## 2020-01-17 PROCEDURE — 85025 COMPLETE CBC W/AUTO DIFF WBC: CPT

## 2020-01-17 PROCEDURE — 85014 HEMATOCRIT: CPT | Mod: 91

## 2020-01-17 PROCEDURE — 700112 HCHG RX REV CODE 229: Performed by: PHYSICIAN ASSISTANT

## 2020-01-17 PROCEDURE — 700102 HCHG RX REV CODE 250 W/ 637 OVERRIDE(OP): Performed by: FAMILY MEDICINE

## 2020-01-17 PROCEDURE — 700101 HCHG RX REV CODE 250: Performed by: FAMILY MEDICINE

## 2020-01-17 PROCEDURE — 86923 COMPATIBILITY TEST ELECTRIC: CPT

## 2020-01-17 PROCEDURE — 700112 HCHG RX REV CODE 229: Performed by: PSYCHIATRY & NEUROLOGY

## 2020-01-17 PROCEDURE — 99291 CRITICAL CARE FIRST HOUR: CPT | Performed by: PSYCHIATRY & NEUROLOGY

## 2020-01-17 PROCEDURE — 700101 HCHG RX REV CODE 250: Performed by: PHYSICIAN ASSISTANT

## 2020-01-17 PROCEDURE — 30233N1 TRANSFUSION OF NONAUTOLOGOUS RED BLOOD CELLS INTO PERIPHERAL VEIN, PERCUTANEOUS APPROACH: ICD-10-PCS | Performed by: PSYCHIATRY & NEUROLOGY

## 2020-01-17 PROCEDURE — 700102 HCHG RX REV CODE 250 W/ 637 OVERRIDE(OP): Performed by: NURSE PRACTITIONER

## 2020-01-17 PROCEDURE — 700102 HCHG RX REV CODE 250 W/ 637 OVERRIDE(OP): Performed by: HOSPITALIST

## 2020-01-17 RX ORDER — OMEPRAZOLE 20 MG/1
20 CAPSULE, DELAYED RELEASE ORAL DAILY
Status: DISCONTINUED | OUTPATIENT
Start: 2020-01-17 | End: 2020-01-24 | Stop reason: HOSPADM

## 2020-01-17 RX ORDER — ONDANSETRON 4 MG/1
4 TABLET, ORALLY DISINTEGRATING ORAL EVERY 4 HOURS PRN
Status: DISCONTINUED | OUTPATIENT
Start: 2020-01-17 | End: 2020-01-24 | Stop reason: HOSPADM

## 2020-01-17 RX ORDER — LEVOTHYROXINE SODIUM 0.1 MG/1
100 TABLET ORAL
Status: DISCONTINUED | OUTPATIENT
Start: 2020-01-18 | End: 2020-01-22

## 2020-01-17 RX ORDER — DEXAMETHASONE 4 MG/1
4 TABLET ORAL EVERY 12 HOURS
Status: DISCONTINUED | OUTPATIENT
Start: 2020-01-17 | End: 2020-01-22

## 2020-01-17 RX ORDER — GABAPENTIN 300 MG/1
300 CAPSULE ORAL
Status: DISCONTINUED | OUTPATIENT
Start: 2020-01-17 | End: 2020-01-21

## 2020-01-17 RX ORDER — HYDROXYCHLOROQUINE SULFATE 200 MG/1
200 TABLET, FILM COATED ORAL DAILY
Status: DISCONTINUED | OUTPATIENT
Start: 2020-01-18 | End: 2020-01-24

## 2020-01-17 RX ORDER — DIAZEPAM 5 MG/1
5 TABLET ORAL EVERY 4 HOURS PRN
Status: DISCONTINUED | OUTPATIENT
Start: 2020-01-17 | End: 2020-01-21

## 2020-01-17 RX ORDER — POLYETHYLENE GLYCOL 3350 17 G/17G
1 POWDER, FOR SOLUTION ORAL 2 TIMES DAILY PRN
Status: DISCONTINUED | OUTPATIENT
Start: 2020-01-17 | End: 2020-01-24 | Stop reason: HOSPADM

## 2020-01-17 RX ORDER — CYCLOBENZAPRINE HCL 10 MG
10 TABLET ORAL EVERY 8 HOURS PRN
Status: DISCONTINUED | OUTPATIENT
Start: 2020-01-17 | End: 2020-01-21

## 2020-01-17 RX ORDER — DIPHENHYDRAMINE HCL 25 MG
25 TABLET ORAL EVERY 6 HOURS PRN
Status: DISCONTINUED | OUTPATIENT
Start: 2020-01-17 | End: 2020-01-24 | Stop reason: HOSPADM

## 2020-01-17 RX ORDER — OXYCODONE HYDROCHLORIDE 5 MG/1
5 TABLET ORAL
Status: DISCONTINUED | OUTPATIENT
Start: 2020-01-17 | End: 2020-01-24 | Stop reason: HOSPADM

## 2020-01-17 RX ORDER — METHOCARBAMOL 750 MG/1
750 TABLET, FILM COATED ORAL EVERY 8 HOURS PRN
Status: DISCONTINUED | OUTPATIENT
Start: 2020-01-17 | End: 2020-01-21

## 2020-01-17 RX ORDER — DIPHENHYDRAMINE HYDROCHLORIDE 50 MG/ML
25 INJECTION INTRAMUSCULAR; INTRAVENOUS EVERY 6 HOURS PRN
Status: DISCONTINUED | OUTPATIENT
Start: 2020-01-17 | End: 2020-01-24 | Stop reason: HOSPADM

## 2020-01-17 RX ORDER — TRAZODONE HYDROCHLORIDE 50 MG/1
25 TABLET ORAL EVERY EVENING
Status: DISCONTINUED | OUTPATIENT
Start: 2020-01-17 | End: 2020-01-21

## 2020-01-17 RX ORDER — CALCIUM CARBONATE 500 MG/1
500 TABLET, CHEWABLE ORAL 2 TIMES DAILY
Status: DISCONTINUED | OUTPATIENT
Start: 2020-01-17 | End: 2020-01-23

## 2020-01-17 RX ORDER — AMOXICILLIN 250 MG
1 CAPSULE ORAL
Status: DISCONTINUED | OUTPATIENT
Start: 2020-01-17 | End: 2020-01-24 | Stop reason: HOSPADM

## 2020-01-17 RX ORDER — SODIUM CHLORIDE 9 MG/ML
500 INJECTION, SOLUTION INTRAVENOUS ONCE
Status: COMPLETED | OUTPATIENT
Start: 2020-01-17 | End: 2020-01-17

## 2020-01-17 RX ORDER — DOCUSATE SODIUM 100 MG/1
100 CAPSULE, LIQUID FILLED ORAL 2 TIMES DAILY
Status: DISCONTINUED | OUTPATIENT
Start: 2020-01-17 | End: 2020-01-24 | Stop reason: HOSPADM

## 2020-01-17 RX ORDER — CITALOPRAM 20 MG/1
20 TABLET ORAL
Status: DISCONTINUED | OUTPATIENT
Start: 2020-01-17 | End: 2020-01-24 | Stop reason: HOSPADM

## 2020-01-17 RX ORDER — AMOXICILLIN 250 MG
1 CAPSULE ORAL NIGHTLY
Status: DISCONTINUED | OUTPATIENT
Start: 2020-01-17 | End: 2020-01-24 | Stop reason: HOSPADM

## 2020-01-17 RX ORDER — OXYCODONE HYDROCHLORIDE 10 MG/1
10 TABLET ORAL
Status: DISCONTINUED | OUTPATIENT
Start: 2020-01-17 | End: 2020-01-21

## 2020-01-17 RX ORDER — GABAPENTIN 100 MG/1
100 CAPSULE ORAL 2 TIMES DAILY
Status: DISCONTINUED | OUTPATIENT
Start: 2020-01-17 | End: 2020-01-21

## 2020-01-17 RX ORDER — ACETAMINOPHEN 325 MG/1
650 TABLET ORAL EVERY 6 HOURS PRN
Status: DISCONTINUED | OUTPATIENT
Start: 2020-01-17 | End: 2020-01-24 | Stop reason: HOSPADM

## 2020-01-17 RX ORDER — METHOCARBAMOL 500 MG/1
500 TABLET, FILM COATED ORAL 4 TIMES DAILY
Status: DISCONTINUED | OUTPATIENT
Start: 2020-01-17 | End: 2020-01-21

## 2020-01-17 RX ADMIN — SENNOSIDES AND DOCUSATE SODIUM 1 TABLET: 8.6; 5 TABLET ORAL at 20:33

## 2020-01-17 RX ADMIN — MAGNESIUM OXIDE TAB 400 MG (241.3 MG ELEMENTAL MG) 400 MG: 400 (241.3 MG) TAB at 18:39

## 2020-01-17 RX ADMIN — METHOCARBAMOL TABLETS 500 MG: 500 TABLET, COATED ORAL at 18:41

## 2020-01-17 RX ADMIN — OXYCODONE HYDROCHLORIDE 10 MG: 10 TABLET ORAL at 20:30

## 2020-01-17 RX ADMIN — DOCUSATE SODIUM 100 MG: 100 CAPSULE, LIQUID FILLED ORAL at 18:39

## 2020-01-17 RX ADMIN — HYDROXYCHLOROQUINE SULFATE 200 MG: 200 TABLET ORAL at 05:53

## 2020-01-17 RX ADMIN — FENTANYL CITRATE 100 MCG: 0.05 INJECTION, SOLUTION INTRAMUSCULAR; INTRAVENOUS at 04:14

## 2020-01-17 RX ADMIN — GABAPENTIN 100 MG: 100 CAPSULE ORAL at 05:51

## 2020-01-17 RX ADMIN — ACETAMINOPHEN 650 MG: 325 TABLET, FILM COATED ORAL at 12:03

## 2020-01-17 RX ADMIN — CEFAZOLIN SODIUM 2 G: 2 INJECTION, SOLUTION INTRAVENOUS at 05:56

## 2020-01-17 RX ADMIN — ANTACID TABLETS 500 MG: 500 TABLET, CHEWABLE ORAL at 18:40

## 2020-01-17 RX ADMIN — LABETALOL HYDROCHLORIDE 10 MG: 5 INJECTION, SOLUTION INTRAVENOUS at 14:01

## 2020-01-17 RX ADMIN — OMEPRAZOLE 20 MG: 20 CAPSULE, DELAYED RELEASE ORAL at 18:40

## 2020-01-17 RX ADMIN — ANTACID TABLETS 500 MG: 500 TABLET, CHEWABLE ORAL at 05:53

## 2020-01-17 RX ADMIN — HYDROMORPHONE HYDROCHLORIDE 0.5 MG: 1 INJECTION, SOLUTION INTRAMUSCULAR; INTRAVENOUS; SUBCUTANEOUS at 03:27

## 2020-01-17 RX ADMIN — METHOCARBAMOL TABLETS 500 MG: 500 TABLET, COATED ORAL at 09:00

## 2020-01-17 RX ADMIN — TRAZODONE HYDROCHLORIDE 25 MG: 50 TABLET ORAL at 18:00

## 2020-01-17 RX ADMIN — CYCLOBENZAPRINE 10 MG: 10 TABLET, FILM COATED ORAL at 03:20

## 2020-01-17 RX ADMIN — GABAPENTIN 100 MG: 100 CAPSULE ORAL at 12:03

## 2020-01-17 RX ADMIN — LIDOCAINE 2 PATCH: 50 PATCH CUTANEOUS at 12:05

## 2020-01-17 RX ADMIN — POTASSIUM CHLORIDE, DEXTROSE MONOHYDRATE AND SODIUM CHLORIDE: 150; 5; 900 INJECTION, SOLUTION INTRAVENOUS at 05:51

## 2020-01-17 RX ADMIN — METHOCARBAMOL TABLETS 500 MG: 500 TABLET, COATED ORAL at 12:03

## 2020-01-17 RX ADMIN — OXYCODONE HYDROCHLORIDE 10 MG: 10 TABLET ORAL at 13:17

## 2020-01-17 RX ADMIN — POTASSIUM CHLORIDE, DEXTROSE MONOHYDRATE AND SODIUM CHLORIDE: 150; 5; 900 INJECTION, SOLUTION INTRAVENOUS at 17:51

## 2020-01-17 RX ADMIN — GABAPENTIN 300 MG: 300 CAPSULE ORAL at 20:30

## 2020-01-17 RX ADMIN — METHOCARBAMOL TABLETS 750 MG: 750 TABLET, COATED ORAL at 01:03

## 2020-01-17 RX ADMIN — METHOCARBAMOL TABLETS 500 MG: 500 TABLET, COATED ORAL at 23:30

## 2020-01-17 RX ADMIN — OXYCODONE HYDROCHLORIDE 5 MG: 5 TABLET ORAL at 02:47

## 2020-01-17 RX ADMIN — SODIUM CHLORIDE 500 ML: 9 INJECTION, SOLUTION INTRAVENOUS at 07:09

## 2020-01-17 RX ADMIN — LEVOTHYROXINE SODIUM 100 MCG: 25 TABLET ORAL at 07:44

## 2020-01-17 RX ADMIN — DEXAMETHASONE 4 MG: 4 TABLET ORAL at 18:39

## 2020-01-17 RX ADMIN — CITALOPRAM HYDROBROMIDE 20 MG: 20 TABLET ORAL at 20:30

## 2020-01-17 RX ADMIN — DEXAMETHASONE 4 MG: 4 TABLET ORAL at 05:51

## 2020-01-17 RX ADMIN — OXYCODONE HYDROCHLORIDE 10 MG: 10 TABLET ORAL at 09:21

## 2020-01-17 RX ADMIN — DOCUSATE SODIUM 100 MG: 50 LIQUID ORAL at 05:53

## 2020-01-17 RX ADMIN — MAGNESIUM GLUCONATE 500 MG ORAL TABLET 400 MG: 500 TABLET ORAL at 05:51

## 2020-01-17 RX ADMIN — ONDANSETRON 4 MG: 2 INJECTION INTRAMUSCULAR; INTRAVENOUS at 04:34

## 2020-01-17 ASSESSMENT — ENCOUNTER SYMPTOMS
FOCAL WEAKNESS: 0
BRUISES/BLEEDS EASILY: 0
BACK PAIN: 1
BLURRED VISION: 0
SORE THROAT: 0
VOMITING: 0
NAUSEA: 0
HEADACHES: 1
ABDOMINAL PAIN: 0
SENSORY CHANGE: 0
SHORTNESS OF BREATH: 0
NECK PAIN: 1
DEPRESSION: 0
FEVER: 0
CHILLS: 0

## 2020-01-17 ASSESSMENT — COPD QUESTIONNAIRES
DURING THE PAST 4 WEEKS HOW MUCH DID YOU FEEL SHORT OF BREATH: NONE/LITTLE OF THE TIME
HAVE YOU SMOKED AT LEAST 100 CIGARETTES IN YOUR ENTIRE LIFE: NO/DON'T KNOW
DO YOU EVER COUGH UP ANY MUCUS OR PHLEGM?: NO/ONLY WITH OCCASIONAL COLDS OR INFECTIONS
COPD SCREENING SCORE: 2

## 2020-01-17 ASSESSMENT — LIFESTYLE VARIABLES: EVER_SMOKED: YES

## 2020-01-17 NOTE — PROGRESS NOTES
2 RN skin check with Janis Alvarado RN:  No pressure injuries noted. Large hematoma noted on posterior left thigh/sacrum. Scattered bruising. Mild mottling to all fingers and toes. 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  Generalized fragile skin. All bony prominences assessed and interventions in place.

## 2020-01-17 NOTE — PROGRESS NOTES
Critical Care Progress Note    Date of admission  1/9/2020    Chief Complaint  84 y.o. female admitted 1/9/2020 with extensive thoracic-lumbar surgery with CSF leak, shock    Hospital Course    84 y.o. female who presented 1/9/2020 with a past medical history significant for C. difficile colitis, chronic back pain, rheumatoid arthritis and stroke who was brought into the emergency department by EMS on January 9 after a mechanical fall resulting in a back injury.  On work-up patient was found to have an L1 burst fracture and was seen by neurosurgery due to increasing excruciating pain and radicular symptoms down her legs.  An MRI of her spine showed severe central stenosis secondary to the retropulsed bone fragments with kyphosis.  The decision was made to take her to the operating room today by Dr. Nelson where she underwent an extensive spinal surgery occluding stabilization of the deformity and a T9-L1 fusion.  She had a small CSF leak on the left near L1.  It was a 6-hour case with a approximately 750 mL blood loss.  She received 4 L of crystalloid intraoperatively and in the recovery room but continued to have low urine output of 215 mL.  She was confused postoperatively and upon arrival to the intensive care unit had a blood pressure of 50/20.  A stat glucose was checked which was within normal limits.  She was given pushes of phenylephrine up to 1000 mcg, additional fluid bolus, and started on a norepinephrine infusion.  She had poor peripheral access and the norepinephrine infiltrated unfortunately while I was placing a central venous catheter.  Phentolamine was ordered around the infusion site.  I performed a bedside limited cardiac ultrasound which revealed a small inferior vena cava and hyperdynamic heart without ventricular dilation noted nor pericardial effusion.  A chest x-ray did not reveal a pneumothorax.  Patient continues to move and have sensation in bilateral lower extremities.       Interval  Problem Update  Reviewed last 24 hour events:  Afebrile  HR 80s  -160s  Hemoglobin 4.7 transfused 2 units PRBCs now 7.9  Lactate normalized    Review of Systems  Review of Systems   Constitutional: Negative for chills and fever.   HENT: Negative for sore throat.    Eyes: Negative for blurred vision.   Respiratory: Negative for shortness of breath.    Cardiovascular: Negative for chest pain.   Gastrointestinal: Negative for abdominal pain, nausea and vomiting.   Genitourinary: Negative for dysuria.   Musculoskeletal: Positive for back pain and neck pain.   Skin: Negative for rash.   Neurological: Positive for headaches. Negative for sensory change and focal weakness.   Endo/Heme/Allergies: Does not bruise/bleed easily.   Psychiatric/Behavioral: Negative for depression.        Vital Signs for last 24 hours   Temp:  [35.4 °C (95.7 °F)-36.9 °C (98.5 °F)] 36.1 °C (97 °F)  Pulse:  [] 85  Resp:  [7-27] 15  BP: ()/(29-82) 125/58  SpO2:  [80 %-100 %] 98 %    Hemodynamic parameters for last 24 hours  CVP:  [2 MM HG-6 MM HG] 6 MM HG    Respiratory Information for the last 24 hours       Physical Exam   Physical Exam  Constitutional:       General: She is in acute distress.      Appearance: Normal appearance. She is not ill-appearing.   HENT:      Head: Normocephalic and atraumatic.      Nose: Nose normal.      Mouth/Throat:      Mouth: Mucous membranes are moist.   Eyes:      Extraocular Movements: Extraocular movements intact.      Pupils: Pupils are equal, round, and reactive to light.   Cardiovascular:      Rate and Rhythm: Normal rate and regular rhythm.      Pulses: Normal pulses.   Pulmonary:      Effort: Pulmonary effort is normal.   Abdominal:      Palpations: Abdomen is soft.   Musculoskeletal: Normal range of motion.      Right lower leg: No edema.      Left lower leg: No edema.   Skin:     General: Skin is dry.      Capillary Refill: Capillary refill takes less than 2 seconds.   Neurological:       Mental Status: She is alert.      Comments: GCS 15. Sensation intact to light touch throughout. No motor dysfunction. Neuropathic pain in the BL LEs   Psychiatric:         Mood and Affect: Mood normal.         Behavior: Behavior normal.         Medications  Current Facility-Administered Medications   Medication Dose Route Frequency Provider Last Rate Last Dose   • Pharmacy Consult Request ...Pain Management Review 1 Each  1 Each Other PHARMACY TO DOSE Rosamaria Osorio, P.A.-C.       • MD ALERT...DO NOT ADMINISTER NSAIDS or ASPIRIN unless ORDERED By Neurosurgery 1 Each  1 Each Other PRN Rosamaria Osorio, P.A.-C.       • bisacodyl (DULCOLAX) suppository 10 mg  10 mg Rectal Q24HRS PRN Rosamaria Osorio, P.A.-C.       • fleet enema 133 mL  1 Each Rectal Once PRN Rosamaria Osorio, P.A.-C.       • Respiratory Care per Protocol   Nebulization Continuous RT Rosamaria Osorio, P.A.-C.       • dextrose 5 % and 0.9 % NaCl with KCl 20 mEq infusion   Intravenous Continuous Rosamaria Osorio, P.A.-C. 100 mL/hr at 01/17/20 0551     • HYDROmorphone pf (DILAUDID) injection 0.5 mg  0.5 mg Intravenous Q3HRS PRN Rosamaria Osorio, P.A.-C.   0.5 mg at 01/17/20 0327   • ceFAZolin in dextrose (ANCEF) IVPB premix 2 g  2 g Intravenous Q8HR Rosamaria Osorio, P.A.-C.   Stopped at 01/17/20 0626   • labetalol (NORMODYNE/TRANDATE) injection 10 mg  10 mg Intravenous Q HOUR PRN Rosamaria Osorio, P.A.-C.       • hydrALAZINE (APRESOLINE) injection 10 mg  10 mg Intravenous Q HOUR PRN Rosamaria Osorio, P.A.-C.       • benzocaine-menthol (CEPACOL) lozenge 1 Lozenge  1 Lozenge Mouth/Throat Q2HRS PRN Rosamaria Osorio, P.A.-C.       • norepinephrine (LEVOPHED) 8 mg in  mL Infusion  0.5-30 mcg/min Intravenous Continuous Jeremy M Gonda, M.D.   Stopped at 01/17/20 0032   • ondansetron (ZOFRAN) syringe/vial injection 4 mg  4 mg Intravenous Q4HRS PRN Sang Nelson M.D.   4 mg at 01/17/20 0434   • phentolamine mesylate (REGITINE) injection 1 mg   1 mg Intravenous PRN Jeremy M Gonda, M.D.       • Pharmacy Consult: Enteral tube insertion - review meds/change route/product selection  1 Each Other PHARMACY TO DOSE Jeremy M Gonda, M.D.       • acetaminophen (TYLENOL) tablet 650 mg  650 mg Enteral Tube Q6HRS PRN Blaze Heck M.D.       • calcium carbonate (TUMS) chewable tab 500 mg  500 mg Enteral Tube BID Rosamaria Osorio, P.A.-C.   500 mg at 01/17/20 0553   • citalopram (CELEXA) tablet 20 mg  20 mg Enteral Tube QHS Blaze Heck M.D.       • cyclobenzaprine (FLEXERIL) tablet 10 mg  10 mg Enteral Tube Q8HRS PRN Rosamaria Osorio P.A.-C.   10 mg at 01/17/20 0320    Or   • methocarbamol (ROBAXIN) tablet 750 mg  750 mg Enteral Tube Q8HRS PRN Rosamaria Osorio, P.A.-C.   750 mg at 01/17/20 0103    Or   • diazePAM (VALIUM) tablet 5 mg  5 mg Enteral Tube Q4HRS PRN Rosamaria Osorio, P.A.-C.       • dexamethasone (DECADRON) tablet 4 mg  4 mg Enteral Tube Q12HRS Ronald Torres M.D.   4 mg at 01/17/20 0551   • diphenhydrAMINE (BENADRYL) tablet/capsule 25 mg  25 mg Enteral Tube Q6HRS PRN Rosamaria Osorio, P.A.-C.       • diphenhydrAMINE (BENADRYL) tablet/capsule 25 mg  25 mg Enteral Tube Q6HRS PRN Rosamaria Osorio, P.A.-C.        Or   • diphenhydrAMINE (BENADRYL) injection 25 mg  25 mg Intravenous Q6HRS PRN Rosamaria Osorio, P.A.-C.       • gabapentin (NEURONTIN) capsule 100 mg  100 mg Enteral Tube TID Ronald Torres M.D.   100 mg at 01/17/20 0551   • hydroxychloroquine (PLAQUENIL) tablet 200 mg  200 mg Enteral Tube DAILY Blaze Heck M.D.   200 mg at 01/17/20 0553   • levothyroxine (SYNTHROID) tablet 100 mcg  100 mcg Enteral Tube AM ES Blaze Heck M.D.       • magnesium hydroxide (MILK OF MAGNESIA) suspension 30 mL  30 mL Enteral Tube QDAY PRN Rosamaria Osorio P.A.-C.       • magnesium oxide (MAG-OX) tablet 400 mg  400 mg Enteral Tube BID Ronald Torres M.D.   400 mg at 01/17/20 0551   • methocarbamol (ROBAXIN) tablet 500 mg  500 mg Enteral  Tube 4X/DAY LISA LopezPROSA       • docusate sodium 100mg/10mL (COLACE) solution 100 mg  100 mg Enteral Tube BID MARQUISE Camacho.A.-C.   100 mg at 01/17/20 0553   • omeprazole (FIRST-OMEPRAZOLE) 2 mg/mL oral susp 40 mg  40 mg Enteral Tube DAILY Ronald Torres M.D.   Stopped at 01/16/20 2215   • ondansetron (ZOFRAN ODT) dispertab 4 mg  4 mg Enteral Tube Q4HRS PRN MARQUISE Camacho.A.-C.       • oxyCODONE immediate release (ROXICODONE) tablet 10 mg  10 mg Enteral Tube Q3HRS PRN MARQUISE Camacho.A.-C.       • oxyCODONE immediate-release (ROXICODONE) tablet 5 mg  5 mg Enteral Tube Q3HRS PRN MARQUISE Camacho.A.-C.   5 mg at 01/17/20 0247   • polyethylene glycol/lytes (MIRALAX) PACKET 1 Packet  1 Packet Enteral Tube BID PRN MARQUISE Camacho.A.-C.       • senna-docusate (PERICOLACE or SENOKOT S) 8.6-50 MG per tablet 1 Tab  1 Tab Enteral Tube Q24HRS PRN MARQUISE Camacho.A.-C.       • senna-docusate (PERICOLACE or SENOKOT S) 8.6-50 MG per tablet 1 Tab  1 Tab Enteral Tube Nightly Rosamaria Osorio, MARQUISE.A.-C.       • traZODone (DESYREL) tablet 25 mg  25 mg Enteral Tube Q EVENING Blaze Heck M.D.       • ipratropium (ATROVENT) 0.06 % nasal spray 1 Spray  1 [place] Nasal BID Blaze Heck M.D.   1 Spray at 01/10/20 1730   • lidocaine (LIDODERM) 5 % 2 Patch  2 Patch Transdermal Q24HR Ronald Torres M.D.   2 Patch at 01/15/20 1203       Fluids    Intake/Output Summary (Last 24 hours) at 1/17/2020 0615  Last data filed at 1/17/2020 0400  Gross per 24 hour   Intake 6909.44 ml   Output 1490 ml   Net 5419.44 ml       Laboratory  Recent Labs     01/16/20  1907   ISTATAPH 7.383*   ISTATAPCO2 20.8*   ISTATAPO2 158*   ISTATATCO2 13*   MGWTGEN7BJO 99   ISTATARTHCO3 12.4*   ISTATARTBE -11*   ISTATTEMP 97.0 F   ISTATFIO2 5   ISTATSPEC Arterial   ISTATAPHTC 7.396*   GETUGPFO7RH 153*         Recent Labs     01/15/20  0541 01/16/20  0410 01/16/20  1850   SODIUM 133* 133* 131*   POTASSIUM 4.9 4.4 5.4    CHLORIDE 103 104 103   CO2 22 20 15*   BUN 26* 24* 26*   CREATININE 0.98 0.95 1.46*   CALCIUM 8.2* 8.2* 7.3*     Recent Labs     01/15/20  0541 01/16/20  0410 01/16/20  1850   GLUCOSE 91 94 203*     Recent Labs     01/15/20  0541 01/16/20  0410 01/16/20  1850   WBC 6.8 7.6 16.7*   NEUTSPOLYS 60.90 66.90 81.10*   LYMPHOCYTES 26.90 21.70* 7.20*   MONOCYTES 10.80 10.00 8.80   EOSINOPHILS 0.60 0.80 0.10   BASOPHILS 0.10 0.10 0.20     Recent Labs     01/15/20  0541 01/16/20  0410 01/16/20  0901 01/16/20  1751 01/16/20  1850 01/16/20  2214   RBC 3.65* 3.56*  --   --  2.19*  --    HEMOGLOBIN 12.2 11.9*  --  8.1* 7.3* 6.2*   HEMATOCRIT 36.8* 35.4*  --  26.0* 23.0* 19.7*   PLATELETCT 240 250  --   --  239  --    PROTHROMBTM  --   --  14.2  --  16.4*  --    APTT  --   --  26.6  --   --   --    INR  --   --  1.07  --  1.29*  --        Imaging  X-Ray:  I have personally reviewed the images and compared with prior images.    Assessment/Plan  * Spinal stenosis of lumbar region with neurogenic claudication- (present on admission)  Assessment & Plan  Status post surgical decompression and fusion of T9-L1 1/16  Neurosurgical consultation  PT/OT  Monitor Hemovac output  Ok to sit up  PRN analgesics  Close neurologic monitoring   Perioperative Ancef, Decadron    Encephalopathy acute  Assessment & Plan  Resolved likely 2/2 anesthesia      Shock (HCC)  Assessment & Plan  Improved  Differential diagnosis includes hypovolemic versus hemorrhagic   Off pressors  Required 2 units PRBCS; follow H&H  MAPS now greater than 65mmHg    Lumbar transverse process fracture (HCC)  Assessment & Plan  See above    Hypothyroidism- (present on admission)  Assessment & Plan  Synthroid 100 mcg oral daily    Leg DVT (deep venous thromboembolism), chronic, right (HCC)- (present on admission)  Assessment & Plan  Not on blood thinner currently  SCDs    Hypomagnesemia- (present on admission)  Assessment & Plan  Repletion and monitor closely    CKD (chronic  kidney disease) stage 3, GFR 30-59 ml/min ([facility])- (present on admission)  Assessment & Plan  Now with oliguria perioperatively  Recheck BMP, monitor urine output, electrolytes  Trend CVP  Additional fluid boluses and continue maintenance IV fluids  Avoid nephrotoxins    Hypokalemia- (present on admission)  Assessment & Plan  Recheck postoperatively, replete if remains low    Rheumatoid arthritis (HCC)- (present on admission)  Assessment & Plan  Continue Plaquenil, Robaxin  PRN analgesics and muscle relaxers    Neuropathic pain  Assessment & Plan  2/2 to involvement of DRG from compression  Titrate gabapentin  Fentanyl while in the ICU    Peripheral edema  Assessment & Plan  Improved  Monitor    Depression- (present on admission)  Assessment & Plan  Continue Celexa         VTE:  Contraindicated  Ulcer: Not Indicated  Lines: Central Line  Ongoing indication addressed    I have performed a physical exam and reviewed and updated ROS and Plan today (1/17/2020). In review of yesterday's note (1/16/2020), there are no changes except as documented above.     Discussed patient condition and risk of morbidity and/or mortality with Family, RN, RT, Pharmacy, Code status disscussed, Charge nurse / hot rounds, Patient and neurosurgery  The patient remains critically ill.  Critical care time = 35 minutes in directly providing and coordinating critical care and extensive data review.  No time overlap and excludes procedures.

## 2020-01-17 NOTE — ANESTHESIA POSTPROCEDURE EVALUATION
Patient: Elizabeth Celis    Procedure Summary     Date:  01/16/20 Room / Location:  [facility] OR 07 / SURGERY [facility]    Anesthesia Start:  1004 Anesthesia Stop:  1646    Procedures:       FUSION, SPINE, THORACIC, POSTERIOR APPROACH - T9-L3 (N/A Spine Thoracic)      LAMINECTOMY, SPINE, THORACIC (N/A Spine Thoracic) Diagnosis:  (L1 burst fracture)    Surgeon:  Sang Nelson M.D. Responsible Provider:  Alexey Livingston M.D.    Anesthesia Type:  general ASA Status:  2          Final Anesthesia Type: general  Last vitals  BP   Blood Pressure : (!) 83/36    Temp   36.1 °C (97 °F)    Pulse   Pulse: (!) 103   Resp   12    SpO2   100 %      Anesthesia Post Evaluation    Patient location during evaluation: PACU  Patient participation: complete - patient participated  Level of consciousness: confused and awake  Pain score: 4    Airway patency: patent  Anesthetic complications: no  Cardiovascular status: hemodynamically stable  Respiratory status: acceptable  Hydration status: euvolemic    PONV: none           Nurse Pain Score: 10 (NPRS)

## 2020-01-17 NOTE — PROGRESS NOTES
[facility] Medicine Daily Progress Note    Date of Service  1/16/2020    Chief Complaint  84 y.o. female admitted 1/9/2020 with back pain.     Hospital Course    This is an 84-year-old female with a past medical history of CKD, stroke, osteoporosis, rheumatoid arthritis, hypothyroidism, and depression admitted with low back pain after mechanical ground-level fall.  The patient was attempting to sit in a chair when the chair rolled out from underneath her causing her to land on the floor.  She developed acute low back pain leading her to present to the ED.    On admission CT L-spine did reveal left L1 transverse process fracture that is possibly chronic.  No other acute fracture noted.  CT pelvis was negative for acute abnormality.  She remained in the hospital for pain control and therapy.  She has been initiated on Neurontin, Decadron, and lidocaine patches.  Her pain is overall improving, although she still continues to need significant assistance with mobilizing secondary to her pain.    The patient also takes Spironolactone and Lasix as an outpatient for edema.  On admission she was noted to have worsening of her underlying CKD.  Her diuretics were held with improvement of her creatinine.  As the patient has no evidence of edema we will continue to hold these medications.      Interval Problem Update  1/14:  Patient sleepy this morning and requiring supplemental oxygen.  Suspect secondary to pain medication.  Decrease frequency of IV dilaudid.  Continues to complain of uncontrolled back pain.  Medications adjusted.  1/15: MRI lumbar spine showing severe lumbar stenosis.  Neurosurgery consulted.  Patient continues to have severe low back pain.  Continue current medication regimen.  She did have an episode of urinary retention requiring straight catheter overnight.  This appears to be resolved now.  1/16: Patient taken to the OR for spinal surgery.  Patient was transferred to ICU afterwards.  Continue pain  control.  Consultants/Specialty  Neurosurgery.    Code Status  FULL    Disposition  Await recommendations per neurosurgery.    Review of Systems  Review of Systems   Constitutional: Positive for malaise/fatigue. Negative for fever.   HENT: Negative for congestion and sore throat.    Eyes: Negative for blurred vision and discharge.   Respiratory: Negative for shortness of breath.    Cardiovascular: Negative for chest pain and leg swelling.   Gastrointestinal: Negative for abdominal pain, constipation, diarrhea, nausea and vomiting.   Genitourinary: Negative for dysuria and hematuria.   Musculoskeletal: Positive for back pain, joint pain and myalgias. Negative for neck pain.   Skin: Negative for rash.   Neurological: Positive for weakness. Negative for dizziness, tingling, tremors and headaches.        Physical Exam  Temp:  [36.1 °C (97 °F)-36.9 °C (98.5 °F)] 36.1 °C (97 °F)  Pulse:  [] 106  Resp:  [7-20] 20  BP: ()/(29-81) 80/51  SpO2:  [95 %-100 %] 100 %    Physical Exam  Vitals signs and nursing note reviewed.   Constitutional:       General: She is not in acute distress.     Appearance: Normal appearance. She is not toxic-appearing.   HENT:      Head: Normocephalic and atraumatic.      Nose: Nose normal.      Mouth/Throat:      Mouth: Mucous membranes are moist.      Pharynx: Oropharynx is clear. No oropharyngeal exudate.   Eyes:      General:         Right eye: No discharge.         Left eye: No discharge.      Conjunctiva/sclera: Conjunctivae normal.   Neck:      Musculoskeletal: Normal range of motion. No neck rigidity.   Cardiovascular:      Rate and Rhythm: Normal rate and regular rhythm.      Heart sounds: Normal heart sounds.   Pulmonary:      Effort: Pulmonary effort is normal. No respiratory distress.      Breath sounds: Normal breath sounds. No wheezing or rales.   Abdominal:      General: Bowel sounds are normal. There is no distension.      Palpations: Abdomen is soft.      Tenderness:  There is no tenderness. There is no guarding.   Musculoskeletal:         General: Tenderness present. No swelling.      Right lower leg: No edema.      Left lower leg: No edema.      Comments: Mobility limited by pain.    Low back tenderness.    Skin:     General: Skin is warm and dry.      Coloration: Skin is not jaundiced.   Neurological:      General: No focal deficit present.      Mental Status: She is alert and oriented to person, place, and time. Mental status is at baseline.      Cranial Nerves: No cranial nerve deficit.      Sensory: No sensory deficit.   Psychiatric:         Mood and Affect: Mood normal.         Behavior: Behavior normal.         Thought Content: Thought content normal.         Judgment: Judgment normal.         Fluids    Intake/Output Summary (Last 24 hours) at 1/16/2020 1740  Last data filed at 1/16/2020 1642  Gross per 24 hour   Intake 3720 ml   Output 2025 ml   Net 1695 ml       Laboratory  Recent Labs     01/14/20  0519 01/15/20  0541 01/16/20  0410   WBC 7.7 6.8 7.6   RBC 3.55* 3.65* 3.56*   HEMOGLOBIN 11.9* 12.2 11.9*   HEMATOCRIT 35.7* 36.8* 35.4*   .6* 100.8* 99.4*   MCH 33.5* 33.4* 33.4*   MCHC 33.3* 33.2* 33.6   RDW 49.4 48.7 46.9   PLATELETCT 259 240 250   MPV 9.0 8.8* 8.7*     Recent Labs     01/14/20  0449 01/15/20  0541 01/16/20  0410   SODIUM 136 133* 133*   POTASSIUM 4.5 4.9 4.4   CHLORIDE 105 103 104   CO2 22 22 20   GLUCOSE 90 91 94   BUN 29* 26* 24*   CREATININE 1.01 0.98 0.95   CALCIUM 8.1* 8.2* 8.2*     Recent Labs     01/16/20  0901   APTT 26.6   INR 1.07               Imaging  OUTSIDE IMAGES-CT THORACIC SPINE   Final Result      OUTSIDE IMAGES-CT LUMBAR SPINE   Final Result      DX-THORACOLUMBAR SPINE-2 VIEWS   Final Result      Digitized intraoperative radiograph is submitted for review.  This examination is not for diagnostic purpose but for guidance during a surgical procedure.      DX-PORTABLE FLUORO > 1 HOUR   Final Result      Portable fluoroscopy  utilized for 1 minute 48 seconds.         INTERPRETING LOCATION: [address] [place] [address] [place], [id number]      MR-LUMBAR SPINE-W/O   Final Result      1.  Multilevel subluxations with L3-4 grade 1 spondylolisthesis, mild anterolisthesis L2-L3 and L4-L5. Retrolisthesis T12-L1 and L1-L2 which is difficult to quantify due to the retropulsed fractures at T12 and L1.   2.  T12 moderate recent or subacute insufficiency compression fracture with retropulsion. Moderate central stenosis at the T12 level.   3.  L1 essentially complete chronic collapse marked retropulsion and chronic sclerotic change seen on CT. No change from 1/12/2018. Moderate central stenosis at the L1 level due to prominent retropulsion up to about 10 mm.   4.  Additional multilevel degenerative disc disease and spondylotic change with L3-4 severe central stenosis at L4-5 severe central stenosis.   5.  Multilevel foraminal stenoses most notable for L3-4 severe right foraminal stenosis.   6.  Details for each level above in the body of report.      CT-PELVIS W/O PLUS RECONS   Final Result         1.  No acute abnormality.   2.  Atherosclerosis      CT-LSPINE W/O PLUS RECONS   Final Result         1.  Left L1 transverse process fracture, may be chronic, otherwise no acute traumatic lumbar spine injury identified   2.  Anterolisthesis L2 on L3 and L3 on L4, appears most likely secondary to severe facet arthrosis.   3.  Chronic appearing L1 vertebral plana with 8.3 mm retropulsion and large central Schmorl's node   4.  Atherosclerosis           Assessment/Plan  * Spinal stenosis of lumbar region with neurogenic claudication- (present on admission)  Assessment & Plan  Causing intractable back pain  L1 transverse process fracture on CT.    MRI showing severe lumbar stenosis.  Neurosurgery consulted.  Patient taken to the OR today and will be transferred to the ICU afterwards  Pain not controlled well  Continue Robaxin, lidocaine patches, neurontin, and decadron  Dilaudid  as needed for breakthrough pain, monitor her mental and respiratory status closely  PT and OT following        Lumbar transverse process fracture (HCC)  Assessment & Plan  Neurosurgery following.  MRI of L-spine found retropulsed fractures of T12 and L1 patient was taken to surgery    Hypothyroidism- (present on admission)  Assessment & Plan  Synthroid    Leg DVT (deep venous thromboembolism), chronic, right ([facility])- (present on admission)  Assessment & Plan  Lovenox prophylaxis    Hypomagnesemia- (present on admission)  Assessment & Plan  oral Mg    CKD (chronic kidney disease) stage 3, GFR 30-59 ml/min ([facility])- (present on admission)  Assessment & Plan  Creatinine much improved with IV hydration.  Continue to hold Lasix.    Hypokalemia- (present on admission)  Assessment & Plan  Resolved    Rheumatoid arthritis ([facility])- (present on admission)  Assessment & Plan  Plaquenil    Peripheral edema  Assessment & Plan  Patient has a history of peripheral edema for which she takes Lasix and Aldactone as an outpatient.  These medications were held secondary to her kidney function.  Her kidney function is now improved and the patient is requesting restarting of her diuretics.  No significant peripheral edema on physical exam.  Will restart Aldactone from for now.  Monitor kidney function.    Depression- (present on admission)  Assessment & Plan  Citalopram       VTE prophylaxis: Lovenox.

## 2020-01-17 NOTE — ANESTHESIA TIME REPORT
Anesthesia Start and Stop Event Times     Date Time Event    1/16/2020 0952 Ready for Procedure     1004 Anesthesia Start     1646 Anesthesia Stop        Responsible Staff  01/16/20    Name Role Begin End    Alexey Livingston M.D. Anesth 1004 1646        Preop Diagnosis (Free Text):  Pre-op Diagnosis      L1 burst fracture        Preop Diagnosis (Codes):    Post op Diagnosis  Lumbar burst fracture (HCC)      Premium Reason  A. 3PM - 7AM    Comments:

## 2020-01-17 NOTE — THERAPY
Physical Therapy Contact Note    PT re-consult received post spine sx; no procedure note available but pulm note mentioning dural tear with resultant bedrest also with drop in HandH; will monitor for appropriate timing of PT continuation of POC.     Adia Morgan, PT, DPT Pager: [phone]

## 2020-01-17 NOTE — OP REPORT
DATE OF SERVICE:  01/16/2020    SURGEON:  Sang Nelson MD    FIRST ASSISTANT:  Rosamaria Osorio PA-C    PREOPERATIVE DIAGNOSES:  1.  L1 burst fracture.  2.  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  3.  Greater than 50% retropulsion of L1 burst fracture into conus.    POSTOPERATIVE DIAGNOSES:  1.  L1 burst fracture.  2.  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  3.  Greater than 50% retropulsion of L1 burst fracture into conus.    PROCEDURES PERFORMED:  1.  Placement of posterolateral instrumentation from T10 through L3 using   pedicle screws and rods.  2.  Posterolateral arthrodesis from T10 through L3 using MagniFuse and   autograft with BMP.  3.  Augmentation of posterolateral screws at T10 bilaterally using cement with   fenestrated screws at L2 on the patient's right side using fenestrated screws   and bone cement, and at L3 bilaterally using fenestrated screws and bone   cement.  4.  Laminectomy from T11 through L2 for posterior decompression.  5.  Bilateral foraminotomy at L1 for disimpaction and removal of bone   fragments impacting the L1 nerve root.  6.  Transpedicular approach for reduction of L1 retropulsed bone fragments.  7.  Open reduction of kyphotic deformity using in situ bending and reduction   towers to reduce the patient's kyphotic deformity, as well as subluxation of   L2 on L1.  8.  Use of intraoperative fluoro for guidance real time of placement of   thoracic and lumbar screws as well as live fluoro and injection of bone cement   at T10, L2 and L3.  9.  Repair of iatrogenic CSF leak at the pedicle of T11.  10.  Repair of pathologic leak at the shoulder of the L1 left-sided nerve root   secondary to nerve impaction from bone fragments.  This was repaired using   muscle graft along with Adherus.  11.  Intraoperative ultrasound was used to ensure that appropriate   disimpaction of her conus was achieved with the reduction in her retropulsed   L1 burst  fracture.    COMPLICATIONS:  The patient had a breach of the T11 pedicle on the left, which   caused a CSF leak and therefore no screw was placed in the T11 pedicle.    Instead, muscle graft and Adherus was used in that location to patch the hole   in place of skeletonizing the CSF leak and primarily repairing it as the   patient was aged and this may exacerbate the CSF leak at the level.    Additionally, the patient had substantial compression of the thecal sac in the   lateral aspect of the L1 nerve root due to bone shards pressing into the   axilla of the left L1 nerve root.  This area upon removal of these bone   fragments caused a linear, very small defect in the dura at the axilla of the   left nerve root and therefore, this required patching without direct primary   repair of the CSF leak.  This was done with muscle graft and Adherus.    IMPLANTS:  [employer] Solera fenestrated screw system.  Implant sizes were   6.5x40 mm screws in T10, 6.5x40 at T11 on the right skipping the left pedicle,   6.5x45 mm screws at T12.  We skipped the pedicles of L1 as this level had   been obliterated and transpedicular approaches for reduction of bone graft was   used at this level.  A 6.5x50 mm screw at the left L2 and 6.5x45 at the right   L2 pedicle and 6.5x45 mm screws were placed at the L3 pedicles bilaterally.    Biologics were two 1x10 cm bags of MagniFuse as well as two 1x5 cm MagniFuse   bags.  We also used a medium infuse.  Additionally, 2 packages of bone cement   were used to perform the augmentation of the fenestrated screws in the bodies.    BLOOD LOSS:  Approximately 1 liter.    BRIEF HISTORY OF PRESENTING ILLNESS:  This is an 84-year-old woman who   presented after a ground level fall causing a 90% loss of height with burst   fracture at L1 with significant retropulsion of fragments.  We were called   approximately half a week later after the patient had been in the hospital   with intractable pain with an MRI  and CT scan showing an unstable fracture at   the L1 level.  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  We also talked to the patient's   daughter at length about fusion in aged patient with very poor bone quality.    They understood the risks.  However, the patient would continue to be   completely immobilized due to pain limited restrictions and likely failure of   conservative treatment in the brace if we continue to try with a TLSO after   consultation.  They wished to proceed with surgery.    CONSENT:  Consent was obtained from the patient apprising her the risks,   complications, and indications of surgery, which included, but were not   limited to paralysis, CSF leak and need for more surgery.  She agreed and   consented.    PROCEDURE IN DETAIL:  The patient was brought into the operating room, was   placed under general anesthesia with endotracheal intubation and then placed   prone on a Trever table with thigh and hip pads with her arms outstretched.    We then clipped, prepped, and draped the patient in sterile usual fashion for   a thoracolumbar fusion.  We then performed a surgical timeout confirming the   correct side, site, procedure and patient with all faculty and staff agreeing.    We then infiltrated the skin with lidocaine with epinephrine and then   performed a midline incision using a 10 blade surgical scalpel and then using   self-retaining Weitlaner retractors along with Bovie cautery.  We then   dissected in the avascular plane along the patient's spinous process and   lamina over to the T-piece at L3, L2, L1 and then continued our dissection   superiorly out over the rib heads for a T12, T11 and  T10.  Initially, we   attempted to use the O-arm and obtained an O-arm after having the patient the   star placed on the T10 spinous process.  The O-arm was unfortunately not   working and therefore, we brought fluoro in for direct visualization with live   fluoro for placement of our pedicle screws.  Initially, we attempted to place   screws only from T11 through L3.  However, at the initial placement of the   T11 left side pedicle screw, we noted that the patient felt like she had a   breach medially after we medialized our trajectory using the Lenke probe   despite using live fluoro in the AP trajectory.  Given this finding, we ended   up examining the hole, noted that there was a small CSF leak in this area and   we placed a 1x1 laura into this area to ensure that CSF did not leak out   throughout the entirety of the case.  We then attempted a T12 pedicle screws   using the same technique.  There seemed to be a soft part lateral to the body   of the trajectory that we initially attempted.  Because of this, we therefore   aborted placing screws under live fluoro for the moment and instead elected to   proceed with our laminectomy so we could have direct palpable finding of the   pedicles so that we knew the superior, medial and inferior wall of the pedicle   to ensure that we had appropriate placement of our pedicle screws.  We   therefore began with a laminectomy from inferior aspect of T11 through L2 to   get our decompression completed across the junction of the large burst   fracture.  We did this through using a 5 mm high speed round bur drill as well   as a Leksell in the beginning to remove the spinous process.  Once we were   able to completely eggshell the lamina from T12, L1, L2 with the bottom half   of T11, we then removed the residual lamina using Kerrisons to remove   redundancy in the lateral recess as well as the central portion of the lamina.    We noted that there was a significant bulge up in  the dura at the L1   fracture site, which was easily able to be visualized with both fluoro as well   as ultrasound.  Once we were able to visualize this area, we then proceeded   with a transpedicular approach to reduce the fragments at L1.  We did this by   drilling out the pedicle and then infracturing the pedicle of L1 to remove it   away from the dura.  Once this was then completed, we then noted that   bilaterally the foramina had been completely obliterated over the L1 nerve   root from L1-L2.  Because of this, we did a complete foraminotomy through the   pars at the L1 nerve root bilaterally so that the nerve root would be   completely unencumbered.  When we did this, we noted that on the left side,   the bone shards and fragments had encased the L1 nerve root, had pinched over   the axilla where the DRG was.  We ended up addressing this by drilling into   the body underneath the posterior wall of L1 to hollow this out and then   infractured this bone shard that was pressing into the L1 DRG on the left.    When doing this, there was a small rent in the dura on this location that was   adjacent immediately to the nerve and therefore it was not appropriate to   place a stitch in this as this would be a stitch through the DRG.  She did   show a small amount of CSF leaking from this area because of this.  We packed   this with a 1x1 for the time being and then approached the right side by   completing a foraminotomy on that side as well and disimpacting the nerve root   out through the pars to ensure that there was adequate room for the L1 nerve   root.  Once we had done this, we then checked all the residual foramina from   T11 down through L2, and L2-L3 nerve roots, making sure that the lateral   recess was removed as well as any redundancy in the foramina.  Once we felt we   had disimpacted the lumbar cistern, we then used our transpedicular approach   to reduce the retropulsed fragments by first dissecting  underneath the dura,   ensuring not to lacerate the ventral side of the dura and skeletonize the   retropulsed fragments very gently to ensure we did not hit the conus.  Once we   had exposure of the retropulsed fragments from the left side, we then used   hands to punch the retropulsed fragments down into the patient's body of L1.    When doing this, we intermittently were checking using the ultrasound to   ensure that we had adequate reduction of these bone shards.  We did this with   an attempt mostly on the left side as this was the worst of the two sides.    There were still some retropulsed fragments on the right side; however, we did   not proceed with a transpedicular approach on both sides with reduction of   the fragments as it appeared on ultrasound that the conus was completely   disimpacted from this along with some indirect decompression from reduction of   the patient's kyphotic deformity from flipping her on the bed and removing   the redundant bone from her back.  Once we had the bone tamped into place, we   then FloSeal the gutters bilaterally and packed off the CSF leak at the L1   nerve root.  We then brought the fluoro back into the field and then under   live fluoro, both AP and lateral, we placed pedicle screws at T10, T11, T12,   L2 and L3 ensuring that we did not have any further breaches.  Once we had   good screws placed at all those levels, checked with both AP and lateral   fluoro, we then augmented the screws at T10, L2 on the right and then   bilaterally at L3 with bone cement using the fenestrated screws.  Under live   fluoro, we injected approximately 4 mL of bone cement into each screw until we   saw that there was extrusion, and at this point, we felt that the screws had   been cemented in appropriately.  At this point, we then irrigated the wound   thoroughly with bacitracin irrigation, checked our leaks, and then using   muscle graft with Adherus, we secured our CSF leaks at the  T11 pedicle using   Adherus, then muscle graft, then Adherus, then muscle graft, then Adherus down   the pedicle shaft to ensure that there was an appropriate plug.  We asked for   Valsalva.  There was no sign of CSF drainage from this area at this time.  We   did the same technique with Adherus followed by muscle graft and Adherus   again in the transpedicular approach around the nerve axilla of the left L1   nerve root.  Again, there was no sign of CSF leak after the repair.  We did   not attempt at primary repair as is our typical method at either level as this   would require substantial removal of bone at the T11 area and the CSF plug   seemed to be working very well, and at the L1 nerve root, we did not wish to   damage the nerve root further as it had already been slightly lacerated from   the bone impaction from her L1 burst fracture.  Once this had been completed,   we felt satisfied with both our CSF repair.  We again checked for   decompression of the L1 burst fracture using the ultrasound intraoperatively   and we felt there was adequate decompression even though there was still some   degree of retropulsed fragments, as we did not wish to hurt the patient's   inferior aspect of her spinal cord.  At this point, we placed our titanium concha   into tulips and then used the reducing tower to pull the L2 screws up into   the construct to reduce the patient's retrolisthesis of L1 on L2, and then we   used in situ bending to give the patient back her normal lordosis across the   thoracolumbar junction.  We were able to see this occur live under fluoro and   felt that we had adequate reduction as well as open reduction of her kyphotic   deformity and correction of her retrolisthesis across the junction.  At that   time, we locked her locking caps in place, placed a cross connector to give   added structural stability to the patient and then tunneled Hemovac   superiorly.  We then decorticated the bone at both the  T-piece, rib heads and   lamina and then placed a medium BMP across the exposed decorticated bone along   with MagniFuse bone bags across the entirety of the structure to enable the   patient to have scaffolding to build her bone across.  Once this was   completed, we then began closing the wound in layers after checking for   appropriate hemostasis and achieving that with the Aquamantys.  We closed the   wound in layers using 0 Vicryl on the fascia, followed by 2-0 Vicryl on the   skin, followed by surgical staples as a cutaneous stitch.  A silver dressing   was applied to the wound and then the patient's Hemovac was hooked up to   suction.  At that time, there was no noted CSF getting pulled from the wound   and the patient demonstrated normal blood consistency coming from the Hemovac.    The patient was then rolled supine on a gurney and extubated.  At the time   of her PACU evaluation, the patient had whole sensation and motor function in   the bilateral lower extremities.  She had no sign of any sort of neurologic   dysfunction.    I was present for all pertinent parts of the case.  My PA was essential for   both the dissection as well as placement of screws and was present for the   case for assistance in expediting the case.  Also, placed modifier 22.  This   patient's case was at least 50% more difficult than the average thoracolumbar   fusion given the patient's bone density was extremely limited in terms and   requiring augmentation of bone cement and her pedicle screws, as well as a   significant amount of time needed to be spent decompressing her nerve roots at   L1 given the substantial retropulsion of fragments encasing her nerve roots,   which required very delicate dissection and removal of bone fragments.       ____________________________________     MD JOE Wheatley / [facility]    DD:  01/17/2020 12:13:29  DT:  01/17/2020 13:45:05    D#:  [id number]  Job#:  [id number]

## 2020-01-17 NOTE — CONSULTS
Critical Care Consultation    Date of consult: 1/16/2020    Referring Physician  Ana Melo M.D.    Reason for Consultation  Extensive thoracic-lumbar surgery with CSF leak, shock    History of Presenting Illness  84 y.o. female who presented 1/9/2020 with a past medical history significant for C. difficile colitis, chronic back pain, rheumatoid arthritis and stroke who was brought into the emergency department by EMS on January 9 after a mechanical fall resulting in a back injury.  On work-up patient was found to have an L1 burst fracture and was seen by neurosurgery due to increasing excruciating pain and radicular symptoms down her legs.  An MRI of her spine showed severe central stenosis secondary to the retropulsed bone fragments with kyphosis.  The decision was made to take her to the operating room today by Dr. Nelson where she underwent an extensive spinal surgery occluding stabilization of the deformity and a T9-L1 fusion.  She had a small CSF leak on the left near L1.  It was a 6-hour case with a approximately 750 mL blood loss.  She received 4 L of crystalloid intraoperatively and in the recovery room but continued to have low urine output of 215 mL.  She was confused postoperatively and upon arrival to the intensive care unit had a blood pressure of 50/20.  A stat glucose was checked which was within normal limits.  She was given pushes of phenylephrine up to 1000 mcg, additional fluid bolus, and started on a norepinephrine infusion.  She had poor peripheral access and the norepinephrine infiltrated unfortunately while I was placing a central venous catheter.  Phentolamine was ordered around the infusion site.  I performed a bedside limited cardiac ultrasound which revealed a small inferior vena cava and hyperdynamic heart without ventricular dilation noted nor pericardial effusion.  A chest x-ray did not reveal a pneumothorax.  Patient continues to move and have sensation in bilateral lower  extremities.    Code Status  Full Code    Review of Systems  Review of Systems   Unable to perform ROS: Mental status change       Past Medical History   has a past medical history of Anesthesia, C. difficile diarrhea (2/16), C. difficile diarrhea (3/2016), Cancer ([facility]) (1947), Chronic back pain, Dense breast tissue on mammogram (7/25/2019), Depression, Elbow fracture, left, Heart burn, History of anemia, Hypothyroid, MRSA (methicillin resistant Staphylococcus aureus), MRSA (methicillin resistant Staphylococcus aureus) (2012), Osteoporosis (3/27/2019), Pain, Rheumatoid arthritis ([facility]) (9/26/2014), Stroke ([facility]) (1990's?), and Urinary incontinence.    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    Family History  family history includes Anesthesia in her paternal grandfather; Non-contributory in her father and mother.    Social History   reports that she quit smoking about 4 years ago. Her smoking use included cigarettes. She has a 20.00 pack-year smoking history. She has never used smokeless tobacco. She reports current alcohol use of about 1.2 oz of alcohol per week. She reports that she does not use drugs.    Medications  Home Medications     Reviewed by Taya Delarosa R.N. (Registered Nurse) on 01/16/20 at 0900  Med List Status: Complete   Medication Last Dose Status   acetaminophen (TYLENOL) tablet 650 mg  Active   Ascorbic Acid (VITAMIN C PO) 1/9/2020 Active   BIOTIN PO 1/9/2020 Active   bisacodyl (DULCOLAX) suppository 10 mg  Active    CALCIUM PO 1/9/2020 Active   citalopram (CELEXA) 20 MG Tab 1/8/2020 Active   citalopram (CELEXA) tablet 20 mg  Active   denosumab (PROLIA) 60 MG/ML Solution 11/10/2019 Active   dexamethasone (DECADRON) tablet 4 mg  Active   diphenhydrAMINE (BENADRYL) tablet/capsule 25 mg  Active   FIBER PO 1/9/2020 Active   furosemide (LASIX) 40 MG Tab 1/9/2020 Active   gabapentin (NEURONTIN) capsule 100 mg  Active   HYDROmorphone pf (DILAUDID) injection 0.5 mg  Active   hydroxychloroquine (PLAQUENIL) 200 MG Tab 1/9/2020 Active   hydroxychloroquine (PLAQUENIL) tablet 200 mg  Active   ipratropium (ATROVENT) 0.06 % nasal spray 1 Spray  Active   levothyroxine (SYNTHROID) tablet 100 mcg  Active   lidocaine (LIDODERM) 5 % 2 Patch  Active   magnesium hydroxide (MILK OF MAGNESIA) suspension 30 mL  Active   magnesium oxide (MAG-OX) tablet 400 mg  Active   methocarbamol (ROBAXIN) tablet 500 mg  Active   Multiple Vitamins-Minerals (WOMENS ONE DAILY) Tab 1/9/2020 Active   omeprazole (PRILOSEC) capsule 20 mg  Active   ondansetron (ZOFRAN ODT) dispertab 4 mg  Active   ondansetron (ZOFRAN) syringe/vial injection 4 mg  Active   oxyCODONE immediate-release (ROXICODONE) tablet 5-10 mg  Active   Pharmacy Consult Request ...Pain Management Review 1 Each  Active   polyethylene glycol/lytes (MIRALAX) PACKET 1 Packet  Active   Potassium Chloride Catrachita ER (KLOR-CON M10 PO) 1/9/2020 Active   senna-docusate (PERICOLACE or SENOKOT S) 8.6-50 MG per tablet 2 Tab  Active   spironolactone (ALDACTONE) 25 MG Tab 1/9/2020 Active   spironolactone (ALDACTONE) tablet 25 mg  Active   SYNTHROID 100 MCG Tab 1/9/2020 Active   traZODone (DESYREL) 50 MG Tab 1/8/2020 Active   traZODone (DESYREL) tablet 25 mg  Active              Current Facility-Administered Medications   Medication Dose Route Frequency Provider Last Rate Last Dose   • [MAR Hold] HYDROmorphone pf (DILAUDID) injection 0.5 mg  0.5 mg Intravenous Q3HRS PRN Rosamaria M Clarkin, P.AEstela-C.   0.5 mg at 01/16/20 0847    • [MAR Hold] diphenhydrAMINE (BENADRYL) tablet/capsule 25 mg  25 mg Oral Q6HRS PRN Rosamaria Osorio P.A.-C.       • ondansetron (ZOFRAN) syringe/vial injection 4 mg  4 mg Intravenous Once PRN Alexey Livingston M.D.       • haloperidol lactate (HALDOL) injection 1 mg  1 mg Intravenous Q15 MIN PRN Alexey Livingston M.D.       • diphenhydrAMINE (BENADRYL) injection 12.5 mg  12.5 mg Intravenous Q15 MIN PRN Alexey Livingston M.D.       • lactated ringers infusion   Intravenous Continuous Alexey Livingston M.D.       • fentaNYL (SUBLIMAZE) injection 25 mcg  25 mcg Intravenous Q2 MIN PRN Alexey Livingston M.D.        Or   • fentaNYL (SUBLIMAZE) injection 50 mcg  50 mcg Intravenous Q2 MIN PRN Alexey Livingston M.D.       • HYDROmorphone pf (DILAUDID) injection 0.1 mg  0.1 mg Intravenous Q5 MIN PRN Alexey Livingston M.D.        Or   • HYDROmorphone pf (DILAUDID) injection 0.2 mg  0.2 mg Intravenous Q5 MIN PRN Alexey Livingston M.D.        Or   • HYDROmorphone pf (DILAUDID) injection 0.4 mg  0.4 mg Intravenous Q5 MIN PRN Alexey Livingston M.D.       • oxyCODONE (ROXICODONE) oral solution 5 mg  5 mg Oral Once PRN Alexey Livingston M.D.        Or   • oxyCODONE (ROXICODONE) oral solution 10 mg  10 mg Oral Once PRN Alexey Livingston M.D.       • meperidine (DEMEROL) injection 6.25 mg  6.25 mg Intravenous Q5 MIN PRN Alexey Livingston M.D.       • labetalol (NORMODYNE/TRANDATE) injection 5 mg  5 mg Intravenous Q5 MIN PRN Alexey Livingston M.D.       • hydrALAZINE (APRESOLINE) injection 5 mg  5 mg Intravenous Q5 MIN PRN Alexey Livingston M.D.       • albuterol (PROVENTIL) 2.5mg/0.5ml nebulizer solution 2.5 mg  2.5 mg Inhalation Q10 MIN PRN Alexey Livingston M.D.       • lactated ringer BOLUS infusion  500 mL Intravenous Once Alexey Livingston M.D.       • [MAR Hold] spironolactone (ALDACTONE) tablet 25 mg  25 mg Oral Q DAY Saleem Persaud A.P.R.N.   25 mg at 01/16/20 0509   • [MAR Hold] Pharmacy Consult Request ...Pain Management Review 1 Each  1  Each Other PHARMACY TO DOSE Saleem Persaud, A.P.R.N.        And   • [MAR Hold] oxyCODONE immediate-release (ROXICODONE) tablet 5-10 mg  5-10 mg Oral Q3HRS PRN SMITH Lopez.P.R.N.   10 mg at 01/15/20 1556   • [MAR Hold] methocarbamol (ROBAXIN) tablet 500 mg  500 mg Oral 4X/DAY Saleem Persaud A.P.R.N.   500 mg at 01/15/20 2033   • [MAR Hold] dexamethasone (DECADRON) tablet 4 mg  4 mg Oral Q12HRS Ronald Torres M.D.   4 mg at 01/16/20 0508   • [MAR Hold] magnesium oxide (MAG-OX) tablet 400 mg  400 mg Oral BID Ronald Torres M.D.   400 mg at 01/16/20 0508   • [MAR Hold] citalopram (CELEXA) tablet 20 mg  20 mg Oral QHS Blaze Heck M.D.   20 mg at 01/15/20 2033   • [MAR Hold] hydroxychloroquine (PLAQUENIL) tablet 200 mg  200 mg Oral DAILY Blaze Heck M.D.   200 mg at 01/16/20 0508   • [MAR Hold] ipratropium (ATROVENT) 0.06 % nasal spray 1 Spray  1 [place] Nasal BID Blaze Heck M.D.   1 Spray at 01/10/20 1730   • [MAR Hold] levothyroxine (SYNTHROID) tablet 100 mcg  100 mcg Oral AM ES Blaze Heck M.D.   100 mcg at 01/16/20 0509   • [MAR Hold] traZODone (DESYREL) tablet 25 mg  25 mg Oral Q EVENING Blaze Heck M.D.   25 mg at 01/15/20 1743   • [MAR Hold] acetaminophen (TYLENOL) tablet 650 mg  650 mg Oral Q6HRS PRN Blaze Heck M.D.   650 mg at 01/11/20 2114   • [MAR Hold] ondansetron (ZOFRAN) syringe/vial injection 4 mg  4 mg Intravenous Q4HRS PRN Blaze Heck M.D.   4 mg at 01/10/20 1256   • [MAR Hold] ondansetron (ZOFRAN ODT) dispertab 4 mg  4 mg Oral Q4HRS PRGEREMIAS Heck M.D.   4 mg at 01/10/20 2100   • [MAR Hold] senna-docusate (PERICOLACE or SENOKOT S) 8.6-50 MG per tablet 2 Tab  2 Tab Oral BID Blaze Heck M.D.   2 Tab at 01/16/20 0508    And   • [MAR Hold] polyethylene glycol/lytes (MIRALAX) PACKET 1 Packet  1 Packet Oral QDAY PRGEREMIAS Heck M.D.        And   • [MAR Hold] magnesium hydroxide (MILK OF MAGNESIA) suspension 30 mL  30 mL Oral QDAY MAGALIS Heck M.D.        And    • [MAR Hold] bisacodyl (DULCOLAX) suppository 10 mg  10 mg Rectal QDAY PRN Blaze Heck M.D.       • [MAR Hold] lidocaine (LIDODERM) 5 % 2 Patch  2 Patch Transdermal Q24HR Ronald Torres M.D.   2 Patch at 01/15/20 1203   • [MAR Hold] gabapentin (NEURONTIN) capsule 100 mg  100 mg Oral TID Ronald Torres M.D.   100 mg at 01/16/20 0508   • [MAR Hold] omeprazole (PRILOSEC) capsule 20 mg  20 mg Oral DAILY Ronald Torres M.D.   20 mg at 01/15/20 1743       Allergies  Allergies   Allergen Reactions   • Azithromycin Unspecified     Matti Johnsons syndrome   • Cefuroxime Itching and Vomiting     They gave me C-Diff   • Ciprofloxacin Itching and Vomiting     They gave me C-Diff   • Clindamycin Itching and Vomiting     They gave me c-diff   • Daptomycin      Matti colleen syndrome     • Erythromycin Unspecified     Matti Johnsons syndrome   • Nitrofurantoin Vomiting and Nausea   • Rifampin      Matti colleen syndrome   • Sulfa Drugs Swelling   • Codeine Itching   • Flagyl [Metronidazole] Vomiting and Nausea   • Macrodantin  [Nitrofurantoin Macrocrystal]      Other reaction(s): Decreased Blood Pressure   • Morphine Itching     Tolerates oxycodone/hydromorphone   • Premarin Unspecified     burning       Vital Signs last 24 hours  Temp:  [36.1 °C (97 °F)-36.9 °C (98.5 °F)] 36.1 °C (97 °F)  Pulse:  [] 104  Resp:  [7-18] 10  BP: ()/(29-81) 69/37  SpO2:  [95 %-100 %] 100 %    Physical Exam  Physical Exam  Vitals signs and nursing note reviewed.   Constitutional:       General: She is in acute distress.      Appearance: She is normal weight. She is ill-appearing.   HENT:      Head: Normocephalic and atraumatic.      Right Ear: External ear normal.      Left Ear: External ear normal.      Nose: Nose normal. No congestion.      Mouth/Throat:      Mouth: Mucous membranes are dry.      Pharynx: Oropharynx is clear. No oropharyngeal exudate.   Eyes:      General: No scleral icterus.     Conjunctiva/sclera:  Conjunctivae normal.      Pupils: Pupils are equal, round, and reactive to light.   Neck:      Musculoskeletal: Neck supple. No neck rigidity.      Comments: Flattened neck veins  Cardiovascular:      Rate and Rhythm: Regular rhythm. Tachycardia present. Occasional extrasystoles are present.     Chest Wall: PMI is not displaced.      Pulses: Decreased pulses.           Radial pulses are 1+ on the right side and 1+ on the left side.      Heart sounds: Heart sounds not distant. No murmur.      Comments: Delayed capillary refill  Pulmonary:      Effort: Tachypnea present. No accessory muscle usage or respiratory distress.      Breath sounds: No decreased air movement. Examination of the left-lower field reveals rhonchi. Rhonchi present. No wheezing or rales.   Abdominal:      General: Bowel sounds are normal. There is no distension.      Palpations: Abdomen is soft.      Tenderness: There is no tenderness.      Comments: Hemovac with bloody output   Genitourinary:     Comments: Rao catheter in place, low urine output  Musculoskeletal:         General: No tenderness.      Right lower leg: No edema.      Left lower leg: No edema.   Skin:     General: Skin is dry.      Capillary Refill: Capillary refill takes more than 3 seconds.      Coloration: Skin is pale.      Findings: No rash.      Comments: Poor skin turgor   Neurological:      General: No focal deficit present.      Mental Status: She is alert. She is disoriented.      Comments: Drowsy, confused, moves all extremities purposefully but slow to respond   Psychiatric:      Comments: Unable to assess given current clinical condition         Fluids    Intake/Output Summary (Last 24 hours) at 1/16/2020 1731  Last data filed at 1/16/2020 1642  Gross per 24 hour   Intake 3720 ml   Output 2025 ml   Net 1695 ml       Laboratory  Recent Results (from the past 48 hour(s))   CBC WITH DIFFERENTIAL    Collection Time: 01/15/20  5:41 AM   Result Value Ref Range    WBC 6.8 4.8  - 10.8 K/uL    RBC 3.65 (L) 4.20 - 5.40 M/uL    Hemoglobin 12.2 12.0 - 16.0 g/dL    Hematocrit 36.8 (L) 37.0 - 47.0 %    .8 (H) 81.4 - 97.8 fL    MCH 33.4 (H) 27.0 - 33.0 pg    MCHC 33.2 (L) 33.6 - 35.0 g/dL    RDW 48.7 35.9 - 50.0 fL    Platelet Count 240 164 - 446 K/uL    MPV 8.8 (L) 9.0 - 12.9 fL    Neutrophils-Polys 60.90 44.00 - 72.00 %    Lymphocytes 26.90 22.00 - 41.00 %    Monocytes 10.80 0.00 - 13.40 %    Eosinophils 0.60 0.00 - 6.90 %    Basophils 0.10 0.00 - 1.80 %    Immature Granulocytes 0.70 0.00 - 0.90 %    Nucleated RBC 0.00 /100 WBC    Neutrophils (Absolute) 4.16 2.00 - 7.15 K/uL    Lymphs (Absolute) 1.84 1.00 - 4.80 K/uL    Monos (Absolute) 0.74 0.00 - 0.85 K/uL    Eos (Absolute) 0.04 0.00 - 0.51 K/uL    Baso (Absolute) 0.01 0.00 - 0.12 K/uL    Immature Granulocytes (abs) 0.05 0.00 - 0.11 K/uL    NRBC (Absolute) 0.00 K/uL   Basic Metabolic Panel    Collection Time: 01/15/20  5:41 AM   Result Value Ref Range    Sodium 133 (L) 135 - 145 mmol/L    Potassium 4.9 3.6 - 5.5 mmol/L    Chloride 103 96 - 112 mmol/L    Co2 22 20 - 33 mmol/L    Glucose 91 65 - 99 mg/dL    Bun 26 (H) 8 - 22 mg/dL    Creatinine 0.98 0.50 - 1.40 mg/dL    Calcium 8.2 (L) 8.5 - 10.5 mg/dL    Anion Gap 8.0 0.0 - 11.9   ESTIMATED GFR    Collection Time: 01/15/20  5:41 AM   Result Value Ref Range    GFR If African American >60 >60 mL/min/1.73 m 2    GFR If Non African American 54 (A) >60 mL/min/1.73 m 2   CBC WITH DIFFERENTIAL    Collection Time: 01/16/20  4:10 AM   Result Value Ref Range    WBC 7.6 4.8 - 10.8 K/uL    RBC 3.56 (L) 4.20 - 5.40 M/uL    Hemoglobin 11.9 (L) 12.0 - 16.0 g/dL    Hematocrit 35.4 (L) 37.0 - 47.0 %    MCV 99.4 (H) 81.4 - 97.8 fL    MCH 33.4 (H) 27.0 - 33.0 pg    MCHC 33.6 33.6 - 35.0 g/dL    RDW 46.9 35.9 - 50.0 fL    Platelet Count 250 164 - 446 K/uL    MPV 8.7 (L) 9.0 - 12.9 fL    Neutrophils-Polys 66.90 44.00 - 72.00 %    Lymphocytes 21.70 (L) 22.00 - 41.00 %    Monocytes 10.00 0.00 - 13.40 %     Eosinophils 0.80 0.00 - 6.90 %    Basophils 0.10 0.00 - 1.80 %    Immature Granulocytes 0.50 0.00 - 0.90 %    Nucleated RBC 0.00 /100 WBC    Neutrophils (Absolute) 5.08 2.00 - 7.15 K/uL    Lymphs (Absolute) 1.65 1.00 - 4.80 K/uL    Monos (Absolute) 0.76 0.00 - 0.85 K/uL    Eos (Absolute) 0.06 0.00 - 0.51 K/uL    Baso (Absolute) 0.01 0.00 - 0.12 K/uL    Immature Granulocytes (abs) 0.04 0.00 - 0.11 K/uL    NRBC (Absolute) 0.00 K/uL   Basic Metabolic Panel    Collection Time: 01/16/20  4:10 AM   Result Value Ref Range    Sodium 133 (L) 135 - 145 mmol/L    Potassium 4.4 3.6 - 5.5 mmol/L    Chloride 104 96 - 112 mmol/L    Co2 20 20 - 33 mmol/L    Glucose 94 65 - 99 mg/dL    Bun 24 (H) 8 - 22 mg/dL    Creatinine 0.95 0.50 - 1.40 mg/dL    Calcium 8.2 (L) 8.5 - 10.5 mg/dL    Anion Gap 9.0 0.0 - 11.9   ESTIMATED GFR    Collection Time: 01/16/20  4:10 AM   Result Value Ref Range    GFR If African American >60 >60 mL/min/1.73 m 2    GFR If Non  56 (A) >60 mL/min/1.73 m 2   APTT    Collection Time: 01/16/20  9:01 AM   Result Value Ref Range    APTT 26.6 24.7 - 36.0 sec   Prothrombin Time    Collection Time: 01/16/20  9:01 AM   Result Value Ref Range    PT 14.2 12.0 - 14.6 sec    INR 1.07 0.87 - 1.13       Imaging  DX-CHEST-PORTABLE (1 VIEW)   Final Result      Placement of right internal jugular catheter tip projecting appropriately over the SVC      OUTSIDE IMAGES-CT THORACIC SPINE   Final Result      OUTSIDE IMAGES-CT LUMBAR SPINE   Final Result      DX-THORACOLUMBAR SPINE-2 VIEWS   Final Result      Digitized intraoperative radiograph is submitted for review.  This examination is not for diagnostic purpose but for guidance during a surgical procedure.      DX-PORTABLE FLUORO > 1 HOUR   Final Result      Portable fluoroscopy utilized for 1 minute 48 seconds.         INTERPRETING LOCATION: [address] [place], [place] [id number], [id number]      MR-LUMBAR SPINE-W/O   Final Result      1.  Multilevel subluxations with L3-4 grade 1  spondylolisthesis, mild anterolisthesis L2-L3 and L4-L5. Retrolisthesis T12-L1 and L1-L2 which is difficult to quantify due to the retropulsed fractures at T12 and L1.   2.  T12 moderate recent or subacute insufficiency compression fracture with retropulsion. Moderate central stenosis at the T12 level.   3.  L1 essentially complete chronic collapse marked retropulsion and chronic sclerotic change seen on CT. No change from 1/12/2018. Moderate central stenosis at the L1 level due to prominent retropulsion up to about 10 mm.   4.  Additional multilevel degenerative disc disease and spondylotic change with L3-4 severe central stenosis at L4-5 severe central stenosis.   5.  Multilevel foraminal stenoses most notable for L3-4 severe right foraminal stenosis.   6.  Details for each level above in the body of report.      CT-PELVIS W/O PLUS RECONS   Final Result         1.  No acute abnormality.   2.  Atherosclerosis      CT-LSPINE W/O PLUS RECONS   Final Result         1.  Left L1 transverse process fracture, may be chronic, otherwise no acute traumatic lumbar spine injury identified   2.  Anterolisthesis L2 on L3 and L3 on L4, appears most likely secondary to severe facet arthrosis.   3.  Chronic appearing L1 vertebral plana with 8.3 mm retropulsion and large central Schmorl's node   4.  Atherosclerosis       *Personally reviewed chest x-ray showing normal cardiac silhouette, trace bibasilar atelectasis, right IJ central venous catheter in good position, upper portion of thoracic hardware noted, no pneumothorax.   *Personally performed bedside limited cardiac ultrasound showing no pericardial effusion, small inferior vena cava that collapses with respiration and preserved biventricular function and size   *Personally reviewed EKG showing no evidence of acute ST changes with normal sinus rhythm and a QTc interval of 490    Assessment/Plan  * Spinal stenosis of lumbar region with neurogenic claudication- (present on  admission)  Assessment & Plan  Status post surgical decompression and fusion of T9-L1 1/16  Neurosurgical consultation  PT/OT  Monitor Hemovac output  Keep flat for now given CSF leak, monitor Hemovac output quality  PRN analgesics  Close neurologic monitoring every 2 hours  Perioperative Ancef, Decadron    Encephalopathy acute  Assessment & Plan  Has remained confused postoperatively, query post anesthetic effect, no focal neurologic deficits  Close neurologic monitoring  Avoid sedatives  Consider head CT if no improvement in the next few hours  Check for metabolic causes    Shock (HCC)  Assessment & Plan  Undifferentiated postoperatively.  Differential diagnosis includes hypovolemic versus hemorrhagic versus neurogenic versus medication/vaso-plegia versus cardiogenic.  Not on chronic steroids so doubt relative adrenal insufficiency.  No evidence of infection/sepsis.  Bedside echo, troponin, EKG performed, serial CBC, monitor Hemovac  Titrate norepinephrine to maintain mean arterial pressure greater than 65  Central venous catheter placement to monitor CVP targeting a goal of 8-10 with additional fluid boluses  Monitor lactic acid, urine output, vital signs closely for adequacy of resuscitation.    Lumbar transverse process fracture (HCC)  Assessment & Plan  See above    Hypothyroidism- (present on admission)  Assessment & Plan  Synthroid 100 mcg oral daily    Leg DVT (deep venous thromboembolism), chronic, right ([facility])- (present on admission)  Assessment & Plan  Not on blood thinner currently  SCDs    Hypomagnesemia- (present on admission)  Assessment & Plan  Repletion and monitor closely    CKD (chronic kidney disease) stage 3, GFR 30-59 ml/min ([facility])- (present on admission)  Assessment & Plan  Now with oliguria perioperatively  Recheck BMP, monitor urine output, electrolytes  Trend CVP  Additional fluid boluses and continue maintenance IV fluids  Avoid nephrotoxins    Hypokalemia- (present on  admission)  Assessment & Plan  Recheck postoperatively, replete if remains low    Rheumatoid arthritis (HCC)- (present on admission)  Assessment & Plan  Continue Plaquenil, Robaxin  PRN analgesics and muscle relaxers    Peripheral edema  Assessment & Plan  Improved  Monitor    Depression- (present on admission)  Assessment & Plan  Continue Celexa  Hold benzodiazepines for now given altered mental status      Discussed patient condition and risk of morbidity and/or mortality with RN, RT, Pharmacy, Charge nurse / hot rounds, Patient and neurosurgery.,  PACU nurse  The patient remains critically ill.  Critical care time = 89 minutes in directly providing and coordinating critical care and extensive data review.  No time overlap and excludes procedures.

## 2020-01-17 NOTE — DIETARY
Nutrition Services: Nutrition Support Assessment  Day 2 of admit.  Elizabeth Celis is a 84 y.o. female with admitting DX of Acute low back pain     Current problem list:  1. Spinal stenosis of lumbar region with neurogenic claudication  2. Lumbar transverse process fracture  3. Shock   4. Encephalopathy acute  5. CKD stage 3  6. Leg DVT, chronic, right  7. Hypothyroidism     Assessment:  Estimated Nutritional Needs: based on: Ht: 165.1 cm, Wt : 58.2 kg via bed scale, IBW: 56.7 kg, BMI: 21.35 - Normal     Calculation/Equation: REE per MSJ x 1.2 = 1241 kcal/day  Total Calories / day: 1250 - 1550 (Calories / k - 27)  Total Grams Protein / day: 58 - 70 (Grams Protein / k - 1.2)  Total Fluids ml / day: 1457.6 ml    Evaluation:   1. Consult received for TF assessment.    2. Enteral access: Cortrak to be placed   3. Labs: Glucose 159, BUN 29, Creatinine 1.44  4. Meds: tums, celexa, decadron, colace, neurontin, synthroid, mag-ox, robaxin, first-omeprazole, pericolace, desyrel  5. Fluids: dextrose 5% and 0.9% NaCl with KCL 20 mEq @ 100 mL/hr  6. Pt will benefit from standard TF formula at this time.      Malnutrition Risk: No criteria noted at this time.      Recommendations/Plan:  Start Fibersource HN @ 25 ml/hr and advance per protocol to 50 ml/hr (goal rate) to provide 1440 kcal (25 kcal/kg), 65 grams protein (1.1 gm/kg), and 972 ml free water per day.   Fluids per MD.   Diet upgrades per SLP.        RD following

## 2020-01-17 NOTE — ASSESSMENT & PLAN NOTE
Resolved  Hypovolemic/hemorrhagic   Off pressors  Required 2 units PRBCS; follow H&H  Hgb stable at 7.8  MAPS greater than 65mmHg

## 2020-01-17 NOTE — ASSESSMENT & PLAN NOTE
2/2 to involvement of DRG from compression  Titrate gabapentin  May need additional agents upon transfer

## 2020-01-17 NOTE — PROGRESS NOTES
Lab called with critical result of hemoglobin at 4.7 and hematocrit at 13.7. Critical lab result read back.   Dr. Valerio notified of critical lab result at 0647.  Critical lab result read back by Dr. Valerio. New orders in place.     Dr. Valerio notified of low urine output overnight. New orders in place. Will continue to monitor.

## 2020-01-17 NOTE — PROGRESS NOTES
Neurosurgery Progress Note    Subjective:  Pt doing much better post op with pain   Drains pulling bloody fluid no concern at this time for CSF luis,     Exam:  A&O x3, pt appears uncomfortable  FS x4  sensation grossly intact      BP  Min: 60/45  Max: 160/68  Pulse  Av.9  Min: 51  Max: 107  Resp  Av.9  Min: 7  Max: 27  Temp  Av.3 °C (97.3 °F)  Min: 35.4 °C (95.7 °F)  Max: 36.8 °C (98.3 °F)  SpO2  Av.1 %  Min: 80 %  Max: 100 %    No data recorded    Recent Labs     20   WBC 7.6  --  16.7*  --  13.0*   RBC 3.56*  --  2.19*  --  1.36*   HEMOGLOBIN 11.9*   < > 7.3* 6.2* 4.7*   HEMATOCRIT 35.4*   < > 23.0* 19.7* 13.7*   MCV 99.4*  --  105.0*  --  100.7*   MCH 33.4*  --  33.3*  --  34.6*   MCHC 33.6  --  31.7*  --  34.3   RDW 46.9  --  50.3*  --  48.3   PLATELETCT 250  --  239  --  143*   MPV 8.7*  --  9.0  --  9.5    < > = values in this interval not displayed.     Recent Labs     20   SODIUM 133* 131* 135   POTASSIUM 4.4 5.4 4.6   CHLORIDE 104 103 108   CO2 20 15* 18*   GLUCOSE 94 203* 159*   BUN 24* 26* 29*   CREATININE 0.95 1.46* 1.44*   CALCIUM 8.2* 7.3* 7.5*     Recent Labs     20  0901 20   APTT 26.6  --    INR 1.07 1.29*     Recent Labs     20   REACTMIN 1.8*   CLOTKINET 0.8*   CLOTANGL 79.3*   MAXCLOTS 71.3*   HBD97NVA 0.5   PRCINADP 38.8   PRCINAA 13.3       Intake/Output       20 07 - 20 0659 20 07 - 20 0659       Total  Total       Intake    P.O.  --  -- --  100  -- 100    P.O. -- -- -- 100 -- 100    I.V.  3650  1461.5 5111.5  400  -- 400    Albumin Volume -- 500 500 -- -- --    Norepinephrine Volume -- 93.1 93.1 -- -- --    Volume (mL) (Lactated Ringers) 3600 -- 3600 -- -- --    Volume (mL) (lactated ringers infusion) 50 -- 50 -- -- --    Volume (mL) (dextrose 5 % and 0.9 % NaCl with KCl 20 mEq  infusion) -- 868.3 868.3 400 -- 400    Blood  --  -- --  700  -- 700    Volume (RELEASE RED BLOOD CELLS) -- -- -- 350 -- 350    Volume (RELEASE RED BLOOD CELLS) -- -- -- 350 -- 350    IV Piggyback  --  2700 2700  500  -- 500    Volume (mL) (NS (BOLUS) infusion 500 mL) -- -- -- 500 -- 500    Volume (mL) (lactated ringer BOLUS infusion) -- 500 500 -- -- --    Volume (mL) (lactated ringer BOLUS infusion) -- 1000 1000 -- -- --    Volume (mL) (lactated ringer BOLUS infusion) -- 1000 1000 -- -- --    Volume (mL) (ceFAZolin in dextrose (ANCEF) IVPB premix 2 g) -- 200 200 -- -- --    Total Intake 3650 4161.5 7811.5 1700 -- 1700       Output    Urine  315  360 675  200  -- 200    Urine 200 -- 200 -- -- --    Urine Void (mL) 100 -- 100 -- -- --    Output (mL) (Urethral Catheter) 15 360 375 200 -- 200    Drains  10  635 645  70  -- 70    Output (mL) (Closed/Suction Drain 1 Back Hemovac) 10 635 645 70 -- 70    Stool  --  -- --  --  -- --    Number of Times Stooled 0 x -- 0 x -- -- --    Blood  500  -- 500  --  -- --    Est. Blood Loss 500 -- 500 -- -- --    Total Output  270 -- 270       Net I/O     2825 3166.5 5991.5 1430 -- 1430            Intake/Output Summary (Last 24 hours) at 1/17/2020 1226  Last data filed at 1/17/2020 1100  Gross per 24 hour   Intake 9511.47 ml   Output 1990 ml   Net 7521.47 ml            • gabapentin  100 mg BID   • gabapentin  300 mg QHS   • fentaNYL  25-50 mcg Q3HRS PRN   • magnesium oxide  400 mg BID   • [START ON 1/18/2020] levothyroxine  100 mcg AM ES   • magnesium hydroxide  30 mL QDAY PRN   • diphenhydrAMINE  25 mg Q6HRS PRN    Or   • diphenhydrAMINE  25 mg Q6HRS PRN   • [START ON 1/18/2020] hydroxychloroquine  200 mg DAILY   • methocarbamol  500 mg 4X/DAY   • polyethylene glycol/lytes  1 Packet BID PRN   • ondansetron  4 mg Q4HRS PRN   • traZODone  25 mg Q EVENING   • senna-docusate  1 Tab Nightly   • senna-docusate  1 Tab Q24HRS PRN   • oxyCODONE immediate-release  5 mg Q3HRS PRN    • oxyCODONE immediate release  10 mg Q3HRS PRN   • diphenhydrAMINE  25 mg Q6HRS PRN   • dexamethasone  4 mg Q12HRS   • cyclobenzaprine  10 mg Q8HRS PRN    Or   • methocarbamol  750 mg Q8HRS PRN    Or   • diazePAM  5 mg Q4HRS PRN   • calcium carbonate  500 mg BID   • citalopram  20 mg QHS   • acetaminophen  650 mg Q6HRS PRN   • omeprazole  20 mg DAILY   • docusate sodium  100 mg BID   • Pharmacy Consult Request  1 Each PHARMACY TO DOSE   • MD ALERT...DO NOT ADMINISTER NSAIDS or ASPIRIN unless ORDERED By Neurosurgery  1 Each PRN   • bisacodyl  10 mg Q24HRS PRN   • fleet  1 Each Once PRN   • Respiratory Care per Protocol   Continuous RT   • dextrose 5 % and 0.9 % NaCl with KCl 20 mEq   Continuous   • labetalol  10 mg Q HOUR PRN   • hydrALAZINE  10 mg Q HOUR PRN   • benzocaine-menthol  1 Lozenge Q2HRS PRN   • NORepinephrine (LEVOPHED) infusion  0.5-30 mcg/min Continuous   • ondansetron  4 mg Q4HRS PRN   • ipratropium  1 Spray BID   • lidocaine  2 Patch Q24HR       Assessment and Plan:  Hospital day #8  POD #1   Prophylactic anticoagulation: no         Start date/time: tbd    Neuro checks Q4hr  Hgb 4 will receive 2 units will need to have Hgb>7 and stable prior to TTF  HVC to suction  OOB ok today with TLSO on   PRN pain meds per ICU  Hold anticoagulation until drain is removed will likely be a few days given 600 ML of out put in last 12 hours  Watch for change in consistency of drain   OK to TTF once hemodynamically stable

## 2020-01-17 NOTE — PROGRESS NOTES
Patient transported to unit by PACU RN. Patient very confused and minimally responsive. SBP in the 70s. One IV appearing to be infiltrated in place. Dr. Gonda on unit and came right to bedside to assess patient. A second PIV was established and three doses of IVP phenylephrine were given per MD order with good results (see MAR). A levophed drip was also started as the MD prepared to emergently insert a central line. Second PIV infiltrated with levo infusing. Pharmacy notified and a hot pack applied to the IV site after the IV was removed. MD placed the central line and it's placement was verified by xray.     After patient's BP was stabilized, report was given to the oncoming NOC RN.

## 2020-01-17 NOTE — ASSESSMENT & PLAN NOTE
Status post surgical decompression and fusion of T9-L1 1/16  Neurosurgery Primary  PT/OT  Monitor Hemovac output - Mgmt per NSG  Ok to sit up  PRN analgesics  Close neurologic monitoring   Perioperative Ancef, Decadron

## 2020-01-17 NOTE — PROGRESS NOTES
Lab called with critical result of lactic acid at 9.3. Critical lab result read back.   Dr. Gonda notified of critical lab result at 2125.  Critical lab result read back by Dr. Gonda. New orders in place.

## 2020-01-17 NOTE — PROCEDURES
Procedure Note    Date: 1/16/2020  Time: 1840    Procedure: Central Venous Line placement  Site: R IJ vein    Indication: Shock needing vasopressors, CVP  Consent: Informed consent obtained from patient or designated decision maker after explaining the benefits/risks of the procedure including but not limited to bleeding, infection, nerve or other deep structure injury, pneumothorax/hemothorax, arrythmia, or death. Patient or surrogate expressed understanding and agreement.    Procedure: After obtaining consent, a time-out was performed. Appropriate site confirmed with ultrasound and patient positioned, prepped, and draped in sterile fashion. All those present wearing cap and mask and those physically participating remained adhering to sterile fashion with cap, mask, gloves, and gown. 5 mL of local anesthetic injected (1% lidocaine without epinephrine) achieving appropriate comfort level for patient. Vein localized and accessed using continuous ultrasound guidance and a 7 Fr Triple lumen catheter placed using Seldinger technique. Able to aspirate dark, non-pulsatile blood through each lumen and sterile saline flushed easily before capping. Line secured and dressed in sterile fashion. Patient tolerated procedure well without any difficulties and remains in care of bedside nurse. CXR will be performed to confirm appropriate placement for internal jugular or subclavian CVLs.    EBL: minimal  Complications: none  CXR: pending    Jeremy Gonda, MD  Critical Care Medicine

## 2020-01-18 ENCOUNTER — APPOINTMENT (OUTPATIENT)
Dept: RADIOLOGY | Facility: MEDICAL CENTER | Age: 85
DRG: 456 | End: 2020-01-18
Attending: PSYCHIATRY & NEUROLOGY
Payer: MEDICARE

## 2020-01-18 LAB
ANION GAP SERPL CALC-SCNC: 1 MMOL/L (ref 0–11.9)
BASOPHILS # BLD AUTO: 0.1 % (ref 0–1.8)
BASOPHILS # BLD: 0.01 K/UL (ref 0–0.12)
BUN SERPL-MCNC: 20 MG/DL (ref 8–22)
CALCIUM SERPL-MCNC: 7.2 MG/DL (ref 8.5–10.5)
CHLORIDE SERPL-SCNC: 113 MMOL/L (ref 96–112)
CO2 SERPL-SCNC: 22 MMOL/L (ref 20–33)
CREAT SERPL-MCNC: 1 MG/DL (ref 0.5–1.4)
EOSINOPHIL # BLD AUTO: 0.01 K/UL (ref 0–0.51)
EOSINOPHIL NFR BLD: 0.1 % (ref 0–6.9)
ERYTHROCYTE [DISTWIDTH] IN BLOOD BY AUTOMATED COUNT: 61.4 FL (ref 35.9–50)
GLUCOSE SERPL-MCNC: 124 MG/DL (ref 65–99)
HCT VFR BLD AUTO: 22.6 % (ref 37–47)
HCT VFR BLD AUTO: 24 % (ref 37–47)
HGB BLD-MCNC: 7.8 G/DL (ref 12–16)
HGB BLD-MCNC: 8.2 G/DL (ref 12–16)
IMM GRANULOCYTES # BLD AUTO: 0.53 K/UL (ref 0–0.11)
IMM GRANULOCYTES NFR BLD AUTO: 4.3 % (ref 0–0.9)
LYMPHOCYTES # BLD AUTO: 0.91 K/UL (ref 1–4.8)
LYMPHOCYTES NFR BLD: 7.3 % (ref 22–41)
MCH RBC QN AUTO: 31.7 PG (ref 27–33)
MCHC RBC AUTO-ENTMCNC: 34.5 G/DL (ref 33.6–35)
MCV RBC AUTO: 91.9 FL (ref 81.4–97.8)
MONOCYTES # BLD AUTO: 0.93 K/UL (ref 0–0.85)
MONOCYTES NFR BLD AUTO: 7.5 % (ref 0–13.4)
NEUTROPHILS # BLD AUTO: 10.04 K/UL (ref 2–7.15)
NEUTROPHILS NFR BLD: 80.7 % (ref 44–72)
NRBC # BLD AUTO: 0 K/UL
NRBC BLD-RTO: 0 /100 WBC
PLATELET # BLD AUTO: 146 K/UL (ref 164–446)
PMV BLD AUTO: 9.2 FL (ref 9–12.9)
POTASSIUM SERPL-SCNC: 5 MMOL/L (ref 3.6–5.5)
RBC # BLD AUTO: 2.46 M/UL (ref 4.2–5.4)
SODIUM SERPL-SCNC: 136 MMOL/L (ref 135–145)
WBC # BLD AUTO: 12.4 K/UL (ref 4.8–10.8)

## 2020-01-18 PROCEDURE — 700101 HCHG RX REV CODE 250: Performed by: PHYSICIAN ASSISTANT

## 2020-01-18 PROCEDURE — A9270 NON-COVERED ITEM OR SERVICE: HCPCS | Performed by: PSYCHIATRY & NEUROLOGY

## 2020-01-18 PROCEDURE — 770006 HCHG ROOM/CARE - MED/SURG/GYN SEMI*

## 2020-01-18 PROCEDURE — 85025 COMPLETE CBC W/AUTO DIFF WBC: CPT

## 2020-01-18 PROCEDURE — 700101 HCHG RX REV CODE 250: Performed by: FAMILY MEDICINE

## 2020-01-18 PROCEDURE — 99233 SBSQ HOSP IP/OBS HIGH 50: CPT | Performed by: PSYCHIATRY & NEUROLOGY

## 2020-01-18 PROCEDURE — 700111 HCHG RX REV CODE 636 W/ 250 OVERRIDE (IP): Performed by: PSYCHIATRY & NEUROLOGY

## 2020-01-18 PROCEDURE — 700112 HCHG RX REV CODE 229: Performed by: PSYCHIATRY & NEUROLOGY

## 2020-01-18 PROCEDURE — 80048 BASIC METABOLIC PNL TOTAL CA: CPT

## 2020-01-18 PROCEDURE — 700102 HCHG RX REV CODE 250 W/ 637 OVERRIDE(OP): Performed by: PSYCHIATRY & NEUROLOGY

## 2020-01-18 PROCEDURE — 76937 US GUIDE VASCULAR ACCESS: CPT

## 2020-01-18 PROCEDURE — 700111 HCHG RX REV CODE 636 W/ 250 OVERRIDE (IP): Performed by: NEUROLOGICAL SURGERY

## 2020-01-18 PROCEDURE — 05H933Z INSERTION OF INFUSION DEVICE INTO RIGHT BRACHIAL VEIN, PERCUTANEOUS APPROACH: ICD-10-PCS | Performed by: PSYCHIATRY & NEUROLOGY

## 2020-01-18 RX ORDER — SPIRONOLACTONE 25 MG/1
25 TABLET ORAL DAILY
Status: DISCONTINUED | OUTPATIENT
Start: 2020-01-18 | End: 2020-01-24 | Stop reason: HOSPADM

## 2020-01-18 RX ORDER — FUROSEMIDE 40 MG/1
40 TABLET ORAL
Status: DISCONTINUED | OUTPATIENT
Start: 2020-01-18 | End: 2020-01-23

## 2020-01-18 RX ADMIN — OXYCODONE HYDROCHLORIDE 10 MG: 10 TABLET ORAL at 20:32

## 2020-01-18 RX ADMIN — OMEPRAZOLE 20 MG: 20 CAPSULE, DELAYED RELEASE ORAL at 20:30

## 2020-01-18 RX ADMIN — FENTANYL CITRATE 50 MCG: 0.05 INJECTION, SOLUTION INTRAMUSCULAR; INTRAVENOUS at 00:15

## 2020-01-18 RX ADMIN — FUROSEMIDE 40 MG: 40 TABLET ORAL at 13:59

## 2020-01-18 RX ADMIN — GABAPENTIN 300 MG: 300 CAPSULE ORAL at 20:26

## 2020-01-18 RX ADMIN — LABETALOL HYDROCHLORIDE 10 MG: 5 INJECTION, SOLUTION INTRAVENOUS at 03:04

## 2020-01-18 RX ADMIN — POTASSIUM CHLORIDE, DEXTROSE MONOHYDRATE AND SODIUM CHLORIDE: 150; 5; 900 INJECTION, SOLUTION INTRAVENOUS at 03:59

## 2020-01-18 RX ADMIN — METHOCARBAMOL TABLETS 500 MG: 500 TABLET, COATED ORAL at 18:19

## 2020-01-18 RX ADMIN — ANTACID TABLETS 500 MG: 500 TABLET, CHEWABLE ORAL at 05:15

## 2020-01-18 RX ADMIN — GABAPENTIN 100 MG: 100 CAPSULE ORAL at 11:32

## 2020-01-18 RX ADMIN — SPIRONOLACTONE 25 MG: 25 TABLET ORAL at 11:34

## 2020-01-18 RX ADMIN — METHOCARBAMOL TABLETS 500 MG: 500 TABLET, COATED ORAL at 13:56

## 2020-01-18 RX ADMIN — ONDANSETRON 4 MG: 2 INJECTION INTRAMUSCULAR; INTRAVENOUS at 09:17

## 2020-01-18 RX ADMIN — ANTACID TABLETS 500 MG: 500 TABLET, CHEWABLE ORAL at 20:25

## 2020-01-18 RX ADMIN — DOCUSATE SODIUM 100 MG: 100 CAPSULE, LIQUID FILLED ORAL at 05:15

## 2020-01-18 RX ADMIN — LIDOCAINE 2 PATCH: 50 PATCH CUTANEOUS at 11:32

## 2020-01-18 RX ADMIN — LEVOTHYROXINE SODIUM 100 MCG: 25 TABLET ORAL at 05:15

## 2020-01-18 RX ADMIN — FENTANYL CITRATE 50 MCG: 0.05 INJECTION, SOLUTION INTRAMUSCULAR; INTRAVENOUS at 03:57

## 2020-01-18 RX ADMIN — OXYCODONE HYDROCHLORIDE 5 MG: 5 TABLET ORAL at 16:45

## 2020-01-18 RX ADMIN — DEXAMETHASONE 4 MG: 4 TABLET ORAL at 05:15

## 2020-01-18 RX ADMIN — METHOCARBAMOL TABLETS 500 MG: 500 TABLET, COATED ORAL at 10:14

## 2020-01-18 RX ADMIN — HYDROXYCHLOROQUINE SULFATE 200 MG: 200 TABLET ORAL at 05:15

## 2020-01-18 RX ADMIN — MAGNESIUM OXIDE TAB 400 MG (241.3 MG ELEMENTAL MG) 400 MG: 400 (241.3 MG) TAB at 20:26

## 2020-01-18 RX ADMIN — METHOCARBAMOL TABLETS 500 MG: 500 TABLET, COATED ORAL at 20:28

## 2020-01-18 RX ADMIN — GABAPENTIN 100 MG: 100 CAPSULE ORAL at 05:15

## 2020-01-18 RX ADMIN — MAGNESIUM OXIDE TAB 400 MG (241.3 MG ELEMENTAL MG) 400 MG: 400 (241.3 MG) TAB at 05:15

## 2020-01-18 RX ADMIN — CITALOPRAM HYDROBROMIDE 20 MG: 20 TABLET ORAL at 20:26

## 2020-01-18 RX ADMIN — TRAZODONE HYDROCHLORIDE 25 MG: 50 TABLET ORAL at 20:29

## 2020-01-18 ASSESSMENT — ENCOUNTER SYMPTOMS
SORE THROAT: 0
NECK PAIN: 1
BACK PAIN: 1
CHILLS: 0
HEADACHES: 1
FOCAL WEAKNESS: 0
NAUSEA: 0
BLURRED VISION: 0
FEVER: 0
SHORTNESS OF BREATH: 0
BRUISES/BLEEDS EASILY: 0
VOMITING: 0
ABDOMINAL PAIN: 0
DEPRESSION: 0
SENSORY CHANGE: 0

## 2020-01-18 NOTE — PROCEDURES
Vascular Access Team    Date of Insertion: 1/18/20  Arm Circumference: n/a  Line Length: 10  Line Size: 20  Vein Occupancy %: 35  Reason for Midline: access  Labs: WBC 12.4, , BUN 20, Cr 1.00, GFR 53, INR 1.29 on 1/16/20    Orders confirmed, vessel patency confirmed with ultrasound. Risks and benefits of procedure explained to patient and education regarding line associated bloodstream infections provided. Questions answered.     PowerGlide Midline placed in RUE per licensed provider order with ultrasound guidance. 20g, 10 cm line placed in brachial vein after 1 attempt(s).  Catheter inserted with brisk blood return. Secured with 0cm external from insertion site.  Line flushed without resistance with 10 mL 0.9% normal saline.  Midline secured with Biopatch and Tegaderm.     Midline placement is confirmed by nurse using ultrasound and ability to flush and draw blood. Midline is appropriate for use at this time.  No X-ray is needed for placement confirmation. Pt tolerated procedure well.  Patient condition relayed to unit RN or ordering physician via this post procedure note in the EMR.    Ultrasound images uploaded to PACS and viewable in the EMR - yes  Ultrasound imaged printed and placed in paper chart - no      BARD PowerGlide Midline ref # [id number], Lot # [id number]

## 2020-01-18 NOTE — PROGRESS NOTES
Neurosurgery Progress Note    Subjective:  Pain not too bad.  Leg pain better.    Exam:  A and A.  Moving LEs well.    BP  Min: 114/54  Max: 171/59  Pulse  Av.2  Min: 69  Max: 86  Resp  Av.5  Min: 10  Max: 24  Temp  Av.9 °C (98.4 °F)  Min: 36.4 °C (97.5 °F)  Max: 37.4 °C (99.3 °F)  SpO2  Av.7 %  Min: 92 %  Max: 100 %    No data recorded    Recent Labs     20  18520  0613  20  1642 20  2355 20  0405   WBC 16.7*  --  13.0*  --   --   --  12.4*   RBC 2.19*  --  1.36*  --   --   --  2.46*   HEMOGLOBIN 7.3*   < > 4.7*   < > 7.8* 8.2* 7.8*   HEMATOCRIT 23.0*   < > 13.7*   < > 23.0* 24.0* 22.6*   .0*  --  100.7*  --   --   --  91.9   MCH 33.3*  --  34.6*  --   --   --  31.7   MCHC 31.7*  --  34.3  --   --   --  34.5   RDW 50.3*  --  48.3  --   --   --  61.4*   PLATELETCT 239  --  143*  --   --   --  146*   MPV 9.0  --  9.5  --   --   --  9.2    < > = values in this interval not displayed.     Recent Labs     20  18520  0613 20  0405   SODIUM 131* 135 136   POTASSIUM 5.4 4.6 5.0   CHLORIDE 103 108 113*   CO2 15* 18* 22   GLUCOSE 203* 159* 124*   BUN 26* 29* 20   CREATININE 1.46* 1.44* 1.00   CALCIUM 7.3* 7.5* 7.2*     Recent Labs     20  0901 20   APTT 26.6  --    INR 1.07 1.29*     Recent Labs     20   REACTMIN 1.8*   CLOTKINET 0.8*   CLOTANGL 79.3*   MAXCLOTS 71.3*   NFX81GOH 0.5   PRCINADP 38.8   PRCINAA 13.3       Intake/Output       20 - 20 0659 20 - 2059       Total  Total       Intake    P.O.  500  600 1100  300  -- 300    P.O.  300 -- 300    I.V.  1200  1200 2400  150  -- 150    Volume (mL) (dextrose 5 % and 0.9 % NaCl with KCl 20 mEq infusion) 1200 1200 2400 150 -- 150    Blood  700  -- 700  --  -- --    Volume (RELEASE RED BLOOD CELLS) 350 -- 350 -- -- --    Volume (RELEASE RED BLOOD CELLS) 350 -- 350 -- -- --    IV  Piggyback  500  -- 500  --  -- --    Volume (mL) (NS (BOLUS) infusion 500 mL) 500 -- 500 -- -- --    Total Intake 2900 1800 4700 450 -- 450       Output    Urine  790  1340 2130  650  -- 650    Output (mL) (Urethral Catheter) 790 1340 2130 650 -- 650    Drains  400  140 540  50  -- 50    Output (mL) (Closed/Suction Drain 1 Back Hemovac) 400 140 540 50 -- 50    Total Output 1190 1480 2670 700 -- 700       Net I/O     2781 432 2197 -250 -- -250            Intake/Output Summary (Last 24 hours) at 1/18/2020 1304  Last data filed at 1/18/2020 1000  Gross per 24 hour   Intake 3150 ml   Output 2770 ml   Net 380 ml            • spironolactone  25 mg DAILY   • gabapentin  100 mg BID   • gabapentin  300 mg QHS   • fentaNYL  25-50 mcg Q3HRS PRN   • magnesium oxide  400 mg BID   • levothyroxine  100 mcg AM ES   • magnesium hydroxide  30 mL QDAY PRN   • diphenhydrAMINE  25 mg Q6HRS PRN    Or   • diphenhydrAMINE  25 mg Q6HRS PRN   • hydroxychloroquine  200 mg DAILY   • methocarbamol  500 mg 4X/DAY   • polyethylene glycol/lytes  1 Packet BID PRN   • ondansetron  4 mg Q4HRS PRN   • traZODone  25 mg Q EVENING   • senna-docusate  1 Tab Nightly   • senna-docusate  1 Tab Q24HRS PRN   • oxyCODONE immediate-release  5 mg Q3HRS PRN   • oxyCODONE immediate release  10 mg Q3HRS PRN   • diphenhydrAMINE  25 mg Q6HRS PRN   • dexamethasone  4 mg Q12HRS   • cyclobenzaprine  10 mg Q8HRS PRN    Or   • methocarbamol  750 mg Q8HRS PRN    Or   • diazePAM  5 mg Q4HRS PRN   • calcium carbonate  500 mg BID   • citalopram  20 mg QHS   • acetaminophen  650 mg Q6HRS PRN   • omeprazole  20 mg DAILY   • docusate sodium  100 mg BID   • Pharmacy Consult Request  1 Each PHARMACY TO DOSE   • MD ALERT...DO NOT ADMINISTER NSAIDS or ASPIRIN unless ORDERED By Neurosurgery  1 Each PRN   • bisacodyl  10 mg Q24HRS PRN   • fleet  1 Each Once PRN   • Respiratory Care per Protocol   Continuous RT   • labetalol  10 mg Q HOUR PRN   • hydrALAZINE  10 mg Q HOUR PRN   •  benzocaine-menthol  1 Lozenge Q2HRS PRN   • ondansetron  4 mg Q4HRS PRN   • ipratropium  1 Spray BID   • lidocaine  2 Patch Q24HR       Assessment and Plan:  Doing well po.  OK to floor.  PT.  Likely need rehab.

## 2020-01-18 NOTE — PROGRESS NOTES
Critical Care Progress Note    Date of admission  1/9/2020    Chief Complaint  84 y.o. female admitted 1/9/2020 with extensive thoracic-lumbar surgery with CSF leak, shock    Hospital Course    84 y.o. female who presented 1/9/2020 with a past medical history significant for C. difficile colitis, chronic back pain, rheumatoid arthritis and stroke who was brought into the emergency department by EMS on January 9 after a mechanical fall resulting in a back injury.  On work-up patient was found to have an L1 burst fracture and was seen by neurosurgery due to increasing excruciating pain and radicular symptoms down her legs.  An MRI of her spine showed severe central stenosis secondary to the retropulsed bone fragments with kyphosis.  The decision was made to take her to the operating room today by Dr. Nelson where she underwent an extensive spinal surgery occluding stabilization of the deformity and a T9-L1 fusion.  She had a small CSF leak on the left near L1.  It was a 6-hour case with a approximately 750 mL blood loss.  She received 4 L of crystalloid intraoperatively and in the recovery room but continued to have low urine output of 215 mL.  She was confused postoperatively and upon arrival to the intensive care unit had a blood pressure of 50/20.  A stat glucose was checked which was within normal limits.  She was given pushes of phenylephrine up to 1000 mcg, additional fluid bolus, and started on a norepinephrine infusion.  She had poor peripheral access and the norepinephrine infiltrated unfortunately while I was placing a central venous catheter.  Phentolamine was ordered around the infusion site.  I performed a bedside limited cardiac ultrasound which revealed a small inferior vena cava and hyperdynamic heart without ventricular dilation noted nor pericardial effusion.  A chest x-ray did not reveal a pneumothorax.  Patient continues to move and have sensation in bilateral lower extremities.       Interval  Problem Update  Reviewed last 24 hour events:  Afebrile  HR 70-80s  -170s  Hemoglobin 7.8  Plt 146  Good urine output    Review of Systems  Review of Systems   Constitutional: Negative for chills and fever.   HENT: Negative for sore throat.    Eyes: Negative for blurred vision.   Respiratory: Negative for shortness of breath.    Cardiovascular: Negative for chest pain.   Gastrointestinal: Negative for abdominal pain, nausea and vomiting.   Genitourinary: Negative for dysuria.   Musculoskeletal: Positive for back pain and neck pain.   Skin: Negative for rash.   Neurological: Positive for headaches. Negative for sensory change and focal weakness.   Endo/Heme/Allergies: Does not bruise/bleed easily.   Psychiatric/Behavioral: Negative for depression.        Vital Signs for last 24 hours   Temp:  [36.3 °C (97.4 °F)-37.4 °C (99.3 °F)] 37.2 °C (99 °F)  Pulse:  [67-90] 76  Resp:  [9-24] 14  BP: (118-171)/(56-72) 149/68  SpO2:  [92 %-100 %] 93 %    Hemodynamic parameters for last 24 hours  CVP:  [1 MM HG-8 MM HG] 5 MM HG    Respiratory Information for the last 24 hours       Physical Exam   Physical Exam  Constitutional:       General: She is not in acute distress.     Appearance: Normal appearance. She is not ill-appearing, toxic-appearing or diaphoretic.   HENT:      Head: Normocephalic and atraumatic.      Right Ear: External ear normal.      Left Ear: External ear normal.      Nose: Nose normal.      Mouth/Throat:      Mouth: Mucous membranes are moist.   Eyes:      Extraocular Movements: Extraocular movements intact.      Pupils: Pupils are equal, round, and reactive to light.   Neck:      Musculoskeletal: Normal range of motion and neck supple.   Cardiovascular:      Rate and Rhythm: Normal rate and regular rhythm.      Pulses: Normal pulses.   Pulmonary:      Effort: Pulmonary effort is normal.   Abdominal:      General: Abdomen is flat. Bowel sounds are normal.      Palpations: Abdomen is soft.    Musculoskeletal: Normal range of motion.      Right lower leg: No edema.      Left lower leg: No edema.   Skin:     General: Skin is dry.      Capillary Refill: Capillary refill takes less than 2 seconds.   Neurological:      Mental Status: She is alert.      Comments: GCS 15. Sensation intact to light touch throughout. No motor dysfunction. Neuropathic pain in the BL LEs   Psychiatric:         Mood and Affect: Mood normal.         Behavior: Behavior normal.         Medications  Current Facility-Administered Medications   Medication Dose Route Frequency Provider Last Rate Last Dose   • gabapentin (NEURONTIN) capsule 100 mg  100 mg Oral BID Nash Valerio M.D.   100 mg at 01/18/20 0515   • gabapentin (NEURONTIN) capsule 300 mg  300 mg Oral QHS Nash Valerio M.D.   300 mg at 01/17/20 2030   • fentaNYL (SUBLIMAZE) injection 25-50 mcg  25-50 mcg Intravenous Q3HRS PRN Nash Valerio M.D.   50 mcg at 01/18/20 0357   • magnesium oxide (MAG-OX) tablet 400 mg  400 mg Oral BID Nash Valerio M.D.   400 mg at 01/18/20 0515   • levothyroxine (SYNTHROID) tablet 100 mcg  100 mcg Oral AM ES Nash Valerio M.D.   100 mcg at 01/18/20 0515   • magnesium hydroxide (MILK OF MAGNESIA) suspension 30 mL  30 mL Oral QDAY PRN Nash Valerio M.D.       • diphenhydrAMINE (BENADRYL) tablet/capsule 25 mg  25 mg Oral Q6HRS PRN Nash Valerio M.D.        Or   • diphenhydrAMINE (BENADRYL) injection 25 mg  25 mg Intravenous Q6HRS PRN Nash Valerio M.D.       • hydroxychloroquine (PLAQUENIL) tablet 200 mg  200 mg Oral DAILY Nash Valerio M.D.   200 mg at 01/18/20 0515   • methocarbamol (ROBAXIN) tablet 500 mg  500 mg Oral 4X/DAY Nash Valerio M.D.   500 mg at 01/17/20 2330   • polyethylene glycol/lytes (MIRALAX) PACKET 1 Packet  1 Packet Oral BID PRN Nash Valerio M.D.       • ondansetron (ZOFRAN ODT) dispertab 4 mg  4 mg Oral Q4HRS PRN Nash Valerio M.D.       • traZODone (DESYREL) tablet 25 mg  25 mg Oral Q EVENING Nash MARINELLI  MARY Valerio   25 mg at 01/17/20 1800   • senna-docusate (PERICOLACE or SENOKOT S) 8.6-50 MG per tablet 1 Tab  1 Tab Oral Nightly Nash Valerio M.D.   1 Tab at 01/17/20 2033   • senna-docusate (PERICOLACE or SENOKOT S) 8.6-50 MG per tablet 1 Tab  1 Tab Oral Q24HRS PRN Nash Valerio M.D.       • oxyCODONE immediate-release (ROXICODONE) tablet 5 mg  5 mg Oral Q3HRS PRN Nash Valerio M.D.       • oxyCODONE immediate release (ROXICODONE) tablet 10 mg  10 mg Oral Q3HRS PRN Nash Valeroi M.D.   10 mg at 01/17/20 2030   • diphenhydrAMINE (BENADRYL) tablet/capsule 25 mg  25 mg Oral Q6HRS PRN Nash Valerio M.D.       • dexamethasone (DECADRON) tablet 4 mg  4 mg Oral Q12HRS Nash Valerio M.D.   4 mg at 01/18/20 0515   • cyclobenzaprine (FLEXERIL) tablet 10 mg  10 mg Oral Q8HRS PRN Nash Valerio M.D.        Or   • methocarbamol (ROBAXIN) tablet 750 mg  750 mg Oral Q8HRS PRN Nash Valerio M.D.        Or   • diazePAM (VALIUM) tablet 5 mg  5 mg Oral Q4HRS PRN Nash Valerio M.D.       • calcium carbonate (TUMS) chewable tab 500 mg  500 mg Oral BID Nash Valerio M.D.   500 mg at 01/18/20 0515   • citalopram (CELEXA) tablet 20 mg  20 mg Oral QHS Nash Valerio M.D.   20 mg at 01/17/20 2030   • acetaminophen (TYLENOL) tablet 650 mg  650 mg Oral Q6HRS PRN Nash Valerio M.D.   650 mg at 01/17/20 1203   • omeprazole (PRILOSEC) capsule 20 mg  20 mg Oral DAILY Nash Valerio M.D.   20 mg at 01/17/20 1840   • docusate sodium (COLACE) capsule 100 mg  100 mg Oral BID Nash Valerio M.D.   100 mg at 01/18/20 0515   • Pharmacy Consult Request ...Pain Management Review 1 Each  1 Each Other PHARMACY TO DOSE Rosamaria Osorio P.A.-C.       • MD ALERT...DO NOT ADMINISTER NSAIDS or ASPIRIN unless ORDERED By Neurosurgery 1 Each  1 Each Other PRN Rosamaria Osorio P.A.-C.       • bisacodyl (DULCOLAX) suppository 10 mg  10 mg Rectal Q24HRS PRN Rosamaria Osorio P.A.-C.       • fleet enema 133 mL  1 Each Rectal Once PRN  MARQUISE Camacho.A.-C.       • Respiratory Care per Protocol   Nebulization Continuous RT MARQUISE Camacho.A.-C.       • dextrose 5 % and 0.9 % NaCl with KCl 20 mEq infusion   Intravenous Continuous MARQUISE Camacho.A.-C. 100 mL/hr at 01/18/20 0359     • labetalol (NORMODYNE/TRANDATE) injection 10 mg  10 mg Intravenous Q HOUR PRN Rosamaria Osorio P.A.-C.   10 mg at 01/18/20 0304   • hydrALAZINE (APRESOLINE) injection 10 mg  10 mg Intravenous Q HOUR PRN Rosamaria Osorio P.A.-C.       • benzocaine-menthol (CEPACOL) lozenge 1 Lozenge  1 Lozenge Mouth/Throat Q2HRS PRN Rosamaria Osorio P.A.-C.       • ondansetron (ZOFRAN) syringe/vial injection 4 mg  4 mg Intravenous Q4HRS PRN Sang Nelson M.D.   4 mg at 01/17/20 0434   • ipratropium (ATROVENT) 0.06 % nasal spray 1 Spray  1 [place] Nasal BID Blaze Heck M.D.   Stopped at 01/17/20 2000   • lidocaine (LIDODERM) 5 % 2 Patch  2 Patch Transdermal Q24HR Ronald Torres M.D.   2 Patch at 01/17/20 1205       Fluids    Intake/Output Summary (Last 24 hours) at 1/18/2020 0653  Last data filed at 1/18/2020 0600  Gross per 24 hour   Intake 4700 ml   Output 2670 ml   Net 2030 ml       Laboratory  Recent Labs     01/16/20  1907 01/16/20  1917   ISTATAPH 7.383* 7.405   ISTATAPCO2 20.8* 17.9*   ISTATAPO2 158* 147*   ISTATATCO2 13* 12*   [id number] 99 99   ISTATARTHCO3 12.4* 11.2*   ISTATARTBE -11* -12*   ISTATTEMP 97.0 F 97.0 F   ISTATFIO2 5 5   ISTATSPEC Arterial Arterial   ISTATAPHTC 7.396* 7.418   [id number] 153* 141*         Recent Labs     01/16/20 1850 01/17/20  0613 01/18/20  0405   SODIUM 131* 135 136   POTASSIUM 5.4 4.6 5.0   CHLORIDE 103 108 113*   CO2 15* 18* 22   BUN 26* 29* 20   CREATININE 1.46* 1.44* 1.00   CALCIUM 7.3* 7.5* 7.2*     Recent Labs     01/16/20 1850 01/17/20  0613 01/18/20  0405   PREALBUMIN  --  13.0*  --    GLUCOSE 203* 159* 124*     Recent Labs     01/16/20 1850 01/17/20  0613 01/18/20  0405   WBC 16.7* 13.0* 12.4*    NEUTSPOLYS 81.10* 79.40* 80.70*   LYMPHOCYTES 7.20* 10.30* 7.30*   MONOCYTES 8.80 7.50 7.50   EOSINOPHILS 0.10 0.10 0.10   BASOPHILS 0.20 0.10 0.10     Recent Labs     01/16/20  0901  01/16/20  1850  01/17/20  0613  01/17/20  1642 01/17/20  2355 01/18/20  0405   RBC  --   --  2.19*  --  1.36*  --   --   --  2.46*   HEMOGLOBIN  --    < > 7.3*   < > 4.7*   < > 7.8* 8.2* 7.8*   HEMATOCRIT  --    < > 23.0*   < > 13.7*   < > 23.0* 24.0* 22.6*   PLATELETCT  --   --  239  --  143*  --   --   --  146*   PROTHROMBTM 14.2  --  16.4*  --   --   --   --   --   --    APTT 26.6  --   --   --   --   --   --   --   --    INR 1.07  --  1.29*  --   --   --   --   --   --     < > = values in this interval not displayed.       Imaging  X-Ray:  I have personally reviewed the images and compared with prior images. No acute cardiopulmonary process.    Assessment/Plan  * Spinal stenosis of lumbar region with neurogenic claudication- (present on admission)  Assessment & Plan  Status post surgical decompression and fusion of T9-L1 1/16  Neurosurgery Primary  PT/OT  Monitor Hemovac output - Mgmt per NSG  Ok to sit up  PRN analgesics  Close neurologic monitoring   Perioperative Ancef, Decadron    Encephalopathy acute  Assessment & Plan  Resolved likely 2/2 anesthesia      Shock (HCC)  Assessment & Plan  Resolved  Hypovolemic/hemorrhagic   Off pressors  Required 2 units PRBCS; follow H&H  Hgb stable at 7.8  MAPS greater than 65mmHg    Lumbar transverse process fracture (HCC)  Assessment & Plan  See above    Hypothyroidism- (present on admission)  Assessment & Plan  Synthroid 100 mcg oral daily    Leg DVT (deep venous thromboembolism), chronic, right (HCC)- (present on admission)  Assessment & Plan  Not on blood thinner currently  SCDs    Hypomagnesemia- (present on admission)  Assessment & Plan  Repletion and monitor    CKD (chronic kidney disease) stage 3, GFR 30-59 ml/min (HCC)- (present on admission)  Assessment & Plan  Encourage po  fluids  Renal dose meds, avoid nephrotoxins  Follow I/Os  Follow renal function        Hypokalemia- (present on admission)  Assessment & Plan  Replete PRN    Rheumatoid arthritis (HCC)- (present on admission)  Assessment & Plan  Continue Plaquenil, Robaxin  PRN analgesics and muscle relaxers    Neuropathic pain  Assessment & Plan  2/2 to involvement of DRG from compression  Titrate gabapentin  May need additional agents upon transfer    Peripheral edema  Assessment & Plan  Improved  Monitor    Depression- (present on admission)  Assessment & Plan  Continue Celexa       OK to transfer to [facility] [place]. Critical Care will sign off.    VTE:  Contraindicated  Ulcer: Not Indicated  Lines: Central Line  Ongoing indication addressed    I have performed a physical exam and reviewed and updated ROS and Plan today (1/18/2020). In review of yesterday's note (1/17/2020), there are no changes except as documented above.     Discussed patient condition and risk of morbidity and/or mortality with Family, RN, RT, Pharmacy, Code status disscussed, Charge nurse / hot rounds, Patient and neurosurgery

## 2020-01-19 PROBLEM — D64.9 ANEMIA: Status: ACTIVE | Noted: 2020-01-19

## 2020-01-19 LAB
ANION GAP SERPL CALC-SCNC: 3 MMOL/L (ref 0–11.9)
BASOPHILS # BLD AUTO: 0.1 % (ref 0–1.8)
BASOPHILS # BLD: 0.01 K/UL (ref 0–0.12)
BUN SERPL-MCNC: 23 MG/DL (ref 8–22)
CALCIUM SERPL-MCNC: 7.5 MG/DL (ref 8.5–10.5)
CHLORIDE SERPL-SCNC: 106 MMOL/L (ref 96–112)
CO2 SERPL-SCNC: 23 MMOL/L (ref 20–33)
CREAT SERPL-MCNC: 0.9 MG/DL (ref 0.5–1.4)
EOSINOPHIL # BLD AUTO: 0.09 K/UL (ref 0–0.51)
EOSINOPHIL NFR BLD: 0.8 % (ref 0–6.9)
ERYTHROCYTE [DISTWIDTH] IN BLOOD BY AUTOMATED COUNT: 59.1 FL (ref 35.9–50)
GLUCOSE SERPL-MCNC: 98 MG/DL (ref 65–99)
HCT VFR BLD AUTO: 22 % (ref 37–47)
HCT VFR BLD AUTO: 22.6 % (ref 37–47)
HCT VFR BLD AUTO: 24.9 % (ref 37–47)
HCT VFR BLD AUTO: 25.6 % (ref 37–47)
HGB BLD-MCNC: 7.4 G/DL (ref 12–16)
HGB BLD-MCNC: 7.7 G/DL (ref 12–16)
HGB BLD-MCNC: 8.2 G/DL (ref 12–16)
HGB BLD-MCNC: 8.3 G/DL (ref 12–16)
IMM GRANULOCYTES # BLD AUTO: 0.27 K/UL (ref 0–0.11)
IMM GRANULOCYTES NFR BLD AUTO: 2.5 % (ref 0–0.9)
LYMPHOCYTES # BLD AUTO: 0.98 K/UL (ref 1–4.8)
LYMPHOCYTES NFR BLD: 9.1 % (ref 22–41)
MCH RBC QN AUTO: 31.4 PG (ref 27–33)
MCHC RBC AUTO-ENTMCNC: 33.6 G/DL (ref 33.6–35)
MCV RBC AUTO: 93.2 FL (ref 81.4–97.8)
MONOCYTES # BLD AUTO: 0.96 K/UL (ref 0–0.85)
MONOCYTES NFR BLD AUTO: 8.9 % (ref 0–13.4)
NEUTROPHILS # BLD AUTO: 8.43 K/UL (ref 2–7.15)
NEUTROPHILS NFR BLD: 78.6 % (ref 44–72)
NRBC # BLD AUTO: 0 K/UL
NRBC BLD-RTO: 0 /100 WBC
PLATELET # BLD AUTO: 172 K/UL (ref 164–446)
PMV BLD AUTO: 9.1 FL (ref 9–12.9)
POTASSIUM SERPL-SCNC: 4.5 MMOL/L (ref 3.6–5.5)
RBC # BLD AUTO: 2.36 M/UL (ref 4.2–5.4)
SODIUM SERPL-SCNC: 132 MMOL/L (ref 135–145)
WBC # BLD AUTO: 10.7 K/UL (ref 4.8–10.8)

## 2020-01-19 PROCEDURE — 700112 HCHG RX REV CODE 229: Performed by: PSYCHIATRY & NEUROLOGY

## 2020-01-19 PROCEDURE — 770006 HCHG ROOM/CARE - MED/SURG/GYN SEMI*

## 2020-01-19 PROCEDURE — 36415 COLL VENOUS BLD VENIPUNCTURE: CPT

## 2020-01-19 PROCEDURE — A9270 NON-COVERED ITEM OR SERVICE: HCPCS | Performed by: PSYCHIATRY & NEUROLOGY

## 2020-01-19 PROCEDURE — 85018 HEMOGLOBIN: CPT

## 2020-01-19 PROCEDURE — 700102 HCHG RX REV CODE 250 W/ 637 OVERRIDE(OP): Performed by: PSYCHIATRY & NEUROLOGY

## 2020-01-19 PROCEDURE — 99233 SBSQ HOSP IP/OBS HIGH 50: CPT | Performed by: INTERNAL MEDICINE

## 2020-01-19 PROCEDURE — 80048 BASIC METABOLIC PNL TOTAL CA: CPT

## 2020-01-19 PROCEDURE — 700101 HCHG RX REV CODE 250: Performed by: FAMILY MEDICINE

## 2020-01-19 PROCEDURE — 700101 HCHG RX REV CODE 250: Performed by: HOSPITALIST

## 2020-01-19 PROCEDURE — 85025 COMPLETE CBC W/AUTO DIFF WBC: CPT

## 2020-01-19 PROCEDURE — 85014 HEMATOCRIT: CPT

## 2020-01-19 RX ADMIN — CITALOPRAM HYDROBROMIDE 20 MG: 20 TABLET ORAL at 21:42

## 2020-01-19 RX ADMIN — ANTACID TABLETS 500 MG: 500 TABLET, CHEWABLE ORAL at 17:15

## 2020-01-19 RX ADMIN — ANTACID TABLETS 500 MG: 500 TABLET, CHEWABLE ORAL at 05:28

## 2020-01-19 RX ADMIN — DOCUSATE SODIUM 100 MG: 100 CAPSULE, LIQUID FILLED ORAL at 17:15

## 2020-01-19 RX ADMIN — MAGNESIUM OXIDE TAB 400 MG (241.3 MG ELEMENTAL MG) 400 MG: 400 (241.3 MG) TAB at 05:27

## 2020-01-19 RX ADMIN — SPIRONOLACTONE 25 MG: 25 TABLET ORAL at 05:28

## 2020-01-19 RX ADMIN — DOCUSATE SODIUM 100 MG: 100 CAPSULE, LIQUID FILLED ORAL at 05:27

## 2020-01-19 RX ADMIN — HYDROXYCHLOROQUINE SULFATE 200 MG: 200 TABLET ORAL at 05:27

## 2020-01-19 RX ADMIN — GABAPENTIN 100 MG: 100 CAPSULE ORAL at 05:27

## 2020-01-19 RX ADMIN — GABAPENTIN 300 MG: 300 CAPSULE ORAL at 21:43

## 2020-01-19 RX ADMIN — OXYCODONE HYDROCHLORIDE 10 MG: 10 TABLET ORAL at 11:38

## 2020-01-19 RX ADMIN — TRAZODONE HYDROCHLORIDE 25 MG: 50 TABLET ORAL at 17:14

## 2020-01-19 RX ADMIN — FUROSEMIDE 40 MG: 40 TABLET ORAL at 05:29

## 2020-01-19 RX ADMIN — OXYCODONE HYDROCHLORIDE 10 MG: 10 TABLET ORAL at 18:44

## 2020-01-19 RX ADMIN — METHOCARBAMOL TABLETS 500 MG: 500 TABLET, COATED ORAL at 21:42

## 2020-01-19 RX ADMIN — LIDOCAINE 2 PATCH: 50 PATCH CUTANEOUS at 11:38

## 2020-01-19 RX ADMIN — METHOCARBAMOL TABLETS 500 MG: 500 TABLET, COATED ORAL at 08:46

## 2020-01-19 RX ADMIN — OMEPRAZOLE 20 MG: 20 CAPSULE, DELAYED RELEASE ORAL at 17:15

## 2020-01-19 RX ADMIN — DEXAMETHASONE 4 MG: 4 TABLET ORAL at 11:38

## 2020-01-19 RX ADMIN — GABAPENTIN 100 MG: 100 CAPSULE ORAL at 11:38

## 2020-01-19 RX ADMIN — METHOCARBAMOL TABLETS 500 MG: 500 TABLET, COATED ORAL at 17:15

## 2020-01-19 RX ADMIN — MAGNESIUM HYDROXIDE 30 ML: 400 SUSPENSION ORAL at 17:15

## 2020-01-19 RX ADMIN — SENNOSIDES AND DOCUSATE SODIUM 1 TABLET: 8.6; 5 TABLET ORAL at 21:42

## 2020-01-19 RX ADMIN — LEVOTHYROXINE SODIUM 100 MCG: 25 TABLET ORAL at 05:27

## 2020-01-19 RX ADMIN — METHOCARBAMOL TABLETS 500 MG: 500 TABLET, COATED ORAL at 13:32

## 2020-01-19 RX ADMIN — MAGNESIUM OXIDE TAB 400 MG (241.3 MG ELEMENTAL MG) 400 MG: 400 (241.3 MG) TAB at 17:15

## 2020-01-19 RX ADMIN — DEXAMETHASONE 4 MG: 4 TABLET ORAL at 00:13

## 2020-01-19 ASSESSMENT — ENCOUNTER SYMPTOMS
CONSTIPATION: 0
SORE THROAT: 0
DIARRHEA: 0
SHORTNESS OF BREATH: 0
WEAKNESS: 1
FEVER: 0
ABDOMINAL PAIN: 0
EYE DISCHARGE: 0
NECK PAIN: 0
BLURRED VISION: 0
TINGLING: 0
DIZZINESS: 0
HEADACHES: 0
TREMORS: 0
NAUSEA: 0
VOMITING: 0
BACK PAIN: 1

## 2020-01-19 NOTE — PROGRESS NOTES
Neurosurgery Progress Note    Subjective:  No acute events. C/o pain in right glute around hip and down ant right thigh.     Exam:  AAO, lying on left side.  RLE 4/5, LLE 5/5  Sensation intact  Dressing with small amount old drainage.   HVAC 40cc/8hrs, serosang  Eating, drinking. Denies N/V  Rao. -BM, PTA        BP  Min: 111/42  Max: 150/72  Pulse  Av.6  Min: 79  Max: 89  Resp  Av.1  Min: 11  Max: 30  Temp  Av.9 °C (98.4 °F)  Min: 36.6 °C (97.8 °F)  Max: 37.4 °C (99.3 °F)  SpO2  Av.5 %  Min: 97 %  Max: 100 %    No data recorded    Recent Labs     20  0047 20  0458 20  0914   WBC 13.0*  --  12.4*  --  10.7  --    RBC 1.36*  --  2.46*  --  2.36*  --    HEMOGLOBIN 4.7*   < > 7.8* 7.7* 7.4* 8.3*   HEMATOCRIT 13.7*   < > 22.6* 22.6* 22.0* 24.9*   .7*  --  91.9  --  93.2  --    MCH 34.6*  --  31.7  --  31.4  --    MCHC 34.3  --  34.5  --  33.6  --    RDW 48.3  --  61.4*  --  59.1*  --    PLATELETCT 143*  --  146*  --  172  --    MPV 9.5  --  9.2  --  9.1  --     < > = values in this interval not displayed.     Recent Labs     208   SODIUM 135 136 132*   POTASSIUM 4.6 5.0 4.5   CHLORIDE 108 113* 106   CO2 18* 22 23   GLUCOSE 159* 124* 98   BUN 29* 20 23*   CREATININE 1.44* 1.00 0.90   CALCIUM 7.5* 7.2* 7.5*     Recent Labs     20  1850   INR 1.29*     Recent Labs     20   REACTMIN 1.8*   CLOTKINET 0.8*   CLOTANGL 79.3*   MAXCLOTS 71.3*   [id number] 0.5   PRCINADP 38.8   PRCINAA 13.3       Intake/Output       20 0700 - 20 0659 20 0700 - 20 0659      [id number] Total  Total       Intake    P.O.  600  -- 600  --  -- --    P.O. 600 -- 600 -- -- --    I.V.  150  -- 150  --  -- --    Volume (mL) (dextrose 5 % and 0.9 % NaCl with KCl 20 mEq infusion) 150 -- 150 -- -- --    Total Intake 750 -- 750 -- -- --       Output    Urine  1850  1300 3150   --  -- --    Output (mL) (Urethral Catheter) 1850 1300 3150 -- -- --    Drains  180  40 220  --  -- --    Output (mL) (Closed/Suction Drain 1 Back Hemovac) 180 40 220 -- -- --    Stool  --  -- --  --  -- --    Number of Times Stooled -- 0 x 0 x -- -- --    Total Output 2030 1340 3370 -- -- --       Net I/O     -1280 -1340 -2620 -- -- --            Intake/Output Summary (Last 24 hours) at 1/19/2020 1421  Last data filed at 1/19/2020 0500  Gross per 24 hour   Intake --   Output 2520 ml   Net -2520 ml            • spironolactone  25 mg DAILY   • furosemide  40 mg Q DAY   • gabapentin  100 mg BID   • gabapentin  300 mg QHS   • fentaNYL  25-50 mcg Q3HRS PRN   • magnesium oxide  400 mg BID   • levothyroxine  100 mcg AM ES   • magnesium hydroxide  30 mL QDAY PRN   • diphenhydrAMINE  25 mg Q6HRS PRN    Or   • diphenhydrAMINE  25 mg Q6HRS PRN   • hydroxychloroquine  200 mg DAILY   • methocarbamol  500 mg 4X/DAY   • polyethylene glycol/lytes  1 Packet BID PRN   • ondansetron  4 mg Q4HRS PRN   • traZODone  25 mg Q EVENING   • senna-docusate  1 Tab Nightly   • senna-docusate  1 Tab Q24HRS PRN   • oxyCODONE immediate-release  5 mg Q3HRS PRN   • oxyCODONE immediate release  10 mg Q3HRS PRN   • diphenhydrAMINE  25 mg Q6HRS PRN   • dexamethasone  4 mg Q12HRS   • cyclobenzaprine  10 mg Q8HRS PRN    Or   • methocarbamol  750 mg Q8HRS PRN    Or   • diazePAM  5 mg Q4HRS PRN   • calcium carbonate  500 mg BID   • citalopram  20 mg QHS   • acetaminophen  650 mg Q6HRS PRN   • omeprazole  20 mg DAILY   • docusate sodium  100 mg BID   • Pharmacy Consult Request  1 Each PHARMACY TO DOSE   • MD ALERT...DO NOT ADMINISTER NSAIDS or ASPIRIN unless ORDERED By Neurosurgery  1 Each PRN   • bisacodyl  10 mg Q24HRS PRN   • fleet  1 Each Once PRN   • Respiratory Care per Protocol   Continuous RT   • labetalol  10 mg Q HOUR PRN   • hydrALAZINE  10 mg Q HOUR PRN   • benzocaine-menthol  1 Lozenge Q2HRS PRN   • ondansetron  4 mg Q4HRS PRN   • ipratropium   1 Spray BID   • lidocaine  2 Patch Q24HR       Assessment and Plan:  Hospital Day#4 L1 burst fx with >50% retropulsion into conus, kyphotic deformity thoracolumbar junction  POD#3  T10-L3 fusion, laminectomy T11-L2    Intraop CSF leak at pedicle of T11.    Plan:  Stable neuro exam.   Hemoglobin 8.3, stable. Monitor. Received 2 units post op  HVAC 40cc/8hrs, serosang. Can DC when output less than 30cc/8hrs  Continue pain management  PT/OT-evals pending  Continue decadron  No ASA or anticoagulants until cleared by NSG

## 2020-01-19 NOTE — PROGRESS NOTES
[facility] Medicine Daily Progress Note    Date of Service  1/19/2020    Chief Complaint  84 y.o. female admitted 1/9/2020 with back pain.     Hospital Course    This is an 84-year-old female with a past medical history of CKD, stroke, osteoporosis, rheumatoid arthritis, hypothyroidism, and depression admitted with low back pain after mechanical ground-level fall.  The patient was attempting to sit in a chair when the chair rolled out from underneath her causing her to land on the floor.  She developed acute low back pain leading her to present to the ED.    On admission CT L-spine did reveal left L1 transverse process fracture that is possibly chronic.  No other acute fracture noted.  CT pelvis was negative for acute abnormality.  She remained in the hospital for pain control and therapy.  She has been initiated on Neurontin, Decadron, and lidocaine patches.  Her pain is overall improving, although she still continues to need significant assistance with mobilizing secondary to her pain.    The patient also takes Spironolactone and Lasix as an outpatient for edema.  On admission she was noted to have worsening of her underlying CKD.  Her diuretics were held with improvement of her creatinine.  As the patient has no evidence of edema we will continue to hold these medications.    Her MRI lumbar spine showed severe lumbar stenosis and neurosurgery was consulted and she was taken to OR on 1/16/20 and surgery she was transferred to ICU.       Interval Problem Update  Pt seen and examined, she has been transferred from ICU yesterday, no acute events overnight, afebrile.  Still with some back pain  Neurosurgery following    Consultants/Specialty  Neurosurgery.  Critical care     Code Status  FULL    Disposition  tbd     Review of Systems  Review of Systems   Constitutional: Positive for malaise/fatigue. Negative for fever.   HENT: Negative for congestion and sore throat.    Eyes: Negative for blurred vision and discharge.    Respiratory: Negative for shortness of breath.    Cardiovascular: Negative for chest pain and leg swelling.   Gastrointestinal: Negative for abdominal pain, constipation, diarrhea, nausea and vomiting.   Genitourinary: Negative for dysuria and hematuria.   Musculoskeletal: Positive for back pain. Negative for neck pain.   Skin: Negative for rash.   Neurological: Positive for weakness. Negative for dizziness, tingling, tremors and headaches.        Physical Exam  Temp:  [36.6 °C (97.8 °F)-37.4 °C (99.3 °F)] 36.8 °C (98.3 °F)  Pulse:  [79-90] 84  Resp:  [11-30] 14  BP: (111-155)/(40-72) 111/42  SpO2:  [97 %-100 %] 97 %    Physical Exam  Vitals signs and nursing note reviewed.   Constitutional:       General: She is not in acute distress.     Appearance: Normal appearance. She is not toxic-appearing.   HENT:      Head: Normocephalic and atraumatic.      Nose: Nose normal.      Mouth/Throat:      Mouth: Mucous membranes are moist.      Pharynx: Oropharynx is clear. No oropharyngeal exudate.   Eyes:      General:         Right eye: No discharge.         Left eye: No discharge.      Conjunctiva/sclera: Conjunctivae normal.   Neck:      Musculoskeletal: Normal range of motion. No neck rigidity.   Cardiovascular:      Rate and Rhythm: Normal rate and regular rhythm.      Heart sounds: Normal heart sounds.   Pulmonary:      Effort: Pulmonary effort is normal. No respiratory distress.      Breath sounds: Normal breath sounds. No wheezing or rales.   Abdominal:      General: Bowel sounds are normal. There is no distension.      Palpations: Abdomen is soft.      Tenderness: There is no tenderness. There is no guarding or rebound.   Musculoskeletal:         General: No swelling.      Right lower leg: No edema.      Left lower leg: No edema.      Comments: Mobility limited by pain.    Low back tenderness.    Skin:     General: Skin is warm and dry.      Coloration: Skin is not jaundiced.   Neurological:      Mental Status: She  is alert and oriented to person, place, and time. Mental status is at baseline.      Cranial Nerves: No cranial nerve deficit.      Sensory: No sensory deficit.   Psychiatric:         Mood and Affect: Mood normal.         Fluids    Intake/Output Summary (Last 24 hours) at 1/19/2020 1100  Last data filed at 1/19/2020 0500  Gross per 24 hour   Intake 300 ml   Output 2670 ml   Net -2370 ml       Laboratory  Recent Labs     01/17/20  0613  01/18/20  0405 01/19/20  0047 01/19/20  0458 01/19/20  0914   WBC 13.0*  --  12.4*  --  10.7  --    RBC 1.36*  --  2.46*  --  2.36*  --    HEMOGLOBIN 4.7*   < > 7.8* 7.7* 7.4* 8.3*   HEMATOCRIT 13.7*   < > 22.6* 22.6* 22.0* 24.9*   .7*  --  91.9  --  93.2  --    MCH 34.6*  --  31.7  --  31.4  --    MCHC 34.3  --  34.5  --  33.6  --    RDW 48.3  --  61.4*  --  59.1*  --    PLATELETCT 143*  --  146*  --  172  --    MPV 9.5  --  9.2  --  9.1  --     < > = values in this interval not displayed.     Recent Labs     01/17/20  0613 01/18/20  0405 01/19/20  0458   SODIUM 135 136 132*   POTASSIUM 4.6 5.0 4.5   CHLORIDE 108 113* 106   CO2 18* 22 23   GLUCOSE 159* 124* 98   BUN 29* 20 23*   CREATININE 1.44* 1.00 0.90   CALCIUM 7.5* 7.2* 7.5*     Recent Labs     01/16/20  1850   INR 1.29*               Imaging  IR-MIDLINE CATHETER INSERTION WO GUIDANCE > AGE 3   Final Result                  Ultrasound-guided midline placement performed by qualified nursing staff    as above.          DX-CHEST-PORTABLE (1 VIEW)   Final Result      Placement of right internal jugular catheter tip projecting appropriately over the SVC      OUTSIDE IMAGES-CT THORACIC SPINE   Final Result      OUTSIDE IMAGES-CT LUMBAR SPINE   Final Result      DX-THORACOLUMBAR SPINE-2 VIEWS   Final Result      Digitized intraoperative radiograph is submitted for review.  This examination is not for diagnostic purpose but for guidance during a surgical procedure.      DX-PORTABLE FLUORO > 1 HOUR   Final Result      Portable  fluoroscopy utilized for 1 minute 48 seconds.         INTERPRETING LOCATION: [address] [place] [address] [place], [id number]      MR-LUMBAR SPINE-W/O   Final Result      1.  Multilevel subluxations with L3-4 grade 1 spondylolisthesis, mild anterolisthesis L2-L3 and L4-L5. Retrolisthesis T12-L1 and L1-L2 which is difficult to quantify due to the retropulsed fractures at T12 and L1.   2.  T12 moderate recent or subacute insufficiency compression fracture with retropulsion. Moderate central stenosis at the T12 level.   3.  L1 essentially complete chronic collapse marked retropulsion and chronic sclerotic change seen on CT. No change from 1/12/2018. Moderate central stenosis at the L1 level due to prominent retropulsion up to about 10 mm.   4.  Additional multilevel degenerative disc disease and spondylotic change with L3-4 severe central stenosis at L4-5 severe central stenosis.   5.  Multilevel foraminal stenoses most notable for L3-4 severe right foraminal stenosis.   6.  Details for each level above in the body of report.      CT-PELVIS W/O PLUS RECONS   Final Result         1.  No acute abnormality.   2.  Atherosclerosis      CT-LSPINE W/O PLUS RECONS   Final Result         1.  Left L1 transverse process fracture, may be chronic, otherwise no acute traumatic lumbar spine injury identified   2.  Anterolisthesis L2 on L3 and L3 on L4, appears most likely secondary to severe facet arthrosis.   3.  Chronic appearing L1 vertebral plana with 8.3 mm retropulsion and large central Schmorl's node   4.  Atherosclerosis           Assessment/Plan  * Spinal stenosis of lumbar region with neurogenic claudication- (present on admission)  Assessment & Plan  Causing intractable back pain  L1 transverse process fracture on CT.    MRI showed severe lumbar stenosis.  S/p surgical repair by neurosurgery on 1/16  Pain management as per neurosurgery   PT and OT following        [facility] ([facility])  Assessment & Plan  Improved  Differential diagnosis includes  hypovolemic versus hemorrhagic   Off pressors  Has required 2 units of RBC so far during this admission     Lumbar transverse process fracture (HCC)  Assessment & Plan  Neurosurgery following.  MRI of L-spine found retropulsed fractures of T12 and L1 patient was taken to surgery    Hypothyroidism- (present on admission)  Assessment & Plan  Synthroid    Leg DVT (deep venous thromboembolism), chronic, right ([facility])- (present on admission)  Assessment & Plan  Not on blood thinner currently  SCDs    Hypomagnesemia- (present on admission)  Assessment & Plan  oral Mg    CKD (chronic kidney disease) stage 3, GFR 30-59 ml/min ([facility])- (present on admission)  Assessment & Plan  At baseline   Avoid nephrotoxic drugs   Monitor     Hypokalemia- (present on admission)  Assessment & Plan  Resolved    Rheumatoid arthritis ([facility])- (present on admission)  Assessment & Plan  Plaquenil    Anemia  Assessment & Plan  Acute blood loss   S/p surgical intervention   Has received 2 units of RBC during this admission   H/H stable   Monitor     Peripheral edema  Assessment & Plan  Improved  Cont on lasix and spironolactone     Depression- (present on admission)  Assessment & Plan  Citalopram       VTE prophylaxis: scd

## 2020-01-19 NOTE — PROGRESS NOTES
Skin assessment per 2 RN's: Pt has a abrasion to right wrist, Left wrist/forearm purple and swollen in color, Bilateral upper and lower extremities have scattered bruising, right buttocks dark purple in color that extends into upper right thigh. Surgical dressing to upper/middle back region with Hemovac in place.

## 2020-01-19 NOTE — ASSESSMENT & PLAN NOTE
Improved  Differential diagnosis includes hypovolemic versus hemorrhagic   Off pressors  Has required 2 units of RBC so far during this admission

## 2020-01-19 NOTE — CARE PLAN
Problem: Communication  Goal: The ability to communicate needs accurately and effectively will improve  Outcome: PROGRESSING AS EXPECTED     Problem: Safety  Goal: Will remain free from injury  Outcome: PROGRESSING AS EXPECTED  Goal: Will remain free from falls  Outcome: PROGRESSING AS EXPECTED     Problem: Infection  Goal: Will remain free from infection  Outcome: PROGRESSING AS EXPECTED     Problem: Venous Thromboembolism (VTW)/Deep Vein Thrombosis (DVT) Prevention:  Goal: Patient will participate in Venous Thrombosis (VTE)/Deep Vein Thrombosis (DVT)Prevention Measures  Outcome: PROGRESSING AS EXPECTED     Problem: Bowel/Gastric:  Goal: Normal bowel function is maintained or improved  Outcome: PROGRESSING AS EXPECTED  Goal: Will not experience complications related to bowel motility  Outcome: PROGRESSING AS EXPECTED     Problem: Knowledge Deficit  Goal: Knowledge of disease process/condition, treatment plan, diagnostic tests, and medications will improve  Outcome: PROGRESSING AS EXPECTED  Goal: Knowledge of the prescribed therapeutic regimen will improve  Outcome: PROGRESSING AS EXPECTED     Problem: Discharge Barriers/Planning  Goal: Patient's continuum of care needs will be met  Outcome: PROGRESSING AS EXPECTED     Problem: Fluid Volume:  Goal: Will maintain balanced intake and output  Outcome: PROGRESSING AS EXPECTED     Problem: Mobility  Goal: Risk for activity intolerance will decrease  Outcome: PROGRESSING AS EXPECTED     Problem: Pain Management  Goal: Pain level will decrease to patient's comfort goal  Outcome: PROGRESSING AS EXPECTED     Problem: Urinary Elimination:  Goal: Ability to reestablish a normal urinary elimination pattern will improve  Outcome: PROGRESSING AS EXPECTED     Problem: Skin Integrity  Goal: Risk for impaired skin integrity will decrease  Outcome: PROGRESSING AS EXPECTED     Problem: Psychosocial Needs:  Goal: Level of anxiety will decrease  Outcome: PROGRESSING AS EXPECTED      Problem: Safety - Medical Restraint  Goal: Remains free of injury from restraints (Restraint for Interference with Medical Device)  Description  INTERVENTIONS:  1. Determine that other, less restrictive measures have been tried or would not be effective before applying the restraint  2. Evaluate the patient's condition at the time of restraint application  3. Inform patient/family regarding the reason for restraint  4. Q2H: Monitor safety, psychosocial status, comfort, nutrition and hydration  Outcome: PROGRESSING AS EXPECTED  Goal: Free from restraint(s) (Restraint for Interference with Medical Device)  Description  INTERVENTIONS:  1. ONCE/SHIFT or MINIMUM Q12H: Assess and document the continuing need for restraints  2. Q24H: Continued use of restraint requires LIP to perform face to face examination and written order  3. Identify and implement measures to help patient regain control  Outcome: PROGRESSING AS EXPECTED     Problem: Respiratory:  Goal: Respiratory status will improve  Outcome: PROGRESSING AS EXPECTED

## 2020-01-19 NOTE — ASSESSMENT & PLAN NOTE
Acute blood loss   S/p surgical intervention   Has received 2 units of RBC during this admission   H/H stable   Monitor   1/22 dropped from 9.3-7.6.  No evidence of bleeding.  Ordered iron studies, vitamin B12 and folic acid, reticulocyte count and FOBT  1/23 hemoglobin 9.3

## 2020-01-20 LAB
ANION GAP SERPL CALC-SCNC: 4 MMOL/L (ref 0–11.9)
ANION GAP SERPL CALC-SCNC: 6 MMOL/L (ref 0–11.9)
BASOPHILS # BLD AUTO: 0.1 % (ref 0–1.8)
BASOPHILS # BLD: 0.01 K/UL (ref 0–0.12)
BUN SERPL-MCNC: 26 MG/DL (ref 8–22)
BUN SERPL-MCNC: 26 MG/DL (ref 8–22)
CALCIUM SERPL-MCNC: 7.5 MG/DL (ref 8.5–10.5)
CALCIUM SERPL-MCNC: 7.7 MG/DL (ref 8.5–10.5)
CHLORIDE SERPL-SCNC: 103 MMOL/L (ref 96–112)
CHLORIDE SERPL-SCNC: 103 MMOL/L (ref 96–112)
CO2 SERPL-SCNC: 23 MMOL/L (ref 20–33)
CO2 SERPL-SCNC: 24 MMOL/L (ref 20–33)
CREAT SERPL-MCNC: 0.88 MG/DL (ref 0.5–1.4)
CREAT SERPL-MCNC: 0.88 MG/DL (ref 0.5–1.4)
EOSINOPHIL # BLD AUTO: 0.02 K/UL (ref 0–0.51)
EOSINOPHIL NFR BLD: 0.2 % (ref 0–6.9)
ERYTHROCYTE [DISTWIDTH] IN BLOOD BY AUTOMATED COUNT: 57.1 FL (ref 35.9–50)
ERYTHROCYTE [DISTWIDTH] IN BLOOD BY AUTOMATED COUNT: 60.2 FL (ref 35.9–50)
GLUCOSE SERPL-MCNC: 105 MG/DL (ref 65–99)
GLUCOSE SERPL-MCNC: 118 MG/DL (ref 65–99)
HCT VFR BLD AUTO: 22.3 % (ref 37–47)
HCT VFR BLD AUTO: 22.5 % (ref 37–47)
HCT VFR BLD AUTO: 25.4 % (ref 37–47)
HCT VFR BLD AUTO: 26 % (ref 37–47)
HGB BLD-MCNC: 7.3 G/DL (ref 12–16)
HGB BLD-MCNC: 7.6 G/DL (ref 12–16)
HGB BLD-MCNC: 8.2 G/DL (ref 12–16)
HGB BLD-MCNC: 8.3 G/DL (ref 12–16)
IMM GRANULOCYTES # BLD AUTO: 0.18 K/UL (ref 0–0.11)
IMM GRANULOCYTES NFR BLD AUTO: 1.8 % (ref 0–0.9)
LYMPHOCYTES # BLD AUTO: 0.75 K/UL (ref 1–4.8)
LYMPHOCYTES NFR BLD: 7.3 % (ref 22–41)
MCH RBC QN AUTO: 31.7 PG (ref 27–33)
MCH RBC QN AUTO: 31.8 PG (ref 27–33)
MCHC RBC AUTO-ENTMCNC: 32.7 G/DL (ref 33.6–35)
MCHC RBC AUTO-ENTMCNC: 33.8 G/DL (ref 33.6–35)
MCV RBC AUTO: 94.1 FL (ref 81.4–97.8)
MCV RBC AUTO: 97 FL (ref 81.4–97.8)
MONOCYTES # BLD AUTO: 0.82 K/UL (ref 0–0.85)
MONOCYTES NFR BLD AUTO: 8 % (ref 0–13.4)
NEUTROPHILS # BLD AUTO: 8.49 K/UL (ref 2–7.15)
NEUTROPHILS NFR BLD: 82.6 % (ref 44–72)
NRBC # BLD AUTO: 0 K/UL
NRBC BLD-RTO: 0 /100 WBC
PLATELET # BLD AUTO: 207 K/UL (ref 164–446)
PLATELET # BLD AUTO: 217 K/UL (ref 164–446)
PMV BLD AUTO: 9.2 FL (ref 9–12.9)
PMV BLD AUTO: 9.3 FL (ref 9–12.9)
POTASSIUM SERPL-SCNC: 4.7 MMOL/L (ref 3.6–5.5)
POTASSIUM SERPL-SCNC: 5.1 MMOL/L (ref 3.6–5.5)
RBC # BLD AUTO: 2.3 M/UL (ref 4.2–5.4)
RBC # BLD AUTO: 2.39 M/UL (ref 4.2–5.4)
SODIUM SERPL-SCNC: 130 MMOL/L (ref 135–145)
SODIUM SERPL-SCNC: 133 MMOL/L (ref 135–145)
WBC # BLD AUTO: 10.3 K/UL (ref 4.8–10.8)
WBC # BLD AUTO: 10.9 K/UL (ref 4.8–10.8)

## 2020-01-20 PROCEDURE — 700102 HCHG RX REV CODE 250 W/ 637 OVERRIDE(OP): Performed by: PHYSICIAN ASSISTANT

## 2020-01-20 PROCEDURE — 700112 HCHG RX REV CODE 229: Performed by: PSYCHIATRY & NEUROLOGY

## 2020-01-20 PROCEDURE — A9270 NON-COVERED ITEM OR SERVICE: HCPCS | Performed by: PSYCHIATRY & NEUROLOGY

## 2020-01-20 PROCEDURE — 770006 HCHG ROOM/CARE - MED/SURG/GYN SEMI*

## 2020-01-20 PROCEDURE — 36415 COLL VENOUS BLD VENIPUNCTURE: CPT

## 2020-01-20 PROCEDURE — 700101 HCHG RX REV CODE 250: Performed by: FAMILY MEDICINE

## 2020-01-20 PROCEDURE — 97535 SELF CARE MNGMENT TRAINING: CPT

## 2020-01-20 PROCEDURE — A9270 NON-COVERED ITEM OR SERVICE: HCPCS | Performed by: PHYSICIAN ASSISTANT

## 2020-01-20 PROCEDURE — 85027 COMPLETE CBC AUTOMATED: CPT

## 2020-01-20 PROCEDURE — 80048 BASIC METABOLIC PNL TOTAL CA: CPT

## 2020-01-20 PROCEDURE — 700102 HCHG RX REV CODE 250 W/ 637 OVERRIDE(OP): Performed by: PSYCHIATRY & NEUROLOGY

## 2020-01-20 PROCEDURE — 97530 THERAPEUTIC ACTIVITIES: CPT

## 2020-01-20 PROCEDURE — 85018 HEMOGLOBIN: CPT | Mod: 91

## 2020-01-20 PROCEDURE — 99232 SBSQ HOSP IP/OBS MODERATE 35: CPT | Performed by: INTERNAL MEDICINE

## 2020-01-20 PROCEDURE — 85014 HEMATOCRIT: CPT

## 2020-01-20 PROCEDURE — 85025 COMPLETE CBC W/AUTO DIFF WBC: CPT

## 2020-01-20 RX ADMIN — ANTACID TABLETS 500 MG: 500 TABLET, CHEWABLE ORAL at 16:08

## 2020-01-20 RX ADMIN — TRAZODONE HYDROCHLORIDE 25 MG: 50 TABLET ORAL at 19:57

## 2020-01-20 RX ADMIN — DEXAMETHASONE 4 MG: 4 TABLET ORAL at 11:34

## 2020-01-20 RX ADMIN — BISACODYL 10 MG: 10 SUPPOSITORY RECTAL at 23:35

## 2020-01-20 RX ADMIN — GABAPENTIN 100 MG: 100 CAPSULE ORAL at 04:59

## 2020-01-20 RX ADMIN — ANTACID TABLETS 500 MG: 500 TABLET, CHEWABLE ORAL at 04:59

## 2020-01-20 RX ADMIN — DOCUSATE SODIUM 100 MG: 100 CAPSULE, LIQUID FILLED ORAL at 04:59

## 2020-01-20 RX ADMIN — GABAPENTIN 100 MG: 100 CAPSULE ORAL at 11:34

## 2020-01-20 RX ADMIN — MAGNESIUM OXIDE TAB 400 MG (241.3 MG ELEMENTAL MG) 400 MG: 400 (241.3 MG) TAB at 16:08

## 2020-01-20 RX ADMIN — METHOCARBAMOL TABLETS 500 MG: 500 TABLET, COATED ORAL at 11:34

## 2020-01-20 RX ADMIN — DEXAMETHASONE 4 MG: 4 TABLET ORAL at 00:17

## 2020-01-20 RX ADMIN — MAGNESIUM OXIDE TAB 400 MG (241.3 MG ELEMENTAL MG) 400 MG: 400 (241.3 MG) TAB at 05:00

## 2020-01-20 RX ADMIN — OXYCODONE HYDROCHLORIDE 10 MG: 10 TABLET ORAL at 19:59

## 2020-01-20 RX ADMIN — CYCLOBENZAPRINE HYDROCHLORIDE 10 MG: 10 TABLET, FILM COATED ORAL at 21:58

## 2020-01-20 RX ADMIN — GABAPENTIN 300 MG: 300 CAPSULE ORAL at 19:57

## 2020-01-20 RX ADMIN — METHOCARBAMOL TABLETS 500 MG: 500 TABLET, COATED ORAL at 08:02

## 2020-01-20 RX ADMIN — METHOCARBAMOL TABLETS 500 MG: 500 TABLET, COATED ORAL at 19:57

## 2020-01-20 RX ADMIN — DOCUSATE SODIUM 100 MG: 100 CAPSULE, LIQUID FILLED ORAL at 16:08

## 2020-01-20 RX ADMIN — CITALOPRAM HYDROBROMIDE 20 MG: 20 TABLET ORAL at 19:57

## 2020-01-20 RX ADMIN — MAGNESIUM HYDROXIDE 30 ML: 400 SUSPENSION ORAL at 21:13

## 2020-01-20 RX ADMIN — OXYCODONE HYDROCHLORIDE 10 MG: 10 TABLET ORAL at 16:08

## 2020-01-20 RX ADMIN — SENNOSIDES AND DOCUSATE SODIUM 1 TABLET: 8.6; 5 TABLET ORAL at 19:58

## 2020-01-20 RX ADMIN — ACETAMINOPHEN 650 MG: 325 TABLET, FILM COATED ORAL at 04:58

## 2020-01-20 RX ADMIN — METHOCARBAMOL TABLETS 500 MG: 500 TABLET, COATED ORAL at 16:08

## 2020-01-20 RX ADMIN — OXYCODONE HYDROCHLORIDE 10 MG: 10 TABLET ORAL at 09:56

## 2020-01-20 RX ADMIN — OXYCODONE HYDROCHLORIDE 10 MG: 10 TABLET ORAL at 02:25

## 2020-01-20 RX ADMIN — POLYETHYLENE GLYCOL 3350 1 PACKET: 17 POWDER, FOR SOLUTION ORAL at 21:09

## 2020-01-20 RX ADMIN — FUROSEMIDE 40 MG: 40 TABLET ORAL at 04:59

## 2020-01-20 RX ADMIN — DEXAMETHASONE 4 MG: 4 TABLET ORAL at 23:35

## 2020-01-20 RX ADMIN — LIDOCAINE 2 PATCH: 50 PATCH CUTANEOUS at 11:34

## 2020-01-20 RX ADMIN — SPIRONOLACTONE 25 MG: 25 TABLET ORAL at 05:00

## 2020-01-20 RX ADMIN — HYDROXYCHLOROQUINE SULFATE 200 MG: 200 TABLET ORAL at 04:59

## 2020-01-20 RX ADMIN — LEVOTHYROXINE SODIUM 100 MCG: 25 TABLET ORAL at 05:00

## 2020-01-20 RX ADMIN — OMEPRAZOLE 20 MG: 20 CAPSULE, DELAYED RELEASE ORAL at 16:08

## 2020-01-20 ASSESSMENT — ENCOUNTER SYMPTOMS
HEADACHES: 0
ABDOMINAL PAIN: 0
TREMORS: 0
BACK PAIN: 1
WEAKNESS: 1
CONSTIPATION: 0
EYE DISCHARGE: 0
NAUSEA: 0
DIZZINESS: 0
NECK PAIN: 0
FEVER: 0
VOMITING: 0
BLURRED VISION: 0
TINGLING: 0
DIARRHEA: 0
SORE THROAT: 0
SHORTNESS OF BREATH: 0

## 2020-01-20 ASSESSMENT — COGNITIVE AND FUNCTIONAL STATUS - GENERAL
DAILY ACTIVITIY SCORE: 16
TOILETING: A LITTLE
MOVING FROM LYING ON BACK TO SITTING ON SIDE OF FLAT BED: A LITTLE
CLIMB 3 TO 5 STEPS WITH RAILING: A LOT
SUGGESTED CMS G CODE MODIFIER DAILY ACTIVITY: CK
WALKING IN HOSPITAL ROOM: A LITTLE
HELP NEEDED FOR BATHING: A LOT
DRESSING REGULAR UPPER BODY CLOTHING: A LOT
SUGGESTED CMS G CODE MODIFIER MOBILITY: CK
PERSONAL GROOMING: A LITTLE
TURNING FROM BACK TO SIDE WHILE IN FLAT BAD: A LITTLE
DRESSING REGULAR LOWER BODY CLOTHING: A LOT
MOVING TO AND FROM BED TO CHAIR: A LITTLE
STANDING UP FROM CHAIR USING ARMS: A LITTLE
MOBILITY SCORE: 17

## 2020-01-20 ASSESSMENT — GAIT ASSESSMENTS
GAIT LEVEL OF ASSIST: MINIMAL ASSIST
DEVIATION: ATAXIC
DISTANCE (FEET): 50
ASSISTIVE DEVICE: FRONT WHEEL WALKER

## 2020-01-20 NOTE — PROGRESS NOTES
Neurosurgery Progress Note    Subjective:  No acute events. C/o pain in right glute around hip and down ant right thigh- some improved today.  No complaints of HA even w ambulation    Exam:  BLE intact, R df/pf (States likely baseline, multiple foot surgeries  Dressing with small amount old drainage.   HVAC 25cc/8hrs, serosang      BP  Min: 99/53  Max: 119/49  Pulse  Av.3  Min: 76  Max: 85  Resp  Avg: 15.6  Min: 14  Max: 16  Temp  Av.8 °C (98.3 °F)  Min: 36.8 °C (98.2 °F)  Max: 37.1 °C (98.7 °F)  SpO2  Av %  Min: 97 %  Max: 99 %    No data recorded    Recent Labs     203 20  0900   WBC 10.7  --  10.9* 10.3  --    RBC 2.36*  --  2.30* 2.39*  --    HEMOGLOBIN 7.4*   < > 7.3* 7.6* 8.2*   HEMATOCRIT 22.0*   < > 22.3* 22.5* 25.4*   MCV 93.2  --  97.0 94.1  --    MCH 31.4  --  31.7 31.8  --    MCHC 33.6  --  32.7* 33.8  --    RDW 59.1*  --  60.2* 57.1*  --    PLATELETCT 172  --  207 217  --    MPV 9.1  --  9.2 9.3  --     < > = values in this interval not displayed.     Recent Labs     203   SODIUM 132* 130* 133*   POTASSIUM 4.5 4.7 5.1   CHLORIDE 106 103 103   CO2 23 23 24   GLUCOSE 98 118* 105*   BUN 23* 26* 26*   CREATININE 0.90 0.88 0.88   CALCIUM 7.5* 7.5* 7.7*               Intake/Output       20 0700 - 20 0659 20 07 - 20 0659      [id number] 3332-0336 Total 7679-0512 1338-5417 Total       Intake    P.O.  120  -- 120  --  -- --    P.O. 120 -- 120 -- -- --    Total Intake 120 -- 120 -- -- --       Output    Urine  1600  450   --  -- --    Urine Void (mL) 1600 -- 1600 -- -- --    Output (mL) (Urethral Catheter) -- 450 450 -- -- --    Drains  40  35 75  --  -- --    Output (mL) (Closed/Suction Drain 1 Back Hemovac) 40 35 75 -- -- --    Stool  --  -- --  --  -- --    Number of Times Stooled -- 0 x 0 x 0 x -- 0 x    Total Output 9804 676 3741 -- -- --       Net I/O     -1180 -251  --  -- --            Intake/Output Summary (Last 24 hours) at 1/20/2020 1219  Last data filed at 1/20/2020 0500  Gross per 24 hour   Intake --   Output 2125 ml   Net -2125 ml            • spironolactone  25 mg DAILY   • furosemide  40 mg Q DAY   • gabapentin  100 mg BID   • gabapentin  300 mg QHS   • fentaNYL  25-50 mcg Q3HRS PRN   • magnesium oxide  400 mg BID   • levothyroxine  100 mcg AM ES   • magnesium hydroxide  30 mL QDAY PRN   • diphenhydrAMINE  25 mg Q6HRS PRN    Or   • diphenhydrAMINE  25 mg Q6HRS PRN   • hydroxychloroquine  200 mg DAILY   • methocarbamol  500 mg 4X/DAY   • polyethylene glycol/lytes  1 Packet BID PRN   • ondansetron  4 mg Q4HRS PRN   • traZODone  25 mg Q EVENING   • senna-docusate  1 Tab Nightly   • senna-docusate  1 Tab Q24HRS PRN   • oxyCODONE immediate-release  5 mg Q3HRS PRN   • oxyCODONE immediate release  10 mg Q3HRS PRN   • diphenhydrAMINE  25 mg Q6HRS PRN   • dexamethasone  4 mg Q12HRS   • cyclobenzaprine  10 mg Q8HRS PRN    Or   • methocarbamol  750 mg Q8HRS PRN    Or   • diazePAM  5 mg Q4HRS PRN   • calcium carbonate  500 mg BID   • citalopram  20 mg QHS   • acetaminophen  650 mg Q6HRS PRN   • omeprazole  20 mg DAILY   • docusate sodium  100 mg BID   • Pharmacy Consult Request  1 Each PHARMACY TO DOSE   • MD ALERT...DO NOT ADMINISTER NSAIDS or ASPIRIN unless ORDERED By Neurosurgery  1 Each PRN   • bisacodyl  10 mg Q24HRS PRN   • fleet  1 Each Once PRN   • Respiratory Care per Protocol   Continuous RT   • labetalol  10 mg Q HOUR PRN   • hydrALAZINE  10 mg Q HOUR PRN   • benzocaine-menthol  1 Lozenge Q2HRS PRN   • ondansetron  4 mg Q4HRS PRN   • ipratropium  1 Spray BID   • lidocaine  2 Patch Q24HR       Assessment and Plan:  Hospital Day#5 L1 burst fx with >50% retropulsion into conus, kyphotic deformity thoracolumbar junction  POD#4 T10-L3 fusion, laminectomy T11-L2    Intraop CSF leak at pedicle of T11.    Plan:  Stable neuro exam.   Hemoglobin 8.2 monitor  Dc hvac  Continue pain  management  PT/OT-evals pending  Continue decadron  No ASA or anticoagulants until cleared by NSG

## 2020-01-20 NOTE — THERAPY
Physical Therapy Contact Note    PT re-consult received, pt now s/p lumbar sx. Attempted PT session, pt declining due to fatigue, reports recently returned to bed after mobilizing with nursing. Pt requesting PT return tomorrow. Will re-attempt as able.

## 2020-01-20 NOTE — PROGRESS NOTES
[facility] Medicine Daily Progress Note    Date of Service  1/20/2020    Chief Complaint  84 y.o. female admitted 1/9/2020 with back pain.     Hospital Course    This is an 84-year-old female with a past medical history of CKD, stroke, osteoporosis, rheumatoid arthritis, hypothyroidism, and depression admitted with low back pain after mechanical ground-level fall.  The patient was attempting to sit in a chair when the chair rolled out from underneath her causing her to land on the floor.  She developed acute low back pain leading her to present to the ED.    On admission CT L-spine did reveal left L1 transverse process fracture that is possibly chronic.  No other acute fracture noted.  CT pelvis was negative for acute abnormality.  She remained in the hospital for pain control and therapy.  She has been initiated on Neurontin, Decadron, and lidocaine patches.  Her pain is overall improving, although she still continues to need significant assistance with mobilizing secondary to her pain.    The patient also takes Spironolactone and Lasix as an outpatient for edema.  On admission she was noted to have worsening of her underlying CKD.  Her diuretics were held with improvement of her creatinine.  As the patient has no evidence of edema we will continue to hold these medications.    Her MRI lumbar spine showed severe lumbar stenosis and neurosurgery was consulted and she was taken to OR on 1/16/20 and surgery she was transferred to ICU.       Interval Problem Update  Pt seen and examined,  no acute events overnight, afebrile.  Still with some back pain controlled with pain medication   Neurosurgery following    Consultants/Specialty  Neurosurgery.  Critical care     Code Status  FULL    Disposition  tbd     Review of Systems  Review of Systems   Constitutional: Positive for malaise/fatigue. Negative for fever.   HENT: Negative for congestion and sore throat.    Eyes: Negative for blurred vision and discharge.    Respiratory: Negative for shortness of breath.    Cardiovascular: Negative for chest pain and leg swelling.   Gastrointestinal: Negative for abdominal pain, constipation, diarrhea, nausea and vomiting.   Genitourinary: Negative for dysuria and hematuria.   Musculoskeletal: Positive for back pain. Negative for neck pain.   Skin: Negative for rash.   Neurological: Positive for weakness. Negative for dizziness, tingling, tremors and headaches.        Physical Exam  Temp:  [36.8 °C (98.2 °F)-37.1 °C (98.7 °F)] 36.8 °C (98.2 °F)  Pulse:  [76-85] 78  Resp:  [14-16] 16  BP: ()/(35-55) 115/55  SpO2:  [97 %-99 %] 98 %    Physical Exam  Vitals signs and nursing note reviewed.   Constitutional:       General: She is not in acute distress.     Appearance: Normal appearance. She is not toxic-appearing.   HENT:      Head: Normocephalic and atraumatic.      Nose: Nose normal.      Mouth/Throat:      Mouth: Mucous membranes are moist.      Pharynx: Oropharynx is clear. No oropharyngeal exudate.   Eyes:      General:         Right eye: No discharge.         Left eye: No discharge.      Conjunctiva/sclera: Conjunctivae normal.   Neck:      Musculoskeletal: Normal range of motion. No neck rigidity.   Cardiovascular:      Rate and Rhythm: Normal rate and regular rhythm.      Heart sounds: Normal heart sounds.   Pulmonary:      Effort: Pulmonary effort is normal. No respiratory distress.      Breath sounds: Normal breath sounds. No wheezing or rales.   Abdominal:      General: Bowel sounds are normal. There is no distension.      Palpations: Abdomen is soft.      Tenderness: There is no tenderness. There is no guarding or rebound.   Musculoskeletal:         General: No swelling.      Right lower leg: No edema.      Left lower leg: No edema.      Comments: Mobility limited by pain.    Low back tenderness.    Skin:     General: Skin is warm and dry.      Coloration: Skin is not jaundiced.   Neurological:      Mental Status: She is  alert and oriented to person, place, and time. Mental status is at baseline.      Cranial Nerves: No cranial nerve deficit.      Sensory: No sensory deficit.   Psychiatric:         Mood and Affect: Mood normal.         Fluids    Intake/Output Summary (Last 24 hours) at 1/20/2020 1331  Last data filed at 1/20/2020 0500  Gross per 24 hour   Intake --   Output 2125 ml   Net -2125 ml       Laboratory  Recent Labs     01/19/20 0458 01/20/20  0043 01/20/20  0423 01/20/20  0900   WBC 10.7  --  10.9* 10.3  --    RBC 2.36*  --  2.30* 2.39*  --    HEMOGLOBIN 7.4*   < > 7.3* 7.6* 8.2*   HEMATOCRIT 22.0*   < > 22.3* 22.5* 25.4*   MCV 93.2  --  97.0 94.1  --    MCH 31.4  --  31.7 31.8  --    MCHC 33.6  --  32.7* 33.8  --    RDW 59.1*  --  60.2* 57.1*  --    PLATELETCT 172  --  207 217  --    MPV 9.1  --  9.2 9.3  --     < > = values in this interval not displayed.     Recent Labs     01/19/20 0458 01/20/20 0043 01/20/20 0423   SODIUM 132* 130* 133*   POTASSIUM 4.5 4.7 5.1   CHLORIDE 106 103 103   CO2 23 23 24   GLUCOSE 98 118* 105*   BUN 23* 26* 26*   CREATININE 0.90 0.88 0.88   CALCIUM 7.5* 7.5* 7.7*                   Imaging  IR-MIDLINE CATHETER INSERTION WO GUIDANCE > AGE 3   Final Result                  Ultrasound-guided midline placement performed by qualified nursing staff    as above.          DX-CHEST-PORTABLE (1 VIEW)   Final Result      Placement of right internal jugular catheter tip projecting appropriately over the SVC      OUTSIDE IMAGES-CT THORACIC SPINE   Final Result      OUTSIDE IMAGES-CT LUMBAR SPINE   Final Result      DX-THORACOLUMBAR SPINE-2 VIEWS   Final Result      Digitized intraoperative radiograph is submitted for review.  This examination is not for diagnostic purpose but for guidance during a surgical procedure.      DX-PORTABLE FLUORO > 1 HOUR   Final Result      Portable fluoroscopy utilized for 1 minute 48 seconds.         INTERPRETING LOCATION: [address] [place] [address], [id number]      MR-LUMBAR  SPINE-W/O   Final Result      1.  Multilevel subluxations with L3-4 grade 1 spondylolisthesis, mild anterolisthesis L2-L3 and L4-L5. Retrolisthesis T12-L1 and L1-L2 which is difficult to quantify due to the retropulsed fractures at T12 and L1.   2.  T12 moderate recent or subacute insufficiency compression fracture with retropulsion. Moderate central stenosis at the T12 level.   3.  L1 essentially complete chronic collapse marked retropulsion and chronic sclerotic change seen on CT. No change from 1/12/2018. Moderate central stenosis at the L1 level due to prominent retropulsion up to about 10 mm.   4.  Additional multilevel degenerative disc disease and spondylotic change with L3-4 severe central stenosis at L4-5 severe central stenosis.   5.  Multilevel foraminal stenoses most notable for L3-4 severe right foraminal stenosis.   6.  Details for each level above in the body of report.      CT-PELVIS W/O PLUS RECONS   Final Result         1.  No acute abnormality.   2.  Atherosclerosis      CT-LSPINE W/O PLUS RECONS   Final Result         1.  Left L1 transverse process fracture, may be chronic, otherwise no acute traumatic lumbar spine injury identified   2.  Anterolisthesis L2 on L3 and L3 on L4, appears most likely secondary to severe facet arthrosis.   3.  Chronic appearing L1 vertebral plana with 8.3 mm retropulsion and large central Schmorl's node   4.  Atherosclerosis           Assessment/Plan  * Spinal stenosis of lumbar region with neurogenic claudication- (present on admission)  Assessment & Plan  Causing intractable back pain  L1 transverse process fracture on CT.    MRI showed severe lumbar stenosis.  S/p surgical repair by neurosurgery on 1/16  Pain management as per neurosurgery   PT and OT following        Shock ([facility])  Assessment & Plan  Improved  Differential diagnosis includes hypovolemic versus hemorrhagic   Off pressors  Has required 2 units of RBC so far during this admission     Lumbar transverse  process fracture ([facility])  Assessment & Plan  Neurosurgery following.  MRI of L-spine found retropulsed fractures of T12 and L1 patient was taken to surgery    Hypothyroidism- (present on admission)  Assessment & Plan  Synthroid    Leg DVT (deep venous thromboembolism), chronic, right ([facility])- (present on admission)  Assessment & Plan  Not on blood thinner currently  SCDs    Hypomagnesemia- (present on admission)  Assessment & Plan  oral Mg    CKD (chronic kidney disease) stage 3, GFR 30-59 ml/min ([facility])- (present on admission)  Assessment & Plan  At baseline   Avoid nephrotoxic drugs   Monitor     Hypokalemia- (present on admission)  Assessment & Plan  Resolved    Rheumatoid arthritis ([facility])- (present on admission)  Assessment & Plan  Plaquenil    Anemia  Assessment & Plan  Acute blood loss   S/p surgical intervention   Has received 2 units of RBC during this admission   H/H stable   Monitor     Peripheral edema  Assessment & Plan  Improved  Cont on lasix and spironolactone     Depression- (present on admission)  Assessment & Plan  Citalopram       VTE prophylaxis: scd

## 2020-01-20 NOTE — THERAPY
"  Occupational Therapy Treatment completed with focus on ADLs, ADL transfers and patient education.  Functional Status:  ModA supine>sit via log roll, MaxA TLSO don, Cal sit>stand, ModA 8-10 side steps with FWW to bedside chair, ModA LB dress, Set-Up eating  Plan of Care: Will benefit from Occupational Therapy 3 times per week  Discharge Recommendations:  Equipment Will Continue to Assess for Equipment Needs. Post-acute therapy Recommend post-acute placement for additional occupational therapy services prior to discharge home. Patient can tolerate post-acute therapies at a 5x/week frequency.    See \"Rehab Therapy-Acute\" Patient Summary Report for complete documentation.     Pt seen for OT tx s/p T9-L3 fusion, TLSO ordered on at EOB ok off for showers and meals. Reviewed post-op precautions, brace wearing instructions and provided education on compensatory strategies for ADLs. Post-op packet provided for reference of education. Pt required assist for bed mobility, upper and lower dressing, TLSO don, sit>stand and short mobility w/ FWW, physical assist for FWW mgmt required and cues for hand placement to facilitate slow controlled sit. Pt is limited by pain and weakness which impacts her independence in ADLs. Acute OT to follow. Recommend post-acute placement for additional therapy prior to d/c home, pt is in agreement and reports her daughter will visit to assist following post-acute stay.   "

## 2020-01-20 NOTE — CARE PLAN
Problem: Nutritional:  Goal: Achieve adequate nutritional intake  Description  Patient will consume 50% of meals/supplements.    Outcome: PROGRESSING SLOWER THAN EXPECTED

## 2020-01-20 NOTE — PROGRESS NOTES
Bedside report received by day shift. Pt alert and oriented x4, assist with repositioning every 2 hours, call light within reach.

## 2020-01-20 NOTE — DIETARY
NUTRITION SERVICES: UPDATE  Cortrak was not placed per chart review. Pt initiated PO diet on 1/18. Current diet: regular. PO intake per chart review <25-75% x 6 meals. Pt reports appetite is lower than usual. MD consented to nutritional supplements. Pt offered Boost Plus TID and was agreeable.     Recommendations/Plan:  1. Boost Plus TID.    2. Encourage intake of meals/supplements.   3. Document intake of all meals/supplements as % taken in ADL's to provide interdisciplinary communication across all shifts.   4. Monitor weight.  5. Nutrition rep will continue to see patient for ongoing meal and snack preferences.    RD following.

## 2020-01-21 ENCOUNTER — APPOINTMENT (OUTPATIENT)
Dept: RADIOLOGY | Facility: MEDICAL CENTER | Age: 85
DRG: 456 | End: 2020-01-21
Attending: NEUROLOGICAL SURGERY
Payer: MEDICARE

## 2020-01-21 PROBLEM — K59.00 CONSTIPATION: Status: ACTIVE | Noted: 2020-01-21

## 2020-01-21 LAB
ANION GAP SERPL CALC-SCNC: 10 MMOL/L (ref 0–11.9)
ANION GAP SERPL CALC-SCNC: 5 MMOL/L (ref 0–11.9)
BASOPHILS # BLD AUTO: 0.1 % (ref 0–1.8)
BASOPHILS # BLD: 0.02 K/UL (ref 0–0.12)
BUN SERPL-MCNC: 27 MG/DL (ref 8–22)
BUN SERPL-MCNC: 28 MG/DL (ref 8–22)
CALCIUM SERPL-MCNC: 7.9 MG/DL (ref 8.5–10.5)
CALCIUM SERPL-MCNC: 8 MG/DL (ref 8.5–10.5)
CHLORIDE SERPL-SCNC: 100 MMOL/L (ref 96–112)
CHLORIDE SERPL-SCNC: 101 MMOL/L (ref 96–112)
CO2 SERPL-SCNC: 21 MMOL/L (ref 20–33)
CO2 SERPL-SCNC: 25 MMOL/L (ref 20–33)
CREAT SERPL-MCNC: 1.03 MG/DL (ref 0.5–1.4)
CREAT SERPL-MCNC: 1.04 MG/DL (ref 0.5–1.4)
CRP SERPL HS-MCNC: 2.51 MG/DL (ref 0–0.75)
CRP SERPL HS-MCNC: 2.86 MG/DL (ref 0–0.75)
EOSINOPHIL # BLD AUTO: 0.02 K/UL (ref 0–0.51)
EOSINOPHIL NFR BLD: 0.1 % (ref 0–6.9)
ERYTHROCYTE [DISTWIDTH] IN BLOOD BY AUTOMATED COUNT: 55.2 FL (ref 35.9–50)
ERYTHROCYTE [DISTWIDTH] IN BLOOD BY AUTOMATED COUNT: 58.1 FL (ref 35.9–50)
GLUCOSE SERPL-MCNC: 102 MG/DL (ref 65–99)
GLUCOSE SERPL-MCNC: 93 MG/DL (ref 65–99)
HCT VFR BLD AUTO: 24.8 % (ref 37–47)
HCT VFR BLD AUTO: 25.1 % (ref 37–47)
HCT VFR BLD AUTO: 30.5 % (ref 37–47)
HGB BLD-MCNC: 8.3 G/DL (ref 12–16)
HGB BLD-MCNC: 8.3 G/DL (ref 12–16)
HGB BLD-MCNC: 9.3 G/DL (ref 12–16)
IMM GRANULOCYTES # BLD AUTO: 0.25 K/UL (ref 0–0.11)
IMM GRANULOCYTES NFR BLD AUTO: 1.8 % (ref 0–0.9)
LYMPHOCYTES # BLD AUTO: 1.12 K/UL (ref 1–4.8)
LYMPHOCYTES NFR BLD: 8 % (ref 22–41)
MCH RBC QN AUTO: 32 PG (ref 27–33)
MCH RBC QN AUTO: 32.4 PG (ref 27–33)
MCHC RBC AUTO-ENTMCNC: 33.1 G/DL (ref 33.6–35)
MCHC RBC AUTO-ENTMCNC: 33.5 G/DL (ref 33.6–35)
MCV RBC AUTO: 95.8 FL (ref 81.4–97.8)
MCV RBC AUTO: 98 FL (ref 81.4–97.8)
MONOCYTES # BLD AUTO: 1.28 K/UL (ref 0–0.85)
MONOCYTES NFR BLD AUTO: 9.2 % (ref 0–13.4)
NEUTROPHILS # BLD AUTO: 11.27 K/UL (ref 2–7.15)
NEUTROPHILS NFR BLD: 80.8 % (ref 44–72)
NRBC # BLD AUTO: 0.02 K/UL
NRBC BLD-RTO: 0.1 /100 WBC
PLATELET # BLD AUTO: 126 K/UL (ref 164–446)
PLATELET # BLD AUTO: 242 K/UL (ref 164–446)
PMV BLD AUTO: 8.7 FL (ref 9–12.9)
PMV BLD AUTO: 9.9 FL (ref 9–12.9)
POTASSIUM SERPL-SCNC: 4.6 MMOL/L (ref 3.6–5.5)
POTASSIUM SERPL-SCNC: 4.8 MMOL/L (ref 3.6–5.5)
PREALB SERPL-MCNC: 16 MG/DL (ref 18–38)
PREALB SERPL-MCNC: 16 MG/DL (ref 18–38)
RBC # BLD AUTO: 2.56 M/UL (ref 4.2–5.4)
RBC # BLD AUTO: 2.59 M/UL (ref 4.2–5.4)
SODIUM SERPL-SCNC: 131 MMOL/L (ref 135–145)
SODIUM SERPL-SCNC: 131 MMOL/L (ref 135–145)
WBC # BLD AUTO: 13.4 K/UL (ref 4.8–10.8)
WBC # BLD AUTO: 14 K/UL (ref 4.8–10.8)

## 2020-01-21 PROCEDURE — 700102 HCHG RX REV CODE 250 W/ 637 OVERRIDE(OP): Performed by: INTERNAL MEDICINE

## 2020-01-21 PROCEDURE — 700102 HCHG RX REV CODE 250 W/ 637 OVERRIDE(OP): Performed by: PSYCHIATRY & NEUROLOGY

## 2020-01-21 PROCEDURE — A9270 NON-COVERED ITEM OR SERVICE: HCPCS | Performed by: PSYCHIATRY & NEUROLOGY

## 2020-01-21 PROCEDURE — 84134 ASSAY OF PREALBUMIN: CPT | Mod: 91

## 2020-01-21 PROCEDURE — 36415 COLL VENOUS BLD VENIPUNCTURE: CPT

## 2020-01-21 PROCEDURE — 99232 SBSQ HOSP IP/OBS MODERATE 35: CPT | Performed by: INTERNAL MEDICINE

## 2020-01-21 PROCEDURE — 770006 HCHG ROOM/CARE - MED/SURG/GYN SEMI*

## 2020-01-21 PROCEDURE — 700102 HCHG RX REV CODE 250 W/ 637 OVERRIDE(OP): Performed by: NEUROLOGICAL SURGERY

## 2020-01-21 PROCEDURE — 72131 CT LUMBAR SPINE W/O DYE: CPT

## 2020-01-21 PROCEDURE — 86140 C-REACTIVE PROTEIN: CPT | Mod: 91

## 2020-01-21 PROCEDURE — 700101 HCHG RX REV CODE 250: Performed by: NEUROLOGICAL SURGERY

## 2020-01-21 PROCEDURE — 72128 CT CHEST SPINE W/O DYE: CPT

## 2020-01-21 PROCEDURE — 700112 HCHG RX REV CODE 229: Performed by: PSYCHIATRY & NEUROLOGY

## 2020-01-21 PROCEDURE — 700111 HCHG RX REV CODE 636 W/ 250 OVERRIDE (IP): Performed by: INTERNAL MEDICINE

## 2020-01-21 PROCEDURE — 700101 HCHG RX REV CODE 250: Performed by: PHYSICIAN ASSISTANT

## 2020-01-21 PROCEDURE — 700101 HCHG RX REV CODE 250: Performed by: INTERNAL MEDICINE

## 2020-01-21 PROCEDURE — 85025 COMPLETE CBC W/AUTO DIFF WBC: CPT

## 2020-01-21 PROCEDURE — A9270 NON-COVERED ITEM OR SERVICE: HCPCS | Performed by: NEUROLOGICAL SURGERY

## 2020-01-21 PROCEDURE — 80048 BASIC METABOLIC PNL TOTAL CA: CPT

## 2020-01-21 PROCEDURE — 85014 HEMATOCRIT: CPT

## 2020-01-21 PROCEDURE — 85027 COMPLETE CBC AUTOMATED: CPT

## 2020-01-21 PROCEDURE — A9270 NON-COVERED ITEM OR SERVICE: HCPCS | Performed by: INTERNAL MEDICINE

## 2020-01-21 PROCEDURE — 51798 US URINE CAPACITY MEASURE: CPT

## 2020-01-21 PROCEDURE — 85018 HEMOGLOBIN: CPT

## 2020-01-21 RX ORDER — ENEMA 19; 7 G/133ML; G/133ML
1 ENEMA RECTAL ONCE
Status: COMPLETED | OUTPATIENT
Start: 2020-01-21 | End: 2020-01-21

## 2020-01-21 RX ORDER — METHOCARBAMOL 500 MG/1
500 TABLET, FILM COATED ORAL 2 TIMES DAILY
Status: DISCONTINUED | OUTPATIENT
Start: 2020-01-22 | End: 2020-01-23

## 2020-01-21 RX ORDER — POLYETHYLENE GLYCOL 3350 17 G/17G
1 POWDER, FOR SOLUTION ORAL DAILY
Status: DISCONTINUED | OUTPATIENT
Start: 2020-01-21 | End: 2020-01-21

## 2020-01-21 RX ORDER — GABAPENTIN 300 MG/1
300 CAPSULE ORAL 3 TIMES DAILY
Status: DISCONTINUED | OUTPATIENT
Start: 2020-01-21 | End: 2020-01-24 | Stop reason: HOSPADM

## 2020-01-21 RX ORDER — LIDOCAINE 50 MG/G
1 PATCH TOPICAL EVERY 24 HOURS
Status: DISCONTINUED | OUTPATIENT
Start: 2020-01-21 | End: 2020-01-24 | Stop reason: HOSPADM

## 2020-01-21 RX ORDER — POLYETHYLENE GLYCOL 3350 17 G/17G
1 POWDER, FOR SOLUTION ORAL 3 TIMES DAILY
Status: DISCONTINUED | OUTPATIENT
Start: 2020-01-21 | End: 2020-01-24 | Stop reason: HOSPADM

## 2020-01-21 RX ADMIN — OMEPRAZOLE 20 MG: 20 CAPSULE, DELAYED RELEASE ORAL at 17:36

## 2020-01-21 RX ADMIN — SENNOSIDES AND DOCUSATE SODIUM 1 TABLET: 8.6; 5 TABLET ORAL at 17:36

## 2020-01-21 RX ADMIN — GABAPENTIN 300 MG: 300 CAPSULE ORAL at 17:36

## 2020-01-21 RX ADMIN — HYDROXYCHLOROQUINE SULFATE 200 MG: 200 TABLET ORAL at 04:03

## 2020-01-21 RX ADMIN — DEXAMETHASONE 4 MG: 4 TABLET ORAL at 13:07

## 2020-01-21 RX ADMIN — METHYLNALTREXONE BROMIDE 4 MG: 12 INJECTION, SOLUTION SUBCUTANEOUS at 17:42

## 2020-01-21 RX ADMIN — SENNOSIDES AND DOCUSATE SODIUM 1 TABLET: 8.6; 5 TABLET ORAL at 21:45

## 2020-01-21 RX ADMIN — OXYCODONE HYDROCHLORIDE 10 MG: 10 TABLET ORAL at 04:03

## 2020-01-21 RX ADMIN — DEXAMETHASONE 4 MG: 4 TABLET ORAL at 23:25

## 2020-01-21 RX ADMIN — METHOCARBAMOL TABLETS 500 MG: 500 TABLET, COATED ORAL at 13:06

## 2020-01-21 RX ADMIN — LEVOTHYROXINE SODIUM 100 MCG: 25 TABLET ORAL at 04:03

## 2020-01-21 RX ADMIN — OXYCODONE HYDROCHLORIDE 5 MG: 5 TABLET ORAL at 21:45

## 2020-01-21 RX ADMIN — SPIRONOLACTONE 25 MG: 25 TABLET ORAL at 04:03

## 2020-01-21 RX ADMIN — POLYETHYLENE GLYCOL 3350 1 PACKET: 17 POWDER, FOR SOLUTION ORAL at 17:36

## 2020-01-21 RX ADMIN — DOCUSATE SODIUM 100 MG: 100 CAPSULE, LIQUID FILLED ORAL at 17:36

## 2020-01-21 RX ADMIN — OXYCODONE HYDROCHLORIDE 10 MG: 10 TABLET ORAL at 00:19

## 2020-01-21 RX ADMIN — MAGNESIUM OXIDE TAB 400 MG (241.3 MG ELEMENTAL MG) 400 MG: 400 (241.3 MG) TAB at 04:03

## 2020-01-21 RX ADMIN — SODIUM PHOSPHATE, DIBASIC AND SODIUM PHOSPHATE, MONOBASIC 133 ML: 7; 19 ENEMA RECTAL at 05:54

## 2020-01-21 RX ADMIN — ANTACID TABLETS 500 MG: 500 TABLET, CHEWABLE ORAL at 17:35

## 2020-01-21 RX ADMIN — DOCUSATE SODIUM 100 MG: 100 CAPSULE, LIQUID FILLED ORAL at 04:03

## 2020-01-21 RX ADMIN — OXYCODONE HYDROCHLORIDE 5 MG: 5 TABLET ORAL at 17:35

## 2020-01-21 RX ADMIN — CITALOPRAM HYDROBROMIDE 20 MG: 20 TABLET ORAL at 20:55

## 2020-01-21 RX ADMIN — MAGNESIUM OXIDE TAB 400 MG (241.3 MG ELEMENTAL MG) 400 MG: 400 (241.3 MG) TAB at 17:36

## 2020-01-21 RX ADMIN — SODIUM PHOSPHATE 133 ML: 7; 19 ENEMA RECTAL at 13:44

## 2020-01-21 RX ADMIN — GABAPENTIN 100 MG: 100 CAPSULE ORAL at 04:03

## 2020-01-21 RX ADMIN — GABAPENTIN 300 MG: 300 CAPSULE ORAL at 13:07

## 2020-01-21 RX ADMIN — FUROSEMIDE 40 MG: 40 TABLET ORAL at 04:03

## 2020-01-21 RX ADMIN — LIDOCAINE 1 PATCH: 50 PATCH TOPICAL at 13:07

## 2020-01-21 RX ADMIN — ANTACID TABLETS 500 MG: 500 TABLET, CHEWABLE ORAL at 04:03

## 2020-01-21 ASSESSMENT — ENCOUNTER SYMPTOMS
BLURRED VISION: 0
POLYDIPSIA: 0
NAUSEA: 0
DIARRHEA: 0
ABDOMINAL PAIN: 0
SORE THROAT: 0
TINGLING: 0
BACK PAIN: 1
HEMOPTYSIS: 0
BRUISES/BLEEDS EASILY: 0
WEAKNESS: 1
TREMORS: 0
CHILLS: 0
HEADACHES: 0
HALLUCINATIONS: 0
COUGH: 0
ORTHOPNEA: 0
FEVER: 0
SHORTNESS OF BREATH: 0
MYALGIAS: 0
SPUTUM PRODUCTION: 0
DIZZINESS: 0
EYE DISCHARGE: 0
PALPITATIONS: 0
SENSORY CHANGE: 0
CONSTIPATION: 0
VOMITING: 0
NECK PAIN: 0

## 2020-01-21 ASSESSMENT — LIFESTYLE VARIABLES: SUBSTANCE_ABUSE: 0

## 2020-01-21 NOTE — PROGRESS NOTES
Neurosurgery Progress Note    Subjective:  No acute events. Pain continues to improve     Exam:  BLE intact, R df/pf (States likely baseline, multiple foot surgeries  Dressing with small amount old drainage.   HVAC 25cc/8hrs, serosang      BP  Min: 112/68  Max: 148/68  Pulse  Av.2  Min: 76  Max: 85  Resp  Av  Min: 16  Max: 16  Temp  Av.9 °C (98.4 °F)  Min: 36.8 °C (98.2 °F)  Max: 36.9 °C (98.5 °F)  SpO2  Av %  Min: 89 %  Max: 95 %    No data recorded    Recent Labs     20  0423  01/21/20  0050 20  0438 20  0849   WBC 10.3  --  13.4* 14.0*  --    RBC 2.39*  --  2.56* 2.59*  --    HEMOGLOBIN 7.6*   < > 8.3* 8.3* 9.3*   HEMATOCRIT 22.5*   < > 25.1* 24.8* 30.5*   MCV 94.1  --  98.0* 95.8  --    MCH 31.8  --  32.4 32.0  --    MCHC 33.8  --  33.1* 33.5*  --    RDW 57.1*  --  58.1* 55.2*  --    PLATELETCT 217  --  126* 242  --    MPV 9.3  --  9.9 8.7*  --     < > = values in this interval not displayed.     Recent Labs     20  0423 01/21/20  0050 20  0322   SODIUM 133* 131* 131*   POTASSIUM 5.1 4.8 4.6   CHLORIDE 103 100 101   CO2 24 21 25   GLUCOSE 105* 93 102*   BUN 26* 28* 27*   CREATININE 0.88 1.03 1.04   CALCIUM 7.7* 8.0* 7.9*               Intake/Output       20 - 20 0659 20 07 - 20 0659      [id number] 8958-0046 Total 2395-2034 5072-0167 Total       Intake    Total Intake -- -- -- -- -- --       Output    Urine  1700  940 2640  --  -- --    Straight Catheter -- 940 940 -- -- --    Output (mL) ([REMOVED] Urethral Catheter) 1700 -- 1700 -- -- --    Stool  --  -- --  --  -- --    Number of Times Stooled 0 x -- 0 x 2 x -- 2 x    Total Output 0576 128 2547 -- -- --       Net I/O     -1700 -940 -2640 -- -- --            Intake/Output Summary (Last 24 hours) at 2020 0955  Last data filed at 2020 0500  Gross per 24 hour   Intake --   Output 2640 ml   Net -2640 ml       $ Bladder Scan Results (mL): 449    • spironolactone  25 mg DAILY    • furosemide  40 mg Q DAY   • gabapentin  100 mg BID   • gabapentin  300 mg QHS   • fentaNYL  25-50 mcg Q3HRS PRN   • magnesium oxide  400 mg BID   • levothyroxine  100 mcg AM ES   • magnesium hydroxide  30 mL QDAY PRN   • diphenhydrAMINE  25 mg Q6HRS PRN    Or   • diphenhydrAMINE  25 mg Q6HRS PRN   • hydroxychloroquine  200 mg DAILY   • methocarbamol  500 mg 4X/DAY   • polyethylene glycol/lytes  1 Packet BID PRN   • ondansetron  4 mg Q4HRS PRN   • traZODone  25 mg Q EVENING   • senna-docusate  1 Tab Nightly   • senna-docusate  1 Tab Q24HRS PRN   • oxyCODONE immediate-release  5 mg Q3HRS PRN   • oxyCODONE immediate release  10 mg Q3HRS PRN   • diphenhydrAMINE  25 mg Q6HRS PRN   • dexamethasone  4 mg Q12HRS   • cyclobenzaprine  10 mg Q8HRS PRN    Or   • methocarbamol  750 mg Q8HRS PRN    Or   • diazePAM  5 mg Q4HRS PRN   • calcium carbonate  500 mg BID   • citalopram  20 mg QHS   • acetaminophen  650 mg Q6HRS PRN   • omeprazole  20 mg DAILY   • docusate sodium  100 mg BID   • Pharmacy Consult Request  1 Each PHARMACY TO DOSE   • MD ALERT...DO NOT ADMINISTER NSAIDS or ASPIRIN unless ORDERED By Neurosurgery  1 Each PRN   • bisacodyl  10 mg Q24HRS PRN   • Respiratory Care per Protocol   Continuous RT   • labetalol  10 mg Q HOUR PRN   • hydrALAZINE  10 mg Q HOUR PRN   • benzocaine-menthol  1 Lozenge Q2HRS PRN   • ondansetron  4 mg Q4HRS PRN   • ipratropium  1 Spray BID   • lidocaine  2 Patch Q24HR       Assessment and Plan:  Hospital Day#10 L1 burst fx with >50% retropulsion into conus, kyphotic deformity thoracolumbar junction  POD#6 T10-L3 fusion, laminectomy T11-L2    Intraop CSF leak at pedicle of T11.    Plan:  Stable neuro exam.   Hemoglobin >9monitor  Continue pain management  PT/OT-AR  Wean dex  If appropriate would start gabapentin   ASA can restart POD10   Ct t/l spine ordered for post op imaging

## 2020-01-21 NOTE — DISCHARGE PLANNING
Received Choice form at 4007   Agency/Facility Name: [facility] [place]  Referral sent per Choice form @ [id number]

## 2020-01-21 NOTE — DISCHARGE PLANNING
Anticipated Discharge Disposition:   SNF    Action:    Pt and daughter provided verbal choices for [facility] and [facility] SNFs.  Choice form faxed to [facility].    Barriers to Discharge:    SNF acceptance  Medical clearance    Plan:    F/U referrals.

## 2020-01-21 NOTE — PROGRESS NOTES
Patient bladder scanned and found to have 590mL in bladder approximately 6 hours after removal of garcia catheter. Patient assisted to bedside commode with no success urinating. Straight cathed and removed a little over 500mL. Patient was also given suppository as she has not had a BM in 5 days.

## 2020-01-21 NOTE — PROGRESS NOTES
Hospital Medicine Daily Progress Note    Date of Service  1/21/2020    Chief Complaint  84 y.o. female admitted 1/9/2020 with back pain.     Hospital Course    This is an 84-year-old female with a past medical history of CKD, stroke, osteoporosis, rheumatoid arthritis, hypothyroidism, and depression admitted with low back pain after mechanical ground-level fall.  The patient was attempting to sit in a chair when the chair rolled out from underneath her causing her to land on the floor.  She developed acute low back pain leading her to present to the ED.    On admission CT L-spine did reveal left L1 transverse process fracture that is possibly chronic.  No other acute fracture noted.  CT pelvis was negative for acute abnormality.  She remained in the hospital for pain control and therapy.  She has been initiated on Neurontin, Decadron, and lidocaine patches.  Her pain is overall improving, although she still continues to need significant assistance with mobilizing secondary to her pain.    The patient also takes Spironolactone and Lasix as an outpatient for edema.  On admission she was noted to have worsening of her underlying CKD.  Her diuretics were held with improvement of her creatinine.  As the patient has no evidence of edema we will continue to hold these medications.    Her MRI lumbar spine showed severe lumbar stenosis and neurosurgery was consulted and she was taken to OR on 1/16/20 and surgery she was transferred to ICU.       Interval Problem Update  Pt seen and examined,     Urine retention: 470 cc on bladder scan.  Patient is somewhat lethargic.  Constipated over the last 6 days.  I ordered a repeat Fleet enema.  Scheduled MiraLAX  Rao placed.  Will DC trazodone and will decrease dose of methocarbamol from 4 times a day to twice daily  I placed referral to   rehab medicine  Consultants/Specialty  Neurosurgery.  Critical care     Code Status  FULL    Disposition  Placed referral to rehab consult  1/21    Review of Systems  Review of Systems   Constitutional: Negative for chills, fever and malaise/fatigue.   HENT: Negative for congestion, ear discharge, ear pain, nosebleeds and sore throat.    Eyes: Negative for blurred vision and discharge.   Respiratory: Negative for cough, hemoptysis, sputum production and shortness of breath.    Cardiovascular: Negative for chest pain, palpitations, orthopnea and leg swelling.   Gastrointestinal: Negative for abdominal pain, constipation, diarrhea, nausea and vomiting.   Genitourinary: Negative for dysuria and hematuria.   Musculoskeletal: Positive for back pain. Negative for joint pain, myalgias and neck pain.   Skin: Negative for rash.   Neurological: Positive for weakness. Negative for dizziness, tingling, tremors, sensory change and headaches.   Endo/Heme/Allergies: Negative for environmental allergies and polydipsia. Does not bruise/bleed easily.   Psychiatric/Behavioral: Negative for hallucinations, substance abuse and suicidal ideas.        Physical Exam  Temp:  [36.8 °C (98.2 °F)-36.9 °C (98.5 °F)] 36.9 °C (98.5 °F)  Pulse:  [76-85] 78  Resp:  [16] 16  BP: (112-148)/(46-70) 112/68  SpO2:  [89 %-95 %] 89 %    Physical Exam  Vitals signs and nursing note reviewed.   Constitutional:       General: She is not in acute distress.     Appearance: Normal appearance. She is not toxic-appearing.   HENT:      Head: Normocephalic and atraumatic.      Nose: Nose normal.      Mouth/Throat:      Mouth: Mucous membranes are moist.      Pharynx: Oropharynx is clear. No oropharyngeal exudate.   Eyes:      General:         Right eye: No discharge.         Left eye: No discharge.      Conjunctiva/sclera: Conjunctivae normal.   Neck:      Musculoskeletal: Normal range of motion. No neck rigidity.   Cardiovascular:      Rate and Rhythm: Normal rate and regular rhythm.      Heart sounds: Normal heart sounds.   Pulmonary:      Effort: Pulmonary effort is normal. No respiratory distress.       Breath sounds: Normal breath sounds. No wheezing or rales.   Abdominal:      General: Bowel sounds are normal. There is no distension.      Palpations: Abdomen is soft.      Tenderness: There is no tenderness. There is no guarding or rebound.   Musculoskeletal:         General: No swelling.      Right lower leg: No edema.      Left lower leg: No edema.      Comments:   Low back tenderness.   Sterile dressing with small amount of sanguineous discharge at surgical site   Skin:     General: Skin is warm and dry.      Coloration: Skin is not jaundiced.   Neurological:      Mental Status: She is alert and oriented to person, place, and time. Mental status is at baseline.      Cranial Nerves: No cranial nerve deficit.      Sensory: No sensory deficit.   Psychiatric:         Mood and Affect: Mood normal.         Fluids    Intake/Output Summary (Last 24 hours) at 1/21/2020 1443  Last data filed at 1/21/2020 0500  Gross per 24 hour   Intake --   Output 1240 ml   Net -1240 ml       Laboratory  Recent Labs     01/20/20  0423  01/21/20  0050 01/21/20  0438 01/21/20  0849   WBC 10.3  --  13.4* 14.0*  --    RBC 2.39*  --  2.56* 2.59*  --    HEMOGLOBIN 7.6*   < > 8.3* 8.3* 9.3*   HEMATOCRIT 22.5*   < > 25.1* 24.8* 30.5*   MCV 94.1  --  98.0* 95.8  --    MCH 31.8  --  32.4 32.0  --    MCHC 33.8  --  33.1* 33.5*  --    RDW 57.1*  --  58.1* 55.2*  --    PLATELETCT 217  --  126* 242  --    MPV 9.3  --  9.9 8.7*  --     < > = values in this interval not displayed.     Recent Labs     01/20/20  0423 01/21/20  0050 01/21/20  0322   SODIUM 133* 131* 131*   POTASSIUM 5.1 4.8 4.6   CHLORIDE 103 100 101   CO2 24 21 25   GLUCOSE 105* 93 102*   BUN 26* 28* 27*   CREATININE 0.88 1.03 1.04   CALCIUM 7.7* 8.0* 7.9*                   Imaging  CT-TSPINE W/O PLUS RECONS   Final Result         1.  Partially visualized sequela of thoracolumbar instrumentation, detailed in a separate lumbar spine CT report. T12 and L1 fractures are also discussed  separately.   2.  No fractures above T12 level.   3.  Mild multilevel thoracic spondylosis, with grade 1 degenerative retrolisthesis at T10-T11 level.   4.  Tree-in-bud groundglass opacities in the right lung, likely sequela of infectious/inflammatory bronchiolitis.      CT-LSPINE W/O PLUS RECONS   Final Result      1.  Interval posterior cervical lumbar fusion at T10-L3. Paraspinal rods and transpedicular screws are intact.   2.  Unchanged severe L1 compression fracture with posterior retropulsion.   3.  Mild T12 compression fracture, with bilateral pedicle screws at this level, positioned in close proximity to T12 superior endplate. Mild posterior retropulsion of approximately 5 mm, new since prior study.   4.  Osteopenia and degenerative changes.      IR-MIDLINE CATHETER INSERTION WO GUIDANCE > AGE 3   Final Result                  Ultrasound-guided midline placement performed by qualified nursing staff    as above.          DX-CHEST-PORTABLE (1 VIEW)   Final Result      Placement of right internal jugular catheter tip projecting appropriately over the SVC      OUTSIDE IMAGES-CT THORACIC SPINE   Final Result      OUTSIDE IMAGES-CT LUMBAR SPINE   Final Result      DX-THORACOLUMBAR SPINE-2 VIEWS   Final Result      Digitized intraoperative radiograph is submitted for review.  This examination is not for diagnostic purpose but for guidance during a surgical procedure.      DX-PORTABLE FLUORO > 1 HOUR   Final Result      Portable fluoroscopy utilized for 1 minute 48 seconds.         INTERPRETING LOCATION: [address] [place] [address], [facility]      MR-LUMBAR SPINE-W/O   Final Result      1.  Multilevel subluxations with L3-4 grade 1 spondylolisthesis, mild anterolisthesis L2-L3 and L4-L5. Retrolisthesis T12-L1 and L1-L2 which is difficult to quantify due to the retropulsed fractures at T12 and L1.   2.  T12 moderate recent or subacute insufficiency compression fracture with retropulsion. Moderate central stenosis at the T12  level.   3.  L1 essentially complete chronic collapse marked retropulsion and chronic sclerotic change seen on CT. No change from 1/12/2018. Moderate central stenosis at the L1 level due to prominent retropulsion up to about 10 mm.   4.  Additional multilevel degenerative disc disease and spondylotic change with L3-4 severe central stenosis at L4-5 severe central stenosis.   5.  Multilevel foraminal stenoses most notable for L3-4 severe right foraminal stenosis.   6.  Details for each level above in the body of report.      CT-PELVIS W/O PLUS RECONS   Final Result         1.  No acute abnormality.   2.  Atherosclerosis      CT-LSPINE W/O PLUS RECONS   Final Result         1.  Left L1 transverse process fracture, may be chronic, otherwise no acute traumatic lumbar spine injury identified   2.  Anterolisthesis L2 on L3 and L3 on L4, appears most likely secondary to severe facet arthrosis.   3.  Chronic appearing L1 vertebral plana with 8.3 mm retropulsion and large central Schmorl's node   4.  Atherosclerosis           Assessment/Plan  * Spinal stenosis of lumbar region with neurogenic claudication- (present on admission)  Assessment & Plan  Causing intractable back pain  L1 transverse process fracture on CT.    MRI showed severe lumbar stenosis.  S/p surgical repair by neurosurgery on 1/16  Pain management as per neurosurgery   PT and OT following    Acute rehab consult pending    Shock (HCC)  Assessment & Plan  Improved  Differential diagnosis includes hypovolemic versus hemorrhagic   Off pressors  Has required 2 units of RBC so far during this admission     Lumbar transverse process fracture (HCC)  Assessment & Plan  Neurosurgery following.  MRI of L-spine found retropulsed fractures of T12 and L1 patient was taken to surgery    Hypothyroidism- (present on admission)  Assessment & Plan  Synthroid.  Repeat thyroid panel    Leg DVT (deep venous thromboembolism), chronic, right (HCC)- (present on  admission)  Assessment & Plan  Not on blood thinner currently  SCDs    Hypomagnesemia- (present on admission)  Assessment & Plan  oral Mg    CKD (chronic kidney disease) stage 3, GFR 30-59 ml/min ([facility])- (present on admission)  Assessment & Plan  At baseline   Avoid nephrotoxic drugs   Monitor     Hypokalemia- (present on admission)  Assessment & Plan  Resolved    Rheumatoid arthritis ([facility])- (present on admission)  Assessment & Plan  Plaquenil    Constipation  Assessment & Plan  Likely secondary to opiates and trazodone and methocarbamol.  Repeat Fleet Enema, scheduled MiraLAX    Anemia  Assessment & Plan  Acute blood loss   S/p surgical intervention   Has received 2 units of RBC during this admission   H/H stable   Monitor     Urine retention  Assessment & Plan  Likely related to medications, including methocarbamol, trazodone.  Will DC trazodone, decrease dose of methocarbamol.  Limit opiates.  Nebulization.  Void trial in 1 to 2 days    Peripheral edema  Assessment & Plan  Improved  Cont on lasix and spironolactone     Depression- (present on admission)  Assessment & Plan  Citalopram       VTE prophylaxis: scd

## 2020-01-21 NOTE — PROGRESS NOTES
Pt unable to void, is retaining 470mL and has been straight catheterized twice.  Enema return is clear with no stool.  Last bowel movement charted is 1/16, 6 days ago.  This discussed with NeuroSurg and Hospitalist.  New orders received, some pain medications changed and discontinued.  Rao placed.

## 2020-01-21 NOTE — DISCHARGE PLANNING
Current documentation indicates a potential for acute inpatient rehabilitation. Please consider a PMR referral to assist with discharge planning.  Diagnosis Other ortho  Versus other neuro.  Discharge plan/support Home with outpatient . TT message to IONA/SHEFALI

## 2020-01-21 NOTE — THERAPY
"Physical Therapy Treatment completed.   Bed Mobility:  Supine to Sit: Minimal Assist  Transfers: Sit to Stand: Minimal Assist  Gait: Level Of Assist: Minimal Assist with Front-Wheel Walker       Plan of Care: Will benefit from Physical Therapy 4 times per week  Discharge Recommendations: Equipment: Will Continue to Assess for Equipment Needs. Post-acute therapy Discharge to a transitional care facility for continued skilled therapy services.    Pt is now s/p T10-L3 fusion, laminectomy T11-L2 and presents most limited by reported discomfort to anterior portion of bilateral proximal LE. She was able to complete log roll, sit to stand, and x50' of gait with FWW prior to fatigue and increasing discomfort. While here, PT will continue to follow to address instability, gait deviations, and limited activity tolerance. Recommend placememnt.      See \"Rehab Therapy-Acute\" Patient Summary Report for complete documentation.       "

## 2020-01-21 NOTE — CARE PLAN
Problem: Communication  Goal: The ability to communicate needs accurately and effectively will improve  Outcome: PROGRESSING AS EXPECTED     Problem: Safety  Goal: Will remain free from injury  Outcome: PROGRESSING AS EXPECTED     Problem: Mobility  Goal: Risk for activity intolerance will decrease  Outcome: PROGRESSING AS EXPECTED     Problem: Bowel/Gastric:  Goal: Normal bowel function is maintained or improved  Outcome: PROGRESSING SLOWER THAN EXPECTED     Problem: Discharge Barriers/Planning  Goal: Patient's continuum of care needs will be met  Outcome: PROGRESSING SLOWER THAN EXPECTED     Problem: Pain Management  Goal: Pain level will decrease to patient's comfort goal  Outcome: PROGRESSING SLOWER THAN EXPECTED

## 2020-01-21 NOTE — ASSESSMENT & PLAN NOTE
Likely secondary to opiates and trazodone and methocarbamol.  Repeat Fleet Enema, scheduled MiraLAX  1/22 had bowel movement

## 2020-01-21 NOTE — ASSESSMENT & PLAN NOTE
Likely related to medications, including methocarbamol, trazodone.  Will DC trazodone, decrease dose of methocarbamol.  Limit opiates.  Nebulization.  1/23 we will discontinue Rao, bladder protocol

## 2020-01-22 ENCOUNTER — HOSPITAL ENCOUNTER (INPATIENT)
Facility: REHABILITATION | Age: 85
End: 2020-01-22
Attending: PHYSICAL MEDICINE & REHABILITATION | Admitting: PHYSICAL MEDICINE & REHABILITATION
Payer: MEDICARE

## 2020-01-22 LAB
ANION GAP SERPL CALC-SCNC: 8 MMOL/L (ref 0–11.9)
BASOPHILS # BLD AUTO: 0.4 % (ref 0–1.8)
BASOPHILS # BLD: 0.05 K/UL (ref 0–0.12)
BUN SERPL-MCNC: 24 MG/DL (ref 8–22)
CALCIUM SERPL-MCNC: 8.5 MG/DL (ref 8.5–10.5)
CHLORIDE SERPL-SCNC: 100 MMOL/L (ref 96–112)
CK SERPL-CCNC: 220 U/L (ref 0–154)
CO2 SERPL-SCNC: 25 MMOL/L (ref 20–33)
CREAT SERPL-MCNC: 0.93 MG/DL (ref 0.5–1.4)
EOSINOPHIL # BLD AUTO: 0.01 K/UL (ref 0–0.51)
EOSINOPHIL NFR BLD: 0.1 % (ref 0–6.9)
ERYTHROCYTE [DISTWIDTH] IN BLOOD BY AUTOMATED COUNT: 54.7 FL (ref 35.9–50)
FERRITIN SERPL-MCNC: 165.7 NG/ML (ref 10–291)
FOLATE SERPL-MCNC: >24 NG/ML
GLUCOSE SERPL-MCNC: 102 MG/DL (ref 65–99)
HCT VFR BLD AUTO: 23.3 % (ref 37–47)
HGB BLD-MCNC: 7.6 G/DL (ref 12–16)
HGB RETIC QN AUTO: 34.4 PG/CELL (ref 29–35)
IMM GRANULOCYTES # BLD AUTO: 0.22 K/UL (ref 0–0.11)
IMM GRANULOCYTES NFR BLD AUTO: 1.6 % (ref 0–0.9)
IMM RETICS NFR: 22.3 % (ref 9.3–17.4)
IRON SATN MFR SERPL: 16 % (ref 15–55)
IRON SERPL-MCNC: 37 UG/DL (ref 40–170)
LYMPHOCYTES # BLD AUTO: 1.1 K/UL (ref 1–4.8)
LYMPHOCYTES NFR BLD: 8.2 % (ref 22–41)
MAGNESIUM SERPL-MCNC: 2.3 MG/DL (ref 1.5–2.5)
MCH RBC QN AUTO: 31 PG (ref 27–33)
MCHC RBC AUTO-ENTMCNC: 32.6 G/DL (ref 33.6–35)
MCV RBC AUTO: 95.1 FL (ref 81.4–97.8)
MONOCYTES # BLD AUTO: 0.93 K/UL (ref 0–0.85)
MONOCYTES NFR BLD AUTO: 6.9 % (ref 0–13.4)
NEUTROPHILS # BLD AUTO: 11.15 K/UL (ref 2–7.15)
NEUTROPHILS NFR BLD: 82.8 % (ref 44–72)
NRBC # BLD AUTO: 0 K/UL
NRBC BLD-RTO: 0 /100 WBC
PHOSPHATE SERPL-MCNC: 2.8 MG/DL (ref 2.5–4.5)
PLATELET # BLD AUTO: 272 K/UL (ref 164–446)
PMV BLD AUTO: 8.9 FL (ref 9–12.9)
POTASSIUM SERPL-SCNC: 4.3 MMOL/L (ref 3.6–5.5)
RBC # BLD AUTO: 2.45 M/UL (ref 4.2–5.4)
RETICS # AUTO: 0.11 M/UL (ref 0.04–0.06)
RETICS/RBC NFR: 4.1 % (ref 0.8–2.1)
SODIUM SERPL-SCNC: 133 MMOL/L (ref 135–145)
T4 FREE SERPL-MCNC: 0.71 NG/DL (ref 0.53–1.43)
TIBC SERPL-MCNC: 228 UG/DL (ref 250–450)
TSH SERPL DL<=0.005 MIU/L-ACNC: 7.77 UIU/ML (ref 0.38–5.33)
VIT B12 SERPL-MCNC: >1500 PG/ML (ref 211–911)
WBC # BLD AUTO: 13.5 K/UL (ref 4.8–10.8)

## 2020-01-22 PROCEDURE — 82746 ASSAY OF FOLIC ACID SERUM: CPT

## 2020-01-22 PROCEDURE — 82728 ASSAY OF FERRITIN: CPT

## 2020-01-22 PROCEDURE — 700102 HCHG RX REV CODE 250 W/ 637 OVERRIDE(OP): Performed by: NEUROLOGICAL SURGERY

## 2020-01-22 PROCEDURE — 700101 HCHG RX REV CODE 250: Performed by: NEUROLOGICAL SURGERY

## 2020-01-22 PROCEDURE — 83735 ASSAY OF MAGNESIUM: CPT

## 2020-01-22 PROCEDURE — 85046 RETICYTE/HGB CONCENTRATE: CPT

## 2020-01-22 PROCEDURE — 770006 HCHG ROOM/CARE - MED/SURG/GYN SEMI*

## 2020-01-22 PROCEDURE — 84100 ASSAY OF PHOSPHORUS: CPT

## 2020-01-22 PROCEDURE — 84443 ASSAY THYROID STIM HORMONE: CPT

## 2020-01-22 PROCEDURE — 85025 COMPLETE CBC W/AUTO DIFF WBC: CPT

## 2020-01-22 PROCEDURE — 82550 ASSAY OF CK (CPK): CPT

## 2020-01-22 PROCEDURE — 700112 HCHG RX REV CODE 229: Performed by: PSYCHIATRY & NEUROLOGY

## 2020-01-22 PROCEDURE — A9270 NON-COVERED ITEM OR SERVICE: HCPCS | Performed by: INTERNAL MEDICINE

## 2020-01-22 PROCEDURE — 84439 ASSAY OF FREE THYROXINE: CPT

## 2020-01-22 PROCEDURE — A9270 NON-COVERED ITEM OR SERVICE: HCPCS | Performed by: PSYCHIATRY & NEUROLOGY

## 2020-01-22 PROCEDURE — 99232 SBSQ HOSP IP/OBS MODERATE 35: CPT | Performed by: INTERNAL MEDICINE

## 2020-01-22 PROCEDURE — 36415 COLL VENOUS BLD VENIPUNCTURE: CPT

## 2020-01-22 PROCEDURE — A9270 NON-COVERED ITEM OR SERVICE: HCPCS | Performed by: NEUROLOGICAL SURGERY

## 2020-01-22 PROCEDURE — 700102 HCHG RX REV CODE 250 W/ 637 OVERRIDE(OP): Performed by: INTERNAL MEDICINE

## 2020-01-22 PROCEDURE — 700102 HCHG RX REV CODE 250 W/ 637 OVERRIDE(OP): Performed by: PSYCHIATRY & NEUROLOGY

## 2020-01-22 PROCEDURE — 82607 VITAMIN B-12: CPT

## 2020-01-22 PROCEDURE — 83550 IRON BINDING TEST: CPT

## 2020-01-22 PROCEDURE — 80048 BASIC METABOLIC PNL TOTAL CA: CPT

## 2020-01-22 PROCEDURE — 83540 ASSAY OF IRON: CPT

## 2020-01-22 RX ORDER — LEVOTHYROXINE SODIUM 112 UG/1
112 TABLET ORAL
Status: DISCONTINUED | OUTPATIENT
Start: 2020-01-23 | End: 2020-01-24 | Stop reason: HOSPADM

## 2020-01-22 RX ORDER — DEXAMETHASONE 1 MG
2 TABLET ORAL EVERY 12 HOURS
Status: DISCONTINUED | OUTPATIENT
Start: 2020-01-22 | End: 2020-01-23

## 2020-01-22 RX ADMIN — LEVOTHYROXINE SODIUM 100 MCG: 25 TABLET ORAL at 04:29

## 2020-01-22 RX ADMIN — SPIRONOLACTONE 25 MG: 25 TABLET ORAL at 04:29

## 2020-01-22 RX ADMIN — MAGNESIUM OXIDE TAB 400 MG (241.3 MG ELEMENTAL MG) 400 MG: 400 (241.3 MG) TAB at 04:29

## 2020-01-22 RX ADMIN — LIDOCAINE 1 PATCH: 50 PATCH TOPICAL at 09:50

## 2020-01-22 RX ADMIN — ANTACID TABLETS 500 MG: 500 TABLET, CHEWABLE ORAL at 04:29

## 2020-01-22 RX ADMIN — METHOCARBAMOL TABLETS 500 MG: 500 TABLET, COATED ORAL at 18:16

## 2020-01-22 RX ADMIN — GABAPENTIN 300 MG: 300 CAPSULE ORAL at 04:30

## 2020-01-22 RX ADMIN — MAGNESIUM OXIDE TAB 400 MG (241.3 MG ELEMENTAL MG) 400 MG: 400 (241.3 MG) TAB at 18:17

## 2020-01-22 RX ADMIN — DOCUSATE SODIUM 100 MG: 100 CAPSULE, LIQUID FILLED ORAL at 04:30

## 2020-01-22 RX ADMIN — CITALOPRAM HYDROBROMIDE 20 MG: 20 TABLET ORAL at 20:32

## 2020-01-22 RX ADMIN — OMEPRAZOLE 20 MG: 20 CAPSULE, DELAYED RELEASE ORAL at 18:16

## 2020-01-22 RX ADMIN — GABAPENTIN 300 MG: 300 CAPSULE ORAL at 18:16

## 2020-01-22 RX ADMIN — HYDROXYCHLOROQUINE SULFATE 200 MG: 200 TABLET ORAL at 04:29

## 2020-01-22 RX ADMIN — DEXAMETHASONE 2 MG: 1 TABLET ORAL at 18:16

## 2020-01-22 RX ADMIN — OXYCODONE HYDROCHLORIDE 5 MG: 5 TABLET ORAL at 20:49

## 2020-01-22 RX ADMIN — SENNOSIDES AND DOCUSATE SODIUM 1 TABLET: 8.6; 5 TABLET ORAL at 20:32

## 2020-01-22 RX ADMIN — POLYETHYLENE GLYCOL 3350 1 PACKET: 17 POWDER, FOR SOLUTION ORAL at 13:31

## 2020-01-22 RX ADMIN — FUROSEMIDE 40 MG: 40 TABLET ORAL at 04:29

## 2020-01-22 RX ADMIN — GABAPENTIN 300 MG: 300 CAPSULE ORAL at 13:31

## 2020-01-22 RX ADMIN — DOCUSATE SODIUM 100 MG: 100 CAPSULE, LIQUID FILLED ORAL at 18:16

## 2020-01-22 RX ADMIN — POLYETHYLENE GLYCOL 3350 1 PACKET: 17 POWDER, FOR SOLUTION ORAL at 04:30

## 2020-01-22 RX ADMIN — OXYCODONE HYDROCHLORIDE 5 MG: 5 TABLET ORAL at 23:49

## 2020-01-22 RX ADMIN — OXYCODONE HYDROCHLORIDE 5 MG: 5 TABLET ORAL at 09:54

## 2020-01-22 RX ADMIN — POLYETHYLENE GLYCOL 3350 1 PACKET: 17 POWDER, FOR SOLUTION ORAL at 18:17

## 2020-01-22 RX ADMIN — DEXAMETHASONE 2 MG: 1 TABLET ORAL at 09:55

## 2020-01-22 RX ADMIN — OXYCODONE HYDROCHLORIDE 5 MG: 5 TABLET ORAL at 13:45

## 2020-01-22 RX ADMIN — METHOCARBAMOL TABLETS 500 MG: 500 TABLET, COATED ORAL at 04:29

## 2020-01-22 RX ADMIN — ANTACID TABLETS 500 MG: 500 TABLET, CHEWABLE ORAL at 18:17

## 2020-01-22 ASSESSMENT — ENCOUNTER SYMPTOMS
POLYDIPSIA: 0
NECK PAIN: 0
DIZZINESS: 0
TINGLING: 0
WEAKNESS: 1
SENSORY CHANGE: 0
HEMOPTYSIS: 0
CONSTIPATION: 0
BACK PAIN: 1
FEVER: 0
MYALGIAS: 0
PALPITATIONS: 0
DIARRHEA: 0
CHILLS: 0
NAUSEA: 0
HEADACHES: 0
SORE THROAT: 0
VOMITING: 0
ORTHOPNEA: 0
SHORTNESS OF BREATH: 0
BLURRED VISION: 0
HALLUCINATIONS: 0
COUGH: 0
BRUISES/BLEEDS EASILY: 0
EYE DISCHARGE: 0
SPUTUM PRODUCTION: 0
ABDOMINAL PAIN: 0
TREMORS: 0

## 2020-01-22 ASSESSMENT — LIFESTYLE VARIABLES: SUBSTANCE_ABUSE: 0

## 2020-01-22 NOTE — THERAPY
Attempted therapy follow up session this am. Pt reporting she is in too much pain to participate. Pt requesting pt therapy return following day.

## 2020-01-22 NOTE — DISCHARGE PLANNING
[facility] Rehabilitation Transitional Care Coordination     Referral from:  Dr. Montalvo    Facesheet indicates: SCP    Potential Rehab Diagnosis: Other Neuro    Chart review indicates patient has on going medical management and  therapy needs to possibly meet inpatient rehab facility criteria with the goal of returning to community.    D/C support: Lives alone     Physiatry consultation forwarded per protocol.      POD#7 T10-L3 fusion, laminectomy T11-L2.  Physiatry Dr. Yee to consult per protocol. TCC will follow.      Thank you for the referral.

## 2020-01-22 NOTE — CARE PLAN
Problem: Communication  Goal: The ability to communicate needs accurately and effectively will improve  Outcome: PROGRESSING AS EXPECTED  Note:   Pt able to communicate needs and concerns effectively to staff.      Problem: Safety  Goal: Will remain free from falls  Outcome: PROGRESSING AS EXPECTED  Note:   Fall precautions in place, Pt aware of fall risk, Pt educated on use of call light.

## 2020-01-22 NOTE — DISCHARGE PLANNING
Anticipated Discharge Disposition:   SNF    Action:    Mazin returned call and she feels confident with [facility] and agreeable to transfer.    Barriers to Discharge:    None    Plan:    Arrange transport.

## 2020-01-22 NOTE — PROGRESS NOTES
[facility] Medicine Daily Progress Note    Date of Service  1/22/2020    Chief Complaint  84 y.o. female admitted 1/9/2020 with back pain.     Hospital Course    This is an 84-year-old female with a past medical history of CKD, stroke, osteoporosis, rheumatoid arthritis, hypothyroidism, and depression admitted with low back pain after mechanical ground-level fall.  The patient was attempting to sit in a chair when the chair rolled out from underneath her causing her to land on the floor.  She developed acute low back pain leading her to present to the ED.    On admission CT L-spine did reveal left L1 transverse process fracture that is possibly chronic.  No other acute fracture noted.  CT pelvis was negative for acute abnormality.  She remained in the hospital for pain control and therapy.  She has been initiated on Neurontin, Decadron, and lidocaine patches.  Her pain is overall improving, although she still continues to need significant assistance with mobilizing secondary to her pain.    The patient also takes Spironolactone and Lasix as an outpatient for edema.  On admission she was noted to have worsening of her underlying CKD.  Her diuretics were held with improvement of her creatinine.  As the patient has no evidence of edema we will continue to hold these medications.    Her MRI lumbar spine showed severe lumbar stenosis and neurosurgery was consulted and she was taken to OR on 1/16/20 and surgery she was transferred to ICU.       Interval Problem Update  Patient had bowel movement yesterday.  Subjectively, she is complaining of generalized weakness during attempts to mobilize and lower back pain worsening with any movements and feeling frustrated by this.  I encouraged her to continue attempts to mobilize  Hemoglobin dropped from 9.3-7.6.  Patient denies any signs of bleeding.  I will order FOBT, iron studies  And TSH is elevated at 7.770.  Considering constipation, will increase dose of levothyroxine from  100-112  Will taper down Decadron  Consultants/Specialty  Neurosurgery.  Critical care     Code Status  FULL    Disposition  Placed referral to rehab consult 1/21  Patient accepted at SNF.  Pending discharge/placement, medically clear  Review of Systems  Review of Systems   Constitutional: Negative for chills, fever and malaise/fatigue.   HENT: Negative for congestion, ear discharge, ear pain, nosebleeds and sore throat.    Eyes: Negative for blurred vision and discharge.   Respiratory: Negative for cough, hemoptysis, sputum production and shortness of breath.    Cardiovascular: Negative for chest pain, palpitations, orthopnea and leg swelling.   Gastrointestinal: Negative for abdominal pain, constipation, diarrhea, nausea and vomiting.   Genitourinary: Negative for dysuria and hematuria.   Musculoskeletal: Positive for back pain. Negative for joint pain, myalgias and neck pain.   Skin: Negative for rash.   Neurological: Positive for weakness. Negative for dizziness, tingling, tremors, sensory change and headaches.   Endo/Heme/Allergies: Negative for environmental allergies and polydipsia. Does not bruise/bleed easily.   Psychiatric/Behavioral: Negative for hallucinations, substance abuse and suicidal ideas.        Physical Exam  Temp:  [36.1 °C (97 °F)-37.3 °C (99.2 °F)] 36.1 °C (97 °F)  Pulse:  [77-85] 77  Resp:  [14-16] 14  BP: (106-135)/(49-63) 135/61  SpO2:  [92 %-98 %] 95 %    Physical Exam  Vitals signs and nursing note reviewed.   Constitutional:       General: She is not in acute distress.     Appearance: Normal appearance. She is not toxic-appearing.   HENT:      Head: Normocephalic and atraumatic.      Nose: Nose normal.      Mouth/Throat:      Mouth: Mucous membranes are moist.      Pharynx: Oropharynx is clear. No oropharyngeal exudate.   Eyes:      General:         Right eye: No discharge.         Left eye: No discharge.      Conjunctiva/sclera: Conjunctivae normal.   Neck:      Musculoskeletal: Normal  range of motion. No neck rigidity.   Cardiovascular:      Rate and Rhythm: Normal rate and regular rhythm.      Heart sounds: Normal heart sounds.   Pulmonary:      Effort: Pulmonary effort is normal. No respiratory distress.      Breath sounds: Normal breath sounds. No wheezing or rales.   Abdominal:      General: Bowel sounds are normal. There is no distension.      Palpations: Abdomen is soft.      Tenderness: There is no tenderness. There is no guarding or rebound.   Genitourinary:     Comments: Rao catheter  Musculoskeletal:         General: No swelling.      Right lower leg: No edema.      Left lower leg: No edema.      Comments: Low back tenderness.      Skin:     General: Skin is warm and dry.      Coloration: Skin is not jaundiced.   Neurological:      Mental Status: She is alert and oriented to person, place, and time. Mental status is at baseline.      Cranial Nerves: No cranial nerve deficit.      Sensory: No sensory deficit.   Psychiatric:         Mood and Affect: Mood normal.         Fluids    Intake/Output Summary (Last 24 hours) at 1/22/2020 1327  Last data filed at 1/22/2020 0400  Gross per 24 hour   Intake 300 ml   Output 1350 ml   Net -1050 ml       Laboratory  Recent Labs     01/21/20  0050 01/21/20  0438 01/21/20  0849 01/22/20  0313   WBC 13.4* 14.0*  --  13.5*   RBC 2.56* 2.59*  --  2.45*   HEMOGLOBIN 8.3* 8.3* 9.3* 7.6*   HEMATOCRIT 25.1* 24.8* 30.5* 23.3*   MCV 98.0* 95.8  --  95.1   MCH 32.4 32.0  --  31.0   MCHC 33.1* 33.5*  --  32.6*   RDW 58.1* 55.2*  --  54.7*   PLATELETCT 126* 242  --  272   MPV 9.9 8.7*  --  8.9*     Recent Labs     01/21/20  0050 01/21/20  0322 01/22/20  0313   SODIUM 131* 131* 133*   POTASSIUM 4.8 4.6 4.3   CHLORIDE 100 101 100   CO2 21 25 25   GLUCOSE 93 102* 102*   BUN 28* 27* 24*   CREATININE 1.03 1.04 0.93   CALCIUM 8.0* 7.9* 8.5                   Imaging  CT-TSPINE W/O PLUS RECONS   Final Result         1.  Partially visualized sequela of thoracolumbar  instrumentation, detailed in a separate lumbar spine CT report. T12 and L1 fractures are also discussed separately.   2.  No fractures above T12 level.   3.  Mild multilevel thoracic spondylosis, with grade 1 degenerative retrolisthesis at T10-T11 level.   4.  Tree-in-bud groundglass opacities in the right lung, likely sequela of infectious/inflammatory bronchiolitis.      CT-LSPINE W/O PLUS RECONS   Final Result      1.  Interval posterior cervical lumbar fusion at T10-L3. Paraspinal rods and transpedicular screws are intact.   2.  Unchanged severe L1 compression fracture with posterior retropulsion.   3.  Mild T12 compression fracture, with bilateral pedicle screws at this level, positioned in close proximity to T12 superior endplate. Mild posterior retropulsion of approximately 5 mm, new since prior study.   4.  Osteopenia and degenerative changes.      IR-MIDLINE CATHETER INSERTION WO GUIDANCE > AGE 3   Final Result                  Ultrasound-guided midline placement performed by qualified nursing staff    as above.          DX-CHEST-PORTABLE (1 VIEW)   Final Result      Placement of right internal jugular catheter tip projecting appropriately over the SVC      OUTSIDE IMAGES-CT THORACIC SPINE   Final Result      OUTSIDE IMAGES-CT LUMBAR SPINE   Final Result      DX-THORACOLUMBAR SPINE-2 VIEWS   Final Result      Digitized intraoperative radiograph is submitted for review.  This examination is not for diagnostic purpose but for guidance during a surgical procedure.      DX-PORTABLE FLUORO > 1 HOUR   Final Result      Portable fluoroscopy utilized for 1 minute 48 seconds.         INTERPRETING LOCATION: [address] [place] [address], [id number]      MR-LUMBAR SPINE-W/O   Final Result      1.  Multilevel subluxations with L3-4 grade 1 spondylolisthesis, mild anterolisthesis L2-L3 and L4-L5. Retrolisthesis T12-L1 and L1-L2 which is difficult to quantify due to the retropulsed fractures at T12 and L1.   2.  T12 moderate recent  or subacute insufficiency compression fracture with retropulsion. Moderate central stenosis at the T12 level.   3.  L1 essentially complete chronic collapse marked retropulsion and chronic sclerotic change seen on CT. No change from 1/12/2018. Moderate central stenosis at the L1 level due to prominent retropulsion up to about 10 mm.   4.  Additional multilevel degenerative disc disease and spondylotic change with L3-4 severe central stenosis at L4-5 severe central stenosis.   5.  Multilevel foraminal stenoses most notable for L3-4 severe right foraminal stenosis.   6.  Details for each level above in the body of report.      CT-PELVIS W/O PLUS RECONS   Final Result         1.  No acute abnormality.   2.  Atherosclerosis      CT-LSPINE W/O PLUS RECONS   Final Result         1.  Left L1 transverse process fracture, may be chronic, otherwise no acute traumatic lumbar spine injury identified   2.  Anterolisthesis L2 on L3 and L3 on L4, appears most likely secondary to severe facet arthrosis.   3.  Chronic appearing L1 vertebral plana with 8.3 mm retropulsion and large central Schmorl's node   4.  Atherosclerosis           Assessment/Plan  * Spinal stenosis of lumbar region with neurogenic claudication- (present on admission)  Assessment & Plan  Causing intractable back pain  L1 transverse process fracture on CT.    MRI showed severe lumbar stenosis.  S/p surgical repair by neurosurgery on 1/16  Pain management as per neurosurgery   PT and OT following    Acute rehab consult pending  Taper down Decadron    Shock (HCC)  Assessment & Plan  Improved  Differential diagnosis includes hypovolemic versus hemorrhagic   Off pressors  Has required 2 units of RBC so far during this admission     Lumbar transverse process fracture (HCC)  Assessment & Plan  Neurosurgery following.  MRI of L-spine found retropulsed fractures of T12 and L1 patient was taken to surgery    Hypothyroidism- (present on admission)  Assessment &  Plan  Synthroid.  TSH 7.770.  Constipated, generally weak.  Will increase dose of levothyroxine from 100-112    Leg DVT (deep venous thromboembolism), chronic, right (HCC)- (present on admission)  Assessment & Plan  Not on blood thinner currently  SCDs    Hypomagnesemia- (present on admission)  Assessment & Plan  oral Mg    CKD (chronic kidney disease) stage 3, GFR 30-59 ml/min ([facility])- (present on admission)  Assessment & Plan  At baseline   Avoid nephrotoxic drugs   Monitor     Hypokalemia- (present on admission)  Assessment & Plan  Resolved    Rheumatoid arthritis (HCC)- (present on admission)  Assessment & Plan  Plaquenil    Constipation  Assessment & Plan  Likely secondary to opiates and trazodone and methocarbamol.  Repeat Fleet Enema, scheduled MiraLAX    Anemia  Assessment & Plan  Acute blood loss   S/p surgical intervention   Has received 2 units of RBC during this admission   H/H stable   Monitor   1/22 dropped from 9.3-7.6.  No evidence of bleeding.  Ordered iron studies, vitamin B12 and folic acid, reticulocyte count and FOBT    Urine retention  Assessment & Plan  Likely related to medications, including methocarbamol, trazodone.  Will DC trazodone, decrease dose of methocarbamol.  Limit opiates.  Nebulization.  Void trial in 1 to 2 days    Peripheral edema  Assessment & Plan  Improved  Cont on lasix and spironolactone     Depression- (present on admission)  Assessment & Plan  Citalopram       VTE prophylaxis: scd

## 2020-01-22 NOTE — CARE PLAN
Problem: Safety  Goal: Will remain free from injury  Note:   Bed locked and in lowest position. Call light and personal belongings in reach. Bed alarm in place. Hourly rounding.       Problem: Pain Management  Goal: Pain level will decrease to patient's comfort goal  Note:   Pt medicated per MAR for back and bilateral leg pain. Heat packs provided. Will continue to assess throughout shift.

## 2020-01-22 NOTE — DISCHARGE PLANNING
Agency/Facility Name: [facility]  Outcome: Attempted to follow up, however, no answer. Voicemail left for Cathy(Admissions).    Agency/Facility Name: [facility]  Spoke To: Cher  Outcome: Patient accepted. Bed available today.    Eli(LEONIDAS CM) updated.

## 2020-01-22 NOTE — PROGRESS NOTES
Pt slept most of the day and pain was managed primarily with repositioning, massage, heat and lidocaine as pt was quite drowsy. VSS.  Repeat enema given with no results.  Consulted with MD- relistor ordered, given, and quality bowel movement produced by the end of day.

## 2020-01-22 NOTE — CARE PLAN
Problem: Communication  Goal: The ability to communicate needs accurately and effectively will improve  Outcome: PROGRESSING AS EXPECTED     Problem: Safety  Goal: Will remain free from injury  Outcome: PROGRESSING AS EXPECTED  Goal: Will remain free from falls  Outcome: PROGRESSING AS EXPECTED     Problem: Infection  Goal: Will remain free from infection  Outcome: PROGRESSING AS EXPECTED     Problem: Bowel/Gastric:  Goal: Normal bowel function is maintained or improved  Outcome: PROGRESSING AS EXPECTED  Note:   After multiple interventions, pt had a bowel movement that had both liquid and solid stool and was quite large this evening.  Pt reports feeling improved, and is notably more alert afterward.   Goal: Will not experience complications related to bowel motility  Outcome: PROGRESSING AS EXPECTED

## 2020-01-22 NOTE — PROGRESS NOTES
Neurosurgery Progress Note    Subjective:  Pain improving, moderate anterior thigh pain bilaterally & incisional back pain better today  Rao catheter d/t urinary retention  +BM yesterday  Appetite OK  Denies N/V, SOB, CP  Minimal ambulation    Exam:  A&O, appears comfortable  BLE intact, R df/pf (States likely baseline, multiple foot surgeries) sensation grossly intact  Dressing with small amount old drainage.         BP  Min: 106/49  Max: 135/61  Pulse  Av.3  Min: 77  Max: 85  Resp  Avg: 15.6  Min: 14  Max: 16  Temp  Av.8 °C (98.3 °F)  Min: 36.1 °C (97 °F)  Max: 37.3 °C (99.2 °F)  SpO2  Av.3 %  Min: 92 %  Max: 98 %    No data recorded    Recent Labs     20  0050 20  0438 20  0849 20  0313   WBC 13.4* 14.0*  --  13.5*   RBC 2.56* 2.59*  --  2.45*   HEMOGLOBIN 8.3* 8.3* 9.3* 7.6*   HEMATOCRIT 25.1* 24.8* 30.5* 23.3*   MCV 98.0* 95.8  --  95.1   MCH 32.4 32.0  --  31.0   MCHC 33.1* 33.5*  --  32.6*   RDW 58.1* 55.2*  --  54.7*   PLATELETCT 126* 242  --  272   MPV 9.9 8.7*  --  8.9*     Recent Labs     20  0050 20  0322 20  0313   SODIUM 131* 131* 133*   POTASSIUM 4.8 4.6 4.3   CHLORIDE 100 101 100   CO2 21 25 25   GLUCOSE 93 102* 102*   BUN 28* 27* 24*   CREATININE 1.03 1.04 0.93   CALCIUM 8.0* 7.9* 8.5               Intake/Output       20 - 20 0659 20 07 - 20 0659       Total  Total       Intake    P.O.  --  300 300  --  -- --    P.O. -- 300 300 -- -- --    Total Intake -- 300 300 -- -- --       Output    Urine  750  600 1350  --  -- --    Urine Void (mL) 750 -- 750 -- -- --    Output (mL) (Urethral Catheter Latex) -- 600 600 -- -- --    Stool  --  -- --  --  -- --    Number of Times Stooled 2 x -- 2 x -- -- --    Total Output  -- -- --       Net I/O     -750 -300 -1050 -- -- --            Intake/Output Summary (Last 24 hours) at 2020 1051  Last data filed at 2020  0400  Gross per 24 hour   Intake 300 ml   Output 1350 ml   Net -1050 ml            • dexamethasone  2 mg Q12HRS   • gabapentin  300 mg TID   • lidocaine  1 Patch Q24HR   • methocarbamol  500 mg BID   • polyethylene glycol/lytes  1 Packet TID   • spironolactone  25 mg DAILY   • furosemide  40 mg Q DAY   • magnesium oxide  400 mg BID   • levothyroxine  100 mcg AM ES   • magnesium hydroxide  30 mL QDAY PRN   • diphenhydrAMINE  25 mg Q6HRS PRN    Or   • diphenhydrAMINE  25 mg Q6HRS PRN   • hydroxychloroquine  200 mg DAILY   • polyethylene glycol/lytes  1 Packet BID PRN   • ondansetron  4 mg Q4HRS PRN   • senna-docusate  1 Tab Nightly   • senna-docusate  1 Tab Q24HRS PRN   • oxyCODONE immediate-release  5 mg Q3HRS PRN   • diphenhydrAMINE  25 mg Q6HRS PRN   • calcium carbonate  500 mg BID   • citalopram  20 mg QHS   • acetaminophen  650 mg Q6HRS PRN   • omeprazole  20 mg DAILY   • docusate sodium  100 mg BID   • Pharmacy Consult Request  1 Each PHARMACY TO DOSE   • MD ALERT...DO NOT ADMINISTER NSAIDS or ASPIRIN unless ORDERED By Neurosurgery  1 Each PRN   • bisacodyl  10 mg Q24HRS PRN   • Respiratory Care per Protocol   Continuous RT   • labetalol  10 mg Q HOUR PRN   • hydrALAZINE  10 mg Q HOUR PRN   • benzocaine-menthol  1 Lozenge Q2HRS PRN   • ipratropium  1 Spray BID       Assessment and Plan:  Hospital Day#11 L1 burst fx with >50% retropulsion into conus, kyphotic deformity thoracolumbar junction  POD#7 T10-L3 fusion, laminectomy T11-L2    Intraop CSF leak at pedicle of T11.    Plan:  Stable neuro exam.   Hemoglobin 7.6 this AM, appreciate hospitalist assistance in mgmt. D/W RN  Continue pain management, gabapentin  Wean off dex over the next 2-3 days  Can restart ASA  POD#10   Ct t/l spine reviewed by Dr Nelson & myself, adequate decompression  Mobilize as tolerated in TLSO brace (to be worn when OOB)    PT/OT- recommending rehab. Consult placed  OK to DC to SNF vs Rehab when medically cleared.   Staples out  POD#14  F/up at [facility] for a 2 week post-op appointment

## 2020-01-23 ENCOUNTER — APPOINTMENT (OUTPATIENT)
Dept: RADIOLOGY | Facility: MEDICAL CENTER | Age: 85
DRG: 456 | End: 2020-01-23
Attending: INTERNAL MEDICINE
Payer: MEDICARE

## 2020-01-23 ENCOUNTER — APPOINTMENT (OUTPATIENT)
Dept: CARDIOLOGY | Facility: MEDICAL CENTER | Age: 85
DRG: 456 | End: 2020-01-23
Attending: INTERNAL MEDICINE
Payer: MEDICARE

## 2020-01-23 PROBLEM — J18.9 PNEUMONIA: Status: ACTIVE | Noted: 2020-01-23

## 2020-01-23 LAB
ANION GAP SERPL CALC-SCNC: 10 MMOL/L (ref 0–11.9)
APPEARANCE UR: ABNORMAL
BACTERIA #/AREA URNS HPF: ABNORMAL /HPF
BASOPHILS # BLD AUTO: 0.4 % (ref 0–1.8)
BASOPHILS # BLD: 0.07 K/UL (ref 0–0.12)
BILIRUB UR QL STRIP.AUTO: NEGATIVE
BUN SERPL-MCNC: 23 MG/DL (ref 8–22)
CALCIUM SERPL-MCNC: 8.1 MG/DL (ref 8.5–10.5)
CHLORIDE SERPL-SCNC: 98 MMOL/L (ref 96–112)
CO2 SERPL-SCNC: 22 MMOL/L (ref 20–33)
COLOR UR: YELLOW
CREAT SERPL-MCNC: 0.95 MG/DL (ref 0.5–1.4)
EOSINOPHIL # BLD AUTO: 0.16 K/UL (ref 0–0.51)
EOSINOPHIL NFR BLD: 0.8 % (ref 0–6.9)
EPI CELLS #/AREA URNS HPF: NEGATIVE /HPF
ERYTHROCYTE [DISTWIDTH] IN BLOOD BY AUTOMATED COUNT: 54.4 FL (ref 35.9–50)
GLUCOSE SERPL-MCNC: 78 MG/DL (ref 65–99)
GLUCOSE UR STRIP.AUTO-MCNC: NEGATIVE MG/DL
HCT VFR BLD AUTO: 28 % (ref 37–47)
HGB BLD-MCNC: 9.3 G/DL (ref 12–16)
HYALINE CASTS #/AREA URNS LPF: ABNORMAL /LPF
IMM GRANULOCYTES # BLD AUTO: 0.55 K/UL (ref 0–0.11)
IMM GRANULOCYTES NFR BLD AUTO: 2.9 % (ref 0–0.9)
KETONES UR STRIP.AUTO-MCNC: NEGATIVE MG/DL
LEUKOCYTE ESTERASE UR QL STRIP.AUTO: ABNORMAL
LV EJECT FRACT  99904: 70
LV EJECT FRACT MOD 2C 99903: 71.68
LV EJECT FRACT MOD 4C 99902: 71.14
LV EJECT FRACT MOD BP 99901: 72.39
LYMPHOCYTES # BLD AUTO: 1.81 K/UL (ref 1–4.8)
LYMPHOCYTES NFR BLD: 9.6 % (ref 22–41)
MCH RBC QN AUTO: 31.4 PG (ref 27–33)
MCHC RBC AUTO-ENTMCNC: 33.2 G/DL (ref 33.6–35)
MCV RBC AUTO: 94.6 FL (ref 81.4–97.8)
MICRO URNS: ABNORMAL
MONOCYTES # BLD AUTO: 1.56 K/UL (ref 0–0.85)
MONOCYTES NFR BLD AUTO: 8.3 % (ref 0–13.4)
NEUTROPHILS # BLD AUTO: 14.74 K/UL (ref 2–7.15)
NEUTROPHILS NFR BLD: 78 % (ref 44–72)
NITRITE UR QL STRIP.AUTO: NEGATIVE
NRBC # BLD AUTO: 0 K/UL
NRBC BLD-RTO: 0 /100 WBC
PH UR STRIP.AUTO: 6.5 [PH] (ref 5–8)
PLATELET # BLD AUTO: 328 K/UL (ref 164–446)
PMV BLD AUTO: 8.7 FL (ref 9–12.9)
POTASSIUM SERPL-SCNC: 4.4 MMOL/L (ref 3.6–5.5)
PROT UR QL STRIP: NEGATIVE MG/DL
RBC # BLD AUTO: 2.96 M/UL (ref 4.2–5.4)
RBC # URNS HPF: ABNORMAL /HPF
RBC UR QL AUTO: ABNORMAL
SODIUM SERPL-SCNC: 130 MMOL/L (ref 135–145)
SP GR UR STRIP.AUTO: 1.02
UROBILINOGEN UR STRIP.AUTO-MCNC: 1 MG/DL
WBC # BLD AUTO: 18.9 K/UL (ref 4.8–10.8)
WBC #/AREA URNS HPF: ABNORMAL /HPF

## 2020-01-23 PROCEDURE — 85025 COMPLETE CBC W/AUTO DIFF WBC: CPT

## 2020-01-23 PROCEDURE — 700101 HCHG RX REV CODE 250: Performed by: NEUROLOGICAL SURGERY

## 2020-01-23 PROCEDURE — A9270 NON-COVERED ITEM OR SERVICE: HCPCS | Performed by: PHYSICIAN ASSISTANT

## 2020-01-23 PROCEDURE — 700102 HCHG RX REV CODE 250 W/ 637 OVERRIDE(OP): Performed by: INTERNAL MEDICINE

## 2020-01-23 PROCEDURE — 700102 HCHG RX REV CODE 250 W/ 637 OVERRIDE(OP): Performed by: PHYSICIAN ASSISTANT

## 2020-01-23 PROCEDURE — 51798 US URINE CAPACITY MEASURE: CPT

## 2020-01-23 PROCEDURE — A9270 NON-COVERED ITEM OR SERVICE: HCPCS | Performed by: PSYCHIATRY & NEUROLOGY

## 2020-01-23 PROCEDURE — 80048 BASIC METABOLIC PNL TOTAL CA: CPT

## 2020-01-23 PROCEDURE — 71045 X-RAY EXAM CHEST 1 VIEW: CPT

## 2020-01-23 PROCEDURE — 97530 THERAPEUTIC ACTIVITIES: CPT | Mod: CQ

## 2020-01-23 PROCEDURE — 700101 HCHG RX REV CODE 250: Performed by: HOSPITALIST

## 2020-01-23 PROCEDURE — 93306 TTE W/DOPPLER COMPLETE: CPT | Mod: 26 | Performed by: INTERNAL MEDICINE

## 2020-01-23 PROCEDURE — 700111 HCHG RX REV CODE 636 W/ 250 OVERRIDE (IP): Performed by: INTERNAL MEDICINE

## 2020-01-23 PROCEDURE — 700102 HCHG RX REV CODE 250 W/ 637 OVERRIDE(OP): Performed by: NEUROLOGICAL SURGERY

## 2020-01-23 PROCEDURE — 36415 COLL VENOUS BLD VENIPUNCTURE: CPT

## 2020-01-23 PROCEDURE — 81001 URINALYSIS AUTO W/SCOPE: CPT

## 2020-01-23 PROCEDURE — 770006 HCHG ROOM/CARE - MED/SURG/GYN SEMI*

## 2020-01-23 PROCEDURE — 700102 HCHG RX REV CODE 250 W/ 637 OVERRIDE(OP): Performed by: PSYCHIATRY & NEUROLOGY

## 2020-01-23 PROCEDURE — 700112 HCHG RX REV CODE 229: Performed by: PSYCHIATRY & NEUROLOGY

## 2020-01-23 PROCEDURE — 97530 THERAPEUTIC ACTIVITIES: CPT

## 2020-01-23 PROCEDURE — 87040 BLOOD CULTURE FOR BACTERIA: CPT

## 2020-01-23 PROCEDURE — 93306 TTE W/DOPPLER COMPLETE: CPT

## 2020-01-23 PROCEDURE — A9270 NON-COVERED ITEM OR SERVICE: HCPCS | Performed by: INTERNAL MEDICINE

## 2020-01-23 PROCEDURE — 97535 SELF CARE MNGMENT TRAINING: CPT

## 2020-01-23 PROCEDURE — 700105 HCHG RX REV CODE 258: Performed by: INTERNAL MEDICINE

## 2020-01-23 PROCEDURE — 99233 SBSQ HOSP IP/OBS HIGH 50: CPT | Performed by: INTERNAL MEDICINE

## 2020-01-23 PROCEDURE — A9270 NON-COVERED ITEM OR SERVICE: HCPCS | Performed by: NEUROLOGICAL SURGERY

## 2020-01-23 PROCEDURE — 93970 EXTREMITY STUDY: CPT

## 2020-01-23 RX ORDER — FUROSEMIDE 20 MG/1
20 TABLET ORAL
Status: DISCONTINUED | OUTPATIENT
Start: 2020-01-24 | End: 2020-01-23

## 2020-01-23 RX ORDER — DEXAMETHASONE 1 MG
1 TABLET ORAL EVERY 12 HOURS
Status: DISCONTINUED | OUTPATIENT
Start: 2020-01-23 | End: 2020-01-24 | Stop reason: HOSPADM

## 2020-01-23 RX ADMIN — SPIRONOLACTONE 25 MG: 25 TABLET ORAL at 05:32

## 2020-01-23 RX ADMIN — MAGNESIUM OXIDE TAB 400 MG (241.3 MG ELEMENTAL MG) 400 MG: 400 (241.3 MG) TAB at 18:07

## 2020-01-23 RX ADMIN — SODIUM CHLORIDE 3 G: 900 INJECTION INTRAVENOUS at 13:32

## 2020-01-23 RX ADMIN — OXYCODONE HYDROCHLORIDE 5 MG: 5 TABLET ORAL at 09:41

## 2020-01-23 RX ADMIN — DEXAMETHASONE 1 MG: 1 TABLET ORAL at 18:12

## 2020-01-23 RX ADMIN — DOCUSATE SODIUM 100 MG: 100 CAPSULE, LIQUID FILLED ORAL at 18:09

## 2020-01-23 RX ADMIN — OXYCODONE HYDROCHLORIDE 5 MG: 5 TABLET ORAL at 21:42

## 2020-01-23 RX ADMIN — DEXAMETHASONE 2 MG: 1 TABLET ORAL at 05:32

## 2020-01-23 RX ADMIN — POLYETHYLENE GLYCOL 3350 1 PACKET: 17 POWDER, FOR SOLUTION ORAL at 05:31

## 2020-01-23 RX ADMIN — SODIUM CHLORIDE 3 G: 900 INJECTION INTRAVENOUS at 23:53

## 2020-01-23 RX ADMIN — FUROSEMIDE 40 MG: 40 TABLET ORAL at 05:32

## 2020-01-23 RX ADMIN — ANTACID TABLETS 500 MG: 500 TABLET, CHEWABLE ORAL at 05:32

## 2020-01-23 RX ADMIN — MAGNESIUM OXIDE TAB 400 MG (241.3 MG ELEMENTAL MG) 400 MG: 400 (241.3 MG) TAB at 05:32

## 2020-01-23 RX ADMIN — CITALOPRAM HYDROBROMIDE 20 MG: 20 TABLET ORAL at 18:08

## 2020-01-23 RX ADMIN — GABAPENTIN 300 MG: 300 CAPSULE ORAL at 11:48

## 2020-01-23 RX ADMIN — POLYETHYLENE GLYCOL 3350 1 PACKET: 17 POWDER, FOR SOLUTION ORAL at 18:06

## 2020-01-23 RX ADMIN — GABAPENTIN 300 MG: 300 CAPSULE ORAL at 05:32

## 2020-01-23 RX ADMIN — POLYETHYLENE GLYCOL 3350 1 PACKET: 17 POWDER, FOR SOLUTION ORAL at 11:48

## 2020-01-23 RX ADMIN — GABAPENTIN 300 MG: 300 CAPSULE ORAL at 18:09

## 2020-01-23 RX ADMIN — DOCUSATE SODIUM 100 MG: 100 CAPSULE, LIQUID FILLED ORAL at 05:32

## 2020-01-23 RX ADMIN — SENNOSIDES AND DOCUSATE SODIUM 1 TABLET: 8.6; 5 TABLET ORAL at 18:09

## 2020-01-23 RX ADMIN — LIDOCAINE 1 PATCH: 50 PATCH TOPICAL at 09:44

## 2020-01-23 RX ADMIN — SODIUM CHLORIDE 3 G: 900 INJECTION INTRAVENOUS at 18:06

## 2020-01-23 RX ADMIN — LEVOTHYROXINE SODIUM 112 MCG: 0.11 TABLET ORAL at 06:00

## 2020-01-23 RX ADMIN — METHOCARBAMOL TABLETS 500 MG: 500 TABLET, COATED ORAL at 05:31

## 2020-01-23 RX ADMIN — HYDROXYCHLOROQUINE SULFATE 200 MG: 200 TABLET ORAL at 06:13

## 2020-01-23 RX ADMIN — BISACODYL 10 MG: 10 SUPPOSITORY RECTAL at 11:48

## 2020-01-23 ASSESSMENT — COGNITIVE AND FUNCTIONAL STATUS - GENERAL
PERSONAL GROOMING: A LITTLE
STANDING UP FROM CHAIR USING ARMS: A LOT
DRESSING REGULAR UPPER BODY CLOTHING: A LOT
HELP NEEDED FOR BATHING: A LOT
MOVING FROM LYING ON BACK TO SITTING ON SIDE OF FLAT BED: A LOT
SUGGESTED CMS G CODE MODIFIER DAILY ACTIVITY: CK
MOBILITY SCORE: 14
TURNING FROM BACK TO SIDE WHILE IN FLAT BAD: A LITTLE
DAILY ACTIVITIY SCORE: 14
MOVING TO AND FROM BED TO CHAIR: A LITTLE
TOILETING: A LOT
CLIMB 3 TO 5 STEPS WITH RAILING: A LOT
DRESSING REGULAR LOWER BODY CLOTHING: A LOT
WALKING IN HOSPITAL ROOM: A LOT
SUGGESTED CMS G CODE MODIFIER MOBILITY: CL
EATING MEALS: A LITTLE

## 2020-01-23 ASSESSMENT — ENCOUNTER SYMPTOMS
BLURRED VISION: 0
SENSORY CHANGE: 0
CHILLS: 0
BACK PAIN: 1
SORE THROAT: 0
POLYDIPSIA: 0
EYE DISCHARGE: 0
CONSTIPATION: 0
DIZZINESS: 0
NECK PAIN: 0
WEAKNESS: 1
COUGH: 0
VOMITING: 0
TREMORS: 0
ABDOMINAL PAIN: 0
HEMOPTYSIS: 0
FEVER: 0
TINGLING: 0
NAUSEA: 0
BRUISES/BLEEDS EASILY: 0
ORTHOPNEA: 0
SHORTNESS OF BREATH: 0
PALPITATIONS: 0
DIARRHEA: 0
SPUTUM PRODUCTION: 0
HEADACHES: 0
HALLUCINATIONS: 0
MYALGIAS: 0

## 2020-01-23 ASSESSMENT — LIFESTYLE VARIABLES: SUBSTANCE_ABUSE: 0

## 2020-01-23 ASSESSMENT — GAIT ASSESSMENTS: GAIT LEVEL OF ASSIST: UNABLE TO PARTICIPATE

## 2020-01-23 NOTE — CARE PLAN
Problem: Nutritional:  Goal: Achieve adequate nutritional intake  Description  Patient will consume 50% of meals/supplements.    Outcome: PROGRESSING SLOWER THAN EXPECTED    Pt is eating 25-50% of meals, observed eating breakfast and helped pt open trays items. Agrees to Boost Plus variety with meals.    RD to follow.

## 2020-01-23 NOTE — DISCHARGE PLANNING
Received Transport Form @ 1200  Spoke to Cher @ [facility]    Transport is scheduled for 1/23 @ 1600 going to [facility]. Eli(LEONIDAS CM) notified of transport time.

## 2020-01-23 NOTE — DISCHARGE PLANNING
Agency/Facility Name: [facility]  Spoke To: Cathy  Outcome: No beds available today.    Agency/Facility Name: [facility]  Spoke To: Cher  Outcome: Bed available today.    Eli(LEONIDAS CM) notified.

## 2020-01-23 NOTE — ASSESSMENT & PLAN NOTE
Multifactorial  CPK is mildly elevated.  Plan to taper down steroids  Levothyroxine dose increased on 1/22  Treatment for pneumonia  Follow with echo of the heart  PT OT

## 2020-01-23 NOTE — THERAPY
"Physical Therapy Treatment completed.   Bed Mobility:  Supine to Sit: Moderate Assist  Transfers: Sit to Stand: Maximal Assist  Gait: Level Of Assist: Unable to Participate due to pain and anxiety     Plan of Care: Will benefit from Physical Therapy 4 times per week  Discharge Recommendations: Equipment: Will Continue to Assess for Equipment Needs. Post-acute therapy Recommend post-acute placement for continued physical therapy services prior to discharge home. Patient can tolerate post-acute therapies at a 5x/week frequency.     See \"Rehab Therapy-Acute\" Patient Summary Report for complete documentation.     Pt pleasant and agreeable with encouragement to participate. Today she required more assist due to pain, fear and some anxiousness. Pt required modA for bed mobility. At EOB while tonning TLSO pt grimaching and squealing in pain. Pt was pre medicated. Educatedon pursed lip breathing and relaxation techniques. Performed multiple sit to stands but assist varied from Ken to maxA. As pt would state \" I can't do it\" than would resist movement. Pt agreeable and performed stand step pivot over to chair with modA. Assist for balance and fww management. Pt once in chair reporting she felt \"weird\" but difficulty exactly explaining her symptoms. BP within normal range, assesed twice and RN notified and aware in room. Pt mobility decreased due to pain today and she will benefit from post acute placement at [place]. Will continue to follow while in house.   "

## 2020-01-23 NOTE — PROGRESS NOTES
Assumed patient care at 0700 and received bedside report from RN. Patient alert and oriented times 4. Patient complains of numbness in groin, denies tingling. Patient denies chest pain, shortness of breath, blurry/double vision. Patient ambulating x1 with FWW. Patient continent of bowel, garcia in place for retention. Patient is tolerating diet. Plan of care discussed, education provided on all administered medications, hourly rounding and Q4 neuro checks in place.

## 2020-01-23 NOTE — THERAPY
"Occupational Therapy Treatment completed   Functional Status: Pt seen for skilled OT session today, pt is limited by pain and some anxiety during session. Pt performed bed mobility with mod a, LB dressing max a, donned TLSO with max a sitting eob, pt declined to attempt self, STS eob varying min to max a, eob->chair with max a for safety and assist with maneuvering FWW appropriately. Once up in chair, pt reported feeling \"loopy\", BP assessed within normal parameters and RN notified. Pt will continue to benefit from acute skilled OT services while in house and post acute recommended prior to d/c home.   Plan of Care: Will benefit from Occupational Therapy 3 times per week  Discharge Recommendations:  Equipment Will Continue to Assess for Equipment Needs. Post-acute therapy Recommend post-acute placement for additional occupational therapy services prior to discharge home. Patient can tolerate post-acute therapies at a 5x/week frequency.      See \"Rehab Therapy-Acute\" Patient Summary Report for complete documentation.   "

## 2020-01-23 NOTE — PROGRESS NOTES
Attempted to inflate waffle mattress overlay, pt refused, provided education on risk for pressure ulcers. Pt continued to decline waffle overlay.

## 2020-01-23 NOTE — PROGRESS NOTES
Assumed patient care at 0700 and received bedside report from RN. Patient alert and oriented times 4. Patient complains of numbness on left and right groin, denies tingling. Patient denies chest pain, shortness of breath, blurry/double vision. Patient ambulating x1 with FWW. Patient continent of bowel and bladder. Patient is tolerating diet. Plan of care discussed, education provided on all administered medications, hourly rounding and Q4 neuro checks in place.

## 2020-01-23 NOTE — PROGRESS NOTES
Neurosurgery Progress Note    Subjective:  Pain continues to improve  Rao catheter d/t urinary retention  Appetite OK  Denies N/V, SOB, CP  Minimal ambulation    Exam:  A&O, appears comfortable  BLE intact, R df/pf (States likely baseline, multiple foot surgeries) sensation grossly intact  Dressing with small amount old drainage.         BP  Min: 130/54  Max: 144/67  Pulse  Av  Min: 80  Max: 86  Resp  Av  Min: 16  Max: 16  Temp  Av.8 °C (98.3 °F)  Min: 36.4 °C (97.5 °F)  Max: 37.1 °C (98.8 °F)  SpO2  Av %  Min: 96 %  Max: 100 %    No data recorded    Recent Labs     20  0438 20  0849 20  0313 20  0826   WBC 14.0*  --  13.5* 18.9*   RBC 2.59*  --  2.45* 2.96*   HEMOGLOBIN 8.3* 9.3* 7.6* 9.3*   HEMATOCRIT 24.8* 30.5* 23.3* 28.0*   MCV 95.8  --  95.1 94.6   MCH 32.0  --  31.0 31.4   MCHC 33.5*  --  32.6* 33.2*   RDW 55.2*  --  54.7* 54.4*   PLATELETCT 242  --  272 328   MPV 8.7*  --  8.9* 8.7*     Recent Labs     20  0322 203 20  0452   SODIUM 131* 133* 130*   POTASSIUM 4.6 4.3 4.4   CHLORIDE 101 100 98   CO2 25 25 22   GLUCOSE 102* 102* 78   BUN 27* 24* 23*   CREATININE 1.04 0.93 0.95   CALCIUM 7.9* 8.5 8.1*               Intake/Output       20 - 20 0659 20 - 20 0659      5757-3440 1160-3995 Total 6114-1377 1121-6468 Total       Intake    Total Intake -- -- -- -- -- --       Output    Urine  1000  700 1700  --  -- --    Output (mL) (Urethral Catheter Latex) 4000 752 7469 -- -- --    Total Output 5979 828 5479 -- -- --       Net I/O     -1000 -700 -1700 -- -- --            Intake/Output Summary (Last 24 hours) at 2020 1104  Last data filed at 2020 0500  Gross per 24 hour   Intake --   Output 1700 ml   Net -1700 ml            • [START ON 2020] furosemide  20 mg Q DAY   • dexamethasone  1 mg Q12HRS   • levothyroxine  112 mcg AM ES   • gabapentin  300 mg TID   • lidocaine  1 Patch Q24HR   • polyethylene  glycol/lytes  1 Packet TID   • spironolactone  25 mg DAILY   • magnesium oxide  400 mg BID   • magnesium hydroxide  30 mL QDAY PRN   • diphenhydrAMINE  25 mg Q6HRS PRN    Or   • diphenhydrAMINE  25 mg Q6HRS PRN   • hydroxychloroquine  200 mg DAILY   • polyethylene glycol/lytes  1 Packet BID PRN   • ondansetron  4 mg Q4HRS PRN   • senna-docusate  1 Tab Nightly   • senna-docusate  1 Tab Q24HRS PRN   • oxyCODONE immediate-release  5 mg Q3HRS PRN   • diphenhydrAMINE  25 mg Q6HRS PRN   • citalopram  20 mg QHS   • acetaminophen  650 mg Q6HRS PRN   • omeprazole  20 mg DAILY   • docusate sodium  100 mg BID   • Pharmacy Consult Request  1 Each PHARMACY TO DOSE   • MD ALERT...DO NOT ADMINISTER NSAIDS or ASPIRIN unless ORDERED By Neurosurgery  1 Each PRN   • bisacodyl  10 mg Q24HRS PRN   • Respiratory Care per Protocol   Continuous RT   • labetalol  10 mg Q HOUR PRN   • hydrALAZINE  10 mg Q HOUR PRN   • benzocaine-menthol  1 Lozenge Q2HRS PRN   • ipratropium  1 Spray BID       Assessment and Plan:  Hospital Day#12 L1 burst fx with >50% retropulsion into conus, kyphotic deformity thoracolumbar junction  POD#8 T10-L3 fusion, laminectomy T11-L2    Intraop CSF leak at pedicle of T11.    Plan:  Stable neuro exam.   Hemoglobin 9.3 this AM, improved from 7.6 yesterday  Continue pain management, gabapentin  Wean off dex over the next 1-2 days, IM service managing  Can restart ASA  POD#10   Ct t/l spine reviewed by Dr Nelson & myself, adequate decompression  Mobilize as tolerated in TLSO brace (to be worn when OOB)    Continue to mobilize as tolerated with PT/OT  OK to DC to SNF when medically cleared.   [place] out POD#14  F/up at [facility] Neurosurgery for a 2 week post-op appointment

## 2020-01-23 NOTE — PROGRESS NOTES
[facility] Medicine Daily Progress Note    Date of Service  1/23/2020    Chief Complaint  84 y.o. female admitted 1/9/2020 with back pain.     Hospital Course    This is an 84-year-old female with a past medical history of CKD, stroke, osteoporosis, rheumatoid arthritis, hypothyroidism, and depression admitted with low back pain after mechanical ground-level fall.  The patient was attempting to sit in a chair when the chair rolled out from underneath her causing her to land on the floor.  She developed acute low back pain leading her to present to the ED.    On admission CT L-spine did reveal left L1 transverse process fracture that is possibly chronic.  No other acute fracture noted.  CT pelvis was negative for acute abnormality.  She remained in the hospital for pain control and therapy.  She has been initiated on Neurontin, Decadron, and lidocaine patches.  Her pain is overall improving, although she still continues to need significant assistance with mobilizing secondary to her pain.    The patient also takes Spironolactone and Lasix as an outpatient for edema.  On admission she was noted to have worsening of her underlying CKD.  Her diuretics were held with improvement of her creatinine.  As the patient has no evidence of edema we will continue to hold these medications.    Her MRI lumbar spine showed severe lumbar stenosis and neurosurgery was consulted and she was taken to OR on 1/16/20 and surgery she was transferred to ICU.       Interval Problem Update    Complaining of generalized weakness, almost passed out during physical therapy according to her.  Denies chills fever shortness of breath no calf pain.  In the light of rising leukocytosis and groundglass opacities in the right lower lobe on recent CT of the thoracic spine, will start Unasyn, repeat chest x-ray, blood culture, procalcitonin  Will remove Rao catheter and do voiding trial today with bladder  protocol    Consultants/Specialty  Neurosurgery.  Critical care     Code Status  FULL    Disposition  SNF when medically clear    Review of Systems  Review of Systems   Constitutional: Negative for chills, fever and malaise/fatigue.   HENT: Negative for congestion, ear discharge, ear pain, nosebleeds and sore throat.    Eyes: Negative for blurred vision and discharge.   Respiratory: Negative for cough, hemoptysis, sputum production and shortness of breath.    Cardiovascular: Negative for chest pain, palpitations, orthopnea and leg swelling.   Gastrointestinal: Negative for abdominal pain, constipation, diarrhea, nausea and vomiting.   Genitourinary: Negative for dysuria and hematuria.   Musculoskeletal: Positive for back pain. Negative for joint pain, myalgias and neck pain.   Skin: Negative for rash.   Neurological: Positive for weakness. Negative for dizziness, tingling, tremors, sensory change and headaches.   Endo/Heme/Allergies: Negative for environmental allergies and polydipsia. Does not bruise/bleed easily.   Psychiatric/Behavioral: Negative for hallucinations, substance abuse and suicidal ideas.        Physical Exam  Temp:  [36.4 °C (97.5 °F)-37.1 °C (98.8 °F)] 37 °C (98.6 °F)  Pulse:  [80-86] 80  Resp:  [16] 16  BP: (130-144)/(54-67) 130/54  SpO2:  [96 %-100 %] 96 %    Physical Exam  Vitals signs and nursing note reviewed.   Constitutional:       General: She is not in acute distress.     Appearance: Normal appearance. She is not toxic-appearing.   HENT:      Head: Normocephalic and atraumatic.      Nose: Nose normal.      Mouth/Throat:      Mouth: Mucous membranes are moist.      Pharynx: Oropharynx is clear. No oropharyngeal exudate.   Eyes:      General:         Right eye: No discharge.         Left eye: No discharge.      Conjunctiva/sclera: Conjunctivae normal.   Neck:      Musculoskeletal: Normal range of motion. No neck rigidity.   Cardiovascular:      Rate and Rhythm: Normal rate and regular  rhythm.      Heart sounds: Normal heart sounds.   Pulmonary:      Effort: Pulmonary effort is normal. No respiratory distress.      Breath sounds: Examination of the right-lower field reveals decreased breath sounds. Decreased breath sounds present. No wheezing or rales.   Abdominal:      General: Bowel sounds are normal. There is no distension.      Palpations: Abdomen is soft.      Tenderness: There is no tenderness. There is no guarding or rebound.   Musculoskeletal:         General: No swelling.      Right lower leg: No edema.      Left lower leg: No edema.      Comments: Low back tenderness.      Skin:     General: Skin is warm and dry.      Coloration: Skin is not jaundiced.   Neurological:      Mental Status: She is alert and oriented to person, place, and time. Mental status is at baseline.      Cranial Nerves: No cranial nerve deficit.      Sensory: No sensory deficit.   Psychiatric:         Mood and Affect: Mood normal.         Fluids    Intake/Output Summary (Last 24 hours) at 1/23/2020 1229  Last data filed at 1/23/2020 0500  Gross per 24 hour   Intake --   Output 1700 ml   Net -1700 ml       Laboratory  Recent Labs     01/21/20  0438 01/21/20  0849 01/22/20  0313 01/23/20  0826   WBC 14.0*  --  13.5* 18.9*   RBC 2.59*  --  2.45* 2.96*   HEMOGLOBIN 8.3* 9.3* 7.6* 9.3*   HEMATOCRIT 24.8* 30.5* 23.3* 28.0*   MCV 95.8  --  95.1 94.6   MCH 32.0  --  31.0 31.4   MCHC 33.5*  --  32.6* 33.2*   RDW 55.2*  --  54.7* 54.4*   PLATELETCT 242  --  272 328   MPV 8.7*  --  8.9* 8.7*     Recent Labs     01/21/20  0322 01/22/20  0313 01/23/20  0452   SODIUM 131* 133* 130*   POTASSIUM 4.6 4.3 4.4   CHLORIDE 101 100 98   CO2 25 25 22   GLUCOSE 102* 102* 78   BUN 27* 24* 23*   CREATININE 1.04 0.93 0.95   CALCIUM 7.9* 8.5 8.1*                   Imaging  CT-TSPINE W/O PLUS RECONS   Final Result         1.  Partially visualized sequela of thoracolumbar instrumentation, detailed in a separate lumbar spine CT report. T12 and L1  fractures are also discussed separately.   2.  No fractures above T12 level.   3.  Mild multilevel thoracic spondylosis, with grade 1 degenerative retrolisthesis at T10-T11 level.   4.  Tree-in-bud groundglass opacities in the right lung, likely sequela of infectious/inflammatory bronchiolitis.      CT-LSPINE W/O PLUS RECONS   Final Result      1.  Interval posterior cervical lumbar fusion at T10-L3. Paraspinal rods and transpedicular screws are intact.   2.  Unchanged severe L1 compression fracture with posterior retropulsion.   3.  Mild T12 compression fracture, with bilateral pedicle screws at this level, positioned in close proximity to T12 superior endplate. Mild posterior retropulsion of approximately 5 mm, new since prior study.   4.  Osteopenia and degenerative changes.      IR-MIDLINE CATHETER INSERTION WO GUIDANCE > AGE 3   Final Result                  Ultrasound-guided midline placement performed by qualified nursing staff    as above.          DX-CHEST-PORTABLE (1 VIEW)   Final Result      Placement of right internal jugular catheter tip projecting appropriately over the SVC      OUTSIDE IMAGES-CT THORACIC SPINE   Final Result      OUTSIDE IMAGES-CT LUMBAR SPINE   Final Result      DX-THORACOLUMBAR SPINE-2 VIEWS   Final Result      Digitized intraoperative radiograph is submitted for review.  This examination is not for diagnostic purpose but for guidance during a surgical procedure.      DX-PORTABLE FLUORO > 1 HOUR   Final Result      Portable fluoroscopy utilized for 1 minute 48 seconds.         INTERPRETING LOCATION: [address] [place] [address], [facility]      MR-LUMBAR SPINE-W/O   Final Result      1.  Multilevel subluxations with L3-4 grade 1 spondylolisthesis, mild anterolisthesis L2-L3 and L4-L5. Retrolisthesis T12-L1 and L1-L2 which is difficult to quantify due to the retropulsed fractures at T12 and L1.   2.  T12 moderate recent or subacute insufficiency compression fracture with retropulsion. Moderate  central stenosis at the T12 level.   3.  L1 essentially complete chronic collapse marked retropulsion and chronic sclerotic change seen on CT. No change from 1/12/2018. Moderate central stenosis at the L1 level due to prominent retropulsion up to about 10 mm.   4.  Additional multilevel degenerative disc disease and spondylotic change with L3-4 severe central stenosis at L4-5 severe central stenosis.   5.  Multilevel foraminal stenoses most notable for L3-4 severe right foraminal stenosis.   6.  Details for each level above in the body of report.      CT-PELVIS W/O PLUS RECONS   Final Result         1.  No acute abnormality.   2.  Atherosclerosis      CT-LSPINE W/O PLUS RECONS   Final Result         1.  Left L1 transverse process fracture, may be chronic, otherwise no acute traumatic lumbar spine injury identified   2.  Anterolisthesis L2 on L3 and L3 on L4, appears most likely secondary to severe facet arthrosis.   3.  Chronic appearing L1 vertebral plana with 8.3 mm retropulsion and large central Schmorl's node   4.  Atherosclerosis      DX-CHEST-PORTABLE (1 VIEW)    (Results Pending)        Assessment/Plan  * Spinal stenosis of lumbar region with neurogenic claudication- (present on admission)  Assessment & Plan  Causing intractable back pain  L1 transverse process fracture on CT.    MRI showed severe lumbar stenosis.  S/p surgical repair by neurosurgery on 1/16  Pain management as per neurosurgery   PT and OT following    Acute rehab consult pending  Taper down Decadron    Shock (HCC)  Assessment & Plan  Improved  Differential diagnosis includes hypovolemic versus hemorrhagic   Off pressors  Has required 2 units of RBC so far during this admission     Lumbar transverse process fracture (HCC)  Assessment & Plan  Neurosurgery following.  MRI of L-spine found retropulsed fractures of T12 and L1 patient was taken to surgery    Hypothyroidism- (present on admission)  Assessment & Plan  Synthroid.  1/22 TSH 7.770.   Constipated, generally weak.  Will increase dose of levothyroxine from 100-112    Leg DVT (deep venous thromboembolism), chronic, right (HCC)- (present on admission)  Assessment & Plan  Not on blood thinner currently  SCDs    Hypomagnesemia- (present on admission)  Assessment & Plan  oral Mg    CKD (chronic kidney disease) stage 3, GFR 30-59 ml/min ([facility])- (present on admission)  Assessment & Plan  At baseline   Avoid nephrotoxic drugs   Monitor     Hypokalemia- (present on admission)  Assessment & Plan  Resolved    Rheumatoid arthritis ([facility])- (present on admission)  Assessment & Plan  Plaquenil    Pneumonia  Assessment & Plan  Pertinent data include abnormal finding on CT of the thoracic spine,   Tree-in-bud groundglass opacities in the right lung, likely sequela of infectious/inflammatory bronchiolitis.  Worsening leukocytosis, generalized weakness  Started on IV Unasyn 1/23  Check procalcitonin    Constipation  Assessment & Plan  Likely secondary to opiates and trazodone and methocarbamol.  Repeat Fleet Enema, scheduled MiraLAX  1/22 had bowel movement    Anemia  Assessment & Plan  Acute blood loss   S/p surgical intervention   Has received 2 units of RBC during this admission   H/H stable   Monitor   1/22 dropped from 9.3-7.6.  No evidence of bleeding.  Ordered iron studies, vitamin B12 and folic acid, reticulocyte count and FOBT  1/23 hemoglobin 9.3    Urine retention  Assessment & Plan  Likely related to medications, including methocarbamol, trazodone.  Will DC trazodone, decrease dose of methocarbamol.  Limit opiates.  Nebulization.  1/23 we will discontinue Rao, bladder protocol    Leukocytosis  Assessment & Plan  Trending up.  1/23   18.9.  Started on Unasyn for pneumonia  Check procalcitonin, blood culture, UA    Generalized weakness  Assessment & Plan  Multifactorial  CPK is mildly elevated.  Plan to taper down steroids  Levothyroxine dose increased on 1/22  Treatment for pneumonia  Follow with echo of  the heart  PT OT     Peripheral edema  Assessment & Plan  Improved  Cont on lasix and spironolactone     Check heart ultrasound    Depression- (present on admission)  Assessment & Plan  Citalopram       VTE prophylaxis: scd

## 2020-01-23 NOTE — CARE PLAN
Problem: Communication  Goal: The ability to communicate needs accurately and effectively will improve  Outcome: PROGRESSING AS EXPECTED  Note:   Encouraged use of call light, assessed needs, encouraged pt to voice feelings.        Problem: Safety  Goal: Will remain free from falls  Outcome: PROGRESSING AS EXPECTED  Note:   Bed alarm on, walker out of sight, nonskid socks on, call light within reach, personal belongings within reach, toileting offered.

## 2020-01-23 NOTE — ASSESSMENT & PLAN NOTE
Trending up.  1/23   18.9.  Started on Unasyn for pneumonia  Check procalcitonin, blood culture, UA

## 2020-01-24 VITALS
RESPIRATION RATE: 12 BRPM | DIASTOLIC BLOOD PRESSURE: 64 MMHG | SYSTOLIC BLOOD PRESSURE: 140 MMHG | HEART RATE: 80 BPM | HEIGHT: 65 IN | BODY MASS INDEX: 21.38 KG/M2 | OXYGEN SATURATION: 96 % | WEIGHT: 128.31 LBS | TEMPERATURE: 97.8 F

## 2020-01-24 LAB
ANION GAP SERPL CALC-SCNC: 11 MMOL/L (ref 0–11.9)
BASOPHILS # BLD AUTO: 0.4 % (ref 0–1.8)
BASOPHILS # BLD: 0.07 K/UL (ref 0–0.12)
BUN SERPL-MCNC: 25 MG/DL (ref 8–22)
CALCIUM SERPL-MCNC: 8.6 MG/DL (ref 8.5–10.5)
CHLORIDE SERPL-SCNC: 100 MMOL/L (ref 96–112)
CO2 SERPL-SCNC: 23 MMOL/L (ref 20–33)
CORTIS SERPL-MCNC: 7.5 UG/DL (ref 0–23)
CREAT SERPL-MCNC: 1.14 MG/DL (ref 0.5–1.4)
EOSINOPHIL # BLD AUTO: 0.11 K/UL (ref 0–0.51)
EOSINOPHIL NFR BLD: 0.6 % (ref 0–6.9)
ERYTHROCYTE [DISTWIDTH] IN BLOOD BY AUTOMATED COUNT: 58.3 FL (ref 35.9–50)
GLUCOSE SERPL-MCNC: 96 MG/DL (ref 65–99)
HCT VFR BLD AUTO: 26.8 % (ref 37–47)
HGB BLD-MCNC: 8.6 G/DL (ref 12–16)
IMM GRANULOCYTES # BLD AUTO: 0.41 K/UL (ref 0–0.11)
IMM GRANULOCYTES NFR BLD AUTO: 2.2 % (ref 0–0.9)
LYMPHOCYTES # BLD AUTO: 1.84 K/UL (ref 1–4.8)
LYMPHOCYTES NFR BLD: 9.8 % (ref 22–41)
MCH RBC QN AUTO: 31.3 PG (ref 27–33)
MCHC RBC AUTO-ENTMCNC: 32.1 G/DL (ref 33.6–35)
MCV RBC AUTO: 97.5 FL (ref 81.4–97.8)
MONOCYTES # BLD AUTO: 1.32 K/UL (ref 0–0.85)
MONOCYTES NFR BLD AUTO: 7 % (ref 0–13.4)
NEUTROPHILS # BLD AUTO: 15.08 K/UL (ref 2–7.15)
NEUTROPHILS NFR BLD: 80 % (ref 44–72)
NRBC # BLD AUTO: 0 K/UL
NRBC BLD-RTO: 0 /100 WBC
PLATELET # BLD AUTO: 337 K/UL (ref 164–446)
PMV BLD AUTO: 8.6 FL (ref 9–12.9)
POTASSIUM SERPL-SCNC: 4.1 MMOL/L (ref 3.6–5.5)
RBC # BLD AUTO: 2.75 M/UL (ref 4.2–5.4)
SODIUM SERPL-SCNC: 134 MMOL/L (ref 135–145)
WBC # BLD AUTO: 18.8 K/UL (ref 4.8–10.8)

## 2020-01-24 PROCEDURE — 36415 COLL VENOUS BLD VENIPUNCTURE: CPT

## 2020-01-24 PROCEDURE — 80048 BASIC METABOLIC PNL TOTAL CA: CPT

## 2020-01-24 PROCEDURE — 700102 HCHG RX REV CODE 250 W/ 637 OVERRIDE(OP): Performed by: NEUROLOGICAL SURGERY

## 2020-01-24 PROCEDURE — A9270 NON-COVERED ITEM OR SERVICE: HCPCS | Performed by: NEUROLOGICAL SURGERY

## 2020-01-24 PROCEDURE — 700102 HCHG RX REV CODE 250 W/ 637 OVERRIDE(OP): Performed by: INTERNAL MEDICINE

## 2020-01-24 PROCEDURE — 82533 TOTAL CORTISOL: CPT

## 2020-01-24 PROCEDURE — 97530 THERAPEUTIC ACTIVITIES: CPT | Mod: CQ

## 2020-01-24 PROCEDURE — 85025 COMPLETE CBC W/AUTO DIFF WBC: CPT

## 2020-01-24 PROCEDURE — A9270 NON-COVERED ITEM OR SERVICE: HCPCS | Performed by: PSYCHIATRY & NEUROLOGY

## 2020-01-24 PROCEDURE — 700101 HCHG RX REV CODE 250: Performed by: HOSPITALIST

## 2020-01-24 PROCEDURE — A9270 NON-COVERED ITEM OR SERVICE: HCPCS | Performed by: INTERNAL MEDICINE

## 2020-01-24 PROCEDURE — 700102 HCHG RX REV CODE 250 W/ 637 OVERRIDE(OP): Performed by: PSYCHIATRY & NEUROLOGY

## 2020-01-24 PROCEDURE — 99222 1ST HOSP IP/OBS MODERATE 55: CPT | Performed by: PHYSICAL MEDICINE & REHABILITATION

## 2020-01-24 PROCEDURE — 700101 HCHG RX REV CODE 250: Performed by: NEUROLOGICAL SURGERY

## 2020-01-24 PROCEDURE — 700112 HCHG RX REV CODE 229: Performed by: PSYCHIATRY & NEUROLOGY

## 2020-01-24 PROCEDURE — 99239 HOSP IP/OBS DSCHRG MGMT >30: CPT | Performed by: INTERNAL MEDICINE

## 2020-01-24 PROCEDURE — 700111 HCHG RX REV CODE 636 W/ 250 OVERRIDE (IP): Performed by: INTERNAL MEDICINE

## 2020-01-24 PROCEDURE — 700105 HCHG RX REV CODE 258: Performed by: INTERNAL MEDICINE

## 2020-01-24 PROCEDURE — 51798 US URINE CAPACITY MEASURE: CPT

## 2020-01-24 RX ORDER — LIDOCAINE 50 MG/G
1 PATCH TOPICAL EVERY 24 HOURS
Qty: 10 PATCH
Start: 2020-01-24 | End: 2020-04-27

## 2020-01-24 RX ORDER — FUROSEMIDE 40 MG/1
20 TABLET ORAL DAILY
Qty: 90 TAB | Refills: 3 | Status: SHIPPED | OUTPATIENT
Start: 2020-01-24 | End: 2020-03-27 | Stop reason: SDUPTHER

## 2020-01-24 RX ORDER — OXYCODONE HYDROCHLORIDE 5 MG/1
5 TABLET ORAL EVERY 6 HOURS PRN
Qty: 12 TAB | Refills: 0 | Status: SHIPPED | OUTPATIENT
Start: 2020-01-24 | End: 2020-01-27

## 2020-01-24 RX ORDER — GABAPENTIN 300 MG/1
300 CAPSULE ORAL 3 TIMES DAILY
Qty: 90 CAP | Refills: 0 | Status: SHIPPED | OUTPATIENT
Start: 2020-01-24 | End: 2020-07-29

## 2020-01-24 RX ORDER — IPRATROPIUM BROMIDE 42 UG/1
1 SPRAY, METERED NASAL 2 TIMES DAILY
Start: 2020-01-24 | End: 2021-05-13 | Stop reason: CLARIF

## 2020-01-24 RX ORDER — AMOXICILLIN AND CLAVULANATE POTASSIUM 875; 125 MG/1; MG/1
1 TABLET, FILM COATED ORAL 2 TIMES DAILY
Refills: 0
Start: 2020-01-24 | End: 2020-01-28

## 2020-01-24 RX ORDER — LEVOTHYROXINE SODIUM 100 MCG
112 TABLET ORAL
Qty: 90 TAB | Refills: 3 | Status: SHIPPED | OUTPATIENT
Start: 2020-01-24 | End: 2020-03-13 | Stop reason: SDUPTHER

## 2020-01-24 RX ORDER — OMEPRAZOLE 20 MG/1
20 CAPSULE, DELAYED RELEASE ORAL DAILY
Qty: 30 CAP
Start: 2020-01-24 | End: 2020-11-13

## 2020-01-24 RX ADMIN — DEXAMETHASONE 1 MG: 1 TABLET ORAL at 16:54

## 2020-01-24 RX ADMIN — GABAPENTIN 300 MG: 300 CAPSULE ORAL at 04:55

## 2020-01-24 RX ADMIN — POLYETHYLENE GLYCOL 3350 1 PACKET: 17 POWDER, FOR SOLUTION ORAL at 04:55

## 2020-01-24 RX ADMIN — GABAPENTIN 300 MG: 300 CAPSULE ORAL at 11:22

## 2020-01-24 RX ADMIN — DOCUSATE SODIUM 100 MG: 100 CAPSULE, LIQUID FILLED ORAL at 16:51

## 2020-01-24 RX ADMIN — MAGNESIUM OXIDE TAB 400 MG (241.3 MG ELEMENTAL MG) 400 MG: 400 (241.3 MG) TAB at 04:54

## 2020-01-24 RX ADMIN — OXYCODONE HYDROCHLORIDE 5 MG: 5 TABLET ORAL at 16:51

## 2020-01-24 RX ADMIN — MAGNESIUM OXIDE TAB 400 MG (241.3 MG ELEMENTAL MG) 400 MG: 400 (241.3 MG) TAB at 16:51

## 2020-01-24 RX ADMIN — OMEPRAZOLE 20 MG: 20 CAPSULE, DELAYED RELEASE ORAL at 16:51

## 2020-01-24 RX ADMIN — GABAPENTIN 300 MG: 300 CAPSULE ORAL at 16:51

## 2020-01-24 RX ADMIN — POLYETHYLENE GLYCOL 3350 1 PACKET: 17 POWDER, FOR SOLUTION ORAL at 11:21

## 2020-01-24 RX ADMIN — DEXAMETHASONE 1 MG: 1 TABLET ORAL at 04:55

## 2020-01-24 RX ADMIN — DOCUSATE SODIUM 100 MG: 100 CAPSULE, LIQUID FILLED ORAL at 04:54

## 2020-01-24 RX ADMIN — SPIRONOLACTONE 25 MG: 25 TABLET ORAL at 04:54

## 2020-01-24 RX ADMIN — IPRATROPIUM BROMIDE 1 SPRAY: 42 SPRAY NASAL at 16:54

## 2020-01-24 RX ADMIN — POLYETHYLENE GLYCOL 3350 1 PACKET: 17 POWDER, FOR SOLUTION ORAL at 16:52

## 2020-01-24 RX ADMIN — HYDROXYCHLOROQUINE SULFATE 200 MG: 200 TABLET ORAL at 04:54

## 2020-01-24 RX ADMIN — LIDOCAINE 1 PATCH: 50 PATCH TOPICAL at 11:18

## 2020-01-24 RX ADMIN — SODIUM CHLORIDE 3 G: 900 INJECTION INTRAVENOUS at 11:18

## 2020-01-24 RX ADMIN — OXYCODONE HYDROCHLORIDE 5 MG: 5 TABLET ORAL at 04:53

## 2020-01-24 RX ADMIN — LEVOTHYROXINE SODIUM 112 MCG: 0.11 TABLET ORAL at 04:55

## 2020-01-24 RX ADMIN — SODIUM CHLORIDE 3 G: 900 INJECTION INTRAVENOUS at 04:55

## 2020-01-24 RX ADMIN — OXYCODONE HYDROCHLORIDE 5 MG: 5 TABLET ORAL at 08:40

## 2020-01-24 RX ADMIN — OXYCODONE HYDROCHLORIDE 5 MG: 5 TABLET ORAL at 13:43

## 2020-01-24 ASSESSMENT — COGNITIVE AND FUNCTIONAL STATUS - GENERAL
WALKING IN HOSPITAL ROOM: A LOT
CLIMB 3 TO 5 STEPS WITH RAILING: A LOT
MOBILITY SCORE: 14
MOVING FROM LYING ON BACK TO SITTING ON SIDE OF FLAT BED: A LOT
MOVING TO AND FROM BED TO CHAIR: A LITTLE
STANDING UP FROM CHAIR USING ARMS: A LOT
SUGGESTED CMS G CODE MODIFIER MOBILITY: CL
TURNING FROM BACK TO SIDE WHILE IN FLAT BAD: A LITTLE

## 2020-01-24 ASSESSMENT — GAIT ASSESSMENTS: GAIT LEVEL OF ASSIST: UNABLE TO PARTICIPATE

## 2020-01-24 NOTE — CONSULTS
Physical Medicine and Rehabilitation Consultation         Initial Consult      Date of Consultation: 1/24/2020  Consulting provider: Russell Montalvo MD   Reason for consultation: assess for acute inpatient rehab appropriateness  LOS: 9 Day(s)    Chief complaint: L1 burst fracture    HPI: The patient is a 84 y.o. right hand dominant female with a past medical history of CKD, stroke, osteoporosis, rheumatoid arthritis, hypothyroidism, depression;  who presented on 1/9/2020 10:17 PM with low back pain after mechanical ground-level fall.  Patient sustained an L1 burst fracture with retropulsion into conus when a chair rolled out from underneath her as she was trying to sit down.  CT pelvis was negative for acute abnormality, and there were no other fractures noted.  Patient was seen and evaluated by neurosurgery who took the patient to the OR for T10-L3 fusion and laminectomy at T11-L2 by Dr. Sang Nelson MD on 1/16/2020.  Patient has remained in the hospital for pain control and therapy.  Patient complains of generalized weakness, and states she almost passed out during physical therapy.  Patient has a rising white blood cell count with possible pneumonia.  Patient has been started on Unasyn and is undergoing infectious work-up at this time.    The patient currently reports high amounts of pain in her bilateral legs, dizziness, constipation, saddle anesthesia. Patient has poor endurance and states she passes out when trying to get up. She tells me she is going to a SNF in [place] today.       Procedures:  DATE OF SERVICE:  01/16/2020  SURGEON:  Sang Nelson MD  1.  Placement of posterolateral instrumentation from T10 through L3 using   pedicle screws and rods.  2.  Posterolateral arthrodesis from T10 through L3 using MagniFuse and   autograft with BMP.  3.  Augmentation of posterolateral screws at T10 bilaterally using cement with   fenestrated screws at L2 on the patient's right side using fenestrated  "screws   and bone cement, and at L3 bilaterally using fenestrated screws and bone   cement.  4.  Laminectomy from T11 through L2 for posterior decompression.  5.  Bilateral foraminotomy at L1 for disimpaction and removal of bone   fragments impacting the L1 nerve root.  6.  Transpedicular approach for reduction of L1 retropulsed bone fragments.  7.  Open reduction of kyphotic deformity using in situ bending and reduction   towers to reduce the patient's kyphotic deformity, as well as subluxation of   L2 on L1.  8.  Use of intraoperative fluoro for guidance real time of placement of   thoracic and lumbar screws as well as live fluoro and injection of bone cement   at T10, L2 and L3.  9.  Repair of iatrogenic CSF leak at the pedicle of T11.  10.  Repair of pathologic leak at the shoulder of the L1 left-sided nerve root   secondary to nerve impaction from bone fragments.  This was repaired using   muscle graft along with Adherus.  11.  Intraoperative ultrasound was used to ensure that appropriate   disimpaction of her conus was achieved with the reduction in her retropulsed   L1 burst fracture.      ROS:  Pertinent positives are listed in HPI, all other systems reviewed and are negative    Social Hx:  Pre-morbidly, this patient lived in a single level home with One steps to enter, alone and able to care for self.   Employment: retired  Tobacco: denies   Alcohol: denies   Drugs: denies     Current level of function:  The patient was evaluated by acute care Physical Therapy and Occupational Therapy; currently requiring maximal assistance for mobility and maximal assistance for ADLs    PMH:  Past Medical History:   Diagnosis Date   • Anesthesia     \"One time had a hard time waking me up\"   • C. difficile diarrhea 2/16   • C. 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 2017: T score -3.4 right forearm and -1.6 left hip DEXA Aug. 2019: T score forearm -4.6 and hip -1.4   • Pain     shoulders, feet, back   • Rheumatoid arthritis (HCC) 9/26/2014   • Stroke ([facility]) 1990's?    \"mini\" no residual symptoms   • Urinary incontinence     occasional       PSH:  Past Surgical History:   Procedure Laterality Date   • PB DEGROOT W/O FACETEC FORAMOT/DSKC 1/2 VRT SEG, TH* N/A 1/16/2020    Procedure: LAMINECTOMY, SPINE, THORACIC;  Surgeon: Sang Nelson M.D.;  Location: [facility];  Service: Neurosurgery   • THORACIC FUSION O-ARM N/A 1/16/2020    Procedure: FUSION, SPINE, THORACIC, POSTERIOR APPROACH - T9-L3;  Surgeon: Sang Nelson M.D.;  Location: SURGERY [facility];  Service: Neurosurgery   • PB RECONSTR TOTAL SHOULDER IMPLANT Left 4/30/2019    Procedure: ARTHROPLASTY, SHOULDER, TOTAL - REVERSE;  Surgeon: Daniella Camargo M.D.;  Location: SURGERY [facility];  Service: Orthopedics   • SHOULDER ARTHROPLASTY TOTAL Right 1/9/2018    Procedure: SHOULDER ARTHROPLASTY TOTAL - REVERSE;  Surgeon: Daniella Camargo M.D.;  Location: SURGERY [facility];  Service: Orthopedics   • COLONOSCOPY - ENDO N/A 3/7/2016    Procedure: COLONOSCOPY - ENDO;  Surgeon: Estiven Ayers M.D.;  Location: ENDOSCOPY [facility];  Service:    • FECAL TRANSPLANT N/A 3/7/2016    Procedure: FECAL TRANSPLANT;  Surgeon: Estiven Ayers M.D.;  Location: ENDOSCOPY [facility];  Service:    • OTHER ORTHOPEDIC SURGERY Left 2012    Left Elbow fx repair   • BLEPHAROPLASTY  3/31/2011    Performed by ORACIO DIAZ at SURGERY SURGICAL [facility] ORS   • FOOT SURGERY Right 2010   • OTHER ORTHOPEDIC SURGERY Left 2006    finger- removal of digit Left middle " finger   • CHOLECYSTECTOMY  2000   • HYSTERECTOMY LAPAROSCOPY  2000   • HYSTERECTOMY RADICAL  1985   • OTHER Bilateral 2010, 2008    feet- repair torn tendons   • OTHER ORTHOPEDIC SURGERY Left 1970s    femur fx       FHX:  Family History   Problem Relation Age of Onset   • Non-contributory Mother    • Non-contributory Father    • Anesthesia Paternal Grandfather         death       Medications:  Current Facility-Administered Medications   Medication Dose   • dexamethasone (DECADRON) tablet 1 mg  1 mg   • ampicillin/sulbactam (UNASYN) 3 g in  mL IVPB  3 g   • levothyroxine (SYNTHROID) tablet 112 mcg  112 mcg   • gabapentin (NEURONTIN) capsule 300 mg  300 mg   • lidocaine (LIDODERM) 5 % 1 Patch  1 Patch   • polyethylene glycol/lytes (MIRALAX) PACKET 1 Packet  1 Packet   • spironolactone (ALDACTONE) tablet 25 mg  25 mg   • magnesium oxide (MAG-OX) tablet 400 mg  400 mg   • magnesium hydroxide (MILK OF MAGNESIA) suspension 30 mL  30 mL   • diphenhydrAMINE (BENADRYL) tablet/capsule 25 mg  25 mg    Or   • diphenhydrAMINE (BENADRYL) injection 25 mg  25 mg   • polyethylene glycol/lytes (MIRALAX) PACKET 1 Packet  1 Packet   • ondansetron (ZOFRAN ODT) dispertab 4 mg  4 mg   • senna-docusate (PERICOLACE or SENOKOT S) 8.6-50 MG per tablet 1 Tab  1 Tab   • senna-docusate (PERICOLACE or SENOKOT S) 8.6-50 MG per tablet 1 Tab  1 Tab   • oxyCODONE immediate-release (ROXICODONE) tablet 5 mg  5 mg   • diphenhydrAMINE (BENADRYL) tablet/capsule 25 mg  25 mg   • citalopram (CELEXA) tablet 20 mg  20 mg   • acetaminophen (TYLENOL) tablet 650 mg  650 mg   • omeprazole (PRILOSEC) capsule 20 mg  20 mg   • docusate sodium (COLACE) capsule 100 mg  100 mg   • Pharmacy Consult Request ...Pain Management Review 1 Each  1 Each   • MD ALERT...DO NOT ADMINISTER NSAIDS or ASPIRIN unless ORDERED By Neurosurgery 1 Each  1 Each   • bisacodyl (DULCOLAX) suppository 10 mg  10 mg   • Respiratory Care per Protocol     • labetalol (NORMODYNE/TRANDATE)  "injection 10 mg  10 mg   • hydrALAZINE (APRESOLINE) injection 10 mg  10 mg   • benzocaine-menthol (CEPACOL) lozenge 1 Lozenge  1 Lozenge   • ipratropium (ATROVENT) 0.06 % nasal spray 1 Spray  1 Spray       Allergies:  Allergies   Allergen Reactions   • Azithromycin Unspecified     Matti Johnsons syndrome   • Cefuroxime Itching and Vomiting     They gave me C-Diff   • Ciprofloxacin Itching and Vomiting     They gave me C-Diff   • Clindamycin Itching and Vomiting     They gave me c-diff   • Daptomycin      Matti colleen syndrome     • Erythromycin Unspecified     Matti Johnsons syndrome   • Nitrofurantoin Vomiting and Nausea   • Rifampin      Matti colleen syndrome   • Sulfa Drugs Swelling   • Codeine Itching   • Flagyl [Metronidazole] Vomiting and Nausea   • Macrodantin  [Nitrofurantoin Macrocrystal]      Other reaction(s): Decreased Blood Pressure   • Morphine Itching     Tolerates oxycodone/hydromorphone   • Premarin Unspecified     burning       Physical Exam:  Vitals: /64   Pulse 80   Temp 36.6 °C (97.8 °F) (Temporal)   Resp 12   Ht 1.651 m (5' 5\")   Wt 58.2 kg (128 lb 4.9 oz)   SpO2 96%   Gen: NAD  Head: NC/AT  Eyes/ Nose/ Mouth: PERRLA, moist mucous membranes  Cardio: RRR, no mumurs  Pulm: CTAB, with normal respiratory effort  Abd: Soft NTND, active bowel sounds,   Ext: No peripheral edema. No calf tenderness. No clubbing/cyanosis.     Mental status:  A&Ox4 (person, place, date, situation) answers questions appropriately follows commands  Speech: fluent, no aphasia or dysarthria    CRANIAL NERVES:  2,3: visual acuity grossly intact, PERRL  3,4,6: EOMI bilaterally, no nystagmus or diplopia  5: sensation intact to light touch bilaterally and symmetric  7: no facial asymmetry  8: hearing grossly intact  9,10: symmetric palate elevation  11: SCM/Trapezius strength 5/5 bilaterally  12: tongue protrudes midline    Motor:      Shoulder flexors:  Bilateral -  4/5  Elbow flexors:  Bilateral -  4/5  Wrist " extensors:  Bilateral -  4/5  Elbow extensors:  Bilateral -  4/5  Finger flexors:  Bilateral -  4/5  Finger abductors:  Bilateral -  4/5  Hip flexors:  Bilateral -  4/5  Knee ext:  Bilateral -  4/5  Dorsiflexors:  Bilateral -  4/5  EHL:  Bilateral -  4/5  Plantar flexors:  Bilateral -  4/5     Sensory:   intact to light touch through out    DTRs: 2+ in bilateral biceps, triceps, brachioradialis, 2+ in bilateral patellar and achilles tendons  Negative babinski b/l  Negative Rodriguez b/l     Tone: no spasticity noted, no cogwheeling noted    Labs: Reviewed and significant for   Recent Labs     01/22/20  0313 01/22/20  1605 01/23/20  0826 01/24/20  0333   RBC 2.45*  --  2.96* 2.75*   HEMOGLOBIN 7.6*  --  9.3* 8.6*   HEMATOCRIT 23.3*  --  28.0* 26.8*   PLATELETCT 272  --  328 337   IRON  --  37*  --   --    FERRITIN  --  165.7  --   --    TOTIRONBC  --  228*  --   --      Recent Labs     01/22/20  0313 01/23/20  0452 01/24/20  0333   SODIUM 133* 130* 134*   POTASSIUM 4.3 4.4 4.1   CHLORIDE 100 98 100   CO2 25 22 23   GLUCOSE 102* 78 96   BUN 24* 23* 25*   CREATININE 0.93 0.95 1.14   CALCIUM 8.5 8.1* 8.6     Recent Results (from the past 24 hour(s))   Blood Culture    Collection Time: 01/23/20  1:04 PM   Result Value Ref Range    Significant Indicator NEG     Source BLD     Site PERIPHERAL     Culture Result       No Growth  Note: Blood cultures are incubated for 5 days and  are monitored continuously.Positive blood cultures  are called to the RN and reported as soon as  they are identified.     EC-ECHOCARDIOGRAM COMPLETE W/O CONT    Collection Time: 01/23/20  4:24 PM   Result Value Ref Range    Eject.Frac. MOD BP 72.39     Eject.Frac. MOD 4C 71.14     Eject.Frac. MOD 2C 71.68     Left Ventrical Ejection Fraction 70    URINALYSIS    Collection Time: 01/23/20 10:25 PM   Result Value Ref Range    Color Yellow     Character Cloudy (A)     Specific Gravity 1.019 <1.035    Ph 6.5 5.0 - 8.0    Glucose Negative Negative mg/dL     Ketones Negative Negative mg/dL    Protein Negative Negative mg/dL    Bilirubin Negative Negative    Urobilinogen, Urine 1.0 Negative    Nitrite Negative Negative    Leukocyte Esterase Large (A) Negative    Occult Blood Trace (A) Negative    Micro Urine Req Microscopic    URINE MICROSCOPIC (W/UA)    Collection Time: 01/23/20 10:25 PM   Result Value Ref Range    WBC Packed (A) /hpf    RBC 2-5 (A) /hpf    Bacteria Few (A) None /hpf    Epithelial Cells Negative /hpf    Hyaline Cast 0-2 /lpf   CORTISOL    Collection Time: 01/24/20  3:33 AM   Result Value Ref Range    Cortisol 7.5 0.0 - 23.0 ug/dL   Basic Metabolic Panel    Collection Time: 01/24/20  3:33 AM   Result Value Ref Range    Sodium 134 (L) 135 - 145 mmol/L    Potassium 4.1 3.6 - 5.5 mmol/L    Chloride 100 96 - 112 mmol/L    Co2 23 20 - 33 mmol/L    Glucose 96 65 - 99 mg/dL    Bun 25 (H) 8 - 22 mg/dL    Creatinine 1.14 0.50 - 1.40 mg/dL    Calcium 8.6 8.5 - 10.5 mg/dL    Anion Gap 11.0 0.0 - 11.9   CBC WITH DIFFERENTIAL    Collection Time: 01/24/20  3:33 AM   Result Value Ref Range    WBC 18.8 (H) 4.8 - 10.8 K/uL    RBC 2.75 (L) 4.20 - 5.40 M/uL    Hemoglobin 8.6 (L) 12.0 - 16.0 g/dL    Hematocrit 26.8 (L) 37.0 - 47.0 %    MCV 97.5 81.4 - 97.8 fL    MCH 31.3 27.0 - 33.0 pg    MCHC 32.1 (L) 33.6 - 35.0 g/dL    RDW 58.3 (H) 35.9 - 50.0 fL    Platelet Count 337 164 - 446 K/uL    MPV 8.6 (L) 9.0 - 12.9 fL    Neutrophils-Polys 80.00 (H) 44.00 - 72.00 %    Lymphocytes 9.80 (L) 22.00 - 41.00 %    Monocytes 7.00 0.00 - 13.40 %    Eosinophils 0.60 0.00 - 6.90 %    Basophils 0.40 0.00 - 1.80 %    Immature Granulocytes 2.20 (H) 0.00 - 0.90 %    Nucleated RBC 0.00 /100 WBC    Neutrophils (Absolute) 15.08 (H) 2.00 - 7.15 K/uL    Lymphs (Absolute) 1.84 1.00 - 4.80 K/uL    Monos (Absolute) 1.32 (H) 0.00 - 0.85 K/uL    Eos (Absolute) 0.11 0.00 - 0.51 K/uL    Baso (Absolute) 0.07 0.00 - 0.12 K/uL    Immature Granulocytes (abs) 0.41 (H) 0.00 - 0.11 K/uL    NRBC (Absolute) 0.00  K/uL   ESTIMATED GFR    Collection Time: 01/24/20  3:33 AM   Result Value Ref Range    GFR If  55 (A) >60 mL/min/1.73 m 2    GFR If Non African American 45 (A) >60 mL/min/1.73 m 2       Imaging:   MRI lumbar spine 1/15/2020  1.  Multilevel subluxations with L3-4 grade 1 spondylolisthesis, mild anterolisthesis L2-L3 and L4-L5. Retrolisthesis T12-L1 and L1-L2 which is difficult to quantify due to the retropulsed fractures at T12 and L1.  2.  T12 moderate recent or subacute insufficiency compression fracture with retropulsion. Moderate central stenosis at the T12 level.  3.  L1 essentially complete chronic collapse marked retropulsion and chronic sclerotic change seen on CT. No change from 1/12/2018. Moderate central stenosis at the L1 level due to prominent retropulsion up to about 10 mm.  4.  Additional multilevel degenerative disc disease and spondylotic change with L3-4 severe central stenosis at L4-5 severe central stenosis.  5.  Multilevel foraminal stenoses most notable for L3-4 severe right foraminal stenosis.  6.  Details for each level above in the body of report.    ASSESSMENT:  Patient is a 84 y.o. female admitted with L1 burst fracture now s/p T10-L3 fusion, and ongoing medical management for leukocytosis likely related to pneumonia versus UTI.    Rehabilitation: Impaired ADLs and mobility  Patient is not a candidate for IPR due to lack of endurance. She is aware she can not tolerate 3 hours of therapy 5 days a week and will be best served by a SNF where she can recover at a slower pace.     Spinal stenosis of lumbar region with neurogenic claudication -status post T10-L3 instrumentation and T11-L2 laminectomy by Dr. Sang Nelson MD on 1/16/2020.  -L1 transverse fracture seen on CT  -MRI shows severe lumbar stenosis  -Decadron 1 mg every 12 hours p.o.  - Continue PT/OT while in house  - Recommend orthostatic testing and treating with midodrine 5mg TID if needed     Leukocytosis- pneumonia  is suspected source  -WBC 18.8 today  -Started on Unasyn 3 g every 6 hours IV today  - Management per primary team    Mood  -Celexa 20 mg nightly    Hypothyroidism  -Synthroid 112 mcg every morning    Hypertension  -Spironolactone 25 mg daily    Anemia  - HGB 8.9 today   - Consider daily iron supplement for hgb below 10  - Transfuse if less than 7.0     Pain  -Lidoderm patch daily as needed  -Roxicodone 5 mg every 3 hours as needed   - Consider adding additional pain medication such as norco 10/325 Q6 for better pain control. Ok to keep roxicodone for breakthrough pain     Neuropathic pain  -Gabapentin 300 mg 3 times daily    Bowel program-last documented bowel movement yesterday  - Patient is constipated, likely related to immobility and opioid use, recommend the following:  - Senokot 1 tab nightly  - MiraLAX 1 packet twice daily PRN  - Milk of magnesia 30mL daily if no bowel movement in greater than 24 hours  - Dulcolax suppository 10 mg if patient goes more than 48 hours without a bowel movement  - Fleet enema if patient goes more than 72 hours without a bowel movement    DVT Prophylaxis:   - SCDs      Discussed with pt, summarized hospitalization and care, options for next step of care  Labs reviewed   Imaging personally reviewed    Discussed with team about recommendations     Thank you for allowing us to participate in the care of this patient.     Alfonso Yee, DO   Physical Medicine and Rehabilitation

## 2020-01-24 NOTE — CARE PLAN
Problem: Safety  Goal: Will remain free from falls  Outcome: PROGRESSING AS EXPECTED  Note:   Fall precautions in place.     Problem: Venous Thromboembolism (VTW)/Deep Vein Thrombosis (DVT) Prevention:  Goal: Patient will participate in Venous Thrombosis (VTE)/Deep Vein Thrombosis (DVT)Prevention Measures  Outcome: PROGRESSING AS EXPECTED  Flowsheets (Taken 1/24/2020 0359)  Mechanical Prophylaxis : SCDs, Sequential Compression Device  SCDs, Sequential Compression Device: On

## 2020-01-24 NOTE — DISCHARGE PLANNING
Spoke to Cher @ [place]    Transport is scheduled for 1/24 @ 1700 going to [place]. Eli(LEONIDAS CM) notified of transport time.

## 2020-01-24 NOTE — PROGRESS NOTES
Neurosurgery Progress Note    Subjective:  Pain continues to improve  Rao catheter removed- pt has not voided yet  Appetite OK  Denies N/V, SOB, CP  Minimal ambulation  +BM yesterday    Exam:  A&O, appears comfortable  BLE intact, R df/pf (States likely baseline, multiple foot surgeries) sensation grossly intact  Dressing with small amount old drainage.         BP  Min: 100/49  Max: 139/72  Pulse  Av.3  Min: 84  Max: 88  Resp  Av  Min: 16  Max: 16  Temp  Av.9 °C (98.4 °F)  Min: 36.5 °C (97.7 °F)  Max: 37.1 °C (98.8 °F)  SpO2  Av.3 %  Min: 97 %  Max: 98 %    No data recorded    Recent Labs     20  0826 20  0333   WBC 13.5* 18.9* 18.8*   RBC 2.45* 2.96* 2.75*   HEMOGLOBIN 7.6* 9.3* 8.6*   HEMATOCRIT 23.3* 28.0* 26.8*   MCV 95.1 94.6 97.5   MCH 31.0 31.4 31.3   MCHC 32.6* 33.2* 32.1*   RDW 54.7* 54.4* 58.3*   PLATELETCT 272 328 337   MPV 8.9* 8.7* 8.6*     Recent Labs     20  0452 20  0333   SODIUM 133* 130* 134*   POTASSIUM 4.3 4.4 4.1   CHLORIDE 100 98 100   CO2 25 22 23   GLUCOSE 102* 78 96   BUN 24* 23* 25*   CREATININE 0.93 0.95 1.14   CALCIUM 8.5 8.1* 8.6               Intake/Output       20 - 20 0659 20 - 2059       Total  Total       Intake    Total Intake -- -- -- -- -- --       Output    Urine  --  850 850  --  -- --    Straight Catheter -- 850 850 -- -- --    Stool  --  -- --  --  -- --    Number of Times Stooled 1 x -- 1 x -- -- --    Total Output -- 850 850 -- -- --       Net I/O     -- -850 -850 -- -- --            Intake/Output Summary (Last 24 hours) at 2020 3842  Last data filed at 2020 0530  Gross per 24 hour   Intake --   Output 850 ml   Net -850 ml       $ Bladder Scan Results (mL): 405    • dexamethasone  1 mg Q12HRS   • ampicillin-sulbactam (UNASYN) IV  3 g Q6HRS   • levothyroxine  112 mcg AM ES   • gabapentin  300 mg TID   • lidocaine  1  Patch Q24HR   • polyethylene glycol/lytes  1 Packet TID   • spironolactone  25 mg DAILY   • magnesium oxide  400 mg BID   • magnesium hydroxide  30 mL QDAY PRN   • diphenhydrAMINE  25 mg Q6HRS PRN    Or   • diphenhydrAMINE  25 mg Q6HRS PRN   • hydroxychloroquine  200 mg DAILY   • polyethylene glycol/lytes  1 Packet BID PRN   • ondansetron  4 mg Q4HRS PRN   • senna-docusate  1 Tab Nightly   • senna-docusate  1 Tab Q24HRS PRN   • oxyCODONE immediate-release  5 mg Q3HRS PRN   • diphenhydrAMINE  25 mg Q6HRS PRN   • citalopram  20 mg QHS   • acetaminophen  650 mg Q6HRS PRN   • omeprazole  20 mg DAILY   • docusate sodium  100 mg BID   • Pharmacy Consult Request  1 Each PHARMACY TO DOSE   • MD ALERT...DO NOT ADMINISTER NSAIDS or ASPIRIN unless ORDERED By Neurosurgery  1 Each PRN   • bisacodyl  10 mg Q24HRS PRN   • Respiratory Care per Protocol   Continuous RT   • labetalol  10 mg Q HOUR PRN   • hydrALAZINE  10 mg Q HOUR PRN   • benzocaine-menthol  1 Lozenge Q2HRS PRN   • ipratropium  1 Spray BID       Assessment and Plan:  Hospital Day#13 L1 burst fx with >50% retropulsion into conus, kyphotic deformity thoracolumbar junction  POD#9 T10-L3 fusion, laminectomy T11-L2    Intraop CSF leak at pedicle of T11.    Plan:  Stable neuro exam.   Hemoglobin 8.6, continue to monitor  Continue pain management, gabapentin  Wean off dex over the next 1-2 days, IM service managing  Can restart ASA  POD#10   Ct t/l spine reviewed by Dr Nelson & myself, adequate decompression  Mobilize as tolerated in TLSO brace (to be worn when OOB)    Continue to mobilize as tolerated with PT/OT  OK to DC to SNF when medically cleared.   Staples out POD#14. OK to remove silver dressing today, keep incision open to air   F/up at [facility] for a 2 week post-op appointment

## 2020-01-24 NOTE — DISCHARGE SUMMARY
Discharge Summary    CHIEF COMPLAINT ON ADMISSION  Chief Complaint   Patient presents with   • T-5000 GLF       Reason for Admission  EMS     CODE STATUS  Full Code    HPI & HOSPITAL COURSE     This is an 84-year-old female with a past medical history of CKD, stroke, osteoporosis, rheumatoid arthritis, hypothyroidism, and depression admitted with low back pain after mechanical ground-level fall.  The patient was attempting to sit in a chair when the chair rolled out from underneath her causing her to land on the floor.  She developed acute low back pain leading her to present to the ED.    On admission CT L-spine did reveal left L1 transverse process fracture that is possibly chronic.  No other acute fracture noted.  CT pelvis was negative for acute abnormality.      The patient also takes Spironolactone and Lasix as an outpatient for edema.  On admission she was noted to have worsening of her underlying CKD.  Her diuretics were held with improvement of her creatinine.  Lasix restarted in reduce dose 20 mg today    Her MRI lumbar spine showed severe lumbar stenosis and neurosurgery was consulted and she was taken to OR on 1/16/20   .After surgery patient was transferred to ICU due to hypotension.  Shock was  believed to be secondary to hypovolemia versus hemorrhagic.  Patient received 2 units of red blood cells.  She also received pressors.  After stabilization, she was transferred out of ICU on 1/19 and has been hemodynamically stable since    Patient requires significant assistance due to lower back pain and generalized weakness.    Patient was complaining of generalized weakness when she started to mobilize.  Her CPK noted to be mildly elevated at 220.  Myopathy could be secondary to steroid treatment, treatment with Plaquenil and poorly controlled hypothyroidism.   Dose of levothyroxine was increased from 100-112 because TSH is 7.770.  Plaquenil stopped.  Decadron tapered off.    Patient was noted to have urine  retention secondary to methocarbamol, trazodone and opiates.  Methocarbamol and trazodone DC'd.    Control CT of T/ L-spine showed adequate decompression.  Recommended to mobilize as tolerated in TLSO brace.    Due to finding on CT of the thoracic spine showed with groundglass opacity in the right lower lobe and leukocytosis 18, patient was started on IV Unasyn and transitioned to p.o. Augmentin for possible pneumonia.    UA was checked due to leukocytosis and returned positive for packed white blood cells, large leukocyte esterase.  She should be covered with Augmentin empirically.  Urine culture is pending    Per neurosurgery, aspirin allowed, but not DVT prophylaxis with heparin.    Heart ultrasound and lower extremity DVT were done due to mild lower extremity edema and and history of DVT.  Ultrasound of lower extremity negative for DVT.  Heart ultrasound showed preserved ejection fraction 70%, normal diastolic function, unable to estimate pulmonary artery pressure, no significant valvular disease.    PT and OT evaluated patient and recommended rehab at transitional care facility.    Therefore, she is discharged in fair and stable condition to skilled nursing facility.    The patient met 2-midnight criteria for an inpatient stay at the time of discharge.      FOLLOW UP ITEMS POST DISCHARGE  PCP  Neurosurgery    DISCHARGE DIAGNOSES  Principal Problem:    Spinal stenosis of lumbar region with neurogenic claudication POA: Yes  Active Problems:    Lumbar transverse process fracture (HCC) POA: Unknown    Shock (HCC) POA: Unknown    Encephalopathy acute POA: Unknown    Rheumatoid arthritis (HCC) POA: Yes    Hypokalemia POA: Yes    CKD (chronic kidney disease) stage 3, GFR 30-59 ml/min ([facility]) POA: Yes    Hypomagnesemia POA: Yes    Leg DVT (deep venous thromboembolism), chronic, right (HCC) POA: Yes    Hypothyroidism POA: Yes    Generalized weakness POA: Unknown    Leukocytosis POA: Unknown    Urine retention POA:  Unknown    Neuropathic pain POA: Unknown    Anemia POA: Unknown    Constipation POA: Unknown    Pneumonia POA: Unknown    Depression POA: Yes    Peripheral edema POA: Unknown  Resolved Problems:    * No resolved hospital problems. *      FOLLOW UP  Future Appointments   Date Time Provider Department Center   2/19/2020  1:00 PM Ramone Iraheta M.D. [facility] None   [facility]10[facility]2020  [facility]     No follow-up provider specified.    MEDICATIONS ON DISCHARGE     Medication List      START taking these medications      Instructions   amoxicillin-clavulanate 875-125 MG Tabs  Commonly known as:  AUGMENTIN   Take 1 Tab by mouth 2 times a day for 4 days.  Dose:  1 Tab     gabapentin 300 MG Caps  Commonly known as:  NEURONTIN   Take 1 Cap by mouth 3 times a day.  Dose:  300 mg     lidocaine 5 % Ptch  Commonly known as:  LIDODERM   Apply 1 Patch to skin as directed every 24 hours.  Dose:  1 Patch     magnesium oxide 400 (241.3 Mg) MG Tabs tablet  Commonly known as:  MAG-OX   Take 1 Tab by mouth 2 Times a Day.  Dose:  400 mg     omeprazole 20 MG delayed-release capsule  Commonly known as:  PRILOSEC   Take 1 Cap by mouth every day.  Dose:  20 mg     oxyCODONE immediate-release 5 MG Tabs  Commonly known as:  ROXICODONE   Take 1 Tab by mouth every 6 hours as needed for Severe Pain for up to 3 days.  Dose:  5 mg        CHANGE how you take these medications      Instructions   furosemide 40 MG Tabs  What changed:  how much to take  Commonly known as:  LASIX   Take 0.5 Tabs by mouth every day.  Dose:  20 mg        CONTINUE taking these medications      Instructions   citalopram 20 MG Tabs  Commonly known as:  CELEXA   Take 1 Tab by mouth every bedtime.  Dose:  20 mg     ipratropium 0.06 % Soln  Commonly known as:  ATROVENT   Spray 1 Spray in nose 2 Times a Day.  Dose:  1 Spray     KLOR-CON M10 PO   Take 10 mEq by mouth every day.  Dose:  10 mEq     PROLIA 60 MG/ML Soln  Generic drug:  denosumab   Inject 60 mg  as instructed every 6 months.  Dose:  60 mg     spironolactone 25 MG Tabs  Commonly known as:  ALDACTONE   Take 25 mg by mouth every day.  Dose:  25 mg     SYNTHROID 100 MCG Tabs  Generic drug:  levothyroxine   Take 1 Tab by mouth Every morning on an empty stomach.  Dose:  100 mcg     VITAMIN C PO   Take 1 Tab by mouth every morning.  Dose:  1 Tab        STOP taking these medications    BIOTIN PO     CALCIUM PO     FIBER PO     hydroxychloroquine 200 MG Tabs  Commonly known as:  PLAQUENIL     traZODone 50 MG Tabs  Commonly known as:  DESYREL     WOMENS ONE DAILY Tabs            Allergies  Allergies   Allergen Reactions   • Azithromycin Unspecified     Matti Johnsons syndrome   • Cefuroxime Itching and Vomiting     They gave me C-Diff   • Ciprofloxacin Itching and Vomiting     They gave me C-Diff   • Clindamycin Itching and Vomiting     They gave me c-diff   • Daptomycin      Matti colleen syndrome     • Erythromycin Unspecified     Matti Johnsons syndrome   • Nitrofurantoin Vomiting and Nausea   • Rifampin      Matti colleen syndrome   • Sulfa Drugs Swelling   • Codeine Itching   • Flagyl [Metronidazole] Vomiting and Nausea   • Macrodantin  [Nitrofurantoin Macrocrystal]      Other reaction(s): Decreased Blood Pressure   • Morphine Itching     Tolerates oxycodone/hydromorphone   • Premarin Unspecified     burning       DIET  Orders Placed This Encounter   Procedures   • Diet Order Regular     Standing Status:   Standing     Number of Occurrences:   1     Order Specific Question:   Diet:     Answer:   Regular [1]       ACTIVITY  As tolerated.  Weight bearing as tolerated    LINES, DRAINS, AND WOUNDS  This is an automated list. Peripheral IVs will be removed prior to discharge.  Midline IV 01/18/20 Right Upper arm (Active)   Site Assessment Clean;Dry;Intact 1/24/2020 10:00 AM   Dressing Type Biopatch;Transparent 1/24/2020 10:00 AM   Line Status Saline locked;Scrubbed the hub prior to access;Flushed 1/24/2020 10:00  AM   Dressing Status Clean;Dry;Intact 1/24/2020 10:00 AM   Dressing Intervention N/A 1/21/2020 11:00 PM   Dressing Change Due 01/25/20 1/20/2020  8:05 AM   Infiltration Grading ([facility]) 0 1/21/2020 11:00 PM   [facility]) 0 1/21/2020 11:00 PM                     MENTAL STATUS ON TRANSFER  Level of Consciousness: Alert  Orientation : Oriented x 4  Speech: Speech Clear    CONSULTATIONS  Neurosurgery  Critical care    PROCEDURES  PROCEDURES PERFORMED: 1/16, Dr. Nelson  1.  Placement of posterolateral instrumentation from T10 through L3 using   pedicle screws and rods.  2.  Posterolateral arthrodesis from T10 through L3 using MagniFuse and   autograft with BMP.  3.  Augmentation of posterolateral screws at T10 bilaterally using cement with   fenestrated screws at L2 on the patient's right side using fenestrated screws   and bone cement, and at L3 bilaterally using fenestrated screws and bone   cement.  4.  Laminectomy from T11 through L2 for posterior decompression.  5.  Bilateral foraminotomy at L1 for disimpaction and removal of bone   fragments impacting the L1 nerve root.  6.  Transpedicular approach for reduction of L1 retropulsed bone fragments.  7.  Open reduction of kyphotic deformity using in situ bending and reduction   towers to reduce the patient's kyphotic deformity, as well as subluxation of   L2 on L1.  8.  Use of intraoperative fluoro for guidance real time of placement of   thoracic and lumbar screws as well as live fluoro and injection of bone cement   at T10, L2 and L3.  9.  Repair of iatrogenic CSF leak at the pedicle of T11.  10.  Repair of pathologic leak at the shoulder of the L1 left-sided nerve root   secondary to nerve impaction from bone fragments.  This was repaired using   muscle graft along with Adherus.  11.  Intraoperative ultrasound was used to ensure that appropriate   disimpaction of her conus was achieved with the reduction in her retropulsed   L1 burst  fracture.    LABORATORY  Lab Results   Component Value Date    SODIUM 134 (L) 01/24/2020    POTASSIUM 4.1 01/24/2020    CHLORIDE 100 01/24/2020    CO2 23 01/24/2020    GLUCOSE 96 01/24/2020    BUN 25 (H) 01/24/2020    CREATININE 1.14 01/24/2020    CREATININE 1.1 06/29/2007        Lab Results   Component Value Date    WBC 18.8 (H) 01/24/2020    HEMOGLOBIN 8.6 (L) 01/24/2020    HEMATOCRIT 26.8 (L) 01/24/2020    PLATELETCT 337 01/24/2020      US-EXTREMITY VENOUS LOWER BILAT   Final Result      EC-ECHOCARDIOGRAM COMPLETE W/O CONT   Final Result      DX-CHEST-PORTABLE (1 VIEW)   Final Result      No acute cardiopulmonary disease evident.      CT-TSPINE W/O PLUS RECONS   Final Result         1.  Partially visualized sequela of thoracolumbar instrumentation, detailed in a separate lumbar spine CT report. T12 and L1 fractures are also discussed separately.   2.  No fractures above T12 level.   3.  Mild multilevel thoracic spondylosis, with grade 1 degenerative retrolisthesis at T10-T11 level.   4.  Tree-in-bud groundglass opacities in the right lung, likely sequela of infectious/inflammatory bronchiolitis.      CT-LSPINE W/O PLUS RECONS   Final Result      1.  Interval posterior cervical lumbar fusion at T10-L3. Paraspinal rods and transpedicular screws are intact.   2.  Unchanged severe L1 compression fracture with posterior retropulsion.   3.  Mild T12 compression fracture, with bilateral pedicle screws at this level, positioned in close proximity to T12 superior endplate. Mild posterior retropulsion of approximately 5 mm, new since prior study.   4.  Osteopenia and degenerative changes.      IR-MIDLINE CATHETER INSERTION WO GUIDANCE > AGE 3   Final Result                  Ultrasound-guided midline placement performed by qualified nursing staff    as above.          DX-CHEST-PORTABLE (1 VIEW)   Final Result      Placement of right internal jugular catheter tip projecting appropriately over the SVC      OUTSIDE IMAGES-CT  THORACIC SPINE   Final Result      OUTSIDE IMAGES-CT LUMBAR SPINE   Final Result      DX-THORACOLUMBAR SPINE-2 VIEWS   Final Result      Digitized intraoperative radiograph is submitted for review.  This examination is not for diagnostic purpose but for guidance during a surgical procedure.      DX-PORTABLE FLUORO > 1 HOUR   Final Result      Portable fluoroscopy utilized for 1 minute 48 seconds.         INTERPRETING LOCATION: [address] [place] [address] [place], [id number]      MR-LUMBAR SPINE-W/O   Final Result      1.  Multilevel subluxations with L3-4 grade 1 spondylolisthesis, mild anterolisthesis L2-L3 and L4-L5. Retrolisthesis T12-L1 and L1-L2 which is difficult to quantify due to the retropulsed fractures at T12 and L1.   2.  T12 moderate recent or subacute insufficiency compression fracture with retropulsion. Moderate central stenosis at the T12 level.   3.  L1 essentially complete chronic collapse marked retropulsion and chronic sclerotic change seen on CT. No change from 1/12/2018. Moderate central stenosis at the L1 level due to prominent retropulsion up to about 10 mm.   4.  Additional multilevel degenerative disc disease and spondylotic change with L3-4 severe central stenosis at L4-5 severe central stenosis.   5.  Multilevel foraminal stenoses most notable for L3-4 severe right foraminal stenosis.   6.  Details for each level above in the body of report.      CT-PELVIS W/O PLUS RECONS   Final Result         1.  No acute abnormality.   2.  Atherosclerosis      CT-LSPINE W/O PLUS RECONS   Final Result         1.  Left L1 transverse process fracture, may be chronic, otherwise no acute traumatic lumbar spine injury identified   2.  Anterolisthesis L2 on L3 and L3 on L4, appears most likely secondary to severe facet arthrosis.   3.  Chronic appearing L1 vertebral plana with 8.3 mm retropulsion and large central Schmorl's node   4.  Atherosclerosis            Total time of the discharge process exceeds 37 minutes.

## 2020-01-25 NOTE — THERAPY
"Physical Therapy Treatment completed.   Bed Mobility:  Supine to Sit: Moderate Assist  Transfers: Sit to Stand: Refused  Gait: Level Of Assist: Unable to Participate    Plan of Care: Will benefit from Physical Therapy 4 times per week  Discharge Recommendations: Equipment: Will Continue to Assess for Equipment Needs. Post-acute therapy Recommend post-acute placement for continued physical therapy services prior to discharge home. Patient can tolerate post-acute therapies at a 5x/week frequency.     See \"Rehab Therapy-Acute\" Patient Summary Report for complete documentation.     Pt was tearful upon arrival due to pain. She required extra time and education on improtance of mobility. Pt required modA for log rolling with vc for technique. Pt at this point gasping and grimacing in pain. Provided cues for relaxation and depe breathing. She was agreeable to sit on EOB but refused standing or transfers today. She completed supine to sit with modA and therapist provided step by step instructions. She improved while sitting at eob compared to yesterday. Continue to recommend post acute placement at [facility]. Will follow while here.   "

## 2020-01-25 NOTE — PROGRESS NOTES
Patient discharged via Wheelchair by [facility]. IV out. Discharge paperwork given to transporter. Questions answered. Patient belongings discharged with patient. Patient's back brace also discharged with patient.

## 2020-01-25 NOTE — DISCHARGE INSTRUCTIONS
Discharge Instructions    Discharged to other by medical transportation with escort. Discharged via wheelchair, hospital escort: Yes.  Special equipment needed: Immobilizer    Be sure to schedule a follow-up appointment with your primary care doctor or any specialists as instructed.     Discharge Plan:   Influenza Vaccine Indication: Not indicated: Previously immunized this influenza season and > 8 years of age    I understand that a diet low in cholesterol, fat, and sodium is recommended for good health. Unless I have been given specific instructions below for another diet, I accept this instruction as my diet prescription.   Other diet: regular diet    Special Instructions: None    · Is patient discharged on Warfarin / Coumadin?   No     Depression / Suicide Risk    As you are discharged from this [facility] facility, it is important to learn how to keep safe from harming yourself.    Recognize the warning signs:  · Abrupt changes in personality, positive or negative- including increase in energy   · Giving away possessions  · Change in eating patterns- significant weight changes-  positive or negative  · Change in sleeping patterns- unable to sleep or sleeping all the time   · Unwillingness or inability to communicate  · Depression  · Unusual sadness, discouragement and loneliness  · Talk of wanting to die  · Neglect of personal appearance   · Rebelliousness- reckless behavior  · Withdrawal from people/activities they love  · Confusion- inability to concentrate     If you or a loved one observes any of these behaviors or has concerns about self-harm, here's what you can do:  · Talk about it- your feelings and reasons for harming yourself  · Remove any means that you might use to hurt yourself (examples: pills, rope, extension cords, firearm)  · Get professional help from the community (Mental Health, Substance Abuse, psychological counseling)  · Do not be alone:Call your Safe Contact- someone whom you trust  who will be there for you.  · Call your local CRISIS HOTLINE 912-1350 or 364-964-6597  · Call your local Children's Mobile Crisis Response Team [place] (699) 453-5576 or www.Hashtrack  · Call the toll free National Suicide Prevention Hotlines   · National Suicide Prevention Lifeline 415-242-TKKD (7076)  · National [employer] Line Network 800-suicide (286-9708)      FOLLOW UP WITH RHEUMATOLOGIST

## 2020-01-25 NOTE — DISCHARGE PLANNING
Anticipated Discharge Disposition:   [facility]    Action:    Pt medically cleared for transfer today per Dr. Montalvo.    RN IONA spoke with patient's daughter Mazin and she is agreeable to transfer today.    Pt agreeable to transfer today at 1700.  Pt signed Cobra.    Dr. Montalvo signed Cobra.    DC packet given to bedside LEONIDAS Chavez.    Barriers to Discharge:    None    Plan:    DC today.

## 2020-01-28 LAB
BACTERIA BLD CULT: NORMAL
BACTERIA BLD CULT: NORMAL
SIGNIFICANT IND 70042: NORMAL
SIGNIFICANT IND 70042: NORMAL
SITE SITE: NORMAL
SITE SITE: NORMAL
SOURCE SOURCE: NORMAL
SOURCE SOURCE: NORMAL

## 2020-02-27 ENCOUNTER — HOSPITAL ENCOUNTER (OUTPATIENT)
Dept: RADIOLOGY | Facility: MEDICAL CENTER | Age: 85
End: 2020-02-27
Attending: NEUROLOGICAL SURGERY
Payer: MEDICARE

## 2020-02-27 DIAGNOSIS — S32.001D: ICD-10-CM

## 2020-02-27 PROBLEM — S22.000A COMPRESSION FRACTURE OF BODY OF THORACIC VERTEBRA (HCC): Status: ACTIVE | Noted: 2020-02-27

## 2020-02-27 PROBLEM — S32.010A COMPRESSION FRACTURE OF FIRST LUMBAR VERTEBRA (HCC): Status: ACTIVE | Noted: 2020-02-27

## 2020-02-27 PROCEDURE — 72100 X-RAY EXAM L-S SPINE 2/3 VWS: CPT

## 2020-02-28 ENCOUNTER — OFFICE VISIT (OUTPATIENT)
Dept: INTERNAL MEDICINE | Facility: IMAGING CENTER | Age: 85
End: 2020-02-28
Payer: MEDICARE

## 2020-02-28 VITALS
HEIGHT: 65 IN | BODY MASS INDEX: 20.33 KG/M2 | SYSTOLIC BLOOD PRESSURE: 104 MMHG | OXYGEN SATURATION: 95 % | TEMPERATURE: 97.7 F | HEART RATE: 66 BPM | WEIGHT: 122 LBS | DIASTOLIC BLOOD PRESSURE: 50 MMHG | RESPIRATION RATE: 14 BRPM

## 2020-02-28 DIAGNOSIS — D72.829 LEUKOCYTOSIS, UNSPECIFIED TYPE: ICD-10-CM

## 2020-02-28 PROBLEM — G93.40 ENCEPHALOPATHY ACUTE: Status: RESOLVED | Noted: 2020-01-16 | Resolved: 2020-02-28

## 2020-02-28 PROCEDURE — 99213 OFFICE O/P EST LOW 20 MIN: CPT | Performed by: INTERNAL MEDICINE

## 2020-02-28 RX ORDER — TRIAMCINOLONE ACETONIDE 0.1 %
PASTE (GRAM) DENTAL
Qty: 5 G | Refills: 5 | Status: SHIPPED | OUTPATIENT
Start: 2020-02-28 | End: 2020-12-03 | Stop reason: SDUPTHER

## 2020-02-28 RX ORDER — TRAZODONE HYDROCHLORIDE 50 MG/1
25 TABLET ORAL
Qty: 15 TAB | Refills: 1 | Status: SHIPPED | OUTPATIENT
Start: 2020-02-28 | End: 2020-03-02 | Stop reason: SDUPTHER

## 2020-02-28 ASSESSMENT — FIBROSIS 4 INDEX: FIB4 SCORE: 3.07

## 2020-02-28 NOTE — PROGRESS NOTES
"Established Patient Note   HPI:        Elizabeth is here today to follow up after back surgery and inpatient rehabilitation. She takes tylenol for pain. She is currently using walker to help with balance.     Past Medical History:   Diagnosis Date   • Anesthesia     \"One time had a hard time waking me up\"   • C. difficile diarrhea 2/16   • C. difficile diarrhea 3/2016    fecal transplant   • Cancer ([facility]) 1947    Tongue CA - Radiation   • Chronic back pain     hands, shoulders   • Dense breast tissue on mammogram 7/25/2019   • Depression    • Elbow fracture, left    • Heart burn    • History of anemia    • Hypothyroid    • MRSA (methicillin resistant Staphylococcus aureus)     Right foot   • MRSA (methicillin resistant Staphylococcus aureus) 2012   • Osteoporosis 3/27/2019    DEXA Aug. 2017: T score -3.4 right forearm and -1.6 left hip DEXA Aug. 2019: T score forearm -4.6 and hip -1.4   • Pain     shoulders, feet, back   • Rheumatoid arthritis ([facility]) 9/26/2014   • Stroke ([facility]) 1990's?    \"mini\" no residual symptoms   • Urinary incontinence     occasional       Current Outpatient Medications   Medication Sig Dispense Refill   • traZODone (DESYREL) 50 MG Tab Take 0.5 Tabs by mouth every bedtime. 15 Tab 1   • triamcinolone acetonide-orabase (KENALOG IN ORABASE) 0.1 % Paste Apply orally BID, PRN 5 g 5   • furosemide (LASIX) 40 MG Tab Take 0.5 Tabs by mouth every day. 90 Tab 3   • ipratropium (ATROVENT) 0.06 % Solution Spray 1 Spray in nose 2 Times a Day.     • SYNTHROID 100 MCG Tab Take 1 Tab by mouth Every morning on an empty stomach. 90 Tab 3   • gabapentin (NEURONTIN) 300 MG Cap Take 1 Cap by mouth 3 times a day. 90 Cap 0   • magnesium oxide (MAG-OX) 400 (241.3 Mg) MG Tab tablet Take 1 Tab by mouth 2 Times a Day. 30 Tab 0   • spironolactone (ALDACTONE) 25 MG Tab Take 25 mg by mouth every day.     • Ascorbic Acid (VITAMIN C PO) Take 1 Tab by mouth every morning.     • citalopram (CELEXA) 20 MG Tab Take 1 Tab by mouth " every bedtime. 90 Tab 3   • Potassium Chloride Catrachita ER (KLOR-CON M10 PO) Take 10 mEq by mouth every day.     • denosumab (PROLIA) 60 MG/ML Solution Inject 60 mg as instructed every 6 months.     • lidocaine (LIDODERM) 5 % Patch Apply 1 Patch to skin as directed every 24 hours. (Patient not taking: Reported on 2020) 10 Patch    • omeprazole (PRILOSEC) 20 MG delayed-release capsule Take 1 Cap by mouth every day. (Patient not taking: Reported on 2020) 30 Cap      No current facility-administered medications for this visit.          Allergies   Allergen Reactions   • Azithromycin Unspecified     Matti Johnsons syndrome   • Cefuroxime Itching and Vomiting     They gave me C-Diff   • Ciprofloxacin Itching and Vomiting     They gave me C-Diff   • Clindamycin Itching and Vomiting     They gave me c-diff   • Daptomycin      Matti colleen syndrome     • Erythromycin Unspecified     Matti Johnsons syndrome   • Nitrofurantoin Vomiting and Nausea   • Rifampin      Matti colleen syndrome   • Sulfa Drugs Swelling   • Codeine Itching   • Flagyl [Metronidazole] Vomiting and Nausea   • Macrodantin  [Nitrofurantoin Macrocrystal]      Other reaction(s): Decreased Blood Pressure   • Morphine Itching     Tolerates oxycodone/hydromorphone   • Premarin Unspecified     burning         Social History     Tobacco Use   • Smoking status: Former Smoker     Packs/day: 1.00     Years: 20.00     Pack years: 20.00     Types: Cigarettes     Last attempt to quit: 2016     Years since quittin.1   • Smokeless tobacco: Never Used   Substance Use Topics   • Alcohol use: Yes     Alcohol/week: 1.2 oz     Types: 2 Glasses of wine per week     Drinks per session: 1 or 2     Binge frequency: Daily or almost daily     Comment: 1 per day   • Drug use: No       Past Surgical History:   Procedure Laterality Date   • PB DEGROOT W/O FACETEC FORAMOT/DSKC  VRT SEG, TH* N/A 2020    Procedure: LAMINECTOMY, SPINE, THORACIC;  Surgeon: Sang UGARTE  "MARY Nelson;  Location: SURGERY [facility];  Service: Neurosurgery   • THORACIC FUSION O-ARM N/A 1/16/2020    Procedure: FUSION, SPINE, THORACIC, POSTERIOR APPROACH - T9-L3;  Surgeon: Sang Nelson M.D.;  Location: SURGERY [facility];  Service: Neurosurgery   • PB RECONSTR TOTAL SHOULDER IMPLANT Left 4/30/2019    Procedure: ARTHROPLASTY, SHOULDER, TOTAL - REVERSE;  Surgeon: Daniella Camargo M.D.;  Location: SURGERY [facility];  Service: Orthopedics   • SHOULDER ARTHROPLASTY TOTAL Right 1/9/2018    Procedure: SHOULDER ARTHROPLASTY TOTAL - REVERSE;  Surgeon: Daniella Camargo M.D.;  Location: SURGERY [facility];  Service: Orthopedics   • COLONOSCOPY - ENDO N/A 3/7/2016    Procedure: COLONOSCOPY - ENDO;  Surgeon: Estiven Ayers M.D.;  Location: ENDOSCOPY [facility];  Service:    • FECAL TRANSPLANT N/A 3/7/2016    Procedure: FECAL TRANSPLANT;  Surgeon: Estiven Ayers M.D.;  Location: ENDOSCOPY [facility];  Service:    • OTHER ORTHOPEDIC SURGERY Left 2012    Left Elbow fx repair   • BLEPHAROPLASTY  3/31/2011    Performed by ORACIO DIAZ at SURGERY [facility] ORS   • FOOT SURGERY Right 2010   • OTHER ORTHOPEDIC SURGERY Left 2006    finger- removal of digit Left middle finger   • CHOLECYSTECTOMY  2000   • HYSTERECTOMY LAPAROSCOPY  2000   • HYSTERECTOMY RADICAL  1985   • OTHER Bilateral 2010, 2008    feet- repair torn tendons   • OTHER ORTHOPEDIC SURGERY Left 1970s    femur fx        ROS    Ambulatory Vitals  /50 (BP Location: Left arm, Patient Position: Sitting, BP Cuff Size: Adult)   Pulse 66   Temp 36.5 °C (97.7 °F) (Temporal)   Resp 14   Ht 1.651 m (5' 5\")   Wt 55.3 kg (122 lb) Comment: reported (due to back brace)  SpO2 95%   BMI 20.30 kg/m²     Physical Exam   Constitutional: She is well-developed, well-nourished, and in no distress. No distress.   Cardiovascular: Normal rate, regular rhythm and normal heart sounds. Exam reveals no gallop and no friction rub. "   No murmur heard.  Pulmonary/Chest: Effort normal and breath sounds normal. She has no wheezes. She has no rales.   Neurological: She is alert.   Skin: She is not diaphoretic.     Reviewing last lab her WBC count was elevated.      Assessment and Plan:     1. Leukocytosis, unspecified type       Will check CBC along with her scheduled blood work in mid March but advised her to do blood work in early to mid April.    Ramone Iraheta M.D.

## 2020-03-02 RX ORDER — TRAZODONE HYDROCHLORIDE 50 MG/1
25 TABLET ORAL
Qty: 45 TAB | Refills: 3 | Status: SHIPPED | OUTPATIENT
Start: 2020-03-02 | End: 2020-03-13 | Stop reason: SDUPTHER

## 2020-03-02 RX ORDER — POTASSIUM CHLORIDE 750 MG/1
10 TABLET, EXTENDED RELEASE ORAL DAILY
Qty: 90 TAB | Refills: 3 | Status: SHIPPED | OUTPATIENT
Start: 2020-03-02 | End: 2021-02-16 | Stop reason: SDUPTHER

## 2020-03-02 RX ORDER — HYDROXYCHLOROQUINE SULFATE 200 MG/1
200 TABLET, FILM COATED ORAL DAILY
Qty: 90 TAB | Refills: 3 | Status: SHIPPED | OUTPATIENT
Start: 2020-03-02 | End: 2021-02-16 | Stop reason: SDUPTHER

## 2020-03-13 DIAGNOSIS — E03.9 HYPOTHYROIDISM (ACQUIRED): ICD-10-CM

## 2020-03-13 RX ORDER — TRAZODONE HYDROCHLORIDE 50 MG/1
25 TABLET ORAL
Qty: 45 TAB | Refills: 3 | Status: SHIPPED | OUTPATIENT
Start: 2020-03-13 | End: 2021-02-16

## 2020-03-13 RX ORDER — LEVOTHYROXINE SODIUM 100 MCG
112 TABLET ORAL
Qty: 90 TAB | Refills: 3 | Status: SHIPPED
Start: 2020-03-13 | End: 2020-07-29

## 2020-03-20 ENCOUNTER — PATIENT OUTREACH (OUTPATIENT)
Dept: HEALTH INFORMATION MANAGEMENT | Facility: OTHER | Age: 85
End: 2020-03-20

## 2020-03-25 NOTE — PROGRESS NOTES
An 84-year-old female was a local transfer admission to [facility] from 1/24/2020 to 2/14/2020 to treat Dorsalgia, unspecified. The patient was discharged home w/ Home Health. The patient was not under clinical case management.    The patient was ordered to follow-up with PCP and Home Health. The patient had the following appointments:     1) 2/19/2020 @ 1:00 Ramone Iraheta, internal medicine - CONFIRMED AS KEPT     2) 3/12/2020 @ 9:00 [facility] - Appointment Kept       [facility] communicated with the patient/caregiver via phone.  Patient became non-responsive to IHD outreaches. As a result, I[facility] had limited patient engagement.

## 2020-03-26 RX ORDER — CYCLOSPORINE 0.5 MG/ML
1 EMULSION OPHTHALMIC 2 TIMES DAILY
Qty: 180 EACH | Refills: 1 | Status: SHIPPED | OUTPATIENT
Start: 2020-03-26 | End: 2021-04-23 | Stop reason: SDUPTHER

## 2020-03-27 DIAGNOSIS — E03.9 HYPOTHYROIDISM (ACQUIRED): ICD-10-CM

## 2020-03-27 RX ORDER — SPIRONOLACTONE 25 MG/1
25 TABLET ORAL DAILY
Qty: 90 TAB | Refills: 3 | Status: SHIPPED | OUTPATIENT
Start: 2020-03-27 | End: 2021-04-12

## 2020-03-27 RX ORDER — FUROSEMIDE 40 MG/1
20 TABLET ORAL DAILY
Qty: 45 TAB | Refills: 3 | Status: SHIPPED | OUTPATIENT
Start: 2020-03-27 | End: 2021-04-09

## 2020-04-09 ENCOUNTER — HOSPITAL ENCOUNTER (OUTPATIENT)
Dept: RADIOLOGY | Facility: MEDICAL CENTER | Age: 85
End: 2020-04-09
Attending: NEUROLOGICAL SURGERY
Payer: MEDICARE

## 2020-04-09 DIAGNOSIS — M48.56XD COLLAPSED VERTEBRA, NOT ELSEWHERE CLASSIFIED, LUMBAR REGION, SUBSEQUENT ENCOUNTER FOR FRACTURE WITH ROUTINE HEALING: ICD-10-CM

## 2020-04-09 PROCEDURE — 72128 CT CHEST SPINE W/O DYE: CPT

## 2020-04-09 PROCEDURE — 72131 CT LUMBAR SPINE W/O DYE: CPT

## 2020-04-10 ENCOUNTER — APPOINTMENT (OUTPATIENT)
Dept: ONCOLOGY | Facility: MEDICAL CENTER | Age: 85
End: 2020-04-10
Attending: INTERNAL MEDICINE
Payer: MEDICARE

## 2020-04-10 ENCOUNTER — HOSPITAL ENCOUNTER (OUTPATIENT)
Dept: LAB | Facility: MEDICAL CENTER | Age: 85
End: 2020-04-10
Attending: INTERNAL MEDICINE
Payer: MEDICARE

## 2020-04-10 DIAGNOSIS — D72.829 LEUKOCYTOSIS, UNSPECIFIED TYPE: ICD-10-CM

## 2020-04-10 LAB
BASOPHILS # BLD AUTO: 1.4 % (ref 0–1.8)
BASOPHILS # BLD: 0.09 K/UL (ref 0–0.12)
EOSINOPHIL # BLD AUTO: 0.26 K/UL (ref 0–0.51)
EOSINOPHIL NFR BLD: 4.2 % (ref 0–6.9)
ERYTHROCYTE [DISTWIDTH] IN BLOOD BY AUTOMATED COUNT: 55.9 FL (ref 35.9–50)
HCT VFR BLD AUTO: 41.2 % (ref 37–47)
HGB BLD-MCNC: 12.5 G/DL (ref 12–16)
IMM GRANULOCYTES # BLD AUTO: 0.01 K/UL (ref 0–0.11)
IMM GRANULOCYTES NFR BLD AUTO: 0.2 % (ref 0–0.9)
LYMPHOCYTES # BLD AUTO: 2.09 K/UL (ref 1–4.8)
LYMPHOCYTES NFR BLD: 33.4 % (ref 22–41)
MCH RBC QN AUTO: 30.5 PG (ref 27–33)
MCHC RBC AUTO-ENTMCNC: 30.3 G/DL (ref 33.6–35)
MCV RBC AUTO: 100.5 FL (ref 81.4–97.8)
MONOCYTES # BLD AUTO: 0.55 K/UL (ref 0–0.85)
MONOCYTES NFR BLD AUTO: 8.8 % (ref 0–13.4)
NEUTROPHILS # BLD AUTO: 3.25 K/UL (ref 2–7.15)
NEUTROPHILS NFR BLD: 52 % (ref 44–72)
NRBC # BLD AUTO: 0 K/UL
NRBC BLD-RTO: 0 /100 WBC
PLATELET # BLD AUTO: 298 K/UL (ref 164–446)
PMV BLD AUTO: 9.4 FL (ref 9–12.9)
RBC # BLD AUTO: 4.1 M/UL (ref 4.2–5.4)
WBC # BLD AUTO: 6.3 K/UL (ref 4.8–10.8)

## 2020-04-10 PROCEDURE — 36415 COLL VENOUS BLD VENIPUNCTURE: CPT

## 2020-04-10 PROCEDURE — 85025 COMPLETE CBC W/AUTO DIFF WBC: CPT

## 2020-04-13 RX ORDER — CITALOPRAM 20 MG/1
20 TABLET ORAL
Qty: 90 TAB | Refills: 3 | Status: SHIPPED | OUTPATIENT
Start: 2020-04-13 | End: 2021-04-12

## 2020-04-20 ENCOUNTER — TELEPHONE (OUTPATIENT)
Dept: INTERNAL MEDICINE | Facility: IMAGING CENTER | Age: 85
End: 2020-04-20

## 2020-04-20 DIAGNOSIS — R19.7 DIARRHEA, UNSPECIFIED TYPE: ICD-10-CM

## 2020-04-20 RX ORDER — CHOLESTYRAMINE 4 G/9G
1 POWDER, FOR SUSPENSION ORAL DAILY
Qty: 30 EACH | Refills: 0 | Status: SHIPPED | OUTPATIENT
Start: 2020-04-20 | End: 2020-05-13

## 2020-04-20 NOTE — TELEPHONE ENCOUNTER
Patient c/o 1 episode/day of sudden onset explosive diarrhea (loose & solid stool) for the last month.  She is not ill.  Discharged from SNF 02/14/2020  H/O C Difficile with fecal transplant 2016.  Per Dr Ramirez - check stool for C Difficile & prescription to preferred pharmacy for Questran.

## 2020-04-21 ENCOUNTER — HOSPITAL ENCOUNTER (OUTPATIENT)
Facility: MEDICAL CENTER | Age: 85
End: 2020-04-21
Attending: INTERNAL MEDICINE
Payer: MEDICARE

## 2020-04-21 ENCOUNTER — NON-PROVIDER VISIT (OUTPATIENT)
Dept: INTERNAL MEDICINE | Facility: IMAGING CENTER | Age: 85
End: 2020-04-21
Payer: MEDICARE

## 2020-04-21 DIAGNOSIS — R19.7 DIARRHEA, UNSPECIFIED TYPE: ICD-10-CM

## 2020-04-21 PROCEDURE — 87493 C DIFF AMPLIFIED PROBE: CPT

## 2020-04-22 LAB
C DIFF DNA SPEC QL NAA+PROBE: NEGATIVE
C DIFF TOX GENS STL QL NAA+PROBE: NEGATIVE

## 2020-04-27 ENCOUNTER — TELEMEDICINE (OUTPATIENT)
Dept: INTERNAL MEDICINE | Facility: IMAGING CENTER | Age: 85
End: 2020-04-27
Payer: MEDICARE

## 2020-04-27 DIAGNOSIS — N18.30 CKD (CHRONIC KIDNEY DISEASE) STAGE 3, GFR 30-59 ML/MIN: ICD-10-CM

## 2020-04-27 DIAGNOSIS — M06.9 RHEUMATOID ARTHRITIS INVOLVING MULTIPLE SITES, UNSPECIFIED RHEUMATOID FACTOR PRESENCE: ICD-10-CM

## 2020-04-27 DIAGNOSIS — M80.80XD LOCALIZED OSTEOPOROSIS WITH CURRENT PATHOLOGICAL FRACTURE WITH ROUTINE HEALING, SUBSEQUENT ENCOUNTER: ICD-10-CM

## 2020-04-27 DIAGNOSIS — D64.9 ANEMIA, UNSPECIFIED TYPE: ICD-10-CM

## 2020-04-27 PROCEDURE — 99214 OFFICE O/P EST MOD 30 MIN: CPT | Mod: 95,CR | Performed by: INTERNAL MEDICINE

## 2020-04-27 NOTE — PROGRESS NOTES
Established Patient Note   HPI:   Telemedicine Visit: Established Patient     This encounter was conducted via Zoom .   Verbal consent was obtained. Patient's identity was verified.    Subjective:   CC:   Elizabeth Celis is a 84 y.o. female presenting for evaluation and management of:  Elizabeth states diarrhea has improved with use of cholestyramine. Recent stool for Clostridium difficile was negative. She has held on doing her physical therapy until she has her external back brace stopped by specialist. She is due for an injection of prolia.    ROS   Denies any recent fevers or chills. No nausea or vomiting. No chest pains or shortness of breath.     Allergies   Allergen Reactions   • Azithromycin Unspecified     Matti Johnsons syndrome   • Cefuroxime Itching and Vomiting     They gave me C-Diff   • Ciprofloxacin Itching and Vomiting     They gave me C-Diff   • Clindamycin Itching and Vomiting     They gave me c-diff   • Daptomycin      Matti colleen syndrome     • Erythromycin Unspecified     Matti Johnsons syndrome   • Nitrofurantoin Vomiting and Nausea   • Rifampin      Matti colleen syndrome   • Sulfa Drugs Swelling   • Codeine Itching   • Flagyl [Metronidazole] Vomiting and Nausea   • Macrodantin  [Nitrofurantoin Macrocrystal]      Other reaction(s): Decreased Blood Pressure   • Morphine Itching     Tolerates oxycodone/hydromorphone   • Premarin Unspecified     burning       Current medicines (including changes today)  Current Outpatient Medications   Medication Sig Dispense Refill   • cholestyramine (QUESTRAN) 4 g packet Take 4 g by mouth every day. 30 Each 0   • citalopram (CELEXA) 20 MG Tab Take 1 Tab by mouth every bedtime. 90 Tab 3   • furosemide (LASIX) 40 MG Tab Take 0.5 Tabs by mouth every day. 45 Tab 3   • spironolactone (ALDACTONE) 25 MG Tab Take 1 Tab by mouth every day. 90 Tab 3   • cyclosporin (RESTASIS) 0.05 % ophthalmic emulsion Place 1 Drop in both eyes 2 times a day. 180 Each 1   •  SYNTHROID 100 MCG Tab Take 1 Tab by mouth Every morning on an empty stomach. 90 Tab 3   • traZODone (DESYREL) 50 MG Tab Take 0.5 Tabs by mouth every bedtime. 45 Tab 3   • hydroxychloroquine (PLAQUENIL) 200 MG Tab Take 1 Tab by mouth every day. 90 Tab 3   • potassium chloride SA (KLOR-CON M10) 10 MEQ Tab CR Take 1 Tab by mouth every day. 90 Tab 3   • triamcinolone acetonide-orabase (KENALOG IN ORABASE) 0.1 % Paste Apply orally BID, PRN 5 g 5   • ipratropium (ATROVENT) 0.06 % Solution Spray 1 Spray in nose 2 Times a Day.     • gabapentin (NEURONTIN) 300 MG Cap Take 1 Cap by mouth 3 times a day. 90 Cap 0   • magnesium oxide (MAG-OX) 400 (241.3 Mg) MG Tab tablet Take 1 Tab by mouth 2 Times a Day. 30 Tab 0   • omeprazole (PRILOSEC) 20 MG delayed-release capsule Take 1 Cap by mouth every day. 30 Cap    • Ascorbic Acid (VITAMIN C PO) Take 1 Tab by mouth every morning.     • denosumab (PROLIA) 60 MG/ML Solution Inject 60 mg as instructed every 6 months.       No current facility-administered medications for this visit.        Patient Active Problem List    Diagnosis Date Noted   • Shock ([facility]) 01/16/2020     Priority: High   • Lumbar transverse process fracture ([facility]) 01/14/2020     Priority: High   • Spinal stenosis of lumbar region with neurogenic claudication 01/10/2020     Priority: High   • Stroke-like symptom 02/10/2019     Priority: High   • Hypothyroidism 02/10/2019     Priority: Medium   • Leg DVT (deep venous thromboembolism), chronic, right ([facility]) 11/13/2017     Priority: Medium   • Hypomagnesemia 11/12/2017     Priority: Medium   • CKD (chronic kidney disease) stage 3, GFR 30-59 ml/min ([facility]) 11/11/2017     Priority: Medium   • Hypokalemia 02/21/2016     Priority: Medium   • Rheumatoid arthritis ([facility]) 09/26/2014     Priority: Medium   • Peripheral edema 01/15/2020     Priority: Low   • Depression 02/21/2016     Priority: Low   • Compression fracture of body of thoracic vertebra ([facility]) 02/27/2020   • Compression  fracture of first lumbar vertebra ([facility]) 02/27/2020   • Pneumonia 01/23/2020   • Constipation 01/21/2020   • Anemia 01/19/2020   • Neuropathic pain 01/17/2020   • Ex-cigarette smoker 07/29/2019   • Dense breast tissue on mammogram 07/25/2019   • Osteoporosis 03/27/2019   • History of recurrent UTIs 03/27/2019   • Urine retention 02/11/2019   • Leukocytosis 01/18/2018   • History of anemia 01/12/2018   • Generalized weakness 01/12/2018   • Raynaud disease 11/11/2017   • History of falling 11/11/2017   • Hypotension 02/23/2016   • History of Clostridium difficile 02/21/2016   • Acute sciatica 09/26/2014       Family History   Problem Relation Age of Onset   • Non-contributory Mother    • Non-contributory Father    • Anesthesia Paternal Grandfather         death       She  has a past medical history of Anesthesia, C. difficile diarrhea (2/16), C. difficile diarrhea (3/2016), Cancer ([facility]) (1947), Chronic back pain, Dense breast tissue on mammogram (7/25/2019), Depression, Elbow fracture, left, Heart burn, History of anemia, Hypothyroid, MRSA (methicillin resistant Staphylococcus aureus), MRSA (methicillin resistant Staphylococcus aureus) (2012), Osteoporosis (3/27/2019), Pain, Rheumatoid arthritis ([facility]) (9/26/2014), Stroke ([facility]) (1990's?), and Urinary incontinence.  She  has a past surgical history that includes blepharoplasty (3/31/2011); cholecystectomy (2000); colonoscopy - endo (N/A, 3/7/2016); fecal transplant (N/A, 3/7/2016); hysterectomy radical (1985); other (Bilateral, 2010, 2008); other orthopedic surgery (Left, 1970s); other orthopedic surgery (Left, 2006); other orthopedic surgery (Left, 2012); hysterectomy laparoscopy (2000); foot surgery (Right, 2010); shoulder arthroplasty total (Right, 1/9/2018); pr reconstr total shoulder implant (Left, 4/30/2019); pr hooper w/o facetec foramot/dskc 1/2 vrt seg, th* (N/A, 1/16/2020); and thoracic fusion o-arm (N/A, 1/16/2020).       Objective:   Vitals obtained by  "patient:      Physical Exam:  Constitutional: Alert, no distress, well-groomed.  Skin: No rashes in visible areas.  Eye: Round. Conjunctiva clear, lids normal. No icterus.   ENMT: Lips pink without lesions, good dentition, moist mucous membranes. Phonation normal.  Neck: No masses, no thyromegaly. Moves freely without pain.  CV: Pulse as reported by patient  Respiratory: Unlabored respiratory effort, no cough or audible wheeze  Psych: Alert and oriented x3, normal affect and mood.       Assessment and Plan:   The following treatment plan was discussed:     1. CKD (chronic kidney disease) stage 3, GFR 30-59 ml/min ([facility])  - CBC WITH DIFFERENTIAL; Future  - VITAMIN B12; Future  - IRON/TOTAL IRON BIND; Future    2. Rheumatoid arthritis involving multiple sites, unspecified rheumatoid factor presence ([facility])    3. Localized osteoporosis with current pathological fracture with routine healing, subsequent encounter    4. Anemia, unspecified type  - VITAMIN B12; Future  - IRON/TOTAL IRON BIND; Future        Follow-up: No follow-ups on file.           Past Medical History:   Diagnosis Date   • Anesthesia     \"One time had a hard time waking me up\"   • C. difficile diarrhea 2/16   • C. 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 2017: T score -3.4 right forearm and -1.6 left hip DEXA Aug. 2019: T score forearm -4.6 and hip -1.4   • Pain     shoulders, feet, back   • Rheumatoid arthritis ([facility]) 9/26/2014   • Stroke ([facility]) 1990's?    \"mini\" no residual symptoms   • Urinary incontinence     occasional       Current Outpatient " Medications   Medication Sig Dispense Refill   • cholestyramine (QUESTRAN) 4 g packet Take 4 g by mouth every day. 30 Each 0   • citalopram (CELEXA) 20 MG Tab Take 1 Tab by mouth every bedtime. 90 Tab 3   • furosemide (LASIX) 40 MG Tab Take 0.5 Tabs by mouth every day. 45 Tab 3   • spironolactone (ALDACTONE) 25 MG Tab Take 1 Tab by mouth every day. 90 Tab 3   • cyclosporin (RESTASIS) 0.05 % ophthalmic emulsion Place 1 Drop in both eyes 2 times a day. 180 Each 1   • SYNTHROID 100 MCG Tab Take 1 Tab by mouth Every morning on an empty stomach. 90 Tab 3   • traZODone (DESYREL) 50 MG Tab Take 0.5 Tabs by mouth every bedtime. 45 Tab 3   • hydroxychloroquine (PLAQUENIL) 200 MG Tab Take 1 Tab by mouth every day. 90 Tab 3   • potassium chloride SA (KLOR-CON M10) 10 MEQ Tab CR Take 1 Tab by mouth every day. 90 Tab 3   • triamcinolone acetonide-orabase (KENALOG IN ORABASE) 0.1 % Paste Apply orally BID, PRN 5 g 5   • ipratropium (ATROVENT) 0.06 % Solution Spray 1 Spray in nose 2 Times a Day.     • gabapentin (NEURONTIN) 300 MG Cap Take 1 Cap by mouth 3 times a day. 90 Cap 0   • magnesium oxide (MAG-OX) 400 (241.3 Mg) MG Tab tablet Take 1 Tab by mouth 2 Times a Day. 30 Tab 0   • omeprazole (PRILOSEC) 20 MG delayed-release capsule Take 1 Cap by mouth every day. 30 Cap    • Ascorbic Acid (VITAMIN C PO) Take 1 Tab by mouth every morning.     • denosumab (PROLIA) 60 MG/ML Solution Inject 60 mg as instructed every 6 months.       No current facility-administered medications for this visit.          Allergies   Allergen Reactions   • Azithromycin Unspecified     Matti Johnsons syndrome   • Cefuroxime Itching and Vomiting     They gave me C-Diff   • Ciprofloxacin Itching and Vomiting     They gave me C-Diff   • Clindamycin Itching and Vomiting     They gave me c-diff   • Daptomycin      Matti colleen syndrome     • Erythromycin Unspecified     Matti Johnsons syndrome   • Nitrofurantoin Vomiting and Nausea   • Rifampin      Matti  colleen syndrome   • Sulfa Drugs Swelling   • Codeine Itching   • Flagyl [Metronidazole] Vomiting and Nausea   • Macrodantin  [Nitrofurantoin Macrocrystal]      Other reaction(s): Decreased Blood Pressure   • Morphine Itching     Tolerates oxycodone/hydromorphone   • Premarin Unspecified     burning         Social History     Tobacco Use   • Smoking status: Former Smoker     Packs/day: 1.00     Years: 20.00     Pack years: 20.00     Types: Cigarettes     Last attempt to quit: 2016     Years since quittin.3   • Smokeless tobacco: Never Used   Substance Use Topics   • Alcohol use: Yes     Alcohol/week: 1.2 oz     Types: 2 Glasses of wine per week     Drinks per session: 1 or 2     Binge frequency: Daily or almost daily     Comment: 1 per day   • Drug use: No       Past Surgical History:   Procedure Laterality Date   • PB DEGROOT W/O FACETEC FORAMOT/DSKC  VRT SEG, TH* N/A 2020    Procedure: LAMINECTOMY, SPINE, THORACIC;  Surgeon: Sang Nelson M.D.;  Location: [facility];  Service: Neurosurgery   • THORACIC FUSION O-ARM N/A 2020    Procedure: FUSION, SPINE, THORACIC, POSTERIOR APPROACH - T9-L3;  Surgeon: Sang Nelson M.D.;  Location: [facility];  Service: Neurosurgery   • PB RECONSTR TOTAL SHOULDER IMPLANT Left 2019    Procedure: ARTHROPLASTY, SHOULDER, TOTAL - REVERSE;  Surgeon: Daniella Camargo M.D.;  Location: SURGERY [facility];  Service: Orthopedics   • SHOULDER ARTHROPLASTY TOTAL Right 2018    Procedure: SHOULDER ARTHROPLASTY TOTAL - REVERSE;  Surgeon: Daniella Camargo M.D.;  Location: SURGERY [facility];  Service: Orthopedics   • COLONOSCOPY - ENDO N/A 3/7/2016    Procedure: COLONOSCOPY - ENDO;  Surgeon: Estiven Ayers M.D.;  Location: END[facility]COPY [facility];  Service:    • FECAL TRANSPLANT N/A 3/7/2016    Procedure: FECAL TRANSPLANT;  Surgeon: Estiven Ayers M.D.;  Location: [facility];  Service:    • OTHER ORTHOPEDIC  SURGERY Left 2012    Left Elbow fx repair   • BLEPHAROPLASTY  3/31/2011    Performed by ORACIO DIAZ at [facility]ERY [facility]   • FOOT SURGERY Right 2010   • OTHER ORTHOPEDIC SURGERY Left 2006    finger- removal of digit Left middle finger   • CHOLECYSTECTOMY  2000   • HYSTERECTOMY LAPAROSCOPY  2000   • HYSTERECTOMY RADICAL  1985   • OTHER Bilateral 2010, 2008    feet- repair torn tendons   • OTHER ORTHOPEDIC SURGERY Left 1970s    femur fx        ROS    Ambulatory Vitals  There were no vitals taken for this visit.    Physical Exam  CBC WITH DIFFERENTIAL   Order: [id number]   Status:  Final result   Visible to patient:  Yes (MyChart) Next appt:  None Dx:  Leukocytosis, unspecified type    Ref Range & Units 2wk ago  (4/10/20) 3mo ago  (1/24/20) 3mo ago  (1/23/20) 3mo ago  (1/22/20)   WBC 4.8 - 10.8 K/uL 6.3  18.8High   18.9High   13.5High     RBC 4.20 - 5.40 M/uL 4.10Low   2.75Low   2.96Low   2.45Low     Hemoglobin 12.0 - 16.0 g/dL 12.5  8.6Low   9.3Low   7.6Low     Hematocrit 37.0 - 47.0 % 41.2  26.8Low   28.0Low   23.3Low     MCV 81.4 - 97.8 fL 100.5High   97.5  94.6  95.1    MCH 27.0 - 33.0 pg 30.5  31.3  31.4  31.0    MCHC 33.6 - 35.0 g/dL 30.3Low   32.1Low   33.2Low   32.6Low     RDW 35.9 - 50.0 fL 55.9High   58.3High   54.4High   54.7High     Platelet Count 164 - 446 K/uL 298  337  328  272    MPV 9.0 - 12.9 fL 9.4  8.6Low   8.7Low   8.9Low     Neutrophils-Polys 44.00 - 72.00 % 52.00  80.00High   78.00High   82.80High     Lymphocytes 22.00 - 41.00 % 33.40  9.80Low   9.60Low   8.20Low     Monocytes 0.00 - 13.40 % 8.80  7.00  8.30  6.90    Eosinophils 0.00 - 6.90 % 4.20  0.60  0.80  0.10    Basophils 0.00 - 1.80 % 1.40  0.40  0.40  0.40    Immature Granulocytes 0.00 - 0.90 % 0.20  2.20High   2.90High   1.60High     Nucleated RBC /100 WBC 0.00  0.00  0.00  0.00    Neutrophils (Absolute) 2.00 - 7.15 K/uL 3.25  15.08High  CM 14.74High  CM 11.15High  CM   Comment: Includes immature neutrophils, if present.    Lymphs (Absolute) 1.00 - 4.80 K/uL 2.09  1.84  1.81  1.10    Monos (Absolute) 0.00 - 0.85 K/uL 0.55  1.32High   1.56High   0.93High     Eos (Absolute) 0.00 - 0.51 K/uL 0.26  0.11  0.16  0.01    Baso (Absolute) 0.00 - 0.12 K/uL 0.09  0.07  0.07  0.05    Immature Granulocytes (abs) 0.00 - 0.11 K/uL 0.01  0.41High   0.55High   0.22High     NRBC (Absolute) K/uL 0.00  0.00  0.00  0.00                Assessment and Plan:     1. CKD (chronic kidney disease) stage 3, GFR 30-59 ml/min ([facility])  CBC WITH DIFFERENTIAL    VITAMIN B12    IRON/TOTAL IRON BIND   2. Rheumatoid arthritis involving multiple sites, unspecified rheumatoid factor presence ([facility])     3. Localized osteoporosis with current pathological fracture with routine healing, subsequent encounter     4. Anemia, unspecified type  VITAMIN B12    IRON/TOTAL IRON BIND     Due to relatively recent spine fracture requiring surgical intervention I have advised Elizabeth to resume her prolia injections. She was scheduled for blood work and appointment for May; since she is still somewhat limited in movement with her brace she will reschedule her blood work and appointment to be done in July to follow CKD and RA.  Face to face time: 25 minutes with greater than 50% of time spent with direct patient contact and medical management.     Ramone Iraheta M.D.

## 2020-05-05 ENCOUNTER — TELEPHONE (OUTPATIENT)
Dept: HEALTH INFORMATION MANAGEMENT | Facility: OTHER | Age: 85
End: 2020-05-05

## 2020-05-12 ENCOUNTER — OUTPATIENT INFUSION SERVICES (OUTPATIENT)
Dept: ONCOLOGY | Facility: MEDICAL CENTER | Age: 85
End: 2020-05-12
Attending: INTERNAL MEDICINE
Payer: MEDICARE

## 2020-05-12 VITALS
WEIGHT: 132.28 LBS | RESPIRATION RATE: 18 BRPM | OXYGEN SATURATION: 98 % | DIASTOLIC BLOOD PRESSURE: 48 MMHG | HEIGHT: 64 IN | SYSTOLIC BLOOD PRESSURE: 146 MMHG | HEART RATE: 117 BPM | TEMPERATURE: 97.8 F | BODY MASS INDEX: 22.58 KG/M2

## 2020-05-12 LAB
CA-I BLD ISE-SCNC: 0.94 MMOL/L (ref 1.1–1.3)
CREAT BLD-MCNC: 1.2 MG/DL (ref 0.5–1.4)

## 2020-05-12 PROCEDURE — 700111 HCHG RX REV CODE 636 W/ 250 OVERRIDE (IP): Mod: JG | Performed by: INTERNAL MEDICINE

## 2020-05-12 PROCEDURE — 82330 ASSAY OF CALCIUM: CPT

## 2020-05-12 PROCEDURE — 96372 THER/PROPH/DIAG INJ SC/IM: CPT

## 2020-05-12 PROCEDURE — 82565 ASSAY OF CREATININE: CPT

## 2020-05-12 PROCEDURE — 36415 COLL VENOUS BLD VENIPUNCTURE: CPT

## 2020-05-12 RX ADMIN — DENOSUMAB 60 MG: 60 INJECTION SUBCUTANEOUS at 12:35

## 2020-05-12 ASSESSMENT — FIBROSIS 4 INDEX: FIB4 SCORE: 3.47

## 2020-05-12 NOTE — PROGRESS NOTES
Pharmacy denosumab (Prolia) note    SCr = 1.2, CrCl < 35 mL/min *MD aware  iCal = 0.94** MD has been notified; ordered pt to increase daily supplemental calcium    Last dose 10/10/19  OK for denosumab (Prolia) 60 mg SQ today per MD

## 2020-05-12 NOTE — PROGRESS NOTES
Pt arrived ambulatory to [facility] for q 6 month Prolia.  POC discussed.  Pt denies active infections or recent/upcoming invasive dental procedures.  Labs drawn from RFA, results reviewed and reported to LEONIDAS Michael at Dr. Herzog's office.  Orders received to proceed with Prolia.  Pt to increase daily Calcium dose to 1500 mg daily for one week.  Pt updated and agrees with plan.  Prolia given as ordered to back of L arm, site covered with adhesive bandage.  Pt tolerated well.  Nex appt confirmed.  Pt discharged from [facility] in NAD under self care.

## 2020-05-13 RX ORDER — CHOLESTYRAMINE 4 G/9G
1 POWDER, FOR SUSPENSION ORAL DAILY
Qty: 30 EACH | Refills: 3 | Status: SHIPPED | OUTPATIENT
Start: 2020-05-13 | End: 2022-06-02 | Stop reason: SDUPTHER

## 2020-06-29 ENCOUNTER — HOSPITAL ENCOUNTER (OUTPATIENT)
Dept: RADIOLOGY | Facility: MEDICAL CENTER | Age: 85
End: 2020-06-29
Payer: MEDICARE

## 2020-07-01 ENCOUNTER — HOSPITAL ENCOUNTER (OUTPATIENT)
Dept: RADIOLOGY | Facility: MEDICAL CENTER | Age: 85
End: 2020-07-01
Attending: NEUROLOGICAL SURGERY
Payer: MEDICARE

## 2020-07-01 DIAGNOSIS — S32.009S: ICD-10-CM

## 2020-07-01 DIAGNOSIS — S34.109S: ICD-10-CM

## 2020-07-01 PROCEDURE — 72131 CT LUMBAR SPINE W/O DYE: CPT

## 2020-07-24 ENCOUNTER — HOSPITAL ENCOUNTER (OUTPATIENT)
Dept: LAB | Facility: MEDICAL CENTER | Age: 85
End: 2020-07-24
Attending: INTERNAL MEDICINE
Payer: MEDICARE

## 2020-07-24 DIAGNOSIS — M06.9 RHEUMATOID ARTHRITIS INVOLVING MULTIPLE SITES, UNSPECIFIED RHEUMATOID FACTOR PRESENCE: ICD-10-CM

## 2020-07-24 DIAGNOSIS — N18.30 CKD (CHRONIC KIDNEY DISEASE) STAGE 3, GFR 30-59 ML/MIN: ICD-10-CM

## 2020-07-24 DIAGNOSIS — D64.9 ANEMIA, UNSPECIFIED TYPE: ICD-10-CM

## 2020-07-24 DIAGNOSIS — E03.9 HYPOTHYROIDISM (ACQUIRED): ICD-10-CM

## 2020-07-24 DIAGNOSIS — R74.01 ELEVATED AST (SGOT): ICD-10-CM

## 2020-07-24 DIAGNOSIS — E87.1 HYPONATREMIA: ICD-10-CM

## 2020-07-24 DIAGNOSIS — Z79.899 LONG TERM USE OF DRUG: ICD-10-CM

## 2020-07-24 LAB
ALBUMIN SERPL BCP-MCNC: 4.5 G/DL (ref 3.2–4.9)
ALBUMIN/GLOB SERPL: 1.8 G/DL
ALP SERPL-CCNC: 38 U/L (ref 30–99)
ALT SERPL-CCNC: 13 U/L (ref 2–50)
ANION GAP SERPL CALC-SCNC: 12 MMOL/L (ref 7–16)
AST SERPL-CCNC: 26 U/L (ref 12–45)
BASOPHILS # BLD AUTO: 1.8 % (ref 0–1.8)
BASOPHILS # BLD: 0.07 K/UL (ref 0–0.12)
BILIRUB SERPL-MCNC: 0.4 MG/DL (ref 0.1–1.5)
BUN SERPL-MCNC: 22 MG/DL (ref 8–22)
CALCIUM SERPL-MCNC: 9.4 MG/DL (ref 8.5–10.5)
CHLORIDE SERPL-SCNC: 100 MMOL/L (ref 96–112)
CO2 SERPL-SCNC: 20 MMOL/L (ref 20–33)
CREAT SERPL-MCNC: 0.98 MG/DL (ref 0.5–1.4)
CRP SERPL HS-MCNC: 0.12 MG/DL (ref 0–0.75)
EOSINOPHIL # BLD AUTO: 0.23 K/UL (ref 0–0.51)
EOSINOPHIL NFR BLD: 6.1 % (ref 0–6.9)
ERYTHROCYTE [DISTWIDTH] IN BLOOD BY AUTOMATED COUNT: 52 FL (ref 35.9–50)
ERYTHROCYTE [SEDIMENTATION RATE] IN BLOOD BY WESTERGREN METHOD: 14 MM/HOUR (ref 0–30)
FASTING STATUS PATIENT QL REPORTED: NORMAL
GLOBULIN SER CALC-MCNC: 2.5 G/DL (ref 1.9–3.5)
GLUCOSE SERPL-MCNC: 91 MG/DL (ref 65–99)
HCT VFR BLD AUTO: 35.8 % (ref 37–47)
HGB BLD-MCNC: 11.9 G/DL (ref 12–16)
IMM GRANULOCYTES # BLD AUTO: 0 K/UL (ref 0–0.11)
IMM GRANULOCYTES NFR BLD AUTO: 0 % (ref 0–0.9)
IRON SATN MFR SERPL: 25 % (ref 15–55)
IRON SERPL-MCNC: 79 UG/DL (ref 40–170)
LYMPHOCYTES # BLD AUTO: 1.46 K/UL (ref 1–4.8)
LYMPHOCYTES NFR BLD: 38.4 % (ref 22–41)
MCH RBC QN AUTO: 31.2 PG (ref 27–33)
MCHC RBC AUTO-ENTMCNC: 33.2 G/DL (ref 33.6–35)
MCV RBC AUTO: 93.7 FL (ref 81.4–97.8)
MONOCYTES # BLD AUTO: 0.33 K/UL (ref 0–0.85)
MONOCYTES NFR BLD AUTO: 8.7 % (ref 0–13.4)
NEUTROPHILS # BLD AUTO: 1.71 K/UL (ref 2–7.15)
NEUTROPHILS NFR BLD: 45 % (ref 44–72)
NRBC # BLD AUTO: 0 K/UL
NRBC BLD-RTO: 0 /100 WBC
PLATELET # BLD AUTO: 301 K/UL (ref 164–446)
PMV BLD AUTO: 9.1 FL (ref 9–12.9)
POTASSIUM SERPL-SCNC: 4.6 MMOL/L (ref 3.6–5.5)
PROT SERPL-MCNC: 7 G/DL (ref 6–8.2)
RBC # BLD AUTO: 3.82 M/UL (ref 4.2–5.4)
SODIUM SERPL-SCNC: 132 MMOL/L (ref 135–145)
T3 SERPL-MCNC: 61.7 NG/DL (ref 60–181)
TIBC SERPL-MCNC: 321 UG/DL (ref 250–450)
TSH SERPL DL<=0.005 MIU/L-ACNC: 2.09 UIU/ML (ref 0.38–5.33)
UIBC SERPL-MCNC: 242 UG/DL (ref 110–370)
VIT B12 SERPL-MCNC: 780 PG/ML (ref 211–911)
WBC # BLD AUTO: 3.8 K/UL (ref 4.8–10.8)

## 2020-07-24 PROCEDURE — 83540 ASSAY OF IRON: CPT

## 2020-07-24 PROCEDURE — 82607 VITAMIN B-12: CPT

## 2020-07-24 PROCEDURE — 83550 IRON BINDING TEST: CPT

## 2020-07-24 PROCEDURE — 85025 COMPLETE CBC W/AUTO DIFF WBC: CPT

## 2020-07-24 PROCEDURE — 85652 RBC SED RATE AUTOMATED: CPT

## 2020-07-24 PROCEDURE — 84480 ASSAY TRIIODOTHYRONINE (T3): CPT

## 2020-07-24 PROCEDURE — 86140 C-REACTIVE PROTEIN: CPT

## 2020-07-24 PROCEDURE — 84443 ASSAY THYROID STIM HORMONE: CPT

## 2020-07-24 PROCEDURE — 36415 COLL VENOUS BLD VENIPUNCTURE: CPT

## 2020-07-24 PROCEDURE — 80053 COMPREHEN METABOLIC PANEL: CPT

## 2020-07-29 ENCOUNTER — OFFICE VISIT (OUTPATIENT)
Dept: INTERNAL MEDICINE | Facility: IMAGING CENTER | Age: 85
End: 2020-07-29
Payer: MEDICARE

## 2020-07-29 VITALS
HEIGHT: 64 IN | TEMPERATURE: 99.1 F | SYSTOLIC BLOOD PRESSURE: 120 MMHG | HEART RATE: 75 BPM | WEIGHT: 127 LBS | OXYGEN SATURATION: 98 % | RESPIRATION RATE: 14 BRPM | DIASTOLIC BLOOD PRESSURE: 60 MMHG | BODY MASS INDEX: 21.68 KG/M2

## 2020-07-29 DIAGNOSIS — Z79.899 LONG TERM USE OF DRUG: ICD-10-CM

## 2020-07-29 DIAGNOSIS — E03.9 HYPOTHYROIDISM (ACQUIRED): ICD-10-CM

## 2020-07-29 DIAGNOSIS — R14.0 ABDOMINAL BLOATING: ICD-10-CM

## 2020-07-29 DIAGNOSIS — D64.9 ANEMIA, UNSPECIFIED TYPE: ICD-10-CM

## 2020-07-29 DIAGNOSIS — D72.819 LEUKOPENIA, UNSPECIFIED TYPE: ICD-10-CM

## 2020-07-29 DIAGNOSIS — M06.9 RHEUMATOID ARTHRITIS INVOLVING MULTIPLE SITES, UNSPECIFIED RHEUMATOID FACTOR PRESENCE: ICD-10-CM

## 2020-07-29 PROCEDURE — 99214 OFFICE O/P EST MOD 30 MIN: CPT | Performed by: INTERNAL MEDICINE

## 2020-07-29 RX ORDER — LEVOTHYROXINE SODIUM 112 MCG
112 TABLET ORAL
Qty: 90 TAB | Refills: 3 | Status: SHIPPED | OUTPATIENT
Start: 2020-07-29 | End: 2021-07-27

## 2020-07-29 ASSESSMENT — PATIENT HEALTH QUESTIONNAIRE - PHQ9
2. FEELING DOWN, DEPRESSED, IRRITABLE, OR HOPELESS: NOT AT ALL
SUM OF ALL RESPONSES TO PHQ QUESTIONS 1-9: 0
3. TROUBLE FALLING OR STAYING ASLEEP OR SLEEPING TOO MUCH: NOT AT ALL
1. LITTLE INTEREST OR PLEASURE IN DOING THINGS: NOT AT ALL
7. TROUBLE CONCENTRATING ON THINGS, SUCH AS READING THE NEWSPAPER OR WATCHING TELEVISION: NOT AT ALL
5. POOR APPETITE OR OVEREATING: NOT AT ALL
4. FEELING TIRED OR HAVING LITTLE ENERGY: NOT AT ALL
SUM OF ALL RESPONSES TO PHQ9 QUESTIONS 1 AND 2: 0
9. THOUGHTS THAT YOU WOULD BE BETTER OFF DEAD, OR OF HURTING YOURSELF: NOT AT ALL
6. FEELING BAD ABOUT YOURSELF - OR THAT YOU ARE A FAILURE OR HAVE LET YOURSELF OR YOUR FAMILY DOWN: NOT AL ALL
8. MOVING OR SPEAKING SO SLOWLY THAT OTHER PEOPLE COULD HAVE NOTICED. OR THE OPPOSITE, BEING SO FIGETY OR RESTLESS THAT YOU HAVE BEEN MOVING AROUND A LOT MORE THAN USUAL: NOT AT ALL

## 2020-07-29 ASSESSMENT — FIBROSIS 4 INDEX: FIB4 SCORE: 2.01

## 2020-07-29 NOTE — PROGRESS NOTES
"Established Patient Note   HPI:        Elizabeth comes in today to follow up hypothyroid and chronic anemia likely due to her RA although she has also been low on iron in past. She states she has significant gas and bloating along with belching for past 2-3 months; no significant pain. She has been treated for C difficile that in included fecal transplant.    Past Medical History:   Diagnosis Date   • Anesthesia     \"One time had a hard time waking me up\"   • C. difficile diarrhea 2/16   • C. difficile diarrhea 3/2016    fecal transplant   • Cancer ([facility]) 1947    Tongue CA - Radiation   • Chronic back pain     hands, shoulders   • Dense breast tissue on mammogram 7/25/2019   • Depression    • Elbow fracture, left    • Heart burn    • History of anemia    • Hypothyroid    • MRSA (methicillin resistant Staphylococcus aureus)     Right foot   • MRSA (methicillin resistant Staphylococcus aureus) 2012   • Osteoporosis 3/27/2019    DEXA Aug. 2017: T score -3.4 right forearm and -1.6 left hip DEXA Aug. 2019: T score forearm -4.6 and hip -1.4   • Pain     shoulders, feet, back   • Rheumatoid arthritis ([facility]) 9/26/2014   • Stroke ([facility]) 1990's?    \"mini\" no residual symptoms   • Urinary incontinence     occasional       Current Outpatient Medications   Medication Sig Dispense Refill   • SYNTHROID 112 MCG Tab Take 1 Tab by mouth Every morning on an empty stomach. 90 Tab 3   • cholestyramine (QUESTRAN) 4 g packet TAKE 4 G BY MOUTH EVERY DAY. 30 Each 3   • citalopram (CELEXA) 20 MG Tab Take 1 Tab by mouth every bedtime. 90 Tab 3   • furosemide (LASIX) 40 MG Tab Take 0.5 Tabs by mouth every day. 45 Tab 3   • spironolactone (ALDACTONE) 25 MG Tab Take 1 Tab by mouth every day. 90 Tab 3   • cyclosporin (RESTASIS) 0.05 % ophthalmic emulsion Place 1 Drop in both eyes 2 times a day. 180 Each 1   • traZODone (DESYREL) 50 MG Tab Take 0.5 Tabs by mouth every bedtime. 45 Tab 3   • hydroxychloroquine (PLAQUENIL) 200 MG Tab Take 1 Tab by mouth " every day. 90 Tab 3   • potassium chloride SA (KLOR-CON M10) 10 MEQ Tab CR Take 1 Tab by mouth every day. 90 Tab 3   • triamcinolone acetonide-orabase (KENALOG IN ORABASE) 0.1 % Paste Apply orally BID, PRN 5 g 5   • ipratropium (ATROVENT) 0.06 % Solution Spray 1 Spray in nose 2 Times a Day.     • magnesium oxide (MAG-OX) 400 (241.3 Mg) MG Tab tablet Take 1 Tab by mouth 2 Times a Day. 30 Tab 0   • Ascorbic Acid (VITAMIN C PO) Take 1 Tab by mouth every morning.     • denosumab (PROLIA) 60 MG/ML Solution Inject 60 mg as instructed every 6 months.     • omeprazole (PRILOSEC) 20 MG delayed-release capsule Take 1 Cap by mouth every day. (Patient not taking: Reported on 2020) 30 Cap      No current facility-administered medications for this visit.          Allergies   Allergen Reactions   • Azithromycin Unspecified     Matti Johnsons syndrome   • Cefuroxime Itching and Vomiting     They gave me C-Diff   • Ciprofloxacin Itching and Vomiting     They gave me C-Diff   • Clindamycin Itching and Vomiting     They gave me c-diff   • Daptomycin      Matti colleen syndrome     • Erythromycin Unspecified     Matti Johnsons syndrome   • Nitrofurantoin Vomiting and Nausea   • Rifampin      Matti colleen syndrome   • Sulfa Drugs Swelling   • Codeine Itching   • Flagyl [Metronidazole] Vomiting and Nausea   • Macrodantin  [Nitrofurantoin Macrocrystal]      Other reaction(s): Decreased Blood Pressure   • Morphine Itching     Tolerates oxycodone/hydromorphone   • Premarin Unspecified     burning         Social History     Tobacco Use   • Smoking status: Former Smoker     Packs/day: 1.00     Years: 20.00     Pack years: 20.00     Types: Cigarettes     Last attempt to quit: 2016     Years since quittin.5   • Smokeless tobacco: Never Used   Substance Use Topics   • Alcohol use: Yes     Alcohol/week: 1.2 oz     Types: 2 Glasses of wine per week     Drinks per session: 1 or 2     Binge frequency: Daily or almost daily      Comment: 1 per day   • Drug use: No       Past Surgical History:   Procedure Laterality Date   • PB DEGROOT W/O FACETEC FORAMOT/DSKC 1/2 VRT SEG, TH* N/A 1/16/2020    Procedure: LAMINECTOMY, SPINE, THORACIC;  Surgeon: Sang Nelson M.D.;  Location: SURGERY [facility];  Service: Neurosurgery   • THORACIC FUSION O-ARM N/A 1/16/2020    Procedure: FUSION, SPINE, THORACIC, POSTERIOR APPROACH - T9-L3;  Surgeon: Sang Nelson M.D.;  Location: SURGERY [facility];  Service: Neurosurgery   • PB RECONSTR TOTAL SHOULDER IMPLANT Left 4/30/2019    Procedure: ARTHROPLASTY, SHOULDER, TOTAL - REVERSE;  Surgeon: Daniella Camargo M.D.;  Location: SURGERY [facility];  Service: Orthopedics   • SHOULDER ARTHROPLASTY TOTAL Right 1/9/2018    Procedure: SHOULDER ARTHROPLASTY TOTAL - REVERSE;  Surgeon: Daniella Camargo M.D.;  Location: SURGERY [facility];  Service: Orthopedics   • COLONOSCOPY - ENDO N/A 3/7/2016    Procedure: COLONOSCOPY - ENDO;  Surgeon: Estiven Ayers M.D.;  Location: ENDOSCOPY [facility];  Service:    • FECAL TRANSPLANT N/A 3/7/2016    Procedure: FECAL TRANSPLANT;  Surgeon: Estiven Ayers M.D.;  Location: ENDOSCOPY [facility];  Service:    • OTHER ORTHOPEDIC SURGERY Left 2012    Left Elbow fx repair   • BLEPHAROPLASTY  3/31/2011    Performed by ORACIO DIAZ at SURGERY [facility]S   • FOOT SURGERY Right 2010   • OTHER ORTHOPEDIC SURGERY Left 2006    finger- removal of digit Left middle finger   • CHOLECYSTECTOMY  2000   • HYSTERECTOMY LAPAROSCOPY  2000   • HYSTERECTOMY RADICAL  1985   • OTHER Bilateral 2010, 2008    feet- repair torn tendons   • OTHER ORTHOPEDIC SURGERY Left 1970s    femur fx        ROS  Energy level is fair; she is able to walk in morning but will typically take a nap in afternoon.  Denies significant joint pain.    Ambulatory Vitals  /60 (BP Location: Left arm, Patient Position: Sitting, BP Cuff Size: Adult)   Pulse 75   Temp 37.3 °C (99.1 °F)  "(Temporal)   Resp 14   Ht 1.626 m (5' 4\")   Wt 57.6 kg (127 lb)   SpO2 98%   BMI 21.80 kg/m²     Physical Exam   Constitutional: She is well-developed, well-nourished, and in no distress. No distress.   Cardiovascular: Normal rate, regular rhythm and normal heart sounds.   Pulmonary/Chest: Effort normal and breath sounds normal. She has no wheezes. She has no rales.   Abdominal: Soft. She exhibits no distension and no mass. There is no abdominal tenderness. There is no guarding.   Musculoskeletal:      Comments: Palpation over wrists, PIP, MCP joints without pain.   Neurological: She is alert. No cranial nerve deficit.   Skin: She is not diaphoretic.         Asses  Component      Latest Ref Rng & Units 7/24/2020   WBC      4.8 - 10.8 K/uL 3.8 (L)   RBC      4.20 - 5.40 M/uL 3.82 (L)   Hemoglobin      12.0 - 16.0 g/dL 11.9 (L)   Hematocrit      37.0 - 47.0 % 35.8 (L)   MCV      81.4 - 97.8 fL 93.7   MCH      27.0 - 33.0 pg 31.2   MCHC      33.6 - 35.0 g/dL 33.2 (L)   RDW      35.9 - 50.0 fL 52.0 (H)   Platelet Count      164 - 446 K/uL 301   MPV      9.0 - 12.9 fL 9.1   Neutrophils-Polys      44.00 - 72.00 % 45.00   Lymphocytes      22.00 - 41.00 % 38.40   Monocytes      0.00 - 13.40 % 8.70   Eosinophils      0.00 - 6.90 % 6.10   Basophils      0.00 - 1.80 % 1.80   Immature Granulocytes      0.00 - 0.90 % 0.00   Nucleated RBC      /100 WBC 0.00   Neutrophils (Absolute)      2.00 - 7.15 K/uL 1.71 (L)   Lymphs (Absolute)      1.00 - 4.80 K/uL 1.46   Monos (Absolute)      0.00 - 0.85 K/uL 0.33   Eos (Absolute)      0.00 - 0.51 K/uL 0.23   Baso (Absolute)      0.00 - 0.12 K/uL 0.07   Immature Granulocytes (abs)      0.00 - 0.11 K/uL 0.00   NRBC (Absolute)      K/uL 0.00   Sodium      135 - 145 mmol/L 132 (L)   Potassium      3.6 - 5.5 mmol/L 4.6   Chloride      96 - 112 mmol/L 100   Co2      20 - 33 mmol/L 20   Anion Gap      7.0 - 16.0 12.0   Glucose      65 - 99 mg/dL 91   Bun      8 - 22 mg/dL 22   Creatinine     "  0.50 - 1.40 mg/dL 0.98   Calcium      8.5 - 10.5 mg/dL 9.4   AST(SGOT)      12 - 45 U/L 26   ALT(SGPT)      2 - 50 U/L 13   Alkaline Phosphatase      30 - 99 U/L 38   Total Bilirubin      0.1 - 1.5 mg/dL 0.4   Albumin      3.2 - 4.9 g/dL 4.5   Total Protein      6.0 - 8.2 g/dL 7.0   Globulin      1.9 - 3.5 g/dL 2.5   A-G Ratio      g/dL 1.8   Iron      40 - 170 ug/dL 79   Total Iron Binding      250 - 450 ug/dL 321   Unsat Iron Binding      110 - 370 ug/dL 242   % Saturation      15 - 55 % 25   C Diff by PCR      Negative    [id number] Presumptive      Negative    GFR If African American      >60 mL/min/1.73 m 2 >60   GFR If Non African American      >60 mL/min/1.73 m 2 54 (A)   Istat Creatinine      0.5 - 1.4 mg/dL    Istat Ionized Calcium      1.10 - 1.30 mmol/L    Vitamin B12 -True Cobalamin      211 - 911 pg/mL 780   Stat C-Reactive Protein      0.00 - 0.75 mg/dL 0.12   Sed Rate Westergren      0 - 30 mm/hour 14   T3      60.0 - 181.0 ng/dL 61.7   TSH      0.380 - 5.330 uIU/mL 2.090   Fasting Status       Fasting   CBC WITH DIFFERENTIAL   Order: [id number]   Status: Final result Visible to patient: Yes (MyChart) Next appt: 08/27/2020 at 12:40 PM in Radiology ([facility]) Dx: CKD (chronic kidney disease) stage 3,...      Ref Range & Units  5d ago   3mo ago   6mo ago     WBC  4.8 - 10.8 K/uL  3.8 6.3  18.8   RBC  4.20 - 5.40 M/uL  3.82 4.10 2.75   Hemoglobin  12.0 - 16.0 g/dL  11.9 12.5  8.6   Hematocrit  37.0 - 47.0 %  35.8 41.2  26.8   MCV  81.4 - 97.8 fL  93.7  100.5 97.5    MCH  27.0 - 33.0 pg  31.2  30.5  31.3    MCHC  33.6 - 35.0 g/dL  33.2 30.3 32.1   RDW  35.9 - 50.0 fL  52.0 55.9 58.3   Platelet Count  164 - 446 K/uL  301  298  337    MPV  9.0 - 12.9 fL  9.1  9.4  8.6   Neutrophils-Polys  44.00 - 72.00 %  45.00  52.00  80.00   Lymphocytes  22.00 - 41.00 %  38.40  33.40  9.80   Monocytes  0.00 - 13.40 %  8.70  8.80  7.00    Eosinophils  0.00 - 6.90 %  6.10  4.20  0.60    Basophils  0.00  - 1.80 %  1.80  1.40  0.40    Immature Granulocytes  0.00 - 0.90 %  0.00  0.20  2.20   Nucleated RBC  /100 WBC  0.00  0.00  0.00    Neutrophils (Absolute)  2.00 - 7.15 K/uL  1.71 3.25 CM  15.08CM    Comment: Includes immature neutrophils, if present.    Lymphs (Absolute)  1.00 - 4.80 K/uL  1.46  2.09  1.84    Monos (Absolute)  0.00 - 0.85 K/uL  0.33  0.55  1.32   Eos (Absolute)  0.00 - 0.51 K/uL  0.23  0.26  0.11    Baso (Absolute)  0.00 - 0.12 K/uL  0.07  0.09  0.07    Immature Granulocytes (abs)  0.00 - 0.11 K/uL  0.00  0.01  0.41   NRBC (Absolute)  K/uL  0.00  0.00  0.00    IRON/TOTAL IRON BIND   Order: [id number]     Status:  Final result   Visible to patient:  Yes (Adelaida) Next appt:  08/27/2020 at 12:40 PM in Radiology ([facility]) Dx:  CKD (chronic kidney disease) stage 3,...    Ref Range & Units  5d ago 6mo ago 1yr ago   Iron  40 - 170 ug/dL  79   37Low     77     Total Iron Binding  250 - 450 ug/dL  321   228Low     413     Unsat Iron Binding  110 - 370 ug/dL  242       % Saturation  15 - 55 %  25   16   19          TSH   Order: [id number]   Status:  Final result   Visible to patient:  Yes (Adelaida) Next appt:  08/27/2020 at 12:40 PM in Radiology ([facility]) Dx:  Hypothyroidism (acquired)    Ref Range & Units  5d ago  (7/24/20)  6mo ago  (1/22/20)  6mo ago  (1/12/20)    TSH  0.380 - 5.330 uIU/mL  2.090   7.770High   CM   2.790 CM          TRIIDOTHYRONINE   Order: [id number]     Status:  Final result   Visible to patient:  Yes (Adelaida) Next appt:  08/27/2020 at 12:40 PM in Radiology ([facility]) Dx:  Hypothyroidism (acquired)    Ref Range & Units  5d ago  (7/24/20)  1yr ago  (7/22/19)  1yr ago  (3/28/19)    T3  60.0 - 181.0 ng/dL  61.7   89.5   74.1              Assessment and Plan:     1. Hypothyroidism (acquired)  TSH    TRIIDOTHYRONINE    SYNTHROID 112 MCG Tab   2. Leukopenia, unspecified type  CBC WITH DIFFERENTIAL   3. Anemia, unspecified type  CBC  WITH DIFFERENTIAL    Comp Metabolic Panel   4. Rheumatoid arthritis involving multiple sites, unspecified rheumatoid factor presence (HCC)  CBC WITH DIFFERENTIAL    CRP QUANTITIVE (NON-CARDIAC)    Sed Rate   5. Abdominal bloating  Comp Metabolic Panel   6. Long term use of drug  Comp Metabolic Panel     Will increase synthroid to 112 mcg qam in attempt to lower TSH closer to 1 uIU/ml. Check CBC, CMP, TSH, T3, ESR, CRP in 3 months. She will set up appointment with her GI MD to discuss her bowel issues.     Ramone Iraheta M.D.

## 2020-08-27 ENCOUNTER — HOSPITAL ENCOUNTER (OUTPATIENT)
Dept: RADIOLOGY | Facility: MEDICAL CENTER | Age: 85
End: 2020-08-27
Attending: INTERNAL MEDICINE
Payer: MEDICARE

## 2020-08-27 DIAGNOSIS — Z12.31 VISIT FOR SCREENING MAMMOGRAM: ICD-10-CM

## 2020-08-27 PROCEDURE — 77067 SCR MAMMO BI INCL CAD: CPT

## 2020-09-15 NOTE — ANESTHESIA QCDR
2019 [facility] Clinical Data Registry (for Quality Improvement)     Postoperative nausea/vomiting risk protocol (Adult = 18 yrs and Pediatric 3-17 yrs)- (430 and 463)  General inhalation anesthetic (NOT TIVA) with PONV risk factors: Yes  Provision of anti-emetic therapy with at least 2 different classes of agents: Yes   Patient DID NOT receive anti-emetic therapy and reason is documented in Medical Record:  N/A    Multimodal Pain Management- (AQI59)  Patient undergoing Elective Surgery (i.e. Outpatient, or ASC, or Prescheduled Surgery prior to Hospital Admission): Yes  Use of Multimodal Pain Management, two or more drugs and/or interventions, NOT including systemic opioids: Yes   Exception: Documented allergy to multiple classes of analgesics:  N/A    PACU assessment of acute postoperative pain prior to Anesthesia Care End- Applies to Patients Age = 18- (ABG7)  Initial PACU pain score is which of the following: < 7/10  Patient unable to report pain score: N/A    Post-anesthetic transfer of care checklist/protocol to PACU/ICU- (426 and 427)  Upon conclusion of case, patient transferred to which of the following locations: PACU/Non-ICU  Use of transfer checklist/protocol: Yes  Exclusion: Service Performed in Patient Hospital Room (and thus did not require transfer): N/A    PACU Reintubation- (AQI31)  General anesthesia requiring endotracheal intubation (ETT) along with subsequent extubation in OR or PACU: Yes  Required reintubation in the PACU: No   Extubation was a planned trial documented in the medical record prior to removal of the original airway device:  N/A    Unplanned admission to ICU related to anesthesia service up through end of PACU care- (MD51)  Unplanned admission to ICU (not initially anticipated at anesthesia start time): No          ,

## 2020-10-23 ENCOUNTER — NON-PROVIDER VISIT (OUTPATIENT)
Dept: INTERNAL MEDICINE | Facility: IMAGING CENTER | Age: 85
End: 2020-10-23
Payer: MEDICARE

## 2020-10-23 DIAGNOSIS — Z23 NEED FOR INFLUENZA VACCINATION: ICD-10-CM

## 2020-10-23 PROCEDURE — G0008 ADMIN INFLUENZA VIRUS VAC: HCPCS | Performed by: INTERNAL MEDICINE

## 2020-10-23 PROCEDURE — 90662 IIV NO PRSV INCREASED AG IM: CPT | Performed by: INTERNAL MEDICINE

## 2020-10-29 ENCOUNTER — HOSPITAL ENCOUNTER (OUTPATIENT)
Dept: LAB | Facility: MEDICAL CENTER | Age: 85
End: 2020-10-29
Attending: INTERNAL MEDICINE
Payer: MEDICARE

## 2020-10-29 DIAGNOSIS — D64.9 ANEMIA, UNSPECIFIED TYPE: ICD-10-CM

## 2020-10-29 DIAGNOSIS — M06.9 RHEUMATOID ARTHRITIS INVOLVING MULTIPLE SITES (HCC): ICD-10-CM

## 2020-10-29 DIAGNOSIS — Z79.899 LONG TERM USE OF DRUG: ICD-10-CM

## 2020-10-29 DIAGNOSIS — E03.9 HYPOTHYROIDISM (ACQUIRED): ICD-10-CM

## 2020-10-29 DIAGNOSIS — R14.0 ABDOMINAL BLOATING: ICD-10-CM

## 2020-10-29 DIAGNOSIS — D72.819 LEUKOPENIA, UNSPECIFIED TYPE: ICD-10-CM

## 2020-11-04 ENCOUNTER — HOSPITAL ENCOUNTER (OUTPATIENT)
Dept: LAB | Facility: MEDICAL CENTER | Age: 85
End: 2020-11-04
Attending: INTERNAL MEDICINE
Payer: MEDICARE

## 2020-11-04 LAB
ALBUMIN SERPL BCP-MCNC: 4.7 G/DL (ref 3.2–4.9)
ALBUMIN/GLOB SERPL: 1.6 G/DL
ALP SERPL-CCNC: 50 U/L (ref 30–99)
ALT SERPL-CCNC: 25 U/L (ref 2–50)
ANION GAP SERPL CALC-SCNC: 20 MMOL/L (ref 7–16)
AST SERPL-CCNC: 39 U/L (ref 12–45)
BASOPHILS # BLD AUTO: 1.1 % (ref 0–1.8)
BASOPHILS # BLD: 0.06 K/UL (ref 0–0.12)
BILIRUB SERPL-MCNC: 0.4 MG/DL (ref 0.1–1.5)
BUN SERPL-MCNC: 32 MG/DL (ref 8–22)
CALCIUM SERPL-MCNC: 10.3 MG/DL (ref 8.5–10.5)
CHLORIDE SERPL-SCNC: 102 MMOL/L (ref 96–112)
CO2 SERPL-SCNC: 12 MMOL/L (ref 20–33)
CREAT SERPL-MCNC: 0.99 MG/DL (ref 0.5–1.4)
CRP SERPL HS-MCNC: 0.22 MG/DL (ref 0–0.75)
EOSINOPHIL # BLD AUTO: 0.26 K/UL (ref 0–0.51)
EOSINOPHIL NFR BLD: 5 % (ref 0–6.9)
ERYTHROCYTE [DISTWIDTH] IN BLOOD BY AUTOMATED COUNT: 50.2 FL (ref 35.9–50)
ERYTHROCYTE [SEDIMENTATION RATE] IN BLOOD BY WESTERGREN METHOD: 17 MM/HOUR (ref 0–30)
FASTING STATUS PATIENT QL REPORTED: NORMAL
GLOBULIN SER CALC-MCNC: 2.9 G/DL (ref 1.9–3.5)
GLUCOSE SERPL-MCNC: 85 MG/DL (ref 65–99)
HCT VFR BLD AUTO: 41.1 % (ref 37–47)
HGB BLD-MCNC: 13.3 G/DL (ref 12–16)
IMM GRANULOCYTES # BLD AUTO: 0.01 K/UL (ref 0–0.11)
IMM GRANULOCYTES NFR BLD AUTO: 0.2 % (ref 0–0.9)
LYMPHOCYTES # BLD AUTO: 1.63 K/UL (ref 1–4.8)
LYMPHOCYTES NFR BLD: 31.2 % (ref 22–41)
MCH RBC QN AUTO: 32.2 PG (ref 27–33)
MCHC RBC AUTO-ENTMCNC: 32.4 G/DL (ref 33.6–35)
MCV RBC AUTO: 99.5 FL (ref 81.4–97.8)
MONOCYTES # BLD AUTO: 0.47 K/UL (ref 0–0.85)
MONOCYTES NFR BLD AUTO: 9 % (ref 0–13.4)
NEUTROPHILS # BLD AUTO: 2.79 K/UL (ref 2–7.15)
NEUTROPHILS NFR BLD: 53.5 % (ref 44–72)
NRBC # BLD AUTO: 0 K/UL
NRBC BLD-RTO: 0 /100 WBC
PLATELET # BLD AUTO: 334 K/UL (ref 164–446)
PMV BLD AUTO: 10 FL (ref 9–12.9)
POTASSIUM SERPL-SCNC: 4.2 MMOL/L (ref 3.6–5.5)
PROT SERPL-MCNC: 7.6 G/DL (ref 6–8.2)
RBC # BLD AUTO: 4.13 M/UL (ref 4.2–5.4)
SODIUM SERPL-SCNC: 134 MMOL/L (ref 135–145)
WBC # BLD AUTO: 5.2 K/UL (ref 4.8–10.8)

## 2020-11-04 PROCEDURE — 85652 RBC SED RATE AUTOMATED: CPT

## 2020-11-04 PROCEDURE — 84443 ASSAY THYROID STIM HORMONE: CPT

## 2020-11-04 PROCEDURE — 84480 ASSAY TRIIODOTHYRONINE (T3): CPT

## 2020-11-04 PROCEDURE — 86140 C-REACTIVE PROTEIN: CPT

## 2020-11-04 PROCEDURE — 85025 COMPLETE CBC W/AUTO DIFF WBC: CPT

## 2020-11-04 PROCEDURE — 36415 COLL VENOUS BLD VENIPUNCTURE: CPT

## 2020-11-04 PROCEDURE — 80053 COMPREHEN METABOLIC PANEL: CPT

## 2020-11-05 LAB
T3 SERPL-MCNC: 77.2 NG/DL (ref 60–181)
TSH SERPL DL<=0.005 MIU/L-ACNC: 0.15 UIU/ML (ref 0.38–5.33)

## 2020-11-11 ENCOUNTER — TELEMEDICINE (OUTPATIENT)
Dept: INTERNAL MEDICINE | Facility: IMAGING CENTER | Age: 85
End: 2020-11-11
Payer: MEDICARE

## 2020-11-11 VITALS — WEIGHT: 124 LBS | BODY MASS INDEX: 21.28 KG/M2

## 2020-11-11 DIAGNOSIS — Z79.899 LONG TERM USE OF DRUG: ICD-10-CM

## 2020-11-11 DIAGNOSIS — Z87.440 HISTORY OF RECURRENT UTIS: ICD-10-CM

## 2020-11-11 DIAGNOSIS — E87.1 HYPONATREMIA: ICD-10-CM

## 2020-11-11 DIAGNOSIS — E03.9 HYPOTHYROIDISM (ACQUIRED): ICD-10-CM

## 2020-11-11 DIAGNOSIS — Z86.2 HISTORY OF ANEMIA: ICD-10-CM

## 2020-11-11 DIAGNOSIS — N18.30 STAGE 3 CHRONIC KIDNEY DISEASE, UNSPECIFIED WHETHER STAGE 3A OR 3B CKD: ICD-10-CM

## 2020-11-11 DIAGNOSIS — N18.9 CHRONIC KIDNEY DISEASE, UNSPECIFIED CKD STAGE: ICD-10-CM

## 2020-11-11 DIAGNOSIS — M81.0 OSTEOPOROSIS OF MULTIPLE SITES: ICD-10-CM

## 2020-11-11 PROCEDURE — 99214 OFFICE O/P EST MOD 30 MIN: CPT | Mod: 95,CR | Performed by: INTERNAL MEDICINE

## 2020-11-11 RX ORDER — CLOTRIMAZOLE 10 MG/1
10 LOZENGE ORAL; TOPICAL
Qty: 35 TROCHE | Refills: 2 | Status: SHIPPED | OUTPATIENT
Start: 2020-11-11 | End: 2020-11-13

## 2020-11-11 RX ORDER — M-VIT,TX,IRON,MINS/CALC/FOLIC 27MG-0.4MG
1 TABLET ORAL DAILY
COMMUNITY

## 2020-11-11 RX ORDER — CLOTRIMAZOLE 10 MG/1
10 LOZENGE ORAL; TOPICAL
Qty: 35 TROCHE | Refills: 2 | Status: SHIPPED | OUTPATIENT
Start: 2020-11-11 | End: 2020-11-11 | Stop reason: CLARIF

## 2020-11-11 ASSESSMENT — ENCOUNTER SYMPTOMS
PALPITATIONS: 0
TREMORS: 0
NERVOUS/ANXIOUS: 0

## 2020-11-11 ASSESSMENT — FIBROSIS 4 INDEX: FIB4 SCORE: 1.96

## 2020-11-11 NOTE — PROGRESS NOTES
"Established Patient Note   HPI:   Patient was presented for a telehealth consultation via secure and encrypted videoconferencing technology. Video visit is done today to follow up hypothyroid and CKD. She is due for her 6 months prolia tomorrow. She has been receiving physical therapy for her back and weakness in left leg. She has not been seeing rheumatologist regularly. She has been referred to rheumatology. She states she has minimal morning stiffness in hands and feet.    Past Medical History:   Diagnosis Date   • Anesthesia     \"One time had a hard time waking me up\"   • C. difficile diarrhea 2/16   • C. difficile diarrhea 3/2016    fecal transplant   • Cancer ([facility]) 1947    Tongue CA - Radiation   • Chronic back pain     hands, shoulders   • Dense breast tissue on mammogram 7/25/2019   • Depression    • Elbow fracture, left    • Heart burn    • History of anemia    • Hypothyroid    • MRSA (methicillin resistant Staphylococcus aureus)     Right foot   • MRSA (methicillin resistant Staphylococcus aureus) 2012   • Osteoporosis 3/27/2019    DEXA Aug. 2017: T score -3.4 right forearm and -1.6 left hip DEXA Aug. 2019: T score forearm -4.6 and hip -1.4   • Pain     shoulders, feet, back   • Rheumatoid arthritis ([facility]) 9/26/2014   • Stroke ([facility]) 1990's?    \"mini\" no residual symptoms   • Urinary incontinence     occasional       Current Outpatient Medications   Medication Sig Dispense Refill   • calcium citrate (CALCITRATE) 950 MG Tab Take 200 mg by mouth every day.     • Cholecalciferol 125 MCG (5000 UT) Tab Take 5,000 Units by mouth every day.     • therapeutic multivitamin-minerals (THERAGRAN-M) Tab Take 1 Tab by mouth every day.     • SYNTHROID 112 MCG Tab Take 1 Tab by mouth Every morning on an empty stomach. 90 Tab 3   • cholestyramine (QUESTRAN) 4 g packet TAKE 4 G BY MOUTH EVERY DAY. 30 Each 3   • citalopram (CELEXA) 20 MG Tab Take 1 Tab by mouth every bedtime. 90 Tab 3   • furosemide (LASIX) 40 MG Tab Take 0.5 " Tabs by mouth every day. 45 Tab 3   • spironolactone (ALDACTONE) 25 MG Tab Take 1 Tab by mouth every day. 90 Tab 3   • cyclosporin (RESTASIS) 0.05 % ophthalmic emulsion Place 1 Drop in both eyes 2 times a day. 180 Each 1   • traZODone (DESYREL) 50 MG Tab Take 0.5 Tabs by mouth every bedtime. 45 Tab 3   • hydroxychloroquine (PLAQUENIL) 200 MG Tab Take 1 Tab by mouth every day. 90 Tab 3   • potassium chloride SA (KLOR-CON M10) 10 MEQ Tab CR Take 1 Tab by mouth every day. 90 Tab 3   • triamcinolone acetonide-orabase (KENALOG IN ORABASE) 0.1 % Paste Apply orally BID, PRN 5 g 5   • ipratropium (ATROVENT) 0.06 % Solution Spray 1 Spray in nose 2 Times a Day.     • Ascorbic Acid (VITAMIN C PO) Take 1 Tab by mouth every morning.     • denosumab (PROLIA) 60 MG/ML Solution Inject 60 mg as instructed every 6 months.     • omeprazole (PRILOSEC) 20 MG delayed-release capsule Take 1 Cap by mouth every day. (Patient not taking: Reported on 7/29/2020) 30 Cap      No current facility-administered medications for this visit.          Allergies   Allergen Reactions   • Azithromycin Unspecified     Matti Johnsons syndrome   • Cefuroxime Itching and Vomiting     They gave me C-Diff   • Ciprofloxacin Itching and Vomiting     They gave me C-Diff   • Clindamycin Itching and Vomiting     They gave me c-diff   • Daptomycin      Matti colleen syndrome     • Erythromycin Unspecified     Matti Johnsons syndrome   • Nitrofurantoin Vomiting and Nausea   • Rifampin      Matti colleen syndrome   • Sulfa Drugs Swelling   • Codeine Itching   • Flagyl [Metronidazole] Vomiting and Nausea   • Macrodantin  [Nitrofurantoin Macrocrystal]      Other reaction(s): Decreased Blood Pressure   • Morphine Itching     Tolerates oxycodone/hydromorphone   • Premarin Unspecified     burning         Social History     Tobacco Use   • Smoking status: Former Smoker     Packs/day: 1.00     Years: 20.00     Pack years: 20.00     Types: Cigarettes     Quit date:  2016     Years since quittin.8   • Smokeless tobacco: Never Used   Substance Use Topics   • Alcohol use: Yes     Alcohol/week: 1.2 oz     Types: 2 Glasses of wine per week     Drinks per session: 1 or 2     Binge frequency: Daily or almost daily     Comment: 1 per day   • Drug use: No       Past Surgical History:   Procedure Laterality Date   • PB DEGROOT W/O FACETEC FORAMOT/DSKC  VRT SEG, TH* N/A 2020    Procedure: LAMINECTOMY, SPINE, THORACIC;  Surgeon: Sang Nelson M.D.;  Location: SURGERY [facility];  Service: Neurosurgery   • THORACIC FUSION O-ARM N/A 2020    Procedure: FUSION, SPINE, THORACIC, POSTERIOR APPROACH - T9-L3;  Surgeon: Sang Nelson M.D.;  Location: SURGERY [facility];  Service: Neurosurgery   • PB RECONSTR TOTAL SHOULDER IMPLANT Left 2019    Procedure: ARTHROPLASTY, SHOULDER, TOTAL - REVERSE;  Surgeon: Daniella Camargo M.D.;  Location: SURGERY [facility];  Service: Orthopedics   • SHOULDER ARTHROPLASTY TOTAL Right 2018    Procedure: SHOULDER ARTHROPLASTY TOTAL - REVERSE;  Surgeon: Daniella Camargo M.D.;  Location: SURGERY [facility];  Service: Orthopedics   • COLONOSCOPY - ENDO N/A 3/7/2016    Procedure: COLONOSCOPY - ENDO;  Surgeon: Estiven Ayers M.D.;  Location: ENDOSCOPY [facility];  Service:    • FECAL TRANSPLANT N/A 3/7/2016    Procedure: FECAL TRANSPLANT;  Surgeon: Estiven Ayers M.D.;  Location: ENDOSCOPY [facility];  Service:    • OTHER ORTHOPEDIC SURGERY Left     Left Elbow fx repair   • BLEPHAROPLASTY  3/31/2011    Performed by ORACIO DIAZ at [facility]   • FOOT SURGERY Right    • OTHER ORTHOPEDIC SURGERY Left     finger- removal of digit Left middle finger   • CHOLECYSTECTOMY     • HYSTERECTOMY LAPAROSCOPY     • HYSTERECTOMY RADICAL     • OTHER Bilateral ,     feet- repair torn tendons   • OTHER ORTHOPEDIC SURGERY Left     femur fx        Review of Systems    Cardiovascular: Negative for palpitations.   Neurological: Negative for tremors.   Psychiatric/Behavioral: The patient is not nervous/anxious.        Ambulatory Vitals  Wt 56.2 kg (124 lb) Comment: by patient report  BMI 21.28 kg/m²     Physical Exam    Component      Latest Ref Rng & Units 7/24/2020 11/4/2020   WBC      4.8 - 10.8 K/uL 3.8 (L) 5.2   RBC      4.20 - 5.40 M/uL 3.82 (L) 4.13 (L)   Hemoglobin      12.0 - 16.0 g/dL 11.9 (L) 13.3   Hematocrit      37.0 - 47.0 % 35.8 (L) 41.1   MCV      81.4 - 97.8 fL 93.7 99.5 (H)   MCH      27.0 - 33.0 pg 31.2 32.2   MCHC      33.6 - 35.0 g/dL 33.2 (L) 32.4 (L)   RDW      35.9 - 50.0 fL 52.0 (H) 50.2 (H)   Platelet Count      164 - 446 K/uL 301 334   MPV      9.0 - 12.9 fL 9.1 10.0   Neutrophils-Polys      44.00 - 72.00 % 45.00 53.50   Lymphocytes      22.00 - 41.00 % 38.40 31.20   Monocytes      0.00 - 13.40 % 8.70 9.00   Eosinophils      0.00 - 6.90 % 6.10 5.00   Basophils      0.00 - 1.80 % 1.80 1.10   Immature Granulocytes      0.00 - 0.90 % 0.00 0.20   Nucleated RBC      /100 WBC 0.00 0.00   Neutrophils (Absolute)      2.00 - 7.15 K/uL 1.71 (L) 2.79   Lymphs (Absolute)      1.00 - 4.80 K/uL 1.46 1.63   Monos (Absolute)      0.00 - 0.85 K/uL 0.33 0.47   Eos (Absolute)      0.00 - 0.51 K/uL 0.23 0.26   Baso (Absolute)      0.00 - 0.12 K/uL 0.07 0.06   Immature Granulocytes (abs)      0.00 - 0.11 K/uL 0.00 0.01   NRBC (Absolute)      K/uL 0.00 0.00   Sodium      135 - 145 mmol/L 132 (L) 134 (L)   Potassium      3.6 - 5.5 mmol/L 4.6 4.2   Chloride      96 - 112 mmol/L 100 102   Co2      20 - 33 mmol/L 20 12 (L)   Anion Gap      7.0 - 16.0 12.0 20.0 (H)   Glucose      65 - 99 mg/dL 91 85   Bun      8 - 22 mg/dL 22 32 (H)   Creatinine      0.50 - 1.40 mg/dL 0.98 0.99   Calcium      8.5 - 10.5 mg/dL 9.4 10.3   AST(SGOT)      12 - 45 U/L 26 39   ALT(SGPT)      2 - 50 U/L 13 25   Alkaline Phosphatase      30 - 99 U/L 38 50   Total Bilirubin      0.1 - 1.5 mg/dL 0.4 0.4    Albumin      3.2 - 4.9 g/dL 4.5 4.7   Total Protein      6.0 - 8.2 g/dL 7.0 7.6   Globulin      1.9 - 3.5 g/dL 2.5 2.9   A-G Ratio      g/dL 1.8 1.6   Iron      40 - 170 ug/dL 79    Total Iron Binding      250 - 450 ug/dL 321    Unsat Iron Binding      110 - 370 ug/dL 242    % Saturation      15 - 55 % 25    GFR If African American      >60 mL/min/1.73 m 2 >60 >60   GFR If Non African American      >60 mL/min/1.73 m 2 54 (A) 53 (A)   Vitamin B12 -True Cobalamin      211 - 911 pg/mL 780    Stat C-Reactive Protein      0.00 - 0.75 mg/dL 0.12 0.22   Sed Rate Westergren      0 - 30 mm/hour 14 17   T3      60.0 - 181.0 ng/dL 61.7 77.2   TSH      0.380 - 5.330 uIU/mL 2.090 0.154 (L)   Fasting Status       Fasting Fasting     Assessment and Plan:     1. Hypothyroidism (acquired)  TSH    TRIIDOTHYRONINE   2. Stage 3 chronic kidney disease, unspecified whether stage 3a or 3b CKD  CBC WITH DIFFERENTIAL    PTH INTACT (PTH ONLY)    Comp Metabolic Panel    Lipid Profile   3. Hyponatremia  Comp Metabolic Panel    Likely due to Rx spironolactone   4. Osteoporosis of multiple sites  DS-BONE DENSITY STUDY (DEXA)    PTH INTACT (PTH ONLY)    VITAMIN D,25 HYDROXY    Comp Metabolic Panel   5. History of anemia  VITAMIN B12    CBC WITH DIFFERENTIAL   6. History of recurrent UTIs  URINALYSIS,CULTURE IF INDICATED    Comp Metabolic Panel   7. Long term use of drug  Comp Metabolic Panel   8. Chronic kidney disease, unspecified CKD stage   Lipid Profile     She will do follow up blood work in 6 months. Will attempt to have an updated DEXA done since she has been on prolia for past year. Remain in current dose of synthroid since she is doing well without side effects.  Face to face time: 30 minutes with greater than 50% of time spent with direct patient contact and medical management.     Ramone Iraheta M.D.

## 2020-11-12 DIAGNOSIS — M81.0 OSTEOPOROSIS, UNSPECIFIED OSTEOPOROSIS TYPE, UNSPECIFIED PATHOLOGICAL FRACTURE PRESENCE: ICD-10-CM

## 2020-11-13 ENCOUNTER — HOSPITAL ENCOUNTER (OUTPATIENT)
Dept: RADIOLOGY | Facility: MEDICAL CENTER | Age: 85
End: 2020-11-13
Attending: INTERNAL MEDICINE
Payer: MEDICARE

## 2020-11-13 ENCOUNTER — OUTPATIENT INFUSION SERVICES (OUTPATIENT)
Dept: ONCOLOGY | Facility: MEDICAL CENTER | Age: 85
End: 2020-11-13
Attending: INTERNAL MEDICINE
Payer: MEDICARE

## 2020-11-13 VITALS
OXYGEN SATURATION: 92 % | DIASTOLIC BLOOD PRESSURE: 52 MMHG | WEIGHT: 126.32 LBS | HEART RATE: 79 BPM | RESPIRATION RATE: 17 BRPM | HEIGHT: 64 IN | TEMPERATURE: 97.8 F | BODY MASS INDEX: 21.57 KG/M2 | SYSTOLIC BLOOD PRESSURE: 151 MMHG

## 2020-11-13 DIAGNOSIS — M81.0 OSTEOPOROSIS OF MULTIPLE SITES: ICD-10-CM

## 2020-11-13 DIAGNOSIS — M81.0 OSTEOPOROSIS, UNSPECIFIED OSTEOPOROSIS TYPE, UNSPECIFIED PATHOLOGICAL FRACTURE PRESENCE: ICD-10-CM

## 2020-11-13 PROCEDURE — 96372 THER/PROPH/DIAG INJ SC/IM: CPT

## 2020-11-13 PROCEDURE — 77080 DXA BONE DENSITY AXIAL: CPT

## 2020-11-13 PROCEDURE — 700111 HCHG RX REV CODE 636 W/ 250 OVERRIDE (IP): Mod: JG | Performed by: INTERNAL MEDICINE

## 2020-11-13 RX ADMIN — DENOSUMAB 60 MG: 60 INJECTION SUBCUTANEOUS at 10:42

## 2020-11-13 ASSESSMENT — FIBROSIS 4 INDEX: FIB4 SCORE: 1.96

## 2020-11-13 NOTE — PROGRESS NOTES
Pt arrives to [facility] for Prolia injection.  Pt denies s/sx of infection or recent/planned dental procedures.  Pt had CMP drawn on 11/04/2020.  Labs reviewed and ok to proceed with Prolia per pharmacy.  Prolia given SC to back of E.  Scheduled next appt in May 2021 for pt.  Pt dc home to self care.

## 2020-12-03 RX ORDER — TRIAMCINOLONE ACETONIDE 0.25 MG/G
1 CREAM TOPICAL 2 TIMES DAILY PRN
Qty: 30 G | Refills: 2 | Status: SHIPPED | OUTPATIENT
Start: 2020-12-03 | End: 2022-06-02

## 2020-12-03 RX ORDER — TRIAMCINOLONE ACETONIDE 0.1 %
PASTE (GRAM) DENTAL
Qty: 15 G | Refills: 3 | Status: SHIPPED | OUTPATIENT
Start: 2020-12-03 | End: 2020-12-15 | Stop reason: SDUPTHER

## 2020-12-15 RX ORDER — TRIAMCINOLONE ACETONIDE 0.1 %
PASTE (GRAM) DENTAL
Qty: 30 G | Refills: 3 | Status: SHIPPED | OUTPATIENT
Start: 2020-12-15 | End: 2022-06-02

## 2021-01-05 ENCOUNTER — TELEPHONE (OUTPATIENT)
Dept: INTERNAL MEDICINE | Facility: IMAGING CENTER | Age: 86
End: 2021-01-05

## 2021-01-05 NOTE — TELEPHONE ENCOUNTER
----- Message from Ramone Iraheta M.D. sent at 1/5/2021 10:01 AM PST -----  Regarding: FW: Multiple Allergies  Fernanda:       I believe she should have the vaccine since she has tolerated others in past. Having her take the benadryl may help decrease side effect and go ahead and Rx an epipen just in case. Thanks.  Dr. BERRIOS  ----- Message -----  From: Nola Curtis R.N.  Sent: 1/4/2021   5:18 PM PST  To: Ramone Iraheta M.D.  Subject: Multiple Allergies                               Elizabeth An called to inquire about the safety of the COVID vaccine given her multiple drug allergies.  She had an allergy consult in 2019, but that was just to rule out an actual penicillin allergy.  She has tolerated ALL other adult vaccines.  I thought she might pre treat with benadryl 50 mg, and get an EpiPen just to be ultra safe.  What are your recommendations?  Thank you,  Fernanda

## 2021-01-06 ENCOUNTER — TELEPHONE (OUTPATIENT)
Dept: INTERNAL MEDICINE | Facility: IMAGING CENTER | Age: 86
End: 2021-01-06

## 2021-01-06 DIAGNOSIS — M06.9 RHEUMATOID ARTHRITIS, INVOLVING UNSPECIFIED SITE, UNSPECIFIED WHETHER RHEUMATOID FACTOR PRESENT (HCC): ICD-10-CM

## 2021-01-06 DIAGNOSIS — M25.552 LEFT HIP PAIN: ICD-10-CM

## 2021-01-07 NOTE — TELEPHONE ENCOUNTER
"----- Message from Ramone Iraheta M.D. sent at 1/6/2021  9:01 AM PST -----  Regarding: FW: Left Hip Pain  Fernanda:       Sounds like hip problem since pain radiates to groin. If you can get X-ray of both hips to compare the affected left hip to the \"normal\" right hip would probably be best.  Dr. BERRIOS  ----- Message -----  From: Nola Curtis R.N.  Sent: 1/5/2021   4:01 PM PST  To: Ramone Iraheta M.D.  Subject: Left Hip Pain                                    Elizabeth An reports worsening left hip pain over the last year (since her back surgery).  Over the last few months it has gotten increasingly worse.  It radiates into her left groin and rarely down her left thigh.  Okay to start with an xray?  Thank you,  Fernanda      "

## 2021-01-11 ENCOUNTER — HOSPITAL ENCOUNTER (OUTPATIENT)
Dept: RADIOLOGY | Facility: MEDICAL CENTER | Age: 86
End: 2021-01-11
Attending: INTERNAL MEDICINE
Payer: MEDICARE

## 2021-01-11 ENCOUNTER — HOSPITAL ENCOUNTER (OUTPATIENT)
Dept: RADIOLOGY | Facility: MEDICAL CENTER | Age: 86
End: 2021-01-11
Attending: NEUROLOGICAL SURGERY
Payer: MEDICARE

## 2021-01-11 ENCOUNTER — TELEPHONE (OUTPATIENT)
Dept: INTERNAL MEDICINE | Facility: IMAGING CENTER | Age: 86
End: 2021-01-11

## 2021-01-11 DIAGNOSIS — M48.56XD COLLAPSED VERTEBRA, NOT ELSEWHERE CLASSIFIED, LUMBAR REGION, SUBSEQUENT ENCOUNTER FOR FRACTURE WITH ROUTINE HEALING: ICD-10-CM

## 2021-01-11 DIAGNOSIS — Z23 NEED FOR VACCINATION: ICD-10-CM

## 2021-01-11 DIAGNOSIS — M06.9 RHEUMATOID ARTHRITIS, INVOLVING UNSPECIFIED SITE, UNSPECIFIED WHETHER RHEUMATOID FACTOR PRESENT (HCC): ICD-10-CM

## 2021-01-11 DIAGNOSIS — M25.552 LEFT HIP PAIN: ICD-10-CM

## 2021-01-11 PROCEDURE — 72128 CT CHEST SPINE W/O DYE: CPT

## 2021-01-11 PROCEDURE — 73522 X-RAY EXAM HIPS BI 3-4 VIEWS: CPT

## 2021-01-11 NOTE — TELEPHONE ENCOUNTER
Elizabeth states she is having episodic left hip pain that radiates into groin but she is able to ambulate with walker without pain most of the time. She does not feel she needs injection at this time. She has seen PETER in past; I advised her that when pain becomes more severe and/or constant to contact [facility] for appointment with hip specialist. X-ray does reveal moderate osteoarthritis of let hip.

## 2021-01-23 ENCOUNTER — IMMUNIZATION (OUTPATIENT)
Dept: FAMILY PLANNING/WOMEN'S HEALTH CLINIC | Facility: IMMUNIZATION CENTER | Age: 86
End: 2021-01-23
Attending: INTERNAL MEDICINE
Payer: MEDICARE

## 2021-01-23 DIAGNOSIS — Z23 ENCOUNTER FOR VACCINATION: Primary | ICD-10-CM

## 2021-01-23 DIAGNOSIS — Z23 NEED FOR VACCINATION: ICD-10-CM

## 2021-01-23 PROCEDURE — 91300 PFIZER SARS-COV-2 VACCINE: CPT

## 2021-01-23 PROCEDURE — 0001A PFIZER SARS-COV-2 VACCINE: CPT

## 2021-02-13 ENCOUNTER — IMMUNIZATION (OUTPATIENT)
Dept: FAMILY PLANNING/WOMEN'S HEALTH CLINIC | Facility: IMMUNIZATION CENTER | Age: 86
End: 2021-02-13
Attending: INTERNAL MEDICINE
Payer: MEDICARE

## 2021-02-13 DIAGNOSIS — Z23 ENCOUNTER FOR VACCINATION: Primary | ICD-10-CM

## 2021-02-13 PROCEDURE — 91300 PFIZER SARS-COV-2 VACCINE: CPT

## 2021-02-13 PROCEDURE — 0002A PFIZER SARS-COV-2 VACCINE: CPT

## 2021-02-16 DIAGNOSIS — M06.9 RHEUMATOID ARTHRITIS, INVOLVING UNSPECIFIED SITE, UNSPECIFIED WHETHER RHEUMATOID FACTOR PRESENT (HCC): ICD-10-CM

## 2021-02-16 RX ORDER — HYDROXYCHLOROQUINE SULFATE 200 MG/1
200 TABLET, FILM COATED ORAL DAILY
Qty: 90 TABLET | Refills: 3 | Status: SHIPPED | OUTPATIENT
Start: 2021-02-16 | End: 2022-03-23

## 2021-02-16 RX ORDER — TRAZODONE HYDROCHLORIDE 50 MG/1
TABLET ORAL
Qty: 45 TABLET | Refills: 3 | Status: SHIPPED | OUTPATIENT
Start: 2021-02-16 | End: 2022-01-03

## 2021-02-16 RX ORDER — POTASSIUM CHLORIDE 750 MG/1
10 TABLET, EXTENDED RELEASE ORAL DAILY
Qty: 90 TABLET | Refills: 3 | Status: SHIPPED | OUTPATIENT
Start: 2021-02-16 | End: 2022-02-28

## 2021-04-09 RX ORDER — FUROSEMIDE 40 MG/1
TABLET ORAL
Qty: 45 TABLET | Refills: 3 | Status: SHIPPED | OUTPATIENT
Start: 2021-04-09 | End: 2022-02-22

## 2021-04-12 RX ORDER — CITALOPRAM 20 MG/1
TABLET ORAL
Qty: 90 TABLET | Refills: 3 | Status: SHIPPED | OUTPATIENT
Start: 2021-04-12 | End: 2022-02-24

## 2021-04-12 RX ORDER — SPIRONOLACTONE 25 MG/1
TABLET ORAL
Qty: 90 TABLET | Refills: 3 | Status: SHIPPED | OUTPATIENT
Start: 2021-04-12 | End: 2022-02-25

## 2021-04-23 RX ORDER — CYCLOSPORINE 0.5 MG/ML
1 EMULSION OPHTHALMIC 2 TIMES DAILY
Qty: 72 ML | Refills: 1 | Status: SHIPPED | OUTPATIENT
Start: 2021-04-23 | End: 2022-01-24 | Stop reason: SDUPTHER

## 2021-05-10 ENCOUNTER — HOSPITAL ENCOUNTER (OUTPATIENT)
Dept: LAB | Facility: MEDICAL CENTER | Age: 86
End: 2021-05-10
Attending: INTERNAL MEDICINE
Payer: MEDICARE

## 2021-05-10 DIAGNOSIS — E03.9 HYPOTHYROIDISM (ACQUIRED): ICD-10-CM

## 2021-05-10 DIAGNOSIS — Z86.2 HISTORY OF ANEMIA: ICD-10-CM

## 2021-05-10 DIAGNOSIS — E87.1 HYPONATREMIA: ICD-10-CM

## 2021-05-10 DIAGNOSIS — Z79.899 LONG TERM USE OF DRUG: ICD-10-CM

## 2021-05-10 DIAGNOSIS — N18.30 STAGE 3 CHRONIC KIDNEY DISEASE, UNSPECIFIED WHETHER STAGE 3A OR 3B CKD: ICD-10-CM

## 2021-05-10 DIAGNOSIS — Z87.440 HISTORY OF RECURRENT UTIS: ICD-10-CM

## 2021-05-10 DIAGNOSIS — N18.9 CHRONIC KIDNEY DISEASE, UNSPECIFIED CKD STAGE: ICD-10-CM

## 2021-05-10 DIAGNOSIS — M81.0 OSTEOPOROSIS OF MULTIPLE SITES: ICD-10-CM

## 2021-05-10 LAB
ALBUMIN SERPL BCP-MCNC: 4.5 G/DL (ref 3.2–4.9)
ALBUMIN/GLOB SERPL: 1.5 G/DL
ALP SERPL-CCNC: 53 U/L (ref 30–99)
ALT SERPL-CCNC: 21 U/L (ref 2–50)
ANION GAP SERPL CALC-SCNC: 16 MMOL/L (ref 7–16)
APPEARANCE UR: CLEAR
AST SERPL-CCNC: 34 U/L (ref 12–45)
BACTERIA #/AREA URNS HPF: NEGATIVE /HPF
BASOPHILS # BLD AUTO: 1 % (ref 0–1.8)
BASOPHILS # BLD: 0.07 K/UL (ref 0–0.12)
BILIRUB SERPL-MCNC: 0.6 MG/DL (ref 0.1–1.5)
BILIRUB UR QL STRIP.AUTO: NEGATIVE
BUN SERPL-MCNC: 28 MG/DL (ref 8–22)
CALCIUM SERPL-MCNC: 10.2 MG/DL (ref 8.5–10.5)
CHLORIDE SERPL-SCNC: 104 MMOL/L (ref 96–112)
CHOLEST SERPL-MCNC: 221 MG/DL (ref 100–199)
CO2 SERPL-SCNC: 17 MMOL/L (ref 20–33)
COLOR UR: YELLOW
CREAT SERPL-MCNC: 0.88 MG/DL (ref 0.5–1.4)
EOSINOPHIL # BLD AUTO: 0.16 K/UL (ref 0–0.51)
EOSINOPHIL NFR BLD: 2.3 % (ref 0–6.9)
EPI CELLS #/AREA URNS HPF: NEGATIVE /HPF
ERYTHROCYTE [DISTWIDTH] IN BLOOD BY AUTOMATED COUNT: 47.1 FL (ref 35.9–50)
FASTING STATUS PATIENT QL REPORTED: NORMAL
GLOBULIN SER CALC-MCNC: 3.1 G/DL (ref 1.9–3.5)
GLUCOSE SERPL-MCNC: 79 MG/DL (ref 65–99)
GLUCOSE UR STRIP.AUTO-MCNC: NEGATIVE MG/DL
HCT VFR BLD AUTO: 41.2 % (ref 37–47)
HDLC SERPL-MCNC: 69 MG/DL
HGB BLD-MCNC: 13.7 G/DL (ref 12–16)
HYALINE CASTS #/AREA URNS LPF: NORMAL /LPF
IMM GRANULOCYTES # BLD AUTO: 0.01 K/UL (ref 0–0.11)
IMM GRANULOCYTES NFR BLD AUTO: 0.1 % (ref 0–0.9)
KETONES UR STRIP.AUTO-MCNC: NEGATIVE MG/DL
LDLC SERPL CALC-MCNC: 135 MG/DL
LEUKOCYTE ESTERASE UR QL STRIP.AUTO: ABNORMAL
LYMPHOCYTES # BLD AUTO: 1.29 K/UL (ref 1–4.8)
LYMPHOCYTES NFR BLD: 18.7 % (ref 22–41)
MCH RBC QN AUTO: 33.1 PG (ref 27–33)
MCHC RBC AUTO-ENTMCNC: 33.3 G/DL (ref 33.6–35)
MCV RBC AUTO: 99.5 FL (ref 81.4–97.8)
MICRO URNS: ABNORMAL
MONOCYTES # BLD AUTO: 0.4 K/UL (ref 0–0.85)
MONOCYTES NFR BLD AUTO: 5.8 % (ref 0–13.4)
NEUTROPHILS # BLD AUTO: 4.97 K/UL (ref 2–7.15)
NEUTROPHILS NFR BLD: 72.1 % (ref 44–72)
NITRITE UR QL STRIP.AUTO: NEGATIVE
NRBC # BLD AUTO: 0 K/UL
NRBC BLD-RTO: 0 /100 WBC
PH UR STRIP.AUTO: 6 [PH] (ref 5–8)
PLATELET # BLD AUTO: 312 K/UL (ref 164–446)
PMV BLD AUTO: 9.9 FL (ref 9–12.9)
POTASSIUM SERPL-SCNC: 4.3 MMOL/L (ref 3.6–5.5)
PROT SERPL-MCNC: 7.6 G/DL (ref 6–8.2)
PROT UR QL STRIP: NEGATIVE MG/DL
RBC # BLD AUTO: 4.14 M/UL (ref 4.2–5.4)
RBC # URNS HPF: NORMAL /HPF
RBC UR QL AUTO: NEGATIVE
SODIUM SERPL-SCNC: 137 MMOL/L (ref 135–145)
SP GR UR STRIP.AUTO: 1.01
TRIGL SERPL-MCNC: 84 MG/DL (ref 0–149)
UROBILINOGEN UR STRIP.AUTO-MCNC: 0.2 MG/DL
WBC # BLD AUTO: 6.9 K/UL (ref 4.8–10.8)
WBC #/AREA URNS HPF: NORMAL /HPF

## 2021-05-10 PROCEDURE — 80053 COMPREHEN METABOLIC PANEL: CPT

## 2021-05-10 PROCEDURE — 84443 ASSAY THYROID STIM HORMONE: CPT

## 2021-05-10 PROCEDURE — 84480 ASSAY TRIIODOTHYRONINE (T3): CPT

## 2021-05-10 PROCEDURE — 80061 LIPID PANEL: CPT

## 2021-05-10 PROCEDURE — 81001 URINALYSIS AUTO W/SCOPE: CPT

## 2021-05-10 PROCEDURE — 82607 VITAMIN B-12: CPT

## 2021-05-10 PROCEDURE — 83970 ASSAY OF PARATHORMONE: CPT

## 2021-05-10 PROCEDURE — 82306 VITAMIN D 25 HYDROXY: CPT

## 2021-05-10 PROCEDURE — 85025 COMPLETE CBC W/AUTO DIFF WBC: CPT

## 2021-05-10 PROCEDURE — 36415 COLL VENOUS BLD VENIPUNCTURE: CPT

## 2021-05-11 LAB
PTH-INTACT SERPL-MCNC: 22 PG/ML (ref 14–72)
T3 SERPL-MCNC: 81 NG/DL (ref 60–181)
TSH SERPL DL<=0.005 MIU/L-ACNC: 0.38 UIU/ML (ref 0.38–5.33)
VIT B12 SERPL-MCNC: 1089 PG/ML (ref 211–911)

## 2021-05-12 DIAGNOSIS — Z87.81 HISTORY OF COMPRESSION FRACTURE OF SPINE: ICD-10-CM

## 2021-05-12 LAB — 25(OH)D3 SERPL-MCNC: 68 NG/ML (ref 30–80)

## 2021-05-13 ENCOUNTER — OUTPATIENT INFUSION SERVICES (OUTPATIENT)
Dept: ONCOLOGY | Facility: MEDICAL CENTER | Age: 86
End: 2021-05-13
Attending: INTERNAL MEDICINE
Payer: MEDICARE

## 2021-05-13 VITALS
SYSTOLIC BLOOD PRESSURE: 133 MMHG | DIASTOLIC BLOOD PRESSURE: 59 MMHG | HEART RATE: 77 BPM | HEIGHT: 65 IN | RESPIRATION RATE: 18 BRPM | OXYGEN SATURATION: 98 % | TEMPERATURE: 98.5 F | WEIGHT: 127.65 LBS | BODY MASS INDEX: 21.27 KG/M2

## 2021-05-13 DIAGNOSIS — Z87.81 HISTORY OF COMPRESSION FRACTURE OF SPINE: ICD-10-CM

## 2021-05-13 DIAGNOSIS — M81.0 OSTEOPOROSIS, UNSPECIFIED OSTEOPOROSIS TYPE, UNSPECIFIED PATHOLOGICAL FRACTURE PRESENCE: ICD-10-CM

## 2021-05-13 PROCEDURE — 306780 HCHG STAT FOR TRANSFUSION PER CASE

## 2021-05-13 ASSESSMENT — FIBROSIS 4 INDEX: FIB4 SCORE: 2.02

## 2021-05-13 NOTE — PROGRESS NOTES
Pt arrives to [facility] for Prolia.  Pt denies any s/sx of infection.  Pt had cavity filled to left lower tooth 3 weeks ago.  Pt is to have implant replaced tomorrow.  Pt reports there will be no drilling during tomorrow's dental visit.  Labs drawn on 5/10/2021.  Pharmacy recommends that Prolia be held today and re-schedule appt for next week.  Pt informed of new appt time on 5/21/2021.  Pt dc home to self care.

## 2021-05-17 ENCOUNTER — OFFICE VISIT (OUTPATIENT)
Dept: INTERNAL MEDICINE | Facility: IMAGING CENTER | Age: 86
End: 2021-05-17
Payer: MEDICARE

## 2021-05-17 VITALS
RESPIRATION RATE: 14 BRPM | WEIGHT: 128 LBS | TEMPERATURE: 97.8 F | DIASTOLIC BLOOD PRESSURE: 60 MMHG | BODY MASS INDEX: 21.33 KG/M2 | OXYGEN SATURATION: 95 % | HEIGHT: 65 IN | SYSTOLIC BLOOD PRESSURE: 134 MMHG | HEART RATE: 70 BPM

## 2021-05-17 DIAGNOSIS — N18.31 STAGE 3A CHRONIC KIDNEY DISEASE: ICD-10-CM

## 2021-05-17 DIAGNOSIS — Z00.00 MEDICARE ANNUAL WELLNESS VISIT, SUBSEQUENT: ICD-10-CM

## 2021-05-17 DIAGNOSIS — E03.9 HYPOTHYROIDISM, UNSPECIFIED TYPE: ICD-10-CM

## 2021-05-17 DIAGNOSIS — I73.00 RAYNAUD'S DISEASE WITHOUT GANGRENE: ICD-10-CM

## 2021-05-17 DIAGNOSIS — Z79.899 LONG TERM USE OF DRUG: ICD-10-CM

## 2021-05-17 PROBLEM — R29.90 STROKE-LIKE SYMPTOM: Status: RESOLVED | Noted: 2019-02-10 | Resolved: 2021-05-17

## 2021-05-17 PROBLEM — Z87.81 HISTORY OF COMPRESSION FRACTURE OF VERTEBRAL COLUMN: Status: ACTIVE | Noted: 2020-02-27

## 2021-05-17 PROBLEM — D72.829 LEUKOCYTOSIS: Status: RESOLVED | Noted: 2018-01-18 | Resolved: 2021-05-17

## 2021-05-17 PROBLEM — R57.9 SHOCK (HCC): Status: RESOLVED | Noted: 2020-01-16 | Resolved: 2021-05-17

## 2021-05-17 PROBLEM — S32.010A COMPRESSION FRACTURE OF FIRST LUMBAR VERTEBRA (HCC): Status: RESOLVED | Noted: 2020-02-27 | Resolved: 2021-05-17

## 2021-05-17 PROCEDURE — G0439 PPPS, SUBSEQ VISIT: HCPCS | Performed by: INTERNAL MEDICINE

## 2021-05-17 ASSESSMENT — ACTIVITIES OF DAILY LIVING (ADL): BATHING_REQUIRES_ASSISTANCE: 0

## 2021-05-17 ASSESSMENT — FIBROSIS 4 INDEX: FIB4 SCORE: 2.02

## 2021-05-17 ASSESSMENT — PATIENT HEALTH QUESTIONNAIRE - PHQ9: CLINICAL INTERPRETATION OF PHQ2 SCORE: 0

## 2021-05-17 ASSESSMENT — ENCOUNTER SYMPTOMS: GENERAL WELL-BEING: GOOD

## 2021-05-17 NOTE — PROGRESS NOTES
"Established Patient Note   HPI:        Elizabeth comes in today for annual medicare wellness; she has done recent lab work also. She uses walker at home. She has left hip pain that radiates into buttock and anterior thigh; she will be seeing orthopedist in June for follow up of this issue. She states she will awaken with tingling in left side of neck that will resolve after she has been up for a while. She states her Raynauds has worsened in both hands over past couple weeks.    Past Medical History:   Diagnosis Date   • Anesthesia     \"One time had a hard time waking me up\"   • C. difficile diarrhea 2/16   • C. 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 2017: T score -3.4 right forearm and -1.6 left hip DEXA Aug. 2019: T score forearm -4.6 and hip -1.4   • Pain     shoulders, feet, back   • Rheumatoid arthritis ([facility]) 9/26/2014   • Stroke ([facility]) 1990's?    \"mini\" no residual symptoms   • Urinary incontinence     occasional       Current Outpatient Medications   Medication Sig Dispense Refill   • cyclosporin (RESTASIS) 0.05 % ophthalmic emulsion Administer 1 Drop into both eyes 2 times a day. Pharmacist - please dispense 180 single use vials (72 ml) 72 mL 1   • spironolactone (ALDACTONE) 25 MG Tab TAKE ONE TABLET BY MOUTH ONE TIME DAILY 90 tablet 3   • citalopram (CELEXA) 20 MG Tab TAKE ONE TABLET BY MOUTH EVERY BEDTIME 90 tablet 3   • furosemide (LASIX) 40 MG Tab TAKE 1/2 TABLET BY MOUTH EVERY DAY 45 tablet 3   • hydroxychloroquine (PLAQUENIL) 200 MG Tab Take 1 tablet by mouth every day. 90 tablet 3   • potassium " chloride SA (KLOR-CON M10) 10 MEQ Tab CR Take 1 tablet by mouth every day. 90 tablet 3   • traZODone (DESYREL) 50 MG Tab TAKE 1/2 TABLET BY MOUTH AT BEDTIME 45 tablet 3   • EPINEPHrine 0.3 MG/0.3ML Solution Prefilled Syringe Inject 0.3 mL as directed as needed (serious allergic reaction/anaphylaxis). 1 Each 0   • triamcinolone acetonide-orabase (KENALOG IN ORABASE) 0.1 % Paste Apply orally BID, PRN 30 g 3   • triamcinolone acetonide (KENALOG) 0.025 % Cream Apply 1 Application topically 2 times a day as needed. 30 g 2   • CALCIUM CITRATE PO Take 200 mg by mouth every day.     • therapeutic multivitamin-minerals (THERAGRAN-M) Tab Take 1 Tab by mouth every day.     • SYNTHROID 112 MCG Tab Take 1 Tab by mouth Every morning on an empty stomach. 90 Tab 3   • cholestyramine (QUESTRAN) 4 g packet TAKE 4 G BY MOUTH EVERY DAY. 30 Each 3   • denosumab (PROLIA) 60 MG/ML Solution Inject 60 mg as instructed every 6 months.     • Ascorbic Acid (VITAMIN C PO) Take 1 Tab by mouth every morning. (Patient not taking: Reported on 5/17/2021)       No current facility-administered medications for this visit.         Allergies   Allergen Reactions   • Azithromycin Unspecified     Matti Johnsons syndrome   • Cefuroxime Itching and Vomiting     They gave me C-Diff   • Ciprofloxacin Itching and Vomiting     They gave me C-Diff   • Clindamycin Itching and Vomiting     They gave me c-diff   • Daptomycin      Matti colleen syndrome     • Erythromycin Unspecified     Matti Johnsons syndrome   • Nitrofurantoin Vomiting and Nausea   • Rifampin      Matti colleen syndrome   • Sulfa Drugs Swelling   • Codeine Itching   • Flagyl [Metronidazole] Vomiting and Nausea   • Macrodantin  [Nitrofurantoin Macrocrystal]      Other reaction(s): Decreased Blood Pressure   • Morphine Itching     Tolerates oxycodone/hydromorphone   • Premarin Unspecified     burning         Social History     Tobacco Use   • Smoking status: Former Smoker     Packs/day: 1.00      Years: 20.00     Pack years: 20.00     Types: Cigarettes     Quit date: 2016     Years since quittin.3   • Smokeless tobacco: Never Used   Vaping Use   • Vaping Use: Never used   Substance Use Topics   • Alcohol use: Yes     Alcohol/week: 1.2 oz     Types: 2 Glasses of wine per week     Comment: 1 per day   • Drug use: No       Past Surgical History:   Procedure Laterality Date   • PB DEGROOT W/O FACETEC FORAMOT/DSKC  VRT SEG, TH* N/A 2020    Procedure: LAMINECTOMY, SPINE, THORACIC;  Surgeon: Sang Nelson M.D.;  Location: SURGERY [facility];  Service: Neurosurgery   • THORACIC FUSION O-ARM N/A 2020    Procedure: FUSION, SPINE, THORACIC, POSTERIOR APPROACH - T9-L3;  Surgeon: Sang Nelson M.D.;  Location: SURGERY [facility];  Service: Neurosurgery   • PB RECONSTR TOTAL SHOULDER IMPLANT Left 2019    Procedure: ARTHROPLASTY, SHOULDER, TOTAL - REVERSE;  Surgeon: Daniella Camargo M.D.;  Location: SURGERY [facility];  Service: Orthopedics   • SHOULDER ARTHROPLASTY TOTAL Right 2018    Procedure: SHOULDER ARTHROPLASTY TOTAL - REVERSE;  Surgeon: Daniella Camargo M.D.;  Location: SURGERY [facility];  Service: Orthopedics   • COLONOSCOPY - ENDO N/A 3/7/2016    Procedure: COLONOSCOPY - ENDO;  Surgeon: Estiven Ayers M.D.;  Location: ENDOSCOPY [facility];  Service:    • FECAL TRANSPLANT N/A 3/7/2016    Procedure: FECAL TRANSPLANT;  Surgeon: Estiven Ayers M.D.;  Location: ENDOSCOPY [facility];  Service:    • OTHER ORTHOPEDIC SURGERY Left     Left Elbow fx repair   • BLEPHAROPLASTY  3/31/2011    Performed by ORACIO DIAZ [facility]   • FOOT SURGERY Right    • OTHER ORTHOPEDIC SURGERY Left     finger- removal of digit Left middle finger   • CHOLECYSTECTOMY     • HYSTERECTOMY LAPAROSCOPY     • HYSTERECTOMY RADICAL     • OTHER Bilateral ,     feet- repair torn tendons   • OTHER ORTHOPEDIC SURGERY Left      femur fx        ROS  Depression Screening    Little interest or pleasure in doing things?  0 - not at all  Feeling down, depressed , or hopeless? 0 - not at all  Patient Health Questionnaire Score: 0     If depressive symptoms identified deferred to follow up visit unless specifically addressed in assessment and plan.    Interpretation of PHQ-9 Total Score   Score Severity   1-4 No Depression   5-9 Mild Depression   10-14 Moderate Depression   15-19 Moderately Severe Depression   20-27 Severe Depression    Screening for Cognitive Impairment    Three Minute Recall (captain, garden, picture) 3/3    Eric clock face with all 12 numbers and set the hands to show 5 past 8.  Yes    Cognitive concerns identified deferred for follow up unless specifically addressed in assessment and plan.    Fall Risk Assessment    Has the patient had two or more falls in the last year or any fall with injury in the last year?  No    Safety Assessment    Throw rugs on floor.  No  Handrails on all stairs.  Yes  Good lighting in all hallways.  Yes  Difficulty hearing.  Yes  Patient counseled about all safety risks that were identified.    Functional Assessment ADLs    Are there any barriers preventing you from cooking for yourself or meeting nutritional needs?  No.    Are there any barriers preventing you from driving safely or obtaining transportation?  No.    Are there any barriers preventing you from using a telephone or calling for help?  No.    Are there any barriers preventing you from shopping?  No.    Are there any barriers preventing you from taking care of your own finances?  No.    Are there any barriers preventing you from managing your medications?  No.    Are there any barriers preventing you from showering, bathing or dressing yourself?  No.    Are you currently engaging in any exercise or physical activity?  Yes.     What is your perception of your health?  Good.      Health Maintenance Summary                MAMMOGRAM Next Due  "8/27/2021      Done 7/5/2017 Ext Proc: MA-SCREEN MAMMO UNI W/CAD     Patient has more history with this topic...    Annual Wellness Visit Next Due 5/18/2022      Done 5/17/2021 Visit Dx: Medicare annual wellness visit, subsequent     Patient has more history with this topic...    IMM DTaP/Tdap/Td Vaccine Next Due 1/11/2025      Done 1/11/2015 Imm Admin: Dtap Vaccine     Patient has more history with this topic...    BONE DENSITY Next Due 11/13/2025      Done 11/13/2020 DS-BONE DENSITY STUDY (DEXA)     Patient has more history with this topic...    COLONOSCOPY Next Due 3/7/2026      Done 3/7/2016 Surg:COLONOSCOPY - ENDO          Patient Care Team:  Ramone Iraheta M.D. as PCP - General (Internal Medicine)     Ambulatory Vitals  /60 (BP Location: Left arm, Patient Position: Sitting, BP Cuff Size: Adult)   Pulse 70   Temp 36.6 °C (97.8 °F) (Temporal)   Resp 14   Ht 1.65 m (5' 4.96\")   Wt 58.1 kg (128 lb)   SpO2 95%   BMI 21.33 kg/m²     Physical Exam  Constitutional:       Appearance: Normal appearance.   Cardiovascular:      Rate and Rhythm: Normal rate and regular rhythm.      Heart sounds: Normal heart sounds. No murmur heard.   No friction rub. No gallop.       Comments: Fingers mildly discolored purplish but with decent refill.  Pulmonary:      Effort: Pulmonary effort is normal.      Breath sounds: Normal breath sounds. No wheezing or rales.   Abdominal:      General: There is no distension.      Palpations: Abdomen is soft. There is no mass.      Tenderness: There is no abdominal tenderness.   Genitourinary:     Comments: No flank tenderness bilaterally.  Skin:     Capillary Refill: Capillary refill takes less than 2 seconds.   Neurological:      General: No focal deficit present.      Cranial Nerves: No cranial nerve deficit.      Motor: No weakness.      Gait: Gait normal.         Component      Latest Ref Rng & Units 11/4/2020 5/10/2021   WBC      4.8 - 10.8 K/uL 5.2 6.9   RBC      4.20 - 5.40 " M/uL 4.13 (L) 4.14 (L)   Hemoglobin      12.0 - 16.0 g/dL 13.3 13.7   Hematocrit      37.0 - 47.0 % 41.1 41.2   MCV      81.4 - 97.8 fL 99.5 (H) 99.5 (H)   MCH      27.0 - 33.0 pg 32.2 33.1 (H)   MCHC      33.6 - 35.0 g/dL 32.4 (L) 33.3 (L)   RDW      35.9 - 50.0 fL 50.2 (H) 47.1   Platelet Count      164 - 446 K/uL 334 312   MPV      9.0 - 12.9 fL 10.0 9.9   Neutrophils-Polys      44.00 - 72.00 % 53.50 72.10 (H)   Lymphocytes      22.00 - 41.00 % 31.20 18.70 (L)   Monocytes      0.00 - 13.40 % 9.00 5.80   Eosinophils      0.00 - 6.90 % 5.00 2.30   Basophils      0.00 - 1.80 % 1.10 1.00   Immature Granulocytes      0.00 - 0.90 % 0.20 0.10   Nucleated RBC      /100 WBC 0.00 0.00   Neutrophils (Absolute)      2.00 - 7.15 K/uL 2.79 4.97   Lymphs (Absolute)      1.00 - 4.80 K/uL 1.63 1.29   Monos (Absolute)      0.00 - 0.85 K/uL 0.47 0.40   Eos (Absolute)      0.00 - 0.51 K/uL 0.26 0.16   Baso (Absolute)      0.00 - 0.12 K/uL 0.06 0.07   Immature Granulocytes (abs)      0.00 - 0.11 K/uL 0.01 0.01   NRBC (Absolute)      K/uL 0.00 0.00   Sodium      135 - 145 mmol/L 134 (L) 137   Potassium      3.6 - 5.5 mmol/L 4.2 4.3   Chloride      96 - 112 mmol/L 102 104   Co2      20 - 33 mmol/L 12 (L) 17 (L)   Anion Gap      7.0 - 16.0 20.0 (H) 16.0   Glucose      65 - 99 mg/dL 85 79   Bun      8 - 22 mg/dL 32 (H) 28 (H)   Creatinine      0.50 - 1.40 mg/dL 0.99 0.88   Calcium      8.5 - 10.5 mg/dL 10.3 10.2   AST(SGOT)      12 - 45 U/L 39 34   ALT(SGPT)      2 - 50 U/L 25 21   Alkaline Phosphatase      30 - 99 U/L 50 53   Total Bilirubin      0.1 - 1.5 mg/dL 0.4 0.6   Albumin      3.2 - 4.9 g/dL 4.7 4.5   Total Protein      6.0 - 8.2 g/dL 7.6 7.6   Globulin      1.9 - 3.5 g/dL 2.9 3.1   A-G Ratio      g/dL 1.6 1.5   Color        Yellow   Character        Clear   Specific Gravity      <1.035  1.010   Ph      5.0 - 8.0  6.0   Glucose      Negative mg/dL  Negative   Ketones      Negative mg/dL  Negative   Protein      Negative mg/dL   Negative   Bilirubin      Negative  Negative   Urobilinogen, Urine      Negative  0.2   Nitrite      Negative  Negative   Leukocyte Esterase      Negative  Trace (A)   Occult Blood      Negative  Negative   Micro Urine Req        Microscopic   WBC      /hpf  0-2   RBC      /hpf  0-2   Bacteria      None /hpf  Negative   Epithelial Cells      /hpf  Negative   Hyaline Cast      /lpf  0-2   Cholesterol,Tot      100 - 199 mg/dL  221 (H)   Triglycerides      0 - 149 mg/dL  84   HDL      >=40 mg/dL  69   LDL      <100 mg/dL  135 (H)   GFR If African American      >60 mL/min/1.73 m 2 >60 >60   GFR If Non African American      >60 mL/min/1.73 m 2 53 (A) >60   Stat C-Reactive Protein      0.00 - 0.75 mg/dL 0.22    Sed Rate Westergren      0 - 30 mm/hour 17    TSH      0.380 - 5.330 uIU/mL 0.154 (L) 0.380   Fasting Status       Fasting Fasting   T3      60.0 - 181.0 ng/dL 77.2 81.0   25-Hydroxy   Vitamin D 25      30 - 80 ng/mL  68   Pth, Intact      14.0 - 72.0 pg/mL  22.0   Vitamin B12 -True Cobalamin      211 - 911 pg/mL  1089 (H)     Assessment and Plan:     1. Medicare annual wellness visit, subsequent     2. Raynaud's disease without gangrene  Comp Metabolic Panel   3. Hypothyroidism, unspecified type  TSH    TRIIDOTHYRONINE   4. Long term use of drug  Comp Metabolic Panel     Discussed since weather will be warming in next 3 months we could hold on starting Rx for Raynauds; she agrees with waiting. Come late September may consider starting low dose amlodipine with 2.5 mg qd and increase prn.    Ramone Iraheta M.D.

## 2021-05-21 ENCOUNTER — OUTPATIENT INFUSION SERVICES (OUTPATIENT)
Dept: ONCOLOGY | Facility: MEDICAL CENTER | Age: 86
End: 2021-05-21
Attending: INTERNAL MEDICINE
Payer: MEDICARE

## 2021-05-21 VITALS
WEIGHT: 129.63 LBS | HEART RATE: 58 BPM | SYSTOLIC BLOOD PRESSURE: 142 MMHG | RESPIRATION RATE: 18 BRPM | HEIGHT: 64 IN | OXYGEN SATURATION: 91 % | BODY MASS INDEX: 22.13 KG/M2 | DIASTOLIC BLOOD PRESSURE: 66 MMHG | TEMPERATURE: 97 F

## 2021-05-21 DIAGNOSIS — Z87.81 HISTORY OF COMPRESSION FRACTURE OF SPINE: ICD-10-CM

## 2021-05-21 DIAGNOSIS — M81.0 OSTEOPOROSIS, UNSPECIFIED OSTEOPOROSIS TYPE, UNSPECIFIED PATHOLOGICAL FRACTURE PRESENCE: ICD-10-CM

## 2021-05-21 PROCEDURE — 96372 THER/PROPH/DIAG INJ SC/IM: CPT

## 2021-05-21 PROCEDURE — 700111 HCHG RX REV CODE 636 W/ 250 OVERRIDE (IP): Mod: JG | Performed by: INTERNAL MEDICINE

## 2021-05-21 RX ADMIN — DENOSUMAB 60 MG: 60 INJECTION SUBCUTANEOUS at 11:48

## 2021-05-21 ASSESSMENT — FIBROSIS 4 INDEX: FIB4 SCORE: 2.02

## 2021-05-21 NOTE — PROGRESS NOTES
Elizabeth arrived ambulatory for Q 6 month Prolia injection. She denies any s/s acute infection or illness, denies dental procedures in the past 4 weeks or upcoming dental procedures, denies s/s of hypocalcimia.  Pt confirms taking calcuim/vitamin D at home.    Labs collected on 05/10/2020 reviewed, pt within parameters to receive injection.  Prolia injected Sub Q to back L arm per MAR, bandaid declined.  Elizabeth tolerated well, discharged in no apparent distress, next appointment scheduled and confirmed.

## 2021-06-07 NOTE — DISCHARGE PLANNING
Anticipated Discharge Disposition:   SNF    Action:    Pt reiterated to RN IONA today that she cannot tolerate 3 hours of IRH/day.  She would like to proceed with SNF.  She requested RN IONA to speak to her daughter, Mazin.    Mazin stated she is going to call [facility] and then [place] and make decision.    Barriers to Discharge:    Pt and daughter final agreement for transfer    Plan:    F/U with patient's daughter Mazin.   [Follow-Up: _____] : a [unfilled] follow-up visit

## 2021-07-26 DIAGNOSIS — E03.9 HYPOTHYROIDISM (ACQUIRED): ICD-10-CM

## 2021-07-27 RX ORDER — LEVOTHYROXINE SODIUM 112 MCG
TABLET ORAL
Qty: 90 TABLET | Refills: 3 | Status: SHIPPED | OUTPATIENT
Start: 2021-07-27 | End: 2022-06-20

## 2021-08-17 ENCOUNTER — TELEPHONE (OUTPATIENT)
Dept: INTERNAL MEDICINE | Facility: IMAGING CENTER | Age: 86
End: 2021-08-17

## 2021-08-17 NOTE — TELEPHONE ENCOUNTER
Patient has a history of eczema in both ear canals.  Recently she has developed a tender right external ear and cannot wear her right hearing aide because her canal is too swollen.  Access to our office today is extremely limited due to re paving.  She has MANY antibiotic allergies.  Cortisporin Otic Drops to preferred pharmacy per Dr Ramirez.  If she fails to improve completely she knows to callfor an office visit.

## 2021-09-03 ENCOUNTER — APPOINTMENT (OUTPATIENT)
Dept: RADIOLOGY | Facility: MEDICAL CENTER | Age: 86
End: 2021-09-03
Attending: INTERNAL MEDICINE
Payer: MEDICARE

## 2021-09-09 RX ORDER — IPRATROPIUM BROMIDE 42 UG/1
1 SPRAY, METERED NASAL 2 TIMES DAILY
Qty: 15 ML | Refills: 1 | Status: SHIPPED | OUTPATIENT
Start: 2021-09-09 | End: 2022-03-23

## 2021-09-29 ENCOUNTER — NON-PROVIDER VISIT (OUTPATIENT)
Dept: INTERNAL MEDICINE | Facility: IMAGING CENTER | Age: 86
End: 2021-09-29
Payer: MEDICARE

## 2021-09-29 DIAGNOSIS — Z23 NEED FOR INFLUENZA VACCINATION: ICD-10-CM

## 2021-09-29 PROCEDURE — 90662 IIV NO PRSV INCREASED AG IM: CPT | Performed by: INTERNAL MEDICINE

## 2021-09-29 PROCEDURE — G0008 ADMIN INFLUENZA VIRUS VAC: HCPCS | Performed by: INTERNAL MEDICINE

## 2021-10-08 ENCOUNTER — OFFICE VISIT (OUTPATIENT)
Dept: INTERNAL MEDICINE | Facility: IMAGING CENTER | Age: 86
End: 2021-10-08
Payer: MEDICARE

## 2021-10-08 VITALS
TEMPERATURE: 97.9 F | RESPIRATION RATE: 14 BRPM | WEIGHT: 125 LBS | SYSTOLIC BLOOD PRESSURE: 126 MMHG | OXYGEN SATURATION: 99 % | HEIGHT: 65 IN | BODY MASS INDEX: 20.83 KG/M2 | DIASTOLIC BLOOD PRESSURE: 60 MMHG | HEART RATE: 68 BPM

## 2021-10-08 DIAGNOSIS — H60.501 ACUTE OTITIS EXTERNA OF RIGHT EAR, UNSPECIFIED TYPE: ICD-10-CM

## 2021-10-08 PROCEDURE — 99213 OFFICE O/P EST LOW 20 MIN: CPT | Performed by: INTERNAL MEDICINE

## 2021-10-08 RX ORDER — NEOMYCIN SULFATE, POLYMYXIN B SULFATE, HYDROCORTISONE 3.5; 10000; 1 MG/ML; [USP'U]/ML; MG/ML
1 SOLUTION/ DROPS AURICULAR (OTIC)
Qty: 10 ML | Refills: 1 | Status: SHIPPED | OUTPATIENT
Start: 2021-10-08 | End: 2021-12-01

## 2021-10-08 ASSESSMENT — FIBROSIS 4 INDEX: FIB4 SCORE: 2.02

## 2021-10-08 NOTE — PROGRESS NOTES
"Established Patient Note   HPI:        Elizabeth comes in today with complaint of right ear pain and some drainage.     Past Medical History:   Diagnosis Date   • Anesthesia     \"One time had a hard time waking me up\"   • C. difficile diarrhea 2/16   • C. difficile diarrhea 3/2016    fecal transplant   • Cancer ([facility]) 1947    Tongue CA - Radiation   • Chronic back pain     hands, shoulders   • Dense breast tissue on mammogram 7/25/2019   • Depression    • Elbow fracture, left    • Heart burn    • History of anemia    • Hypothyroid    • MRSA (methicillin resistant Staphylococcus aureus)     Right foot   • MRSA (methicillin resistant Staphylococcus aureus) 2012   • Osteoporosis 3/27/2019    DEXA Aug. 2017: T score -3.4 right forearm and -1.6 left hip DEXA Aug. 2019: T score forearm -4.6 and hip -1.4   • Pain     shoulders, feet, back   • Rheumatoid arthritis ([facility]) 9/26/2014   • Stroke ([facility]) 1990's?    \"mini\" no residual symptoms   • Urinary incontinence     occasional       Current Outpatient Medications   Medication Sig Dispense Refill   • NEOMYCIN-POLYMYXIN-HC, OTIC, 1 % Solution Administer 1 Drop into affected ear(s) 4 times a day. 10 mL 1   • ipratropium (ATROVENT) 0.06 % Solution Administer 1 Spray into affected nostril(S) 2 times a day. 15 mL 1   • SYNTHROID 112 MCG Tab TAKE ONE TABLET BY MOUTH EVERY MORNING ON AN EMPTY STOMACH 90 tablet 3   • cyclosporin (RESTASIS) 0.05 % ophthalmic emulsion Administer 1 Drop into both eyes 2 times a day. Pharmacist - please dispense 180 single use vials (72 ml) 72 mL 1   • spironolactone (ALDACTONE) 25 MG Tab TAKE ONE TABLET BY MOUTH ONE TIME DAILY 90 tablet 3   • citalopram (CELEXA) 20 MG Tab TAKE ONE TABLET BY MOUTH EVERY BEDTIME 90 tablet 3   • furosemide (LASIX) 40 MG Tab TAKE 1/2 TABLET BY MOUTH EVERY DAY 45 tablet 3   • hydroxychloroquine (PLAQUENIL) 200 MG Tab Take 1 tablet by mouth every day. 90 tablet 3   • potassium chloride SA (KLOR-CON M10) 10 MEQ Tab CR Take 1 " tablet by mouth every day. 90 tablet 3   • traZODone (DESYREL) 50 MG Tab TAKE 1/2 TABLET BY MOUTH AT BEDTIME 45 tablet 3   • EPINEPHrine 0.3 MG/0.3ML Solution Prefilled Syringe Inject 0.3 mL as directed as needed (serious allergic reaction/anaphylaxis). 1 Each 0   • triamcinolone acetonide-orabase (KENALOG IN ORABASE) 0.1 % Paste Apply orally BID, PRN 30 g 3   • triamcinolone acetonide (KENALOG) 0.025 % Cream Apply 1 Application topically 2 times a day as needed. 30 g 2   • CALCIUM CITRATE PO Take 200 mg by mouth every day.     • therapeutic multivitamin-minerals (THERAGRAN-M) Tab Take 1 Tab by mouth every day.     • cholestyramine (QUESTRAN) 4 g packet TAKE 4 G BY MOUTH EVERY DAY. 30 Each 3   • denosumab (PROLIA) 60 MG/ML Solution Inject 60 mg as instructed every 6 months.       No current facility-administered medications for this visit.         Allergies   Allergen Reactions   • Azithromycin Unspecified     Matti Johnsons syndrome   • Cefuroxime Itching and Vomiting     They gave me C-Diff   • Ciprofloxacin Itching and Vomiting     They gave me C-Diff   • Clindamycin Itching and Vomiting     They gave me c-diff   • Codeine Itching   • Daptomycin      Matti colleen syndrome     • Erythromycin Unspecified     Matti Johnsons syndrome   • Flagyl [Metronidazole] Rash, Vomiting and Nausea     rash   • Morphine Itching     Tolerates oxycodone/hydromorphone   • Nitrofurantoin Vomiting and Nausea   • Rifampin      Matti colleen syndrome   • Sulfa Drugs Swelling   • Macrodantin [Nitrofurantoin Macrocrystal] Rash     Other reaction(s): Decreased Blood Pressure   • Premarin Rash and Unspecified     burning         Social History     Tobacco Use   • Smoking status: Former Smoker     Packs/day: 1.00     Years: 20.00     Pack years: 20.00     Types: Cigarettes     Quit date: 2016     Years since quittin.7   • Smokeless tobacco: Never Used   Vaping Use   • Vaping Use: Never used   Substance Use Topics   • Alcohol  "use: Yes     Alcohol/week: 1.2 oz     Types: 2 Glasses of wine per week     Comment: 1 per day   • Drug use: No       Past Surgical History:   Procedure Laterality Date   • PB DEGROOT W/O FACETEC FORAMOT/[facility]KC 1/2 VRT SEG, TH* N/A 1/16/2020    Procedure: LAMINECTOMY, SPINE, THORACIC;  Surgeon: Sang Nelson M.D.;  Location: SURGERY [facility];  Service: Neurosurgery   • THORACIC FUSION O-ARM N/A 1/16/2020    Procedure: FUSION, SPINE, THORACIC, POSTERIOR APPROACH - T9-L3;  Surgeon: Sang Nelson M.D.;  Location: SURGERY [facility];  Service: Neurosurgery   • PB RECONSTR TOTAL SHOULDER IMPLANT Left 4/30/2019    Procedure: ARTHROPLASTY, SHOULDER, TOTAL - REVERSE;  Surgeon: Daniella Camargo M.D.;  Location: SURGERY [facility];  Service: Orthopedics   • SHOULDER ARTHROPLASTY TOTAL Right 1/9/2018    Procedure: SHOULDER ARTHROPLASTY TOTAL - REVERSE;  Surgeon: Daniella Camargo M.D.;  Location: SURGERY [facility];  Service: Orthopedics   • COLONOSCOPY - ENDO N/A 3/7/2016    Procedure: COLONOSCOPY - ENDO;  Surgeon: Estiven Ayers M.D.;  Location: ENDOSCOPY [facility];  Service:    • FECAL TRANSPLANT N/A 3/7/2016    Procedure: FECAL TRANSPLANT;  Surgeon: Estiven Ayers M.D.;  Location: ENDOSCOPY [facility];  Service:    • OTHER ORTHOPEDIC SURGERY Left 2012    Left Elbow fx repair   • BLEPHAROPLASTY  3/31/2011    Performed by ORACIO DIAZ at [facility]   • FOOT SURGERY Right 2010   • OTHER ORTHOPEDIC SURGERY Left 2006    finger- removal of digit Left middle finger   • CHOLECYSTECTOMY  2000   • HYSTERECTOMY LAPAROSCOPY  2000   • HYSTERECTOMY RADICAL  1985   • OTHER Bilateral 2010, 2008    feet- repair torn tendons   • OTHER ORTHOPEDIC SURGERY Left 1970s    femur fx        ROS    Ambulatory Vitals  /60 (BP Location: Left arm, Patient Position: Sitting, BP Cuff Size: Adult)   Pulse 68   Temp 36.6 °C (97.9 °F) (Temporal)   Resp 14   Ht 1.651 m (5' 5\")   Wt 56.7 kg " (125 lb)   SpO2 99%   BMI 20.80 kg/m²     Physical Exam      Assessment and Plan:     1. Acute otitis externa of right ear, unspecified type  NEOMYCIN-POLYMYXIN-HC, OTIC, 1 % Solution     Advised her to call her ENT MD to set up appointment one week from now to view right ear canal since she tends to get recurring infection in the right ear.    Ramone Iraheta M.D.

## 2021-10-13 ENCOUNTER — HOSPITAL ENCOUNTER (OUTPATIENT)
Dept: RADIOLOGY | Facility: MEDICAL CENTER | Age: 86
End: 2021-10-13
Attending: INTERNAL MEDICINE
Payer: MEDICARE

## 2021-10-13 DIAGNOSIS — Z12.31 VISIT FOR SCREENING MAMMOGRAM: ICD-10-CM

## 2021-10-22 ENCOUNTER — NON-PROVIDER VISIT (OUTPATIENT)
Dept: INTERNAL MEDICINE | Facility: IMAGING CENTER | Age: 86
End: 2021-10-22
Payer: MEDICARE

## 2021-10-22 NOTE — NON-PROVIDER
Patient comes in to have right ear checked after 10/08/2021 appointment for acute otitis externa.  Right ear canal remains occluded with thick yellow/white debris.  Canal is neither tender or infected appearing, but patient is not able to wear her right hearing aide due to debris. Patient continues on Cortisporin Otic drops.  Assisted patient with ENT appointment for further treatment.  Appointment scheduled 11/18/2021 with [facility].  She will continue on Cortisporin Otic drops until her ENT evaluation.

## 2021-11-17 ENCOUNTER — HOSPITAL ENCOUNTER (OUTPATIENT)
Dept: LAB | Facility: MEDICAL CENTER | Age: 86
End: 2021-11-17
Attending: INTERNAL MEDICINE
Payer: MEDICARE

## 2021-11-17 DIAGNOSIS — Z79.899 LONG TERM USE OF DRUG: ICD-10-CM

## 2021-11-17 DIAGNOSIS — I73.00 RAYNAUD'S DISEASE WITHOUT GANGRENE: ICD-10-CM

## 2021-11-17 DIAGNOSIS — E03.9 HYPOTHYROIDISM, UNSPECIFIED TYPE: ICD-10-CM

## 2021-11-17 LAB
ALBUMIN SERPL BCP-MCNC: 4.3 G/DL (ref 3.2–4.9)
ALBUMIN/GLOB SERPL: 1.4 G/DL
ALP SERPL-CCNC: 63 U/L (ref 30–99)
ALT SERPL-CCNC: 15 U/L (ref 2–50)
ANION GAP SERPL CALC-SCNC: 15 MMOL/L (ref 7–16)
AST SERPL-CCNC: 32 U/L (ref 12–45)
BILIRUB SERPL-MCNC: 0.5 MG/DL (ref 0.1–1.5)
BUN SERPL-MCNC: 20 MG/DL (ref 8–22)
CALCIUM SERPL-MCNC: 9.5 MG/DL (ref 8.5–10.5)
CHLORIDE SERPL-SCNC: 106 MMOL/L (ref 96–112)
CO2 SERPL-SCNC: 17 MMOL/L (ref 20–33)
CREAT SERPL-MCNC: 0.91 MG/DL (ref 0.5–1.4)
GLOBULIN SER CALC-MCNC: 3 G/DL (ref 1.9–3.5)
GLUCOSE SERPL-MCNC: 89 MG/DL (ref 65–99)
POTASSIUM SERPL-SCNC: 4.8 MMOL/L (ref 3.6–5.5)
PROT SERPL-MCNC: 7.3 G/DL (ref 6–8.2)
SODIUM SERPL-SCNC: 138 MMOL/L (ref 135–145)
T3 SERPL-MCNC: 79.4 NG/DL (ref 60–181)
TSH SERPL DL<=0.005 MIU/L-ACNC: 0.14 UIU/ML (ref 0.38–5.33)

## 2021-11-17 PROCEDURE — 84443 ASSAY THYROID STIM HORMONE: CPT

## 2021-11-17 PROCEDURE — 80053 COMPREHEN METABOLIC PANEL: CPT

## 2021-11-17 PROCEDURE — 36415 COLL VENOUS BLD VENIPUNCTURE: CPT

## 2021-11-17 PROCEDURE — 84480 ASSAY TRIIODOTHYRONINE (T3): CPT

## 2021-11-18 ENCOUNTER — HOSPITAL ENCOUNTER (OUTPATIENT)
Dept: RADIOLOGY | Facility: MEDICAL CENTER | Age: 86
End: 2021-11-18
Attending: INTERNAL MEDICINE
Payer: MEDICARE

## 2021-11-18 PROCEDURE — 77063 BREAST TOMOSYNTHESIS BI: CPT

## 2021-11-23 ENCOUNTER — OUTPATIENT INFUSION SERVICES (OUTPATIENT)
Dept: ONCOLOGY | Facility: MEDICAL CENTER | Age: 86
End: 2021-11-23
Attending: INTERNAL MEDICINE
Payer: MEDICARE

## 2021-11-23 VITALS
RESPIRATION RATE: 18 BRPM | SYSTOLIC BLOOD PRESSURE: 138 MMHG | BODY MASS INDEX: 21.6 KG/M2 | HEIGHT: 64 IN | DIASTOLIC BLOOD PRESSURE: 48 MMHG | WEIGHT: 126.54 LBS | HEART RATE: 83 BPM | OXYGEN SATURATION: 95 % | TEMPERATURE: 98 F

## 2021-11-23 DIAGNOSIS — M81.0 OSTEOPOROSIS, UNSPECIFIED OSTEOPOROSIS TYPE, UNSPECIFIED PATHOLOGICAL FRACTURE PRESENCE: ICD-10-CM

## 2021-11-23 DIAGNOSIS — Z87.81 HISTORY OF COMPRESSION FRACTURE OF SPINE: ICD-10-CM

## 2021-11-23 PROCEDURE — 96372 THER/PROPH/DIAG INJ SC/IM: CPT

## 2021-11-23 PROCEDURE — 700111 HCHG RX REV CODE 636 W/ 250 OVERRIDE (IP): Mod: JG | Performed by: INTERNAL MEDICINE

## 2021-11-23 RX ORDER — CIPROFLOXACIN AND DEXAMETHASONE 3; 1 MG/ML; MG/ML
SUSPENSION/ DROPS AURICULAR (OTIC)
COMMUNITY
Start: 2021-11-19 | End: 2022-01-24

## 2021-11-23 RX ADMIN — DENOSUMAB 60 MG: 60 INJECTION SUBCUTANEOUS at 11:47

## 2021-11-23 ASSESSMENT — PAIN DESCRIPTION - PAIN TYPE: TYPE: CHRONIC PAIN

## 2021-11-23 ASSESSMENT — FIBROSIS 4 INDEX: FIB4 SCORE: 2.25

## 2021-11-23 NOTE — NON-PROVIDER
Elizabeth arrives to [facility] for q 6 month Prolia for osteoporosis. Last treatment was 5/21/21. Patient denies active infections; denies s/s hypocalcemia; denies recent/upcoming dental procedures. Patient confirms taking supplementary PO calcium and Vitamin D. Patient had lab drawn 11/17. She meets established parameters to receive injection. Prolia administered to back of left arm without issues. Called scheduling regarding next appt. Patient to confirm on MyChart. Discharged home to self care in no apparent distress.

## 2021-11-23 NOTE — PROGRESS NOTES
Elizabeth arrives to [facility] for q 6 month Prolia for osteoporosis. Last treatment was 5/21/21. Patient denies active infections; denies s/s hypocalcemia; denies recent/upcoming dental procedures. Patient confirms taking supplementary PO calcium and Vitamin D. Patient had lab drawn 11/17. She meets established parameters to receive injection. Prolia administered to back of left arm without issues. Called scheduling regarding next appt. Patient to confirm on MyChart. Discharged home to self care in no apparent distress.

## 2021-12-01 ENCOUNTER — OFFICE VISIT (OUTPATIENT)
Dept: INTERNAL MEDICINE | Facility: IMAGING CENTER | Age: 86
End: 2021-12-01
Payer: MEDICARE

## 2021-12-01 VITALS
SYSTOLIC BLOOD PRESSURE: 116 MMHG | HEIGHT: 65 IN | WEIGHT: 127 LBS | HEART RATE: 69 BPM | BODY MASS INDEX: 21.16 KG/M2 | TEMPERATURE: 97.9 F | DIASTOLIC BLOOD PRESSURE: 60 MMHG | OXYGEN SATURATION: 98 % | RESPIRATION RATE: 14 BRPM

## 2021-12-01 DIAGNOSIS — M79.2 NEUROPATHIC PAIN: ICD-10-CM

## 2021-12-01 DIAGNOSIS — D64.9 ANEMIA, UNSPECIFIED TYPE: ICD-10-CM

## 2021-12-01 DIAGNOSIS — M06.9 RHEUMATOID ARTHRITIS INVOLVING MULTIPLE SITES, UNSPECIFIED WHETHER RHEUMATOID FACTOR PRESENT (HCC): ICD-10-CM

## 2021-12-01 DIAGNOSIS — Z91.81 HISTORY OF FALLING: ICD-10-CM

## 2021-12-01 DIAGNOSIS — E03.9 HYPOTHYROIDISM (ACQUIRED): ICD-10-CM

## 2021-12-01 DIAGNOSIS — M81.0 OSTEOPOROSIS, UNSPECIFIED OSTEOPOROSIS TYPE, UNSPECIFIED PATHOLOGICAL FRACTURE PRESENCE: ICD-10-CM

## 2021-12-01 DIAGNOSIS — E78.00 HYPERCHOLESTEROLEMIA: ICD-10-CM

## 2021-12-01 DIAGNOSIS — Z79.899 LONG TERM USE OF DRUG: ICD-10-CM

## 2021-12-01 PROBLEM — Z87.81 HISTORY OF COMPRESSION FRACTURE OF VERTEBRAL COLUMN: Status: RESOLVED | Noted: 2020-02-27 | Resolved: 2021-12-01

## 2021-12-01 PROBLEM — Z86.718 HISTORY OF DVT (DEEP VEIN THROMBOSIS): Status: ACTIVE | Noted: 2021-12-01

## 2021-12-01 PROCEDURE — 99214 OFFICE O/P EST MOD 30 MIN: CPT | Performed by: INTERNAL MEDICINE

## 2021-12-01 ASSESSMENT — FIBROSIS 4 INDEX: FIB4 SCORE: 2.25

## 2021-12-01 NOTE — PROGRESS NOTES
"Established Patient Note   HPI:        Elizabeth comes in today to follow up hypothyroid; she has done recent lab work. She lost balance last weekend and fell into bathroom closet but no broken bones as far as she feels. She has bruising on left face and right forearm.    Past Medical History:   Diagnosis Date   • Anesthesia     \"One time had a hard time waking me up\"   • C. difficile diarrhea 2/16   • C. difficile diarrhea 3/2016    fecal transplant   • Cancer ([facility]) 1947    Tongue CA - Radiation   • Chronic back pain     hands, shoulders   • Dense breast tissue on mammogram 7/25/2019   • Depression    • Elbow fracture, left    • Heart burn    • History of anemia    • History of DVT (deep vein thrombosis) 2017   • Hypothyroid    • MRSA (methicillin resistant Staphylococcus aureus)     Right foot   • MRSA (methicillin resistant Staphylococcus aureus) 2012   • Osteoporosis 3/27/2019    DEXA Aug. 2017: T score -3.4 right forearm and -1.6 left hip DEXA Aug. 2019: T score forearm -4.6 and hip -1.4; DEXA Nov. 2020: T score right forearm -4.0 and right hip -1.7   • Pain     shoulders, feet, back   • Rheumatoid arthritis ([facility]) 9/26/2014   • Stroke ([facility]) 1990's?    \"mini\" no residual symptoms   • Urinary incontinence     occasional       Current Outpatient Medications   Medication Sig Dispense Refill   • Multiple Vitamins-Minerals (PRESERVISION AREDS 2 PO) Take  by mouth every day.     • ciprofloxacin/dexamethasone (CIPRODEX) 0.3-0.1 % Suspension      • ipratropium (ATROVENT) 0.06 % Solution Administer 1 Spray into affected nostril(S) 2 times a day. 15 mL 1   • SYNTHROID 112 MCG Tab TAKE ONE TABLET BY MOUTH EVERY MORNING ON AN EMPTY STOMACH 90 tablet 3   • cyclosporin (RESTASIS) 0.05 % ophthalmic emulsion Administer 1 Drop into both eyes 2 times a day. Pharmacist - please dispense 180 single use vials (72 ml) 72 mL 1   • spironolactone (ALDACTONE) 25 MG Tab TAKE ONE TABLET BY MOUTH ONE TIME DAILY 90 tablet 3   • citalopram " (CELEXA) 20 MG Tab TAKE ONE TABLET BY MOUTH EVERY BEDTIME 90 tablet 3   • furosemide (LASIX) 40 MG Tab TAKE 1/2 TABLET BY MOUTH EVERY DAY 45 tablet 3   • hydroxychloroquine (PLAQUENIL) 200 MG Tab Take 1 tablet by mouth every day. 90 tablet 3   • potassium chloride SA (KLOR-CON M10) 10 MEQ Tab CR Take 1 tablet by mouth every day. 90 tablet 3   • traZODone (DESYREL) 50 MG Tab TAKE 1/2 TABLET BY MOUTH AT BEDTIME 45 tablet 3   • EPINEPHrine 0.3 MG/0.3ML Solution Prefilled Syringe Inject 0.3 mL as directed as needed (serious allergic reaction/anaphylaxis). 1 Each 0   • triamcinolone acetonide-orabase (KENALOG IN ORABASE) 0.1 % Paste Apply orally BID, PRN 30 g 3   • triamcinolone acetonide (KENALOG) 0.025 % Cream Apply 1 Application topically 2 times a day as needed. 30 g 2   • CALCIUM CITRATE PO Take 200 mg by mouth every day.     • therapeutic multivitamin-minerals (THERAGRAN-M) Tab Take 1 Tab by mouth every day.     • cholestyramine (QUESTRAN) 4 g packet TAKE 4 G BY MOUTH EVERY DAY. 30 Each 3   • denosumab (PROLIA) 60 MG/ML Solution Inject 60 mg as instructed every 6 months.       No current facility-administered medications for this visit.         Allergies   Allergen Reactions   • Azithromycin Unspecified     Matti Johnsons syndrome   • Cefuroxime Itching and Vomiting     They gave me C-Diff   • Ciprofloxacin Itching and Vomiting     They gave me C-Diff   • Clindamycin Itching and Vomiting     They gave me c-diff   • Codeine Itching   • Daptomycin      Matti colleen syndrome     • Erythromycin Unspecified     Matti Johnsons syndrome   • Flagyl [Metronidazole] Rash, Vomiting and Nausea     rash   • Morphine Itching     Tolerates oxycodone/hydromorphone   • Nitrofurantoin Vomiting and Nausea   • Rifampin      Matti colleen syndrome   • Sulfa Drugs Swelling   • Macrodantin [Nitrofurantoin Macrocrystal] Rash     Other reaction(s): Decreased Blood Pressure   • Premarin Rash and Unspecified     burning         Social  History     Tobacco Use   • Smoking status: Former Smoker     Packs/day: 1.00     Years: 20.00     Pack years: 20.00     Types: Cigarettes     Quit date: 2016     Years since quittin.9   • Smokeless tobacco: Never Used   Vaping Use   • Vaping Use: Never used   Substance Use Topics   • Alcohol use: Yes     Alcohol/week: 1.2 oz     Types: 2 Glasses of wine per week     Comment: 1 per day   • Drug use: No       Past Surgical History:   Procedure Laterality Date   • PB DEGROOT W/O FACETEC FORAMOT/DSKC  VRT SEG, TH* N/A 2020    Procedure: LAMINECTOMY, SPINE, THORACIC;  Surgeon: Sang Nelson M.D.;  Location: SURGERY [facility];  Service: Neurosurgery   • THORACIC FUSION O-ARM N/A 2020    Procedure: FUSION, SPINE, THORACIC, POSTERIOR APPROACH - T9-L3;  Surgeon: Sang Nelson M.D.;  Location: SURGERY [facility];  Service: Neurosurgery   • PB RECONSTR TOTAL SHOULDER IMPLANT Left 2019    Procedure: ARTHROPLASTY, SHOULDER, TOTAL - REVERSE;  Surgeon: Daniella Camargo M.D.;  Location: SURGERY [facility];  Service: Orthopedics   • SHOULDER ARTHROPLASTY TOTAL Right 2018    Procedure: SHOULDER ARTHROPLASTY TOTAL - REVERSE;  Surgeon: Daniella Camargo M.D.;  Location: SURGERY [facility];  Service: Orthopedics   • COLONOSCOPY - ENDO N/A 3/7/2016    Procedure: COLONOSCOPY - ENDO;  Surgeon: Estiven Ayers M.D.;  Location: ENDOSCOPY [facility];  Service:    • FECAL TRANSPLANT N/A 3/7/2016    Procedure: FECAL TRANSPLANT;  Surgeon: Estiven Ayers M.D.;  Location: ENDOSCOPY [facility];  Service:    • OTHER ORTHOPEDIC SURGERY Left     Left Elbow fx repair   • BLEPHAROPLASTY  3/31/2011    Performed by ORACIO DIAZ at [facility]   • FOOT SURGERY Right    • OTHER ORTHOPEDIC SURGERY Left     finger- removal of digit Left middle finger   • CHOLECYSTECTOMY     • HYSTERECTOMY LAPAROSCOPY     • HYSTERECTOMY RADICAL     • OTHER Bilateral  "2010, 2008    feet- repair torn tendons   • OTHER ORTHOPEDIC SURGERY Left 1970s    femur fx        ROS    Ambulatory Vitals  /60 (BP Location: Left arm, Patient Position: Sitting, BP Cuff Size: Adult)   Pulse 69   Temp 36.6 °C (97.9 °F) (Temporal)   Resp 14   Ht 1.651 m (5' 5\")   Wt 57.6 kg (127 lb)   SpO2 98%   BMI 21.13 kg/m²     Physical Exam  Constitutional:       Appearance: Normal appearance.   Cardiovascular:      Rate and Rhythm: Normal rate and regular rhythm.      Heart sounds: Normal heart sounds. No murmur heard.  No friction rub. No gallop.    Pulmonary:      Effort: Pulmonary effort is normal.      Breath sounds: Normal breath sounds. No wheezing or rales.   Musculoskeletal:      Comments: No tenderness over spine from cervical to lumbar. Mild tenderness over tailbone. Mild tenderness over hips. No pain with palpation over shoulders, upper arms, forearms or knees.         Component      Latest Ref Rng & Units 11/4/2020 5/10/2021 11/17/2021   WBC      4.8 - 10.8 K/uL 5.2 6.9    RBC      4.20 - 5.40 M/uL 4.13 (L) 4.14 (L)    Hemoglobin      12.0 - 16.0 g/dL 13.3 13.7    Hematocrit      37.0 - 47.0 % 41.1 41.2    MCV      81.4 - 97.8 fL 99.5 (H) 99.5 (H)    MCH      27.0 - 33.0 pg 32.2 33.1 (H)    MCHC      33.6 - 35.0 g/dL 32.4 (L) 33.3 (L)    RDW      35.9 - 50.0 fL 50.2 (H) 47.1    Platelet Count      164 - 446 K/uL 334 312    MPV      9.0 - 12.9 fL 10.0 9.9    Neutrophils-Polys      44.00 - 72.00 % 53.50 72.10 (H)    Lymphocytes      22.00 - 41.00 % 31.20 18.70 (L)    Monocytes      0.00 - 13.40 % 9.00 5.80    Eosinophils      0.00 - 6.90 % 5.00 2.30    Basophils      0.00 - 1.80 % 1.10 1.00    Immature Granulocytes      0.00 - 0.90 % 0.20 0.10    Nucleated RBC      /100 WBC 0.00 0.00    Neutrophils (Absolute)      2.00 - 7.15 K/uL 2.79 4.97    Lymphs (Absolute)      1.00 - 4.80 K/uL 1.63 1.29    Monos (Absolute)      0.00 - 0.85 K/uL 0.47 0.40    Eos (Absolute)      0.00 - 0.51 K/uL 0.26 " 0.16    Baso (Absolute)      0.00 - 0.12 K/uL 0.06 0.07    Immature Granulocytes (abs)      0.00 - 0.11 K/uL 0.01 0.01    NRBC (Absolute)      K/uL 0.00 0.00    Sodium      135 - 145 mmol/L 134 (L) 137 138   Potassium      3.6 - 5.5 mmol/L 4.2 4.3 4.8   Chloride      96 - 112 mmol/L 102 104 106   Co2      20 - 33 mmol/L 12 (L) 17 (L) 17 (L)   Anion Gap      7.0 - 16.0 20.0 (H) 16.0 15.0   Glucose      65 - 99 mg/dL 85 79 89   Bun      8 - 22 mg/dL 32 (H) 28 (H) 20   Creatinine      0.50 - 1.40 mg/dL 0.99 0.88 0.91   Calcium      8.5 - 10.5 mg/dL 10.3 10.2 9.5   AST(SGOT)      12 - 45 U/L 39 34 32   ALT(SGPT)      2 - 50 U/L 25 21 15   Alkaline Phosphatase      30 - 99 U/L 50 53 63   Total Bilirubin      0.1 - 1.5 mg/dL 0.4 0.6 0.5   Albumin      3.2 - 4.9 g/dL 4.7 4.5 4.3   Total Protein      6.0 - 8.2 g/dL 7.6 7.6 7.3   Globulin      1.9 - 3.5 g/dL 2.9 3.1 3.0   A-G Ratio      g/dL 1.6 1.5 1.4   Color        Yellow    Character        Clear    Specific Gravity      <1.035  1.010    Ph      5.0 - 8.0  6.0    Glucose      Negative mg/dL  Negative    Ketones      Negative mg/dL  Negative    Protein      Negative mg/dL  Negative    Bilirubin      Negative  Negative    Urobilinogen, Urine      Negative  0.2    Nitrite      Negative  Negative    Leukocyte Esterase      Negative  Trace (A)    Occult Blood      Negative  Negative    Micro Urine Req        Microscopic    WBC      /hpf  0-2    RBC      /hpf  0-2    Bacteria      None /hpf  Negative    Epithelial Cells      /hpf  Negative    Hyaline Cast      /lpf  0-2    Cholesterol,Tot      100 - 199 mg/dL  221 (H)    Triglycerides      0 - 149 mg/dL  84    HDL      >=40 mg/dL  69    LDL      <100 mg/dL  135 (H)    GFR If African American      >60 mL/min/1.73 m 2 >60 >60 >60   GFR If Non African American      >60 mL/min/1.73 m 2 53 (A) >60 59 (A)   Stat C-Reactive Protein      0.00 - 0.75 mg/dL 0.22     Sed Rate Westergren      0 - 30 mm/hour 17     TSH      0.380 - 5.330  "uIU/mL 0.154 (L) 0.380 0.140 (L)   Fasting Status       Fasting Fasting    T3      60.0 - 181.0 ng/dL 77.2 81.0 79.4   25-Hydroxy   Vitamin D 25      30 - 80 ng/mL  68    Pth, Intact      14.0 - 72.0 pg/mL  22.0    Vitamin B12 -True Cobalamin      211 - 911 pg/mL  1089 (H)      Assessment and Plan:     1. Hypothyroidism (acquired)      TSH 0.140 uIU/ml on synthroid 112 mcg qam   2. Osteoporosis, unspecified osteoporosis type, unspecified pathological fracture presence      History of compression fracture at T12 and L1 in Jan. 2020; prolia started Apr. 2018 with 60 mg SC every 6 months   3. History of falling  Referral to Physical Therapy   4. Neuropathic pain  Referral to Physical Therapy   5. Rheumatoid arthritis involving multiple sites, unspecified whether rheumatoid factor present (HCC)  Referral to Physical Therapy   6. Hypercholesterolemia  US-TOTAL VASCULAR SCREENING (S/P)   7. Anemia, unspecified type     8. Long term use of drug       Due to recent fall along with her arthritis and mild neuopathy in lower extremities I feel she may benefit from PT for balance and muscle strengthening. Discussed we could consider starting \"bone builder\" such as evenity or tymlos but studies suggest that starting these after antiresorptive medications have been started is less effective. Will continue prolia. Have discussed vascular screening with [facility]; she is willing to do this. Check CBC, CMP, lipid panel, B12, vitamin D, TSH/T3, urinalysis for 6 months.    Ramone Iraheta M.D.  "

## 2021-12-09 ENCOUNTER — HOSPITAL ENCOUNTER (OUTPATIENT)
Dept: RADIOLOGY | Facility: MEDICAL CENTER | Age: 86
End: 2021-12-09
Attending: INTERNAL MEDICINE
Payer: COMMERCIAL

## 2021-12-09 DIAGNOSIS — E78.00 HYPERCHOLESTEROLEMIA: ICD-10-CM

## 2021-12-09 PROCEDURE — 93998 UNLISTD NONINVAS VASC DX STD: CPT

## 2021-12-09 PROCEDURE — 306723 US-TOTAL VASCULAR SCREENING (S/P)

## 2022-01-03 RX ORDER — TRAZODONE HYDROCHLORIDE 50 MG/1
TABLET ORAL
Qty: 45 TABLET | Refills: 0 | Status: SHIPPED | OUTPATIENT
Start: 2022-01-03 | End: 2022-05-02 | Stop reason: SDUPTHER

## 2022-01-03 RX ORDER — TRAZODONE HYDROCHLORIDE 50 MG/1
TABLET ORAL
Qty: 45 TABLET | Refills: 3 | Status: SHIPPED
Start: 2022-01-03 | End: 2022-01-03

## 2022-01-20 ENCOUNTER — APPOINTMENT (OUTPATIENT)
Dept: INTERNAL MEDICINE | Facility: IMAGING CENTER | Age: 87
End: 2022-01-20
Payer: MEDICARE

## 2022-01-24 ENCOUNTER — OFFICE VISIT (OUTPATIENT)
Dept: INTERNAL MEDICINE | Facility: IMAGING CENTER | Age: 87
End: 2022-01-24
Payer: MEDICARE

## 2022-01-24 VITALS
RESPIRATION RATE: 14 BRPM | HEART RATE: 72 BPM | DIASTOLIC BLOOD PRESSURE: 60 MMHG | TEMPERATURE: 97.5 F | HEIGHT: 65 IN | BODY MASS INDEX: 20.83 KG/M2 | OXYGEN SATURATION: 97 % | WEIGHT: 125 LBS | SYSTOLIC BLOOD PRESSURE: 130 MMHG

## 2022-01-24 DIAGNOSIS — H04.123 DRY EYES, BILATERAL: ICD-10-CM

## 2022-01-24 DIAGNOSIS — E78.00 HYPERCHOLESTEROLEMIA: ICD-10-CM

## 2022-01-24 DIAGNOSIS — I65.23 ATHEROSCLEROSIS OF BOTH CAROTID ARTERIES: ICD-10-CM

## 2022-01-24 DIAGNOSIS — Z79.899 LONG TERM USE OF DRUG: ICD-10-CM

## 2022-01-24 PROCEDURE — 99214 OFFICE O/P EST MOD 30 MIN: CPT | Performed by: INTERNAL MEDICINE

## 2022-01-24 RX ORDER — CYCLOSPORINE 0.5 MG/ML
1 EMULSION OPHTHALMIC 2 TIMES DAILY
Qty: 72 ML | Refills: 3 | Status: SHIPPED | OUTPATIENT
Start: 2022-01-24 | End: 2023-10-18 | Stop reason: SDUPTHER

## 2022-01-24 RX ORDER — ROSUVASTATIN CALCIUM 5 MG/1
5 TABLET, COATED ORAL EVERY EVENING
Qty: 90 TABLET | Refills: 3 | Status: SHIPPED | OUTPATIENT
Start: 2022-01-24 | End: 2022-01-26 | Stop reason: SDUPTHER

## 2022-01-24 ASSESSMENT — FIBROSIS 4 INDEX: FIB4 SCORE: 2.28

## 2022-01-24 NOTE — PROGRESS NOTES
"Established Patient Note   HPI:        Elizabeth comes in today to examine ear canals after she was seen at [facility] for her hearing aids. No ear pain or drainage. She has had vascular screening done in December.    Past Medical History:   Diagnosis Date   • Anesthesia     \"One time had a hard time waking me up\"   • Atherosclerosis of both carotid arteries 1/24/2022     Dec. 2021: Mild plaque right internal carotid and moderate plaque left carotid bifurcation   • C. difficile diarrhea 2/16   • C. 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 2017: T score -3.4 right forearm and -1.6 left hip DEXA Aug. 2019: T score forearm -4.6 and hip -1.4; DEXA Nov. 2020: T score right forearm -4.0 and right hip -1.7   • Pain     shoulders, feet, back   • Rheumatoid arthritis ([facility]) 9/26/2014   • Stroke ([facility]) 1990's?    \"mini\" no residual symptoms   • Urinary incontinence     occasional       Current Outpatient Medications   Medication Sig Dispense Refill   • rosuvastatin (CRESTOR) 5 MG Tab Take 1 Tablet by mouth every evening. 90 Tablet 3   • Coenzyme Q10 300 MG Cap Take 1 Capsule by mouth every day. 100 Capsule 3   • Multiple Vitamins-Minerals (PRESERVISION AREDS 2 PO) Take  by mouth every day.     • ipratropium (ATROVENT) 0.06 % Solution Administer 1 Spray into affected nostril(S) 2 times a day. 15 mL 1   • SYNTHROID 112 MCG Tab TAKE ONE TABLET BY MOUTH EVERY MORNING ON AN EMPTY STOMACH 90 tablet 3   • cyclosporin (RESTASIS) 0.05 % ophthalmic emulsion Administer 1 Drop into both eyes 2 " times a day. Pharmacist - please dispense 180 single use vials (72 ml) 72 mL 1   • spironolactone (ALDACTONE) 25 MG Tab TAKE ONE TABLET BY MOUTH ONE TIME DAILY 90 tablet 3   • citalopram (CELEXA) 20 MG Tab TAKE ONE TABLET BY MOUTH EVERY BEDTIME 90 tablet 3   • furosemide (LASIX) 40 MG Tab TAKE 1/2 TABLET BY MOUTH EVERY DAY 45 tablet 3   • hydroxychloroquine (PLAQUENIL) 200 MG Tab Take 1 tablet by mouth every day. 90 tablet 3   • potassium chloride SA (KLOR-CON M10) 10 MEQ Tab CR Take 1 tablet by mouth every day. 90 tablet 3   • EPINEPHrine 0.3 MG/0.3ML Solution Prefilled Syringe Inject 0.3 mL as directed as needed (serious allergic reaction/anaphylaxis). 1 Each 0   • triamcinolone acetonide-orabase (KENALOG IN ORABASE) 0.1 % Paste Apply orally BID, PRN 30 g 3   • triamcinolone acetonide (KENALOG) 0.025 % Cream Apply 1 Application topically 2 times a day as needed. 30 g 2   • CALCIUM CITRATE PO Take 200 mg by mouth every day.     • therapeutic multivitamin-minerals (THERAGRAN-M) Tab Take 1 Tab by mouth every day.     • cholestyramine (QUESTRAN) 4 g packet TAKE 4 G BY MOUTH EVERY DAY. 30 Each 3   • denosumab (PROLIA) 60 MG/ML Solution Inject 60 mg as instructed every 6 months.     • traZODone (DESYREL) 50 MG Tab TAKE 1/2 TABLET BY MOUTH AT BEDTIME 45 Tablet 0     No current facility-administered medications for this visit.         Allergies   Allergen Reactions   • Azithromycin Unspecified     Matti Johnsons syndrome   • Cefuroxime Itching and Vomiting     They gave me C-Diff   • Ciprofloxacin Itching and Vomiting     They gave me C-Diff   • Clindamycin Itching and Vomiting     They gave me c-diff   • Codeine Itching   • Daptomycin      Matti colleen syndrome     • Erythromycin Unspecified     Matti Johnsons syndrome   • Flagyl [Metronidazole] Rash, Vomiting and Nausea     rash   • Morphine Itching     Tolerates oxycodone/hydromorphone   • Nitrofurantoin Vomiting and Nausea   • Rifampin      Matti colleen syndrome    • Sulfa Drugs Swelling   • Macrodantin [Nitrofurantoin Macrocrystal] Rash     Other reaction(s): Decreased Blood Pressure   • Premarin Rash and Unspecified     burning         Social History     Tobacco Use   • Smoking status: Former Smoker     Packs/day: 1.00     Years: 20.00     Pack years: 20.00     Types: Cigarettes     Quit date: 2016     Years since quittin.0   • Smokeless tobacco: Never Used   Vaping Use   • Vaping Use: Never used   Substance Use Topics   • Alcohol use: Yes     Alcohol/week: 1.2 oz     Types: 2 Glasses of wine per week     Comment: 1 per day   • Drug use: No       Past Surgical History:   Procedure Laterality Date   • PB DEGROOT W/O FACETEC FORAMOT/DSKC  VRT SEG, TH* N/A 2020    Procedure: LAMINECTOMY, SPINE, THORACIC;  Surgeon: Sang Nelson M.D.;  Location: [facility];  Service: Neurosurgery   • THORACIC FUSION O-ARM N/A 2020    Procedure: FUSION, SPINE, THORACIC, POSTERIOR APPROACH - T9-L3;  Surgeon: Sang Nelson M.D.;  Location: [facility];  Service: Neurosurgery   • PB RECONSTR TOTAL SHOULDER IMPLANT Left 2019    Procedure: ARTHROPLASTY, SHOULDER, TOTAL - REVERSE;  Surgeon: Daniella Camargo M.D.;  Location: [facility];  Service: Orthopedics   • SHOULDER ARTHROPLASTY TOTAL Right 2018    Procedure: SHOULDER ARTHROPLASTY TOTAL - REVERSE;  Surgeon: Daniella Camargo M.D.;  Location: [facility];  Service: Orthopedics   • COLONOSCOPY - ENDO N/A 3/7/2016    Procedure: COLONOSCOPY - ENDO;  Surgeon: Estiven Ayers M.D.;  Location: ENDOSCOPY [facility];  Service:    • FECAL TRANSPLANT N/A 3/7/2016    Procedure: FECAL TRANSPLANT;  Surgeon: Estiven Ayers M.D.;  Location: ENDOSCOPY [facility];  Service:    • OTHER ORTHOPEDIC SURGERY Left     Left Elbow fx repair   • BLEPHAROPLASTY  3/31/2011    Performed by ORACIO DIAZ at SURGERY [facility]   • FOOT SURGERY Right    • OTHER  "ORTHOPEDIC SURGERY Left 2006    finger- removal of digit Left middle finger   • CHOLECYSTECTOMY     • HYSTERECTOMY LAPAROSCOPY     • HYSTERECTOMY RADICAL     • OTHER Bilateral ,     feet- repair torn tendons   • OTHER ORTHOPEDIC SURGERY Left 1970s    femur fx        ROS    Ambulatory Vitals  /60 (BP Location: Left arm, Patient Position: Sitting, BP Cuff Size: Adult)   Pulse 72   Temp 36.4 °C (97.5 °F) (Temporal)   Resp 14   Ht 1.651 m (5' 5\")   Wt 56.7 kg (125 lb)   SpO2 97%   BMI 20.80 kg/m²     Physical Exam  HENT:      Right Ear: Tympanic membrane normal.      Left Ear: Tympanic membrane normal.      Ears:      Comments: Small amount of wax in right ear canal without obstruction        US-TOTAL VASCULAR SCREENING (S/P)  Order: [id number]   Status: Final result     Visible to patient: Yes (seen)     Next appt: 06/15/2022 at 11:00 AM in Internal Medicine (Ramone Iraheta M.D.)     Dx: Hypercholesterolemia     0 Result Notes    Details    Reading Physician Reading Date Result Priority   No Reading Provider Prelim 2021    Vargas Cheng M.D.  [phone] 2021      Narrative & Impression  Total Vascular   Screening         Vascular Laboratory   [facility]      Exam Date:     2021 10:10      Room #:     Out Patient      Priority:     Routine      Ht (in):             Wt (lb):      Ordering Physician:        RAMONE IRAHETA      Referring Physician:       [id number]      Sonographer:               Lindsay Camp RVLIZ      Study Type:                Limited Bilateral      Technical Quality:         Adequate      Age:    85    Gender:     F      MRN:    [id number]      :    1935      BSA:      Indications:     Pure hypercholesterolemia, Encounter for screening for                     cardiovascular disorders      CPT Codes:       64922      ICD Codes:       E78.0  Z13.6      History:         Hypercholesterolemia. No prior exams.      Limitations:      Type " of Exam:      [facility] Total Vascular                       Screening      FINDINGS   CAROTID DUPLEX SCAN:    RIGHT:   Mild plaque of the carotid bifurcation. The peak systolic / end diastolic    velocities of the proximal internal carotid are 88/18 cm/sec.      LEFT:   Heavy plaque of the carotid bifurcation. The peak systolic / end diastolic    velocities of the proximal internal carotid are 142/20 cm/sec, consistent   with    a 50-69% stenosis.       DUPLEX OF THE ABDOMINAL AORTA:   The maximum diameter of the aorta is 1.3 cm.    Mild plaque is noted in the abdominal aorta.   Velocity of the mid aorta is 101 cm/sec.        ANKLE BRACHIAL INDEX EXAM:   RIGHT: Monophasic waveforms - HEATHER is 1.14   LEFT: Monophasic waveforms- HEATHER is 1.09      CONCLUSIONS   Right. Mild plaque of the carotid artery with less than 50% stenosis of the    internal carotid.       Left. Moderate plaque of the carotid bifurcation with velocities consistent    with greater than 50% stenosis of the internal carotid.       No aneurysm of the abdominal aorta.       Lower extremity arterial perfusion  is normal at rest.      Vargas Cheng   (Electronically Signed)   Final Date:      09 December 2021                     15:02             Specimen Collected: 12/09/21 10:10 AM Last Resulted: 12/09/21  3:02 PM              Component Ref Range & Units 8 mo ago   (5/10/21) 2 yr ago   (10/30/19) 2 yr ago   (7/22/19) 2 yr ago   (2/11/19) 3 yr ago   (8/8/18) 4 yr ago   (7/28/17) 5 yr ago   (9/20/16)   Cholesterol,Tot 100 - 199 mg/dL 221 High   240 High  R  235 High   147  205 High   208 High   239 High     Triglycerides 0 - 149 mg/dL 84  112 R  140  115  77  112  149    HDL >=40 mg/dL 69  80 High  R  56  44  62  55  46    LDL <100 mg/dL 135 High    151 High   80  128 High   131 High   163 High           Assessment and Plan:     1. Atherosclerosis of both carotid arteries  rosuvastatin (CRESTOR) 5 MG Tab    Referral to Vascular Surgery   2.  Hypercholesterolemia  rosuvastatin (CRESTOR) 5 MG Tab    Coenzyme Q10 300 MG Cap   3. Long term use of drug  Coenzyme Q10 300 MG Cap     Due to risk factors for CV event with her elevated cholesterol, history of inflammatory disease RA and history of prior mild CVA will start crestor 5 mg qd along with coenzyme Q10 200-300 mg qd. Discussed evaluation of her carotid artery disease with potential CTA but due to multiple allergies will not schedule CTA at this time and will have her see vascular surgeon for recommendation regarding work up and follow up of her carotid disease.  Face to face time: 35 minutes with greater than 50% of time spent with direct patient contact and medical management.     Ramone Iraheta M.D.

## 2022-01-26 ENCOUNTER — HOSPITAL ENCOUNTER (OUTPATIENT)
Dept: RADIOLOGY | Facility: MEDICAL CENTER | Age: 87
End: 2022-01-26
Attending: PHYSICIAN ASSISTANT
Payer: MEDICARE

## 2022-01-26 DIAGNOSIS — I65.23 ATHEROSCLEROSIS OF BOTH CAROTID ARTERIES: ICD-10-CM

## 2022-01-26 DIAGNOSIS — E78.00 HYPERCHOLESTEROLEMIA: ICD-10-CM

## 2022-01-26 DIAGNOSIS — Z98.1 ARTHRODESIS STATUS: ICD-10-CM

## 2022-01-26 PROCEDURE — 72128 CT CHEST SPINE W/O DYE: CPT

## 2022-01-26 PROCEDURE — 72131 CT LUMBAR SPINE W/O DYE: CPT

## 2022-01-26 RX ORDER — ROSUVASTATIN CALCIUM 5 MG/1
5 TABLET, COATED ORAL EVERY EVENING
Qty: 100 TABLET | Refills: 3 | Status: SHIPPED | OUTPATIENT
Start: 2022-01-26 | End: 2022-06-03 | Stop reason: SDUPTHER

## 2022-02-22 RX ORDER — FUROSEMIDE 40 MG/1
TABLET ORAL
Qty: 45 TABLET | Refills: 3 | Status: SHIPPED
Start: 2022-02-22 | End: 2022-10-05

## 2022-02-24 RX ORDER — CITALOPRAM 20 MG/1
TABLET ORAL
Qty: 90 TABLET | Refills: 3 | Status: SHIPPED | OUTPATIENT
Start: 2022-02-24 | End: 2022-04-01 | Stop reason: SDUPTHER

## 2022-02-25 RX ORDER — SPIRONOLACTONE 25 MG/1
TABLET ORAL
Qty: 90 TABLET | Refills: 3 | Status: SHIPPED | OUTPATIENT
Start: 2022-02-25 | End: 2022-07-01 | Stop reason: SDUPTHER

## 2022-02-28 RX ORDER — POTASSIUM CHLORIDE 750 MG/1
TABLET, EXTENDED RELEASE ORAL
Qty: 90 TABLET | Refills: 3 | Status: SHIPPED
Start: 2022-02-28 | End: 2022-10-05

## 2022-03-21 PROBLEM — R03.0 ELEVATED BP WITHOUT DIAGNOSIS OF HYPERTENSION: Status: ACTIVE | Noted: 2022-03-21

## 2022-03-21 PROBLEM — E78.5 HYPERLIPIDEMIA: Status: ACTIVE | Noted: 2022-03-21

## 2022-03-22 PROBLEM — F32.5 MAJOR DEPRESSIVE DISORDER, SINGLE EPISODE IN FULL REMISSION (HCC): Status: ACTIVE | Noted: 2022-03-22

## 2022-03-23 ENCOUNTER — TELEPHONE (OUTPATIENT)
Dept: INTERNAL MEDICINE | Facility: IMAGING CENTER | Age: 87
End: 2022-03-23
Payer: MEDICARE

## 2022-03-23 DIAGNOSIS — M06.9 RHEUMATOID ARTHRITIS, INVOLVING UNSPECIFIED SITE, UNSPECIFIED WHETHER RHEUMATOID FACTOR PRESENT (HCC): ICD-10-CM

## 2022-03-23 RX ORDER — HYDROXYCHLOROQUINE SULFATE 200 MG/1
TABLET, FILM COATED ORAL
Qty: 100 TABLET | Refills: 3 | Status: SHIPPED | OUTPATIENT
Start: 2022-03-23 | End: 2023-04-19 | Stop reason: SDUPTHER

## 2022-03-23 RX ORDER — IPRATROPIUM BROMIDE 42 UG/1
1 SPRAY, METERED NASAL 2 TIMES DAILY
Qty: 15 ML | Refills: 3 | Status: SHIPPED | OUTPATIENT
Start: 2022-03-23 | End: 2022-07-28 | Stop reason: SDUPTHER

## 2022-03-23 RX ORDER — CIPROFLOXACIN AND DEXAMETHASONE 3; 1 MG/ML; MG/ML
4 SUSPENSION/ DROPS AURICULAR (OTIC) 2 TIMES DAILY
Qty: 7.5 ML | Refills: 1 | Status: SHIPPED | OUTPATIENT
Start: 2022-03-23 | End: 2022-10-05

## 2022-03-23 NOTE — TELEPHONE ENCOUNTER
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  She has been evaluated by ENT who suggested that she get new hearing aids that were less occlusive.  She has not done this yet.  She calls today to report that her left ear has become swollen & painful over the last few days.  She denies ear drainage.  She reports excellent results from ciprodex ear drops previously prescribed by her ENT.  Per Dr Ramirez - ciprodex ear drop rx to preferred pharmacy.  She is encouraged to try and get new hearing aids.  If her symptoms return, she will need to be seen by her ENT,

## 2022-04-01 ENCOUNTER — OFFICE VISIT (OUTPATIENT)
Dept: MEDICAL GROUP | Facility: PHYSICIAN GROUP | Age: 87
End: 2022-04-01
Payer: MEDICARE

## 2022-04-01 VITALS
HEIGHT: 64 IN | TEMPERATURE: 96 F | OXYGEN SATURATION: 93 % | SYSTOLIC BLOOD PRESSURE: 116 MMHG | HEART RATE: 60 BPM | RESPIRATION RATE: 16 BRPM | DIASTOLIC BLOOD PRESSURE: 60 MMHG | WEIGHT: 126.5 LBS | BODY MASS INDEX: 21.6 KG/M2

## 2022-04-01 DIAGNOSIS — E03.8 OTHER SPECIFIED HYPOTHYROIDISM: ICD-10-CM

## 2022-04-01 DIAGNOSIS — F51.01 PRIMARY INSOMNIA: ICD-10-CM

## 2022-04-01 DIAGNOSIS — G89.29 CHRONIC LEFT HIP PAIN: ICD-10-CM

## 2022-04-01 DIAGNOSIS — M25.552 CHRONIC LEFT HIP PAIN: ICD-10-CM

## 2022-04-01 DIAGNOSIS — Z91.81 HISTORY OF FALLING: ICD-10-CM

## 2022-04-01 DIAGNOSIS — R60.9 PERIPHERAL EDEMA: ICD-10-CM

## 2022-04-01 DIAGNOSIS — F32.A DEPRESSION, UNSPECIFIED DEPRESSION TYPE: ICD-10-CM

## 2022-04-01 DIAGNOSIS — M06.9 RHEUMATOID ARTHRITIS INVOLVING MULTIPLE SITES, UNSPECIFIED WHETHER RHEUMATOID FACTOR PRESENT (HCC): ICD-10-CM

## 2022-04-01 DIAGNOSIS — N18.31 STAGE 3A CHRONIC KIDNEY DISEASE: ICD-10-CM

## 2022-04-01 DIAGNOSIS — K59.00 CONSTIPATION, UNSPECIFIED CONSTIPATION TYPE: ICD-10-CM

## 2022-04-01 DIAGNOSIS — E78.5 HYPERLIPIDEMIA, UNSPECIFIED HYPERLIPIDEMIA TYPE: ICD-10-CM

## 2022-04-01 DIAGNOSIS — M81.0 OSTEOPOROSIS, UNSPECIFIED OSTEOPOROSIS TYPE, UNSPECIFIED PATHOLOGICAL FRACTURE PRESENCE: ICD-10-CM

## 2022-04-01 PROBLEM — S32.009A LUMBAR TRANSVERSE PROCESS FRACTURE (HCC): Status: RESOLVED | Noted: 2020-01-14 | Resolved: 2022-04-01

## 2022-04-01 PROBLEM — E83.42 HYPOMAGNESEMIA: Status: RESOLVED | Noted: 2017-11-12 | Resolved: 2022-04-01

## 2022-04-01 PROBLEM — R03.0 ELEVATED BP WITHOUT DIAGNOSIS OF HYPERTENSION: Status: RESOLVED | Noted: 2022-03-21 | Resolved: 2022-04-01

## 2022-04-01 PROBLEM — F32.5 MAJOR DEPRESSIVE DISORDER, SINGLE EPISODE IN FULL REMISSION (HCC): Status: RESOLVED | Noted: 2022-03-22 | Resolved: 2022-04-01

## 2022-04-01 PROBLEM — R92.30 DENSE BREAST TISSUE ON MAMMOGRAM: Status: RESOLVED | Noted: 2019-07-25 | Resolved: 2022-04-01

## 2022-04-01 PROBLEM — I82.501 LEG DVT (DEEP VENOUS THROMBOEMBOLISM), CHRONIC, RIGHT (HCC): Status: RESOLVED | Noted: 2017-11-13 | Resolved: 2022-04-01

## 2022-04-01 PROBLEM — G47.00 INSOMNIA: Status: ACTIVE | Noted: 2022-04-01

## 2022-04-01 PROBLEM — R33.9 RETENTION OF URINE: Status: RESOLVED | Noted: 2019-02-11 | Resolved: 2022-04-01

## 2022-04-01 PROBLEM — J18.9 PNEUMONIA: Status: RESOLVED | Noted: 2020-01-23 | Resolved: 2022-04-01

## 2022-04-01 PROCEDURE — 93000 ELECTROCARDIOGRAM COMPLETE: CPT | Performed by: FAMILY MEDICINE

## 2022-04-01 PROCEDURE — 99203 OFFICE O/P NEW LOW 30 MIN: CPT | Mod: 25 | Performed by: FAMILY MEDICINE

## 2022-04-01 RX ORDER — CITALOPRAM 20 MG/1
10 TABLET ORAL
Qty: 45 TABLET | Refills: 3
Start: 2022-04-01 | End: 2022-05-19

## 2022-04-01 ASSESSMENT — FIBROSIS 4 INDEX: FIB4 SCORE: 2.28

## 2022-04-01 ASSESSMENT — PATIENT HEALTH QUESTIONNAIRE - PHQ9
1. LITTLE INTEREST OR PLEASURE IN DOING THINGS: NOT AT ALL
8. MOVING OR SPEAKING SO SLOWLY THAT OTHER PEOPLE COULD HAVE NOTICED. OR THE OPPOSITE, BEING SO FIGETY OR RESTLESS THAT YOU HAVE BEEN MOVING AROUND A LOT MORE THAN USUAL: NOT AT ALL
SUM OF ALL RESPONSES TO PHQ QUESTIONS 1-9: 1
7. TROUBLE CONCENTRATING ON THINGS, SUCH AS READING THE NEWSPAPER OR WATCHING TELEVISION: NOT AT ALL
3. TROUBLE FALLING OR STAYING ASLEEP OR SLEEPING TOO MUCH: NOT AT ALL
4. FEELING TIRED OR HAVING LITTLE ENERGY: SEVERAL DAYS
2. FEELING DOWN, DEPRESSED, IRRITABLE, OR HOPELESS: NOT AT ALL
5. POOR APPETITE OR OVEREATING: NOT AT ALL
9. THOUGHTS THAT YOU WOULD BE BETTER OFF DEAD, OR OF HURTING YOURSELF: NOT AT ALL
SUM OF ALL RESPONSES TO PHQ9 QUESTIONS 1 AND 2: 0
6. FEELING BAD ABOUT YOURSELF - OR THAT YOU ARE A FAILURE OR HAVE LET YOURSELF OR YOUR FAMILY DOWN: NOT AL ALL

## 2022-04-01 NOTE — PATIENT INSTRUCTIONS
You can take 1000mg of tylenol twice daily from now on for hip pain.     Drink plenty of water    You can take Aleve up to a couple of times a week if needed, just do not take it every day as it can worsen kidney function.

## 2022-04-01 NOTE — PROGRESS NOTES
Subjective:     CC:    Chief Complaint   Patient presents with   • Establish Care     Dr. Paris       HISTORY OF THE PRESENT ILLNESS: Patient is a 86 y.o. female. This pleasant patient is here today to establish care and discuss routine care. Her prior PCP was a omer doctor. Lives alone, has help w/ vacuuming and and yardwork. 2 children in [place] and [place]. Doesn't want to live in [place] or [place].   2y ago she fell and broke her back and her pcp intervened in her care and improved her outcome.     Not using ear drops at this time but uses prn per ENT, looking to get different hearing aides per her ENT.    Problem   Chronic Left Hip Pain    Has a long h/o arthritis in her L hip and it's been bothering her more. Has a concha in her L femur from an old skiing accident. Is due for another steroid injn in her L hip and realizes that might be why she is in more pain. Sees ortho. I recommended she take 1g tylenol bid and f/u ortho for injn.      Insomnia    Managed w/ trazodone     Hyperlipidemia    Controlled on 5mg crestor     Osteoporosis    DEXA Aug. 2017: T score right forearm -3.4 and left hip -1.6  Prolia 60 mg SC every 6 months started 2018  DEXA Aug. 2019: T score right forearm -4.6 and right hip -1.4  DEXA 2020: T score right forearm -4.0 and right hip -1.7.     Constipation    rarely     Peripheral Edema    dx'd in college, managed w/ lasix, K, spironolactone and compression hose. Loves salt but tries to avoid it.      Hypothyroidism    On 112mcg/d, wnl tsh     Ckd (Chronic Kidney Disease) Stage 3, Gfr 30-59 Ml/Min ([facility])    Stable, continue current plan of care with current medications.        History of Falling    Uses a walker     Depression    She denies any serious depressive history, states she  started celexa when her  was dx'd with LBD, has just stayed on it. Feels it helps her but denies depression. He  7y ago.  She is currently on 20 mg/day, her QTC is 459, I suggested she lowered to 10 mg  daily and I will recheck an EKG when she follows up next month.  She decided she would like to stay on the medication as it does help with her energy.     Rheumatoid Arthritis (Hcc)    Stable, continue current plan of care with current medications. Her pain is fairly well controlled on 200mg plaquenil, is primarily in her hands, uses topical meds as well. Due for labs 5/22.   Does regular eye exams. Will refer to rheum.      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)   Elevated Bp Without Diagnosis of Hypertension (Resolved)   Pneumonia (Resolved)   Lumbar Transverse Process Fracture (Hcc) (Resolved)   Dense Breast Tissue On Mammogram (Resolved)   Urine retention (Resolved)    IMO load March 2020     Leg Dvt (Deep Venous Thromboembolism), Chronic, Right (Hcc) (Resolved)   Hypomagnesemia (Resolved)   Hypotension (Resolved)       Allergies: Azithromycin, Cefuroxime, Ciprofloxacin, Clindamycin, Codeine, Daptomycin, Erythromycin, Flagyl [metronidazole], Morphine, Nitrofurantoin, Rifampin, Sulfa drugs, Macrodantin [nitrofurantoin macrocrystal], and Premarin    Current Outpatient Medications Ordered in Epic   Medication Sig Dispense Refill   • citalopram (CELEXA) 20 MG Tab Take 0.5 Tablets by mouth at bedtime. 45 Tablet 3   • hydroxychloroquine (PLAQUENIL) 200 MG Tab TAKE ONE TABLET BY MOUTH ONE TIME DAILY 100 Tablet 3   • ciprofloxacin/dexamethasone (CIPRODEX) 0.3-0.1 % Suspension Administer 4 Drops into affected ear(s) 2 times a day. 7.5 mL 1   • ipratropium (ATROVENT) 0.06 % Solution ADMINISTER 1 SPRAY INTO AFFECTED NOSTRIL(S) 2 TIMES A DAY. 15 mL 3   • potassium chloride SA (K-DUR) 10 MEQ Tab CR TAKE ONE TABLET BY MOUTH ONE TIME DAILY 90 Tablet 3   • spironolactone (ALDACTONE) 25 MG Tab TAKE ONE TABLET BY MOUTH ONE TIME DAILY 90 Tablet 3   • furosemide (LASIX) 40 MG Tab TAKE 1/2 TABLET BY MOUTH EVERY DAY 45 Tablet 3   • rosuvastatin (CRESTOR) 5 MG Tab Take 1 Tablet by mouth every  "evening. 100 Tablet 3   • Coenzyme Q10 300 MG Cap Take 1 Capsule by mouth every day. 100 Capsule 3   • cyclosporin (RESTASIS) 0.05 % ophthalmic emulsion Administer 1 Drop into both eyes 2 times a day. Pharmacist - please dispense 180 single use vials (72 ml) 72 mL 3   • traZODone (DESYREL) 50 MG Tab TAKE 1/2 TABLET BY MOUTH AT BEDTIME 45 Tablet 0   • Multiple Vitamins-Minerals (PRESERVISION AREDS 2 PO) Take  by mouth every day.     • SYNTHROID 112 MCG Tab TAKE ONE TABLET BY MOUTH EVERY MORNING ON AN EMPTY STOMACH 90 tablet 3   • EPINEPHrine 0.3 MG/0.3ML Solution Prefilled Syringe Inject 0.3 mL as directed as needed (serious allergic reaction/anaphylaxis). 1 Each 0   • triamcinolone acetonide-orabase (KENALOG IN ORABASE) 0.1 % Paste Apply orally BID, PRN 30 g 3   • triamcinolone acetonide (KENALOG) 0.025 % Cream Apply 1 Application topically 2 times a day as needed. 30 g 2   • CALCIUM CITRATE PO Take 200 mg by mouth every day.     • therapeutic multivitamin-minerals (THERAGRAN-M) Tab Take 1 Tab by mouth every day.     • cholestyramine (QUESTRAN) 4 g packet TAKE 4 G BY MOUTH EVERY DAY. 30 Each 3   • denosumab (PROLIA) 60 MG/ML Solution Inject 60 mg as instructed every 6 months.       No current [facility]-ordered facility-administered medications on file.       Past Medical History:   Diagnosis Date   • Anesthesia     \"One time had a hard time waking me up\"   • Atherosclerosis of both carotid arteries 1/24/2022     Dec. 2021: Mild plaque right internal carotid and moderate plaque left carotid bifurcation   • C. difficile diarrhea 2/16   • C. 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 2017: T score -3.4 right forearm and -1.6 left hip DEXA Aug. 2019: T score forearm -4.6 and hip -1.4; DEXA Nov. 2020: T score right forearm -4.0 and right hip -1.7   • Pain     shoulders, feet, back   • Rheumatoid arthritis (HCC) 9/26/2014   • Stroke (HCC) 1990's?    \"mini\" no residual symptoms   • Urinary incontinence     occasional       Past Surgical History:   Procedure Laterality Date   • PB DEGROOT W/O FACETEC FORAMOT/DSKC 1/2 VRT SEG, TH* N/A 1/16/2020    Procedure: LAMINECTOMY, SPINE, THORACIC;  Surgeon: Sang Nelson M.D.;  Location: [facility];  Service: Neurosurgery   • THORACIC FUSION O-ARM N/A 1/16/2020    Procedure: FUSION, SPINE, THORACIC, POSTERIOR APPROACH - T9-L3;  Surgeon: Sang Nelson M.D.;  Location: SURGERY [facility];  Service: Neurosurgery   • PB RECONSTR TOTAL SHOULDER IMPLANT Left 4/30/2019    Procedure: ARTHROPLASTY, SHOULDER, TOTAL - REVERSE;  Surgeon: Daniella Camargo M.D.;  Location: SURGERY [facility];  Service: Orthopedics   • SHOULDER ARTHROPLASTY TOTAL Right 1/9/2018    Procedure: SHOULDER ARTHROPLASTY TOTAL - REVERSE;  Surgeon: Daniella Camargo M.D.;  Location: SURGERY [facility];  Service: Orthopedics   • COLONOSCOPY - ENDO N/A 3/7/2016    Procedure: COLONOSCOPY - ENDO;  Surgeon: Estiven Ayesr M.D.;  Location: ENDOSCOPY [facility];  Service:    • FECAL TRANSPLANT N/A 3/7/2016    Procedure: FECAL TRANSPLANT;  Surgeon: Estiven Ayers M.D.;  Location: ENDOSCOPY [facility];  Service:    • OTHER ORTHOPEDIC SURGERY Left 2012    Left Elbow fx repair   • BLEPHAROPLASTY  3/31/2011    Performed by ORACIO DIAZ at [facility]   • FOOT SURGERY Right 2010   • OTHER ORTHOPEDIC SURGERY Left 2006    finger- removal of digit Left middle finger   • CHOLECYSTECTOMY  2000   • HYSTERECTOMY LAPAROSCOPY  2000   • HYSTERECTOMY " "RADICAL  1985   • OTHER Bilateral ,     feet- repair torn tendons   • OTHER ORTHOPEDIC SURGERY Left 1970s    femur fx       Social History     Tobacco Use   • Smoking status: Former Smoker     Packs/day: 1.00     Years: 20.00     Pack years: 20.00     Types: Cigarettes     Quit date: 2016     Years since quittin.2   • Smokeless tobacco: Never Used   Vaping Use   • Vaping Use: Never used   Substance Use Topics   • Alcohol use: Yes     Alcohol/week: 1.2 oz     Types: 2 Glasses of wine per week     Comment: 1 per day   • Drug use: No       Social History     Social History Narrative   • Not on file       Family History   Problem Relation Age of Onset   • Non-contributory Mother    • Non-contributory Father    • Anesthesia Paternal Grandfather         death       Health Maintenance: Completed    ROS:   See HPI      Objective:     Exam:   /60 (BP Location: Right arm, Patient Position: Sitting, BP Cuff Size: Adult)   Pulse 60   Temp (!) 35.6 °C (96 °F) (Temporal)   Resp 16   Ht 1.626 m (5' 4\")   Wt 57.4 kg (126 lb 8 oz)   SpO2 93%   BMI 21.71 kg/m²   Body mass index is 21.71 kg/m².  Wt Readings from Last 4 Encounters:   22 57.4 kg (126 lb 8 oz)   22 57.1 kg (125 lb 14.4 oz)   22 56.7 kg (125 lb)   21 57.6 kg (127 lb)     General: Normal appearing. No distress. Appropriately groomed.  HEENT: Normocephalic. Eyes conjunctiva clear lids without ptosis, pupils equal and reactive to light accommodation, ears normal shape and contour, canals are clear bilaterally, tympanic membranes are benign, nasal mucosa benign, oropharynx is without erythema, edema or exudates.   Neck: Supple without JVD or bruit. Thyroid is not enlarged.   Pulmonary: Clear to ausculation.  Normal effort. No rales, ronchi, or wheezing.  Cardiovascular: Regular rate and rhythm without murmur. Carotid and radial pulses are intact and equal bilaterally.  Abdomen: Soft, nontender, nondistended. Normal bowel " sounds. Liver and spleen are not palpable  Neurologic: Grossly nonfocal  Lymph: No cervical or supraclavicular lymph nodes are palpable  Skin: Warm and dry.  No obvious lesions.  Musculoskeletal: Normal gait. No extremity cyanosis, clubbing, or edema.  Psych: Normal mood and affect. Alert and oriented x3. Judgment and insight is normal.  ekg 2 pvc. qtc 459, tq 441  Has walker    Assessment & Plan:   86 y.o. female with the following -  I suggested she lowered to 10 mg daily and I will recheck an EKG when she follows up next month.  She decided she would like to stay on the medication as it does help with her energy.  1. Rheumatoid arthritis involving multiple sites, unspecified whether rheumatoid factor present (HCC)  - Referral to Rheumatology  - EKG - Clinic Performed    2. Chronic left hip pain    3. Constipation, unspecified constipation type    4. Other specified hypothyroidism   - EKG - Clinic Performed    5. Peripheral edema    6. Primary insomnia    7. Osteoporosis, unspecified osteoporosis type, unspecified pathological fracture presence    8. Hyperlipidemia, unspecified hyperlipidemia type    9. History of falling    10. Stage 3a chronic kidney disease (HCC)    11. Depression, unspecified depression type    Other orders  - citalopram (CELEXA) 20 MG Tab; Take 0.5 Tablets by mouth at bedtime.  Dispense: 45 Tablet; Refill: 3       Return for f/u 5-6wk labs ordered by prior pcp.    Please note that this dictation was created using voice recognition software. I have made every reasonable attempt to correct obvious errors, but I expect that there are errors of grammar and possibly content that I did not discover before finalizing the note.

## 2022-05-04 RX ORDER — TRAZODONE HYDROCHLORIDE 50 MG/1
TABLET ORAL
Qty: 50 TABLET | Refills: 3 | Status: SHIPPED | OUTPATIENT
Start: 2022-05-04 | End: 2022-07-21

## 2022-05-18 ENCOUNTER — TELEPHONE (OUTPATIENT)
Dept: MEDICAL GROUP | Facility: PHYSICIAN GROUP | Age: 87
End: 2022-05-18
Payer: MEDICARE

## 2022-05-18 NOTE — TELEPHONE ENCOUNTER
ESTABLISHED PATIENT PRE-VISIT PLANNING     Patient was NOT contacted to complete PVP.     Note: Patient will not be contacted if there is no indication to call.     1.  Reviewed notes from the last few office visits within the medical group: Yes    2.  If any orders were placed at last visit or intended to be done for this visit (i.e. 6 mos follow-up), do we have Results/Consult Notes?         •  Labs - Labs were not ordered at last office visit.  Note: If patient appointment is for lab review and patient did not complete labs, check with provider if OK to reschedule patient until labs completed.       •  Imaging - Imaging was not ordered at last office visit.       •  Referrals - Referral ordered, patient has NOT been seen.    3. Is this appointment scheduled as a Hospital Follow-Up? No    4.  Immunizations were updated in Epic using Reconcile Outside Information activity? Yes    5.  Patient is due for the following Health Maintenance Topics:   There are no preventive care reminders to display for this patient.        6.  AHA (Pulse8) form printed for Provider? Yes

## 2022-05-19 ENCOUNTER — OFFICE VISIT (OUTPATIENT)
Dept: MEDICAL GROUP | Facility: PHYSICIAN GROUP | Age: 87
End: 2022-05-19
Payer: MEDICARE

## 2022-05-19 VITALS
OXYGEN SATURATION: 99 % | TEMPERATURE: 98.2 F | SYSTOLIC BLOOD PRESSURE: 102 MMHG | WEIGHT: 125 LBS | HEIGHT: 65 IN | BODY MASS INDEX: 20.83 KG/M2 | RESPIRATION RATE: 16 BRPM | DIASTOLIC BLOOD PRESSURE: 52 MMHG | HEART RATE: 58 BPM

## 2022-05-19 DIAGNOSIS — Z66 DNR (DO NOT RESUSCITATE): ICD-10-CM

## 2022-05-19 DIAGNOSIS — F32.A DEPRESSION, UNSPECIFIED DEPRESSION TYPE: ICD-10-CM

## 2022-05-19 DIAGNOSIS — R94.31 PROLONGED Q-T INTERVAL ON ECG: ICD-10-CM

## 2022-05-19 DIAGNOSIS — I49.3 MULTIFOCAL PVCS WITH PAIRING: ICD-10-CM

## 2022-05-19 DIAGNOSIS — F51.01 PRIMARY INSOMNIA: ICD-10-CM

## 2022-05-19 PROCEDURE — 99214 OFFICE O/P EST MOD 30 MIN: CPT | Performed by: FAMILY MEDICINE

## 2022-05-19 RX ORDER — FLUOXETINE 10 MG/1
10 CAPSULE ORAL DAILY
Qty: 180 CAPSULE | Refills: 1 | Status: SHIPPED | OUTPATIENT
Start: 2022-05-19 | End: 2022-11-02 | Stop reason: SDUPTHER

## 2022-05-19 ASSESSMENT — FIBROSIS 4 INDEX: FIB4 SCORE: 2.28

## 2022-05-19 NOTE — PROGRESS NOTES
Subjective:     CC:   Chief Complaint   Patient presents with   • Other     Covid booster shot questions, POLST questions, Medication questions         HPI:   Elizabeth presents today with follow-up from her last visit when she established with me on .  At that time I checked an EKG to see what her QTC was as she was on 20 mg of citalopram.  EKG showed a QTC of 441, , paired ventricular complexes.  At that time I asked her to lower her dose of citalopram to 10 mg daily and return today for repeat EKG.  Her 2 EKGs today are both sinus rhythm, the first showed a rate of 86 with periventricular premature complexes and a QT of 403 and QTC of 482.  The second EKG showed atrial premature complexes, heart rate 92, prolonged QT with  and .  He is completely asymptomatic, she denies any presyncopal symptoms, [id number] lightheadedness or chest pain.    22 states about 10d ago had an issue w/ her hemorrhoids, had some blood on her tp, has since resolved. Takes 81mg asa/d. Was a little constipated before this occurred.   Her mood and energy have been a little low since lowering her celexa.   Problem   Dnr (Do Not Resuscitate)    Per d/w her 22  POLST form signed and returned to patient.      Insomnia    Managed w/ trazodone, doesn't want to d/c med.      Depression    She denies any serious depressive history, states she  started celexa when her  was dx'd with LBD, has just stayed on it. Feels it helps her but denies depression. He  7y ago.  She is currently on 20 mg/day, her QTC is 459, I suggested she lowered to 10 mg daily and I will recheck an EKG when she follows up next month.  She decided she would like to stay on the medication as it does help with her energy. 22 still w/ prolonged qtc 537. Will stop celexa, refer to cards and can try prozac.          Current Outpatient Medications Ordered in Epic   Medication Sig Dispense Refill   • FLUoxetine (PROZAC) 10 MG  Cap Take 1 Capsule by mouth every day. After 1 week increase to 20mg qam. Stop the citalopram. 180 Capsule 1   • traZODone (DESYREL) 50 MG Tab TAKE 1/2 TABLET BY MOUTH AT BEDTIME 50 Tablet 3   • hydroxychloroquine (PLAQUENIL) 200 MG Tab TAKE ONE TABLET BY MOUTH ONE TIME DAILY 100 Tablet 3   • ciprofloxacin/dexamethasone (CIPRODEX) 0.3-0.1 % Suspension Administer 4 Drops into affected ear(s) 2 times a day. 7.5 mL 1   • ipratropium (ATROVENT) 0.06 % Solution ADMINISTER 1 SPRAY INTO AFFECTED NOSTRIL(S) 2 TIMES A DAY. 15 mL 3   • potassium chloride SA (K-DUR) 10 MEQ Tab CR TAKE ONE TABLET BY MOUTH ONE TIME DAILY 90 Tablet 3   • spironolactone (ALDACTONE) 25 MG Tab TAKE ONE TABLET BY MOUTH ONE TIME DAILY 90 Tablet 3   • furosemide (LASIX) 40 MG Tab TAKE 1/2 TABLET BY MOUTH EVERY DAY 45 Tablet 3   • rosuvastatin (CRESTOR) 5 MG Tab Take 1 Tablet by mouth every evening. 100 Tablet 3   • Coenzyme Q10 300 MG Cap Take 1 Capsule by mouth every day. 100 Capsule 3   • cyclosporin (RESTASIS) 0.05 % ophthalmic emulsion Administer 1 Drop into both eyes 2 times a day. Pharmacist - please dispense 180 single use vials (72 ml) 72 mL 3   • Multiple Vitamins-Minerals (PRESERVISION AREDS 2 PO) Take  by mouth every day.     • SYNTHROID 112 MCG Tab TAKE ONE TABLET BY MOUTH EVERY MORNING ON AN EMPTY STOMACH 90 tablet 3   • EPINEPHrine 0.3 MG/0.3ML Solution Prefilled Syringe Inject 0.3 mL as directed as needed (serious allergic reaction/anaphylaxis). 1 Each 0   • triamcinolone acetonide-orabase (KENALOG IN ORABASE) 0.1 % Paste Apply orally BID, PRN 30 g 3   • triamcinolone acetonide (KENALOG) 0.025 % Cream Apply 1 Application topically 2 times a day as needed. 30 g 2   • CALCIUM CITRATE PO Take 200 mg by mouth every day.     • therapeutic multivitamin-minerals (THERAGRAN-M) Tab Take 1 Tab by mouth every day.     • cholestyramine (QUESTRAN) 4 g packet TAKE 4 G BY MOUTH EVERY DAY. 30 Each 3   • denosumab (PROLIA) 60 MG/ML Solution Inject 60 mg  "as instructed every 6 months.       No current [facility]-ordered facility-administered medications on file.       Past Medical History:   Diagnosis Date   • Anesthesia     \"One time had a hard time waking me up\"   • Atherosclerosis of both carotid arteries 2022     Dec. 2021: Mild plaque right internal carotid and moderate plaque left carotid bifurcation   • C. difficile diarrhea    • C. difficile diarrhea 3/2016    fecal transplant   • Cancer ([facility]) 1947    Tongue [place] - Radiation   • Chronic back pain     hands, shoulders   • Dense breast tissue on mammogram 2019   • Depression    • Elbow fracture, left    • Heart burn    • History of anemia    • History of DVT (deep vein thrombosis)    • Hypothyroid    • MRSA (methicillin resistant Staphylococcus aureus)     Right foot   • MRSA (methicillin resistant Staphylococcus aureus)    • Osteoporosis 3/27/2019    DEXA Aug. 2017: T score -3.4 right forearm and -1.6 left hip DEXA Aug. 2019: T score forearm -4.6 and hip -1.4; DEXA 2020: T score right forearm -4.0 and right hip -1.7   • Pain     shoulders, feet, back   • Rheumatoid arthritis ([facility]) 2014   • Stroke ([facility]) ?    \"mini\" no residual symptoms   • Urinary incontinence     occasional       Social History     Tobacco Use   • Smoking status: Former Smoker     Packs/day: 1.00     Years: 20.00     Pack years: 20.00     Types: Cigarettes     Quit date: 2016     Years since quittin.3   • Smokeless tobacco: Never Used   Vaping Use   • Vaping Use: Never used   Substance Use Topics   • Alcohol use: Yes     Alcohol/week: 1.2 oz     Types: 2 Glasses of wine per week     Comment: 1 per day   • Drug use: No       Allergies:  Azithromycin, Cefuroxime, Ciprofloxacin, Clindamycin, Codeine, Daptomycin, Erythromycin, Flagyl [metronidazole], Morphine, Nitrofurantoin, Rifampin, Sulfa drugs, Macrodantin [nitrofurantoin macrocrystal], and Premarin    Health Maintenance: Completed    ROS:  Per " "HPI      Objective:       Exam:  /52 (BP Location: Right arm, Patient Position: Sitting, BP Cuff Size: Adult)   Pulse (!) 58   Temp 36.8 °C (98.2 °F) (Temporal)   Resp 16   Ht 1.651 m (5' 5\")   Wt 56.7 kg (125 lb)   SpO2 99%   BMI 20.80 kg/m²   Body mass index is 20.8 kg/m².  Wt Readings from Last 4 Encounters:   05/19/22 56.7 kg (125 lb)   04/01/22 57.4 kg (126 lb 8 oz)   03/21/22 57.1 kg (125 lb 14.4 oz)   01/24/22 56.7 kg (125 lb)       Gen: Alert and oriented, No apparent distress. Appropriately groomed.  Neck: Neck is supple without lymphadenopathy.No thyromegaly.   Lungs: Normal effort, CTA bilaterally, no wheezes, rhonchi, or rales.  CV: Regular rate and rhythm. No murmurs, rubs, or gallops.  ABD:  Soft, nontender, nondistended, NABSx4, no HSM or RBT or guarding or masses.  Ext: No clubbing, cyanosis, edema.  Skin: No rash noted.      Labs: pending in system from prior pcp, has scheduled end June. Gets prolia early july    Assessment & Plan:     86 y.o. female with the following -   I will refer her to cardiology and stop her citalopram for now.  rec updated covid booster    1. Prolonged Q-T interval on ECG  - REFERRAL TO CARDIOLOGY    2. Multifocal PVCs with pairing  - REFERRAL TO CARDIOLOGY    3. Depression, unspecified depression type    4. Primary insomnia    5. DNR (do not resuscitate)    Other orders  - FLUoxetine (PROZAC) 10 MG Cap; Take 1 Capsule by mouth every day. After 1 week increase to 20mg qam. Stop the citalopram.  Dispense: 180 Capsule; Refill: 1          Return in about 3 weeks (around 6/9/2022) for ildefonso f/u prozac and prolonged qtc.    Please note that this dictation was created using voice recognition software. I have made every reasonable attempt to correct obvious errors, but I expect that there are errors of grammar and possibly content that I did not discover before finalizing the note.      "

## 2022-05-24 ENCOUNTER — TELEPHONE (OUTPATIENT)
Dept: MEDICAL GROUP | Facility: PHYSICIAN GROUP | Age: 87
End: 2022-05-24
Payer: MEDICARE

## 2022-05-24 NOTE — TELEPHONE ENCOUNTER
Phone Number Called: [phone] (home)       Call outcome: Left detailed message for patient. Informed to call back with any additional questions.    Message: Pt called requesting DMV ppw filled out. Pt needs apt.

## 2022-05-26 ENCOUNTER — HOSPITAL ENCOUNTER (OUTPATIENT)
Dept: LAB | Facility: MEDICAL CENTER | Age: 87
End: 2022-05-26
Attending: INTERNAL MEDICINE
Payer: MEDICARE

## 2022-05-26 DIAGNOSIS — D64.9 ANEMIA, UNSPECIFIED TYPE: ICD-10-CM

## 2022-05-26 DIAGNOSIS — E78.00 HYPERCHOLESTEROLEMIA: ICD-10-CM

## 2022-05-26 DIAGNOSIS — E03.9 HYPOTHYROIDISM (ACQUIRED): ICD-10-CM

## 2022-05-26 DIAGNOSIS — M81.0 OSTEOPOROSIS, UNSPECIFIED OSTEOPOROSIS TYPE, UNSPECIFIED PATHOLOGICAL FRACTURE PRESENCE: ICD-10-CM

## 2022-05-26 DIAGNOSIS — Z79.899 LONG TERM USE OF DRUG: ICD-10-CM

## 2022-05-26 LAB
ALBUMIN SERPL BCP-MCNC: 4.5 G/DL (ref 3.2–4.9)
ALBUMIN/GLOB SERPL: 1.8 G/DL
ALP SERPL-CCNC: 67 U/L (ref 30–99)
ALT SERPL-CCNC: 17 U/L (ref 2–50)
ANION GAP SERPL CALC-SCNC: 13 MMOL/L (ref 7–16)
APPEARANCE UR: CLEAR
AST SERPL-CCNC: 31 U/L (ref 12–45)
BACTERIA #/AREA URNS HPF: NEGATIVE /HPF
BASOPHILS # BLD AUTO: 1 % (ref 0–1.8)
BASOPHILS # BLD: 0.05 K/UL (ref 0–0.12)
BILIRUB SERPL-MCNC: 0.4 MG/DL (ref 0.1–1.5)
BILIRUB UR QL STRIP.AUTO: NEGATIVE
BUN SERPL-MCNC: 28 MG/DL (ref 8–22)
CALCIUM SERPL-MCNC: 9.7 MG/DL (ref 8.5–10.5)
CHLORIDE SERPL-SCNC: 108 MMOL/L (ref 96–112)
CHOLEST SERPL-MCNC: 163 MG/DL (ref 100–199)
CO2 SERPL-SCNC: 16 MMOL/L (ref 20–33)
COLOR UR: YELLOW
CREAT SERPL-MCNC: 1.02 MG/DL (ref 0.5–1.4)
EOSINOPHIL # BLD AUTO: 0.16 K/UL (ref 0–0.51)
EOSINOPHIL NFR BLD: 3.2 % (ref 0–6.9)
EPI CELLS #/AREA URNS HPF: NORMAL /HPF
ERYTHROCYTE [DISTWIDTH] IN BLOOD BY AUTOMATED COUNT: 44.9 FL (ref 35.9–50)
FASTING STATUS PATIENT QL REPORTED: NORMAL
GFR SERPLBLD CREATININE-BSD FMLA CKD-EPI: 53 ML/MIN/1.73 M 2
GLOBULIN SER CALC-MCNC: 2.5 G/DL (ref 1.9–3.5)
GLUCOSE SERPL-MCNC: 92 MG/DL (ref 65–99)
GLUCOSE UR STRIP.AUTO-MCNC: NEGATIVE MG/DL
HCT VFR BLD AUTO: 35.8 % (ref 37–47)
HDLC SERPL-MCNC: 59 MG/DL
HGB BLD-MCNC: 12 G/DL (ref 12–16)
HYALINE CASTS #/AREA URNS LPF: NORMAL /LPF
IMM GRANULOCYTES # BLD AUTO: 0.01 K/UL (ref 0–0.11)
IMM GRANULOCYTES NFR BLD AUTO: 0.2 % (ref 0–0.9)
KETONES UR STRIP.AUTO-MCNC: NEGATIVE MG/DL
LDLC SERPL CALC-MCNC: 84 MG/DL
LEUKOCYTE ESTERASE UR QL STRIP.AUTO: ABNORMAL
LYMPHOCYTES # BLD AUTO: 1.41 K/UL (ref 1–4.8)
LYMPHOCYTES NFR BLD: 28.3 % (ref 22–41)
MCH RBC QN AUTO: 32.3 PG (ref 27–33)
MCHC RBC AUTO-ENTMCNC: 33.5 G/DL (ref 33.6–35)
MCV RBC AUTO: 96.5 FL (ref 81.4–97.8)
MICRO URNS: ABNORMAL
MONOCYTES # BLD AUTO: 0.39 K/UL (ref 0–0.85)
MONOCYTES NFR BLD AUTO: 7.8 % (ref 0–13.4)
NEUTROPHILS # BLD AUTO: 2.97 K/UL (ref 2–7.15)
NEUTROPHILS NFR BLD: 59.5 % (ref 44–72)
NITRITE UR QL STRIP.AUTO: NEGATIVE
NRBC # BLD AUTO: 0 K/UL
NRBC BLD-RTO: 0 /100 WBC
PH UR STRIP.AUTO: 6.5 [PH] (ref 5–8)
PLATELET # BLD AUTO: 314 K/UL (ref 164–446)
PMV BLD AUTO: 9.9 FL (ref 9–12.9)
POTASSIUM SERPL-SCNC: 4.5 MMOL/L (ref 3.6–5.5)
PROT SERPL-MCNC: 7 G/DL (ref 6–8.2)
PROT UR QL STRIP: NEGATIVE MG/DL
RBC # BLD AUTO: 3.71 M/UL (ref 4.2–5.4)
RBC # URNS HPF: NORMAL /HPF
RBC UR QL AUTO: NEGATIVE
SODIUM SERPL-SCNC: 137 MMOL/L (ref 135–145)
SP GR UR STRIP.AUTO: 1.01
T3 SERPL-MCNC: 88.6 NG/DL (ref 60–181)
TRIGL SERPL-MCNC: 102 MG/DL (ref 0–149)
TSH SERPL DL<=0.005 MIU/L-ACNC: 0.11 UIU/ML (ref 0.38–5.33)
UROBILINOGEN UR STRIP.AUTO-MCNC: 0.2 MG/DL
VIT B12 SERPL-MCNC: 876 PG/ML (ref 211–911)
WBC # BLD AUTO: 5 K/UL (ref 4.8–10.8)
WBC #/AREA URNS HPF: NORMAL /HPF

## 2022-05-26 PROCEDURE — 82306 VITAMIN D 25 HYDROXY: CPT

## 2022-05-26 PROCEDURE — 85025 COMPLETE CBC W/AUTO DIFF WBC: CPT

## 2022-05-26 PROCEDURE — 81001 URINALYSIS AUTO W/SCOPE: CPT

## 2022-05-26 PROCEDURE — 84443 ASSAY THYROID STIM HORMONE: CPT

## 2022-05-26 PROCEDURE — 84480 ASSAY TRIIODOTHYRONINE (T3): CPT

## 2022-05-26 PROCEDURE — 80053 COMPREHEN METABOLIC PANEL: CPT

## 2022-05-26 PROCEDURE — 82607 VITAMIN B-12: CPT

## 2022-05-26 PROCEDURE — 80061 LIPID PANEL: CPT

## 2022-05-26 PROCEDURE — 36415 COLL VENOUS BLD VENIPUNCTURE: CPT

## 2022-05-29 LAB — 25(OH)D3 SERPL-MCNC: 53 NG/ML (ref 30–80)

## 2022-06-01 ENCOUNTER — OUTPATIENT INFUSION SERVICES (OUTPATIENT)
Dept: ONCOLOGY | Facility: MEDICAL CENTER | Age: 87
End: 2022-06-01
Attending: INTERNAL MEDICINE
Payer: MEDICARE

## 2022-06-01 VITALS
RESPIRATION RATE: 16 BRPM | SYSTOLIC BLOOD PRESSURE: 92 MMHG | HEART RATE: 56 BPM | WEIGHT: 125.88 LBS | HEIGHT: 64 IN | OXYGEN SATURATION: 100 % | DIASTOLIC BLOOD PRESSURE: 79 MMHG | BODY MASS INDEX: 21.49 KG/M2 | TEMPERATURE: 96.4 F

## 2022-06-01 DIAGNOSIS — Z87.81 HISTORY OF COMPRESSION FRACTURE OF SPINE: ICD-10-CM

## 2022-06-01 DIAGNOSIS — M81.0 OSTEOPOROSIS, UNSPECIFIED OSTEOPOROSIS TYPE, UNSPECIFIED PATHOLOGICAL FRACTURE PRESENCE: ICD-10-CM

## 2022-06-01 PROCEDURE — 700111 HCHG RX REV CODE 636 W/ 250 OVERRIDE (IP): Performed by: INTERNAL MEDICINE

## 2022-06-01 PROCEDURE — 96372 THER/PROPH/DIAG INJ SC/IM: CPT

## 2022-06-01 RX ADMIN — DENOSUMAB 60 MG: 60 INJECTION SUBCUTANEOUS at 10:38

## 2022-06-01 ASSESSMENT — FIBROSIS 4 INDEX: FIB4 SCORE: 2.06

## 2022-06-01 NOTE — PROGRESS NOTES
Patient to [facility] for Prolia injection. Labs previously done and patient meets parameter. Prolia injected into right back of arm. Emailed scheduling for future appointment. Patient to home in care of self.

## 2022-06-02 ENCOUNTER — OFFICE VISIT (OUTPATIENT)
Dept: MEDICAL GROUP | Facility: PHYSICIAN GROUP | Age: 87
End: 2022-06-02
Payer: MEDICARE

## 2022-06-02 VITALS
HEART RATE: 60 BPM | SYSTOLIC BLOOD PRESSURE: 130 MMHG | HEIGHT: 64 IN | RESPIRATION RATE: 16 BRPM | TEMPERATURE: 97.8 F | DIASTOLIC BLOOD PRESSURE: 64 MMHG | BODY MASS INDEX: 21.68 KG/M2 | OXYGEN SATURATION: 95 % | WEIGHT: 127 LBS

## 2022-06-02 DIAGNOSIS — M06.9 RHEUMATOID ARTHRITIS INVOLVING MULTIPLE SITES, UNSPECIFIED WHETHER RHEUMATOID FACTOR PRESENT (HCC): ICD-10-CM

## 2022-06-02 DIAGNOSIS — Z12.31 ENCOUNTER FOR SCREENING MAMMOGRAM FOR MALIGNANT NEOPLASM OF BREAST: ICD-10-CM

## 2022-06-02 DIAGNOSIS — E03.9 HYPOTHYROIDISM, UNSPECIFIED TYPE: ICD-10-CM

## 2022-06-02 DIAGNOSIS — N18.31 STAGE 3A CHRONIC KIDNEY DISEASE: ICD-10-CM

## 2022-06-02 DIAGNOSIS — S68.113A AMPUTATION OF LEFT MIDDLE FINGER: ICD-10-CM

## 2022-06-02 DIAGNOSIS — M81.0 OSTEOPOROSIS, UNSPECIFIED OSTEOPOROSIS TYPE, UNSPECIFIED PATHOLOGICAL FRACTURE PRESENCE: ICD-10-CM

## 2022-06-02 DIAGNOSIS — K52.9 CHRONIC DIARRHEA: ICD-10-CM

## 2022-06-02 PROBLEM — S68.111A AMPUTATION OF LEFT INDEX FINGER: Status: ACTIVE | Noted: 2022-06-02

## 2022-06-02 PROBLEM — K59.00 CONSTIPATION: Status: RESOLVED | Noted: 2020-01-21 | Resolved: 2022-06-02

## 2022-06-02 PROCEDURE — 99215 OFFICE O/P EST HI 40 MIN: CPT | Performed by: FAMILY MEDICINE

## 2022-06-02 RX ORDER — CHOLESTYRAMINE 4 G/9G
1 POWDER, FOR SUSPENSION ORAL DAILY
Qty: 30 EACH | Refills: 3 | Status: SHIPPED | OUTPATIENT
Start: 2022-06-02 | End: 2022-06-06 | Stop reason: SDUPTHER

## 2022-06-02 ASSESSMENT — FIBROSIS 4 INDEX: FIB4 SCORE: 2.06

## 2022-06-02 NOTE — PROGRESS NOTES
Subjective:     CC:   Chief Complaint   Patient presents with   • Establish Care     New Pt    • Paperwork       HISTORY OF THE PRESENT ILLNESS: Patient is a 86 y.o. female. This pleasant patient is here today to establish care and discuss her current health conditions.   Her prior PCP was Dr. Rosa  Retired teacher for [employer], lives alone.   States she moved here due to tax reasons, states her  was in [employer] business.     Chronic diarrhea  Chronic, states she does not have diarrhea. States that stress induced diarrhea. Takes Questran as needed which is helpful. States she had C-diff years ago, 10+ years, and since her bowels have not been the same    Rheumatoid arthritis (HCC)  Stable, continue current plan of care with current medications. Her pain is fairly well controlled on 200mg plaquenil, is primarily in her hands, uses topical meds as well. Due for labs 5/22.   Does regular eye exams.     Osteoporosis  Neck, most recent DEXA scan in November 2020 showed a T score right forearm -4.0 and right hip -1.7.  Is currently on Prolia injections every 6 months.  Denies any side effects or any recent fractures.  Does have a history of a femur fracture and left elbow s fracture    Hypothyroidism  Chronic, currently on Synthroid 112 mcg every morning.  Recent TSH levels reviewed and she is running slightly suppressed.  Discussed taking the 112 mcg 5 days a week, and we will recheck in 3 months.  States that she tried going on a lower dose in the past and then her levels went too high.  Nuys palpitations, heat intolerance, however states she does have diarrhea.    CKD (chronic kidney disease) stage 3, GFR 30-59 ml/min ([facility])  Chronic, stable.  Discussed continue with adequate hydration as well as avoiding nephrotoxic medication such as NSAIDs.    Amputation of left middle finger  Chronic, history of left third digit partial amputation.  Dates it was amputated because they thought she had  osteomyelitis.      Current Outpatient Medications Ordered in Epic   Medication Sig Dispense Refill   • cholestyramine (QUESTRAN) 4 g packet Take 4 g by mouth every day. 30 Each 3   • FLUoxetine (PROZAC) 10 MG Cap Take 1 Capsule by mouth every day. After 1 week increase to 20mg qam. Stop the citalopram. 180 Capsule 1   • traZODone (DESYREL) 50 MG Tab TAKE 1/2 TABLET BY MOUTH AT BEDTIME 50 Tablet 3   • hydroxychloroquine (PLAQUENIL) 200 MG Tab TAKE ONE TABLET BY MOUTH ONE TIME DAILY 100 Tablet 3   • ciprofloxacin/dexamethasone (CIPRODEX) 0.3-0.1 % Suspension Administer 4 Drops into affected ear(s) 2 times a day. 7.5 mL 1   • ipratropium (ATROVENT) 0.06 % Solution ADMINISTER 1 SPRAY INTO AFFECTED NOSTRIL(S) 2 TIMES A DAY. 15 mL 3   • potassium chloride SA (K-DUR) 10 MEQ Tab CR TAKE ONE TABLET BY MOUTH ONE TIME DAILY 90 Tablet 3   • spironolactone (ALDACTONE) 25 MG Tab TAKE ONE TABLET BY MOUTH ONE TIME DAILY 90 Tablet 3   • furosemide (LASIX) 40 MG Tab TAKE 1/2 TABLET BY MOUTH EVERY DAY 45 Tablet 3   • rosuvastatin (CRESTOR) 5 MG Tab Take 1 Tablet by mouth every evening. 100 Tablet 3   • Coenzyme Q10 300 MG Cap Take 1 Capsule by mouth every day. 100 Capsule 3   • cyclosporin (RESTASIS) 0.05 % ophthalmic emulsion Administer 1 Drop into both eyes 2 times a day. Pharmacist - please dispense 180 single use vials (72 ml) 72 mL 3   • Multiple Vitamins-Minerals (PRESERVISION AREDS 2 PO) Take  by mouth every day.     • SYNTHROID 112 MCG Tab TAKE ONE TABLET BY MOUTH EVERY MORNING ON AN EMPTY STOMACH 90 tablet 3   • EPINEPHrine 0.3 MG/0.3ML Solution Prefilled Syringe Inject 0.3 mL as directed as needed (serious allergic reaction/anaphylaxis). 1 Each 0   • CALCIUM CITRATE PO Take 200 mg by mouth every day.     • therapeutic multivitamin-minerals (THERAGRAN-M) Tab Take 1 Tab by mouth every day.     • denosumab (PROLIA) 60 MG/ML Solution Inject 60 mg as instructed every 6 months.       No current [facility]-ordered facility-administered  "medications on file.       Health Maintenance: Completed    Objective:     Exam: /64 (BP Location: Right arm, Patient Position: Sitting, BP Cuff Size: Adult)   Pulse 60   Temp 36.6 °C (97.8 °F) (Temporal)   Resp 16   Ht 1.63 m (5' 4.17\")   Wt 57.6 kg (127 lb)   SpO2 95%  Body mass index is 21.68 kg/m².    Physical Exam  Vitals reviewed.   Constitutional:       General: She is not in acute distress.     Appearance: Normal appearance.   HENT:      Right Ear: Tympanic membrane, ear canal and external ear normal.      Left Ear: Tympanic membrane, ear canal and external ear normal.      Mouth/Throat:      Mouth: Mucous membranes are moist.      Pharynx: Oropharynx is clear.   Eyes:      Conjunctiva/sclera: Conjunctivae normal.      Pupils: Pupils are equal, round, and reactive to light.   Cardiovascular:      Rate and Rhythm: Normal rate and regular rhythm.      Pulses: Normal pulses.      Heart sounds: No murmur heard.  Pulmonary:      Effort: Pulmonary effort is normal. No respiratory distress.      Breath sounds: No stridor. No wheezing, rhonchi or rales.   Chest:      Chest wall: No tenderness.   Abdominal:      General: Abdomen is flat.   Musculoskeletal:         General: Deformity present.      Right lower leg: Edema present.      Left lower leg: Edema present.      Comments: Joint deformities in hands d/t RA  Trace BLE edema  Left 3rd digit amputation    Skin:     General: Skin is warm.   Neurological:      Mental Status: She is alert and oriented to person, place, and time.   Psychiatric:         Mood and Affect: Mood normal.         Behavior: Behavior normal.          A chaperone was offered to the patient during today's exam. Chaperone name: Ben LYLE student was present.    Assessment & Plan:   86 y.o. female with the following -    1. Chronic diarrhea  Chronic, takes Questran as needed.  Dates she had a full work-up in the past and no etiology was found.  States her bowels changed after having " C. difficile over 10 years ago.  2. Rheumatoid arthritis involving multiple sites, unspecified whether rheumatoid factor present (HCC)  Longstanding history of RA.  States that she has not had a rheumatologist in at least 10 years, she would like to establish with 1.  A referral was placed in the past to [facility] and per patient she was told that they were not taking new patients or they were close.  Referral placed to Dr. Farris.  Currently hydroxychloroquine daily.  - Referral to Rheumatology    3. Encounter for screening mammogram for malignant neoplasm of breast  - MA-SCREENING MAMMO BILAT W/TOMOSYNTHESIS W/CAD; Future    4. Hypothyroidism, unspecified type  Chronic, most recent TSH showed thyroid was overly suppressed.  Discussed taking her Synthroid 5 days weekly instead of 7.  States she was tried on a lower dose in the past which did not work.  - TSH WITH REFLEX TO FT4; Future    5. Osteoporosis, unspecified osteoporosis type, unspecified pathological fracture presence  Chronic, stable on current regimen  6. Stage 3a chronic kidney disease (HCC)  Chronic, discussed avoiding nephrotoxic agents such as NSAIDs and maintaining hydration.  We will continue to monitor  7. Amputation of left  finger  History of left third digit partial amputation.  Other orders  - cholestyramine (QUESTRAN) 4 g packet; Take 4 g by mouth every day.  Dispense: 30 Each; Refill: 3       I spent a total of 50 minutes with record review, exam, communication with the patient, communication with other providers, and documentation of this encounter.    Return in about 6 months (around 12/2/2022).    Please note that this dictation was created using voice recognition software. I have made every reasonable attempt to correct obvious errors, but I expect that there are errors of grammar and possibly content that I did not discover before finalizing the note.

## 2022-06-02 NOTE — ASSESSMENT & PLAN NOTE
Stable, continue current plan of care with current medications. Her pain is fairly well controlled on 200mg plaquenil, is primarily in her hands, uses topical meds as well. Due for labs 5/22.   Does regular eye exams.

## 2022-06-02 NOTE — ASSESSMENT & PLAN NOTE
Chronic, history of left third digit partial amputation.  Dates it was amputated because they thought she had osteomyelitis.

## 2022-06-02 NOTE — ASSESSMENT & PLAN NOTE
Chronic, stable.  Discussed continue with adequate hydration as well as avoiding nephrotoxic medication such as NSAIDs.

## 2022-06-02 NOTE — ASSESSMENT & PLAN NOTE
Chronic, states she does not have diarrhea. States that stress induced diarrhea. Takes Questran as needed which is helpful. States she had C-diff years ago, 10+ years, and since her bowels have not been the same

## 2022-06-02 NOTE — ASSESSMENT & PLAN NOTE
Chronic, currently on Synthroid 112 mcg every morning.  Recent TSH levels reviewed and she is running slightly suppressed.  Discussed taking the 112 mcg 5 days a week, and we will recheck in 3 months.  States that she tried going on a lower dose in the past and then her levels went too high.  Nuys palpitations, heat intolerance, however states she does have diarrhea.

## 2022-06-02 NOTE — ASSESSMENT & PLAN NOTE
Neck, most recent DEXA scan in November 2020 showed a T score right forearm -4.0 and right hip -1.7.  Is currently on Prolia injections every 6 months.  Denies any side effects or any recent fractures.  Does have a history of a femur fracture and left elbow s fracture

## 2022-06-03 ENCOUNTER — OFFICE VISIT (OUTPATIENT)
Dept: CARDIOLOGY | Facility: MEDICAL CENTER | Age: 87
End: 2022-06-03
Payer: MEDICARE

## 2022-06-03 VITALS
SYSTOLIC BLOOD PRESSURE: 122 MMHG | HEART RATE: 59 BPM | DIASTOLIC BLOOD PRESSURE: 58 MMHG | WEIGHT: 126 LBS | RESPIRATION RATE: 14 BRPM | HEIGHT: 65 IN | BODY MASS INDEX: 20.99 KG/M2 | OXYGEN SATURATION: 97 %

## 2022-06-03 DIAGNOSIS — E78.00 HYPERCHOLESTEROLEMIA: ICD-10-CM

## 2022-06-03 DIAGNOSIS — I49.3 PVC (PREMATURE VENTRICULAR CONTRACTION): ICD-10-CM

## 2022-06-03 DIAGNOSIS — R94.31 NONSPECIFIC ABNORMAL ELECTROCARDIOGRAM (ECG) (EKG): ICD-10-CM

## 2022-06-03 DIAGNOSIS — I65.23 ATHEROSCLEROSIS OF BOTH CAROTID ARTERIES: ICD-10-CM

## 2022-06-03 LAB — EKG IMPRESSION: NORMAL

## 2022-06-03 PROCEDURE — 93000 ELECTROCARDIOGRAM COMPLETE: CPT | Performed by: INTERNAL MEDICINE

## 2022-06-03 PROCEDURE — 99205 OFFICE O/P NEW HI 60 MIN: CPT | Performed by: INTERNAL MEDICINE

## 2022-06-03 RX ORDER — ROSUVASTATIN CALCIUM 10 MG/1
10 TABLET, COATED ORAL EVERY EVENING
Qty: 90 TABLET | Refills: 3 | Status: SHIPPED | OUTPATIENT
Start: 2022-06-03 | End: 2022-06-07 | Stop reason: SDUPTHER

## 2022-06-03 ASSESSMENT — ENCOUNTER SYMPTOMS
SHORTNESS OF BREATH: 0
FEVER: 0
DIARRHEA: 0
ABDOMINAL PAIN: 0
WEIGHT GAIN: 0
VOMITING: 0
CLAUDICATION: 0
DYSPNEA ON EXERTION: 0
CONSTIPATION: 0
PND: 0
HEARTBURN: 0
FLANK PAIN: 0
NAUSEA: 0
NEAR-SYNCOPE: 0
BLURRED VISION: 0
SYNCOPE: 0
ALTERED MENTAL STATUS: 0
COUGH: 0
IRREGULAR HEARTBEAT: 0
WEIGHT LOSS: 0
BACK PAIN: 0
PALPITATIONS: 0
DIZZINESS: 0
DEPRESSION: 0
DECREASED APPETITE: 0
ORTHOPNEA: 0

## 2022-06-03 ASSESSMENT — FIBROSIS 4 INDEX: FIB4 SCORE: 2.06

## 2022-06-03 NOTE — PROGRESS NOTES
"Cardiology Note    Chief Complaint   Patient presents with   • Hyperlipidemia   • Aortic Atherosclerosis       History of Present Illness: Elizabeth Celis is a 86 y.o. female who presents for initial visit. Referred for abnormal EKG and PVCs.    Patient doing well overall. In her usual state of health. No cardiac complaints. Doesn't sense palpitations nor skipped beats. Imperceptible PVCs to her. Compliant with medications. Denies toxic social habits. Parents with heart disease later in life.     Review of Systems   Constitutional: Negative for decreased appetite, fever, malaise/fatigue, weight gain and weight loss.   HENT: Negative for congestion and nosebleeds.    Eyes: Negative for blurred vision.   Cardiovascular: Negative for chest pain, claudication, dyspnea on exertion, irregular heartbeat, leg swelling, near-syncope, orthopnea, palpitations, paroxysmal nocturnal dyspnea and syncope.   Respiratory: Negative for cough and shortness of breath.    Endocrine: Negative for cold intolerance and heat intolerance.   Skin: Negative for rash.   Musculoskeletal: Negative for back pain.   Gastrointestinal: Negative for abdominal pain, constipation, diarrhea, heartburn, melena, nausea and vomiting.   Genitourinary: Negative for dysuria, flank pain and hematuria.   Neurological: Negative for dizziness.   Psychiatric/Behavioral: Negative for altered mental status and depression.         Past Medical History:   Diagnosis Date   • Anesthesia     \"One time had a hard time waking me up\"   • Atherosclerosis of both carotid arteries 1/24/2022     Dec. 2021: Mild plaque right internal carotid and moderate plaque left carotid bifurcation   • C. difficile diarrhea 2/16   • C. 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 2017: T score -3.4 right forearm and -1.6 left hip DEXA Aug. 2019: T score forearm -4.6 and hip -1.4; DEXA Nov. 2020: T score right forearm -4.0 and right hip -1.7   • Pain     shoulders, feet, back   • Rheumatoid arthritis (HCC) 9/26/2014   • Stroke (HCC) 1990's?    \"mini\" no residual symptoms   • Urinary incontinence     occasional         Past Surgical History:   Procedure Laterality Date   • PB DEGROOT W/O FACETEC FORAMOT/DSKC 1/2 VRT SEG, TH* N/A 1/16/2020    Procedure: LAMINECTOMY, SPINE, THORACIC;  Surgeon: Sang Nelson M.D.;  Location: [facility];  Service: Neurosurgery   • THORACIC FUSION O-ARM N/A 1/16/2020    Procedure: FUSION, SPINE, THORACIC, POSTERIOR APPROACH - T9-L3;  Surgeon: Sang Nelson M.D.;  Location: [facility];  Service: Neurosurgery   • PB RECONSTR TOTAL SHOULDER IMPLANT Left 4/30/2019    Procedure: ARTHROPLASTY, SHOULDER, TOTAL - REVERSE;  Surgeon: Daniella Camargo M.D.;  Location: [facility];  Service: Orthopedics   • SHOULDER ARTHROPLASTY TOTAL Right 1/9/2018    Procedure: SHOULDER ARTHROPLASTY TOTAL - REVERSE;  Surgeon: Daniella Camargo M.D.;  Location: SURGERY [facility];  Service: Orthopedics   • COLONOSCOPY - ENDO N/A 3/7/2016    Procedure: COLONOSCOPY - ENDO;  Surgeon: Estiven Ayers M.D.;  Location: ENDOSCOPY [facility];  Service:    • FECAL TRANSPLANT N/A 3/7/2016    Procedure: FECAL TRANSPLANT;  Surgeon: Estiven Ayers M.D.;  Location: ENDOSCOPY [facility];  Service:    • OTHER ORTHOPEDIC SURGERY Left 2012    Left Elbow fx repair   • BLEPHAROPLASTY  3/31/2011    Performed by ORACIO DIAZ at [facility]S   • FOOT SURGERY " Right 2010   • OTHER ORTHOPEDIC SURGERY Left 2006    finger- removal of digit Left middle finger   • CHOLECYSTECTOMY  2000   • HYSTERECTOMY LAPAROSCOPY  2000   • HYSTERECTOMY RADICAL  1985   • OTHER Bilateral 2010, 2008    feet- repair torn tendons   • OTHER ORTHOPEDIC SURGERY Left 1970s    femur fx         Current Outpatient Medications   Medication Sig Dispense Refill   • rosuvastatin (CRESTOR) 10 MG Tab Take 1 Tablet by mouth every evening. 90 Tablet 3   • cholestyramine (QUESTRAN) 4 g packet Take 4 g by mouth every day. 30 Each 3   • FLUoxetine (PROZAC) 10 MG Cap Take 1 Capsule by mouth every day. After 1 week increase to 20mg qam. Stop the citalopram. 180 Capsule 1   • traZODone (DESYREL) 50 MG Tab TAKE 1/2 TABLET BY MOUTH AT BEDTIME 50 Tablet 3   • hydroxychloroquine (PLAQUENIL) 200 MG Tab TAKE ONE TABLET BY MOUTH ONE TIME DAILY 100 Tablet 3   • ciprofloxacin/dexamethasone (CIPRODEX) 0.3-0.1 % Suspension Administer 4 Drops into affected ear(s) 2 times a day. 7.5 mL 1   • ipratropium (ATROVENT) 0.06 % Solution ADMINISTER 1 SPRAY INTO AFFECTED NOSTRIL(S) 2 TIMES A DAY. 15 mL 3   • potassium chloride SA (K-DUR) 10 MEQ Tab CR TAKE ONE TABLET BY MOUTH ONE TIME DAILY 90 Tablet 3   • spironolactone (ALDACTONE) 25 MG Tab TAKE ONE TABLET BY MOUTH ONE TIME DAILY 90 Tablet 3   • furosemide (LASIX) 40 MG Tab TAKE 1/2 TABLET BY MOUTH EVERY DAY 45 Tablet 3   • Coenzyme Q10 300 MG Cap Take 1 Capsule by mouth every day. 100 Capsule 3   • cyclosporin (RESTASIS) 0.05 % ophthalmic emulsion Administer 1 Drop into both eyes 2 times a day. Pharmacist - please dispense 180 single use vials (72 ml) 72 mL 3   • Multiple Vitamins-Minerals (PRESERVISION AREDS 2 PO) Take  by mouth every day.     • SYNTHROID 112 MCG Tab TAKE ONE TABLET BY MOUTH EVERY MORNING ON AN EMPTY STOMACH 90 tablet 3   • EPINEPHrine 0.3 MG/0.3ML Solution Prefilled Syringe Inject 0.3 mL as directed as needed (serious allergic reaction/anaphylaxis). 1 Each 0   •  CALCIUM CITRATE PO Take 200 mg by mouth every day.     • therapeutic multivitamin-minerals (THERAGRAN-M) Tab Take 1 Tab by mouth every day.     • denosumab (PROLIA) 60 MG/ML Solution Inject 60 mg as instructed every 6 months.       No current facility-administered medications for this visit.         Allergies   Allergen Reactions   • Azithromycin Unspecified     Matti Johnsons syndrome   • Cefuroxime Itching and Vomiting     They gave me C-Diff   • Ciprofloxacin Itching and Vomiting     They gave me C-Diff   • Clindamycin Itching and Vomiting     They gave me c-diff   • Codeine Itching   • Daptomycin      Matti colleen syndrome     • Erythromycin Unspecified     Matti Johnsons syndrome   • Flagyl [Metronidazole] Rash, Vomiting and Nausea     rash   • Morphine Itching     Tolerates oxycodone/hydromorphone   • Nitrofurantoin Vomiting and Nausea   • Rifampin      Matti colleen syndrome   • Sulfa Drugs Swelling   • Macrodantin [Nitrofurantoin Macrocrystal] Rash     Other reaction(s): Decreased Blood Pressure   • Premarin Rash and Unspecified     burning         Family History   Problem Relation Age of Onset   • Non-contributory Mother    • Osteoporosis Mother    • Arthritis Mother    • Non-contributory Father    • Narcolepsy Father    • Anesthesia Paternal Grandfather         death         Social History     Socioeconomic History   • Marital status:      Spouse name: Not on file   • Number of children: Not on file   • Years of education: Not on file   • Highest education level: Not on file   Occupational History   • Not on file   Tobacco Use   • Smoking status: Former Smoker     Packs/day: 1.00     Years: 20.00     Pack years: 20.00     Types: Cigarettes     Quit date: 2016     Years since quittin.4   • Smokeless tobacco: Never Used   Vaping Use   • Vaping Use: Never used   Substance and Sexual Activity   • Alcohol use: Yes     Alcohol/week: 1.2 oz     Types: 2 Glasses of wine per week     Comment: 1  "per day   • Drug use: No   • Sexual activity: Not on file   Other Topics Concern   • Not on file   Social History Narrative   • Not on file     Social Determinants of Health     Financial Resource Strain: Not on file   Food Insecurity: Not on file   Transportation Needs: Not on file   Physical Activity: Not on file   Stress: Not on file   Social Connections: Not on file   Intimate Partner Violence: Not on file   Housing Stability: Not on file         Physical Exam:  Ambulatory Vitals  /58 (BP Location: Left arm, Patient Position: Sitting, BP Cuff Size: Adult)   Pulse (!) 59   Resp 14   Ht 1.651 m (5' 5\")   Wt 57.2 kg (126 lb)   SpO2 97%    BP Readings from Last 4 Encounters:   06/03/22 122/58   06/02/22 130/64   06/01/22 (!) 92/79   05/19/22 102/52     Weight/BMI:   Vitals:    06/03/22 1038   BP: 122/58   Weight: 57.2 kg (126 lb)   Height: 1.651 m (5' 5\")    Body mass index is 20.97 kg/m².  Wt Readings from Last 4 Encounters:   06/03/22 57.2 kg (126 lb)   06/02/22 57.6 kg (127 lb)   06/01/22 57.1 kg (125 lb 14.1 oz)   05/19/22 56.7 kg (125 lb)       Physical Exam  Constitutional:       General: She is not in acute distress.  HENT:      Head: Normocephalic and atraumatic.   Eyes:      Conjunctiva/sclera: Conjunctivae normal.      Pupils: Pupils are equal, round, and reactive to light.   Neck:      Vascular: No JVD.   Cardiovascular:      Rate and Rhythm: Normal rate and regular rhythm.      Heart sounds: Normal heart sounds. No murmur heard.    No friction rub. No gallop.   Pulmonary:      Effort: Pulmonary effort is normal. No respiratory distress.      Breath sounds: Normal breath sounds. No wheezing or rales.   Chest:      Chest wall: No tenderness.   Abdominal:      General: Bowel sounds are normal. There is no distension.      Palpations: Abdomen is soft.   Musculoskeletal:      Cervical back: Normal range of motion and neck supple.   Skin:     General: Skin is warm and dry.   Neurological:      " Mental Status: She is alert and oriented to person, place, and time.   Psychiatric:         Mood and Affect: Affect normal.         Judgment: Judgment normal.         Lab Data Review:  Lab Results   Component Value Date/Time    CHOLSTRLTOT 163 05/26/2022 10:21 AM    LDL 84 05/26/2022 10:21 AM    HDL 59 05/26/2022 10:21 AM    TRIGLYCERIDE 102 05/26/2022 10:21 AM       Lab Results   Component Value Date/Time    SODIUM 137 05/26/2022 10:21 AM    POTASSIUM 4.5 05/26/2022 10:21 AM    CHLORIDE 108 05/26/2022 10:21 AM    CO2 16 (L) 05/26/2022 10:21 AM    GLUCOSE 92 05/26/2022 10:21 AM    BUN 28 (H) 05/26/2022 10:21 AM    CREATININE 1.02 05/26/2022 10:21 AM    CREATININE 1.1 06/29/2007 04:23 PM     CrCl cannot be calculated (Patient's most recent lab result is older than the maximum 7 days allowed.).  Lab Results   Component Value Date/Time    ALKPHOSPHAT 67 05/26/2022 10:21 AM    ASTSGOT 31 05/26/2022 10:21 AM    ALTSGPT 17 05/26/2022 10:21 AM    TBILIRUBIN 0.4 05/26/2022 10:21 AM      Lab Results   Component Value Date/Time    WBC 5.0 05/26/2022 10:21 AM     Lab Results   Component Value Date/Time    HBA1C 5.5 02/10/2019 10:22 PM     No components found for: TROP      Cardiac Imaging and Procedures Review:      EKG 6/3/22 interpreted by me sinus 88, frequent ventricular couplets, borderline qtc setting frequent ventricular ectopy, nonspecific T wave changes    TTE 1/2020  CONCLUSIONS  No prior study is available for comparison.   Left ventricular ejection fraction is visually estimated to be greater   than 70%.  Unable to estimate pulmonary artery pressure due to an inadequate   tricuspid regurgitant jet.    Vascular screening 12/2021  CONCLUSIONS   Right. Mild plaque of the carotid artery with less than 50% stenosis of the    internal carotid.   Left. Moderate plaque of the carotid bifurcation with velocities consistent    with greater than 50% stenosis of the internal carotid.    No aneurysm of the abdominal aorta.     Lower extremity arterial perfusion  is normal at rest.    Medical Decision Making:  Problem List Items Addressed This Visit     Atherosclerosis of both carotid arteries    Relevant Medications    rosuvastatin (CRESTOR) 10 MG Tab    Other Relevant Orders    US-CAROTID DOPPLER BILAT    Hypercholesterolemia    Relevant Medications    rosuvastatin (CRESTOR) 10 MG Tab    Nonspecific abnormal electrocardiogram (ECG) (EKG)    Relevant Orders    EKG (Completed)    PVC (premature ventricular contraction)    Relevant Medications    rosuvastatin (CRESTOR) 10 MG Tab    Other Relevant Orders    EC-ECHOCARDIOGRAM COMPLETE W/O CONT    Holter Monitor Study        Frequent ventricular ectopy - check event monitor for other arrhythmias and quantify ectopy. Check TTE. Asymptomatic at this time however in future can consider BB or CCB.     HLD / carotid stenosis - threhold goal LDL <70. Increase rosuvastatin to 10mg evenings. Repeat carotid ultrasound.    It was my pleasure to meet with Ms. Celis.    A total of 60 minutes of time was spent on day of encounter reviewing medical record, performing history and examination, counseling, ordering medication/test/consults and documentation.

## 2022-06-06 ENCOUNTER — NON-PROVIDER VISIT (OUTPATIENT)
Dept: CARDIOLOGY | Facility: MEDICAL CENTER | Age: 87
End: 2022-06-06
Attending: INTERNAL MEDICINE
Payer: MEDICARE

## 2022-06-06 DIAGNOSIS — E78.00 HYPERCHOLESTEROLEMIA: ICD-10-CM

## 2022-06-06 DIAGNOSIS — I47.10 SVT (SUPRAVENTRICULAR TACHYCARDIA) (HCC): ICD-10-CM

## 2022-06-06 DIAGNOSIS — I47.29 NSVT (NONSUSTAINED VENTRICULAR TACHYCARDIA) (HCC): ICD-10-CM

## 2022-06-06 DIAGNOSIS — I49.3 PVC (PREMATURE VENTRICULAR CONTRACTION): ICD-10-CM

## 2022-06-06 DIAGNOSIS — I49.1 APC (ATRIAL PREMATURE CONTRACTIONS): ICD-10-CM

## 2022-06-06 DIAGNOSIS — I49.3 PVC'S (PREMATURE VENTRICULAR CONTRACTIONS): ICD-10-CM

## 2022-06-06 DIAGNOSIS — K52.9 CHRONIC DIARRHEA: ICD-10-CM

## 2022-06-06 DIAGNOSIS — I65.23 ATHEROSCLEROSIS OF BOTH CAROTID ARTERIES: ICD-10-CM

## 2022-06-06 RX ORDER — ROSUVASTATIN CALCIUM 10 MG/1
10 TABLET, COATED ORAL EVERY EVENING
Qty: 100 TABLET | Refills: 3 | Status: CANCELLED | OUTPATIENT
Start: 2022-06-06

## 2022-06-06 RX ORDER — CHOLESTYRAMINE 4 G/9G
1 POWDER, FOR SUSPENSION ORAL DAILY
Qty: 100 EACH | Refills: 3 | Status: SHIPPED | OUTPATIENT
Start: 2022-06-06 | End: 2023-02-28 | Stop reason: SDUPTHER

## 2022-06-06 NOTE — PROGRESS NOTES
Awaiting VR to read and sign, RN to send message on 7/7/2022      Home enrollment completed in the 14 day [employer]T Holter monitoring program, per Johnny Hernandez M.D.  Monitor will be shipped to patient via [employer].  >Pending EOS.

## 2022-06-06 NOTE — TELEPHONE ENCOUNTER
Received request via: Pharmacy requesting 100 day supply.     Was the patient seen in the last year in this department? Yes    Does the patient have an active prescription (recently filled or refills available) for medication(s) requested? No

## 2022-06-07 ENCOUNTER — TELEPHONE (OUTPATIENT)
Dept: CARDIOLOGY | Facility: MEDICAL CENTER | Age: 87
End: 2022-06-07
Payer: MEDICARE

## 2022-06-07 DIAGNOSIS — E78.00 HYPERCHOLESTEROLEMIA: ICD-10-CM

## 2022-06-07 DIAGNOSIS — I65.23 ATHEROSCLEROSIS OF BOTH CAROTID ARTERIES: ICD-10-CM

## 2022-06-07 RX ORDER — ROSUVASTATIN CALCIUM 10 MG/1
10 TABLET, COATED ORAL EVERY EVENING
Qty: 100 TABLET | Refills: 2 | Status: SHIPPED | OUTPATIENT
Start: 2022-06-07 | End: 2023-02-28

## 2022-06-07 NOTE — TELEPHONE ENCOUNTER
DALIA    Caller: [facility]    Topic/issue: ROSUVASTATIN    Pharmacist states that this medication needs to be filled for 100 day supply with 3 RF. Please advise.    Thank you,  Montrell RICO

## 2022-06-07 NOTE — TELEPHONE ENCOUNTER
Refill sent to [facility] mail order per pt request.            rosuvastatin (CRESTOR) 10 MG Tab 100 Tablet 2/2 6/7/2022     Sig - Route: Take 1 Tablet by mouth every evening. - Oral    Sent to pharmacy as: Rosuvastatin Calcium 10 MG Oral Tablet (CRESTOR)    Cosign for Ordering: Required by Johnny Hernandez M.D.    E-Prescribing Status: Receipt confirmed by pharmacy (6/7/2022  1:41 PM PDT)    Pharmacy  [facility] [id number] - [place] [id number]

## 2022-06-12 DIAGNOSIS — M06.9 RHEUMATOID ARTHRITIS INVOLVING MULTIPLE SITES, UNSPECIFIED WHETHER RHEUMATOID FACTOR PRESENT (HCC): ICD-10-CM

## 2022-06-16 ENCOUNTER — TELEPHONE (OUTPATIENT)
Dept: MEDICAL GROUP | Facility: PHYSICIAN GROUP | Age: 87
End: 2022-06-16
Payer: MEDICARE

## 2022-06-16 NOTE — TELEPHONE ENCOUNTER
Phone Number Called: [phone] (home)       Call outcome: Left detailed message for patient. Informed to call back with any additional questions.    Message: Informed pt that labs have been ordered.Patient can call [phone] to schedule or walk in to any [facility] facility.

## 2022-06-23 ENCOUNTER — HOSPITAL ENCOUNTER (OUTPATIENT)
Dept: LAB | Facility: MEDICAL CENTER | Age: 87
End: 2022-06-23
Attending: FAMILY MEDICINE
Payer: MEDICARE

## 2022-06-23 ENCOUNTER — HOSPITAL ENCOUNTER (OUTPATIENT)
Dept: RADIOLOGY | Facility: MEDICAL CENTER | Age: 87
End: 2022-06-23
Attending: INTERNAL MEDICINE
Payer: MEDICARE

## 2022-06-23 DIAGNOSIS — M06.9 RHEUMATOID ARTHRITIS INVOLVING MULTIPLE SITES, UNSPECIFIED WHETHER RHEUMATOID FACTOR PRESENT (HCC): ICD-10-CM

## 2022-06-23 DIAGNOSIS — E03.9 HYPOTHYROIDISM, UNSPECIFIED TYPE: ICD-10-CM

## 2022-06-23 LAB — TSH SERPL DL<=0.005 MIU/L-ACNC: 1.13 UIU/ML (ref 0.38–5.33)

## 2022-06-23 PROCEDURE — 36415 COLL VENOUS BLD VENIPUNCTURE: CPT

## 2022-06-23 PROCEDURE — 84443 ASSAY THYROID STIM HORMONE: CPT

## 2022-06-23 PROCEDURE — 77077 JOINT SURVEY SINGLE VIEW: CPT

## 2022-06-28 ENCOUNTER — HOSPITAL ENCOUNTER (OUTPATIENT)
Dept: LAB | Facility: MEDICAL CENTER | Age: 87
End: 2022-06-28
Attending: INTERNAL MEDICINE
Payer: MEDICARE

## 2022-06-28 DIAGNOSIS — M06.9 RHEUMATOID ARTHRITIS INVOLVING MULTIPLE SITES, UNSPECIFIED WHETHER RHEUMATOID FACTOR PRESENT (HCC): ICD-10-CM

## 2022-06-28 LAB
CRP SERPL HS-MCNC: <0.3 MG/DL (ref 0–0.75)
ERYTHROCYTE [SEDIMENTATION RATE] IN BLOOD BY WESTERGREN METHOD: 20 MM/HOUR (ref 0–25)
RHEUMATOID FACT SER IA-ACNC: <10 IU/ML (ref 0–14)

## 2022-06-28 PROCEDURE — 86431 RHEUMATOID FACTOR QUANT: CPT

## 2022-06-28 PROCEDURE — 36415 COLL VENOUS BLD VENIPUNCTURE: CPT

## 2022-06-28 PROCEDURE — 86200 CCP ANTIBODY: CPT

## 2022-06-28 PROCEDURE — 85652 RBC SED RATE AUTOMATED: CPT

## 2022-06-28 PROCEDURE — 86140 C-REACTIVE PROTEIN: CPT

## 2022-07-01 ENCOUNTER — TELEPHONE (OUTPATIENT)
Dept: CARDIOLOGY | Facility: MEDICAL CENTER | Age: 87
End: 2022-07-01
Payer: MEDICARE

## 2022-07-01 LAB — CCP IGG SERPL-ACNC: 3 UNITS (ref 0–19)

## 2022-07-01 RX ORDER — SPIRONOLACTONE 25 MG/1
25 TABLET ORAL DAILY
Qty: 100 TABLET | Refills: 0 | Status: SHIPPED | OUTPATIENT
Start: 2022-07-01 | End: 2022-09-26 | Stop reason: SDUPTHER

## 2022-07-13 PROCEDURE — 93248 EXT ECG>7D<15D REV&INTERPJ: CPT | Performed by: INTERNAL MEDICINE

## 2022-07-13 RX ORDER — METOPROLOL SUCCINATE 25 MG/1
25 TABLET, EXTENDED RELEASE ORAL DAILY
Qty: 90 TABLET | Refills: 3 | Status: SHIPPED | OUTPATIENT
Start: 2022-07-13 | End: 2022-10-05 | Stop reason: SDUPTHER

## 2022-07-19 DIAGNOSIS — M06.9 RHEUMATOID ARTHRITIS INVOLVING MULTIPLE SITES, UNSPECIFIED WHETHER RHEUMATOID FACTOR PRESENT (HCC): ICD-10-CM

## 2022-07-20 NOTE — PROGRESS NOTES
Phone Number Called: [phone] (home)       Call outcome: Spoke to patient regarding message below.    Message: Pt is going to log into [employer] and response to message. Pt has a few questions about medication. How long she should take it, the cost of it, side effects it will have.     Makayla- Pt wants to know if she can stop Prozac 10 mg. Medication has been causing night wild. Pt wants to know if it is ok to stop cold turkey. Please Advise.

## 2022-07-21 ENCOUNTER — TELEPHONE (OUTPATIENT)
Dept: MEDICAL GROUP | Facility: PHYSICIAN GROUP | Age: 87
End: 2022-07-21
Payer: MEDICARE

## 2022-07-21 NOTE — TELEPHONE ENCOUNTER
----- Message from EDVIN Thakkar sent at 7/19/2022  6:28 PM PDT -----  Can you please call her with the results the cardiologist put in the note and if she wants to start the metoprolol I can let him know or order this as he recommends.    Thank you  ----- Message -----  From: Chen Mcgee  Sent: 7/14/2022   9:00 AM PDT  To: EDVIN Thakkar, #

## 2022-07-21 NOTE — TELEPHONE ENCOUNTER
Phone Number Called: [phone] (home)         Call outcome: Spoke to patient regarding message below.     Message: Pt is going to log into [employer] and response to message. Pt has a few questions about medication. How long she should take it, the cost of it, side effects it will have.      Makayla- Pt wants to know if she can stop Prozac 10 mg. Medication has been causing night wild. Pt wants to know if it is ok to stop cold turkey. Please Advise.

## 2022-07-22 NOTE — TELEPHONE ENCOUNTER
Phone Number Called: [phone] (home)       Call outcome: Left detailed message for patient. Informed to call back with any additional questions.    Message: Pt called and lvm 07/21/22 pt did not take rx again yesterday and mentioned she felt fine. Pt no longer had nightmares. Pt ok for me to leave a vm on the call back. LVM with Kameron detail message.

## 2022-08-01 RX ORDER — IPRATROPIUM BROMIDE 42 UG/1
1 SPRAY, METERED NASAL 2 TIMES DAILY
Qty: 15 ML | Refills: 0 | Status: SHIPPED | OUTPATIENT
Start: 2022-08-01 | End: 2022-09-06 | Stop reason: SDUPTHER

## 2022-09-06 RX ORDER — IPRATROPIUM BROMIDE 42 UG/1
1 SPRAY, METERED NASAL 2 TIMES DAILY
Qty: 15 ML | Refills: 2 | Status: SHIPPED | OUTPATIENT
Start: 2022-09-06

## 2022-09-22 ENCOUNTER — HOSPITAL ENCOUNTER (OUTPATIENT)
Dept: CARDIOLOGY | Facility: MEDICAL CENTER | Age: 87
End: 2022-09-22
Attending: INTERNAL MEDICINE
Payer: MEDICARE

## 2022-09-22 DIAGNOSIS — I49.3 PVC (PREMATURE VENTRICULAR CONTRACTION): ICD-10-CM

## 2022-09-22 LAB
LV EJECT FRACT  99904: 60
LV EJECT FRACT MOD 2C 99903: 56.43
LV EJECT FRACT MOD 4C 99902: 61.14
LV EJECT FRACT MOD BP 99901: 61.24

## 2022-09-22 PROCEDURE — 93306 TTE W/DOPPLER COMPLETE: CPT

## 2022-09-22 PROCEDURE — 93306 TTE W/DOPPLER COMPLETE: CPT | Mod: 26 | Performed by: INTERNAL MEDICINE

## 2022-09-27 RX ORDER — SPIRONOLACTONE 25 MG/1
25 TABLET ORAL DAILY
Qty: 100 TABLET | Refills: 2 | Status: SHIPPED | OUTPATIENT
Start: 2022-09-27 | End: 2023-11-10 | Stop reason: SDUPTHER

## 2022-10-05 ENCOUNTER — OFFICE VISIT (OUTPATIENT)
Dept: CARDIOLOGY | Facility: MEDICAL CENTER | Age: 87
End: 2022-10-05
Payer: MEDICARE

## 2022-10-05 ENCOUNTER — HOSPITAL ENCOUNTER (OUTPATIENT)
Dept: LAB | Facility: MEDICAL CENTER | Age: 87
End: 2022-10-05
Attending: FAMILY MEDICINE
Payer: MEDICARE

## 2022-10-05 VITALS
WEIGHT: 126.6 LBS | OXYGEN SATURATION: 95 % | DIASTOLIC BLOOD PRESSURE: 62 MMHG | BODY MASS INDEX: 21.09 KG/M2 | HEART RATE: 55 BPM | SYSTOLIC BLOOD PRESSURE: 132 MMHG | RESPIRATION RATE: 18 BRPM | HEIGHT: 65 IN

## 2022-10-05 DIAGNOSIS — I65.23 ATHEROSCLEROSIS OF BOTH CAROTID ARTERIES: ICD-10-CM

## 2022-10-05 DIAGNOSIS — I49.3 PVC (PREMATURE VENTRICULAR CONTRACTION): ICD-10-CM

## 2022-10-05 DIAGNOSIS — E78.00 HYPERCHOLESTEROLEMIA: ICD-10-CM

## 2022-10-05 PROCEDURE — 99214 OFFICE O/P EST MOD 30 MIN: CPT | Performed by: INTERNAL MEDICINE

## 2022-10-05 RX ORDER — KETOCONAZOLE 20 MG/ML
SHAMPOO TOPICAL
COMMUNITY
Start: 2022-08-09 | End: 2023-02-10

## 2022-10-05 RX ORDER — METOPROLOL SUCCINATE 25 MG/1
12.5 TABLET, EXTENDED RELEASE ORAL DAILY
Qty: 90 TABLET | Refills: 3 | Status: SHIPPED | OUTPATIENT
Start: 2022-10-05 | End: 2023-10-17 | Stop reason: SDUPTHER

## 2022-10-05 RX ORDER — MOMETASONE FUROATE 1 MG/ML
SOLUTION TOPICAL
COMMUNITY
Start: 2022-08-10 | End: 2023-02-10

## 2022-10-05 ASSESSMENT — FIBROSIS 4 INDEX: FIB4 SCORE: 2.06

## 2022-10-05 ASSESSMENT — ENCOUNTER SYMPTOMS
ALTERED MENTAL STATUS: 0
PND: 0
BACK PAIN: 0
ABDOMINAL PAIN: 0
DIARRHEA: 0
CONSTIPATION: 0
PALPITATIONS: 0
DIZZINESS: 0
VOMITING: 0
SHORTNESS OF BREATH: 0
COUGH: 0
DYSPNEA ON EXERTION: 0
NEAR-SYNCOPE: 0
WEIGHT LOSS: 0
HEARTBURN: 0
FLANK PAIN: 0
NAUSEA: 0
DECREASED APPETITE: 0
DEPRESSION: 0
IRREGULAR HEARTBEAT: 0
FEVER: 0
BLURRED VISION: 0
ORTHOPNEA: 0
WEIGHT GAIN: 0
CLAUDICATION: 0
SYNCOPE: 0

## 2022-10-05 NOTE — PROGRESS NOTES
"Cardiology Note    Chief Complaint   Patient presents with    Aortic Stenosis     F/V Dx: Atherosclerosis of both carotid arteries         History of Present Illness: Elizabeth Celis is a 86 y.o. female PMH PVCs, HLD, atherosclerosis who presents for follow up visit.     This visit feeling tired. States she slept poorly last night. Otherwise no recent cardiac complaints. States she isn't sure that she feels palpitations. Asymptomatic on prior monitor. Completed echo, see below, with normal biventricular function. Compliant with medications. Sometimes does have orthostasis and making an effort to get up more slowly in the morning.    Review of Systems   Constitutional: Negative for decreased appetite, fever, malaise/fatigue, weight gain and weight loss.   HENT:  Negative for congestion and nosebleeds.    Eyes:  Negative for blurred vision.   Cardiovascular:  Negative for chest pain, claudication, dyspnea on exertion, irregular heartbeat, leg swelling, near-syncope, orthopnea, palpitations, paroxysmal nocturnal dyspnea and syncope.   Respiratory:  Negative for cough and shortness of breath.    Endocrine: Negative for cold intolerance and heat intolerance.   Skin:  Negative for rash.   Musculoskeletal:  Negative for back pain.   Gastrointestinal:  Negative for abdominal pain, constipation, diarrhea, heartburn, melena, nausea and vomiting.   Genitourinary:  Negative for dysuria, flank pain and hematuria.   Neurological:  Negative for dizziness.   Psychiatric/Behavioral:  Negative for altered mental status and depression.        Past Medical History:   Diagnosis Date    Anesthesia     \"One time had a hard time waking me up\"    Atherosclerosis of both carotid arteries 1/24/2022     Dec. 2021: Mild plaque right internal carotid and moderate plaque left carotid bifurcation    C. difficile diarrhea 2/16    C. 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 2017: T score -3.4 right forearm and -1.6 left hip DEXA Aug. 2019: T score forearm -4.6 and hip -1.4; DEXA Nov. 2020: T score right forearm -4.0 and right hip -1.7    Pain     shoulders, feet, back    Rheumatoid arthritis (HCC) 9/26/2014    Stroke (HCC) 1990's?    \"mini\" no residual symptoms    Urinary incontinence     occasional         Past Surgical History:   Procedure Laterality Date    PB DEGROOT W/O FACETEC FORAMOT/DSKC 1/2 VRT SEG, TH* N/A 1/16/2020    Procedure: LAMINECTOMY, SPINE, THORACIC;  Surgeon: Sang Nelson M.D.;  Location: SURGERY [facility];  Service: Neurosurgery    THORACIC FUSION O-ARM N/A 1/16/2020    Procedure: FUSION, SPINE, THORACIC, POSTERIOR APPROACH - T9-L3;  Surgeon: Sang Nelson M.D.;  Location: SURGERY [facility];  Service: Neurosurgery    PB RECONSTR TOTAL SHOULDER IMPLANT Left 4/30/2019    Procedure: ARTHROPLASTY, SHOULDER, TOTAL - REVERSE;  Surgeon: Daniella Camargo M.D.;  Location: SURGERY [facility];  Service: Orthopedics    SHOULDER ARTHROPLASTY TOTAL Right 1/9/2018    Procedure: SHOULDER ARTHROPLASTY TOTAL - REVERSE;  Surgeon: Daniella Camargo M.D.;  Location: SURGERY [facility];  Service: Orthopedics    COLONOSCOPY - ENDO N/A 3/7/2016    Procedure: COLONOSCOPY - ENDO;  Surgeon: sEtiven Ayers M.D.;  Location: ENDOSCOPY [facility];  Service:     FECAL TRANSPLANT N/A 3/7/2016    Procedure: FECAL TRANSPLANT;  Surgeon: Estiven Ayers M.D.;  Location: ENDOSCOPY [facility];  Service:     OTHER ORTHOPEDIC SURGERY Left 2012    Left Elbow " fx repair    BLEPHAROPLASTY  3/31/2011    Performed by ORACIO DIAZ at SUR[facility]ERY [facility]    FOOT SURGERY Right 2010    OTHER ORTHOPEDIC SURGERY Left 2006    finger- removal of digit Left middle finger    CHOLECYSTECTOMY  2000    HYSTERECTOMY LAPAROSCOPY  2000    HYSTERECTOMY RADICAL  1985    OTHER Bilateral 2010, 2008    feet- repair torn tendons    OTHER ORTHOPEDIC SURGERY Left 1970s    femur fx         Current Outpatient Medications   Medication Sig Dispense Refill    mometasone (ELOCON) 0.1 % lotion APPLY TOPICALLY TO AFFECTED AREAS ON SCALP ONCE A DAY UP TO FOUR DAYS A WEEK AS NEEDED FOR ITCHING .      ketoconazole (NIZORAL) 2 % shampoo APPLY TO SCALP 2-3 X WEEKLY WHILE SHOWERING, ALLOW TO SIT FOR 5 MINUTES PRIOR TO RINSING      metoprolol SR (TOPROL XL) 25 MG TABLET SR 24 HR Take 0.5 Tablets by mouth every day. 90 Tablet 3    spironolactone (ALDACTONE) 25 MG Tab Take 1 Tablet by mouth every day. 100 Tablet 2    ipratropium (ATROVENT) 0.06 % Solution Administer 1 Spray into affected nostril(S) 2 times a day. 15 mL 2    traZODone (DESYREL) 50 MG Tab TAKE 1/2 TABLET BY MOUTH AT BEDTIME 50 Tablet 0    SYNTHROID 112 MCG Tab TAKE ONE TABLET BY MOUTH EVERY MORNING ON AN EMPTY STOMACH 100 Tablet 1    rosuvastatin (CRESTOR) 10 MG Tab Take 1 Tablet by mouth every evening. 100 Tablet 2    cholestyramine (QUESTRAN) 4 g packet Take 4 g by mouth every day. 100 Each 3    FLUoxetine (PROZAC) 10 MG Cap Take 1 Capsule by mouth every day. After 1 week increase to 20mg qam. Stop the citalopram. 180 Capsule 1    hydroxychloroquine (PLAQUENIL) 200 MG Tab TAKE ONE TABLET BY MOUTH ONE TIME DAILY 100 Tablet 3    potassium chloride SA (K-DUR) 10 MEQ Tab CR TAKE ONE TABLET BY MOUTH ONE TIME DAILY 90 Tablet 3    furosemide (LASIX) 40 MG Tab TAKE 1/2 TABLET BY MOUTH EVERY DAY 45 Tablet 3    Coenzyme Q10 300 MG Cap Take 1 Capsule by mouth every day. 100 Capsule 3    cyclosporin (RESTASIS) 0.05 % ophthalmic emulsion Administer 1  Drop into both eyes 2 times a day. Pharmacist - please dispense 180 single use vials (72 ml) 72 mL 3    Multiple Vitamins-Minerals (PRESERVISION AREDS 2 PO) Take  by mouth every day.      CALCIUM CITRATE PO Take 200 mg by mouth every day.      therapeutic multivitamin-minerals (THERAGRAN-M) Tab Take 1 Tab by mouth every day.      denosumab (PROLIA) 60 MG/ML Solution Inject 60 mg as instructed every 6 months.      ciprofloxacin/dexamethasone (CIPRODEX) 0.3-0.1 % Suspension Administer 4 Drops into affected ear(s) 2 times a day. 7.5 mL 1    EPINEPHrine 0.3 MG/0.3ML Solution Prefilled Syringe Inject 0.3 mL as directed as needed (serious allergic reaction/anaphylaxis). 1 Each 0     No current facility-administered medications for this visit.         Allergies   Allergen Reactions    Azithromycin Unspecified     Matti Johnsons syndrome    Cefuroxime Itching and Vomiting     They gave me C-Diff    Ciprofloxacin Itching and Vomiting     They gave me C-Diff    Clindamycin Itching and Vomiting     They gave me c-diff    Codeine Itching    Daptomycin      Matti colleen syndrome      Erythromycin Unspecified     Matti Johnsons syndrome    Flagyl [Metronidazole] Rash, Vomiting and Nausea     rash    Morphine Itching     Tolerates oxycodone/hydromorphone    Nitrofurantoin Vomiting and Nausea    Rifampin      Matti colleen syndrome    Sulfa Drugs Swelling    Macrodantin [Nitrofurantoin Macrocrystal] Rash     Other reaction(s): Decreased Blood Pressure    Premarin Rash and Unspecified     burning         Family History   Problem Relation Age of Onset    Non-contributory Mother     Osteoporosis Mother     Arthritis Mother     Non-contributory Father     Narcolepsy Father     Anesthesia Paternal Grandfather         death         Social History     Socioeconomic History    Marital status:      Spouse name: Not on file    Number of children: Not on file    Years of education: Not on file    Highest education level: Not on  "file   Occupational History    Not on file   Tobacco Use    Smoking status: Former     Packs/day: 1.00     Years: 20.00     Pack years: 20.00     Types: Cigarettes     Quit date: 2016     Years since quittin.7    Smokeless tobacco: Never   Vaping Use    Vaping Use: Never used   Substance and Sexual Activity    Alcohol use: Yes     Alcohol/week: 4.2 oz     Types: 7 Glasses of wine per week     Comment: 1 per day    Drug use: No    Sexual activity: Not on file   Other Topics Concern    Not on file   Social History Narrative    Not on file     Social Determinants of Health     Financial Resource Strain: Not on file   Food Insecurity: Not on file   Transportation Needs: Not on file   Physical Activity: Not on file   Stress: Not on file   Social Connections: Not on file   Intimate Partner Violence: Not on file   Housing Stability: Not on file         Physical Exam:  Ambulatory Vitals  /62 (BP Location: Left arm, Patient Position: Sitting, BP Cuff Size: Adult)   Pulse (!) 55   Resp 18   Ht 1.651 m (5' 5\")   Wt 57.4 kg (126 lb 9.6 oz)   SpO2 95%    BP Readings from Last 4 Encounters:   10/05/22 132/62   22 122/58   22 130/64   22 (!) 92/79     Weight/BMI:   Vitals:    10/05/22 1051   BP: 132/62   Weight: 57.4 kg (126 lb 9.6 oz)   Height: 1.651 m (5' 5\")      Body mass index is 21.07 kg/m².  Wt Readings from Last 4 Encounters:   10/05/22 57.4 kg (126 lb 9.6 oz)   22 57.2 kg (126 lb)   22 57.6 kg (127 lb)   22 57.1 kg (125 lb 14.1 oz)       Physical Exam  Constitutional:       General: She is not in acute distress.  HENT:      Head: Normocephalic and atraumatic.   Eyes:      Conjunctiva/sclera: Conjunctivae normal.      Pupils: Pupils are equal, round, and reactive to light.   Neck:      Vascular: No JVD.   Cardiovascular:      Rate and Rhythm: Normal rate and regular rhythm.      Heart sounds: Normal heart sounds. No murmur heard.    No friction rub. No gallop. "   Pulmonary:      Effort: Pulmonary effort is normal. No respiratory distress.      Breath sounds: Normal breath sounds. No wheezing or rales.   Chest:      Chest wall: No tenderness.   Abdominal:      General: Bowel sounds are normal. There is no distension.      Palpations: Abdomen is soft.   Musculoskeletal:      Cervical back: Normal range of motion and neck supple.   Skin:     General: Skin is warm and dry.   Neurological:      Mental Status: She is alert and oriented to person, place, and time.   Psychiatric:         Mood and Affect: Affect normal.         Judgment: Judgment normal.       Lab Data Review:  Lab Results   Component Value Date/Time    CHOLSTRLTOT 163 05/26/2022 10:21 AM    LDL 84 05/26/2022 10:21 AM    HDL 59 05/26/2022 10:21 AM    TRIGLYCERIDE 102 05/26/2022 10:21 AM       Lab Results   Component Value Date/Time    SODIUM 137 05/26/2022 10:21 AM    POTASSIUM 4.5 05/26/2022 10:21 AM    CHLORIDE 108 05/26/2022 10:21 AM    CO2 16 (L) 05/26/2022 10:21 AM    GLUCOSE 92 05/26/2022 10:21 AM    BUN 28 (H) 05/26/2022 10:21 AM    CREATININE 1.02 05/26/2022 10:21 AM    CREATININE 1.1 06/29/2007 04:23 PM     CrCl cannot be calculated (Patient's most recent lab result is older than the maximum 7 days allowed.).  Lab Results   Component Value Date/Time    ALKPHOSPHAT 67 05/26/2022 10:21 AM    ASTSGOT 31 05/26/2022 10:21 AM    ALTSGPT 17 05/26/2022 10:21 AM    TBILIRUBIN 0.4 05/26/2022 10:21 AM      Lab Results   Component Value Date/Time    WBC 5.0 05/26/2022 10:21 AM     Lab Results   Component Value Date/Time    HBA1C 5.5 02/10/2019 10:22 PM     No components found for: TROP      Cardiac Imaging and Procedures Review:      EKG 6/3/22 interpreted by me sinus 88, frequent ventricular couplets, borderline qtc setting frequent ventricular ectopy, nonspecific T wave changes    TTE 1/2020  CONCLUSIONS  No prior study is available for comparison.   Left ventricular ejection fraction is visually estimated to be  greater   than 70%.  Unable to estimate pulmonary artery pressure due to an inadequate   tricuspid regurgitant jet.    Vascular screening 12/2021  CONCLUSIONS   Right. Mild plaque of the carotid artery with less than 50% stenosis of the    internal carotid.   Left. Moderate plaque of the carotid bifurcation with velocities consistent    with greater than 50% stenosis of the internal carotid.    No aneurysm of the abdominal aorta.    Lower extremity arterial perfusion  is normal at rest.    Procedure: zio monitor; 13d 19h; 6/9//22   Indication: PVCs   Quality: Good   Findings:   Underlying rhythm: Predominantly sinus rhythm with average rate 78 bpm. No atrial fibrillation nor flutter detected.   Atrial events: Rare ectopy. Three episodes supraventricular tachycardia (SVT); fastest 144bpm is also longest at 17 beats.   Ventricular events: Frequent ventricular ectopy 22.6% burden. 16 episodes nonsustained ventricular tachycardia (NSVT); fastest 116 bpm and longest 5 beats.   Patient events: Asymptomatic.     Impressions:   Predominantly sinus rhythm.   Frequent, asymptomatic ventricular ectopy.   Asymptomatic, short SVT and NSVT.     TTE 9/22/22  CONCLUSIONS  Normal left ventricular size, wall thickness, and systolic function.  Normal right ventricular size and systolic function.  Normal left atrial size.  Mild mitral annular calcification.  Normal pericardium without effusion.    Medical Decision Making:  Problem List Items Addressed This Visit       Atherosclerosis of both carotid arteries    Relevant Medications    metoprolol SR (TOPROL XL) 25 MG TABLET SR 24 HR    Hypercholesterolemia    Relevant Medications    metoprolol SR (TOPROL XL) 25 MG TABLET SR 24 HR    PVC (premature ventricular contraction)    Relevant Medications    metoprolol SR (TOPROL XL) 25 MG TABLET SR 24 HR     Orthostasis - attempt discontinuing lasix or can change to just as needed for fluid retention in the future. Reduce metoprolol.      Frequent ventricular ectopy, asymptomatic - reduce metoprolol to attempt diminish orthostasis.     HLD / carotid stenosis - threhold goal LDL <70. continue rosuvastatin. Pending annual blood work including lipids.    It was my pleasure to meet with Ms. Celis.

## 2022-10-17 ASSESSMENT — RHEUMATOLOGY NEW PATIENT QUESTIONNAIRE
MUSCLE WEAKNESS: Y
NAUSEA: N
COLD-INDUCED COLOR CHANGES (WHITE, PURPLE, RED ON REWARMING): FINGERS
CHEST PAIN WITH BREATHING: N
HEARTBURN: Y
THYROID PAIN: N
IRREGULAR BEATS: N
MARK ALL THE AREAS OF PAIN: 78274860
INEFFECTIVE_MEDICATIONS: TYLENOL
SKIN THICKENING: N
EAR PAIN: Y
NUMBNESS: Y
BLURRINESS: Y
PALPITATIONS: N
NIGHT SWEATS: Y
GENITAL ULCERS: N
MORNING STIFFNESS: IMPROVES WITH ACTIVITY
HAIR SHEDDING: Y
HAIR LOSS WITH BALD SPOTS: Y
BODY ACHES: Y
JOINT SWELLING: Y
PELVIC PAIN: Y
ACHILLES TENDON PAIN: N
JOINT PAIN: BETTER WITH ACTIVITY
BLOODY CONSTIPATION: Y
ABNORMAL DISCHARGE: N
DRY COUGH: N
FROTHY URINE: N
DRY EYES: Y
VISION LOSS: Y
FEVERS: Y
GOITER: N
HELPFUL_MEDICATIONS: ALEVE
DIZZINESS: Y
AGGRAVATING_FACTORS: COLD
SHORTNESS OF BREATH: N
BLOOD IN URINE: N
NECK PAIN: Y
COUGH WITH BLOODY PHLEGM: N
BLOODY DIARRHEA: N
ANXIETY: Y
MUSCLE PAIN: Y
DRY MOUTH: Y
ABDOMINAL PAIN: Y
ALLEVIATING_FACTORS: HEAT
VOMITING: N
HEARING LOSS: Y
BURNING URINATION: N
SKIN PLAQUES: Y

## 2022-10-18 ENCOUNTER — OFFICE VISIT (OUTPATIENT)
Dept: RHEUMATOLOGY | Facility: MEDICAL CENTER | Age: 87
End: 2022-10-18
Attending: STUDENT IN AN ORGANIZED HEALTH CARE EDUCATION/TRAINING PROGRAM
Payer: MEDICARE

## 2022-10-18 VITALS
DIASTOLIC BLOOD PRESSURE: 60 MMHG | WEIGHT: 126.3 LBS | HEART RATE: 82 BPM | TEMPERATURE: 97 F | SYSTOLIC BLOOD PRESSURE: 102 MMHG | BODY MASS INDEX: 21.04 KG/M2 | OXYGEN SATURATION: 91 % | RESPIRATION RATE: 16 BRPM | HEIGHT: 65 IN

## 2022-10-18 DIAGNOSIS — Z79.899 LONG-TERM USE OF HYDROXYCHLOROQUINE: ICD-10-CM

## 2022-10-18 DIAGNOSIS — M81.0 OSTEOPOROSIS OF MULTIPLE SITES: ICD-10-CM

## 2022-10-18 DIAGNOSIS — M15.9 OSTEOARTHRITIS OF MULTIPLE JOINTS, UNSPECIFIED OSTEOARTHRITIS TYPE: ICD-10-CM

## 2022-10-18 DIAGNOSIS — M06.00 SERONEGATIVE RHEUMATOID ARTHRITIS (HCC): ICD-10-CM

## 2022-10-18 PROBLEM — M53.9 MULTILEVEL DEGENERATIVE DISC DISEASE: Status: ACTIVE | Noted: 2022-10-18

## 2022-10-18 PROCEDURE — 99212 OFFICE O/P EST SF 10 MIN: CPT | Performed by: STUDENT IN AN ORGANIZED HEALTH CARE EDUCATION/TRAINING PROGRAM

## 2022-10-18 PROCEDURE — 99204 OFFICE O/P NEW MOD 45 MIN: CPT | Performed by: STUDENT IN AN ORGANIZED HEALTH CARE EDUCATION/TRAINING PROGRAM

## 2022-10-18 RX ORDER — TRAZODONE HYDROCHLORIDE 50 MG/1
25 TABLET ORAL
Qty: 100 TABLET | Refills: 2 | Status: SHIPPED | OUTPATIENT
Start: 2022-10-18 | End: 2023-12-18 | Stop reason: SDUPTHER

## 2022-10-18 ASSESSMENT — FIBROSIS 4 INDEX: FIB4 SCORE: 2.06

## 2022-10-18 NOTE — PROGRESS NOTES
[facility] RHEUMATOLOGY  [address], [place] [address]  Phone: [phone] ? Fax: [phone]    RHEUMATOLOGY NEW PATIENT VISIT NOTE      DATE OF SERVICE: 10/18/2022    REFERRING PROVIDER:  EDVIN Thakkar [facility] [address]  [place] [address]    PATIENT IDENTIFICATION:  Elizabeth Celis  [address]  [place] [address]    YOB: 1935    MEDICAL RECORD NUMBER: [id number]      Subjective        CHIEF COMPLAINT:   Chief Complaint   Patient presents with    New Patient     Rheumatoid arthritis        HISTORY OF PRESENT ILLNESS:  Elizabeth Celis is a 86 y.o. female with pertinent history notable for seronegative rheumatoid arthritis, osteoarthritis of multiple joints (hands, shoulders, hips, feet) s/p bilateral reverse shoulder replacements, DJD/DDD of cervical/thoracic/lumbar spines s/p thoracolumbar laminectomies/fusions, osteoporosis of multiple sites (forearms, femur), and multiple comorbidities. Previously managed by two different local rheumatologists who have retired (initially Dr. Jake Mejia for many years after diagnosing her and subsequently one-time visit with Dr. Jeferson Farfan several years prior), she presents to establish care for her RA. Reports waxing/waning joint pain in her hands, elbows, shoulders, hips, knees, ankles and feet, associated with variable degrees of morning stiffness that improves with activity. Reports her other symptoms and medical history as noted on the questionnaire below.    Pertinent treatment history as of 10/2022: Remicade infusion (years ago), hydroxychloroquine (years-present).  Pertinent lab results as of 6/2022: Negative/normal RF, CCP, ESR, CRP, TSH, and LFT.  Pertinent imaging as of 6/2022 XR hands: Severe bony deformity affecting numerous joints compatible with a combination of inflammatory and degenerative arthropathy; constellation of findings could indicate burned out rheumatoid arthritis with secondary  osteoarthritis versus spondyloarthropathy such as psoriatic, inflammatory bowel disease or chronic reactive arthritis    [facility] New Patient History Form    10/17/2022  7:03 PM PDT - Filed by Patient   Patient Information   Occupation: retired...teacher and business owner   Highest Level of Education: M.S.   Chief Complaint joint pain and weakness   History of Present Illness   Date of symptom onset (month/year): ??????   Preceding incident/ailment:    Describe/list your symptoms:    Aggravating factors: cold   Alleviating factors: Heat   Helpful medications: Aleve   Ineffective medications: Tylenol   Symptom severity/impact (scale of 1-10): Varies   Personal/emotional stressors:    [place] Locations Of Pain    REVIEW OF SYSTEMS    General   Fevers Yes   Chills    Night sweats Yes   Unintentional Weight Loss None   Musculoskeletal   Joint pain Better with activity   Morning stiffness Improves with activity   Joint swelling Yes   Achilles tendon pain No   Muscle pain Yes   Body Aches Yes   Dermatologic   Hair loss with bald spots Yes   Hair shedding Yes   Sunlight-induced skin rash    Skin thickening No   Skin plaques Yes   Cold-induced color changes (white, purple, red on rewarming) Fingers   Neurologic/Psychiatric   Muscle weakness Yes   Spasms    Tingling    Numbness Yes   Anxiety Yes   Depression    Head/Eyes   Headaches    Temple pain    Dizziness Yes   Dry eyes Yes   Eye pain    Eye redness    Blurriness Yes   Vision loss Yes   Ears/Nose   Ear pain Yes   Ringing in ears    Vertigo    Hearing loss Yes   Nasal ulcers    Nosebleeds    Sinus pain    Sinonasal congestion    Snoring    Mouth/Throat   Oral ulcers    Bleeding gums    Dry mouth Yes   Cavities    Sore throat    Difficulty speaking    Difficulty swallowing    Neck/Lymphatics   Neck pain Yes   Thyroid pain No   Goiter No   Swollen Glands    Cardiac/Respiratory   Chest pain with breathing No   Dry cough No   Cough with bloody phlegm No    Shortness of breath No   Palpitations No   Irregular beats No   Gastrointestinal   Nausea No   Vomiting No   Heartburn Yes   Abdominal pain Yes   Bloody diarrhea No   Bloody constipation Yes   Genitourinary   Pelvic Pain Yes   Genital ulcers No   Abnormal discharge No   Burning urination No   Frothy urine No   Blood in urine No   Medical/Personal History Details   Current Medical Problems:    Past/Resolved Medical Problems:    Surgeries/Procedures (include month/year): left femur  1982   right shoulder 2018    left shoulder  2019    spine 2020   Prescription/Over-The-Counter/Herbal Medications:    Medication/Food/Material Allergies (include reactions):    Immunizations (include month/year) covid and flu  2022   Alcohol/Tobacco/Recreational Drugs: no tobacco   1 glass wine per day   Family Medical History (only first-degree relatives): Mother   severe arthritis     Father narcolepsy and asthma       ACTIVE PROBLEM LIST:  Patient Active Problem List   Diagnosis    Seronegative rheumatoid arthritis (HCC)    History of sciatica    History of Clostridium difficile    Depression    Raynaud disease    CKD (chronic kidney disease) stage 3, GFR 30-59 ml/min ([facility])    History of falling    History of anemia    Generalized weakness    Hypothyroidism    History of recurrent UTIs    Ex-cigarette smoker    Spinal stenosis of lumbar region with neurogenic claudication    Peripheral edema    Neuropathic pain    Anemia    Osteoporosis of multiple sites    History of compression fracture of spine, Jan. 2020 (T12 and L1)    History of DVT (deep vein thrombosis)    Atherosclerosis of both carotid arteries    BMI 21.0-21.9, adult    Hypercholesterolemia    Chronic left hip pain    Insomnia    DNR (do not resuscitate)    Chronic diarrhea    Amputation of left middle finger    Nonspecific abnormal electrocardiogram (ECG) (EKG)    PVC (premature ventricular contraction)    Multilevel degenerative disc disease    Osteoarthritis of multiple  "joints   No problem-specific Assessment & Plan notes found for this encounter.      PAST MEDICAL HISTORY:  Past Medical History:   Diagnosis Date    Anesthesia     \"One time had a hard time waking me up\"    Atherosclerosis of both carotid arteries 1/24/2022     Dec. 2021: Mild plaque right internal carotid and moderate plaque left carotid bifurcation    C. difficile diarrhea 2/16    C. 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 2017: T score -3.4 right forearm and -1.6 left hip DEXA Aug. 2019: T score forearm -4.6 and hip -1.4; DEXA Nov. 2020: T score right forearm -4.0 and right hip -1.7    Pain     shoulders, feet, back    Rheumatoid arthritis ([facility]) 9/26/2014    Stroke ([facility]) 1990's?    \"mini\" no residual symptoms    Urinary incontinence     occasional       PAST SURGICAL HISTORY:  Past Surgical History:   Procedure Laterality Date    PB DEGROOT W/O FACETEC FORAMOT/DSKC 1/2 VRT SEG, TH* N/A 1/16/2020    Procedure: LAMINECTOMY, SPINE, THORACIC;  Surgeon: Sang Nelson M.D.;  Location: [facility];  Service: Neurosurgery    THORACIC FUSION O-ARM N/A 1/16/2020    Procedure: FUSION, SPINE, THORACIC, POSTERIOR APPROACH - T9-L3;  Surgeon: Sang Nelson M.D.;  Location: SURGERY [facility];  Service: Neurosurgery    PB RECONSTR TOTAL SHOULDER IMPLANT Left 4/30/2019    Procedure: ARTHROPLASTY, SHOULDER, TOTAL - REVERSE;  Surgeon: Daniella Camargo M.D.;  Location: [facility];  Service: Orthopedics    SHOULDER " ARTHROPLASTY TOTAL Right 1/9/2018    Procedure: SHOULDER ARTHROPLASTY TOTAL - REVERSE;  Surgeon: Daniella Camargo M.D.;  Location: SURGERY [facility];  Service: Orthopedics    COLONOSCOPY - ENDO N/A 3/7/2016    Procedure: COLONOSCOPY - ENDO;  Surgeon: Estiven Ayers M.D.;  Location: ENDOSCOPY [facility];  Service:     FECAL TRANSPLANT N/A 3/7/2016    Procedure: FECAL TRANSPLANT;  Surgeon: Estiven Ayers M.D.;  Location: ENDOSCOPY [facility];  Service:     OTHER ORTHOPEDIC SURGERY Left 2012    Left Elbow fx repair    BLEPHAROPLASTY  3/31/2011    Performed by ORACIO DIAZ at [facility]    FOOT SURGERY Right 2010    OTHER ORTHOPEDIC SURGERY Left 2006    finger- removal of digit Left middle finger    CHOLECYSTECTOMY  2000    HYSTERECTOMY LAPAROSCOPY  2000    HYSTERECTOMY RADICAL  1985    OTHER Bilateral 2010, 2008    feet- repair torn tendons    OTHER ORTHOPEDIC SURGERY Left 1970s    femur fx       MEDICATIONS:  Current Outpatient Medications   Medication Sig Dispense Refill    mometasone (ELOCON) 0.1 % lotion APPLY TOPICALLY TO AFFECTED AREAS ON SCALP ONCE A DAY UP TO FOUR DAYS A WEEK AS NEEDED FOR ITCHING .      ketoconazole (NIZORAL) 2 % shampoo APPLY TO SCALP 2-3 X WEEKLY WHILE SHOWERING, ALLOW TO SIT FOR 5 MINUTES PRIOR TO RINSING      metoprolol SR (TOPROL XL) 25 MG TABLET SR 24 HR Take 0.5 Tablets by mouth every day. 90 Tablet 3    spironolactone (ALDACTONE) 25 MG Tab Take 1 Tablet by mouth every day. 100 Tablet 2    ipratropium (ATROVENT) 0.06 % Solution Administer 1 Spray into affected nostril(S) 2 times a day. 15 mL 2    SYNTHROID 112 MCG Tab TAKE ONE TABLET BY MOUTH EVERY MORNING ON AN EMPTY STOMACH 100 Tablet 1    rosuvastatin (CRESTOR) 10 MG Tab Take 1 Tablet by mouth every evening. 100 Tablet 2    cholestyramine (QUESTRAN) 4 g packet Take 4 g by mouth every day. 100 Each 3    FLUoxetine (PROZAC) 10 MG Cap Take 1 Capsule by mouth every day. After 1 week increase to 20mg  qam. Stop the citalopram. 180 Capsule 1    hydroxychloroquine (PLAQUENIL) 200 MG Tab TAKE ONE TABLET BY MOUTH ONE TIME DAILY 100 Tablet 3    Coenzyme Q10 300 MG Cap Take 1 Capsule by mouth every day. 100 Capsule 3    cyclosporin (RESTASIS) 0.05 % ophthalmic emulsion Administer 1 Drop into both eyes 2 times a day. Pharmacist - please dispense 180 single use vials (72 ml) 72 mL 3    Multiple Vitamins-Minerals (PRESERVISION AREDS 2 PO) Take  by mouth every day.      CALCIUM CITRATE PO Take 200 mg by mouth every day.      therapeutic multivitamin-minerals (THERAGRAN-M) Tab Take 1 Tab by mouth every day.      denosumab (PROLIA) 60 MG/ML Solution Inject 60 mg as instructed every 6 months.      traZODone (DESYREL) 50 MG Tab Take 0.5 Tablets by mouth at bedtime. 100 Tablet 2     No current facility-administered medications for this visit.       ALLERGIES:   Allergies   Allergen Reactions    Azithromycin Unspecified     Matti Johnsons syndrome    Cefuroxime Itching and Vomiting     They gave me C-Diff    Ciprofloxacin Itching and Vomiting     They gave me C-Diff    Clindamycin Itching and Vomiting     They gave me c-diff    Codeine Itching    Daptomycin      Matti colleen syndrome      Erythromycin Unspecified     Matti Johnsons syndrome    Flagyl [Metronidazole] Rash, Vomiting and Nausea     rash    Morphine Itching     Tolerates oxycodone/hydromorphone    Nitrofurantoin Vomiting and Nausea    Rifampin      Matti colleen syndrome    Sulfa Drugs Swelling    Macrodantin [Nitrofurantoin Macrocrystal] Rash     Other reaction(s): Decreased Blood Pressure    Premarin Rash and Unspecified     burning       IMMUNIZATIONS:  Immunization History   Administered Date(s) Administered    Dtap Vaccine 01/11/2015    Influenza (IM) Preservative Free - HISTORICAL DATA 10/08/2011, 09/20/2012    Influenza Seasonal Injectable - Historical Data 10/28/2013, 10/13/2019    Influenza Vaccine Adult HD 09/23/2014, 09/22/2017, 10/08/2018,  "10/14/2019, 10/23/2020, 2021    Influenza Vaccine Pediatric Split - Historical Data 2010    Influenza Vaccine Quad Inj (Pf) 10/03/2016    Influenza, Unspecified - HISTORICAL DATA 2022    PFIZER BIVALENT BOOSTER SARS-COV-2 VACCINE (12+) 2022    PFIZER PURPLE CAP SARS-COV-2 VACCINATION (12+) 2021, 2021, 10/08/2021    Pneumococcal Conjugate Vaccine (Prevnar/PCV-13) 2016    Pneumococcal Vaccine (PCV7) - HISTORICAL DATA 2016    Pneumococcal polysaccharide vaccine (PPSV-23) 2015, 2015    QUANTIFERON GOLD - HISTORICAL DATA 2020    Tdap Vaccine 2015    Zoster Vaccine Live (ZVL) (Zostavax) - HISTORICAL DATA 2012    Zoster Vaccine Recombinant (RZV) (SHINGRIX) 2018, 2019       SOCIAL HISTORY:   Social History     Tobacco Use    Smoking status: Former     Packs/day: 1.00     Years: 20.00     Pack years: 20.00     Types: Cigarettes     Quit date: 2016     Years since quittin.8    Smokeless tobacco: Never   Vaping Use    Vaping Use: Never used   Substance Use Topics    Alcohol use: Yes     Alcohol/week: 4.2 oz     Types: 7 Glasses of wine per week     Comment: 1 per day    Drug use: No       FAMILY HISTORY:  Family History   Problem Relation Age of Onset    Non-contributory Mother     Osteoporosis Mother     Arthritis Mother     Non-contributory Father     Narcolepsy Father     Anesthesia Paternal Grandfather         death       Objective        Vital Signs: /60 (BP Location: Left arm, Patient Position: Sitting, BP Cuff Size: Adult)   Pulse 82   Temp 36.1 °C (97 °F) (Temporal)   Resp 16   Ht 1.651 m (5' 5\")   Wt 57.3 kg (126 lb 4.8 oz)   SpO2 91% Body mass index is 21.02 kg/m².    General: Appears well and comfortable  Eyes: No scleral or conjunctival lesions  ENT: No apparent oral or nasal lesions  Head/Neck: No apparent scalp or neck lesions  Cardiovascular: Regular rate and rhythm; no pericardial rubs  Respiratory: " Breathing quiet and unlabored; no rales or pleural rubs  Gastrointestinal: No organomegaly or abdominal masses  Integumentary: No significant cutaneous lesions or discolorations  Musculoskeletal: Poorly localized minimal tenderness of hands; rheumatoid/osteoarthritic hand deformities with bony hypertrophy of multiple PIP joints; amputated left 3rd finger distal phalanx; overall limited range of motion due to pain from osteoarthritic changes of multiple joints; no warmth, erythema, or overt signs of synovitis  Neurologic: No focal sensory or motor deficits  Psychiatric: Mood and affect appropriate      LABORATORY RESULTS REVIEWED AND INTERPRETED BY ME:  Lab Results   Component Value Date/Time    SEDRATEWES 20 06/28/2022 01:58 PM    CREACTPROT <0.30 06/28/2022 01:58 PM    URICACID 9.0 (H) 07/28/2017 10:40 AM     Lab Results   Component Value Date/Time    RHEUMFACTN <10 06/28/2022 01:58 PM    CCPANTIBODY 3 06/28/2022 01:58 PM     Lab Results   Component Value Date/Time    PROTHROMBTM 16.4 (H) 01/16/2020 06:50 PM    INR 1.29 (H) 01/16/2020 06:50 PM     Lab Results   Component Value Date/Time    TSHULTRASEN 1.130 06/23/2022 11:58 AM    FREET4 0.71 01/22/2020 03:13 AM     Lab Results   Component Value Date/Time    CPKTOTAL 220 (H) 01/22/2020 04:05 PM     Lab Results   Component Value Date/Time    ASTSGOT 31 05/26/2022 10:21 AM    ALTSGPT 17 05/26/2022 10:21 AM    ALKPHOSPHAT 67 05/26/2022 10:21 AM    TBILIRUBIN 0.4 05/26/2022 10:21 AM    TOTPROTEIN 7.0 05/26/2022 10:21 AM    TOTPROTEIN 7.80 03/28/2019 10:40 AM    ALBUMIN 4.5 05/26/2022 10:21 AM    ALBUMIN 4.49 03/28/2019 10:40 AM     Lab Results   Component Value Date/Time    SODIUM 137 05/26/2022 10:21 AM    POTASSIUM 4.5 05/26/2022 10:21 AM    CHLORIDE 108 05/26/2022 10:21 AM    CO2 16 (L) 05/26/2022 10:21 AM    GLUCOSE 92 05/26/2022 10:21 AM    BUN 28 (H) 05/26/2022 10:21 AM    CREATININE 1.02 05/26/2022 10:21 AM    CREATININE 1.1 06/29/2007 04:23 PM    CALCIUM 9.7  05/26/2022 10:21 AM    MAGNESIUM 2.3 01/22/2020 03:13 AM     Lab Results   Component Value Date/Time    WBC 5.0 05/26/2022 10:21 AM    RBC 3.71 (L) 05/26/2022 10:21 AM    HEMOGLOBIN 12.0 05/26/2022 10:21 AM    HEMATOCRIT 35.8 (L) 05/26/2022 10:21 AM    MCV 96.5 05/26/2022 10:21 AM    MCH 32.3 05/26/2022 10:21 AM    MCHC 33.5 (L) 05/26/2022 10:21 AM    RDW 44.9 05/26/2022 10:21 AM    PLATELETCT 314 05/26/2022 10:21 AM    MPV 9.9 05/26/2022 10:21 AM    NEUTS 2.97 05/26/2022 10:21 AM    LYMPHOCYTES 28.30 05/26/2022 10:21 AM    MONOCYTES 7.80 05/26/2022 10:21 AM    EOSINOPHILS 3.20 05/26/2022 10:21 AM    BASOPHILS 1.00 05/26/2022 10:21 AM    ANISOCYTOSIS 1+ 01/21/2018 04:35 AM     Lab Results   Component Value Date/Time    FERRITIN 165.7 01/22/2020 04:05 PM    IRON 79 07/24/2020 09:27 AM    FOLATE >24.0 01/22/2020 04:05 PM     Lab Results   Component Value Date/Time    25HYDROXY 53 05/26/2022 10:21 AM    PTHINTACT 22.0 05/10/2021 12:47 PM    ISTATICAL 0.94 (L) 05/12/2020 12:16 PM    ISTATCREAT 1.2 05/12/2020 12:16 PM     Lab Results   Component Value Date/Time    COLORURINE Yellow 05/26/2022 10:21 AM    SPECGRAVITY 1.012 05/26/2022 10:21 AM    PHURINE 6.5 05/26/2022 10:21 AM    GLUCOSEUR Negative 05/26/2022 10:21 AM    KETONES Negative 05/26/2022 10:21 AM    PROTEINURIN Negative 05/26/2022 10:21 AM     Lab Results   Component Value Date/Time    CHOLSTRLTOT 163 05/26/2022 10:21 AM    LDL 84 05/26/2022 10:21 AM    HDL 59 05/26/2022 10:21 AM    TRIGLYCERIDE 102 05/26/2022 10:21 AM    HBA1C 5.5 02/10/2019 10:22 PM       RADIOLOGY RESULTS REVIEWED AND INTERPRETED BY ME:  Results for orders placed during the hospital encounter of 07/07/10    DX-FOOT-COMPLETE 3+    Results for orders placed during the hospital encounter of 04/19/19    DX-JOINT SURVEY-FEET SINGLE VIEW    Impression  1.  Multiple bilateral subluxations consistent with chronic inflammatory arthritis    2.  Multiple sites of secondary osteoarthritis    3.  Fusion  across the right first metatarsophalangeal joint    Results for orders placed during the hospital encounter of 12/28/19    DX-HAND 2- RIGHT    Impression  Findings consistent with severe chronic inflammatory osteoarthritis, as seen previously.    Results for orders placed during the hospital encounter of 06/23/22    DX-JOINT SURVEY-HANDS SINGLE VIEW    Impression  Severe bony deformity affecting numerous joints is without significant change from 2019 and this is compatible with a combination of inflammatory and degenerative arthropathy    Constellation of findings could indicate burned out rheumatoid arthritis with secondary osteoarthritis versus spondyloarthropathy such as psoriatic, inflammatory bowel disease or chronic reactive arthritis    Results for orders placed during the hospital encounter of 11/13/20    DS-BONE DENSITY STUDY (DEXA)    Impression  According to the World Health Organization classification, bone mineral density of this patient is osteoporosis with high risk of fracture in the right forearm and osteopenia with increased risk of fracture in the right femur.    10-year Probability of Fracture:  Major Osteoporotic     36.7%  Hip     22.9%  Population      [place] ()    Based on right femur neck BMD    Results for orders placed during the hospital encounter of 01/26/22    CT-LSPINE W/O PLUS RECONS    Impression  1.  Prior multilevel laminectomy and posterior fusion as described above.  Hardware appears intact and unchanged from prior.  2.  Multiple compression deformities, most severe at L2, unchanged from prior.  3.  Alignment is also stable.    Results for orders placed during the hospital encounter of 01/26/22    CT-TSPINE W/O PLUS RECONS    Impression  1.  Severe osteopenia again seen with thoracic kyphosis.  2.  Lower thoracic fusion construct appears similar to prior exam.    Results for orders placed during the hospital encounter of 01/09/20    MR-LUMBAR SPINE-W/O    Impression  1.   Multilevel subluxations with L3-4 grade 1 spondylolisthesis, mild anterolisthesis L2-L3 and L4-L5. Retrolisthesis T12-L1 and L1-L2 which is difficult to quantify due to the retropulsed fractures at T12 and L1.  2.  T12 moderate recent or subacute insufficiency compression fracture with retropulsion. Moderate central stenosis at the T12 level.  3.  L1 essentially complete chronic collapse marked retropulsion and chronic sclerotic change seen on CT. No change from 1/12/2018. Moderate central stenosis at the L1 level due to prominent retropulsion up to about 10 mm.  4.  Additional multilevel degenerative disc disease and spondylotic change with L3-4 severe central stenosis at L4-5 severe central stenosis.  5.  Multilevel foraminal stenoses most notable for L3-4 severe right foraminal stenosis.  6.  Details for each level above in the body of report.    Results for orders placed during the hospital encounter of 05/29/08    MR-CERVICAL SPINE-W/O    Impression  IMPRESSION:    1. ATROPHY OF THE SUPERIOR CEREBELLAR VERMIS. THIS IS A NONSPECIFIC  FINDING WHICH MAY BE A COMPONENT OF AGE-RELATED ATROPHY, AS WELL AS OTHER  ENTITIES INCLUDING BUT NOT LIMITED TO CHRONIC DILANTIN USE, ALCOHOLISM,  SPINOCEREBELLAR DEGENERATION SYNDROMES.    2. MULTILEVEL DEGENERATIVE DISK DISEASE AND VARIABLE SPONDYLOTIC CHANGES  AT C3-4 THROUGH   C6-7, WITHOUT CENTRAL SPINAL STENOSIS AT ANY CERVICAL  LEVEL.  THERE ARE MILD FORAMINAL STENOSES AS DETAILED ABOVE.    3. T2-3 SMALL EXTRADURAL DEFECTS.  THE DORSAL DEFECT IS MOST CONSISTENT  WITH HYPERTROPHY OF THE LIGAMENTUM FLAVUM WITH OR WITHOUT CALCIFICATION.  THIS DOES NOT RESULT IN CENTRAL SPINAL STENOSIS.    4. LINEAR LONGITUDINAL T2 SIGNAL AT THE RIGHT SIDE PERIPHERY OF THE CORD  AT THE C7-T1 LEVEL.  I SUSPECT THIS REPRESENTS PULSATION ARTIFACT.  NO  CORRESPONDING SIGNAL ABNORMALITY IS EVIDENT ON THE GRADIENT-ECHO AXIAL      All relevant laboratory and imaging results reported on this note were  reviewed and interpreted by me.      Assessment & Plan        Elizabeth Celis is a 86 y.o. female with history as noted above whose presentation merits the following diagnostic and clinical status impressions and recommendations:    1. Seronegative rheumatoid arthritis (HCC)  Clinically quiescent disease with no evidence of evolving or impending flare as most of her pain appears to be biomechanical arthralgia secondary to polyarticular osteoarthritis. That said, given the potential for discordance between immunologic activity and clinical disease manifestations, may need to routinely check laboratory markers of disease activity for complete assessment.  - Continue hydroxychloroquine 200 mg daily    2. Osteoarthritis of multiple joints, unspecified osteoarthritis type  Presumably the most significant contributor to her overall joint pain burden which can be managed with topical NSAIDs and analgesics.  - Avoid oral NSAIDs but could try Voltaren 1% gel 3 times daily as needed  - Consider intra-articular steroid injection if needed    3. Osteoporosis of multiple sites  Counseled extensively on her risk of fracture.  - On Prolia 60 mg SC every 6 months    4. Long-term use of hydroxychloroquine  Risk of retinal toxicity is considered minimal in this case given the low dose of hydroxychloroquine prescribed (less than 5 mg/kg of body weight) per rheumatology/ophthalmology guidelines. However, ophthalmologic evaluation is still recommended with the frequency of routine follow-up eye exams determined by the ophthalmologist, typically annually or every other year.  - Routine ophthalmology evaluation as recommended      FOLLOW-UP: Return in about 6 months (around 4/18/2023) for Short.           Thank you for the opportunity to participate in the care of Elizabeth Celis.    Bill Farris MD, MS  Rheumatologist, [facility] Rheumatology ? [facility]   of Clinical Medicine,  Department of Internal Medicine  [facility]

## 2022-10-18 NOTE — PATIENT INSTRUCTIONS
AFTER VISIT INSTRUCTIONS    Below are important information to help you navigate your healthcare needs and help us serve you safely and effectively:  If laboratory tests and/or imaging studies were ordered, remember to go get them done.  If new prescriptions or refills were sent to the pharmacy, remember to go pick them up.  Take your medications exactly as prescribed unless instructed otherwise.  If there are significant findings on your lab tests and imaging studies that warrant further action, I will notify you with explanations via [employer] or phone call, otherwise you can view them on [employer] and let me know if you have any questions.  Sign up for [employer] if you have not already done so, in order to have access to the results of your lab tests and imaging studies, and to be able to send and receive messages from me.  Note that [employer] messages are typically read during office hours only and may take 1-7 days before a response depending on the urgency of the situation and how busy my schedule is.  In general, [employer] messaging is for non-urgent matters that do not require immediate attention, so for urgent matters that cannot wait, you are advised to go to an urgent care.

## 2022-10-31 ENCOUNTER — TELEPHONE (OUTPATIENT)
Dept: MEDICAL GROUP | Facility: PHYSICIAN GROUP | Age: 87
End: 2022-10-31
Payer: MEDICARE

## 2022-10-31 NOTE — TELEPHONE ENCOUNTER
Phone Number Called: [phone] (home)       Call outcome: Did not leave a detailed message. Requested patient to call back.    Message: asked pt to call back regarding medication

## 2022-11-01 NOTE — TELEPHONE ENCOUNTER
VOICEMAIL  1. Caller Name: Elizabeth Celis                        Call Back Number: [phone] (home)       2. Message: Returning patient call    3. Patient approves office to leave a detailed voicemail/MyChart message: N\A

## 2022-11-02 ENCOUNTER — TELEPHONE (OUTPATIENT)
Dept: HEALTH INFORMATION MANAGEMENT | Facility: OTHER | Age: 87
End: 2022-11-02

## 2022-11-02 DIAGNOSIS — M06.00 SERONEGATIVE RHEUMATOID ARTHRITIS (HCC): ICD-10-CM

## 2022-11-02 DIAGNOSIS — E03.9 HYPOTHYROIDISM, UNSPECIFIED TYPE: ICD-10-CM

## 2022-11-02 RX ORDER — FLUOXETINE 10 MG/1
20 CAPSULE ORAL EVERY MORNING
Qty: 180 CAPSULE | Refills: 2 | Status: SHIPPED | OUTPATIENT
Start: 2022-11-02 | End: 2023-12-18 | Stop reason: SDUPTHER

## 2022-11-02 RX ORDER — FUROSEMIDE 40 MG/1
20 TABLET ORAL
Qty: 100 TABLET | Refills: 0 | Status: SHIPPED | OUTPATIENT
Start: 2022-11-02 | End: 2023-06-06 | Stop reason: SDUPTHER

## 2022-11-02 NOTE — TELEPHONE ENCOUNTER
1. Caller Name: Elizabeth Celis                          Call Back Number: [phone] (home)         How would the patient prefer to be contacted with aOK to leave a detailed message response: Phone call OK to leave a detailed message    2. SPECIFIC Action To Be Taken: Lab Orders      Patient asking for lab orders to be completed end of November as she has an appointment with you on 12.01.2022.   I will reach out to her with your response.     Thank You,  [employer]

## 2022-11-11 ENCOUNTER — TELEPHONE (OUTPATIENT)
Dept: HEALTH INFORMATION MANAGEMENT | Facility: OTHER | Age: 87
End: 2022-11-11
Payer: MEDICARE

## 2022-11-17 ENCOUNTER — OFFICE VISIT (OUTPATIENT)
Dept: MEDICAL GROUP | Facility: PHYSICIAN GROUP | Age: 87
End: 2022-11-17
Payer: MEDICARE

## 2022-11-17 VITALS
BODY MASS INDEX: 20.69 KG/M2 | DIASTOLIC BLOOD PRESSURE: 70 MMHG | SYSTOLIC BLOOD PRESSURE: 140 MMHG | WEIGHT: 124.2 LBS | HEIGHT: 65 IN | RESPIRATION RATE: 16 BRPM | TEMPERATURE: 96.8 F

## 2022-11-17 DIAGNOSIS — H35.3222 EXUDATIVE AGE-RELATED MACULAR DEGENERATION OF LEFT EYE WITH INACTIVE CHOROIDAL NEOVASCULARIZATION (HCC): ICD-10-CM

## 2022-11-17 DIAGNOSIS — H91.93 DECREASED HEARING OF BOTH EARS: ICD-10-CM

## 2022-11-17 DIAGNOSIS — M06.00 SERONEGATIVE RHEUMATOID ARTHRITIS (HCC): ICD-10-CM

## 2022-11-17 DIAGNOSIS — E78.00 HYPERCHOLESTEROLEMIA: ICD-10-CM

## 2022-11-17 DIAGNOSIS — G89.29 CHRONIC LEFT HIP PAIN: ICD-10-CM

## 2022-11-17 DIAGNOSIS — M81.0 AGE-RELATED OSTEOPOROSIS WITHOUT CURRENT PATHOLOGICAL FRACTURE: ICD-10-CM

## 2022-11-17 DIAGNOSIS — M25.552 CHRONIC LEFT HIP PAIN: ICD-10-CM

## 2022-11-17 DIAGNOSIS — E03.8 OTHER SPECIFIED HYPOTHYROIDISM: ICD-10-CM

## 2022-11-17 DIAGNOSIS — F32.0 CURRENT MILD EPISODE OF MAJOR DEPRESSIVE DISORDER, UNSPECIFIED WHETHER RECURRENT (HCC): ICD-10-CM

## 2022-11-17 DIAGNOSIS — I73.00 RAYNAUD'S DISEASE WITHOUT GANGRENE: ICD-10-CM

## 2022-11-17 PROCEDURE — 99214 OFFICE O/P EST MOD 30 MIN: CPT | Performed by: INTERNAL MEDICINE

## 2022-11-17 ASSESSMENT — FIBROSIS 4 INDEX: FIB4 SCORE: 2.06

## 2022-11-17 NOTE — PROGRESS NOTES
CC: Establish care, previous PCP Mari Lopez.  Medication follow-up.    HPI:  Elizabeth presents with the following    1. Hypercholesterolemia  Patient with a PMH of aortic stenosis, atherosclerosis, SVT followed by cardiology.  Patient is treated with Toprol-XL and Crestor 10 mg total cholesterol 163, LDL 84.    2. Seronegative rheumatoid arthritis (HCC)  Chronic.  Stable.  Followed by rheumatology.  Currently treated with Plaquenil 200 mg dose.  Patient is up-to-date with regular eye exams with her ophthalmologist.  Affecting mostly her hands bilaterally.  History of Remicade    3. Current mild episode of major depressive disorder, unspecified whether recurrent (HCC)  Chronic.  Stable.  Patient currently taking Prozac 10 mg tablet daily.    4. Raynaud's disease without gangrene  Stable.  No medications.  Followed by rheumatology.    5. Other specified hypothyroidism  Known history of hypothyroidism.  Currently taking Synthroid 112 MCG's 5 days out of the week.  Her TSH is quite suppressed and dose adjustments made.  Most recent TSH is within normal range of 1.13 in June.  Asymptomatic.    6. Age-related osteoporosis without current pathological fracture  History of 6 severe osteoporosis.  DEXA scan completed in November 2020.  Forearm T score -4.0, right femur T score -1.7.  Decreased bone mineral density when compared to DEXA scan in 2019.  Patient is currently being treated with Prolia and is due for her next injection on December 7.  Treated with Prolia for about 2 years.  Will complete lab work prior to treatment.  He has a history of compression fracture of T12 and L1 in January 2020.    7. Decreased hearing of both ears  Patient requesting referral to audiology.  Has been noticed increased decreased hearing bilaterally.  Patient has been going to [employer] and will need new hearing aids.    8. 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  Stable.  Followed by her ophthalmologist at  [facility].  Patient will follow-up for her second injection on December 1.  Patient has noticed a decline in her vision.    9. Chronic left hip pain  Chronic history of osteoarthritis of left hip.  Injury to her left femur in the past.  Followed by orthopedist, Dr. Chinchilla, received a steroid injection in July 2022.  Patient feels that she may need a repeat injection.  Symptoms slightly worsening.      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) 11/17/2022    Multilevel degenerative disc disease 10/18/2022    Osteoarthritis of multiple joints 10/18/2022    Long-term use of hydroxychloroquine 10/18/2022    Nonspecific abnormal electrocardiogram (ECG) (EKG) 06/03/2022    PVC (premature ventricular contraction) 06/03/2022    Chronic diarrhea 06/02/2022    Amputation of left middle finger 06/02/2022    DNR (do not resuscitate) 05/19/2022    Chronic left hip pain 04/01/2022    Insomnia 04/01/2022    BMI 21.0-21.9, adult 03/21/2022    Hypercholesterolemia 03/21/2022    Atherosclerosis of both carotid arteries 01/24/2022    History of DVT (deep vein thrombosis) 12/01/2021    History of compression fracture of spine, Jan. 2020 (T12 and L1) 05/12/2021    Age-related osteoporosis without current pathological fracture 11/12/2020    Anemia 01/19/2020    Neuropathic pain 01/17/2020    Peripheral edema 01/15/2020    Spinal stenosis of lumbar region with neurogenic claudication 01/10/2020    Ex-cigarette smoker 07/29/2019    History of recurrent UTIs 03/27/2019    Hypothyroidism 02/10/2019    History of anemia 01/12/2018    Generalized weakness 01/12/2018    Raynaud disease 11/11/2017    CKD (chronic kidney disease) stage 3, GFR 30-59 ml/min ([facility]) 11/11/2017    History of falling 11/11/2017    History of Clostridium  difficile 02/21/2016    Depression 02/21/2016    Seronegative rheumatoid arthritis (HCC) 09/26/2014    History of sciatica 09/26/2014       Current Outpatient Medications   Medication Sig Dispense Refill    FLUoxetine (PROZAC) 10 MG Cap Take 1 Capsule by mouth every day. After 1 week increase to 20mg qam. Stop the citalopram. 180 Capsule 2    furosemide (LASIX) 40 MG Tab Take 0.5 Tablets by mouth 1 time a day as needed (lower leg swelling). 100 Tablet 0    traZODone (DESYREL) 50 MG Tab Take 0.5 Tablets by mouth at bedtime. 100 Tablet 2    mometasone (ELOCON) 0.1 % lotion APPLY TOPICALLY TO AFFECTED AREAS ON SCALP ONCE A DAY UP TO FOUR DAYS A WEEK AS NEEDED FOR ITCHING .      ketoconazole (NIZORAL) 2 % shampoo APPLY TO SCALP 2-3 X WEEKLY WHILE SHOWERING, ALLOW TO SIT FOR 5 MINUTES PRIOR TO RINSING      metoprolol SR (TOPROL XL) 25 MG TABLET SR 24 HR Take 0.5 Tablets by mouth every day. 90 Tablet 3    spironolactone (ALDACTONE) 25 MG Tab Take 1 Tablet by mouth every day. 100 Tablet 2    ipratropium (ATROVENT) 0.06 % Solution Administer 1 Spray into affected nostril(S) 2 times a day. 15 mL 2    SYNTHROID 112 MCG Tab TAKE ONE TABLET BY MOUTH EVERY MORNING ON AN EMPTY STOMACH 100 Tablet 1    rosuvastatin (CRESTOR) 10 MG Tab Take 1 Tablet by mouth every evening. 100 Tablet 2    cholestyramine (QUESTRAN) 4 g packet Take 4 g by mouth every day. 100 Each 3    hydroxychloroquine (PLAQUENIL) 200 MG Tab TAKE ONE TABLET BY MOUTH ONE TIME DAILY 100 Tablet 3    Coenzyme Q10 300 MG Cap Take 1 Capsule by mouth every day. 100 Capsule 3    cyclosporin (RESTASIS) 0.05 % ophthalmic emulsion Administer 1 Drop into both eyes 2 times a day. Pharmacist - please dispense 180 single use vials (72 ml) 72 mL 3    Multiple Vitamins-Minerals (PRESERVISION AREDS 2 PO) Take  by mouth every day.      CALCIUM CITRATE PO Take 200 mg by mouth every day.      therapeutic multivitamin-minerals (THERAGRAN-M) Tab Take 1 Tablet by mouth every day.       denosumab (PROLIA) 60 MG/ML Solution Inject 1 mL under the skin every 6 months.       No current facility-administered medications for this visit.         Allergies as of 2022 - Reviewed 2022   Allergen Reaction Noted    Azithromycin Unspecified 2017    Cefuroxime Itching and Vomiting 2016    Ciprofloxacin Itching and Vomiting 2017    Clindamycin Itching and Vomiting 2016    Codeine Itching 2012    Daptomycin  2016    Erythromycin Unspecified 2017    Flagyl [metronidazole] Rash, Vomiting, and Nausea 2016    Morphine Itching 2017    Nitrofurantoin Vomiting and Nausea 2016    Rifampin  2016    Sulfa drugs Swelling 2012    Macrodantin [nitrofurantoin macrocrystal] Rash     Premarin Rash and Unspecified 2017        Social History     Socioeconomic History    Marital status:      Spouse name: Not on file    Number of children: Not on file    Years of education: Not on file    Highest education level: Not on file   Occupational History    Not on file   Tobacco Use    Smoking status: Former     Packs/day: 1.00     Years: 20.00     Pack years: 20.00     Types: Cigarettes     Quit date: 2016     Years since quittin.8    Smokeless tobacco: Never   Vaping Use    Vaping Use: Never used   Substance and Sexual Activity    Alcohol use: Yes     Alcohol/week: 4.2 oz     Types: 7 Glasses of wine per week     Comment: 1 per day    Drug use: No    Sexual activity: Not on file   Other Topics Concern    Not on file   Social History Narrative    Not on file     Social Determinants of Health     Financial Resource Strain: Not on file   Food Insecurity: Not on file   Transportation Needs: Not on file   Physical Activity: Not on file   Stress: Not on file   Social Connections: Not on file   Intimate Partner Violence: Not on file   Housing Stability: Not on file       Family History   Problem Relation Age of Onset    Non-contributory  Mother     Osteoporosis Mother     Arthritis Mother     Non-contributory Father     Narcolepsy Father     Anesthesia Paternal Grandfather         death       Past Surgical History:   Procedure Laterality Date    PB DEGROOT W/O FACETEC FORAMOT/DSKC 1/2 VRT SEG, TH* N/A 1/16/2020    Procedure: LAMINECTOMY, SPINE, THORACIC;  Surgeon: Sang Nelson M.D.;  Location: SURGERY [facility];  Service: Neurosurgery    THORACIC FUSION O-ARM N/A 1/16/2020    Procedure: FUSION, SPINE, THORACIC, POSTERIOR APPROACH - T9-L3;  Surgeon: Sang Nelson M.D.;  Location: SURGERY [facility];  Service: Neurosurgery    PB RECONSTR TOTAL SHOULDER IMPLANT Left 4/30/2019    Procedure: ARTHROPLASTY, SHOULDER, TOTAL - REVERSE;  Surgeon: Daniella Camargo M.D.;  Location: SURGERY [facility];  Service: Orthopedics    SHOULDER ARTHROPLASTY TOTAL Right 1/9/2018    Procedure: SHOULDER ARTHROPLASTY TOTAL - REVERSE;  Surgeon: Daniella Camargo M.D.;  Location: SURGERY [facility];  Service: Orthopedics    COLONOSCOPY - ENDO N/A 3/7/2016    Procedure: COLONOSCOPY - ENDO;  Surgeon: Estiven Ayers M.D.;  Location: ENDOSCOPY [facility];  Service:     FECAL TRANSPLANT N/A 3/7/2016    Procedure: FECAL TRANSPLANT;  Surgeon: Estiven Ayers M.D.;  Location: ENDOSCOPY [facility];  Service:     OTHER ORTHOPEDIC SURGERY Left 2012    Left Elbow fx repair    BLEPHAROPLASTY  3/31/2011    Performed by ORACIO DIAZ at [facility]    FOOT SURGERY Right 2010    OTHER ORTHOPEDIC SURGERY Left 2006    finger- removal of digit Left middle finger    CHOLECYSTECTOMY  2000    HYSTERECTOMY LAPAROSCOPY  2000    HYSTERECTOMY RADICAL  1985    OTHER Bilateral 2010, 2008    feet- repair torn tendons    OTHER ORTHOPEDIC SURGERY Left 1970s    femur fx       ROS:  Denies any Headache,Chest pain,  Shortness of breath,  Abdominal pain, Changes of bowel or bladder, Lower ext edema, Fevers, Nights sweats, Weight Changes, Focal weakness or  "numbness.  All other systems are negative.    BP (!) 140/70   Temp 36 °C (96.8 °F) (Temporal)   Resp 16   Ht 1.651 m (5' 5\")   Wt 56.3 kg (124 lb 3.2 oz)   BMI 20.67 kg/m²      Constitutional: Alert, no distress, well-groomed.  Skin: Warm, dry, good turgor, no rashes in visible areas.  Eye: Equal, round and reactive, conjunctiva clear, lids normal.  ENMT: Lips without lesions, good dentition, moist mucous membranes.  Neck: Trachea midline, no masses, no thyromegaly.  Respiratory: Unlabored respiratory effort, no cough.  Abdomen: Soft, no gross masses.  MSK: Normal gait, moves all extremities.  Neuro: Grossly non-focal. No cranial nerve deficit. Strength and sensation intact.   Psych: Alert and oriented x3, normal affect and mood.        Assessment and Plan.   86 y.o. female presenting with the following.     1. Hypercholesterolemia  Stable. Continue care per cardiology.    2. Seronegative rheumatoid arthritis (HCC)  Chronic. Stable. Continue care per rheumatology.    3. Current mild episode of major depressive disorder, unspecified whether recurrent (HCC)  Stable. Continue prozac.    4. Raynaud's disease without gangrene  Stable.    5. Other specified hypothyroidism  Stable. Continue current dose levothyroxine.    6. Age-related osteoporosis without current pathological fracture  Continue prolia.    7. Decreased hearing of both ears    - Referral to Audiology    8. 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 choroidal neovascularization (HCC)  New onset. Stable. F/u with ophthalmology.    9. Chronic left hip pain  Follow up with ortho    My total time spent caring for the patient on the day of the encounter was 33 minutes.   This does not include time spent on separately billable procedures/tests.      "

## 2022-11-21 ENCOUNTER — HOSPITAL ENCOUNTER (OUTPATIENT)
Dept: RADIOLOGY | Facility: MEDICAL CENTER | Age: 87
End: 2022-11-21
Attending: FAMILY MEDICINE
Payer: MEDICARE

## 2022-11-21 DIAGNOSIS — Z12.31 ENCOUNTER FOR SCREENING MAMMOGRAM FOR MALIGNANT NEOPLASM OF BREAST: ICD-10-CM

## 2022-11-21 PROCEDURE — 77063 BREAST TOMOSYNTHESIS BI: CPT

## 2022-12-07 ENCOUNTER — APPOINTMENT (OUTPATIENT)
Dept: ONCOLOGY | Facility: MEDICAL CENTER | Age: 87
End: 2022-12-07
Attending: FAMILY MEDICINE
Payer: MEDICARE

## 2022-12-19 DIAGNOSIS — E03.9 HYPOTHYROIDISM (ACQUIRED): ICD-10-CM

## 2022-12-20 PROCEDURE — RXMED WILLOW AMBULATORY MEDICATION CHARGE: Performed by: INTERNAL MEDICINE

## 2022-12-20 RX ORDER — LEVOTHYROXINE SODIUM 112 MCG
112 TABLET ORAL
Qty: 100 TABLET | Refills: 1 | Status: SHIPPED | OUTPATIENT
Start: 2022-12-20 | End: 2023-09-27 | Stop reason: SDUPTHER

## 2022-12-21 PROCEDURE — RXMED WILLOW AMBULATORY MEDICATION CHARGE: Performed by: FAMILY MEDICINE

## 2022-12-22 ENCOUNTER — PHARMACY VISIT (OUTPATIENT)
Dept: PHARMACY | Facility: MEDICAL CENTER | Age: 87
End: 2022-12-22
Payer: COMMERCIAL

## 2022-12-22 DIAGNOSIS — E87.6 HYPOKALEMIA: ICD-10-CM

## 2022-12-23 RX ORDER — POTASSIUM CHLORIDE 750 MG/1
10 TABLET, FILM COATED, EXTENDED RELEASE ORAL DAILY
Qty: 90 TABLET | Refills: 3 | OUTPATIENT
Start: 2022-12-23

## 2022-12-28 ENCOUNTER — HOSPITAL ENCOUNTER (OUTPATIENT)
Dept: LAB | Facility: MEDICAL CENTER | Age: 87
End: 2022-12-28
Attending: FAMILY MEDICINE
Payer: MEDICARE

## 2022-12-28 DIAGNOSIS — M06.00 SERONEGATIVE RHEUMATOID ARTHRITIS (HCC): ICD-10-CM

## 2022-12-28 DIAGNOSIS — E03.9 HYPOTHYROIDISM, UNSPECIFIED TYPE: ICD-10-CM

## 2022-12-28 LAB
ALBUMIN SERPL BCP-MCNC: 4.2 G/DL (ref 3.2–4.9)
ALBUMIN/GLOB SERPL: 1.3 G/DL
ALP SERPL-CCNC: 79 U/L (ref 30–99)
ALT SERPL-CCNC: 54 U/L (ref 2–50)
ANION GAP SERPL CALC-SCNC: 17 MMOL/L (ref 7–16)
AST SERPL-CCNC: 36 U/L (ref 12–45)
BASOPHILS # BLD AUTO: 0.1 % (ref 0–1.8)
BASOPHILS # BLD: 0.01 K/UL (ref 0–0.12)
BILIRUB SERPL-MCNC: 0.5 MG/DL (ref 0.1–1.5)
BUN SERPL-MCNC: 38 MG/DL (ref 8–22)
CALCIUM ALBUM COR SERPL-MCNC: 9.6 MG/DL (ref 8.5–10.5)
CALCIUM SERPL-MCNC: 9.8 MG/DL (ref 8.4–10.2)
CHLORIDE SERPL-SCNC: 101 MMOL/L (ref 96–112)
CO2 SERPL-SCNC: 18 MMOL/L (ref 20–33)
CREAT SERPL-MCNC: 1.07 MG/DL (ref 0.5–1.4)
EOSINOPHIL # BLD AUTO: 0.12 K/UL (ref 0–0.51)
EOSINOPHIL NFR BLD: 1.6 % (ref 0–6.9)
ERYTHROCYTE [DISTWIDTH] IN BLOOD BY AUTOMATED COUNT: 46.4 FL (ref 35.9–50)
GFR SERPLBLD CREATININE-BSD FMLA CKD-EPI: 50 ML/MIN/1.73 M 2
GLOBULIN SER CALC-MCNC: 3.2 G/DL (ref 1.9–3.5)
GLUCOSE SERPL-MCNC: 70 MG/DL (ref 65–99)
HCT VFR BLD AUTO: 39.2 % (ref 37–47)
HGB BLD-MCNC: 12.9 G/DL (ref 12–16)
IMM GRANULOCYTES # BLD AUTO: 0.1 K/UL (ref 0–0.11)
IMM GRANULOCYTES NFR BLD AUTO: 1.3 % (ref 0–0.9)
LYMPHOCYTES # BLD AUTO: 1.53 K/UL (ref 1–4.8)
LYMPHOCYTES NFR BLD: 20.6 % (ref 22–41)
MCH RBC QN AUTO: 32.2 PG (ref 27–33)
MCHC RBC AUTO-ENTMCNC: 32.9 G/DL (ref 33.6–35)
MCV RBC AUTO: 97.8 FL (ref 81.4–97.8)
MONOCYTES # BLD AUTO: 0.62 K/UL (ref 0–0.85)
MONOCYTES NFR BLD AUTO: 8.4 % (ref 0–13.4)
NEUTROPHILS # BLD AUTO: 5.03 K/UL (ref 2–7.15)
NEUTROPHILS NFR BLD: 68 % (ref 44–72)
NRBC # BLD AUTO: 0 K/UL
NRBC BLD-RTO: 0 /100 WBC
PLATELET # BLD AUTO: 283 K/UL (ref 164–446)
PMV BLD AUTO: 8.9 FL (ref 9–12.9)
POTASSIUM SERPL-SCNC: 4.5 MMOL/L (ref 3.6–5.5)
PROT SERPL-MCNC: 7.4 G/DL (ref 6–8.2)
RBC # BLD AUTO: 4.01 M/UL (ref 4.2–5.4)
SODIUM SERPL-SCNC: 136 MMOL/L (ref 135–145)
T4 FREE SERPL-MCNC: 1.57 NG/DL (ref 0.93–1.7)
TSH SERPL DL<=0.005 MIU/L-ACNC: 5.44 UIU/ML (ref 0.38–5.33)
WBC # BLD AUTO: 7.4 K/UL (ref 4.8–10.8)

## 2022-12-28 PROCEDURE — 85025 COMPLETE CBC W/AUTO DIFF WBC: CPT

## 2022-12-28 PROCEDURE — 36415 COLL VENOUS BLD VENIPUNCTURE: CPT

## 2022-12-28 PROCEDURE — 80053 COMPREHEN METABOLIC PANEL: CPT

## 2022-12-28 PROCEDURE — 84439 ASSAY OF FREE THYROXINE: CPT

## 2022-12-28 PROCEDURE — 84443 ASSAY THYROID STIM HORMONE: CPT

## 2023-01-03 ENCOUNTER — APPOINTMENT (OUTPATIENT)
Dept: ONCOLOGY | Facility: MEDICAL CENTER | Age: 88
End: 2023-01-03
Attending: FAMILY MEDICINE
Payer: MEDICARE

## 2023-01-05 PROCEDURE — RXMED WILLOW AMBULATORY MEDICATION CHARGE: Performed by: INTERNAL MEDICINE

## 2023-01-05 NOTE — DISCHARGE PLANNING
"[facility]   Physical Therapy Treatment Note     Name: Tad Gan  Clinic Number: [id number]    Therapy Diagnosis:   Encounter Diagnoses   Name Primary?    Chronic right shoulder pain Yes    Postural imbalance     Muscle weakness of upper extremity      Physician: Tati Hutchins MD    Visit Date: 1/5/2023    Physician Orders: PT Eval and Treat   Medical Diagnosis: M25.512 (ICD-10-CM) - Pain in left shoulder  Evaluation Date: 11/17/2022  Authorization Period Expiration: 9/16/2023  Plan of Care Certification Period: 1/12/2023  Visit # / Visits authorized: 5/ 20  FOTO: 1/ 5 (12/15/2022)  PTA Visit #: 1/ 5    Precautions: Standard    Time In: 1:42 pm  Time Out: 2:30 pm  Total Billable Time:  48 minutes    SUBJECTIVE   Pt reports: Hasn't had any issues over the last few weeks. Had some soreness in R shoulder while moving heavy boxes last night.    He was somewhat compliant with home exercise program.  Response to previous treatment: "Great"  Functional change: difficulty with abduction and ER at 90 deg abd    Pain: 3/10 only when moving R arm  Location: R shoulder    OBJECTIVE      11/17/2022:     special test:  Anterior apprehension relocation test: positive  IRRST: IR weaker than ER     UE MMT R L   C3 Cervical side bend 5/5 5/5   C4 Shoulder Shrug 5/5  5/5   Shoulder flexion 5/5 5/5   Shoulder abduction 5/5 5/5   Shoulder IR 5/5 5/5   Shoulder ER 5/5 5/5   C5 Elbow flexion 5/5  5/5   C7 Elbow extension 5/5 5/5   C6 Wrist extension 5/5 5/5   Wrist flexion 5/5 5/5   Scapular protraction 5/5 5/5   Mid Traps 4-/5 4-/5   Lower traps 3+/5 3+/5      *- biceps cramping     Joint Mobility                           R                          L  Shoulder flex                151 deg/                     150 deg  Shoulder abd                150 deg                   135 deg  Shoulder ER                  70 deg                    50 deg at side     CMS Impairment/Limitation/Restriction for FOTO Shoulder " Agency/Facility Name: [facility]  Spoke To: fax  Outcome: Pt accepted.       LSW informed       "Survey     Therapist reviewed FOTO scores for Tad Gan on 12/15/2022.   FOTO documents entered into [employer] - see Media section.     Limitation Score: 0%  Category: Carrying     Current : CH = 0 % impaired, limited or restricted  Goal: CI = at least 1% but < 20% impaired, limited or restricted      TREATMENT     Tad received the bolded treatments listed below:      Patient received therapeutic exercises for 48 minutes for improved strength and AROM including:    Prone scapular retractions 20 x 3"   Prone Ws 2 x 10 x 3"   Prone Ts 2 x 10 x 3"   Prone Ys x 10 x 3"   Supine serratus punches 4# 2 x 10  Seated scap retractions 20 x 3"  Brueggers with GTB 2 x 10  Rows with GTB 2 x 10  Shoulder ext with GTB 2 x 10  Lat pull down GTB 3 x 10  Wall circles with red ball 30 CW/CCW  Wall slides x 15  3" holds  Slouch correct 2 x 10  Scapular retraction with row at shoulder height at 90 ER with OH arc  (might be too high level and need to revert to robberies)  Robberies with RTB x 20  Serratus wall press x 20  +Push up plus on wall 2 x 10  +Iso inferior glide 2 x 10  +Seated thoracic ext over half foam x 10    Patient received manual therapeutic technique for 00 minutes for improved soft tissue and joint mobility including:        Patient received neuromuscular reeducation for 00 minutes for improved proprioception and balance including:        Patient received therapeutic activities for 00 minutes for improved tolerance to functional activities including:        PATIENT EDUCATION AND HOME EXERCISES     Home Exercises Provided and Patient Education Provided     Education provided:   -Continuance of HEP    Written Home Exercises Provided: Patient instructed to cont prior HEP. Exercises were reviewed and Tad was able to demonstrate them prior to the end of the session.  Tad demonstrated good  understanding of the education provided. See EMR under Patient Instructions for exercises provided during therapy " sessions    ASSESSMENT   Pt tolerated treatment well with slight improvement in periscapular strength. Pt with noted soreness in R shoulder when abducting above 90 degrees. Noted good muscular fatigue in B UE following treatment.    Tad Is progressing well towards his goals.   Pt prognosis is Good.     Pt will continue to benefit from skilled outpatient physical therapy to address the deficits listed in the problem list box on initial evaluation, provide pt/family education and to maximize pt's level of independence in the home and community environment.     Pt's spiritual, cultural and educational needs considered and pt agreeable to plan of care and goals.     Anticipated barriers to physical therapy: None    Goals:   Short Term Goals: 4 weeks   Independent with HEP. (Met for initial HEP, ongoing)  Report decreased R shoulder pain < or = 2/10 with adls such as sitting in chair with arm rests, rolling his shoulder. Holding his coffee pot, pouring his coffee.  Increased MMT for B UE by 1/2 muscle grade to promote proper scapular stability to decrease R shoulder pain < or = 2/10 with adls such as sitting in chair with arm rests, rolling his shoulder. Holding his coffee pot, pouring his coffee.      Long Term Goals: 8 weeks   Report decreased R shoulder pain < or =  1/10 with adls such as sitting in chair with arm rests, rolling his shoulder. Holding his coffee pot, pouring his coffee.  Increased MMT for B UE by 1 muscle grade to promote proper scapular stability to decrease R shoulder pain < or = 1/10 with adls such as sitting in chair with arm rests, rolling his shoulder. Holding his coffee pot, pouring his coffee.   Patient to achieve CI (at least 1% but less than 20% impaired, limited or restricted) level at 17% functional limitationon the FOTO Outcomes Measurement System.    PLAN   Certification Period/Plan of care expiration: 11/17/2022 to 1/12/2023.    Continue to improve B UE strength and scapular stability       Outpatient Physical Therapy 2 times weekly for 8 weeks to include the following interventions: Manual Therapy, Moist Heat/ Ice, Neuromuscular Re-ed, Patient Education, Therapeutic Activities, and Therapeutic Exercise.       Gene Ba III, PTA   - - -

## 2023-01-06 ENCOUNTER — PHARMACY VISIT (OUTPATIENT)
Dept: PHARMACY | Facility: MEDICAL CENTER | Age: 88
End: 2023-01-06
Payer: COMMERCIAL

## 2023-01-26 ENCOUNTER — OUTPATIENT INFUSION SERVICES (OUTPATIENT)
Dept: ONCOLOGY | Facility: MEDICAL CENTER | Age: 88
End: 2023-01-26
Attending: FAMILY MEDICINE
Payer: MEDICARE

## 2023-01-26 VITALS
RESPIRATION RATE: 18 BRPM | HEART RATE: 93 BPM | OXYGEN SATURATION: 94 % | WEIGHT: 127.21 LBS | DIASTOLIC BLOOD PRESSURE: 53 MMHG | TEMPERATURE: 97.7 F | SYSTOLIC BLOOD PRESSURE: 140 MMHG | HEIGHT: 64 IN | BODY MASS INDEX: 21.72 KG/M2

## 2023-01-26 DIAGNOSIS — M81.0 AGE-RELATED OSTEOPOROSIS WITHOUT CURRENT PATHOLOGICAL FRACTURE: ICD-10-CM

## 2023-01-26 DIAGNOSIS — Z87.81 HISTORY OF COMPRESSION FRACTURE OF SPINE: ICD-10-CM

## 2023-01-26 LAB
CA-I BLD ISE-SCNC: 1.13 MMOL/L (ref 1.1–1.3)
CREAT BLD-MCNC: 1.1 MG/DL (ref 0.5–1.4)

## 2023-01-26 PROCEDURE — 36415 COLL VENOUS BLD VENIPUNCTURE: CPT

## 2023-01-26 PROCEDURE — 82565 ASSAY OF CREATININE: CPT

## 2023-01-26 PROCEDURE — 96372 THER/PROPH/DIAG INJ SC/IM: CPT

## 2023-01-26 PROCEDURE — 82330 ASSAY OF CALCIUM: CPT

## 2023-01-26 PROCEDURE — 700111 HCHG RX REV CODE 636 W/ 250 OVERRIDE (IP): Performed by: INTERNAL MEDICINE

## 2023-01-26 RX ADMIN — DENOSUMAB 60 MG: 60 INJECTION SUBCUTANEOUS at 15:42

## 2023-01-26 ASSESSMENT — PAIN DESCRIPTION - PAIN TYPE: TYPE: CHRONIC PAIN

## 2023-01-26 ASSESSMENT — FIBROSIS 4 INDEX: FIB4 SCORE: 1.51

## 2023-01-27 NOTE — PROGRESS NOTES
Pt presents to [facility] for prolia injection. Pt denies any oral surgery in the last 6 weeks and is taking calcium and vitamin D. Labs drawn from left wrist; brisk blood return observed and pt tolerated well. Labs within treatable parameters. Prolia injected into Left back arm with no s/s of adverse reactions. Discussed with Pt that I will email the scheduling for her next appointment. Pt discharged to self care with all personal belongings and in no apparent distress.

## 2023-02-01 PROBLEM — M16.12 PRIMARY OSTEOARTHRITIS OF LEFT HIP: Status: ACTIVE | Noted: 2023-02-01

## 2023-02-01 PROBLEM — Z96.9 RETAINED ORTHOPEDIC HARDWARE: Status: ACTIVE | Noted: 2023-02-01

## 2023-02-01 PROCEDURE — RXMED WILLOW AMBULATORY MEDICATION CHARGE: Performed by: DENTIST

## 2023-02-02 ENCOUNTER — PHARMACY VISIT (OUTPATIENT)
Dept: PHARMACY | Facility: MEDICAL CENTER | Age: 88
End: 2023-02-02
Payer: COMMERCIAL

## 2023-02-10 ENCOUNTER — PRE-ADMISSION TESTING (OUTPATIENT)
Dept: ADMISSIONS | Facility: MEDICAL CENTER | Age: 88
End: 2023-02-10
Attending: ORTHOPAEDIC SURGERY
Payer: MEDICARE

## 2023-02-10 DIAGNOSIS — M16.12 PRIMARY OSTEOARTHRITIS OF LEFT HIP: ICD-10-CM

## 2023-02-10 DIAGNOSIS — Z01.812 PRE-OPERATIVE LABORATORY EXAMINATION: ICD-10-CM

## 2023-02-10 DIAGNOSIS — Z01.810 PRE-OPERATIVE CARDIOVASCULAR EXAMINATION: ICD-10-CM

## 2023-02-10 LAB
ANION GAP SERPL CALC-SCNC: 14 MMOL/L (ref 7–16)
BUN SERPL-MCNC: 24 MG/DL (ref 8–22)
CALCIUM SERPL-MCNC: 8.9 MG/DL (ref 8.4–10.2)
CHLORIDE SERPL-SCNC: 102 MMOL/L (ref 96–112)
CO2 SERPL-SCNC: 20 MMOL/L (ref 20–33)
CREAT SERPL-MCNC: 0.86 MG/DL (ref 0.5–1.4)
EKG IMPRESSION: NORMAL
ERYTHROCYTE [DISTWIDTH] IN BLOOD BY AUTOMATED COUNT: 50.5 FL (ref 35.9–50)
GFR SERPLBLD CREATININE-BSD FMLA CKD-EPI: 65 ML/MIN/1.73 M 2
GLUCOSE SERPL-MCNC: 66 MG/DL (ref 65–99)
HCT VFR BLD AUTO: 36.8 % (ref 37–47)
HGB BLD-MCNC: 12 G/DL (ref 12–16)
MCH RBC QN AUTO: 32.8 PG (ref 27–33)
MCHC RBC AUTO-ENTMCNC: 32.6 G/DL (ref 33.6–35)
MCV RBC AUTO: 100.5 FL (ref 81.4–97.8)
PLATELET # BLD AUTO: 273 K/UL (ref 164–446)
PMV BLD AUTO: 9.7 FL (ref 9–12.9)
POTASSIUM SERPL-SCNC: 4.3 MMOL/L (ref 3.6–5.5)
RBC # BLD AUTO: 3.66 M/UL (ref 4.2–5.4)
SCCMEC + MECA PNL NOSE NAA+PROBE: NEGATIVE
SCCMEC + MECA PNL NOSE NAA+PROBE: NEGATIVE
SODIUM SERPL-SCNC: 136 MMOL/L (ref 135–145)
WBC # BLD AUTO: 6.4 K/UL (ref 4.8–10.8)

## 2023-02-10 PROCEDURE — 80048 BASIC METABOLIC PNL TOTAL CA: CPT

## 2023-02-10 PROCEDURE — 93005 ELECTROCARDIOGRAM TRACING: CPT

## 2023-02-10 PROCEDURE — 36415 COLL VENOUS BLD VENIPUNCTURE: CPT

## 2023-02-10 PROCEDURE — 93010 ELECTROCARDIOGRAM REPORT: CPT | Performed by: INTERNAL MEDICINE

## 2023-02-10 PROCEDURE — 87641 MR-STAPH DNA AMP PROBE: CPT

## 2023-02-10 PROCEDURE — 87640 STAPH A DNA AMP PROBE: CPT | Mod: XU

## 2023-02-10 PROCEDURE — 85027 COMPLETE CBC AUTOMATED: CPT

## 2023-02-10 ASSESSMENT — FIBROSIS 4 INDEX: FIB4 SCORE: 1.51

## 2023-02-10 NOTE — DISCHARGE PLANNING
DISCHARGE PLANNING NOTE - TOTAL JOINT    Procedure: Procedure(s):  LEFT TOTAL HIP ARTHROPLASTY, AND REMOVAL OF SCREWS FROM TOP OF FEMUR PLATE  REMOVAL, HARDWARE  Procedure Date: 2/21/2023  Insurance: Payor: [facility] SENIOR CARE PLUS / Plan: [facility] PREFERRED  / Product Type: Medicare Advantage /    Equipment currently available at home?  front-wheel walker, riser,shower chair  Steps into the home? ramps  Steps within the home? 0  Toilet height?  both  Type of shower? walk-in shower  Who will be with you during your recovery? spouse  Is Outpatient Physical Therapy set up after surgery? No   Did you take the Total Joint Class and where? Yes  Planning same day discharge? Unsure.    This writer met with pt during her preadmission appt and educated to preop showers, nasal mrsa screen and potential for overnight stay.Pt educated to dc criteria. All questions answered and verbalizes understanding of all instructions. No dc needs identified at this time. Anticipate dc to home without barriers.

## 2023-02-10 NOTE — PREPROCEDURE INSTRUCTIONS
Pre admit appointment completed. History and medications reviewed. Pre-op instructions given, hand outs reviewed and questions answered. Pre admit video emailed.    Anesthesia fasting guidelines reviewed. Patient instructed to continue regularly prescribed medications through the day before surgery. Per anesthesia protocol, patient instructed to take these medications with a sip of water the day of surgery: fluoxetine, metoprolol, synthroid.    Hx sent to Dr. Blanco.

## 2023-02-11 NOTE — OR NURSING
Given this patient's advance age, comorbidities which include carotid, stenosis, and prolonged QTc on her ECG, it would be best for her to get a medical clearance prior to the surgery. If not or unable to, the assigned anesthesiologist will determine whether to proceed or not on DOS. Thank you.  Jeanne Blanco M.D.  [email or web address]    The above Dr. Blanco's request case messaged to Dr. Chinchilla's office to Rashad

## 2023-02-20 ENCOUNTER — ANESTHESIA EVENT (OUTPATIENT)
Dept: SURGERY | Facility: MEDICAL CENTER | Age: 88
End: 2023-02-20
Payer: MEDICARE

## 2023-02-21 ENCOUNTER — HOSPITAL ENCOUNTER (OUTPATIENT)
Facility: MEDICAL CENTER | Age: 88
End: 2023-02-23
Attending: ORTHOPAEDIC SURGERY | Admitting: ORTHOPAEDIC SURGERY
Payer: MEDICARE

## 2023-02-21 ENCOUNTER — ANESTHESIA (OUTPATIENT)
Dept: SURGERY | Facility: MEDICAL CENTER | Age: 88
End: 2023-02-21
Payer: MEDICARE

## 2023-02-21 ENCOUNTER — APPOINTMENT (OUTPATIENT)
Dept: RADIOLOGY | Facility: MEDICAL CENTER | Age: 88
End: 2023-02-21
Payer: MEDICARE

## 2023-02-21 DIAGNOSIS — Z96.642 STATUS POST LEFT HIP REPLACEMENT: ICD-10-CM

## 2023-02-21 DIAGNOSIS — M16.12 PRIMARY OSTEOARTHRITIS OF LEFT HIP: ICD-10-CM

## 2023-02-21 DIAGNOSIS — Z96.9 RETAINED ORTHOPEDIC HARDWARE: ICD-10-CM

## 2023-02-21 LAB
ABO GROUP BLD: NORMAL
BLD GP AB SCN SERPL QL: NORMAL
RH BLD: NORMAL

## 2023-02-21 PROCEDURE — 160009 HCHG ANES TIME/MIN: Performed by: ORTHOPAEDIC SURGERY

## 2023-02-21 PROCEDURE — 86850 RBC ANTIBODY SCREEN: CPT

## 2023-02-21 PROCEDURE — 700102 HCHG RX REV CODE 250 W/ 637 OVERRIDE(OP): Performed by: STUDENT IN AN ORGANIZED HEALTH CARE EDUCATION/TRAINING PROGRAM

## 2023-02-21 PROCEDURE — 700101 HCHG RX REV CODE 250: Performed by: STUDENT IN AN ORGANIZED HEALTH CARE EDUCATION/TRAINING PROGRAM

## 2023-02-21 PROCEDURE — 502000 HCHG MISC OR IMPLANTS RC 0278: Performed by: ORTHOPAEDIC SURGERY

## 2023-02-21 PROCEDURE — 700101 HCHG RX REV CODE 250: Performed by: ORTHOPAEDIC SURGERY

## 2023-02-21 PROCEDURE — 01214 ANES OPEN PX TOT HIP ARTHRP: CPT | Performed by: STUDENT IN AN ORGANIZED HEALTH CARE EDUCATION/TRAINING PROGRAM

## 2023-02-21 PROCEDURE — 20680 REMOVAL OF IMPLANT DEEP: CPT | Mod: 59,LT | Performed by: ORTHOPAEDIC SURGERY

## 2023-02-21 PROCEDURE — 27130 TOTAL HIP ARTHROPLASTY: CPT | Mod: LT | Performed by: ORTHOPAEDIC SURGERY

## 2023-02-21 PROCEDURE — C1776 JOINT DEVICE (IMPLANTABLE): HCPCS | Performed by: ORTHOPAEDIC SURGERY

## 2023-02-21 PROCEDURE — 99100 ANES PT EXTEME AGE<1 YR&>70: CPT | Performed by: STUDENT IN AN ORGANIZED HEALTH CARE EDUCATION/TRAINING PROGRAM

## 2023-02-21 PROCEDURE — 27130 TOTAL HIP ARTHROPLASTY: CPT | Mod: ASROC,LT

## 2023-02-21 PROCEDURE — 160035 HCHG PACU - 1ST 60 MINS PHASE I: Performed by: ORTHOPAEDIC SURGERY

## 2023-02-21 PROCEDURE — 72170 X-RAY EXAM OF PELVIS: CPT

## 2023-02-21 PROCEDURE — 700111 HCHG RX REV CODE 636 W/ 250 OVERRIDE (IP): Performed by: ORTHOPAEDIC SURGERY

## 2023-02-21 PROCEDURE — 160036 HCHG PACU - EA ADDL 30 MINS PHASE I: Performed by: ORTHOPAEDIC SURGERY

## 2023-02-21 PROCEDURE — 700111 HCHG RX REV CODE 636 W/ 250 OVERRIDE (IP): Performed by: STUDENT IN AN ORGANIZED HEALTH CARE EDUCATION/TRAINING PROGRAM

## 2023-02-21 PROCEDURE — 700105 HCHG RX REV CODE 258: Performed by: ORTHOPAEDIC SURGERY

## 2023-02-21 PROCEDURE — 700111 HCHG RX REV CODE 636 W/ 250 OVERRIDE (IP)

## 2023-02-21 PROCEDURE — 20680 REMOVAL OF IMPLANT DEEP: CPT | Mod: ASROC,59,LT

## 2023-02-21 PROCEDURE — A9270 NON-COVERED ITEM OR SERVICE: HCPCS | Performed by: STUDENT IN AN ORGANIZED HEALTH CARE EDUCATION/TRAINING PROGRAM

## 2023-02-21 PROCEDURE — C1713 ANCHOR/SCREW BN/BN,TIS/BN: HCPCS | Performed by: ORTHOPAEDIC SURGERY

## 2023-02-21 PROCEDURE — 700105 HCHG RX REV CODE 258: Performed by: STUDENT IN AN ORGANIZED HEALTH CARE EDUCATION/TRAINING PROGRAM

## 2023-02-21 PROCEDURE — 160042 HCHG SURGERY MINUTES - EA ADDL 1 MIN LEVEL 5: Performed by: ORTHOPAEDIC SURGERY

## 2023-02-21 PROCEDURE — A9270 NON-COVERED ITEM OR SERVICE: HCPCS

## 2023-02-21 PROCEDURE — 94760 N-INVAS EAR/PLS OXIMETRY 1: CPT

## 2023-02-21 PROCEDURE — 160002 HCHG RECOVERY MINUTES (STAT): Performed by: ORTHOPAEDIC SURGERY

## 2023-02-21 PROCEDURE — G0378 HOSPITAL OBSERVATION PER HR: HCPCS

## 2023-02-21 PROCEDURE — 700102 HCHG RX REV CODE 250 W/ 637 OVERRIDE(OP)

## 2023-02-21 PROCEDURE — 86901 BLOOD TYPING SEROLOGIC RH(D): CPT

## 2023-02-21 PROCEDURE — 160048 HCHG OR STATISTICAL LEVEL 1-5: Performed by: ORTHOPAEDIC SURGERY

## 2023-02-21 PROCEDURE — 86900 BLOOD TYPING SEROLOGIC ABO: CPT

## 2023-02-21 PROCEDURE — 160031 HCHG SURGERY MINUTES - 1ST 30 MINS LEVEL 5: Performed by: ORTHOPAEDIC SURGERY

## 2023-02-21 PROCEDURE — 700101 HCHG RX REV CODE 250

## 2023-02-21 DEVICE — SCREW BONE 6.5 X 20MM TRIDENT LP HEX (1EA): Type: IMPLANTABLE DEVICE | Site: HIP | Status: FUNCTIONAL

## 2023-02-21 DEVICE — IMPLANTABLE DEVICE: Type: IMPLANTABLE DEVICE | Site: HIP | Status: FUNCTIONAL

## 2023-02-21 RX ORDER — LABETALOL HYDROCHLORIDE 5 MG/ML
5 INJECTION, SOLUTION INTRAVENOUS
Status: DISCONTINUED | OUTPATIENT
Start: 2023-02-21 | End: 2023-02-21 | Stop reason: HOSPADM

## 2023-02-21 RX ORDER — OXYCODONE HCL 5 MG/5 ML
10 SOLUTION, ORAL ORAL
Status: COMPLETED | OUTPATIENT
Start: 2023-02-21 | End: 2023-02-21

## 2023-02-21 RX ORDER — HYDRALAZINE HYDROCHLORIDE 20 MG/ML
5 INJECTION INTRAMUSCULAR; INTRAVENOUS
Status: DISCONTINUED | OUTPATIENT
Start: 2023-02-21 | End: 2023-02-21 | Stop reason: HOSPADM

## 2023-02-21 RX ORDER — CYCLOSPORINE 0.5 MG/ML
1 EMULSION OPHTHALMIC 2 TIMES DAILY
Status: DISCONTINUED | OUTPATIENT
Start: 2023-02-22 | End: 2023-02-21

## 2023-02-21 RX ORDER — OXYCODONE HYDROCHLORIDE 10 MG/1
10 TABLET ORAL
Status: DISCONTINUED | OUTPATIENT
Start: 2023-02-21 | End: 2023-02-23 | Stop reason: HOSPADM

## 2023-02-21 RX ORDER — SCOLOPAMINE TRANSDERMAL SYSTEM 1 MG/1
1 PATCH, EXTENDED RELEASE TRANSDERMAL
Status: DISCONTINUED | OUTPATIENT
Start: 2023-02-21 | End: 2023-02-23 | Stop reason: HOSPADM

## 2023-02-21 RX ORDER — SODIUM CHLORIDE 9 MG/ML
INJECTION, SOLUTION INTRAMUSCULAR; INTRAVENOUS; SUBCUTANEOUS
Status: DISCONTINUED | OUTPATIENT
Start: 2023-02-21 | End: 2023-02-21 | Stop reason: HOSPADM

## 2023-02-21 RX ORDER — DOCUSATE SODIUM 100 MG/1
100 CAPSULE, LIQUID FILLED ORAL 2 TIMES DAILY
Status: DISCONTINUED | OUTPATIENT
Start: 2023-02-21 | End: 2023-02-23 | Stop reason: HOSPADM

## 2023-02-21 RX ORDER — HYDROMORPHONE HYDROCHLORIDE 1 MG/ML
0.5 INJECTION, SOLUTION INTRAMUSCULAR; INTRAVENOUS; SUBCUTANEOUS
Status: DISCONTINUED | OUTPATIENT
Start: 2023-02-21 | End: 2023-02-23 | Stop reason: HOSPADM

## 2023-02-21 RX ORDER — CEFAZOLIN SODIUM 1 G/3ML
2 INJECTION, POWDER, FOR SOLUTION INTRAMUSCULAR; INTRAVENOUS ONCE
Status: DISCONTINUED | OUTPATIENT
Start: 2023-02-21 | End: 2023-02-21 | Stop reason: HOSPADM

## 2023-02-21 RX ORDER — FLUOXETINE HYDROCHLORIDE 20 MG/1
20 CAPSULE ORAL EVERY MORNING
Status: DISCONTINUED | OUTPATIENT
Start: 2023-02-22 | End: 2023-02-23 | Stop reason: HOSPADM

## 2023-02-21 RX ORDER — HYDROMORPHONE HYDROCHLORIDE 1 MG/ML
0.2 INJECTION, SOLUTION INTRAMUSCULAR; INTRAVENOUS; SUBCUTANEOUS
Status: DISCONTINUED | OUTPATIENT
Start: 2023-02-21 | End: 2023-02-21 | Stop reason: HOSPADM

## 2023-02-21 RX ORDER — ENEMA 19; 7 G/133ML; G/133ML
1 ENEMA RECTAL
Status: DISCONTINUED | OUTPATIENT
Start: 2023-02-21 | End: 2023-02-23 | Stop reason: HOSPADM

## 2023-02-21 RX ORDER — ACETAMINOPHEN 500 MG
1000 TABLET ORAL EVERY 6 HOURS PRN
Status: DISCONTINUED | OUTPATIENT
Start: 2023-02-26 | End: 2023-02-23 | Stop reason: HOSPADM

## 2023-02-21 RX ORDER — HALOPERIDOL 5 MG/ML
1 INJECTION INTRAMUSCULAR EVERY 6 HOURS PRN
Status: DISCONTINUED | OUTPATIENT
Start: 2023-02-21 | End: 2023-02-23 | Stop reason: HOSPADM

## 2023-02-21 RX ORDER — ACETAMINOPHEN 500 MG
1000 TABLET ORAL EVERY 6 HOURS
Status: DISCONTINUED | OUTPATIENT
Start: 2023-02-21 | End: 2023-02-23 | Stop reason: HOSPADM

## 2023-02-21 RX ORDER — ONDANSETRON 2 MG/ML
INJECTION INTRAMUSCULAR; INTRAVENOUS PRN
Status: DISCONTINUED | OUTPATIENT
Start: 2023-02-21 | End: 2023-02-21 | Stop reason: SURG

## 2023-02-21 RX ORDER — TRAMADOL HYDROCHLORIDE 50 MG/1
50 TABLET ORAL EVERY 4 HOURS PRN
Status: DISCONTINUED | OUTPATIENT
Start: 2023-02-21 | End: 2023-02-23 | Stop reason: HOSPADM

## 2023-02-21 RX ORDER — KETOROLAC TROMETHAMINE 30 MG/ML
15 INJECTION, SOLUTION INTRAMUSCULAR; INTRAVENOUS EVERY 6 HOURS
Status: DISCONTINUED | OUTPATIENT
Start: 2023-02-21 | End: 2023-02-23 | Stop reason: HOSPADM

## 2023-02-21 RX ORDER — SODIUM CHLORIDE, SODIUM LACTATE, POTASSIUM CHLORIDE, CALCIUM CHLORIDE 600; 310; 30; 20 MG/100ML; MG/100ML; MG/100ML; MG/100ML
INJECTION, SOLUTION INTRAVENOUS CONTINUOUS
Status: ACTIVE | OUTPATIENT
Start: 2023-02-21 | End: 2023-02-21

## 2023-02-21 RX ORDER — HYDROMORPHONE HYDROCHLORIDE 2 MG/ML
INJECTION, SOLUTION INTRAMUSCULAR; INTRAVENOUS; SUBCUTANEOUS PRN
Status: DISCONTINUED | OUTPATIENT
Start: 2023-02-21 | End: 2023-02-21 | Stop reason: SURG

## 2023-02-21 RX ORDER — AMOXICILLIN 250 MG
1 CAPSULE ORAL NIGHTLY
Status: DISCONTINUED | OUTPATIENT
Start: 2023-02-21 | End: 2023-02-23 | Stop reason: HOSPADM

## 2023-02-21 RX ORDER — OXYCODONE HCL 5 MG/5 ML
5 SOLUTION, ORAL ORAL
Status: COMPLETED | OUTPATIENT
Start: 2023-02-21 | End: 2023-02-21

## 2023-02-21 RX ORDER — OXYCODONE HYDROCHLORIDE 5 MG/1
5 TABLET ORAL
Status: DISCONTINUED | OUTPATIENT
Start: 2023-02-21 | End: 2023-02-23 | Stop reason: HOSPADM

## 2023-02-21 RX ORDER — TRAZODONE HYDROCHLORIDE 50 MG/1
25 TABLET ORAL
Status: DISCONTINUED | OUTPATIENT
Start: 2023-02-21 | End: 2023-02-23 | Stop reason: HOSPADM

## 2023-02-21 RX ORDER — CARBOXYMETHYLCELLULOSE SODIUM 5 MG/ML
1 SOLUTION/ DROPS OPHTHALMIC EVERY 4 HOURS PRN
Status: DISCONTINUED | OUTPATIENT
Start: 2023-02-21 | End: 2023-02-23 | Stop reason: HOSPADM

## 2023-02-21 RX ORDER — ROSUVASTATIN CALCIUM 10 MG/1
10 TABLET, COATED ORAL EVERY EVENING
Status: DISCONTINUED | OUTPATIENT
Start: 2023-02-21 | End: 2023-02-23 | Stop reason: HOSPADM

## 2023-02-21 RX ORDER — IPRATROPIUM BROMIDE 21 UG/1
1 SPRAY, METERED NASAL 2 TIMES DAILY
Status: DISCONTINUED | OUTPATIENT
Start: 2023-02-21 | End: 2023-02-23 | Stop reason: HOSPADM

## 2023-02-21 RX ORDER — ONDANSETRON 2 MG/ML
4 INJECTION INTRAMUSCULAR; INTRAVENOUS
Status: DISCONTINUED | OUTPATIENT
Start: 2023-02-21 | End: 2023-02-21 | Stop reason: HOSPADM

## 2023-02-21 RX ORDER — SODIUM CHLORIDE, SODIUM LACTATE, POTASSIUM CHLORIDE, CALCIUM CHLORIDE 600; 310; 30; 20 MG/100ML; MG/100ML; MG/100ML; MG/100ML
INJECTION, SOLUTION INTRAVENOUS CONTINUOUS
Status: DISCONTINUED | OUTPATIENT
Start: 2023-02-21 | End: 2023-02-21 | Stop reason: HOSPADM

## 2023-02-21 RX ORDER — DEXAMETHASONE SODIUM PHOSPHATE 4 MG/ML
4 INJECTION, SOLUTION INTRA-ARTICULAR; INTRALESIONAL; INTRAMUSCULAR; INTRAVENOUS; SOFT TISSUE
Status: DISCONTINUED | OUTPATIENT
Start: 2023-02-21 | End: 2023-02-23 | Stop reason: HOSPADM

## 2023-02-21 RX ORDER — HYDROMORPHONE HYDROCHLORIDE 1 MG/ML
0.1 INJECTION, SOLUTION INTRAMUSCULAR; INTRAVENOUS; SUBCUTANEOUS
Status: DISCONTINUED | OUTPATIENT
Start: 2023-02-21 | End: 2023-02-21 | Stop reason: HOSPADM

## 2023-02-21 RX ORDER — BUPIVACAINE HYDROCHLORIDE AND EPINEPHRINE 2.5; 5 MG/ML; UG/ML
INJECTION, SOLUTION EPIDURAL; INFILTRATION; INTRACAUDAL; PERINEURAL
Status: DISCONTINUED | OUTPATIENT
Start: 2023-02-21 | End: 2023-02-21 | Stop reason: HOSPADM

## 2023-02-21 RX ORDER — ONDANSETRON 2 MG/ML
4 INJECTION INTRAMUSCULAR; INTRAVENOUS EVERY 4 HOURS PRN
Status: DISCONTINUED | OUTPATIENT
Start: 2023-02-21 | End: 2023-02-23 | Stop reason: HOSPADM

## 2023-02-21 RX ORDER — METOPROLOL SUCCINATE 25 MG/1
12.5 TABLET, EXTENDED RELEASE ORAL DAILY
Status: DISCONTINUED | OUTPATIENT
Start: 2023-02-22 | End: 2023-02-23 | Stop reason: HOSPADM

## 2023-02-21 RX ORDER — LIDOCAINE HYDROCHLORIDE 20 MG/ML
INJECTION, SOLUTION EPIDURAL; INFILTRATION; INTRACAUDAL; PERINEURAL PRN
Status: DISCONTINUED | OUTPATIENT
Start: 2023-02-21 | End: 2023-02-21 | Stop reason: SURG

## 2023-02-21 RX ORDER — LEVOTHYROXINE SODIUM 112 UG/1
112 TABLET ORAL
Status: DISCONTINUED | OUTPATIENT
Start: 2023-02-22 | End: 2023-02-23 | Stop reason: HOSPADM

## 2023-02-21 RX ORDER — SPIRONOLACTONE 25 MG/1
25 TABLET ORAL EVERY EVENING
Status: DISCONTINUED | OUTPATIENT
Start: 2023-02-21 | End: 2023-02-23 | Stop reason: HOSPADM

## 2023-02-21 RX ORDER — POTASSIUM CHLORIDE 20 MEQ/1
10 TABLET, EXTENDED RELEASE ORAL DAILY
Status: DISCONTINUED | OUTPATIENT
Start: 2023-02-22 | End: 2023-02-23 | Stop reason: HOSPADM

## 2023-02-21 RX ORDER — CEFAZOLIN SODIUM 1 G/3ML
INJECTION, POWDER, FOR SOLUTION INTRAMUSCULAR; INTRAVENOUS PRN
Status: DISCONTINUED | OUTPATIENT
Start: 2023-02-21 | End: 2023-02-21 | Stop reason: SURG

## 2023-02-21 RX ORDER — ROCURONIUM BROMIDE 10 MG/ML
INJECTION, SOLUTION INTRAVENOUS PRN
Status: DISCONTINUED | OUTPATIENT
Start: 2023-02-21 | End: 2023-02-21 | Stop reason: SURG

## 2023-02-21 RX ORDER — FUROSEMIDE 20 MG/1
20 TABLET ORAL
Status: DISCONTINUED | OUTPATIENT
Start: 2023-02-21 | End: 2023-02-23 | Stop reason: HOSPADM

## 2023-02-21 RX ORDER — DEXAMETHASONE SODIUM PHOSPHATE 4 MG/ML
4 INJECTION, SOLUTION INTRA-ARTICULAR; INTRALESIONAL; INTRAMUSCULAR; INTRAVENOUS; SOFT TISSUE ONCE
Status: COMPLETED | OUTPATIENT
Start: 2023-02-22 | End: 2023-02-22

## 2023-02-21 RX ORDER — CHOLESTYRAMINE LIGHT 4 G/5.7G
1 POWDER, FOR SUSPENSION ORAL DAILY
Status: DISCONTINUED | OUTPATIENT
Start: 2023-02-22 | End: 2023-02-23 | Stop reason: HOSPADM

## 2023-02-21 RX ORDER — CHOLESTYRAMINE LIGHT 4 G/5.7G
1 POWDER, FOR SUSPENSION ORAL DAILY
Status: DISCONTINUED | OUTPATIENT
Start: 2023-02-22 | End: 2023-02-21

## 2023-02-21 RX ORDER — SODIUM CHLORIDE, SODIUM LACTATE, POTASSIUM CHLORIDE, CALCIUM CHLORIDE 600; 310; 30; 20 MG/100ML; MG/100ML; MG/100ML; MG/100ML
INJECTION, SOLUTION INTRAVENOUS
Status: DISCONTINUED | OUTPATIENT
Start: 2023-02-21 | End: 2023-02-21 | Stop reason: SURG

## 2023-02-21 RX ORDER — AMOXICILLIN 250 MG
1 CAPSULE ORAL
Status: DISCONTINUED | OUTPATIENT
Start: 2023-02-21 | End: 2023-02-23 | Stop reason: HOSPADM

## 2023-02-21 RX ORDER — POLYETHYLENE GLYCOL 3350 17 G/17G
1 POWDER, FOR SOLUTION ORAL 2 TIMES DAILY PRN
Status: DISCONTINUED | OUTPATIENT
Start: 2023-02-21 | End: 2023-02-23 | Stop reason: HOSPADM

## 2023-02-21 RX ORDER — IBUPROFEN 400 MG/1
800 TABLET ORAL 3 TIMES DAILY PRN
Status: DISCONTINUED | OUTPATIENT
Start: 2023-02-24 | End: 2023-02-23 | Stop reason: HOSPADM

## 2023-02-21 RX ORDER — VANCOMYCIN HYDROCHLORIDE 1 G/20ML
INJECTION, POWDER, LYOPHILIZED, FOR SOLUTION INTRAVENOUS
Status: COMPLETED | OUTPATIENT
Start: 2023-02-21 | End: 2023-02-21

## 2023-02-21 RX ORDER — HYDROMORPHONE HYDROCHLORIDE 1 MG/ML
0.4 INJECTION, SOLUTION INTRAMUSCULAR; INTRAVENOUS; SUBCUTANEOUS
Status: DISCONTINUED | OUTPATIENT
Start: 2023-02-21 | End: 2023-02-21 | Stop reason: HOSPADM

## 2023-02-21 RX ORDER — HYDROXYCHLOROQUINE SULFATE 200 MG/1
200 TABLET, FILM COATED ORAL EVERY EVENING
Status: DISCONTINUED | OUTPATIENT
Start: 2023-02-21 | End: 2023-02-23 | Stop reason: HOSPADM

## 2023-02-21 RX ORDER — DEXAMETHASONE SODIUM PHOSPHATE 4 MG/ML
INJECTION, SOLUTION INTRA-ARTICULAR; INTRALESIONAL; INTRAMUSCULAR; INTRAVENOUS; SOFT TISSUE PRN
Status: DISCONTINUED | OUTPATIENT
Start: 2023-02-21 | End: 2023-02-21 | Stop reason: SURG

## 2023-02-21 RX ORDER — DIPHENHYDRAMINE HYDROCHLORIDE 50 MG/ML
25 INJECTION INTRAMUSCULAR; INTRAVENOUS EVERY 6 HOURS PRN
Status: DISCONTINUED | OUTPATIENT
Start: 2023-02-21 | End: 2023-02-23 | Stop reason: HOSPADM

## 2023-02-21 RX ORDER — BISACODYL 10 MG
10 SUPPOSITORY, RECTAL RECTAL
Status: DISCONTINUED | OUTPATIENT
Start: 2023-02-21 | End: 2023-02-23 | Stop reason: HOSPADM

## 2023-02-21 RX ADMIN — CEFAZOLIN 2 G: 330 INJECTION, POWDER, FOR SOLUTION INTRAMUSCULAR; INTRAVENOUS at 11:37

## 2023-02-21 RX ADMIN — FENTANYL CITRATE 50 MCG: 50 INJECTION, SOLUTION INTRAMUSCULAR; INTRAVENOUS at 14:33

## 2023-02-21 RX ADMIN — PROPOFOL 100 MG: 10 INJECTION, EMULSION INTRAVENOUS at 11:32

## 2023-02-21 RX ADMIN — FENTANYL CITRATE 25 MCG: 50 INJECTION, SOLUTION INTRAMUSCULAR; INTRAVENOUS at 11:32

## 2023-02-21 RX ADMIN — ACETAMINOPHEN 1000 MG: 500 TABLET ORAL at 17:47

## 2023-02-21 RX ADMIN — HYDROMORPHONE HYDROCHLORIDE 0.2 MG: 2 INJECTION INTRAMUSCULAR; INTRAVENOUS; SUBCUTANEOUS at 11:52

## 2023-02-21 RX ADMIN — ONDANSETRON 4 MG: 2 INJECTION INTRAMUSCULAR; INTRAVENOUS at 13:00

## 2023-02-21 RX ADMIN — FENTANYL CITRATE 75 MCG: 50 INJECTION, SOLUTION INTRAMUSCULAR; INTRAVENOUS at 11:20

## 2023-02-21 RX ADMIN — SODIUM CHLORIDE, POTASSIUM CHLORIDE, SODIUM LACTATE AND CALCIUM CHLORIDE: 600; 310; 30; 20 INJECTION, SOLUTION INTRAVENOUS at 11:16

## 2023-02-21 RX ADMIN — IPRATROPIUM BROMIDE 1 SPRAY: 21 SPRAY NASAL at 18:17

## 2023-02-21 RX ADMIN — SODIUM CHLORIDE, POTASSIUM CHLORIDE, SODIUM LACTATE AND CALCIUM CHLORIDE: 600; 310; 30; 20 INJECTION, SOLUTION INTRAVENOUS at 09:45

## 2023-02-21 RX ADMIN — HYDROMORPHONE HYDROCHLORIDE 0.1 MG: 2 INJECTION INTRAMUSCULAR; INTRAVENOUS; SUBCUTANEOUS at 12:28

## 2023-02-21 RX ADMIN — KETOROLAC TROMETHAMINE 15 MG: 30 INJECTION, SOLUTION INTRAMUSCULAR; INTRAVENOUS at 23:13

## 2023-02-21 RX ADMIN — LIDOCAINE HYDROCHLORIDE 70 MG: 20 INJECTION, SOLUTION EPIDURAL; INFILTRATION; INTRACAUDAL at 11:20

## 2023-02-21 RX ADMIN — ROCURONIUM BROMIDE 10 MG: 10 INJECTION, SOLUTION INTRAVENOUS at 12:43

## 2023-02-21 RX ADMIN — HYDROMORPHONE HYDROCHLORIDE 0.1 MG: 2 INJECTION INTRAMUSCULAR; INTRAVENOUS; SUBCUTANEOUS at 13:28

## 2023-02-21 RX ADMIN — OXYCODONE HYDROCHLORIDE 10 MG: 5 SOLUTION ORAL at 15:48

## 2023-02-21 RX ADMIN — SENNOSIDES AND DOCUSATE SODIUM 1 TABLET: 50; 8.6 TABLET ORAL at 20:58

## 2023-02-21 RX ADMIN — ROCURONIUM BROMIDE 30 MG: 10 INJECTION, SOLUTION INTRAVENOUS at 11:32

## 2023-02-21 RX ADMIN — DOCUSATE SODIUM 100 MG: 100 CAPSULE, LIQUID FILLED ORAL at 17:47

## 2023-02-21 RX ADMIN — HYDROXYCHLOROQUINE SULFATE 200 MG: 200 TABLET, FILM COATED ORAL at 17:47

## 2023-02-21 RX ADMIN — HYDROMORPHONE HYDROCHLORIDE 0.2 MG: 2 INJECTION INTRAMUSCULAR; INTRAVENOUS; SUBCUTANEOUS at 12:00

## 2023-02-21 RX ADMIN — FENTANYL CITRATE 50 MCG: 50 INJECTION, SOLUTION INTRAMUSCULAR; INTRAVENOUS at 15:46

## 2023-02-21 RX ADMIN — TRAZODONE HYDROCHLORIDE 25 MG: 50 TABLET ORAL at 20:58

## 2023-02-21 RX ADMIN — SUGAMMADEX 200 MG: 100 INJECTION, SOLUTION INTRAVENOUS at 13:00

## 2023-02-21 RX ADMIN — PROPOFOL 50 MG: 10 INJECTION, EMULSION INTRAVENOUS at 11:20

## 2023-02-21 RX ADMIN — FENTANYL CITRATE 50 MCG: 50 INJECTION, SOLUTION INTRAMUSCULAR; INTRAVENOUS at 15:25

## 2023-02-21 RX ADMIN — DEXAMETHASONE SODIUM PHOSPHATE 4 MG: 4 INJECTION, SOLUTION INTRA-ARTICULAR; INTRALESIONAL; INTRAMUSCULAR; INTRAVENOUS; SOFT TISSUE at 11:44

## 2023-02-21 RX ADMIN — ROSUVASTATIN 10 MG: 10 TABLET, FILM COATED ORAL at 17:48

## 2023-02-21 RX ADMIN — ROCURONIUM BROMIDE 10 MG: 10 INJECTION, SOLUTION INTRAVENOUS at 12:12

## 2023-02-21 RX ADMIN — ACETAMINOPHEN 1000 MG: 500 TABLET ORAL at 23:13

## 2023-02-21 RX ADMIN — KETOROLAC TROMETHAMINE 15 MG: 30 INJECTION, SOLUTION INTRAMUSCULAR; INTRAVENOUS at 17:48

## 2023-02-21 ASSESSMENT — LIFESTYLE VARIABLES
AVERAGE NUMBER OF DAYS PER WEEK YOU HAVE A DRINK CONTAINING ALCOHOL: 5
EVER FELT BAD OR GUILTY ABOUT YOUR DRINKING: NO
CONSUMPTION TOTAL: NEGATIVE
HOW MANY TIMES IN THE PAST YEAR HAVE YOU HAD 5 OR MORE DRINKS IN A DAY: 0
HAVE YOU EVER FELT YOU SHOULD CUT DOWN ON YOUR DRINKING: NO
EVER HAD A DRINK FIRST THING IN THE MORNING TO STEADY YOUR NERVES TO GET RID OF A HANGOVER: NO
TOTAL SCORE: 0
HAVE PEOPLE ANNOYED YOU BY CRITICIZING YOUR DRINKING: NO
TOTAL SCORE: 0
ON A TYPICAL DAY WHEN YOU DRINK ALCOHOL HOW MANY DRINKS DO YOU HAVE: 0
TOTAL SCORE: 0
ALCOHOL_USE: YES

## 2023-02-21 ASSESSMENT — PATIENT HEALTH QUESTIONNAIRE - PHQ9
2. FEELING DOWN, DEPRESSED, IRRITABLE, OR HOPELESS: NOT AT ALL
1. LITTLE INTEREST OR PLEASURE IN DOING THINGS: NOT AT ALL
SUM OF ALL RESPONSES TO PHQ9 QUESTIONS 1 AND 2: 0

## 2023-02-21 ASSESSMENT — COGNITIVE AND FUNCTIONAL STATUS - GENERAL
HELP NEEDED FOR BATHING: A LITTLE
MOVING FROM LYING ON BACK TO SITTING ON SIDE OF FLAT BED: A LITTLE
TURNING FROM BACK TO SIDE WHILE IN FLAT BAD: A LITTLE
SUGGESTED CMS G CODE MODIFIER DAILY ACTIVITY: CK
STANDING UP FROM CHAIR USING ARMS: A LITTLE
MOVING TO AND FROM BED TO CHAIR: A LITTLE
DRESSING REGULAR UPPER BODY CLOTHING: A LITTLE
MOBILITY SCORE: 18
CLIMB 3 TO 5 STEPS WITH RAILING: A LITTLE
DAILY ACTIVITIY SCORE: 19
PERSONAL GROOMING: A LITTLE
TOILETING: A LITTLE
SUGGESTED CMS G CODE MODIFIER MOBILITY: CK
WALKING IN HOSPITAL ROOM: A LITTLE
DRESSING REGULAR LOWER BODY CLOTHING: A LITTLE

## 2023-02-21 ASSESSMENT — PAIN DESCRIPTION - PAIN TYPE
TYPE: SURGICAL PAIN

## 2023-02-21 ASSESSMENT — FIBROSIS 4 INDEX
FIB4 SCORE: 1.56
FIB4 SCORE: 1.56

## 2023-02-21 NOTE — ANESTHESIA PROCEDURE NOTES
Peripheral IV    Date/Time: 2/21/2023 11:25 AM  Performed by: Jacoby Escalante M.D.  Authorized by: Jacoby Escalante M.D.     Size:  20 G  Laterality:  Left  Peripheral IV Location:  AC  Local Anesthetic:  None  Technique:  Ultrasound guided  Attempts:  1

## 2023-02-21 NOTE — OP REPORT
Operative Report    Date: 2/21/2023    Surgeon: Rom Chinchilla M.D.    Assistant: MARKUS Randall    Anesthesiologist: Jacoby Escalante M.D.     Pre-operative Diagnosis: 1.Rheumatoid Arthritisarthritis left hip 2. Retained hardware lef femur shaft    Post-operative Diagnosis: 1. Rheumatoid Arthritis 2.  Retained hardware left femur    Procedure: 1. Left  total hip arthroplasty 2.  Removal of deep hardware, 5 screws, left femoral shaft-separate incision    EBL:100    Implants: De Leon size 54 Trident 2 acetabulum with a neutral 36 mm polyethylene liner, De Leon restoration modular stem with a standard length 18 mm tapered fluted stem, 21 mm +0 body, and 36 mm -2.5 ceramic head    Indications: Patient is an 87 y.o.-year-old female with longstanding pain in the left hip secondary toRheumatoid Arthritis arthritis.  A variety of nonsurgical treatments have been tried that include activity modification, over-the-counter medications, and therapy for strengthening and stretching the muscles.  The pain is now to the point of limiting the activities of daily living, interfering with enjoyable activities, and not responding to nonsurgical methods of treatment.  On physical examination the hip has painful and limited range of motion.   X-ray views of the hip show advanced degenerative arthritis with subchondral sclerosis, peripheral osteophytes, and joint space narrowing.  The patient is therefore a candidate for a left total hip replacement.      Findings: Severe rheumatoid changes with collapse of the femoral head marked synovitis left hip    Procedure in detail: The patient was brought to the operating room and anesthesia was administered.  Antibiotics and tranexamic acid given IV.  Leg lengths were measured in the supine position and the operative leg was approximately 10mmshort.  The patient was then placed in the appropriate lateral decubitus position on a well-padded pegboard..  An axillary roll was placed, and patient  secured with well-padded pegs.  Leg lengths were then measured in this position and a timeout was called.    We started with the incision down on the lateral thigh for screw removal.  We could palpate the top of the plate and just made an incision starting there and going down a couple of inches.  We went sharply through skin and subcutaneous tissue and fascia.  The muscle was split off the plate.  The screws were readily available and we had access to remove the top 5 screws which looked like it would be adequate to allow for the length stem we were using.  Wound was irrigated and closed with interrupted #1 Vicryl.  We used a running 2-0 Monocryl subcutaneously and staples in the skin.  We then turned our attention proximally to proceed with a total hip replacement.    A standard anterolateral incision was made starting at the front of the trochanter and extending up to just distal and lateral to the anterior superior iliac spine.  Dissection was carried sharply through skin and subcutaneous tissues and bleeders stopped with electrocautery.  The fascia was opened just in front of the greater trochanter and then proximally along the anterior margin of the gluteus medius muscle.  The interval between the gluteus medius and tensor fascia was developed.  Vessels were coagulated and the superior gluteal nerve was mobilized proximally and protected.    The capsule was exposed, cleared and a capsulotomy was made.  A subcapital femoral cut was made  in the plane of the acetabular opening.  The proximal femur was then delivered into the wound with extension and external rotation.  A secondary cut at the base of the femoral neck was made using appropriate cutting guide.  The femoral neck segment was removed.    Release of the capsule and obturator internus as needed was made to gain adequate exposure to the proximal femur.  The standard instrumentation was then used to broach the femoral canal.  Care was taken to ensure we  were within the intramedullary canal.  We had exposed a little more aggressively since we knew we are using stem that required direct access to the femoral canal.  We did use the tapered reamers and were able to advance the up to 18 mm with the 155 mm length line at the tip of the trochanter.  Looking at our plate removal incision cartilage there was plenty of overlap.  The real 18 mm stem was placed.  We had excellent press-fit.  We prepared proximally for the 21 mm body.    The acetabulum was then exposed and the femoral head was removed.  The labrum, ligamentum teres, and capsule were excised enough for visualization.  We then reamed down to the inner table, and then outward until we had good hemispherical bleeding bone with a size 54 reamer.  The size54 cup was then placed with excellent press-fit.  2 dome screws were placed with excellent purchase.  Osteophytes were removed as needed to avoid impingement. The liner was then locked into position.    Trialing was then carried out using the 21 mm +0 proximal body.  The  -2.5head had the best length and stability characteristics.  Care was taken to ensure the most stable hip that most closely equalized the patient's leg lengths.    We then assembled the 21 mm +0 body.   After final trialing we selected the -2.5ceramic head to achieve our goals of stability followed by length.  Final reduction was done after all debris had been cleared from the hip joint.  Final stability check was done.    The wound was then soaked with Betadine in patients without Betadine allergy.  We then flushed the wound thoroughly with saline.  The capsule was closed with #1 Vicryl popoffs.  The fascia was then closed with running #2 barbed suture and the hip joint region injected with local anesthetic.  The skin was closed with layers of interrupted 2-0 Monocryl and a running 3-0 Monocryl.  A silver-impregnated  dressing was placed on the lateral thigh incision and a judy dressing placed on  the proximal total hip incision.  Patient was stable during the procedure and at the time of this dictation.      Dispo: PACU - hemodynamically stable.

## 2023-02-21 NOTE — H&P
Surgery Orthopedic History & Physical Note    Date  2023    Primary Care Physician  Xiomara Wheat M.D.    CC  Pre-Op Diagnosis Codes:     * Primary osteoarthritis of left hip [M16.12]     * Retained orthopedic hardware [Z96.9]    HPI  This is a 87 y.o. female who presented with retained orthopedic hardware and osteoarthritis of the left hip. She is here today for orthopedic hardware removal with left total hip arthroplasty.     Past Medical History:   Diagnosis Date    Adverse effect of anesthesia 2020    grandfather unexpectedly  from anesthesia, poor experience in  with last surgery    Amputation of left middle finger     osteomyelitis    Anesthesia     difficulty waking up with hysterectomy and last surgery in , hallucinations    Atherosclerosis of both carotid arteries 2022     Dec. 2021: Mild plaque right internal carotid and moderate plaque left carotid bifurcation    Bowel habit changes     Hx cdiff    C. difficile diarrhea 2016    fecal transplant    Cancer (HCC) 194    Tongue CA (surgically removed)    Chronic back pain     hands, shoulders    CKD (chronic kidney disease) stage 3, GFR 30-59 ml/min ([facility])     Constipation 2020    rarely    Decreased hearing of both ears 2022    Delayed emergence from general anesthesia     Dense breast tissue on mammogram 2019    Dental disorder     lower retainer    Depression     Elbow fracture, left     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 choroidal neovascularization (HCC) 2022    Heart burn     High cholesterol     History of anemia     History of DVT (deep vein thrombosis)     unsure of history    Hypothyroid     MRSA (methicillin resistant Staphylococcus aureus)     RIGHT foot    Multilevel degenerative disc disease     Osteoarthritis     Osteoporosis 2019    DEXA Aug. 2017: T score -3.4 right forearm and -1.6 left hip DEXA Aug. 2019: T score forearm -4.6 and hip  "-1.4; DEXA Nov. 2020: T score right forearm -4.0 and right hip -1.7    Pain     shoulders, feet, back    Peripheral edema     PVC (premature ventricular contraction)     Raynaud's disease     Rheumatoid arthritis (HCC) 09/26/2014    Stroke ([facility]) 1990's?    \"mini\" no residual symptoms    Urinary incontinence     occasional       Past Surgical History:   Procedure Laterality Date    PB DEGROOT W/O FACETEC FORAMOT/DSKC 1/2 VRT SEG, TH* N/A 1/16/2020    Procedure: LAMINECTOMY, SPINE, THORACIC;  Surgeon: Sang Nelson M.D.;  Location: SURGERY [facility];  Service: Neurosurgery    THORACIC FUSION O-ARM N/A 1/16/2020    Procedure: FUSION, SPINE, THORACIC, POSTERIOR APPROACH - T9-L3;  Surgeon: Sang Nelson M.D.;  Location: SURGERY [facility];  Service: Neurosurgery    PB RECONSTR TOTAL SHOULDER IMPLANT Left 4/30/2019    Procedure: ARTHROPLASTY, SHOULDER, TOTAL - REVERSE;  Surgeon: Daniella Camargo M.D.;  Location: SURGERY [facility];  Service: Orthopedics    SHOULDER ARTHROPLASTY TOTAL Right 1/9/2018    Procedure: SHOULDER ARTHROPLASTY TOTAL - REVERSE;  Surgeon: Daniella Camargo M.D.;  Location: SURGERY [facility];  Service: Orthopedics    COLONOSCOPY - ENDO N/A 3/7/2016    Procedure: COLONOSCOPY - ENDO;  Surgeon: Estiven Ayers M.D.;  Location: ENDOSCOPY [facility];  Service:     FECAL TRANSPLANT N/A 3/7/2016    Procedure: FECAL TRANSPLANT;  Surgeon: Estiven Ayers M.D.;  Location: ENDOSCOPY [facility];  Service:     OTHER ORTHOPEDIC SURGERY Left 2012    Left Elbow fx repair    BLEPHAROPLASTY  3/31/2011    Performed by ORACIO DIAZ at SUR[facility]ERY [facility]    FOOT SURGERY Right 2010    OTHER ORTHOPEDIC SURGERY Left 2006    finger- removal of digit Left middle finger    CHOLECYSTECTOMY  2000    HYSTERECTOMY LAPAROSCOPY  2000    HYSTERECTOMY RADICAL  1985    OTHER Bilateral 2010, 2008    feet- repair torn tendons    OTHER ORTHOPEDIC SURGERY Left 1970s    femur fx       No current " facility-administered medications for this encounter.     Current Outpatient Medications   Medication Sig Dispense Refill    potassium chloride SA (K-DUR) 10 MEQ Tab CR potassium chloride ER 10 mEq tablet,extended release(part/cryst)      rosuvastatin (CRESTOR) 5 MG Tab rosuvastatin 5 mg tablet      oxyCODONE immediate-release (ROXICODONE) 5 MG Tab Take 1-2 Tablets by mouth every four hours as needed for Severe Pain for up to 7 days. 42 Tablet 0    traMADol (ULTRAM) 50 MG Tab Take 1 Tablet by mouth every 4 hours for 7 days. Space at least 1 hour apart from other opioid medications. 42 Tablet 0    Probiotic Product (PROBIOTIC-10 PO) Take  by mouth every day.      doxycycline (VIBRAMYCIN) 100 MG Tab Take 4 Tablets by mouth 1 hour prior to dental appointment. 16 Tablet 0    SYNTHROID 112 MCG Tab Take 1 Tablet by mouth every morning on an empty stomach. 100 Tablet 1    FLUoxetine (PROZAC) 10 MG Cap Take 2 Capsules by mouth every morning. 180 Capsule 2    furosemide (LASIX) 40 MG Tab Take 0.5 Tablets by mouth 1 time a day as needed (lower leg swelling). 100 Tablet 0    traZODone (DESYREL) 50 MG Tab Take 0.5 Tablets by mouth at bedtime. 100 Tablet 2    metoprolol SR (TOPROL XL) 25 MG TABLET SR 24 HR Take 0.5 Tablets by mouth every day. 90 Tablet 3    spironolactone (ALDACTONE) 25 MG Tab Take 1 Tablet by mouth every day. (Patient taking differently: Take 25 mg by mouth every evening.) 100 Tablet 2    ipratropium (ATROVENT) 0.06 % Solution Administer 1 Spray into affected nostril(S) 2 times a day. 15 mL 2    rosuvastatin (CRESTOR) 10 MG Tab Take 1 Tablet by mouth every evening. 100 Tablet 2    cholestyramine (QUESTRAN) 4 g packet Mix and take 4 g by mouth every day. 100 Each 3    hydroxychloroquine (PLAQUENIL) 200 MG Tab TAKE ONE TABLET BY MOUTH ONE TIME DAILY (Patient taking differently: every evening.) 100 Tablet 3    Coenzyme Q10 300 MG Cap Take 1 Capsule by mouth every day. 100 Capsule 3    cycloSPORINE (RESTASIS) 0.05  % ophthalmic emulsion Administer 1 Drop into both eyes 2 times a day. Pharmacist - please dispense 180 single use vials (72 ml) 72 mL 3    Multiple Vitamins-Minerals (PRESERVISION AREDS 2 PO) Take  by mouth every day.      CALCIUM CITRATE PO Take 200 mg by mouth every evening.      therapeutic multivitamin-minerals (THERAGRAN-M) Tab Take 1 Tablet by mouth every day.      denosumab (PROLIA) 60 MG/ML Solution Inject 1 mL under the skin every 6 months.         Social History     Socioeconomic History    Marital status:      Spouse name: Not on file    Number of children: Not on file    Years of education: Not on file    Highest education level: Not on file   Occupational History    Not on file   Tobacco Use    Smoking status: Former     Packs/day: 1.00     Years: 20.00     Pack years: 20.00     Types: Cigarettes     Quit date: 1985     Years since quittin.9    Smokeless tobacco: Never   Vaping Use    Vaping Use: Never used   Substance and Sexual Activity    Alcohol use: Yes     Alcohol/week: 1.2 oz     Types: 2 Glasses of wine per week     Comment: 1 per day    Drug use: Never    Sexual activity: Not on file   Other Topics Concern    Not on file   Social History Narrative    Not on file     Social Determinants of Health     Financial Resource Strain: Not on file   Food Insecurity: Not on file   Transportation Needs: Not on file   Physical Activity: Not on file   Stress: Not on file   Social Connections: Not on file   Intimate Partner Violence: Not on file   Housing Stability: Not on file       Family History   Problem Relation Age of Onset    Non-contributory Mother     Osteoporosis Mother     Arthritis Mother     Non-contributory Father     Narcolepsy Father     Lung Disease Father         Asthma    Heart Disease Father     Anesthesia Paternal Grandfather         death       Allergies  Azithromycin, Cefuroxime, Ciprofloxacin, Clindamycin, Daptomycin, Erythromycin, Flagyl [metronidazole], Morphine,  Nitrofurantoin, Rifampin, Sulfa drugs, Codeine, Macrodantin [nitrofurantoin macrocrystal], and Premarin    Review of Systems  Negative    Physical Exam  HENT:      Head: Normocephalic and atraumatic.   Eyes:      Extraocular Movements: Extraocular movements intact.   Cardiovascular:      Rate and Rhythm: Normal rate.   Pulmonary:      Effort: Pulmonary effort is normal.   Musculoskeletal:      Cervical back: Neck supple.      Left hip: Decreased range of motion.   Neurological:      General: No focal deficit present.      Mental Status: She is alert and oriented to person, place, and time.   Psychiatric:         Mood and Affect: Mood normal.         Behavior: Behavior normal.       Vital Signs                          Labs:                    Radiology:  DX-FEMUR-2+ LEFT  AP and lateral of the left femur shows distal leg plate that is intact and   securing with comminuted fracture which is all well-healed.  There are   screws in the removal plate and a couple of interfragmentary lag screws in   the central part of the shaft.     The fracture is well aligned and well-healed.  The knee does not show any   significant degenerative change  DX-HIP-COMPLETE - UNILATERAL 2+ LEFT  AP pelvis and lateral of the left hip show severe degenerative arthritis   with complete loss of the joint space.  There is subchondral sclerosis and   collapse of the superior femoral head she has peripheral osteophytes   present.  The proximal part of the lateral femoral plate is present and   does look to be about where primary hip stem with and.      Assessment/Plan:  Pre-Op Diagnosis Codes:     * Primary osteoarthritis of left hip [M16.12]     * Retained orthopedic hardware [Z96.9]  Procedure(s):  LEFT TOTAL HIP ARTHROPLASTY, AND REMOVAL OF SCREWS FROM TOP OF FEMUR PLATE  REMOVAL, HARDWARE

## 2023-02-21 NOTE — ANESTHESIA PREPROCEDURE EVALUATION
Case: [id number] Date/Time: 02/21/23 1015    Procedures:       LEFT TOTAL HIP ARTHROPLASTY, AND REMOVAL OF SCREWS FROM TOP OF FEMUR PLATE      REMOVAL, HARDWARE    Diagnosis:       Primary osteoarthritis of left hip [M16.12]      Retained orthopedic hardware [Z96.9]    Pre-op diagnosis: Primary osteoarthritis of left hip [M16.12]Retained orthopedic hardware [Z96.9]    Location:  OR 02 / SURGERY [facility]    Surgeons: Rom Chinchilla M.D.        88 yo F w/ RA, CKD3, GERD, prolonged QTc    DNR?  Relevant Problems   CARDIAC   (positive) Atherosclerosis of both carotid arteries   (positive) PVC (premature ventricular contraction)   (positive) Raynaud disease         (positive) CKD (chronic kidney disease) stage 3, GFR 30-59 ml/min (HCC)      ENDO   (positive) Hypothyroidism      Other   (positive) Seronegative rheumatoid arthritis (HCC)       Physical Exam    Airway   Mallampati: II  TM distance: >3 FB  Neck ROM: full       Cardiovascular - normal exam  Rhythm: regular  Rate: normal  (-) murmur     Dental - normal exam           Pulmonary - normal exam  Breath sounds clear to auscultation     Abdominal    Neurological - normal exam                 Anesthesia Plan    ASA 3       Plan - general       Airway plan will be ETT          Induction: intravenous    Postoperative Plan: Postoperative administration of opioids is intended.    Pertinent diagnostic labs and testing reviewed    Informed Consent:    Anesthetic plan and risks discussed with patient.    Use of blood products discussed with: patient whom consented to blood products.

## 2023-02-21 NOTE — OR NURSING
0852 Procedure, patient allergies and NPO status verified. Home med rec completed and belongings secured. Patient verbalizes understanding of pain scale, expected course of stay and plan of care. IV access established. SCD placed on bilateral calves. Triple aim completed by Sary LAUREN.     7989 Dr. Escalante notified that patient did not get a medical clearance prior to surgery.     1014 Preop complete.

## 2023-02-21 NOTE — LETTER
February 3, 2023    Patient Name: Elizabeth Celis  Surgeon Name: Rom Chinchilla M.D.  [facility] [facility]  Surgery Date: 2/21/2023    The time of your surgery is not final and may change up to and until the day of your surgery. You will be contacted 24-48 hours prior to your surgery date with your check-in and surgery time.    If you will not be at one of the below numbers please call the surgery scheduler at [phone]  Preferred Phone: [phone]    BEFORE YOUR SURGERY   Pre Registration and/or Lab Work must be done within and no earlier than 28 days prior to your surgery date. Please call [facility] at [phone] for an appointment as soon as possible.    Pre op Appointment:   Date: 2/17   Time: 10 AM   Provider: Rom Chinchilla M.D.   Location: OVER THE PHONE - DR CHINCHILLA OR CARMEL ([facility]) WILL CALL YOU   Instructions: Bring a list of all medications you are taking including the dosing and frequency.    - Please  your non-narcotic prescriptions prior to surgery at the pharmacy you provided us. DON'T START them until after your surgery.    DAY OF YOUR SURGERY  Nothing to eat or drink after midnight     Refrain from smoking any substance after midnight prior to surgery. Smoking may interfere with the anesthetic and frequently produces nausea during the recovery period.    Continue taking all lifesaving medications. Including the morning of your surgery with small sip of water.    Please do NOT take on the day of surgery:  Diuretics: examples- furosemide (Lasix), spironolactone, hydrochlorothiazide  ACE-inhibitors: examples- lisinopril, ramipril, enalapril  “ARBs”: examples- losartan, Olmesartan, valsartan    Please arrive at the hospital/surgery center at the check-in time provided.     An adult will need to bring you and take you home after your surgery.     AFTER YOUR SURGERY  Post op  Appointment:   Date: 3/6   Time: 1:40 PM   With: Rom Chinchilla M.D.   Location: [address] [place] [address]    - No dental work for 3-6 months after your surgery.  - You must have someone provide transportation post surgery and someone to monitor you for at least 24 hours post-surgery. If you don't have either of these your appointment will be canceled.    TIME OFF WORK  FMLA or Disability forms can be faxed directly to: [phone] or you may drop them off at [address] [place] [address]. Our office charges a $35.00 fee per form. Forms will be completed within 10 business days of drop off and payment received. For the status of your forms you may contact our disability office directly [phone].

## 2023-02-21 NOTE — ANESTHESIA POSTPROCEDURE EVALUATION
Patient: Elizabeth Celis    Procedure Summary     Date: 02/21/23 Room / Location:  OR 02 / SURGERY [facility]    Anesthesia Start: 1116 Anesthesia Stop: 1338    Procedures:       LEFT TOTAL HIP ARTHROPLASTY, AND REMOVAL OF SCREWS FROM TOP OF FEMUR PLATE (Left: Hip)      REMOVAL, HARDWARE (Left: Hip) Diagnosis:       Primary osteoarthritis of left hip      Retained orthopedic hardware      (Primary osteoarthritis of left hip [M16.12]Retained orthopedic hardware [Z96.9])    Surgeons: Rom Chinchilla M.D. Responsible Provider: Jacoby Escalante M.D.    Anesthesia Type: general ASA Status: 3          Final Anesthesia Type: general  Last vitals  BP   Blood Pressure : (!) 146/65    Temp   35.8 °C (96.5 °F)    Pulse   63   Resp   18    SpO2   95 %      Anesthesia Post Evaluation    Patient location during evaluation: PACU  Patient participation: complete - patient participated  Level of consciousness: awake and alert    Airway patency: patent  Anesthetic complications: no  Cardiovascular status: hemodynamically stable  Respiratory status: acceptable  Hydration status: euvolemic    PONV: none          No notable events documented.     Nurse Pain Score: 4 (NPRS)

## 2023-02-21 NOTE — ANESTHESIA TIME REPORT
Anesthesia Start and Stop Event Times     Date Time Event    2/21/2023 1107 Ready for Procedure     1116 Anesthesia Start     1338 Anesthesia Stop        Responsible Staff  02/21/23    Name Role Begin End    Jacoby Escalante M.D. Anesth 1116 1338        Overtime Reason:  no overtime (within assigned shift)    Comments:

## 2023-02-21 NOTE — ANESTHESIA PROCEDURE NOTES
Airway    Date/Time: 2/21/2023 11:35 AM  Performed by: Jacoby Escalante M.D.  Authorized by: Jacoby Escalante M.D.     Location:  OR  Urgency:  Elective  Indications for Airway Management:  Anesthesia      Spontaneous Ventilation: absent    Sedation Level:  Deep  Preoxygenated: Yes    Patient Position:  Sniffing  Mask Difficulty Assessment:  1 - vent by mask  Final Airway Type:  Endotracheal airway  Final Endotracheal Airway:  ETT  Cuffed: Yes    Technique Used for Successful ETT Placement:  Direct laryngoscopy    Insertion Site:  Oral  Blade Type:  Glide  Laryngoscope Blade/Videolaryngoscope Blade Size:  3  ETT Size (mm):  6.5  Measured from:  Teeth  ETT to Teeth (cm):  20  Placement Verified by: auscultation and capnometry    Cormack-Lehane Classification:  Grade I - full view of glottis  Number of Attempts at Approach:  1   Glidescope used to minimize neck extension.  RA and patient complaining of sore neck prior to procedure

## 2023-02-22 PROCEDURE — 96376 TX/PRO/DX INJ SAME DRUG ADON: CPT

## 2023-02-22 PROCEDURE — G0378 HOSPITAL OBSERVATION PER HR: HCPCS

## 2023-02-22 PROCEDURE — 700111 HCHG RX REV CODE 636 W/ 250 OVERRIDE (IP)

## 2023-02-22 PROCEDURE — 97535 SELF CARE MNGMENT TRAINING: CPT

## 2023-02-22 PROCEDURE — 94760 N-INVAS EAR/PLS OXIMETRY 1: CPT

## 2023-02-22 PROCEDURE — 97110 THERAPEUTIC EXERCISES: CPT

## 2023-02-22 PROCEDURE — 97162 PT EVAL MOD COMPLEX 30 MIN: CPT

## 2023-02-22 PROCEDURE — 96375 TX/PRO/DX INJ NEW DRUG ADDON: CPT

## 2023-02-22 PROCEDURE — 700102 HCHG RX REV CODE 250 W/ 637 OVERRIDE(OP)

## 2023-02-22 PROCEDURE — 97165 OT EVAL LOW COMPLEX 30 MIN: CPT

## 2023-02-22 PROCEDURE — 96374 THER/PROPH/DIAG INJ IV PUSH: CPT

## 2023-02-22 PROCEDURE — A9270 NON-COVERED ITEM OR SERVICE: HCPCS

## 2023-02-22 RX ADMIN — SENNOSIDES AND DOCUSATE SODIUM 1 TABLET: 50; 8.6 TABLET ORAL at 21:41

## 2023-02-22 RX ADMIN — OXYCODONE HYDROCHLORIDE 10 MG: 10 TABLET ORAL at 02:12

## 2023-02-22 RX ADMIN — FLUOXETINE 20 MG: 20 CAPSULE ORAL at 05:56

## 2023-02-22 RX ADMIN — DOCUSATE SODIUM 100 MG: 100 CAPSULE, LIQUID FILLED ORAL at 17:02

## 2023-02-22 RX ADMIN — KETOROLAC TROMETHAMINE 15 MG: 30 INJECTION, SOLUTION INTRAMUSCULAR; INTRAVENOUS at 12:15

## 2023-02-22 RX ADMIN — LEVOTHYROXINE SODIUM 112 MCG: 0.11 TABLET ORAL at 05:57

## 2023-02-22 RX ADMIN — ONDANSETRON 4 MG: 2 INJECTION INTRAMUSCULAR; INTRAVENOUS at 06:09

## 2023-02-22 RX ADMIN — IPRATROPIUM BROMIDE 1 SPRAY: 21 SPRAY NASAL at 17:09

## 2023-02-22 RX ADMIN — ACETAMINOPHEN 1000 MG: 500 TABLET ORAL at 06:03

## 2023-02-22 RX ADMIN — ASPIRIN 81 MG: 81 TABLET, COATED ORAL at 05:56

## 2023-02-22 RX ADMIN — KETOROLAC TROMETHAMINE 15 MG: 30 INJECTION, SOLUTION INTRAMUSCULAR; INTRAVENOUS at 17:02

## 2023-02-22 RX ADMIN — ACETAMINOPHEN 1000 MG: 500 TABLET ORAL at 23:27

## 2023-02-22 RX ADMIN — HYDROXYCHLOROQUINE SULFATE 200 MG: 200 TABLET, FILM COATED ORAL at 17:01

## 2023-02-22 RX ADMIN — KETOROLAC TROMETHAMINE 15 MG: 30 INJECTION, SOLUTION INTRAMUSCULAR; INTRAVENOUS at 23:26

## 2023-02-22 RX ADMIN — POTASSIUM CHLORIDE 10 MEQ: 20 TABLET, EXTENDED RELEASE ORAL at 06:08

## 2023-02-22 RX ADMIN — ROSUVASTATIN 10 MG: 10 TABLET, FILM COATED ORAL at 17:01

## 2023-02-22 RX ADMIN — TRAZODONE HYDROCHLORIDE 25 MG: 50 TABLET ORAL at 23:29

## 2023-02-22 RX ADMIN — DEXAMETHASONE SODIUM PHOSPHATE 4 MG: 4 INJECTION, SOLUTION INTRA-ARTICULAR; INTRALESIONAL; INTRAMUSCULAR; INTRAVENOUS; SOFT TISSUE at 05:58

## 2023-02-22 RX ADMIN — ACETAMINOPHEN 1000 MG: 500 TABLET ORAL at 12:14

## 2023-02-22 RX ADMIN — KETOROLAC TROMETHAMINE 15 MG: 30 INJECTION, SOLUTION INTRAMUSCULAR; INTRAVENOUS at 05:58

## 2023-02-22 RX ADMIN — DOCUSATE SODIUM 100 MG: 100 CAPSULE, LIQUID FILLED ORAL at 05:57

## 2023-02-22 RX ADMIN — ACETAMINOPHEN 1000 MG: 500 TABLET ORAL at 17:01

## 2023-02-22 ASSESSMENT — GAIT ASSESSMENTS
DEVIATION: DECREASED HEEL STRIKE;DECREASED TOE OFF
ASSISTIVE DEVICE: FRONT WHEEL WALKER
DISTANCE (FEET): 175
DISTANCE (FEET): 15
GAIT LEVEL OF ASSIST: CONTACT GUARD ASSIST

## 2023-02-22 ASSESSMENT — COGNITIVE AND FUNCTIONAL STATUS - GENERAL
DRESSING REGULAR UPPER BODY CLOTHING: A LITTLE
HELP NEEDED FOR BATHING: A LITTLE
TOILETING: A LITTLE
SUGGESTED CMS G CODE MODIFIER MOBILITY: CK
SUGGESTED CMS G CODE MODIFIER DAILY ACTIVITY: CK
DAILY ACTIVITIY SCORE: 19
STANDING UP FROM CHAIR USING ARMS: A LITTLE
TURNING FROM BACK TO SIDE WHILE IN FLAT BAD: A LITTLE
WALKING IN HOSPITAL ROOM: A LITTLE
MOVING FROM LYING ON BACK TO SITTING ON SIDE OF FLAT BED: A LITTLE
PERSONAL GROOMING: A LITTLE
CLIMB 3 TO 5 STEPS WITH RAILING: A LITTLE
MOVING TO AND FROM BED TO CHAIR: A LITTLE
MOBILITY SCORE: 18
DRESSING REGULAR LOWER BODY CLOTHING: A LITTLE

## 2023-02-22 ASSESSMENT — PAIN DESCRIPTION - PAIN TYPE
TYPE: SURGICAL PAIN

## 2023-02-22 ASSESSMENT — PATIENT HEALTH QUESTIONNAIRE - PHQ9
1. LITTLE INTEREST OR PLEASURE IN DOING THINGS: NOT AT ALL
SUM OF ALL RESPONSES TO PHQ9 QUESTIONS 1 AND 2: 0
2. FEELING DOWN, DEPRESSED, IRRITABLE, OR HOPELESS: NOT AT ALL

## 2023-02-22 ASSESSMENT — ACTIVITIES OF DAILY LIVING (ADL): TOILETING: INDEPENDENT

## 2023-02-22 NOTE — DISCHARGE PLANNING
Received Choice form at 1509  Agency/Facility Name: [facility]  Referral sent per Choice form @ HH Order Needed

## 2023-02-22 NOTE — CARE PLAN
Problem: Pain - Standard  Goal: Alleviation of pain or a reduction in pain to the patient’s comfort goal  Description: Target End Date:  Prior to discharge or change in level of care    Document on Vitals flowsheet    1.  Document pain using the appropriate pain scale per order or unit policy  2.  Educate and implement non-pharmacologic comfort measures (i.e. relaxation, distraction, massage, cold/heat therapy, etc.)  3.  Pain management medications as ordered  4.  Reassess pain after pain med administration per policy  5.  If opiods administered assess patient's response to pain medication is appropriate per POSS sedation scale  6.  Follow pain management plan developed in collaboration with patient and interdisciplinary team (including palliative care or pain specialists if applicable)  Outcome: Progressing   The patient is Stable - Low risk of patient condition declining or worsening    Shift Goals  Clinical Goals: pt will ambulate safely within the shift, patient will report pain relief within the shift  Patient Goals: pain control; rest and move comfortably    Progress made toward(s) clinical / shift goals:  Pt was able to ambulate within the shift. Patient has no complaints of pain within the shift.     Patient is not progressing towards the following goals: n/a

## 2023-02-22 NOTE — DISCHARGE PLANNING
"HTH/SCP TCN chart review completed. Collaborated with MARIA G Becerril prior to meeting with the patient. The most current review of medical record, knowledge of pt's PLOF and social support, LACE+ score of 55, 6 clicks scores of 19 ADL and 18 mobility were considered.      TCN met with patient at bedside. Patient noted to be significant hard of hearing noting \"I lost my hearing aid so you will have to speak very loud.\" TCN used a combination of written text and verbal expression to ensure patient understanding during TCN visit.     Introduced self and TCN program. Provided education regarding post acute levels of care. Discussed HTH/SCP plan benefits (Meds to Beds, medical uber and [facility] transitional care). Patient verbalized understanding.    Patient endorsed she lives alone, but that her daughter has come to stay with her from [place] \"while I get better.\" Patient advised she has the following DME at home: FWW, three wheel walker and four wheel walker. Patient stated her previous level of mobility was use of a walker, noting she used the three wheel walker mostly for community mobility \"because it was easy to put in and take out of my car.\" Patient endorsed her previous level of function as independent with ADLs, IADLs and continues to drive. Patient did note some concerns regarding her mobility related to meal preparation \"I was thinking about getting some assistance from [employer].\" Patient amendable to referral to CHW team to provide further support regarding food resources as necessary.    Appreciate orders present for PT and OT consults and will appreciate recommendations in patient discharge planning. Choice proactively obtained for HH and DME and given to CM. Discussed transitional care visits through [facility]. At this time, patient stated preference to follow with surgeon outpatient, noting she also has an appointment scheduled to see her primary care physician, Dr. Wheat in March.     TCN will continue to " follow and collaborate with discharge planning team as additional post acute needs arise. Thank you.     Completed today  Choice obtained: HH DME (pending therapy recommendations)  [facility] referral not sent; patient stated preference for outpatient follow up with surgeon and primary care provider  CHW referral sent 2/22/2023

## 2023-02-22 NOTE — OR NURSING
"1555 - Report received from Rachele LAUREN, pt repositioned to help with pain,  no nausea, VSS.    1615 - Pt reports pain improving, ice pack in place, VSS    1630 - Pt reports pain is \"much better\", no nausea,  VSS    1645 - Pt resting comfortably, no nausea, VSS.  Pt meets criteria for transfer to room, Daughter updated via phone, report called to Ray LAUREN.    1655 - Pt transferred to room with belongings via bed by RN   "

## 2023-02-22 NOTE — PROGRESS NOTES
Pt is awake in bed. Pt remains on 10 LPM oxygen per ERAS protocol. No c/o pain or n/v at this time. Dressing to R hip is dry and intact, with old drainage. CMS is intact to RLE. Pt is requesting to rest at this time. Fall precautions in place.

## 2023-02-22 NOTE — PROGRESS NOTES
"S: Patient having difficulty with safe mobilization Not real comfortable going homeyet    O:  Patient is alert and oriented.  Has good movement of the left leg.    Dressing is saturated and needs changing.- the dressing on left thigh    Blood pressure 111/62, pulse 77, temperature 36.5 °C (97.7 °F), temperature source Oral, resp. rate 17, height 1.651 m (5' 5\"), weight 59.9 kg (132 lb 0.9 oz), SpO2 98 %, not currently breastfeeding.            Intake/Output Summary (Last 24 hours) at 2/22/2023 0536  Last data filed at 2/21/2023 1934  Gross per 24 hour   Intake 840 ml   Output 360 ml   Net 480 ml             A:  Will need further assessment regarding safety for discharge.  She is otherwise stable.  We can change the dressing on her thigh.  Patient Active Problem List    Diagnosis Date Noted    Status post left hip replacement 02/21/2023    Primary osteoarthritis of left hip 02/01/2023    Retained orthopedic hardware 02/01/2023    Decreased hearing of both ears 11/17/2022    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 choroidal neovascularization (HCC) 11/17/2022    Multilevel degenerative disc disease 10/18/2022    Osteoarthritis of multiple joints 10/18/2022    Long-term use of hydroxychloroquine 10/18/2022    Nonspecific abnormal electrocardiogram (ECG) (EKG) 06/03/2022    PVC (premature ventricular contraction) 06/03/2022    Chronic diarrhea 06/02/2022    Amputation of left middle finger 06/02/2022    DNR (do not resuscitate) 05/19/2022    Chronic left hip pain 04/01/2022    Insomnia 04/01/2022    BMI 21.0-21.9, adult 03/21/2022    Hypercholesterolemia 03/21/2022    Atherosclerosis of both carotid arteries 01/24/2022    History of DVT (deep vein thrombosis) 12/01/2021    History of compression fracture of spine, Jan. 2020 (T12 and L1) 05/12/2021    Age-related osteoporosis without current pathological fracture 11/12/2020    Anemia 01/19/2020    Neuropathic pain 01/17/2020    Peripheral " edema 01/15/2020    Spinal stenosis of lumbar region with neurogenic claudication 01/10/2020    Ex-cigarette smoker 07/29/2019    History of recurrent UTIs 03/27/2019    Hypothyroidism 02/10/2019    History of anemia 01/12/2018    Generalized weakness 01/12/2018    Raynaud disease 11/11/2017    CKD (chronic kidney disease) stage 3, GFR 30-59 ml/min ([facility]) 11/11/2017    History of falling 11/11/2017    History of Clostridium difficile 02/21/2016    Depression 02/21/2016    Seronegative rheumatoid arthritis ([facility]) 09/26/2014    History of sciatica 09/26/2014         PLAN:Patient needs another night to work on pain control and mobility prior to discharge if she does have a good day today and is safe and strong throughout the end of the day we could discharge her later today.

## 2023-02-22 NOTE — DISCHARGE PLANNING
Case Management Discharge Planning    Admission Date: 2/21/2023  GMLOS:    ALOS: 0    6-Clicks ADL Score: 19  6-Clicks Mobility Score: 18      Anticipated Discharge Dispo: Discharge Disposition: Discharged to home/self care (01)    DME Needed: No    Action(s) Taken: Updated Provider/Nurse on Discharge Plan    Pending OT evaluations     1505: Choice for [facility]  faxed to [facility].     Escalations Completed: OT    Medically Clear: No    Next Steps: OT evaluation    Barriers to Discharge: Medical clearance and OT evaluation

## 2023-02-22 NOTE — THERAPY
Physical Therapy   Initial Evaluation     Patient Name: Elizabeth Celis  Age:  87 y.o., Sex:  female  Medical Record #: [id number]  Today's Date: 2/22/2023     Precautions  Precautions: Anterior Hip Precautions;Weight Bearing As Tolerated Left Lower Extremity    Assessment  Patient is 87 y.o. female s/p  left SUGAR with removal of deep hardware L femur POD#1.  Pts pain is well controlled and is able to ambulate safely with FWW CGA.  SUGAR anterior hip precautions reviewed, handout issued.  Pt was provided with education on elevation, icing, positioning, and supine/seated therapeutic exercises. SUGAR HEP handout issued, therex reviewed with pt in supine position.  Pts dtr present for entire eval, dtr plans to stay with pt during her recovery.  See below for goals and POC. Recommend HHPT.    Plan    Physical Therapy Initial Treatment Plan   Treatment Plan : Bed Mobility, Gait Training, Therapeutic Exercise, Therapeutic Activities, Neuro Re-Education / Balance  Treatment Frequency: Daily  Duration: Until Therapy Goals Met    DC Equipment Recommendations: None  Discharge Recommendations: Recommend home health for continued physical therapy services        02/22/23 1353   Prior Living Situation   Housing / Facility [address] [facility]   Steps Into Home 0  (ramp)   Steps In Home 0   Equipment Owned Front-Wheel Walker   Lives with - Patient's Self Care Capacity Alone and Able to Care For Self   Comments Pts dtr will be staying with her during her recovery   Prior Level of Functional Mobility   Bed Mobility Independent   Transfer Status Independent   Ambulation Independent   Assistive Devices Used Front-Wheel Walker   Cognition    Level of Consciousness Alert   Comments Oriented x4, [place]   Passive ROM Lower Body   Passive ROM Lower Body X   Comments L hip NT, limited by pain   Active ROM Lower Body    Active ROM Lower Body  X   Comments L hip limited by pain   Strength Lower Body   Lower Body Strength  X   Comments L hip limited by  pain   Balance Assessment   Sitting Balance (Static) Fair +   Sitting Balance (Dynamic) Fair   Standing Balance (Static) Fair   Standing Balance (Dynamic) Fair -   Weight Shift Sitting Fair   Weight Shift Standing Fair   Comments stdg with FWW   Bed Mobility    Supine to Sit   (NT, pt sitting up in chair)   Sit to Supine Minimal Assist   Gait Analysis   Gait Level Of Assist Contact Guard Assist   Assistive Device Front Wheel Walker   Distance (Feet) 175   # of Times Distance was Traveled 1   Deviation Decreased Heel Strike;Decreased Toe Off   Weight Bearing Status WBAT L LE   Functional Mobility   Sit to Stand Contact Guard Assist   Bed, Chair, Wheelchair Transfer Contact Guard Assist   Transfer Method Stand Step   Activity Tolerance   Sitting in Chair as tolerated   Standing 10 min   Short Term Goals    Short Term Goal # 1 Pt will be able to perform bed mobility and sup <> sit Elsie in 6 visits.   Short Term Goal # 2 Pt will be able to perform sit <> stand and transfer Elsie in 6 visits.   Short Term Goal # 3 Pt will be able to ambulate 200 ft with FWW Elsie in 6 visits.

## 2023-02-22 NOTE — PROGRESS NOTES
Received bedside report from NOC RN. Pt is A&O 4. Pt has no complaints of pain. Educated on pain medication use. Denies N/V as of assessment. Dressing to left hip CDI and CMS noted. Safety precaution in place. All needs met at this time. Hourly rounding in place.

## 2023-02-22 NOTE — PROGRESS NOTES
4 Eyes Skin Assessment Completed by LEONIDAS Thomas and LEONIDAS Pereyra.    Head WDL  Ears WDL  Nose WDL  Mouth WDL  Neck WDL  Breast/Chest WDL  Shoulder Blades WDL  Spine WDL  (R) Arm/Elbow/Hand  bluish fingertips  (L) Arm/Elbow/Hand Swelling,bluish fingertips,left nail is missing  Abdomen WDL  Groin WDL  Scrotum/Coccyx/Buttocks WDL  (R) Leg bruising  (L) Leg bruising  (R) Heel/Foot/Toe WDL  (L) Heel/Foot/Toe incision          Devices In Places Blood Pressure Cuff and Pulse Ox      Interventions In Place N/A    Possible Skin Injury No    Pictures Uploaded Into Epic N/A  Wound Consult Placed N/A  RN Wound Prevention Protocol Ordered No

## 2023-02-22 NOTE — CARE PLAN
The patient is Stable - Low risk of patient condition declining or worsening    Shift Goals  Clinical Goals: pt will not sustain a fall this shift. pt will mobilize and void this shift.  Patient Goals: pain management    Progress made toward(s) clinical / shift goals:      Pt placed on moderate fall precautions. No falls this shift.    Pt mobilized and voided this shift.    Problem: Pain - Standard  Goal: Alleviation of pain or a reduction in pain to the patient’s comfort goal  Outcome: Progressing     Problem: Fall Risk  Goal: Patient will remain free from falls  Outcome: Progressing     Problem: Knowledge Deficit - Standard  Goal: Patient and family/care givers will demonstrate understanding of plan of care, disease process/condition, diagnostic tests and medications  Outcome: Progressing       Patient is not progressing towards the following goals:

## 2023-02-22 NOTE — PROGRESS NOTES
Received report from  PACU at 1526. Pt to floor via bed at 1700. Pt is awake and alert.  Pt denies any n/v. Pt denies any  numbness or tingling. Pt reports pain to LLE . Pt is able to dorsi flex and planter flex. + pulses. Dressing to Left hip in place, with Aquacel to lower hip and KIRAN dressing to upper hip.Old drainage noted to the Aquacel dressing. Fall precautions in place. Bed in locked and lowest position. Call light within reach.   Daughter at bedside

## 2023-02-22 NOTE — OR NURSING
1332 To PACU from OR via bed,  respirations spontaneous and non-labored, Icepack applied over c/d/i left hip surgical dressings.     1345 sleeping, rouses easily, vss    1400  no change    1415 no change    1433  medicated for c/o pain    1445 sleeping, rouses easily, states pain better now    1500 sleeping, vss, waiting for room assignment    1525 called report to floor    1535 medicated for c/o pain    1545 medicated for continued c/o pain    1555 report to mayuri hill

## 2023-02-23 ENCOUNTER — HOME HEALTH ADMISSION (OUTPATIENT)
Dept: HOME HEALTH SERVICES | Facility: HOME HEALTHCARE | Age: 88
End: 2023-02-23
Payer: MEDICARE

## 2023-02-23 VITALS
DIASTOLIC BLOOD PRESSURE: 35 MMHG | HEIGHT: 65 IN | HEART RATE: 69 BPM | TEMPERATURE: 97.8 F | OXYGEN SATURATION: 98 % | SYSTOLIC BLOOD PRESSURE: 103 MMHG | WEIGHT: 132.06 LBS | BODY MASS INDEX: 22 KG/M2 | RESPIRATION RATE: 16 BRPM

## 2023-02-23 PROCEDURE — 96375 TX/PRO/DX INJ NEW DRUG ADDON: CPT

## 2023-02-23 PROCEDURE — 96376 TX/PRO/DX INJ SAME DRUG ADON: CPT

## 2023-02-23 PROCEDURE — 97112 NEUROMUSCULAR REEDUCATION: CPT

## 2023-02-23 PROCEDURE — A9270 NON-COVERED ITEM OR SERVICE: HCPCS

## 2023-02-23 PROCEDURE — A9270 NON-COVERED ITEM OR SERVICE: HCPCS | Performed by: ORTHOPAEDIC SURGERY

## 2023-02-23 PROCEDURE — 97535 SELF CARE MNGMENT TRAINING: CPT

## 2023-02-23 PROCEDURE — 97110 THERAPEUTIC EXERCISES: CPT

## 2023-02-23 PROCEDURE — 700102 HCHG RX REV CODE 250 W/ 637 OVERRIDE(OP)

## 2023-02-23 PROCEDURE — G0378 HOSPITAL OBSERVATION PER HR: HCPCS

## 2023-02-23 PROCEDURE — 700102 HCHG RX REV CODE 250 W/ 637 OVERRIDE(OP): Performed by: ORTHOPAEDIC SURGERY

## 2023-02-23 PROCEDURE — 700111 HCHG RX REV CODE 636 W/ 250 OVERRIDE (IP)

## 2023-02-23 PROCEDURE — 97530 THERAPEUTIC ACTIVITIES: CPT

## 2023-02-23 PROCEDURE — 97116 GAIT TRAINING THERAPY: CPT

## 2023-02-23 RX ADMIN — METOPROLOL SUCCINATE 12.5 MG: 25 TABLET, EXTENDED RELEASE ORAL at 05:59

## 2023-02-23 RX ADMIN — ACETAMINOPHEN 1000 MG: 500 TABLET ORAL at 05:59

## 2023-02-23 RX ADMIN — IPRATROPIUM BROMIDE 1 SPRAY: 21 SPRAY NASAL at 06:03

## 2023-02-23 RX ADMIN — ASPIRIN 81 MG: 81 TABLET, COATED ORAL at 06:00

## 2023-02-23 RX ADMIN — CHOLESTYRAMINE 4 G: 4 POWDER, FOR SUSPENSION ORAL at 06:00

## 2023-02-23 RX ADMIN — LEVOTHYROXINE SODIUM 112 MCG: 0.11 TABLET ORAL at 06:00

## 2023-02-23 RX ADMIN — MAGNESIUM HYDROXIDE 30 ML: 400 SUSPENSION ORAL at 03:37

## 2023-02-23 RX ADMIN — FLUOXETINE 20 MG: 20 CAPSULE ORAL at 06:00

## 2023-02-23 RX ADMIN — DOCUSATE SODIUM 100 MG: 100 CAPSULE, LIQUID FILLED ORAL at 06:00

## 2023-02-23 RX ADMIN — POTASSIUM CHLORIDE 10 MEQ: 20 TABLET, EXTENDED RELEASE ORAL at 06:00

## 2023-02-23 RX ADMIN — KETOROLAC TROMETHAMINE 15 MG: 30 INJECTION, SOLUTION INTRAMUSCULAR; INTRAVENOUS at 06:00

## 2023-02-23 ASSESSMENT — GAIT ASSESSMENTS
GAIT LEVEL OF ASSIST: CONTACT GUARD ASSIST
DISTANCE (FEET): 100
DEVIATION: DECREASED HEEL STRIKE;DECREASED TOE OFF
ASSISTIVE DEVICE: FRONT WHEEL WALKER

## 2023-02-23 ASSESSMENT — COGNITIVE AND FUNCTIONAL STATUS - GENERAL
HELP NEEDED FOR BATHING: A LITTLE
CLIMB 3 TO 5 STEPS WITH RAILING: A LITTLE
MOVING FROM LYING ON BACK TO SITTING ON SIDE OF FLAT BED: A LITTLE
PERSONAL GROOMING: A LITTLE
DRESSING REGULAR UPPER BODY CLOTHING: A LITTLE
STANDING UP FROM CHAIR USING ARMS: A LITTLE
SUGGESTED CMS G CODE MODIFIER MOBILITY: CK
WALKING IN HOSPITAL ROOM: A LITTLE
SUGGESTED CMS G CODE MODIFIER DAILY ACTIVITY: CK
MOBILITY SCORE: 16
TOILETING: A LITTLE
DAILY ACTIVITIY SCORE: 19
TURNING FROM BACK TO SIDE WHILE IN FLAT BAD: A LITTLE
DRESSING REGULAR LOWER BODY CLOTHING: A LITTLE
MOVING TO AND FROM BED TO CHAIR: UNABLE

## 2023-02-23 ASSESSMENT — PAIN DESCRIPTION - PAIN TYPE: TYPE: SURGICAL PAIN

## 2023-02-23 NOTE — THERAPY
Physical Therapy   Daily Treatment     Patient Name: Elizabeth Celis  Age:  87 y.o., Sex:  female  Medical Record #: [id number]  Today's Date: 2/23/2023     Precautions  Precautions: (P) Anterior Hip Precautions;Fall Risk;Weight Bearing As Tolerated Left Lower Extremity    Assessment    Pt is progressing well with therapy and able to demonstrate with improved gait mechanics, ambulation distance, and upright activity tolerance. Pt as able to improve gait mechanics with frequent VC, demonstration, and use of visual cues on floor to improve wider TEO. Pt was provided with VC and sequencing during sit<>stands, transfers to chair, and transfer to toilet for appropriate mechanics. Pt was provided with VC and TC for appropriate mechanics during therapeutic exercises. Recommend home health transitional care for continued physical therapy services.     Plan    Physical Therapy Treatment Plan  Physical Therapy Treatment Plan: (P) Continue Current Treatment Plan    DC Equipment Recommendations: (P) None  Discharge Recommendations: (P) Recommend home health for continued physical therapy services    Objective       02/23/23 0920   Precautions   Precautions Anterior Hip Precautions;Fall Risk;Weight Bearing As Tolerated Left Lower Extremity   Pain 0 - 10 Group   Location Hip   Location Orientation Left   Description Sore   Therapist Pain Assessment Prior to Activity;During Activity;Post Activity;Nurse Notified;4   Cognition    Speech/ Communication Hard of Hearing   Level of Consciousness Alert   Supine Lower Body Exercise   Heel Slide 1 set of 10;Left   Ankle Pumps Reciprocal;1 set of 10   Gluteal Isometrics 1 set of 10;Left   Quadriceps Isometrics 1 set of 10;Left   Standing Lower Body Exercises   Standing Lower Body Exercises Yes   Marching 1 set of 10   Home Exercise Program   Home Exercise Program Patient Able to Complete Home Program Independently, Safely   Balance   Sitting Balance (Static) Fair +   Sitting Balance  (Dynamic) Fair +   Standing Balance (Static) Fair   Standing Balance (Dynamic) Fair   Weight Shift Sitting Fair   Weight Shift Standing Fair   Skilled Intervention Verbal Cuing;Postural Facilitation   Bed Mobility    Supine to Sit Minimal Assist   Scooting Contact Guard Assist   Skilled Intervention Verbal Cuing;Sequencing   Gait Analysis   Gait Level Of Assist Contact Guard Assist   Assistive Device Front Wheel Walker   Distance (Feet) 100   # of Times Distance was Traveled 1   Deviation Decreased Heel Strike;Decreased Toe Off   Weight Bearing Status WBAT L LE   Skilled Intervention Verbal Cuing;Facilitation   Functional Mobility   Sit to Stand Contact Guard Assist   Bed, Chair, Wheelchair Transfer Contact Guard Assist   Toilet Transfers Minimal Assist   Transfer Method Stand Step   Mobility w/fww   Skilled Intervention Verbal Cuing;Sequencing   How much difficulty does the patient currently have...   Turning over in bed (including adjusting bedclothes, sheets and blankets)? 3   Sitting down on and standing up from a chair with arms (e.g., wheelchair, bedside commode, etc.) 3   Moving from lying on back to sitting on the side of the bed? 1   How much help from another person does the patient currently need...   Moving to and from a bed to a chair (including a wheelchair)? 3   Need to walk in a hospital room? 3   Climbing 3-5 steps with a railing? 3   6 clicks Mobility Score 16   Activity Tolerance   Sitting in Chair functional   Sitting Edge of Bed 5 mins   Standing 10 mins   Comments limited by pain   Short Term Goals    Short Term Goal # 1 Pt will be able to perform bed mobility and sup <> sit Elsie in 6 visits.   Goal Outcome # 1 Progressing as expected   Short Term Goal # 2 Pt will be able to perform sit <> stand and transfer Elsie in 6 visits.   Goal Outcome # 2 Progressing as expected   Short Term Goal # 3 Pt will be able to ambulate 200 ft with FWW Elsie in 6 visits.   Goal Outcome # 3 Progressing as expected    Education Group   Education Provided Role of Physical Therapist   Role of Physical Therapist Patient Response Patient;Acceptance;Explanation;Demonstration;Verbal Demonstration;Action Demonstration   Use of Assistive Device Patient Response Patient;Acceptance;Explanation;Demonstration;Verbal Demonstration;Action Demonstration   Physical Therapy Treatment Plan   Physical Therapy Treatment Plan Continue Current Treatment Plan   Anticipated Discharge Equipment and Recommendations   DC Equipment Recommendations None   Discharge Recommendations Recommend home health for continued physical therapy services   Interdisciplinary Plan of Care Collaboration   IDT Collaboration with  Nursing   Patient Position at End of Therapy Seated;Call Light within Reach;Tray Table within Reach;Phone within Reach   Collaboration Comments aware of visit and recs   Session Information   Date / Session Number  2/23-2 (2/7, 2/28)   Priority 4

## 2023-02-23 NOTE — DISCHARGE PLANNING
ATTN: Case Management  RE: Referral for Home Health    As of 2/23/23, we have accepted the Home Health referral for the patient listed above.    A [facility] clinician will be out to see the patient within 48 hours. If you have any questions or concerns regarding the patient’s transition to Home Health, please do not hesitate to contact us at [phone].      We look forward to collaborating with you,  [facility] Home Health Team

## 2023-02-23 NOTE — FACE TO FACE
Face to Face Supporting Documentation - Home Health    The encounter with this patient was in whole or in part the primary reason for home health admission.    Date of encounter:   Patient:                    MRN:                       YOB: 2023  Elizabeth Celis  [id number]  1935     Home health to see patient for:  Physical Therapy evaluation and treatment    Skilled need for:  Surgical Aftercare Left Total HIP    Skilled nursing interventions to include:  Wound Care    Homebound status evidenced by:  Need the aid of supportive devices such as crutches, canes, wheelchairs or walkers. Leaving home requires a considerable and taxing effort. There is a normal inability to leave the home.    Community Physician to provide follow up care: Xiomara Wheat M.D.     Optional Interventions? No      I certify the face to face encounter for this home health care referral meets the CMS requirements and the encounter/clinical assessment with the patient was, in whole, or in part, for the medical condition(s) listed above, which is the primary reason for home health care. Based on my clinical findings: the service(s) are medically necessary, support the need for home health care, and the homebound criteria are met.  I certify that this patient has had a face to face encounter by myself.  Rom Chinchilla M.D. - NPI: [id number]

## 2023-02-23 NOTE — CARE PLAN
The patient is Stable - Low risk of patient condition declining or worsening    Shift Goals  Clinical Goals: Patient pain shall remain at a comfortable level of 3/10 after intervention. Patient will ambulate a minimum of one more time during the shift.  Patient Goals: 5+ hours rest/sleep    Progress made toward(s) clinical / shift goals:  Patient has had some pain issues overnight but remained comfortable after interventions. Patient ambulated appx. 200 feet    Patient is not progressing towards the following goals:

## 2023-02-23 NOTE — THERAPY
Occupational Therapy  Daily Treatment     Patient Name: Elizabeth Celis  Age:  87 y.o., Sex:  female  Medical Record #: [id number]  Today's Date: 2/23/2023     Precautions: Anterior Hip Precautions, Fall Risk, Weight Bearing As Tolerated Left Lower Extremity    Assessment  Pt is eager for home today. Reviewed anterior hip prec's during ADL's. Requiring Cal with most ADL's; see below for details. Uses FWW during transfers and ambulation; steady in room, CGA. Education/review on home safety, use of DME/AE. Daughter is present and will be staying with pt upon dc. Rec HH.           Plan  Occupational Therapy Treatment Plan  O.T. Treatment Plan: (P) Modify Current Treatment Plan  Modified Plan: (P) Patient Discharged from Facility    DC Equipment Recommendations: (P) None  Discharge Recommendations: (P) Recommend home health for continued occupational therapy services    Subjective  Pleasant and cooperative.      Objective   02/23/23 0950   Cognition    Cognition / Consciousness X   Speech/ Communication Hard of Hearing   Level of Consciousness Alert   Balance   Sitting Balance (Static) Good   Sitting Balance (Dynamic) Fair   Standing Balance (Static) Fair   Standing Balance (Dynamic) Fair   Weight Shift Sitting Fair   Weight Shift Standing Fair   Skilled Intervention Verbal Cuing   Bed Mobility    Supine to Sit Minimal Assist   Scooting Standby Assist   Skilled Intervention Verbal Cuing   Activities of Daily Living   Grooming Contact Guard Assist   Upper Body Dressing Minimal Assist   Lower Body Dressing Minimal Assist   Toileting Contact Guard Assist   Skilled Intervention Verbal Cuing;Compensatory Strategies   How much help from another person does the patient currently need...   Putting on and taking off regular lower body clothing? 3   Bathing (including washing, rinsing, and drying)? 3   Toileting, which includes using a toilet, bedpan, or urinal? 3   Putting on and taking off regular upper body clothing? 3    Taking care of personal grooming such as brushing teeth? 3   Eating meals? 4   6 Clicks Daily Activity Score 19   Functional Mobility   Sit to Stand Standby Assist   Bed, Chair, Wheelchair Transfer Contact Guard Assist   Toilet Transfers Minimal Assist   Transfer Method Stand Step   Activity Tolerance   Sitting in Chair yes   Sitting Edge of Bed 14   Standing 3x2   Short Term Goals   Goal Outcome # 1 Progressing as expected   Goal Outcome # 2 Progressing as expected   Goal Outcome # 3 Progressing as expected   Goal Outcome # 4 Progressing as expected   Education Group   Role of Occupational Therapist Patient Response Patient;Family;Acceptance;Explanation;Verbal Demonstration   Hip Precautions Patient Response Patient;Family;Acceptance;Explanation;Verbal Demonstration   Home Safety Patient Response Verbal Demonstration;Explanation;Acceptance;Patient;Family   Transfers Patient Response Patient;Acceptance;Explanation;Action Demonstration   ADL Patient Response Patient;Acceptance;Explanation;Action Demonstration   Use of Call Light Patient Response Patient;Acceptance;Explanation;Verbal Demonstration   Occupational Therapy Treatment Plan   O.T. Treatment Plan Modify Current Treatment Plan   Modified Plan Patient Discharged from Facility   Anticipated Discharge Equipment and Recommendations   DC Equipment Recommendations None   Discharge Recommendations Recommend home health for continued occupational therapy services   Interdisciplinary Plan of Care Collaboration   IDT Collaboration with  Nursing;Family / Caregiver   Patient Position at End of Therapy Seated;Phone within Reach;Call Light within Reach;Tray Table within Reach   Collaboration Comments Aware of visit. Dtr will be staying with pt upon dc.

## 2023-02-23 NOTE — DISCHARGE SUMMARY
Discharge Summary    CHIEF COMPLAINT ON ADMISSION  No chief complaint on file.      Reason for Admission  Primary osteoarthritis of left hip     Admission Date  2/21/2023    CODE STATUS  Full Code    HPI & HOSPITAL COURSE  This is a 87 y.o. female here with DJD Left hip.  Had L SUGAR and progressed steadily.   No notes on file    Therefore, she is discharged in good and stable condition to home with close outpatient follow-up.    The patient met 2-midnight criteria for an inpatient stay at the time of discharge.    Discharge Date  2/23/23    FOLLOW UP ITEMS POST DISCHARGE  See me within 2 weeks    DISCHARGE DIAGNOSES  Principal Problem:    Status post left hip replacement POA: Yes  Active Problems:    Primary osteoarthritis of left hip POA: Unknown      Overview: Added automatically from request for surgery [id number]    Retained orthopedic hardware POA: Unknown      Overview: Added automatically from request for surgery [id number]  Resolved Problems:    * No resolved hospital problems. *      FOLLOW UP  Future Appointments   Date Time Provider Department Center   3/6/2023  1:40 PM Rom Chinchilla M.D. [facility]   3/21/2023  1:30 PM Xiomara Wheat M.D. [facility] None   4/18/2023  2:00 PM Bill Farris M.D. [facility] None     No follow-up provider specified.    MEDICATIONS ON DISCHARGE     Medication List        CHANGE how you take these medications        Instructions   hydroxychloroquine 200 MG Tabs  What changed: when to take this  Commonly known as: Plaquenil   TAKE ONE TABLET BY MOUTH ONE TIME DAILY     spironolactone 25 MG Tabs  What changed: when to take this  Commonly known as: ALDACTONE   Take 1 Tablet by mouth every day.  Dose: 25 mg            CONTINUE taking these medications        Instructions   CALCIUM CITRATE PO   Take 200 mg by mouth every evening.  Dose: 200 mg     cholestyramine 4 g packet  Commonly known as: QUESTRAN   Mix and take 4 g by mouth every day.  Dose: 1 Packet     Coenzyme Q10 300 MG Caps    Take 1 Capsule by mouth every day.  Dose: 1 Capsule     denosumab 60 MG/ML Soln  Commonly known as: PROLIA   Inject 1 mL under the skin every 6 months.  Dose: 60 mg     doxycycline 100 MG Tabs  Commonly known as: VIBRAMYCIN   Take 4 Tablets by mouth 1 hour prior to dental appointment.  Dose: 400 mg     FLUoxetine 10 MG Caps  Commonly known as: PROZAC   Take 2 Capsules by mouth every morning.  Dose: 20 mg     furosemide 40 MG Tabs  Commonly known as: LASIX   Take 0.5 Tablets by mouth 1 time a day as needed (lower leg swelling).  Dose: 20 mg     ipratropium 0.06 % Soln  Commonly known as: ATROVENT   Administer 1 [place] into affected nostril(S) 2 times a day.  Dose: 1 Spray     metoprolol SR 25 MG Tb24  Commonly known as: TOPROL XL   Take 0.5 Tablets by mouth every day.  Dose: 12.5 mg     oxyCODONE immediate-release 5 MG Tabs  Commonly known as: ROXICODONE   Take 1-2 Tablets by mouth every four hours as needed for Severe Pain for up to 7 days.  Dose: 5-10 mg     potassium chloride SA 10 MEQ Tbcr  Commonly known as: K-DUR   potassium chloride ER 10 mEq tablet,extended release(part/cryst)     PROBIOTIC-10 PO   Take  by mouth every day.     Restasis 0.05 % ophthalmic emulsion  Generic drug: cycloSPORINE   Administer 1 Drop into both eyes 2 times a day. Pharmacist - please dispense 180 single use vials (72 ml)  Dose: 1 Drop     * rosuvastatin 5 MG Tabs  Commonly known as: CRESTOR   rosuvastatin 5 mg tablet     * rosuvastatin 10 MG Tabs  Commonly known as: CRESTOR   Take 1 Tablet by mouth every evening.  Dose: 10 mg     Synthroid 112 MCG Tabs  Generic drug: levothyroxine   Take 1 Tablet by mouth every morning on an empty stomach.  Dose: 112 mcg     * therapeutic multivitamin-minerals Tabs   Take 1 Tablet by mouth every day.  Dose: 1 Tablet     * PRESERVISION AREDS 2 PO   Take  by mouth every day.     traMADol 50 MG Tabs  Commonly known as: Ultram   Take 1 Tablet by mouth every 4 hours for 7 days. Space at least 1 hour apart  from other opioid medications.  Dose: 50 mg     traZODone 50 MG Tabs  Commonly known as: DESYREL   Take 0.5 Tablets by mouth at bedtime.  Dose: 25 mg           * This list has 4 medication(s) that are the same as other medications prescribed for you. Read the directions carefully, and ask your doctor or other care provider to review them with you.                  Allergies  Allergies   Allergen Reactions    Azithromycin Unspecified     Matti Johnsons syndrome    Cefuroxime Itching and Vomiting     They gave me C-Diff    Ciprofloxacin Itching and Vomiting     They gave me C-Diff    Clindamycin Itching and Vomiting     They gave me c-diff    Daptomycin      Matti colleen syndrome      Erythromycin Unspecified     Matti Johnsons syndrome    Flagyl [Metronidazole] Rash, Vomiting and Nausea     rash    Morphine Itching     Tolerates oxycodone/hydromorphone    Nitrofurantoin Vomiting and Nausea    Rifampin      Matti colleen syndrome    Sulfa Drugs Swelling    Codeine Itching    Macrodantin [Nitrofurantoin Macrocrystal] Rash     Other reaction(s): Decreased Blood Pressure    Premarin Rash and Unspecified     burning       DIET  Orders Placed This Encounter   Procedures    Diet Order Diet: Regular     Standing Status:   Standing     Number of Occurrences:   1     Order Specific Question:   Diet:     Answer:   Regular [1]       ACTIVITY  As tolerated.  Weight bearing as tolerated    CONSULTATIONS  PT/OT    PROCEDURES  L SUGAR and HWR left femur    LABORATORY  Lab Results   Component Value Date    SODIUM 136 02/10/2023    POTASSIUM 4.3 02/10/2023    CHLORIDE 102 02/10/2023    CO2 20 02/10/2023    GLUCOSE 66 02/10/2023    BUN 24 (H) 02/10/2023    CREATININE 0.86 02/10/2023    CREATININE 1.1 06/29/2007        Lab Results   Component Value Date    WBC 6.4 02/10/2023    HEMOGLOBIN 12.0 02/10/2023    HEMATOCRIT 36.8 (L) 02/10/2023    PLATELETCT 273 02/10/2023        Total time of the discharge process exceeds 5 minutes.

## 2023-02-23 NOTE — PROGRESS NOTES
Bedside report received from night RN. Assumed care of patient. Daily plan of care discussed. Pt resting comfortably in bed, wakes easily to voice. Pt aware of plan to discharge home today. Hourly rounding in place.

## 2023-02-23 NOTE — PROGRESS NOTES
BSSR received from Hafsa LAUREN. Patient assisted to bathroom and ambulating in halls. X1 with FWW patient steady on her feet. Patient tolerated well. Patient returned to bed. Plan of care discussed with patient. Bed in low position and brakes applied. Call light within reach and patient denies any needs at this time.

## 2023-02-23 NOTE — THERAPY
Occupational Therapy   Initial Evaluation     Patient Name: Elizabeth Celis  Age:  87 y.o., Sex:  female  Medical Record #: [id number]  Today's Date: 2/22/2023     Precautions  Precautions: (P) Anterior Hip Precautions, Weight Bearing As Tolerated Left Lower Extremity    Assessment  Patient is 87 y.o. female with a diagnosis of Left SUGAR.  Additional factors influencing patient status / progress: Anterior Hip Precautions, WBAT LLE, Left hip discomfort.  Pt reported living alone in a [facility] in [place], [place].  Daughter is visiting from [place] and will have an extended stay with her mother and is avaialbel to assit as needed.  PLOF Mod I to Indep for ADL's, transfers and functional mobility via 4WW (in home) and 3WW(in community).  Pt demonstrated Min assist bed mobility, CGA sit/stand, CGA FWW, CGA transfers, Min assist LB clothing management, Min assist toileting, Min assist UB clothing management, CGA standing at sink for G&H.  Therapist reviewed/educated pt on environmental/safety awareness, fall precautions, Anterior Hip Precautions, AE/DME, ADL's and transfers.    Plan    Occupational Therapy Initial Treatment Plan   Treatment Interventions: (P) Self Care / Activities of Daily Living, Adaptive Equipment, Therapeutic Activity, Neuro Re-Education / Balance  Treatment Frequency: (P) 3 Times per Week  Duration: (P) Until Therapy Goals Met    DC Equipment Recommendations: (P) None  Discharge Recommendations: (P) Recommend home health for continued occupational therapy services     Subjective    Pt was alert and cooperative w/ tx.     Objective       02/22/23 1607    Services   Is patient using  services for this encounter? No   Initial Contact Note    Initial Contact Note Order Received and Verified, Occupational Therapy Evaluation in Progress with Full Report to Follow.   Prior Living Situation   Prior Services Home-Independent   Housing / Facility [facility] [place]   Steps Into Home 0  (ramp)   Steps In  Home 0   Rail None   Bathroom Set up Walk In Shower;Shower Glass Doors;Grab Bars;Shower Chair   Equipment Owned Front-Wheel Walker;4-Wheel Walker;Single Point Cane;Tub / Shower Seat;Grab Bar(s) In Tub / Shower;Bed Side Commode;Hand Held Shower;Reacher;Sock Aid;Long Handled Shoehorn;Ramp;Other (Comments)  (Bidet, 3 wheeled walker (used for community mobility))   Lives with - Patient's Self Care Capacity Alone and Able to Care For Self   Comments Pt reported living alone in a [facility] in [place], [place].  Daughter is visiting from [place] and will have an extended stay with her mother and is avaialbel to assit as needed.  PLOF Mod I to Indep for ADL's, transfers and functional mobility via 4WW (in home) and 3WW(in community).   Prior Level of ADL Function   Self Feeding Independent   Grooming / Hygiene Independent   Bathing Independent   Dressing Independent   Toileting Independent   Prior Level of IADL Function   Medication Management Independent   Laundry Independent   Kitchen Mobility Independent   Finances Independent   Prior Level Of Mobility Independent With Device in Community;Independent With Device in Home   Driving / Transportation Driving Independent   Occupation (Pre-Hospital Vocational) Retired Due To Age   Precautions   Precautions Anterior Hip Precautions;Weight Bearing As Tolerated Left Lower Extremity   Vitals   Pulse Oximetry 94 %   O2 Delivery Device Room air w/o2 available   Pain   Intervention Cold Pack;Rest;Repositioned   Pain 0 - 10 Group   Location Hip   Location Orientation Left   Description Aching   Comfort Goal Perform Activity   Therapist Pain Assessment During Activity;4   Non Verbal Descriptors   Non Verbal Scale  Calm;Unlabored Breathing   Cognition    Cognition / Consciousness WDL   Speech/ Communication Hard of Hearing   Level of Consciousness Alert   Comments [place] - has hearing aides   Active ROM Upper Body   Active ROM Upper Body  WDL   Dominant Hand Right   Strength Upper Body   Upper Body  Strength  WDL   Upper Body Muscle Tone   Upper Body Muscle Tone  WDL   Coordination Upper Body   Coordination WDL   Comments Noted arthritis BUE's   Balance Assessment   Sitting Balance (Static) Fair +   Sitting Balance (Dynamic) Fair +   Standing Balance (Static) Fair   Standing Balance (Dynamic) Fair -   Weight Shift Sitting Fair   Weight Shift Standing Fair   Comments OOB FWW   Bed Mobility    Supine to Sit Minimal Assist   Sit to Supine Minimal Assist   ADL Assessment   Grooming Standing;Contact Guard Assist   Upper Body Dressing Minimal Assist   Lower Body Dressing Minimal Assist   Toileting Minimal Assist   How much help from another person does the patient currently need...   Putting on and taking off regular lower body clothing? 3   Bathing (including washing, rinsing, and drying)? 3   Toileting, which includes using a toilet, bedpan, or urinal? 3   Putting on and taking off regular upper body clothing? 3   Taking care of personal grooming such as brushing teeth? 3   Eating meals? 4   6 Clicks Daily Activity Score 19   Functional Mobility   Sit to Stand Contact Guard Assist   Bed, Chair, Wheelchair Transfer Contact Guard Assist   Toilet Transfers Contact Guard Assist   Transfer Method Stand Step   Mobility CGA FWW, EOB<>commode + sink   Distance (Feet) 15   # of Times Distance was Traveled 2   Edema / Skin Assessment   Edema / Skin  WDL   Comments Clean, dry, intact   Activity Tolerance   Sitting in Chair 5   Sitting Edge of Bed 10   Standing 5x2   Comments sitting/commode 10   Short Term Goals   Short Term Goal # 1 Sup toilet transfer via DME   Short Term Goal # 2 Mod I LB clothing management via AE/DME (uses hip kit at home)   Short Term Goal # 3 Mod I UB clothing management   Short Term Goal # 4 Mod I standing at sink 5+ mins for G&H   Education Group   Education Provided Hip Precautions;Home Safety;Transfers;Role of Occupational Therapist;Activities of Daily Living;Adaptive Equipment;Use of Call Light    Role of Occupational Therapist Patient Response Patient;Acceptance;Explanation;Verbal Demonstration   Hip Precautions Patient Response Patient;Acceptance;Explanation;Demonstration;Handout;Verbal Demonstration;Action Demonstration   Home Safety Patient Response Patient;Acceptance;Explanation;Demonstration;Verbal Demonstration;Action Demonstration   Transfers Patient Response Patient;Acceptance;Explanation;Demonstration;Verbal Demonstration;Action Demonstration;Reinforcement Needed   ADL Patient Response Patient;Acceptance;Explanation;Demonstration;Verbal Demonstration;Action Demonstration;Reinforcement Needed   Adaptive Equipment Patient Response Patient;Acceptance;Explanation;Demonstration;Verbal Demonstration;Action Demonstration;Reinforcement Needed   Use of Call Light Patient Response Patient;Acceptance;Explanation;Verbal Demonstration   Occupational Therapy Initial Treatment Plan    Treatment Interventions Self Care / Activities of Daily Living;Adaptive Equipment;Therapeutic Activity;Neuro Re-Education / Balance   Treatment Frequency 3 Times per Week   Duration Until Therapy Goals Met   Problem List   Problem List Decreased Active Daily Living Skills;Decreased Functional Mobility;Decreased Activity Tolerance;Safety Awareness Deficits / Cognition;Impaired Postural Control / Balance   Anticipated Discharge Equipment and Recommendations   DC Equipment Recommendations None   Discharge Recommendations Recommend home health for continued occupational therapy services

## 2023-02-23 NOTE — DISCHARGE PLANNING
Case Management Discharge Planning    Admission Date: 2/21/2023  GMLOS:    ALOS: 0    6-Clicks ADL Score: 19  6-Clicks Mobility Score: 18      Anticipated Discharge Dispo: Discharge Disposition: D/T to home under HHA care in anticipation of covered skilled care (06)    DME Needed: No    Action(s) Taken: Updated Provider/Nurse on Discharge Plan    0930: RN CM requested HH order from MD.     0937: RN CM requested DPA send referral as order is now place.     Escalations Completed: Provider    Medically Clear: Yes    Next Steps: HH acceptance     Barriers to Discharge: HH acceptance

## 2023-02-23 NOTE — PROGRESS NOTES
"S: Patient doing well, mobilizing without difficulty.    O: Patient has good pain control, good mobility, and operated leg is neurovascularly intact.    Dressing is clean, dry, and intact    Blood pressure (!) 148/75, pulse 79, temperature 36.6 °C (97.9 °F), temperature source Temporal, resp. rate 16, height 1.651 m (5' 5\"), weight 59.9 kg (132 lb 0.9 oz), SpO2 98 %, not currently breastfeeding.            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 choroidal neovascularization (HCC) 11/17/2022    Multilevel degenerative disc disease 10/18/2022    Osteoarthritis of multiple joints 10/18/2022    Long-term use of hydroxychloroquine 10/18/2022    Nonspecific abnormal electrocardiogram (ECG) (EKG) 06/03/2022    PVC (premature ventricular contraction) 06/03/2022    Chronic diarrhea 06/02/2022    Amputation of left middle finger 06/02/2022    DNR (do not resuscitate) 05/19/2022    Chronic left hip pain 04/01/2022    Insomnia 04/01/2022    BMI 21.0-21.9, adult 03/21/2022    Hypercholesterolemia 03/21/2022    Atherosclerosis of both carotid arteries 01/24/2022    History of DVT (deep vein thrombosis) 12/01/2021    History of compression fracture of spine, Jan. 2020 (T12 and L1) 05/12/2021    Age-related osteoporosis without current pathological fracture 11/12/2020    Anemia 01/19/2020    Neuropathic pain 01/17/2020    Peripheral edema 01/15/2020    Spinal stenosis of lumbar region with neurogenic claudication 01/10/2020    Ex-cigarette " smoker 07/29/2019    History of recurrent UTIs 03/27/2019    Hypothyroidism 02/10/2019    History of anemia 01/12/2018    Generalized weakness 01/12/2018    Raynaud disease 11/11/2017    CKD (chronic kidney disease) stage 3, GFR 30-59 ml/min ([facility]) 11/11/2017    History of falling 11/11/2017    History of Clostridium difficile 02/21/2016    Depression 02/21/2016    Seronegative rheumatoid arthritis ([facility]) 09/26/2014    History of sciatica 09/26/2014         PLAN:Patient ready for discharge to home if they are safe and comfortable

## 2023-02-25 ENCOUNTER — HOME CARE VISIT (OUTPATIENT)
Dept: HOME HEALTH SERVICES | Facility: HOME HEALTHCARE | Age: 88
End: 2023-02-25
Payer: MEDICARE

## 2023-02-25 VITALS
HEART RATE: 70 BPM | TEMPERATURE: 97.4 F | SYSTOLIC BLOOD PRESSURE: 115 MMHG | RESPIRATION RATE: 16 BRPM | DIASTOLIC BLOOD PRESSURE: 60 MMHG

## 2023-02-25 PROCEDURE — G0493 RN CARE EA 15 MIN HH/HOSPICE: HCPCS

## 2023-02-25 PROCEDURE — 665001 SOC-HOME HEALTH

## 2023-02-25 ASSESSMENT — ENCOUNTER SYMPTOMS
HYPERTENSION: 1
PAIN LOCATION - PAIN SEVERITY: 8/10
LOWER EXTREMITY EDEMA: 1
LAST BOWEL MOVEMENT: 66528
SUBJECTIVE PAIN PROGRESSION: WAXING AND WANING
LOWEST PAIN SEVERITY IN PAST 24 HOURS: 0/10
PAIN SEVERITY GOAL: 2/10
PAIN: 1
DEBILITATING PAIN: 1
PERSON REPORTING PAIN: PATIENT
HIGHEST PAIN SEVERITY IN PAST 24 HOURS: 8/10
CONSTIPATION: 1
DRY SKIN: 1
FATIGUES EASILY: 1
PAIN LOCATION: LT HIP

## 2023-02-25 ASSESSMENT — ACTIVITIES OF DAILY LIVING (ADL)
AMBULATION ASSISTANCE: MINIMUM ASSIST
PHYSICAL TRANSFERS ASSESSED: 1
AMBULATION ASSISTANCE: 1
CURRENT_FUNCTION: MINIMUM ASSIST
OASIS_M1830: 03

## 2023-02-25 NOTE — CASE COMMUNICATION
PRIMARY DIAGNOSIS: lt hip replacement  SKILLED NEED: fall prevention, medication management, health assessment, wound care    NURSING FREQUENCY: 1w1, 2w5, 3prn  ZIPE CODE: [id number]  DISCIPLINES ORDERED: PT OT SN  INSURANCE: [facility]  CERTIFICATION PERIOD: 2/25-4/25  SPECIAL CONSIDERATION: NONE  Case communication to HH attending provider and P amb anti    Opened patient to [facility] medication reconciliation process done. Medicatio n list left in home care folder. See MAR for drug allergy interactions. There are   major drug to drug interactions.  The best contact phone number is  and PT AND DAUGHTER manages the medications.      Drug-Drug: FLUoxetine and traZODone   Additive serotonergic effects may occur during coadministration of selective serotonin reuptake inhibitors (SSRIs) and traZODone, and the risk of developing serotonin syndrome may be incre ased.   Details Major   FLUoxetine (PROZAC) 10 MG Cap     traZODone (DESYREL) 50 MG Tab   Drug-Drug  <jscript:void(0)>  Drug-Drug: FLUoxetine and traMADol   Selective serotonin reuptake inhibitors that are strong CYP2D6 inhibitors may enhance the adverse/toxic effects of tramadol. Specifically, the risk for serotonin syndrome/toxicity and seizures may be increased. The therapeutic effect of tramadol may also be diminished.   Details Bernard or   FLUoxetine (PROZAC) 10 MG Cap     traMADol (ULTRAM) 50 MG Tab   Drug-Drug  <jscript:void(0)>  Drug-Drug: traMADol and traZODone   Serotonergic effects of this combination may be additive. The risk for serotonin syndrome/toxicity may be increased. Additionally, additive CNS depression may occur.   Details Major   traMADol (ULTRAM) 50 MG Tab     traZODone (DESYREL) 50 MG Tab   Drug-Drug  <jscript:void(0)>  Drug-Drug: potassium chlori de SA and spironolactone   Co-administration of potassium chloride SA and spironolactone may cause hyperkalemia, possibly leading to cardiac arrhythmias or cardiac arrest.   Details  Major   potassium chloride SA (K-DUR) 10 MEQ Tab CR     spironolactone (ALDACTONE) 25 MG Tab

## 2023-02-25 NOTE — Clinical Note
Noted thank you.  ----- Message -----  From: Maggy Bartholomew R.N.  Sent: 2/25/2023  12:07 PM PST  To: Katie Levine R.N., *        PRIMARY DIAGNOSIS: lt hip replacement  SKILLED NEED: fall prevention, medication management, health assessment, wound care    NURSING FREQUENCY: 1w1, 2w5, 3prn  ZIPE CODE: [id number]  DISCIPLINES ORDERED: PT OT SN  INSURANCE: [facility]  CERTIFICATION PERIOD: 2/25-4/25  SPECIAL CONSIDERATION: NONE  Case communication to HH attending provider and P amb anti    Opened patient to [facility] medication reconciliation process done. Medication list left in home care folder. See MAR for drug allergy interactions. There are   major drug to drug interactions.  The best contact phone number is  and PT AND DAUGHTER manages the medications.      Drug-Drug: FLUoxetine and traZODone   Additive serotonergic effects may occur during coadministration of selective serotonin reuptake inhibitors (SSRIs) and traZODone, and the risk of developing serotonin syndrome may be increased.   Details Major   FLUoxetine (PROZAC) 10 MG Cap     traZODone (DESYREL) 50 MG Tab   Drug-Drug  <jscript:void(0)>  Drug-Drug: FLUoxetine and traMADol   Selective serotonin reuptake inhibitors that are strong CYP2D6 inhibitors may enhance the adverse/toxic effects of tramadol. Specifically, the risk for serotonin syndrome/toxicity and seizures may be increased. The therapeutic effect of tramadol may also be diminished.   Details Major   FLUoxetine (PROZAC) 10 MG Cap     traMADol (ULTRAM) 50 MG Tab   Drug-Drug  <jscript:void(0)>  Drug-Drug: traMADol and traZODone   Serotonergic effects of this combination may be additive. The risk for serotonin syndrome/toxicity may be increased. Additionally, additive CNS depression may occur.   Details Major   traMADol (ULTRAM) 50 MG Tab     traZODone (DESYREL) 50 MG Tab   Drug-Drug  <jscript:void(0)>  Drug-Drug: potassium chloride SA and spironolactone    Co-administration of potassium chloride SA and spironolactone may cause hyperkalemia, possibly leading to cardiac arrhythmias or cardiac arrest.   Details Major   potassium chloride SA (K-DUR) 10 MEQ Tab CR     spironolactone (ALDACTONE) 25 MG Tab

## 2023-02-27 ENCOUNTER — HOME CARE VISIT (OUTPATIENT)
Dept: HOME HEALTH SERVICES | Facility: HOME HEALTHCARE | Age: 88
End: 2023-02-27
Payer: MEDICARE

## 2023-02-27 ENCOUNTER — PATIENT OUTREACH (OUTPATIENT)
Dept: HEALTH INFORMATION MANAGEMENT | Facility: OTHER | Age: 88
End: 2023-02-27
Payer: MEDICARE

## 2023-02-27 VITALS
OXYGEN SATURATION: 97 % | RESPIRATION RATE: 16 BRPM | HEART RATE: 64 BPM | DIASTOLIC BLOOD PRESSURE: 60 MMHG | TEMPERATURE: 96.3 F | SYSTOLIC BLOOD PRESSURE: 136 MMHG

## 2023-02-27 PROCEDURE — G0299 HHS/HOSPICE OF RN EA 15 MIN: HCPCS

## 2023-02-27 PROCEDURE — G0151 HHCP-SERV OF PT,EA 15 MIN: HCPCS

## 2023-02-27 NOTE — PROGRESS NOTES
CHW Aziza received referral to help pt with Meals on Wheels application post discharge. CHW was unable to reach pt at this time. CHW left  providing CCM contact information. CHW will try to contact pt again this week.

## 2023-02-28 VITALS
DIASTOLIC BLOOD PRESSURE: 60 MMHG | SYSTOLIC BLOOD PRESSURE: 136 MMHG | HEART RATE: 64 BPM | TEMPERATURE: 97.6 F | RESPIRATION RATE: 16 BRPM | OXYGEN SATURATION: 97 %

## 2023-02-28 RX ORDER — CHOLESTYRAMINE 4 G/9G
1 POWDER, FOR SUSPENSION ORAL PRN
Qty: 100 EACH | Refills: 3 | Status: SHIPPED | OUTPATIENT
Start: 2023-02-28 | End: 2024-01-09 | Stop reason: SDUPTHER

## 2023-02-28 SDOH — ECONOMIC STABILITY: HOUSING INSECURITY: HOME SAFETY: PT LIVES ALONE AND DTR TRINA IS STAYING WITH HER TILL SHE IS BETTER

## 2023-02-28 ASSESSMENT — ENCOUNTER SYMPTOMS
PAIN SEVERITY GOAL: 0/10
PERSON REPORTING PAIN: PATIENT
PAIN LOCATION: LEFT HIP
PAIN LOCATION - PAIN SEVERITY: 1/10
HIGHEST PAIN SEVERITY IN PAST 24 HOURS: 7/10
PAIN: 1
LOWEST PAIN SEVERITY IN PAST 24 HOURS: 2/10
MUSCLE WEAKNESS: 1
SUBJECTIVE PAIN PROGRESSION: UNCHANGED
BOWEL PATTERN NORMAL: 1
MUSCLE WEAKNESS: 1
VOMITING: DENIES
HIGHEST PAIN SEVERITY IN PAST 24 HOURS: 2/10
LIMITED RANGE OF MOTION: 1
PAIN LOCATION - PAIN QUALITY: ACHES
PAIN SEVERITY GOAL: 0/10
PAIN LOCATION - PAIN FREQUENCY: INTERMITTENT
LOWEST PAIN SEVERITY IN PAST 24 HOURS: 1/10
SUBJECTIVE PAIN PROGRESSION: WAXING AND WANING
NAUSEA: DENIES
STOOL FREQUENCY: DAILY
LAST BOWEL MOVEMENT: 66532
ARTHRALGIAS: 1
PERSON REPORTING PAIN: PATIENT
PAIN: 1
PAIN LOCATION - RELIEVING FACTORS: PAIN MEDS

## 2023-02-28 ASSESSMENT — ACTIVITIES OF DAILY LIVING (ADL)
BATHING_COMMENTS: SEE OT EVAL FOR MORE DETAILS.
CURRENT_FUNCTION: SUPERVISION
AMBULATION ASSISTANCE: STAND BY ASSIST
FEEDING_COMMENTS: SEE OT EVAL FOR MORE DETAILS.
AMBULATION ASSISTANCE ON FLAT SURFACES: 1
CURRENT_FUNCTION: STAND BY ASSIST
AMBULATION ASSISTANCE: 1
AMBULATION ASSISTANCE: SUPERVISION
ADLS_COMMENTS: SEE OT EVAL FOR MORE DETAILS.
GROOMING_COMMENTS: SEE OT EVAL FOR MORE DETAILS.
PHYSICAL TRANSFERS ASSESSED: 1

## 2023-02-28 ASSESSMENT — GAIT ASSESSMENTS
WALKING STANCE: 0 - HEELS APART
GAIT SCORE: 7
PATH SCORE: 1
TRUNK: 0 - MARKED SWAY OR USES WALKING AID
TRUNK SCORE: 0
STEP CONTINUITY: 0 - STOPPING OR DISCONTINUITY BETWEEN STEPS
PATH: 1 - MILD/MODERATE DEVIATION OR USES WALKING AID
BALANCE AND GAIT SCORE: 16
STEP SYMMETRY: 1 - RIGHT AND LEFT STEP LENGTH APPEAR EQUAL
INITIATION OF GAIT IMMEDIATELY AFTER GO: 1 - NO HESITANCY

## 2023-02-28 ASSESSMENT — BALANCE ASSESSMENTS
SITTING DOWN: 1 - USES ARMS OR NOT SMOOTH MOTION
EYES CLOSED AT MAXIMUM POSITION NUDGED: 0 - UNSTEADY
ARISING SCORE: 1
SITTING BALANCE: 1 - STEADY, SAFE
ARISES: 1 - ABLE, USES ARMS TO HELP
ATTEMPTS TO ARISE: 1 - ABLE, REQUIRES MORE THAN ONE ATTEMPT
BALANCE SCORE: 9
TURNING 360 DEGREES STEPS: 0 - DISCONTINUOUS STEPS
IMMEDIATE STANDING BALANCE FIRST 5 SECONDS: 1 - STEADY BUT USES WALKER OR OTHER SUPPORT
NUDGED: 2 - STEADY
STANDING BALANCE: 1 - STEADY BUT WIDE STANCE AND USES CANE OR OTHER SUPPORT
NUDGED SCORE: 2

## 2023-03-03 ENCOUNTER — HOME CARE VISIT (OUTPATIENT)
Dept: HOME HEALTH SERVICES | Facility: HOME HEALTHCARE | Age: 88
End: 2023-03-03
Payer: MEDICARE

## 2023-03-03 ENCOUNTER — PATIENT MESSAGE (OUTPATIENT)
Dept: HEALTH INFORMATION MANAGEMENT | Facility: OTHER | Age: 88
End: 2023-03-03

## 2023-03-03 VITALS
SYSTOLIC BLOOD PRESSURE: 104 MMHG | HEART RATE: 70 BPM | DIASTOLIC BLOOD PRESSURE: 56 MMHG | OXYGEN SATURATION: 99 % | RESPIRATION RATE: 14 BRPM | TEMPERATURE: 98.3 F

## 2023-03-03 VITALS
SYSTOLIC BLOOD PRESSURE: 104 MMHG | DIASTOLIC BLOOD PRESSURE: 56 MMHG | HEART RATE: 70 BPM | RESPIRATION RATE: 16 BRPM | OXYGEN SATURATION: 99 % | TEMPERATURE: 98.3 F

## 2023-03-03 PROCEDURE — G0151 HHCP-SERV OF PT,EA 15 MIN: HCPCS

## 2023-03-03 PROCEDURE — G0299 HHS/HOSPICE OF RN EA 15 MIN: HCPCS

## 2023-03-03 ASSESSMENT — ENCOUNTER SYMPTOMS
MUSCLE WEAKNESS: 1
VOMITING: DENIES
BOWEL PATTERN NORMAL: 1
LAST BOWEL MOVEMENT: 66536
SUBJECTIVE PAIN PROGRESSION: UNCHANGED
HIGHEST PAIN SEVERITY IN PAST 24 HOURS: 0/10
PERSON REPORTING PAIN: PATIENT
DENIES PAIN: 1
STOOL FREQUENCY: DAILY
NAUSEA: DENIES
PAIN SEVERITY GOAL: 0/10
LOWEST PAIN SEVERITY IN PAST 24 HOURS: 0/10

## 2023-03-03 ASSESSMENT — ACTIVITIES OF DAILY LIVING (ADL)
CURRENT_FUNCTION: STAND BY ASSIST
AMBULATION ASSISTANCE: STAND BY ASSIST

## 2023-03-03 NOTE — PROGRESS NOTES
CHW Aziza contacted pt. Pt transferred call to daughter Mazin. CHW notified Mazin that referral was placed requesting help with Meals on Wheels application. Mazin states that pt is okay at the moment but will benefit from it in the future. Mazin requested Meals on Wheels information be sent via [address] message. CHW will send [employer] contact information. CHW asked Julia if there was anything else that was needed at this time. Mazin states no other needs. CHW notified Mazin the CHW will include CCM's contact information in [address] message was well. CHW encouraged Mazin to call back if any other resources were needed. CHROBERTO Ervin will not continue to follow at this time.

## 2023-03-05 ASSESSMENT — ENCOUNTER SYMPTOMS
PAIN LOCATION - PAIN QUALITY: ACHES
PAIN LOCATION - EXACERBATING FACTORS: SWELLING
PAIN LOCATION: LEFT HIP
PAIN LOCATION - PAIN FREQUENCY: INTERMITTENT
HIGHEST PAIN SEVERITY IN PAST 24 HOURS: 6/10
PAIN: 1
PAIN LOCATION - RELIEVING FACTORS: PAIN MEDS
PERSON REPORTING PAIN: PATIENT
PAIN SEVERITY GOAL: 1/10
PAIN LOCATION - PAIN SEVERITY: 8/10
SUBJECTIVE PAIN PROGRESSION: WAXING AND WANING
LOWEST PAIN SEVERITY IN PAST 24 HOURS: 3/10

## 2023-03-06 ENCOUNTER — HOME CARE VISIT (OUTPATIENT)
Dept: HOME HEALTH SERVICES | Facility: HOME HEALTHCARE | Age: 88
End: 2023-03-06
Payer: MEDICARE

## 2023-03-06 NOTE — CASE COMMUNICATION
Quality Review Completed for 2/25 SOC OASIS by SERGEY Farley, RN on 3/6/2023:     Edits completed by SERGEY Farley RN:     1.  and  diagnosis coding updated per chart review  2.  is 2/23 per intake for valid referral   3.  is NA, the surgery was outpatient  4. Per narrative,  changed to 3  5. [id number] per PT eval is mod due to L leg needs lifted up onto bed.   6.  changed to 2/27 for PT collaboration  7. [id number] D,  E, F, I, J changed to #3 per narrative. K, L, M, N, O changed to NA, per PT only able to ambulate 100' and goal is 3 steps only.  8.  is #3 for the prolia  9. Added to 485 forms the following: exercises prescribed to Activities Permitted, NA to Functional Limitations, needle precautions to Safety Measures   10.   changed to 8 per therapy  11.  Unchecked pressure ulcer dietician referral

## 2023-03-07 ENCOUNTER — HOME CARE VISIT (OUTPATIENT)
Dept: HOME HEALTH SERVICES | Facility: HOME HEALTHCARE | Age: 88
End: 2023-03-07
Payer: MEDICARE

## 2023-03-07 VITALS
RESPIRATION RATE: 16 BRPM | HEART RATE: 74 BPM | OXYGEN SATURATION: 94 % | DIASTOLIC BLOOD PRESSURE: 62 MMHG | SYSTOLIC BLOOD PRESSURE: 128 MMHG | TEMPERATURE: 98 F

## 2023-03-07 PROCEDURE — G0493 RN CARE EA 15 MIN HH/HOSPICE: HCPCS

## 2023-03-07 PROCEDURE — G0151 HHCP-SERV OF PT,EA 15 MIN: HCPCS

## 2023-03-07 ASSESSMENT — ENCOUNTER SYMPTOMS
LAST BOWEL MOVEMENT: 66540
BOWEL PATTERN NORMAL: 1
MUSCLE WEAKNESS: 1
VOMITING: DENIES
NAUSEA: DENES
STOOL FREQUENCY: DAILY
SUBJECTIVE PAIN PROGRESSION: UNCHANGED
PAIN SEVERITY GOAL: 0/10
HIGHEST PAIN SEVERITY IN PAST 24 HOURS: 4/10
PAIN: 1
LOWEST PAIN SEVERITY IN PAST 24 HOURS: 4/10

## 2023-03-07 ASSESSMENT — ACTIVITIES OF DAILY LIVING (ADL): AMBULATION ASSISTANCE: STAND BY ASSIST

## 2023-03-07 NOTE — CASE COMMUNICATION
i agree with this  ----- Message -----  From: Selin Farley R.N.  Sent: 3/6/2023   9:34 AM PST  To: Maggy Bartholomew R.N.      Quality Review Completed for 2/25 SOC OASIS by SERGEY Farley, RN on 3/6/2023:     Edits completed by SERGEY Farley, RN:     1.  and  diagnosis coding updated per chart review  2.  is 2/23 per intake for valid referral   3.  is NA, the surgery was outpatient  4. Per narrative,  changed to  3  5. [id number] per PT eval is mod due to L leg needs lifted up onto bed.   6.  changed to 2/27 for PT collaboration  7. [id number] D, E, F, I, J changed to #3 per narrative. K, L, M, N, O changed to NA, per PT only able to ambulate 100' and goal is 3 steps only.  8.  is #3 for the prolia  9. Added to 485 forms the following: exercises prescribed to Activities Permitted, NA to Functional Limitations, needle precautions to Safety Me asures   10.   changed to 8 per therapy  11.  Unchecked pressure ulcer dietician referral

## 2023-03-09 ENCOUNTER — HOME CARE VISIT (OUTPATIENT)
Dept: HOME HEALTH SERVICES | Facility: HOME HEALTHCARE | Age: 88
End: 2023-03-09
Payer: MEDICARE

## 2023-03-09 PROCEDURE — G0151 HHCP-SERV OF PT,EA 15 MIN: HCPCS

## 2023-03-09 ASSESSMENT — ENCOUNTER SYMPTOMS
PAIN LOCATION: LEFT HIP
PAIN LOCATION - PAIN FREQUENCY: INTERMITTENT
PAIN LOCATION - RELIEVING FACTORS: PAIN MEDS AS NEEDED
PERSON REPORTING PAIN: PATIENT
PAIN LOCATION - PAIN QUALITY: ACHES
PAIN: 1
LOWEST PAIN SEVERITY IN PAST 24 HOURS: 3/10
HIGHEST PAIN SEVERITY IN PAST 24 HOURS: 6/10
SUBJECTIVE PAIN PROGRESSION: WAXING AND WANING
PAIN LOCATION - PAIN SEVERITY: 3/10
PAIN SEVERITY GOAL: 1/10

## 2023-03-10 ENCOUNTER — HOSPITAL ENCOUNTER (INPATIENT)
Facility: MEDICAL CENTER | Age: 88
LOS: 1 days | DRG: 543 | End: 2023-03-12
Attending: EMERGENCY MEDICINE | Admitting: INTERNAL MEDICINE
Payer: MEDICARE

## 2023-03-10 ENCOUNTER — APPOINTMENT (OUTPATIENT)
Dept: RADIOLOGY | Facility: MEDICAL CENTER | Age: 88
DRG: 543 | End: 2023-03-10
Attending: EMERGENCY MEDICINE
Payer: MEDICARE

## 2023-03-10 ENCOUNTER — PHARMACY VISIT (OUTPATIENT)
Dept: PHARMACY | Facility: MEDICAL CENTER | Age: 88
End: 2023-03-10
Payer: COMMERCIAL

## 2023-03-10 ENCOUNTER — HOME CARE VISIT (OUTPATIENT)
Dept: HOME HEALTH SERVICES | Facility: HOME HEALTHCARE | Age: 88
End: 2023-03-10
Payer: MEDICARE

## 2023-03-10 VITALS
OXYGEN SATURATION: 97 % | SYSTOLIC BLOOD PRESSURE: 128 MMHG | RESPIRATION RATE: 16 BRPM | RESPIRATION RATE: 16 BRPM | HEART RATE: 80 BPM | TEMPERATURE: 97.8 F | HEART RATE: 74 BPM | DIASTOLIC BLOOD PRESSURE: 62 MMHG | SYSTOLIC BLOOD PRESSURE: 122 MMHG | DIASTOLIC BLOOD PRESSURE: 60 MMHG | TEMPERATURE: 98 F

## 2023-03-10 DIAGNOSIS — M80.059A HIP FRACTURE DUE TO OSTEOPOROSIS, INITIAL ENCOUNTER (HCC): ICD-10-CM

## 2023-03-10 DIAGNOSIS — S72.115A CLOSED NONDISPLACED FRACTURE OF GREATER TROCHANTER OF LEFT FEMUR, INITIAL ENCOUNTER (HCC): ICD-10-CM

## 2023-03-10 DIAGNOSIS — M25.559 HIP PAIN: ICD-10-CM

## 2023-03-10 DIAGNOSIS — D64.9 ANEMIA, UNSPECIFIED TYPE: ICD-10-CM

## 2023-03-10 PROBLEM — M54.32 SCIATIC PAIN, LEFT: Status: ACTIVE | Noted: 2023-03-10

## 2023-03-10 PROBLEM — D62 ACUTE BLOOD LOSS ANEMIA: Status: ACTIVE | Noted: 2023-03-10

## 2023-03-10 LAB
ALBUMIN SERPL BCP-MCNC: 3.4 G/DL (ref 3.2–4.9)
ALBUMIN/GLOB SERPL: 1.3 G/DL
ALP SERPL-CCNC: 115 U/L (ref 30–99)
ALT SERPL-CCNC: 20 U/L (ref 2–50)
ANION GAP SERPL CALC-SCNC: 11 MMOL/L (ref 7–16)
AST SERPL-CCNC: 25 U/L (ref 12–45)
BASOPHILS # BLD AUTO: 0.6 % (ref 0–1.8)
BASOPHILS # BLD: 0.04 K/UL (ref 0–0.12)
BILIRUB SERPL-MCNC: 0.3 MG/DL (ref 0.1–1.5)
BUN SERPL-MCNC: 33 MG/DL (ref 8–22)
CALCIUM ALBUM COR SERPL-MCNC: 9.1 MG/DL (ref 8.5–10.5)
CALCIUM SERPL-MCNC: 8.6 MG/DL (ref 8.4–10.2)
CHLORIDE SERPL-SCNC: 103 MMOL/L (ref 96–112)
CO2 SERPL-SCNC: 19 MMOL/L (ref 20–33)
CREAT SERPL-MCNC: 0.87 MG/DL (ref 0.5–1.4)
EOSINOPHIL # BLD AUTO: 0.23 K/UL (ref 0–0.51)
EOSINOPHIL NFR BLD: 3.7 % (ref 0–6.9)
ERYTHROCYTE [DISTWIDTH] IN BLOOD BY AUTOMATED COUNT: 53.8 FL (ref 35.9–50)
GFR SERPLBLD CREATININE-BSD FMLA CKD-EPI: 64 ML/MIN/1.73 M 2
GLOBULIN SER CALC-MCNC: 2.6 G/DL (ref 1.9–3.5)
GLUCOSE SERPL-MCNC: 80 MG/DL (ref 65–99)
HCT VFR BLD AUTO: 25.2 % (ref 37–47)
HGB BLD-MCNC: 8 G/DL (ref 12–16)
IMM GRANULOCYTES # BLD AUTO: 0.04 K/UL (ref 0–0.11)
IMM GRANULOCYTES NFR BLD AUTO: 0.6 % (ref 0–0.9)
LYMPHOCYTES # BLD AUTO: 1.13 K/UL (ref 1–4.8)
LYMPHOCYTES NFR BLD: 18.1 % (ref 22–41)
MCH RBC QN AUTO: 32.8 PG (ref 27–33)
MCHC RBC AUTO-ENTMCNC: 31.7 G/DL (ref 33.6–35)
MCV RBC AUTO: 103.3 FL (ref 81.4–97.8)
MONOCYTES # BLD AUTO: 0.45 K/UL (ref 0–0.85)
MONOCYTES NFR BLD AUTO: 7.2 % (ref 0–13.4)
NEUTROPHILS # BLD AUTO: 4.34 K/UL (ref 2–7.15)
NEUTROPHILS NFR BLD: 69.8 % (ref 44–72)
NRBC # BLD AUTO: 0 K/UL
NRBC BLD-RTO: 0 /100 WBC
PLATELET # BLD AUTO: 408 K/UL (ref 164–446)
PMV BLD AUTO: 8.5 FL (ref 9–12.9)
POTASSIUM SERPL-SCNC: 4.5 MMOL/L (ref 3.6–5.5)
PROT SERPL-MCNC: 6 G/DL (ref 6–8.2)
RBC # BLD AUTO: 2.44 M/UL (ref 4.2–5.4)
SODIUM SERPL-SCNC: 133 MMOL/L (ref 135–145)
TSH SERPL DL<=0.005 MIU/L-ACNC: 8.23 UIU/ML (ref 0.38–5.33)
WBC # BLD AUTO: 6.2 K/UL (ref 4.8–10.8)

## 2023-03-10 PROCEDURE — 700102 HCHG RX REV CODE 250 W/ 637 OVERRIDE(OP): Performed by: HOSPITALIST

## 2023-03-10 PROCEDURE — 85025 COMPLETE CBC W/AUTO DIFF WBC: CPT

## 2023-03-10 PROCEDURE — G0299 HHS/HOSPICE OF RN EA 15 MIN: HCPCS

## 2023-03-10 PROCEDURE — G0378 HOSPITAL OBSERVATION PER HR: HCPCS

## 2023-03-10 PROCEDURE — 94760 N-INVAS EAR/PLS OXIMETRY 1: CPT

## 2023-03-10 PROCEDURE — 80053 COMPREHEN METABOLIC PANEL: CPT

## 2023-03-10 PROCEDURE — A9270 NON-COVERED ITEM OR SERVICE: HCPCS | Performed by: HOSPITALIST

## 2023-03-10 PROCEDURE — 93971 EXTREMITY STUDY: CPT | Mod: 26,LT | Performed by: INTERNAL MEDICINE

## 2023-03-10 PROCEDURE — 36415 COLL VENOUS BLD VENIPUNCTURE: CPT

## 2023-03-10 PROCEDURE — 99223 1ST HOSP IP/OBS HIGH 75: CPT | Performed by: HOSPITALIST

## 2023-03-10 PROCEDURE — 99285 EMERGENCY DEPT VISIT HI MDM: CPT

## 2023-03-10 PROCEDURE — 93971 EXTREMITY STUDY: CPT | Mod: LT

## 2023-03-10 PROCEDURE — RXMED WILLOW AMBULATORY MEDICATION CHARGE: Performed by: FAMILY MEDICINE

## 2023-03-10 PROCEDURE — 84443 ASSAY THYROID STIM HORMONE: CPT

## 2023-03-10 PROCEDURE — 73502 X-RAY EXAM HIP UNI 2-3 VIEWS: CPT | Mod: LT

## 2023-03-10 RX ORDER — CYCLOSPORINE 0.5 MG/ML
1 EMULSION OPHTHALMIC 2 TIMES DAILY
Status: DISCONTINUED | OUTPATIENT
Start: 2023-03-10 | End: 2023-03-10

## 2023-03-10 RX ORDER — LEVOTHYROXINE SODIUM 112 UG/1
112 TABLET ORAL
Status: DISCONTINUED | OUTPATIENT
Start: 2023-03-11 | End: 2023-03-12 | Stop reason: HOSPADM

## 2023-03-10 RX ORDER — IPRATROPIUM BROMIDE 42 UG/1
1 SPRAY, METERED NASAL 2 TIMES DAILY
Status: DISCONTINUED | OUTPATIENT
Start: 2023-03-10 | End: 2023-03-10

## 2023-03-10 RX ORDER — CARBOXYMETHYLCELLULOSE SODIUM 5 MG/ML
1-2 SOLUTION/ DROPS OPHTHALMIC
Status: DISCONTINUED | OUTPATIENT
Start: 2023-03-10 | End: 2023-03-12 | Stop reason: HOSPADM

## 2023-03-10 RX ORDER — POTASSIUM CHLORIDE 20 MEQ/1
10 TABLET, EXTENDED RELEASE ORAL DAILY
Status: DISCONTINUED | OUTPATIENT
Start: 2023-03-11 | End: 2023-03-12 | Stop reason: HOSPADM

## 2023-03-10 RX ORDER — ROSUVASTATIN CALCIUM 10 MG/1
5 TABLET, COATED ORAL EVERY EVENING
Status: DISCONTINUED | OUTPATIENT
Start: 2023-03-10 | End: 2023-03-12 | Stop reason: HOSPADM

## 2023-03-10 RX ORDER — DIPHENHYDRAMINE HYDROCHLORIDE 50 MG/ML
50 INJECTION INTRAMUSCULAR; INTRAVENOUS EVERY 6 HOURS PRN
Status: DISCONTINUED | OUTPATIENT
Start: 2023-03-10 | End: 2023-03-12

## 2023-03-10 RX ORDER — BISACODYL 10 MG
10 SUPPOSITORY, RECTAL RECTAL
Status: DISCONTINUED | OUTPATIENT
Start: 2023-03-10 | End: 2023-03-12 | Stop reason: HOSPADM

## 2023-03-10 RX ORDER — OXYCODONE HYDROCHLORIDE 5 MG/1
2.5 TABLET ORAL
Status: DISCONTINUED | OUTPATIENT
Start: 2023-03-10 | End: 2023-03-12 | Stop reason: HOSPADM

## 2023-03-10 RX ORDER — ACETAMINOPHEN 325 MG/1
650 TABLET ORAL EVERY 6 HOURS PRN
Status: DISCONTINUED | OUTPATIENT
Start: 2023-03-10 | End: 2023-03-11

## 2023-03-10 RX ORDER — AMOXICILLIN 250 MG
2 CAPSULE ORAL 2 TIMES DAILY
Status: DISCONTINUED | OUTPATIENT
Start: 2023-03-11 | End: 2023-03-12 | Stop reason: HOSPADM

## 2023-03-10 RX ORDER — ONDANSETRON 2 MG/ML
4 INJECTION INTRAMUSCULAR; INTRAVENOUS EVERY 4 HOURS PRN
Status: DISCONTINUED | OUTPATIENT
Start: 2023-03-10 | End: 2023-03-12 | Stop reason: HOSPADM

## 2023-03-10 RX ORDER — SPIRONOLACTONE 25 MG/1
25 TABLET ORAL EVERY EVENING
Status: DISCONTINUED | OUTPATIENT
Start: 2023-03-10 | End: 2023-03-12 | Stop reason: HOSPADM

## 2023-03-10 RX ORDER — POLYETHYLENE GLYCOL 3350 17 G/17G
1 POWDER, FOR SOLUTION ORAL
Status: DISCONTINUED | OUTPATIENT
Start: 2023-03-10 | End: 2023-03-12 | Stop reason: HOSPADM

## 2023-03-10 RX ORDER — FUROSEMIDE 20 MG/1
20 TABLET ORAL
Status: DISCONTINUED | OUTPATIENT
Start: 2023-03-10 | End: 2023-03-12 | Stop reason: HOSPADM

## 2023-03-10 RX ORDER — TRAZODONE HYDROCHLORIDE 50 MG/1
25 TABLET ORAL
Status: DISCONTINUED | OUTPATIENT
Start: 2023-03-10 | End: 2023-03-12 | Stop reason: HOSPADM

## 2023-03-10 RX ORDER — METOPROLOL SUCCINATE 25 MG/1
12.5 TABLET, EXTENDED RELEASE ORAL DAILY
Status: DISCONTINUED | OUTPATIENT
Start: 2023-03-11 | End: 2023-03-12 | Stop reason: HOSPADM

## 2023-03-10 RX ORDER — MORPHINE SULFATE 4 MG/ML
2 INJECTION INTRAVENOUS
Status: DISCONTINUED | OUTPATIENT
Start: 2023-03-10 | End: 2023-03-12 | Stop reason: HOSPADM

## 2023-03-10 RX ORDER — OXYCODONE HYDROCHLORIDE 5 MG/1
5 TABLET ORAL
Status: DISCONTINUED | OUTPATIENT
Start: 2023-03-10 | End: 2023-03-12 | Stop reason: HOSPADM

## 2023-03-10 RX ORDER — ONDANSETRON 4 MG/1
4 TABLET, ORALLY DISINTEGRATING ORAL EVERY 4 HOURS PRN
Status: DISCONTINUED | OUTPATIENT
Start: 2023-03-10 | End: 2023-03-12 | Stop reason: HOSPADM

## 2023-03-10 RX ORDER — FLUOXETINE HYDROCHLORIDE 20 MG/1
20 CAPSULE ORAL EVERY EVENING
Status: DISCONTINUED | OUTPATIENT
Start: 2023-03-10 | End: 2023-03-12 | Stop reason: HOSPADM

## 2023-03-10 RX ORDER — CHOLESTYRAMINE LIGHT 4 G/5.7G
4 POWDER, FOR SUSPENSION ORAL
Status: DISCONTINUED | OUTPATIENT
Start: 2023-03-10 | End: 2023-03-12 | Stop reason: HOSPADM

## 2023-03-10 RX ORDER — HYDROXYCHLOROQUINE SULFATE 200 MG/1
200 TABLET, FILM COATED ORAL DAILY
Status: DISCONTINUED | OUTPATIENT
Start: 2023-03-11 | End: 2023-03-12 | Stop reason: HOSPADM

## 2023-03-10 RX ORDER — LABETALOL HYDROCHLORIDE 5 MG/ML
10 INJECTION, SOLUTION INTRAVENOUS EVERY 4 HOURS PRN
Status: DISCONTINUED | OUTPATIENT
Start: 2023-03-10 | End: 2023-03-12 | Stop reason: HOSPADM

## 2023-03-10 RX ADMIN — SPIRONOLACTONE 25 MG: 25 TABLET ORAL at 21:36

## 2023-03-10 RX ADMIN — ROSUVASTATIN 5 MG: 10 TABLET, FILM COATED ORAL at 21:36

## 2023-03-10 RX ADMIN — FLUOXETINE 20 MG: 20 CAPSULE ORAL at 21:36

## 2023-03-10 RX ADMIN — TRAZODONE HYDROCHLORIDE 25 MG: 50 TABLET ORAL at 21:35

## 2023-03-10 ASSESSMENT — LIFESTYLE VARIABLES
TOTAL SCORE: 0
HAVE PEOPLE ANNOYED YOU BY CRITICIZING YOUR DRINKING: NO
CONSUMPTION TOTAL: NEGATIVE
EVER HAD A DRINK FIRST THING IN THE MORNING TO STEADY YOUR NERVES TO GET RID OF A HANGOVER: NO
AVERAGE NUMBER OF DAYS PER WEEK YOU HAVE A DRINK CONTAINING ALCOHOL: 1
TOTAL SCORE: 0
HAVE YOU EVER FELT YOU SHOULD CUT DOWN ON YOUR DRINKING: NO
ALCOHOL_USE: YES
TOTAL SCORE: 0
HOW MANY TIMES IN THE PAST YEAR HAVE YOU HAD 5 OR MORE DRINKS IN A DAY: 0
ON A TYPICAL DAY WHEN YOU DRINK ALCOHOL HOW MANY DRINKS DO YOU HAVE: 1
EVER FELT BAD OR GUILTY ABOUT YOUR DRINKING: NO

## 2023-03-10 ASSESSMENT — ENCOUNTER SYMPTOMS
VOMITING: 0
FALLS: 1
DOUBLE VISION: 0
PALPITATIONS: 0
DIAPHORESIS: 0
PAIN SEVERITY GOAL: 0/10
HEADACHES: 0
RESPIRATORY NEGATIVE: 1
LAST BOWEL MOVEMENT: 66543
COUGH: 0
NAUSEA: 0
PERSON REPORTING PAIN: PATIENT
BRUISES/BLEEDS EASILY: 0
HIGHEST PAIN SEVERITY IN PAST 24 HOURS: 8/10
PAIN LOCATION - RELIEVING FACTORS: PAIN MEDS AS NEEDED
CHILLS: 0
PAIN: 1
PAIN LOCATION - PAIN FREQUENCY: INTERMITTENT
CARDIOVASCULAR NEGATIVE: 1
HEMOPTYSIS: 0
EYES NEGATIVE: 1
CONSTIPATION: 0
PAIN LOCATION - PAIN QUALITY: ACHES
HEARTBURN: 0
HIGHEST PAIN SEVERITY IN PAST 24 HOURS: 6/10
LOSS OF CONSCIOUSNESS: 0
PAIN LOCATION: LEFT HIP
BLOOD IN STOOL: 0
BOWEL PATTERN NORMAL: 1
LOWEST PAIN SEVERITY IN PAST 24 HOURS: 8/10
STOOL FREQUENCY: DAILY
DIZZINESS: 0
PAIN LOCATION - PAIN SEVERITY: 5/10
FEVER: 0
PSYCHIATRIC NEGATIVE: 1
NEUROLOGICAL NEGATIVE: 1
LIMITED RANGE OF MOTION: 1
DIARRHEA: 0
LOWEST PAIN SEVERITY IN PAST 24 HOURS: 3/10
NERVOUS/ANXIOUS: 0
WHEEZING: 0
SUBJECTIVE PAIN PROGRESSION: UNCHANGED
PAIN SEVERITY GOAL: 1/10
PAIN: 1
VOMITING: DENIE
FOCAL WEAKNESS: 0
NAUSEA: DENIES
GASTROINTESTINAL NEGATIVE: 1
ABDOMINAL PAIN: 0
PERSON REPORTING PAIN: PATIENT
SUBJECTIVE PAIN PROGRESSION: WAXING AND WANING
SEIZURES: 0
MYALGIAS: 1
MUSCLE WEAKNESS: 1

## 2023-03-10 ASSESSMENT — COGNITIVE AND FUNCTIONAL STATUS - GENERAL
MOBILITY SCORE: 12
HELP NEEDED FOR BATHING: A LOT
TURNING FROM BACK TO SIDE WHILE IN FLAT BAD: A LOT
TOILETING: A LOT
WALKING IN HOSPITAL ROOM: A LOT
STANDING UP FROM CHAIR USING ARMS: A LOT
DRESSING REGULAR LOWER BODY CLOTHING: A LOT
MOVING TO AND FROM BED TO CHAIR: A LOT
MOVING FROM LYING ON BACK TO SITTING ON SIDE OF FLAT BED: A LOT
SUGGESTED CMS G CODE MODIFIER DAILY ACTIVITY: CK
DRESSING REGULAR UPPER BODY CLOTHING: A LITTLE
CLIMB 3 TO 5 STEPS WITH RAILING: A LOT
SUGGESTED CMS G CODE MODIFIER MOBILITY: CL
DAILY ACTIVITIY SCORE: 17

## 2023-03-10 ASSESSMENT — VISUAL ACUITY: OU: 1

## 2023-03-10 ASSESSMENT — PAIN DESCRIPTION - PAIN TYPE
TYPE: SURGICAL PAIN
TYPE: SURGICAL PAIN

## 2023-03-10 ASSESSMENT — FIBROSIS 4 INDEX
FIB4 SCORE: 1.56
FIB4 SCORE: 1.19

## 2023-03-11 ENCOUNTER — APPOINTMENT (OUTPATIENT)
Dept: RADIOLOGY | Facility: MEDICAL CENTER | Age: 88
DRG: 543 | End: 2023-03-11
Attending: INTERNAL MEDICINE
Payer: MEDICARE

## 2023-03-11 PROBLEM — M80.059A HIP FRACTURE DUE TO OSTEOPOROSIS, INITIAL ENCOUNTER (HCC): Status: ACTIVE | Noted: 2023-03-11

## 2023-03-11 PROBLEM — S72.112A CLOSED FRACTURE OF GREATER TROCHANTER OF LEFT FEMUR (HCC): Status: ACTIVE | Noted: 2023-03-11

## 2023-03-11 PROBLEM — M54.32 SCIATIC PAIN, LEFT: Status: RESOLVED | Noted: 2023-03-10 | Resolved: 2023-03-11

## 2023-03-11 PROBLEM — S68.113A AMPUTATION OF LEFT MIDDLE FINGER: Status: RESOLVED | Noted: 2022-06-02 | Resolved: 2023-03-11

## 2023-03-11 PROBLEM — M79.2 NEUROPATHIC PAIN: Status: RESOLVED | Noted: 2020-01-17 | Resolved: 2023-03-11

## 2023-03-11 LAB
ANION GAP SERPL CALC-SCNC: 11 MMOL/L (ref 7–16)
APPEARANCE UR: CLEAR
BILIRUB UR QL STRIP.AUTO: NEGATIVE
BUN SERPL-MCNC: 31 MG/DL (ref 8–22)
CALCIUM SERPL-MCNC: 8.7 MG/DL (ref 8.4–10.2)
CHLORIDE SERPL-SCNC: 107 MMOL/L (ref 96–112)
CO2 SERPL-SCNC: 18 MMOL/L (ref 20–33)
COLOR UR: YELLOW
CREAT SERPL-MCNC: 0.92 MG/DL (ref 0.5–1.4)
EPI CELLS #/AREA URNS HPF: NORMAL /HPF
ERYTHROCYTE [DISTWIDTH] IN BLOOD BY AUTOMATED COUNT: 54.3 FL (ref 35.9–50)
GFR SERPLBLD CREATININE-BSD FMLA CKD-EPI: 60 ML/MIN/1.73 M 2
GLUCOSE SERPL-MCNC: 84 MG/DL (ref 65–99)
GLUCOSE UR STRIP.AUTO-MCNC: NEGATIVE MG/DL
HCT VFR BLD AUTO: 25.2 % (ref 37–47)
HGB BLD-MCNC: 8 G/DL (ref 12–16)
KETONES UR STRIP.AUTO-MCNC: NEGATIVE MG/DL
LEUKOCYTE ESTERASE UR QL STRIP.AUTO: ABNORMAL
MCH RBC QN AUTO: 32.8 PG (ref 27–33)
MCHC RBC AUTO-ENTMCNC: 31.7 G/DL (ref 33.6–35)
MCV RBC AUTO: 103.3 FL (ref 81.4–97.8)
MICRO URNS: ABNORMAL
NITRITE UR QL STRIP.AUTO: NEGATIVE
PH UR STRIP.AUTO: 6 [PH] (ref 5–8)
PLATELET # BLD AUTO: 411 K/UL (ref 164–446)
PMV BLD AUTO: 8.7 FL (ref 9–12.9)
POTASSIUM SERPL-SCNC: 4.5 MMOL/L (ref 3.6–5.5)
PROT UR QL STRIP: NEGATIVE MG/DL
RBC # BLD AUTO: 2.44 M/UL (ref 4.2–5.4)
RBC # URNS HPF: NORMAL /HPF
RBC UR QL AUTO: ABNORMAL
SODIUM SERPL-SCNC: 136 MMOL/L (ref 135–145)
SP GR UR STRIP.AUTO: <=1.005
T4 FREE SERPL-MCNC: 1.08 NG/DL (ref 0.93–1.7)
UNIDENT CRYS URNS QL MICRO: NORMAL /HPF
VIT B12 SERPL-MCNC: 1009 PG/ML (ref 211–911)
WBC # BLD AUTO: 5.7 K/UL (ref 4.8–10.8)
WBC #/AREA URNS HPF: NORMAL /HPF

## 2023-03-11 PROCEDURE — 81001 URINALYSIS AUTO W/SCOPE: CPT

## 2023-03-11 PROCEDURE — 51798 US URINE CAPACITY MEASURE: CPT

## 2023-03-11 PROCEDURE — 36415 COLL VENOUS BLD VENIPUNCTURE: CPT

## 2023-03-11 PROCEDURE — 700111 HCHG RX REV CODE 636 W/ 250 OVERRIDE (IP): Performed by: INTERNAL MEDICINE

## 2023-03-11 PROCEDURE — 80048 BASIC METABOLIC PNL TOTAL CA: CPT

## 2023-03-11 PROCEDURE — 73702 CT LWR EXTREMITY W/O&W/DYE: CPT | Mod: LT

## 2023-03-11 PROCEDURE — 85027 COMPLETE CBC AUTOMATED: CPT

## 2023-03-11 PROCEDURE — A9270 NON-COVERED ITEM OR SERVICE: HCPCS | Performed by: HOSPITALIST

## 2023-03-11 PROCEDURE — 700101 HCHG RX REV CODE 250: Performed by: INTERNAL MEDICINE

## 2023-03-11 PROCEDURE — 97165 OT EVAL LOW COMPLEX 30 MIN: CPT

## 2023-03-11 PROCEDURE — 99232 SBSQ HOSP IP/OBS MODERATE 35: CPT | Performed by: INTERNAL MEDICINE

## 2023-03-11 PROCEDURE — 700102 HCHG RX REV CODE 250 W/ 637 OVERRIDE(OP): Performed by: INTERNAL MEDICINE

## 2023-03-11 PROCEDURE — 97162 PT EVAL MOD COMPLEX 30 MIN: CPT

## 2023-03-11 PROCEDURE — 84439 ASSAY OF FREE THYROXINE: CPT

## 2023-03-11 PROCEDURE — A9270 NON-COVERED ITEM OR SERVICE: HCPCS | Performed by: INTERNAL MEDICINE

## 2023-03-11 PROCEDURE — 700102 HCHG RX REV CODE 250 W/ 637 OVERRIDE(OP): Performed by: HOSPITALIST

## 2023-03-11 PROCEDURE — 94760 N-INVAS EAR/PLS OXIMETRY 1: CPT

## 2023-03-11 PROCEDURE — 770001 HCHG ROOM/CARE - MED/SURG/GYN PRIV*

## 2023-03-11 PROCEDURE — 700117 HCHG RX CONTRAST REV CODE 255: Performed by: INTERNAL MEDICINE

## 2023-03-11 PROCEDURE — 82607 VITAMIN B-12: CPT

## 2023-03-11 RX ORDER — ENOXAPARIN SODIUM 100 MG/ML
40 INJECTION SUBCUTANEOUS DAILY
Status: DISCONTINUED | OUTPATIENT
Start: 2023-03-11 | End: 2023-03-12 | Stop reason: HOSPADM

## 2023-03-11 RX ORDER — LIDOCAINE 50 MG/G
1 PATCH TOPICAL EVERY 24 HOURS
Status: DISCONTINUED | OUTPATIENT
Start: 2023-03-11 | End: 2023-03-12 | Stop reason: HOSPADM

## 2023-03-11 RX ORDER — ACETAMINOPHEN 500 MG
500-1000 TABLET ORAL EVERY 6 HOURS PRN
COMMUNITY

## 2023-03-11 RX ORDER — ACETAMINOPHEN 500 MG
1000 TABLET ORAL 3 TIMES DAILY
Status: DISCONTINUED | OUTPATIENT
Start: 2023-03-11 | End: 2023-03-12 | Stop reason: HOSPADM

## 2023-03-11 RX ADMIN — IOHEXOL 100 ML: 350 INJECTION, SOLUTION INTRAVENOUS at 11:10

## 2023-03-11 RX ADMIN — ACETAMINOPHEN 1000 MG: 500 TABLET ORAL at 20:55

## 2023-03-11 RX ADMIN — SENNOSIDES AND DOCUSATE SODIUM 2 TABLET: 50; 8.6 TABLET ORAL at 05:28

## 2023-03-11 RX ADMIN — TRAZODONE HYDROCHLORIDE 25 MG: 50 TABLET ORAL at 20:55

## 2023-03-11 RX ADMIN — LEVOTHYROXINE SODIUM 112 MCG: 0.11 TABLET ORAL at 05:27

## 2023-03-11 RX ADMIN — POTASSIUM CHLORIDE 10 MEQ: 1500 TABLET, EXTENDED RELEASE ORAL at 05:26

## 2023-03-11 RX ADMIN — ACETAMINOPHEN 1000 MG: 500 TABLET ORAL at 14:48

## 2023-03-11 RX ADMIN — SPIRONOLACTONE 25 MG: 25 TABLET ORAL at 17:06

## 2023-03-11 RX ADMIN — FLUOXETINE 20 MG: 20 CAPSULE ORAL at 17:06

## 2023-03-11 RX ADMIN — HYDROXYCHLOROQUINE SULFATE 200 MG: 200 TABLET, FILM COATED ORAL at 05:27

## 2023-03-11 RX ADMIN — ROSUVASTATIN 5 MG: 10 TABLET, FILM COATED ORAL at 17:06

## 2023-03-11 RX ADMIN — SENNOSIDES AND DOCUSATE SODIUM 2 TABLET: 50; 8.6 TABLET ORAL at 17:06

## 2023-03-11 RX ADMIN — ENOXAPARIN SODIUM 40 MG: 40 INJECTION SUBCUTANEOUS at 17:06

## 2023-03-11 RX ADMIN — METOPROLOL SUCCINATE 12.5 MG: 25 TABLET, EXTENDED RELEASE ORAL at 05:26

## 2023-03-11 RX ADMIN — LIDOCAINE 1 PATCH: 50 PATCH TOPICAL at 13:34

## 2023-03-11 ASSESSMENT — COGNITIVE AND FUNCTIONAL STATUS - GENERAL
HELP NEEDED FOR BATHING: A LOT
MOVING FROM LYING ON BACK TO SITTING ON SIDE OF FLAT BED: A LITTLE
STANDING UP FROM CHAIR USING ARMS: A LITTLE
MOBILITY SCORE: 18
DAILY ACTIVITIY SCORE: 19
SUGGESTED CMS G CODE MODIFIER MOBILITY: CK
MOVING TO AND FROM BED TO CHAIR: A LITTLE
TURNING FROM BACK TO SIDE WHILE IN FLAT BAD: A LITTLE
WALKING IN HOSPITAL ROOM: A LITTLE
SUGGESTED CMS G CODE MODIFIER DAILY ACTIVITY: CK
DRESSING REGULAR LOWER BODY CLOTHING: A LOT
TOILETING: A LITTLE
CLIMB 3 TO 5 STEPS WITH RAILING: A LITTLE

## 2023-03-11 ASSESSMENT — ACTIVITIES OF DAILY LIVING (ADL): TOILETING: INDEPENDENT

## 2023-03-11 ASSESSMENT — GAIT ASSESSMENTS
ASSISTIVE DEVICE: FRONT WHEEL WALKER
DISTANCE (FEET): 40
GAIT LEVEL OF ASSIST: CONTACT GUARD ASSIST

## 2023-03-11 ASSESSMENT — PAIN DESCRIPTION - PAIN TYPE
TYPE: SURGICAL PAIN
TYPE: SURGICAL PAIN

## 2023-03-11 NOTE — PROGRESS NOTES
[facility] Medicine Daily Progress Note    Date of Service  3/11/2023    Chief Complaint  Elizabeth Celis is a 87 y.o. female admitted 3/10/2023 with sudden onset severe L hip pain    Interval Problem Update  - patient reports having severe pain along L hip, shooting towards anterior thigh  - she has had sciatica before where pain shoots behind her leg, this is def different  - was healing fine and had this pain start suddenly  - dc'd MRI spine, ordered CT hip which showed a new non displaced L greater trochanter fx  - spoke w/ Dr. Vaz who is covering for Dr. Chinchilla, supportive care, no WB restrictions, and likely SNF, these fx are non operative per ortho    I have discussed this patient's plan of care and discharge plan at IDT rounds today with Case Management, Nursing, Nursing leadership, and other members of the IDT team.    Consultants/Specialty  orthopedics    Code Status  DNAR/DNI    Disposition  Patient is not medically cleared for discharge.   Anticipate discharge to to skilled nursing facility.  I have placed the appropriate orders for post-discharge needs.    Review of Systems  Review of Systems   All other systems reviewed and are negative.     Physical Exam  Temp:  [36.4 °C (97.6 °F)-36.9 °C (98.4 °F)] 36.4 °C (97.6 °F)  Pulse:  [71-79] 72  Resp:  [16-18] 18  BP: (101-134)/(42-74) 101/42  SpO2:  [93 %-97 %] 93 %    Physical Exam  Constitutional:       Appearance: Normal appearance.      Comments: Frail appearing  In pain with palpation   HENT:      Head: Normocephalic and atraumatic.      Mouth/Throat:      Mouth: Mucous membranes are moist.   Eyes:      Extraocular Movements: Extraocular movements intact.      Conjunctiva/sclera: Conjunctivae normal.      Pupils: Pupils are equal, round, and reactive to light.   Cardiovascular:      Rate and Rhythm: Normal rate and regular rhythm.   Pulmonary:      Effort: Pulmonary effort is normal.      Breath sounds: Normal breath sounds. No wheezing or  rales.   Abdominal:      General: Bowel sounds are normal.      Palpations: Abdomen is soft.   Musculoskeletal:         General: No swelling. Normal range of motion.      Cervical back: Normal range of motion.      Right lower leg: No edema.      Comments: Severe tenderness along the L hip and anterior thigh  No significant swelling  Incision site from prior hip replacement healing well  ROM limited due to pain   Neurological:      General: No focal deficit present.      Mental Status: She is alert and oriented to person, place, and time.   Psychiatric:         Mood and Affect: Mood normal.       Fluids    Intake/Output Summary (Last 24 hours) at 3/11/2023 1249  Last data filed at 3/11/2023 0842  Gross per 24 hour   Intake 480 ml   Output 1150 ml   Net -670 ml       Laboratory  Recent Labs     03/10/23  1757 03/11/23 0218   WBC 6.2 5.7   RBC 2.44* 2.44*   HEMOGLOBIN 8.0* 8.0*   HEMATOCRIT 25.2* 25.2*   .3* 103.3*   MCH 32.8 32.8   MCHC 31.7* 31.7*   RDW 53.8* 54.3*   PLATELETCT 408 411   MPV 8.5* 8.7*     Recent Labs     03/10/23  1757 03/11/23 0218   SODIUM 133* 136   POTASSIUM 4.5 4.5   CHLORIDE 103 107   CO2 19* 18*   GLUCOSE 80 84   BUN 33* 31*   CREATININE 0.87 0.92   CALCIUM 8.6 8.7                   Imaging  CT-HIP WITH & W/O LEFT   Final Result      1.  There is anatomic alignment of the left hip arthroplasty with no significant joint effusion or enhancing fluid collection.   2.  There is a nondisplaced fracture of the superiormost portion of the left greater trochanter directly adjacent to the prosthesis.      3.  A secure VOALTE message was sent and confirmed received to LEV KELLY on 3/11/2023 at 10:52 AM.      US-EXTREMITY VENOUS LOWER UNILAT LEFT   Final Result      DX-HIP-COMPLETE - UNILATERAL 2+ LEFT   Final Result      1.  Left hip arthroplasty and visualized left femoral plate/screws are intact. No evidence of hardware complication.   2.  No fracture or dislocation.            Assessment/Plan  * Closed fracture of greater trochanter of left femur (HCC)- (present on admission)  Assessment & Plan  New fracture which explains the sudden onset of hip pain on arrival  Discussed w/ Dr. Vaz who is covering for Dr. Chinchilla, these are usually non operative  PT//OT   Pain control - tylenol TID, lidocaine patch, oxy PRN  No weight bearing restriction per ortho    Acute blood loss anemia  Assessment & Plan  Acute blood loss anemia since her left hip replacement  Monitor H&H if drops low 7 or 21 transfuse  Most recently 8 and not in the transfusion range  CT hip without obvious bleed  Monitor, no signs of acute bleed at this time    Status post left hip replacement- (present on admission)  Assessment & Plan  S/p hip replacement by Dr. Chinchilla on2/21  Continue with pain management  PT OT evaluation  Will need skilled placement for rehabilitation    Decreased hearing of both ears- (present on admission)  Assessment & Plan  Severe hearing deficits  Wears hearing aids bilaterally  Even with the hearing aids the patient has very poor hearing    DNR (do not resuscitate)- (present on admission)  Assessment & Plan  Discussion held and she is DNR and does have a signed POLST form    Insomnia- (present on admission)  Assessment & Plan  Continue with trazodone    Hypercholesterolemia- (present on admission)  Assessment & Plan  Low-fat low-cholesterol diet  Continue with Questran and Crestor  Fasting lipid panel    Spinal stenosis of lumbar region with neurogenic claudication- (present on admission)  Assessment & Plan  Noted. Presentation not consistent with sciatica  CT hip revealed new L greater trochanter of femur fx  DC Lumber MRI    Hypothyroidism- (present on admission)  Assessment & Plan  -supportive measures with synthroid supplementation at 112 mcg per day, no change  -latest TSH is 5.440 from 2 months ago  -TSH 8, T4 nl, could be sick euthryoid, monitor and make changes in outpatient  setting    CKD (chronic kidney disease) stage 3, GFR 30-59 ml/min ([facility])- (present on admission)  Assessment & Plan  Monitor renal functions avoid nephrotoxic medications    Raynaud disease- (present on admission)  Assessment & Plan  Pain management  Prevent further atherosclerotic disease  Optimize circulation    Depression- (present on admission)  Assessment & Plan  Chronic depression currently not suicidal homicidal  Continue with Prozac 20 mg nightly    Seronegative rheumatoid arthritis (HCC)- (present on admission)  Assessment & Plan  Continue with pain management         VTE prophylaxis: enoxaparin ppx    I have performed a physical exam and reviewed and updated ROS and Plan today (3/11/2023). In review of yesterday's note (3/10/2023), there are no changes except as documented above.

## 2023-03-11 NOTE — DISCHARGE PLANNING
Case Management Discharge Planning    Admission Date: 3/10/2023  GMLOS:    ALOS: 0    6-Clicks ADL Score: 17  6-Clicks Mobility Score: 12  PT and/or OT Eval ordered: Yes  Post-acute Referrals Ordered: Yes  Post-acute Choice Obtained: Yes  Has referral(s) been sent to post-acute provider:  Yes      Anticipated Discharge Dispo: Discharge Disposition: D/T to SNF with Medicare cert in anticipation of skilled care (03)    DME Needed: No    Action(s) Taken: OTHER    Patient was discussed during IDT rounds. Per M.D., patient can discharge pending CT scan of her hip due to concerns about her hemoglobin levels decreasing.    Previous chart notes indicate patient was accepted to [facility]. LSW left voicemail for admissions coordinator Estiven Arndt regarding bed availability.     Patient will more than likely d/c to [facility] next week pending CT results.     Escalations Completed: None    Medically Clear: No    Next Steps: Care coordination to continue to follow and assist with d/c planning as needed.     Barriers to Discharge: Medical clearance    Is the patient up for discharge tomorrow: No

## 2023-03-11 NOTE — ASSESSMENT & PLAN NOTE
S/p hip replacement by Dr. Chinchilla on2/21  Continue with pain management  PT OT evaluation  Will need skilled placement for rehabilitation

## 2023-03-11 NOTE — CARE PLAN
The patient is Stable - Low risk of patient condition declining or worsening    Shift Goals  Clinical Goals: pt will remain at stated pain goal of 4/10 pain this shift.  Patient Goals: pain management    Progress made toward(s) clinical / shift goals:      Non-pharmacological pain management has allowed pt to remain at stated comfort goal.    Problem: Pain - Standard  Goal: Alleviation of pain or a reduction in pain to the patient’s comfort goal  Outcome: Progressing     Problem: Knowledge Deficit - Standard  Goal: Patient and family/care givers will demonstrate understanding of plan of care, disease process/condition, diagnostic tests and medications  Outcome: Progressing     Problem: Skin Integrity  Goal: Skin integrity is maintained or improved  Outcome: Progressing       Patient is not progressing towards the following goals:

## 2023-03-11 NOTE — ASSESSMENT & PLAN NOTE
Severe hearing deficits  Wears hearing aids bilaterally  Even with the hearing aids the patient has very poor hearing

## 2023-03-11 NOTE — THERAPY
Physical Therapy   Initial Evaluation     Patient Name: Elizabeth Celis  Age:  87 y.o., Sex:  female  Medical Record #: [id number]  Today's Date: 3/11/2023     Precautions  Precautions: (P) Anterior Hip Precautions;Weight Bearing As Tolerated Left Lower Extremity    Assessment  Patient is 87 y.o. female with a diagnosis of L greater trochanter Fx following a fall,S/P L THR ant 2/22 Pt lives at home alone and is usually active,daughter is staying with her at present.Acute PT needed to improve bed mob,transfers and ambulation    Plan    Physical Therapy Initial Treatment Plan   Treatment Plan : (P) Bed Mobility  Treatment Frequency: (P) Daily    DC Equipment Recommendations: (P) None  Discharge Recommendations: (P) Recommend home health for continued physical therapy services       Objective       03/11/23 1500   Charge Group   PT Evaluation PT Evaluation Mod   Total Time Spent   PT Evaluation Time Spent (Mins) 40   Initial Contact Note    Initial Contact Note Order Received and Verified, Physical Therapy Evaluation in Progress with Full Report to Follow.   Precautions   Precautions Anterior Hip Precautions;Weight Bearing As Tolerated Left Lower Extremity   Prior Living Situation   Prior Services Skilled Home Health Services   Housing / Facility 1 Story House   Steps Into Home 0   Steps In Home 0   Equipment Owned Front-Wheel Walker   Lives with - Patient's Self Care Capacity Alone and Able to Care For Self   Comments daughter can stay as needed   Prior Level of Functional Mobility   Bed Mobility Independent   Transfer Status Independent   Ambulation Independent   Assistive Devices Used Front-Wheel Walker   History of Falls   History of Falls Yes   Cognition    Cognition / Consciousness WDL   Passive ROM Lower Body   Passive ROM Lower Body X   Active ROM Lower Body    Active ROM Lower Body  X   Strength Lower Body   Lower Body Strength  X   Sensation Lower Body   Lower Extremity Sensation   WDL   Coordination Lower  Body    Coordination Lower Body  WDL   Balance Assessment   Sitting Balance (Static) Good   Sitting Balance (Dynamic) Good   Standing Balance (Static) Fair   Standing Balance (Dynamic) Fair   Weight Shift Sitting Fair   Weight Shift Standing Fair   Bed Mobility    Supine to Sit Modified Independent   Gait Analysis   Gait Level Of Assist Contact Guard Assist   Assistive Device Front Wheel Walker   Distance (Feet) 40   # of Times Distance was Traveled 1   Weight Bearing Status wbat L   Functional Mobility   Sit to Stand Contact Guard Assist   Bed, Chair, Wheelchair Transfer Contact Guard Assist   Transfer Method Stand Step   How much difficulty does the patient currently have...   Turning over in bed (including adjusting bedclothes, sheets and blankets)? 3   Sitting down on and standing up from a chair with arms (e.g., wheelchair, bedside commode, etc.) 3   Moving from lying on back to sitting on the side of the bed? 3   How much help from another person does the patient currently need...   Moving to and from a bed to a chair (including a wheelchair)? 3   Need to walk in a hospital room? 3   Climbing 3-5 steps with a railing? 3   6 clicks Mobility Score 18   Activity Tolerance   Sitting Edge of Bed 10   Standing 10   Patient / Family Goals    Patient / Family Goal #1 Home   Short Term Goals    Short Term Goal # 1 S bed mob in 4 V   Short Term Goal # 2 S transfers in 4 V   Short Term Goal # 3 S amb x 150 feet in 4 V   Physical Therapy Initial Treatment Plan    Treatment Plan  Bed Mobility   Treatment Frequency Daily   Problem List    Problems Impaired Bed Mobility;Impaired Transfers;Impaired Ambulation;Functional ROM Deficit;Functional Strength Deficit;Impaired Balance;Decreased Activity Tolerance   Anticipated Discharge Equipment and Recommendations   DC Equipment Recommendations None   Discharge Recommendations Recommend home health for continued physical therapy services   Interdisciplinary Plan of Care  Collaboration   IDT Collaboration with  Nursing   Session Information   Date / Session Number  3/11-1 1/7 3/17   Priority 4

## 2023-03-11 NOTE — ASSESSMENT & PLAN NOTE
New fracture which explains the sudden onset of hip pain on arrival  Discussed w/ Dr. Vaz who is covering for Dr. Chinchilla, these are usually non operative  PT//OT   Pain control - tylenol TID, lidocaine patch, oxy PRN  No weight bearing restriction per ortho

## 2023-03-11 NOTE — ED PROVIDER NOTES
"ED Provider Note    CHIEF COMPLAINT  Chief Complaint   Patient presents with    Hip Pain     Post op L hip sx 2/21 per pt. States she was \"standing at the sink yesterday and my hip just started hurting\". Pt. Denies fall, known injury, fevers.        EXTERNAL RECORDS REVIEWED  Inpatient Notes I reviewed the operation note from Dr. Chinchilla on February 21, 2023, the patient had a left total hip arthroplasty with removal of deep hardware    HPI/ROS  LIMITATION TO HISTORY   Select: : None  OUTSIDE HISTORIAN(S):  Family daughter    Elizabeth Celis is a 87 y.o. female who presents status post left hip total arthroplasty performed on February 21, 2023, she says she was doing fine until yesterday when she was standing she had severe left hip pain that is continued.  The pain is worse when she stands.    PAST MEDICAL HISTORY   has a past medical history of Adverse effect of anesthesia (Jan. 2020), Amputation of left middle finger, Anesthesia, Atherosclerosis of both carotid arteries (01/24/2022), Bowel habit changes, C. difficile diarrhea (03/2016), Cancer ([facility]) (1947), Chronic back pain, CKD (chronic kidney disease) stage 3, GFR 30-59 ml/min ([facility]), Constipation (01/21/2020), Decreased hearing of both ears (11/17/2022), Delayed emergence from general anesthesia, Dense breast tissue on mammogram (07/25/2019), Dental disorder, Depression, Elbow fracture, left, 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 choroidal neovascularization ([facility]) (11/17/2022), Heart burn, High cholesterol, History of anemia, History of DVT (deep vein thrombosis) (2017), Hypothyroid, MRSA (methicillin resistant Staphylococcus aureus) (2012), Multilevel degenerative disc disease, Osteoarthritis, Osteoporosis (03/27/2019), Pain, Peripheral edema, PVC (premature ventricular contraction), Raynaud's disease, Rheumatoid arthritis ([facility]) (09/26/2014), Stroke ([facility]) (1990's?), and Urinary incontinence.    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    FAMILY HISTORY  Family History   Problem Relation Age of Onset    Non-contributory Mother     Osteoporosis Mother     Arthritis Mother     Non-contributory Father     Narcolepsy Father     Lung Disease Father         Asthma    Heart Disease Father     Anesthesia Paternal Grandfather         death       SOCIAL HISTORY  Social History     Tobacco Use    Smoking status: Former     Packs/day: 1.00     Years: 20.00     Pack years: 20.00     Types: Cigarettes     Quit date: 1985     Years since quittin.9    Smokeless tobacco: Never   Vaping Use    Vaping Use: Never used   Substance and Sexual Activity    Alcohol use: Yes     Alcohol/week: 1.2 oz     Types: 2 Glasses of wine per week     Comment: 1 per day    Drug use: Never    Sexual activity: Not on file       CURRENT MEDICATIONS     Start End   Naproxen Sodium 220 MG Cap     Sig: Take 1 Capsule by mouth every 12 hours.   Class: Historical Med   Route: Oral   cholestyramine (QUESTRAN) 4 g packet 2023    Sig: Take 4 g by mouth as needed (supplement).   Route: Oral   Number of times this order has been changed since signin     Order Audit [place]     traMADol (ULTRAM) 50 MG Tab     Sig: Take 50 mg by mouth every 6 hours as needed for Moderate Pain. Indications: Pain   Class: Historical  Med   Route: Oral   Number of times this order has been changed since signin     Order Audit [place]     acetaminophen (TYLENOL) 650 MG CR tablet     Sig: Take 500 mg by mouth every 6 hours as needed for Mild Pain. Indications: Pain   Class: Historical Med   Route: Oral   Number of times this order has been changed since signin     Order Audit [place]     potassium chloride SA (K-DUR) 10 MEQ Tab CR     Sig: potassium chloride ER 10 mEq tablet,extended release(part/cryst)   Class: Historical Med   Number of times this order has been changed since signin     Order Audit [place]     rosuvastatin (CRESTOR) 5 MG Tab     Sig: rosuvastatin 5 mg tablet   Class: Historical Med   Number of times this order has been changed since signin     Order Audit [place]     Probiotic Product (PROBIOTIC-10 PO)     Sig: Take  by mouth every day. Indications: nutritional support   Class: Historical Med   Route: Oral   Number of times this order has been changed since signin     Order Audit [place]     doxycycline (VIBRAMYCIN) 100 MG Tab 2023    Sig: Take 4 Tablets by mouth 1 hour prior to dental appointment.   Route: Oral   Number of times this order has been changed since signing: 3     Order Audit [place]     SYNTHROID 112 MCG Tab 2022    Sig: Take 1 Tablet by mouth every morning on an empty stomach.   Route: Oral   Number of times this order has been changed since signin     Order Audit [place]     FLUoxetine (PROZAC) 10 MG Cap 2022    Sig: Take 2 Capsules by mouth every morning.   Route: Oral   Number of times this order has been changed since signin     Order Audit [place]     furosemide (LASIX) 40 MG Tab 2022    Sig: Take 0.5 Tablets by mouth 1 time a day as needed (lower leg swelling).   Route: Oral   Number of times this order has been changed since signin     Order Audit [place]     traZODone (DESYREL) 50 MG Tab 10/18/2022    Sig: Take 0.5 Tablets by mouth at bedtime.   Route: Oral   Number of  times this order has been changed since signin     Order Audit [place]     metoprolol SR (TOPROL XL) 25 MG TABLET SR 24 HR 10/5/2022    Sig: Take 0.5 Tablets by mouth every day.   Route: Oral   Number of times this order has been changed since signin     Order Audit [place]     spironolactone (ALDACTONE) 25 MG Tab 2022    Sig: Take 1 Tablet by mouth every day.   Route: Oral   Number of times this order has been changed since signin     Order Audit [place]     ipratropium (ATROVENT) 0.06 % Solution 2022    Sig: Administer 1 Spray into affected nostril(S) 2 times a day.   Route: Nasal   Number of times this order has been changed since signin     Order Audit [place]     hydroxychloroquine (PLAQUENIL) 200 MG Tab 3/23/2022    Sig: TAKE ONE TABLET BY MOUTH ONE TIME DAILY   Cosign for Ordering: Accepted by Ramone Iraheta M.D. on 3/23/2022 10:13 AM   Number of times this order has been changed since signin     Order Audit [place]     Coenzyme Q10 300 MG Cap 2022    Sig: Take 1 Capsule by mouth every day. Indications: High Blood Pressure Disorder   Class: OTC   Route: Oral   Number of times this order has been changed since signin     Order Audit [place]     cycloSPORINE (RESTASIS) 0.05 % ophthalmic emulsion 2022    Sig: Administer 1 Drop into both eyes 2 times a day. Pharmacist - please dispense 180 single use vials (72 ml)   Route: Both Eyes   Number of times this order has been changed since signin     Order Audit [place]     Multiple Vitamins-Minerals (PRESERVISION AREDS 2 PO)     Sig: Take  by mouth every day. Indications: nutritional support- macular degeneration   Class: Historical Med   Route: Oral   Number of times this order has been changed since signin     Order Audit [place]     CALCIUM CITRATE PO     Sig: Take 200 mg by mouth every evening. Indications: nutritional support   Class: Historical Med   Route: Oral   Number of times this order has been changed since signin    "  Order Audit [place]     therapeutic multivitamin-minerals (THERAGRAN-M) Tab     Sig: Take 1 Tablet by mouth every day. Indications: nutritional support   Class: Historical Med   Route: Oral   Number of times this order has been changed since signin     Order Audit [place]     denosumab (PROLIA) 60 MG/ML Solution     Sig: Inject 1 mL under the skin every 6 months. Indications: Decreased Bone Mineral Density, Osteoporosis   Class: Historical Med   Route: Subcutaneous   Number of times this order has been changed since signin     Order Audit [place]             ALLERGIES  Allergies   Allergen Reactions    Azithromycin Unspecified     Matti Johnsons syndrome    Cefuroxime Itching and Vomiting     They gave me C-Diff    Ciprofloxacin Itching and Vomiting     They gave me C-Diff    Clindamycin Itching and Vomiting     They gave me c-diff    Daptomycin      Matti colleen syndrome      Erythromycin Unspecified     Matti Johnsons syndrome    Flagyl [Metronidazole] Rash, Vomiting and Nausea     rash    Morphine Itching     Tolerates oxycodone/hydromorphone    Nitrofurantoin Vomiting and Nausea    Rifampin      Matti colleen syndrome    Sulfa Drugs Swelling    Codeine Itching    Macrodantin [Nitrofurantoin Macrocrystal] Rash     Other reaction(s): Decreased Blood Pressure    Premarin Rash and Unspecified     burning       PHYSICAL EXAM  VITAL SIGNS: /74   Pulse 79   Temp 36.6 °C (97.9 °F) (Temporal)   Resp 18   Ht 1.651 m (5' 5\")   Wt 59.9 kg (132 lb)   SpO2 94%   BMI 21.97 kg/m²    Constitutional: Alert in no apparent distress.  HENT: No signs of trauma, Bilateral external ears normal, Nose normal.   Eyes: Pupils are equal and reactive, Conjunctiva normal, Non-icteric.   Neck: Normal range of motion, No tenderness, Supple, No stridor.   Lymphatic: No lymphadenopathy noted.   Cardiovascular: Regular rate and rhythm, no murmurs.   Thorax & Lungs: Normal breath sounds, No respiratory distress, No wheezing, " No chest tenderness.   Abdomen: Bowel sounds normal, Soft, No tenderness, No peritoneal signs, No masses, No pulsatile masses.   Skin: Warm, Dry, No erythema, No rash.   Extremities: Intact distal pulses, No edema, No tenderness, No cyanosis,  Negative Saroj's sign.   Musculoskeletal: The patient has a clean and dry incision with no erythema on the left hip.  She is tender to palpation there.  She has minimal bruising.  Neurologic: Alert , Normal motor function, Normal sensory function, No focal deficits noted.   Psychiatric: Affect normal, Judgment normal, Mood normal.       DIAGNOSTIC STUDIES / PROCEDURES      LABS  Labs Reviewed   CBC WITH DIFFERENTIAL - Abnormal; Notable for the following components:       Result Value    RBC 2.44 (*)     Hemoglobin 8.0 (*)     Hematocrit 25.2 (*)     .3 (*)     MCHC 31.7 (*)     RDW 53.8 (*)     MPV 8.5 (*)     Lymphocytes 18.10 (*)     All other components within normal limits   COMP METABOLIC PANEL - Abnormal; Notable for the following components:    Sodium 133 (*)     Co2 19 (*)     Bun 33 (*)     Alkaline Phosphatase 115 (*)     All other components within normal limits   CORRECTED CALCIUM   ESTIMATED GFR   URINALYSIS   TSH   CBC WITHOUT DIFFERENTIAL         RADIOLOGY  I have independently interpreted the diagnostic imaging associated with this visit and am waiting the final reading from the radiologist.   My preliminary interpretation is as follows: No evidence of fracture on hip x-ray  Radiologist interpretation:     US-EXTREMITY VENOUS LOWER UNILAT LEFT   Final Result      DX-HIP-COMPLETE - UNILATERAL 2+ LEFT   Final Result      1.  Left hip arthroplasty and visualized left femoral plate/screws are intact. No evidence of hardware complication.   2.  No fracture or dislocation.               COURSE & MEDICAL DECISION MAKING    ED Observation Status?  Yes    Patient is placed into ED observation status at 5:20 PM on March 10, 2023    The patient is unable to walk  and does not meet criteria for hospital admission.    INITIAL ASSESSMENT, COURSE AND PLAN  Care Narrative: Patient presents with left leg and hip pain after surgery.  Differential diagnose includes fracture, DVT.  She has no evidence of infection.  Ultrasound and x-ray were ordered.        ADDITIONAL PROBLEM LIST  History of DVT and MRSA  DISPOSITION AND DISCUSSIONS  I have discussed management of the patient with the following physicians and ROMARIO's: Hospitalist Dr. Norris.    Discussion of management with other [facility] or appropriate source(s): Case Management I spoke with Sarah of case management, she will put in a PT OT assessment order and assess the patient.    The patient's H&H is very low, her hemoglobin is 8.  Spoke with Dr. Norris who will assess the patient for hospitalization.    Barriers to care at this time, including but not limited to:  None .     Decision tools and prescription drugs considered including, but not limited to: Pain Medications as needed .    FINAL DIAGNOSIS  Acute left hip pain status post total hip replacement  Anemia    Electronically signed by: Matti Hubbard M.D., 3/10/2023 4:37 PM

## 2023-03-11 NOTE — ED NOTES
Pt placed on purewick.     Rounded on pt. Pt in NAD.  Pt reports no further needs at this time.   Call bell within reach.

## 2023-03-11 NOTE — ASSESSMENT & PLAN NOTE
Noted. Presentation not consistent with sciatica  CT hip revealed new L greater trochanter of femur fx  DC [place] MRI

## 2023-03-11 NOTE — ASSESSMENT & PLAN NOTE
Acute blood loss anemia since her left hip replacement  Monitor H&H if drops low 7 or 21 transfuse  Most recently 8 and not in the transfusion range  CT hip without obvious bleed  Monitor, no signs of acute bleed at this time

## 2023-03-11 NOTE — CARE PLAN
The patient is Stable - Low risk of patient condition declining or worsening    Shift Goals  Clinical Goals: Patient will verbalize pain level 3/10 or less throughout the shift after PRN interventions  Patient Goals: pain mngt    Progress made toward(s) clinical / shift goals:  Patient is having pain to left hip upon movement,didn't ask for any pain medication.CT to left hip done today with an impression of non displaced fracture.awaiting Ortho consultation    Patient is not progressing towards the following goals:    Problem: Pain - Standard  Goal: Alleviation of pain or a reduction in pain to the patient’s comfort goal  Outcome: Progressing     Problem: Skin Integrity  Goal: Skin integrity is maintained or improved  Outcome: Progressing

## 2023-03-11 NOTE — ASSESSMENT & PLAN NOTE
-supportive measures with synthroid supplementation at 112 mcg per day, no change  -latest TSH is 5.440 from 2 months ago  -TSH 8, T4 nl, could be sick euthryoid, monitor and make changes in outpatient setting

## 2023-03-11 NOTE — DISCHARGE PLANNING
note:  Choice for SNF from daughter and pt received.   #1 [facility]  #2 [facility].   Otherwise blanket referral ok.     Referrals sent.   LVM for C[facility] at [facility]  LVM for [facility].

## 2023-03-11 NOTE — PROGRESS NOTES
2040: pt arrived on unit. VSS. Pt c/o 2/10 pain in L hip at this time. Pt declines interventions. RN discussed POC with pt. All questions answered. Fall precautions in place.    0319: pt has not voided since arrival on unit. Bladder scan results show 797mls retained. Notified MD.

## 2023-03-11 NOTE — DISCHARGE PLANNING
HTH/SCP TCN chart review completed. Collaborated with [facility] prior to meeting with the pt. The most current review of medical record, knowledge of pt's PLOF and social support, LACE+ score of 72, 6 clicks scores of 12 mobility were considered.      Pt seen at bedside with daughter. Introduced TCN program. Provided education regarding post acute levels of care. Discussed HTH/SCP plan benefits (Meds to Beds, medical uber and [facility] transitional care). Pt verbalizes understanding.     Per discussion with patient and daughter, they report that if medically improves function, then home with home health would be ideal.  Choice obtained.  However, family believe that if function stays the same, discharge consideration is for SNF. Patient reports that blanked SNF was sent out, and they chose [facility] and [facility] as first two choices. Family would like to hear back from those facilities prior to being discharged to [facility].  Daughter reports she will research [facility] and determine if they would be open to going there.  SW made aware.  No further TCN needs at this time.     Choice proactively obtained for [facility] and faxed to [facility]. TCN will continue to follow and collaborate with discharge planning team as additional post acute needs arise. Thank you.     Completed today:  Awaiting PT/OT recs  Choice obtained: SNF by [facility], [facility]  [facility]  [facility]  Blanked sent  [facility] accepted  [facility] accepted  [facility] accepted  [facility] referral not sent, likely SNF placement.

## 2023-03-11 NOTE — PROGRESS NOTES
Received patient from Night shift RN . Patient is awake and alert.Eating her breakfast.Patient denies any n/v. Dressing to left hip in place,with steri strips,SARA.Bruising noted at the surrounding site.Patient is [place],  hearing aids in place,but  no enough battery as per patient. Fall precautions in place, kept bed in lowest position and call light within reach.     1000-patient off to floor for CT scan

## 2023-03-11 NOTE — DISCHARGE PLANNING
Met with pt and daughter Mazin. Pt was living on her own and able to care for herself but she had hip surgery on Feb 21. Pt was dc home with [facility] after surgery and was doing well until yesterday when the pain was intolerable and pt lost lower ext strenght and unable to bear weight due to pain. Dr. Lipscomb said xray is negative. MD, daughter and pt agreeable with SNF transfer. PT/OT unable to see pt tonight because it is late. So CM will follow up tomorrow.  SNF choice received from daughter and sent. CM will follow tomorrow.     PCP is Dr. Xiomara Olvera.     Care Transition Team Assessment    Information Source  Orientation Level: Oriented X4  Information Given By: Other (Comments) (daughter)  Informant's Name: Mazin  Who is responsible for making decisions for patient? : Patient    Readmission Evaluation  Is this a readmission?: No    Elopement Risk  Legal Hold: No    Interdisciplinary Discharge Planning  Does Admitting Nurse Feel This Could be a Complex Discharge?: No  Primary Care Physician: Dr. Xiomara Wheat  Lives with - Patient's Self Care Capacity: Alone and Able to Care For Self  Support Systems: Children  Housing / Facility: 1 Story House (has 2 steps and a ramp)  Do You Take your Prescribed Medications Regularly: Yes  Able to Return to Previous ADL's: Future Time w/Therapy  Mobility Issues: Yes  Prior Services: Skilled Home Health Services  Patient Prefers to be Discharged to:: SNF  Assistance Needed: Yes  Durable Medical Equipment: Walker    Discharge Preparedness  What is your plan after discharge?: Skilled nursing facility  What are your discharge supports?: Child  Prior Functional Level: Ambulatory, Independent with Activities of Daily Living, Independent with Medication Management, Uses Walker  Difficulity with ADLs: Bathing, Dressing, Toileting, Walking  Difficulity with IADLs: Cooking, Driving, Laundry, Shopping    Functional Assesment  Prior Functional Level: Ambulatory, Independent with  Activities of Daily Living, Independent with Medication Management, Uses Walker    Finances  Financial Barriers to Discharge: No  Prescription Coverage: Yes    Vision / Hearing Impairment  Vision Impairment : Yes  Hearing Impairment : No    Values / Beliefs / Concerns  Values / Beliefs Concerns : No    [facility] Directive  [facility] Directive?: Living Will    Discharge Risks or Barriers  Discharge risks or barriers?: Post-acute placement / services  Patient risk factors: Cognitive / sensory / physical deficit    Anticipated Discharge Information  Discharge Disposition: D/T to SNF with Medicare cert in anticipation of skilled care (03)

## 2023-03-11 NOTE — ASSESSMENT & PLAN NOTE
Patient now has pain in the left lower extremity extending from the buttocks down to the heel.  Patient will need an MRI with her history of sciatica and spinal fusion as well as lumbar compression fractures  Continue with pain management for now  PT OT evaluation  Will need skilled placement as unable to live independently

## 2023-03-11 NOTE — DISCHARGE PLANNING
note:  Received a call from Dr. Lipscomb about disposition assistance. Pt has pain and unable to walk s/p hip sx in Feb.    PT/OT order received.   Will talk to pt about Referral to SNF.

## 2023-03-11 NOTE — H&P
"Hospital Medicine History & Physical Note    Date of Service  3/10/2023    Primary Care Physician  Xiomara Wheat M.D.    Consultants  orthopedics    Specialist Names: Dr. Rom Chinchilla    Code Status  DNAR/DNI    Chief Complaint  Chief Complaint   Patient presents with    Hip Pain     Post op L hip sx 2/21 per pt. States she was \"standing at the sink yesterday and my hip just started hurting\". Pt. Denies fall, known injury, fevers.        History of Presenting Illness  Elizabethwilner Celis is a 87 y.o. female who presented 3/10/2023 with recent left hip replacement.  The patient was replaced about 20 days ago and was doing well at home until yesterday when suddenly she had weakness in the leg and could not put any weight on it almost fell.  The family caught her before she fell.  She did not injure herself.  The patient at this point still has significant deficits in that lower extremity.  The pain at this point spreads from the hip down to the ankle.  The patient's pain at this point will need to be evaluated for possible sciatic pain.  The patient at this point will need MRI evaluation as well as optimization of her pain.  The patient is also found to have acute blood loss anemia and hemoglobin dropped from 12-8.  Currently she does not require transfusion but she will need to be monitored on her anemia..    I discussed the plan of care with patient, family, bedside RN, and emergency room physician .    Review of Systems  Review of Systems   Constitutional:  Positive for malaise/fatigue. Negative for chills, diaphoresis and fever.   HENT: Negative.     Eyes: Negative.  Negative for double vision.   Respiratory: Negative.  Negative for cough, hemoptysis and wheezing.    Cardiovascular: Negative.  Negative for chest pain, palpitations and leg swelling.   Gastrointestinal: Negative.  Negative for abdominal pain, blood in stool, constipation, diarrhea, heartburn, nausea and vomiting.   Genitourinary: Negative.  " Negative for frequency, hematuria and urgency.   Musculoskeletal:  Positive for falls, joint pain (Left hip and left knee) and myalgias.   Skin: Negative.  Negative for itching and rash.   Neurological: Negative.  Negative for dizziness, focal weakness, seizures, loss of consciousness and headaches.   Endo/Heme/Allergies: Negative.  Does not bruise/bleed easily.   Psychiatric/Behavioral: Negative.  Negative for suicidal ideas. The patient is not nervous/anxious.    All other systems reviewed and are negative.    Past Medical History   has a past medical history of Adverse effect of anesthesia (Jan. 2020), Amputation of left middle finger, Anesthesia, Atherosclerosis of both carotid arteries (01/24/2022), Bowel habit changes, C. 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    Surgical History   has a past surgical history that includes blepharoplasty (3/31/2011); cholecystectomy (2000); colonoscopy - endo (N/A, 3/7/2016); fecal transplant (N/A, 3/7/2016); hysterectomy radical (1985); other (Bilateral, 2010, 2008); other orthopedic surgery (Left, 1970s); other orthopedic surgery (Left, 2006);  other orthopedic surgery (Left, 2012); hysterectomy laparoscopy (2000); foot surgery (Right, 2010); shoulder arthroplasty total (Right, 1/9/2018); pr reconstr total shoulder implant (Left, 4/30/2019); pr hooper w/o facetec foramot/dskc 1/2 vrt seg, th* (N/A, 1/16/2020); thoracic fusion o-arm (N/A, 1/16/2020); pr total hip arthroplasty (Left, 2/21/2023); and hardware removal ortho (Left, 2/21/2023).     Family History  family history includes Anesthesia in her paternal grandfather; Arthritis in her mother; Heart Disease in her father; Lung Disease in her father; Narcolepsy in her father; Non-contributory in her father and mother; Osteoporosis in her mother.   Family history reviewed with patient. There is no family history that is pertinent to the chief complaint.     Social History   reports that she quit smoking about 37 years ago. Her smoking use included cigarettes. She has a 20.00 pack-year smoking history. She has never used smokeless tobacco. She reports current alcohol use of about 1.2 oz per week. She reports that she does not use drugs.    Allergies  Allergies   Allergen Reactions    Azithromycin Unspecified     Matti Johnsons syndrome    Cefuroxime Itching and Vomiting     They gave me C-Diff    Ciprofloxacin Itching and Vomiting     They gave me C-Diff    Clindamycin Itching and Vomiting     They gave me c-diff    Daptomycin      Matti colleen syndrome      Erythromycin Unspecified     Matti Johnsons syndrome    Flagyl [Metronidazole] Rash, Vomiting and Nausea     rash    Morphine Itching     Tolerates oxycodone/hydromorphone    Nitrofurantoin Vomiting and Nausea    Rifampin      Matti colleen syndrome    Sulfa Drugs Swelling    Codeine Itching    Macrodantin [Nitrofurantoin Macrocrystal] Rash     Other reaction(s): Decreased Blood Pressure    Premarin Rash and Unspecified     burning       Medications  Prior to Admission Medications   Prescriptions Last Dose Informant Patient Reported? Taking?   CALCIUM  CITRATE PO   Yes No   Sig: Take 200 mg by mouth every evening. Indications: nutritional support   Coenzyme Q10 300 MG Cap   Yes No   Sig: Take 1 Capsule by mouth every day. Indications: High Blood Pressure Disorder   FLUoxetine (PROZAC) 10 MG Cap   No No   Sig: Take 2 Capsules by mouth every morning.   Multiple Vitamins-Minerals (PRESERVISION AREDS 2 PO)   Yes No   Sig: Take  by mouth every day. Indications: nutritional support- macular degeneration   Naproxen Sodium 220 MG Cap   Yes No   Sig: Take 1 Capsule by mouth every 12 hours.   Probiotic Product (PROBIOTIC-10 PO)   Yes No   Sig: Take  by mouth every day. Indications: nutritional support   SYNTHROID 112 MCG Tab   No No   Sig: Take 1 Tablet by mouth every morning on an empty stomach.   acetaminophen (TYLENOL) 650 MG CR tablet   Yes No   Sig: Take 500 mg by mouth every 6 hours as needed for Mild Pain. Indications: Pain   cholestyramine (QUESTRAN) 4 g packet   No No   Sig: Take 4 g by mouth as needed (supplement).   cycloSPORINE (RESTASIS) 0.05 % ophthalmic emulsion   No No   Sig: Administer 1 Drop into both eyes 2 times a day. Pharmacist - please dispense 180 single use vials (72 ml)   denosumab (PROLIA) 60 MG/ML Solution  Patient Yes No   Sig: Inject 1 mL under the skin every 6 months. Indications: Decreased Bone Mineral Density, Osteoporosis   doxycycline (VIBRAMYCIN) 100 MG Tab   No No   Sig: Take 4 Tablets by mouth 1 hour prior to dental appointment.   furosemide (LASIX) 40 MG Tab   No No   Sig: Take 0.5 Tablets by mouth 1 time a day as needed (lower leg swelling).   hydroxychloroquine (PLAQUENIL) 200 MG Tab   No No   Sig: TAKE ONE TABLET BY MOUTH ONE TIME DAILY   ipratropium (ATROVENT) 0.06 % Solution   No No   Sig: Administer 1 Spray into affected nostril(S) 2 times a day.   metoprolol SR (TOPROL XL) 25 MG TABLET SR 24 HR   No No   Sig: Take 0.5 Tablets by mouth every day.   potassium chloride SA (K-DUR) 10 MEQ Tab CR   Yes No   Sig: potassium chloride ER  10 mEq tablet,extended release(part/cryst)   rosuvastatin (CRESTOR) 5 MG Tab   Yes No   Sig: rosuvastatin 5 mg tablet   spironolactone (ALDACTONE) 25 MG Tab   No No   Sig: Take 1 Tablet by mouth every day.   Patient taking differently: Take 25 mg by mouth every evening.   therapeutic multivitamin-minerals (THERAGRAN-M) Tab   Yes No   Sig: Take 1 Tablet by mouth every day. Indications: nutritional support   traMADol (ULTRAM) 50 MG Tab   Yes No   Sig: Take 50 mg by mouth every 6 hours as needed for Moderate Pain. Indications: Pain   traZODone (DESYREL) 50 MG Tab   No No   Sig: Take 0.5 Tablets by mouth at bedtime.      Facility-Administered Medications: None       Physical Exam  Temp:  [36.6 °C (97.9 °F)] 36.6 °C (97.9 °F)  Pulse:  [71-79] 79  Resp:  [18] 18  BP: (113-134)/(47-74) 113/74  SpO2:  [94 %-96 %] 94 %  Blood Pressure : 113/74   Temperature: 36.6 °C (97.9 °F)   Pulse: 79   Respiration: 18   Pulse Oximetry: 94 %       Physical Exam  Vitals and nursing note reviewed. Exam conducted with a chaperone present.   Constitutional:       General: She is awake.      Appearance: She is well-developed, well-groomed and normal weight. She is ill-appearing.   HENT:      Head: Normocephalic and atraumatic.      Jaw: There is normal jaw occlusion. No trismus.      Salivary Glands: Right salivary gland is not tender. Left salivary gland is not tender.      Right Ear: External ear normal.      Left Ear: External ear normal.      Nose: Nose normal.      Mouth/Throat:      Mouth: Mucous membranes are dry.      Pharynx: Oropharynx is clear.   Eyes:      General: Lids are normal. Vision grossly intact.         Right eye: No discharge.         Left eye: No discharge.      Extraocular Movements: Extraocular movements intact.      Conjunctiva/sclera: Conjunctivae normal.      Right eye: Right conjunctiva is not injected. No exudate.     Left eye: Left conjunctiva is not injected. No exudate.     Pupils: Pupils are equal, round, and  reactive to light.   Neck:      Thyroid: No thyroid mass.      Vascular: No hepatojugular reflux or JVD.      Trachea: No abnormal tracheal secretions or tracheal deviation.   Cardiovascular:      Rate and Rhythm: Normal rate and regular rhythm. Occasional Extrasystoles are present.     Pulses: Normal pulses.      Heart sounds: Murmur heard.     No friction rub.   Pulmonary:      Effort: Pulmonary effort is normal.      Breath sounds: Normal breath sounds. No wheezing or rhonchi.   Abdominal:      General: Abdomen is flat. Bowel sounds are normal.      Palpations: Abdomen is soft.      Tenderness: There is no abdominal tenderness. There is no right CVA tenderness or left CVA tenderness.      Hernia: No hernia is present.   Musculoskeletal:      Cervical back: Full passive range of motion without pain, normal range of motion and neck supple. No rigidity or tenderness. No muscular tenderness.      Lumbar back: Tenderness present. Decreased range of motion.      Left hip: Tenderness present. Decreased range of motion.      Left knee: Swelling present. Decreased range of motion. Tenderness present.      Right lower leg: No edema.      Left lower leg: No edema.   Lymphadenopathy:      Head:      Right side of head: No submental adenopathy.      Left side of head: No submental adenopathy.      Cervical: No cervical adenopathy.      Right cervical: No superficial cervical adenopathy.     Left cervical: No superficial cervical adenopathy.      Upper Body:      Right upper body: No supraclavicular adenopathy.      Left upper body: No supraclavicular adenopathy.   Skin:     General: Skin is warm and dry.      Capillary Refill: Capillary refill takes less than 2 seconds.      Coloration: Skin is not cyanotic or pale.      Findings: No abrasion or bruising.   Neurological:      General: No focal deficit present.      Mental Status: She is alert and oriented to person, place, and time. Mental status is at baseline.      GCS: GCS  eye subscore is 4. GCS verbal subscore is 5. GCS motor subscore is 6.      Cranial Nerves: No cranial nerve deficit.      Sensory: No sensory deficit.      Motor: Motor function is intact.      Deep Tendon Reflexes:      Reflex Scores:       Tricep reflexes are 2+ on the right side and 2+ on the left side.       Bicep reflexes are 2+ on the right side and 2+ on the left side.       Brachioradialis reflexes are 2+ on the right side and 2+ on the left side.       Patellar reflexes are 2+ on the right side and 2+ on the left side.       Achilles reflexes are 2+ on the right side and 2+ on the left side.  Psychiatric:         Attention and Perception: Attention and perception normal.         Mood and Affect: Mood normal.         Speech: Speech normal.         Behavior: Behavior normal. Behavior is cooperative.         Thought Content: Thought content normal.         Cognition and Memory: Cognition and memory normal.         Judgment: Judgment normal.       Laboratory:  Recent Labs     03/10/23  1757   WBC 6.2   RBC 2.44*   HEMOGLOBIN 8.0*   HEMATOCRIT 25.2*   .3*   MCH 32.8   MCHC 31.7*   RDW 53.8*   PLATELETCT 408   MPV 8.5*     Recent Labs     03/10/23  1757   SODIUM 133*   POTASSIUM 4.5   CHLORIDE 103   CO2 19*   GLUCOSE 80   BUN 33*   CREATININE 0.87   CALCIUM 8.6     Recent Labs     03/10/23  1757   ALTSGPT 20   ASTSGOT 25   ALKPHOSPHAT 115*   TBILIRUBIN 0.3   GLUCOSE 80         No results for input(s): NTPROBNP in the last 72 hours.      No results for input(s): TROPONINT in the last 72 hours.    Imaging:  US-EXTREMITY VENOUS LOWER UNILAT LEFT   Final Result      DX-HIP-COMPLETE - UNILATERAL 2+ LEFT   Final Result      1.  Left hip arthroplasty and visualized left femoral plate/screws are intact. No evidence of hardware complication.   2.  No fracture or dislocation.      MR-LUMBAR SPINE-W/O    (Results Pending)       X-Ray:  I have personally reviewed the images and compared with prior images.  EKG:  I  have personally reviewed the images and compared with prior images.    Assessment/Plan:  Justification for Admission Status  I anticipate this patient is appropriate for observation status at this time because patient has postoperative pain and sciatica which will require at this point imaging studies pain management and evaluation with physical therapy and Occupational Therapy which will require less than 23 hours of inpatient management    Patient will need a Med/Surg bed on MEDICAL service .  The need is secondary to inability to ambulate postoperatively.    * Sciatic pain, left- (present on admission)  Assessment & Plan  Patient now has pain in the left lower extremity extending from the buttocks down to the heel.  Patient will need an MRI with her history of sciatica and spinal fusion as well as lumbar compression fractures  Continue with pain management for now  PT OT evaluation  Will need skilled placement as unable to live independently    Acute blood loss anemia  Assessment & Plan  Acute blood loss anemia since her left hip replacement  Monitor H&H if drops low 7 or 21 transfuse  Most recently 8 and not in the transfusion range    Status post left hip replacement- (present on admission)  Assessment & Plan  Continue with pain management  PT OT evaluation  Will need skilled placement for rehabilitation    Neuropathic pain- (present on admission)  Assessment & Plan  Continue with pain management    Spinal stenosis of lumbar region with neurogenic claudication- (present on admission)  Assessment & Plan  Patient will need MRI of the lumbar spine    Seronegative rheumatoid arthritis (HCC)- (present on admission)  Assessment & Plan  Continue with pain management    Decreased hearing of both ears- (present on admission)  Assessment & Plan  Severe hearing deficits  Wears hearing aids bilaterally  Even with the hearing aids the patient has very poor hearing    Amputation of left middle finger- (present on  admission)  Assessment & Plan  Stable    DNR (do not resuscitate)- (present on admission)  Assessment & Plan  Discussion held and she is DNR and does have a signed POLST form    Insomnia- (present on admission)  Assessment & Plan  Continue with trazodone    Hypercholesterolemia- (present on admission)  Assessment & Plan  Low-fat low-cholesterol diet  Continue with Questran and Crestor  Fasting lipid panel    Hypothyroidism- (present on admission)  Assessment & Plan  -supportive measures with synthroid supplementation at 112 mcg per day, no change  -latest TSH is 5.440 from 2 months ago, this is abnormal will need to be repeated    CKD (chronic kidney disease) stage 3, GFR 30-59 ml/min ([facility])- (present on admission)  Assessment & Plan  Monitor renal functions avoid nephrotoxic medications    Raynaud disease- (present on admission)  Assessment & Plan  Pain management  Prevent further atherosclerotic disease  Optimize circulation    Depression- (present on admission)  Assessment & Plan  Chronic depression currently not suicidal homicidal  Continue with Prozac 20 mg nightly        VTE prophylaxis: SCDs/TEDs

## 2023-03-12 VITALS
BODY MASS INDEX: 21.41 KG/M2 | TEMPERATURE: 98.2 F | RESPIRATION RATE: 18 BRPM | SYSTOLIC BLOOD PRESSURE: 110 MMHG | HEART RATE: 60 BPM | HEIGHT: 65 IN | WEIGHT: 128.53 LBS | DIASTOLIC BLOOD PRESSURE: 61 MMHG | OXYGEN SATURATION: 92 %

## 2023-03-12 LAB
ANION GAP SERPL CALC-SCNC: 13 MMOL/L (ref 7–16)
BUN SERPL-MCNC: 29 MG/DL (ref 8–22)
CALCIUM SERPL-MCNC: 8.6 MG/DL (ref 8.4–10.2)
CHLORIDE SERPL-SCNC: 106 MMOL/L (ref 96–112)
CO2 SERPL-SCNC: 17 MMOL/L (ref 20–33)
CREAT SERPL-MCNC: 0.88 MG/DL (ref 0.5–1.4)
ERYTHROCYTE [DISTWIDTH] IN BLOOD BY AUTOMATED COUNT: 57.6 FL (ref 35.9–50)
GFR SERPLBLD CREATININE-BSD FMLA CKD-EPI: 63 ML/MIN/1.73 M 2
GLUCOSE SERPL-MCNC: 77 MG/DL (ref 65–99)
HCT VFR BLD AUTO: 27.1 % (ref 37–47)
HGB BLD-MCNC: 8.2 G/DL (ref 12–16)
MCH RBC QN AUTO: 32.9 PG (ref 27–33)
MCHC RBC AUTO-ENTMCNC: 30.3 G/DL (ref 33.6–35)
MCV RBC AUTO: 108.8 FL (ref 81.4–97.8)
PLATELET # BLD AUTO: 386 K/UL (ref 164–446)
PMV BLD AUTO: 8.5 FL (ref 9–12.9)
POTASSIUM SERPL-SCNC: 4.4 MMOL/L (ref 3.6–5.5)
RBC # BLD AUTO: 2.49 M/UL (ref 4.2–5.4)
SODIUM SERPL-SCNC: 136 MMOL/L (ref 135–145)
WBC # BLD AUTO: 6.2 K/UL (ref 4.8–10.8)

## 2023-03-12 PROCEDURE — 85027 COMPLETE CBC AUTOMATED: CPT

## 2023-03-12 PROCEDURE — 80048 BASIC METABOLIC PNL TOTAL CA: CPT

## 2023-03-12 PROCEDURE — A9270 NON-COVERED ITEM OR SERVICE: HCPCS | Performed by: HOSPITALIST

## 2023-03-12 PROCEDURE — 99239 HOSP IP/OBS DSCHRG MGMT >30: CPT | Performed by: INTERNAL MEDICINE

## 2023-03-12 PROCEDURE — A9270 NON-COVERED ITEM OR SERVICE: HCPCS | Performed by: INTERNAL MEDICINE

## 2023-03-12 PROCEDURE — 36415 COLL VENOUS BLD VENIPUNCTURE: CPT

## 2023-03-12 PROCEDURE — 700102 HCHG RX REV CODE 250 W/ 637 OVERRIDE(OP): Performed by: HOSPITALIST

## 2023-03-12 PROCEDURE — 700102 HCHG RX REV CODE 250 W/ 637 OVERRIDE(OP): Performed by: INTERNAL MEDICINE

## 2023-03-12 RX ORDER — LIDOCAINE 50 MG/G
1 PATCH TOPICAL EVERY 24 HOURS
Qty: 15 PATCH | Refills: 1 | Status: SHIPPED | OUTPATIENT
Start: 2023-03-12 | End: 2023-03-27

## 2023-03-12 RX ADMIN — ACETAMINOPHEN 1000 MG: 500 TABLET ORAL at 08:12

## 2023-03-12 RX ADMIN — LEVOTHYROXINE SODIUM 112 MCG: 0.11 TABLET ORAL at 05:05

## 2023-03-12 RX ADMIN — POTASSIUM CHLORIDE 10 MEQ: 1500 TABLET, EXTENDED RELEASE ORAL at 05:05

## 2023-03-12 RX ADMIN — SENNOSIDES AND DOCUSATE SODIUM 2 TABLET: 50; 8.6 TABLET ORAL at 05:05

## 2023-03-12 RX ADMIN — HYDROXYCHLOROQUINE SULFATE 200 MG: 200 TABLET, FILM COATED ORAL at 05:06

## 2023-03-12 RX ADMIN — METOPROLOL SUCCINATE 12.5 MG: 25 TABLET, EXTENDED RELEASE ORAL at 05:06

## 2023-03-12 ASSESSMENT — PAIN DESCRIPTION - PAIN TYPE
TYPE: SURGICAL PAIN

## 2023-03-12 NOTE — DISCHARGE PLANNING
ATTN: Case Management  RE: Referral for Home Health    As of 3/12/2023, we have accepted the Home Health referral for the patient listed above.    A [facility] clinician will be out to see the patient within 48 hours. If you have any questions or concerns regarding the patient's transition to Home Health, please do not hesitate to contact us at [phone].      We look forward to collaborating with you,  [facility] Home Health Team

## 2023-03-12 NOTE — FACE TO FACE
Face to Face Supporting Documentation - Home Health    The encounter with this patient was in whole or in part the primary reason for home health admission.    Date of encounter:   Patient:                    MRN:                       YOB: 2023  Elizabeth Celis  [id number]  1935     Home health to see patient for:  Physical Therapy evaluation and treatment and Occupational therapy evaluation and treatment    Skilled need for:  New Onset Medical Diagnosis hip fracture    Skilled nursing interventions to include:  Comment: none    Homebound status evidenced by:  Need the aid of supportive devices such as crutches, canes, wheelchairs or walkers. Leaving home requires a considerable and taxing effort. There is a normal inability to leave the home.    Community Physician to provide follow up care: Xiomara Wheat M.D.     Optional Interventions? No      I certify the face to face encounter for this home health care referral meets the CMS requirements and the encounter/clinical assessment with the patient was, in whole, or in part, for the medical condition(s) listed above, which is the primary reason for home health care. Based on my clinical findings: the service(s) are medically necessary, support the need for home health care, and the homebound criteria are met.  I certify that this patient has had a face to face encounter by myself.  Kim Funez M.D. - NPI: [id number]

## 2023-03-12 NOTE — PROGRESS NOTES
Discharge paper work reviewed with patient and daughter at bedside.Copy given.Copy of POLST returned to patient.     1341-  I.V removed. Patient escorted to ED entrance via wheelchair.Patient discharge to home with .

## 2023-03-12 NOTE — DISCHARGE SUMMARY
Hospital Medicine Discharge Note     Admit Date:  3/10/2023       Discharge Date:   3/12/2023  LOS: 1 day     Primary Care Provider:    Xiomraa Wheat M.D.    Attending Physician:  Kim Funez M.D.     Discharge Diagnoses:   Principal Problem:    Closed fracture of greater trochanter of left femur ([facility])  Active Problems:    Seronegative rheumatoid arthritis ([facility])    Depression    Raynaud disease    CKD (chronic kidney disease) stage 3, GFR 30-59 ml/min ([facility])    Hypothyroidism    Spinal stenosis of lumbar region with neurogenic claudication    Hypercholesterolemia    Insomnia    DNR (do not resuscitate)    Decreased hearing of both ears    Status post left hip replacement    Acute blood loss anemia    Hip fracture due to osteoporosis, initial encounter ([facility])        Hospital Summary (Brief Narrative):         86 yo w/ recent L hip replacement on 2/21 presented with sudden onset L hip pain. CT hip showed a new non displaced greater trochanter fx adjacent to the new prosthesis. Dr. Chinchilla's team was notified. They stated such fractures are non operative and rec'd pain control/PT/OT. Pain was controlled w/ tylenol, lidocaine patch and tramadol. Patient also takes naproxen at home and seems to have tolerated it. OK to use short term since there is no obvious sign of bleed.Since her surgery, pt did have a drop of her b/l Hb of 12 to 8s. Possible post op related bleed that has stabilized. Pt denies melena, hematochezia, hemoptysis, hematuria. Hb was stable at 8 at time of dc. CT hip without obvious fluid in hip. B12 high. Advised OTC iron tablets if able to tolerate as they can cause constipation. TSH high T4 nl, query sick euthyroid. Advised PCP f/u with repeat CBC and TSH in 4 weeks. Pt has an appnt w/ PCP next week. Home health PT/OT arranged at time of dc.      Disposition:   Discharge home w/ HH    Condition:  Stable    Activity:   As tolerated     Diet:   Heart Healthy Diet    Discharge Medications:            Medication List        START taking these medications        Instructions   lidocaine 5 % Ptch  Commonly known as: LIDODERM   Place 1 Patch on the skin every 24 hours for 15 days.  Dose: 1 Patch            CHANGE how you take these medications        Instructions   hydroxychloroquine 200 MG Tabs  What changed:   how much to take  how to take this  when to take this  Commonly known as: Plaquenil   TAKE ONE TABLET BY MOUTH ONE TIME DAILY            CONTINUE taking these medications        Instructions   acetaminophen 500 MG Tabs  Commonly known as: TYLENOL   Take 500-1,000 mg by mouth every 6 hours as needed. Indications: Pain  Dose: 500-1,000 mg     CALCIUM CITRATE PO   Take 1 Tablet by mouth every evening. Indications: nutritional support  Dose: 1 Tablet     cholestyramine 4 g packet  Commonly known as: QUESTRAN   Take 4 g by mouth as needed (supplement).  Dose: 1 Packet     COENZYME Q10 PO   Take 1 Capsule by mouth every day. Indications: High Blood Pressure Disorder  Dose: 1 Capsule     denosumab 60 MG/ML Soln  Commonly known as: PROLIA   Inject 1 mL under the skin every 6 months. Indications: Decreased Bone Mineral Density, Osteoporosis  Dose: 1 mL     FLUoxetine 10 MG Caps  Commonly known as: PROZAC   Take 2 Capsules by mouth every morning.  Dose: 20 mg     furosemide 40 MG Tabs  Commonly known as: LASIX   Take 0.5 Tablets by mouth 1 time a day as needed (lower leg swelling).  Dose: 20 mg     ipratropium 0.06 % Soln  Commonly known as: ATROVENT   Administer 1 [place] into affected nostril(S) 2 times a day.  Dose: 1 Spray     metoprolol SR 25 MG Tb24  Commonly known as: TOPROL XL   Take 0.5 Tablets by mouth every day.  Dose: 12.5 mg     Naproxen Sodium 220 MG Caps   Take 220 mg by mouth 2 times a day as needed (Pain).  Dose: 220 mg     potassium chloride SA 10 MEQ Tbcr  Commonly known as: K-DUR   Take 10 mEq by mouth every day. Indications: Low Amount of Potassium in the Blood  Dose: 10 mEq     PROBIOTIC-10 PO    Take 1 Tablet by mouth every day. Indications: nutritional support  Dose: 1 Tablet     Restasis 0.05 % ophthalmic emulsion  Generic drug: cycloSPORINE   Administer 1 Drop into both eyes 2 times a day. Pharmacist - please dispense 180 single use vials (72 ml)  Dose: 1 Drop     rosuvastatin 10 MG Tabs  Commonly known as: CRESTOR   Take 10 mg by mouth every day. Indications: High Amount of Fats in the Blood  Dose: 10 mg     spironolactone 25 MG Tabs  Commonly known as: ALDACTONE   Take 1 Tablet by mouth every day.  Dose: 25 mg     Synthroid 112 MCG Tabs  Generic drug: levothyroxine   Take 1 Tablet by mouth every morning on an empty stomach.  Dose: 112 mcg     * therapeutic multivitamin-minerals Tabs   Take 1 Tablet by mouth every day. Indications: nutritional support  Dose: 1 Tablet     * PRESERVISION AREDS 2 PO   Take 1 Tablet by mouth every day. Indications: nutritional support- macular degeneration  Dose: 1 Tablet     traMADol 50 MG Tabs  Commonly known as: Ultram   Take 50 mg by mouth every 6 hours as needed for Moderate Pain. Indications: Pain  Dose: 50 mg     traZODone 50 MG Tabs  Commonly known as: DESYREL   Take 0.5 Tablets by mouth at bedtime.  Dose: 25 mg           * This list has 2 medication(s) that are the same as other medications prescribed for you. Read the directions carefully, and ask your doctor or other care provider to review them with you.                    Follow up appointment details :      I encouraged her to call his PCP to confirm follow up after discharge.    Future Appointments   Date Time Provider Department Center   3/13/2023 10:15 AM Sandy Barraza PT [facility] None   3/14/2023 To Be Determined [facility] TEAM [facility] None   3/16/2023 11:00 AM Sandy Barraza PT [facility] None   3/17/2023 To Be Determined Angelina Bojorquez R.N. [facility] None   3/21/2023 To Be Determined Angelina Bojorquez R.N. [facility] None   3/21/2023 12:15 PM Sandy Barraza PT [facility] None   3/21/2023  1:30 PM Xiomara  MARY Wheat [facility] None   3/23/2023  1:30 PM Sandy Barraza, PT [facility] None   3/24/2023 To Be Determined Angelina Bojorquez R.N. [facility] None   3/28/2023 To Be Determined Nuria Jenkins R.N. [facility] None   3/31/2023 To Be Determined Nuria Jenkins R.N. [facility] None   4/10/2023  1:20 PM Rom Chinchilla M.D. [facility] [address] [facility]   4/18/2023  2:00 PM Bill Farris M.D. [facility] None         Consultants:      Ortho over phone (Dr. Vaz , DR. Chinchilla's office)    Studies:    Imaging/ Testing:      CT-HIP WITH & W/O LEFT   Final Result      1.  There is anatomic alignment of the left hip arthroplasty with no significant joint effusion or enhancing fluid collection.   2.  There is a nondisplaced fracture of the superiormost portion of the left greater trochanter directly adjacent to the prosthesis.      3.  A secure VOALTE message was sent and confirmed received to LEV KELLY on 3/11/2023 at 10:52 AM.      US-EXTREMITY VENOUS LOWER UNILAT LEFT   Final Result      DX-HIP-COMPLETE - UNILATERAL 2+ LEFT   Final Result      1.  Left hip arthroplasty and visualized left femoral plate/screws are intact. No evidence of hardware complication.   2.  No fracture or dislocation.          Procedures:        None      Instructions:      The were given instructions to return to the ER if patient's condition worsens      Time Spent on Discharge:     Discharge instructions were discussed with the patient at bedside. Patient  expressed understanding and agreed to comply with all discharge instructions.    37 minutes were spent in the discharge planning and management of this  patient, including more than 50% of the time spent face to face in   Counseling.

## 2023-03-12 NOTE — DISCHARGE PLANNING
Case Management Discharge Planning    Admission Date: 3/10/2023  GMLOS: 2.7  ALOS: 1    6-Clicks ADL Score: 19  6-Clicks Mobility Score: 18      Anticipated Discharge Dispo: d/c home w/ family and resume HH w/ [facility]      DME Needed: No    Action(s) Taken: Updated Provider/Nurse on Discharge Plan, Patient Conference, and Family Conference,  Lsw attended 1st rounds.    Lsw met w/ pt and family at bedside.     Lsw acquired HH choice. They would like to resume w/ [facility].    Lsw faxed to [facility], and sent [employer] to advise of pending fax.      Escalations Completed: Provider    Medically Clear: No    Next Steps: Lsw will continue to follow, and assist w/ d/c planning.     Barriers to Discharge: Medical clearance and Pending Placement    Is the patient up for discharge tomorrow: No    Addendum:  Lsw received voalte from medical team. They are asking if pt can d/c w/o being officially accepted to HH. Lsw explained MD can evaluate and determine if pt is safe to possibly d/c w/o hh services.    Lsw called HH at [facility] and spoke to on call . She took a message and placed Sw on hold. Sw asking if pt can d/c  today w/ acceptance. Further indicating pt is resuming services w/ pt.     Lsw had to disconnect call to attended 2nd rounds this AM.    Lsw attended 2nd rounds.     Lsw sent request for f/u w/ HH so pt can be accepted and d/c home today. Lsw sent [employer] to [facility]ain[facility] and requested as above.

## 2023-03-12 NOTE — THERAPY
Occupational Therapy   Initial Evaluation     Patient Name: Elizabeth Celis  Age:  87 y.o., Sex:  female  Medical Record #: [id number]  Today's Date: 3/11/2023     Precautions  Precautions: (P) Anterior Hip Precautions, Fall Risk, Weight Bearing As Tolerated Left Lower Extremity    Assessment  Patient is 87 y.o. female admitted with L sciatic pain. Recent L SUGAR, 2/22/23. CT shows L greater trochanter fracture; non-operative per Ortho. Hx of arthritis, B hands. Pt is A&Ox4, [place]. Motivated for OOB activity. Tolerated UIC earlier. Bed mobility, STS with CGA. Cal donning shoes. Walks to br with FWW, CGA. Toileting with CGA. Grooms at sink with CGA. Pt shows decreased activity tolerance, balance; OT will follow while in house. Pt lives alone; dtr is able to stay with her upon dc.                      Plan  Occupational Therapy Initial Treatment Plan   Treatment Interventions: (P) Self Care / Activities of Daily Living, Neuro Re-Education / Balance, Therapeutic Activity  Treatment Frequency: (P) 3 Times per Week  Duration: (P) Until Therapy Goals Met    DC Equipment Recommendations: (P) None  Discharge Recommendations: (P) Recommend home health for continued occupational therapy services     Subjective  Agreeable     Objective     03/11/23 1711   Prior Living Situation   Prior Services Skilled Home Health Services   Housing / Facility 1 Story House   Bathroom Set up Walk In Shower;Grab Bars;Shower Chair   Equipment Owned Front-Wheel Walker;Tub / Shower Seat;Grab Bar(s) In Tub / Shower;Grab Bar(s) By Toilet;Raised Toilet Seat With Arms;Reacher;Hand Held Shower   Lives with - Patient's Self Care Capacity Alone and Able to Care For Self   Comments Daughter from [place] able to stay with pt   Prior Level of ADL Function   Self Feeding Independent   Grooming / Hygiene Independent   Bathing Independent   Dressing Independent   Toileting Independent   Prior Level of IADL Function   Medication Management Independent   Laundry  Independent   Kitchen Mobility Independent   Finances Independent   Home Management Independent   Shopping Unable To Determine At This Time   Prior Level Of Mobility Independent With Device in Home   Driving / Transportation Unable To Determine At This Time   Occupation (Pre-Hospital Vocational) Retired Due To Age   Leisure Interests Family;Hobbies   Precautions   Precautions Anterior Hip Precautions;Fall Risk;Weight Bearing As Tolerated Left Lower Extremity   Vitals   O2 Delivery Device None - Room Air   Pain 0 - 10 Group   Location Hip   Location Orientation Left   Therapist Pain Assessment Post Activity Pain Same as Prior to Activity;Nurse Notified   Cognition    Cognition / Consciousness WDL   Speech/ Communication Hard of Hearing   Level of Consciousness Alert   Passive ROM Upper Body   Passive ROM Upper Body WDL   Active ROM Upper Body   Active ROM Upper Body  WDL   Strength Upper Body   Upper Body Strength  WDL   Sensation Upper Body   Upper Extremity Sensation  WDL   Upper Body Muscle Tone   Upper Body Muscle Tone  WDL   Coordination Upper Body   Coordination WDL   Comments RA B hands   Balance Assessment   Sitting Balance (Static) Good   Sitting Balance (Dynamic) Good   Standing Balance (Static) Fair   Standing Balance (Dynamic) Fair   Weight Shift Sitting Fair   Weight Shift Standing Fair   Bed Mobility    Supine to Sit Supervised   Sit to Supine Contact Guard Assist   ADL Assessment   Eating Independent   Grooming Standby Assist;Standing   Upper Body Dressing Supervision   Toileting Contact Guard Assist   How much help from another person does the patient currently need...   Putting on and taking off regular lower body clothing? 2   Bathing (including washing, rinsing, and drying)? 2   Toileting, which includes using a toilet, bedpan, or urinal? 3   Putting on and taking off regular upper body clothing? 4   Taking care of personal grooming such as brushing teeth? 4   Eating meals? 4   6 Clicks Daily  Activity Score 19   Functional Mobility   Sit to Stand Contact Guard Assist   Bed, Chair, Wheelchair Transfer Contact Guard Assist   Toilet Transfers Minimal Assist   Transfer Method Stand Step   Visual Perception   Visual Perception  WDL   Activity Tolerance   Sitting in Chair 7   Sitting Edge of Bed 12   Standing 7   Patient / Family Goals   Patient / Family Goal #1 Home   Short Term Goals   Short Term Goal # 1 Toileting with SBA/Spv within 5 days   Short Term Goal # 2 LB dressing with SBA, AE as needed, within 5 days   Short Term Goal # 3 ADL transfers with Spv within 5 days   Education Group   Role of Occupational Therapist Patient Response Patient;Acceptance;Explanation;Verbal Demonstration   Hip Precautions Patient Response Patient;Acceptance;Explanation;Verbal Demonstration   Home Safety Patient Response Patient;Acceptance;Explanation;Verbal Demonstration   Transfers Patient Response Patient;Acceptance;Action Demonstration;Explanation   ADL Patient Response Patient;Acceptance;Action Demonstration;Explanation   Occupational Therapy Initial Treatment Plan    Treatment Interventions Self Care / Activities of Daily Living;Neuro Re-Education / Balance;Therapeutic Activity   Treatment Frequency 3 Times per Week   Duration Until Therapy Goals Met   Problem List   Problem List Decreased Active Daily Living Skills;Decreased Homemaking Skills;Decreased Functional Mobility;Decreased Activity Tolerance;Impaired Postural Control / Balance   Anticipated Discharge Equipment and Recommendations   DC Equipment Recommendations None   Discharge Recommendations Recommend home health for continued occupational therapy services

## 2023-03-12 NOTE — CARE PLAN
The patient is Stable - Low risk of patient condition declining or worsening    Shift Goals  Clinical Goals: Patient will be free from fall throughout the shift  Patient Goals: rest comfortably    Progress made toward(s) clinical / shift goals:  Patient is free from fall,up to the bathroom with the use of FWW. Fall precautions observe, kept bed in lowest position,bed alarm on  and call bell within reach.Patient is for DC today with HH    Patient is not progressing towards the following goals:NA

## 2023-03-12 NOTE — PROGRESS NOTES
Received patient from Night shift RN . Patient is comfortably sleeping but easily arouse by verbal stimuli.No sign of distress. Dressing to left hip with steri strips,SARA.Fall precaution in place, kept bed in lowest position and call light within reach.

## 2023-03-12 NOTE — CARE PLAN
The patient is Stable - Low risk of patient condition declining or worsening    Shift Goals  Clinical Goals: Patient will verbalize pain level 3/10 or less throughout the shift after PRN interventions  Patient Goals: pain mngt    Progress made toward(s) clinical / shift goals:      Non-pharmacological management allows pt to remain at stated goal.      Problem: Pain - Standard  Goal: Alleviation of pain or a reduction in pain to the patient’s comfort goal  Outcome: Progressing     Problem: Knowledge Deficit - Standard  Goal: Patient and family/care givers will demonstrate understanding of plan of care, disease process/condition, diagnostic tests and medications  Outcome: Progressing     Problem: Skin Integrity  Goal: Skin integrity is maintained or improved  Outcome: Progressing       Patient is not progressing towards the following goals:

## 2023-03-12 NOTE — DISCHARGE INSTRUCTIONS
You were hospitalized for a hip fracture.  Please use a walker and we set up home health physical and occupational therapy at home.  Pain control is important to make sure you are mobile.  Please have a thyroid test and blood count checked within a week of discharge.  Follow up with your surgeon.    Discharge Instructions    Discharged to home by car with relative. Discharged via wheelchair, hospital escort: Yes.  Special equipment needed: Not Applicable    Be sure to schedule a follow-up appointment with your primary care doctor or any specialists as instructed.     Discharge Plan:   Diet Plan: Discussed  Activity Level: Discussed  Confirmed Follow up Appointment: Patient to Call and Schedule Appointment  Confirmed Symptoms Management: Discussed  Medication Reconciliation Updated: Yes  Influenza Vaccine Indication: Not indicated: Previously immunized this influenza season and > 8 years of age    I understand that a diet low in cholesterol, fat, and sodium is recommended for good health. Unless I have been given specific instructions below for another diet, I accept this instruction as my diet prescription.   Other diet: Regular    Special Instructions: None    -Is this patient being discharged with medication to prevent blood clots?  No    Is patient discharged on Warfarin / Coumadin?   No

## 2023-03-13 ENCOUNTER — PATIENT OUTREACH (OUTPATIENT)
Dept: MEDICAL GROUP | Facility: PHYSICIAN GROUP | Age: 88
End: 2023-03-13
Payer: MEDICARE

## 2023-03-13 NOTE — PROGRESS NOTES
Patient outreach for TCM follow up completed with patient's daughter Mazin.  Reviewed discharge instructions and new and current medications. Daughter verbalized understanding.  Confirmed follow up appointment for 3/21/23 . Home health has made contact and scheduled.   Has a FWW, standard. Following joint replacement restrictions per ortho, family was previously educated.   Daughter expressed no further needs at this time.

## 2023-03-14 ENCOUNTER — HOME CARE VISIT (OUTPATIENT)
Dept: HOME HEALTH SERVICES | Facility: HOME HEALTHCARE | Age: 88
End: 2023-03-14
Payer: MEDICARE

## 2023-03-14 VITALS
SYSTOLIC BLOOD PRESSURE: 110 MMHG | HEART RATE: 72 BPM | DIASTOLIC BLOOD PRESSURE: 70 MMHG | TEMPERATURE: 97.6 F | RESPIRATION RATE: 16 BRPM

## 2023-03-14 PROCEDURE — G0180 MD CERTIFICATION HHA PATIENT: HCPCS | Performed by: INTERNAL MEDICINE

## 2023-03-14 PROCEDURE — G0493 RN CARE EA 15 MIN HH/HOSPICE: HCPCS

## 2023-03-14 ASSESSMENT — ENCOUNTER SYMPTOMS
PERSON REPORTING PAIN: PATIENT
BOWEL PATTERN NORMAL: 1
VOMITING: DENIES
NAUSEA: DENIES
PAIN SEVERITY GOAL: 0/10
PAIN: 1
LOWEST PAIN SEVERITY IN PAST 24 HOURS: 2/10
STOOL FREQUENCY: DAILY
MUSCLE WEAKNESS: 1
HIGHEST PAIN SEVERITY IN PAST 24 HOURS: 2/10
LAST BOWEL MOVEMENT: 66547
SUBJECTIVE PAIN PROGRESSION: UNCHANGED

## 2023-03-15 ENCOUNTER — HOME CARE VISIT (OUTPATIENT)
Dept: HOME HEALTH SERVICES | Facility: HOME HEALTHCARE | Age: 88
End: 2023-03-15
Payer: MEDICARE

## 2023-03-16 ENCOUNTER — HOME CARE VISIT (OUTPATIENT)
Dept: HOME HEALTH SERVICES | Facility: HOME HEALTHCARE | Age: 88
End: 2023-03-16
Payer: MEDICARE

## 2023-03-16 ENCOUNTER — HOSPITAL ENCOUNTER (OUTPATIENT)
Facility: MEDICAL CENTER | Age: 88
End: 2023-03-16
Attending: NURSE PRACTITIONER
Payer: MEDICARE

## 2023-03-16 ENCOUNTER — OFFICE VISIT (OUTPATIENT)
Dept: URGENT CARE | Facility: CLINIC | Age: 88
End: 2023-03-16
Payer: MEDICARE

## 2023-03-16 VITALS
HEART RATE: 64 BPM | SYSTOLIC BLOOD PRESSURE: 118 MMHG | HEIGHT: 65 IN | WEIGHT: 125 LBS | DIASTOLIC BLOOD PRESSURE: 50 MMHG | OXYGEN SATURATION: 92 % | RESPIRATION RATE: 16 BRPM | BODY MASS INDEX: 20.83 KG/M2 | TEMPERATURE: 97.8 F

## 2023-03-16 VITALS
RESPIRATION RATE: 16 BRPM | DIASTOLIC BLOOD PRESSURE: 64 MMHG | TEMPERATURE: 96.8 F | SYSTOLIC BLOOD PRESSURE: 116 MMHG | OXYGEN SATURATION: 99 % | HEART RATE: 73 BPM

## 2023-03-16 VITALS
HEART RATE: 73 BPM | SYSTOLIC BLOOD PRESSURE: 116 MMHG | OXYGEN SATURATION: 99 % | DIASTOLIC BLOOD PRESSURE: 64 MMHG | RESPIRATION RATE: 16 BRPM | TEMPERATURE: 96.8 F

## 2023-03-16 DIAGNOSIS — Z86.19 HISTORY OF CLOSTRIDIUM DIFFICILE INFECTION: ICD-10-CM

## 2023-03-16 DIAGNOSIS — R30.0 DYSURIA: ICD-10-CM

## 2023-03-16 DIAGNOSIS — N39.0 URINARY TRACT INFECTION WITH HEMATURIA, SITE UNSPECIFIED: ICD-10-CM

## 2023-03-16 DIAGNOSIS — R31.9 URINARY TRACT INFECTION WITH HEMATURIA, SITE UNSPECIFIED: ICD-10-CM

## 2023-03-16 DIAGNOSIS — Z87.448 HISTORY OF CHRONIC KIDNEY DISEASE: ICD-10-CM

## 2023-03-16 LAB
APPEARANCE UR: NORMAL
BILIRUB UR STRIP-MCNC: NEGATIVE MG/DL
COLOR UR AUTO: YELLOW
GLUCOSE UR STRIP.AUTO-MCNC: NEGATIVE MG/DL
KETONES UR STRIP.AUTO-MCNC: NEGATIVE MG/DL
LEUKOCYTE ESTERASE UR QL STRIP.AUTO: NORMAL
NITRITE UR QL STRIP.AUTO: POSITIVE
PH UR STRIP.AUTO: 7 [PH] (ref 5–8)
PROT UR QL STRIP: 30 MG/DL
RBC UR QL AUTO: NORMAL
SP GR UR STRIP.AUTO: 1.02
UROBILINOGEN UR STRIP-MCNC: 1 MG/DL

## 2023-03-16 PROCEDURE — 87077 CULTURE AEROBIC IDENTIFY: CPT

## 2023-03-16 PROCEDURE — G0156 HHCP-SVS OF AIDE,EA 15 MIN: HCPCS

## 2023-03-16 PROCEDURE — 87186 SC STD MICRODIL/AGAR DIL: CPT

## 2023-03-16 PROCEDURE — G0151 HHCP-SERV OF PT,EA 15 MIN: HCPCS

## 2023-03-16 PROCEDURE — 87086 URINE CULTURE/COLONY COUNT: CPT

## 2023-03-16 PROCEDURE — 81002 URINALYSIS NONAUTO W/O SCOPE: CPT | Performed by: NURSE PRACTITIONER

## 2023-03-16 PROCEDURE — 99214 OFFICE O/P EST MOD 30 MIN: CPT | Performed by: NURSE PRACTITIONER

## 2023-03-16 RX ORDER — DOXYCYCLINE HYCLATE 100 MG
100 TABLET ORAL 2 TIMES DAILY
Qty: 14 TABLET | Refills: 0 | Status: SHIPPED | OUTPATIENT
Start: 2023-03-16 | End: 2023-03-23

## 2023-03-16 ASSESSMENT — FIBROSIS 4 INDEX: FIB4 SCORE: 1.26

## 2023-03-16 NOTE — PROGRESS NOTES
Subjective     Elizabeth Celis is a 87 y.o. female who presents with UTI (X3days, Large volume, odor, cloudy, incontinence )            UTI  This is a new problem. Episode onset: BIB daughter who serves as historian and caregiver. reports new onset of odorous, cloudy urine that started 3 days ago. no fevers. she is incontinent. She has tried nothing for the symptoms.     Review of Systems   Genitourinary:         Odorous urine   All other systems reviewed and are negative.       Past Medical History:   Diagnosis Date    Adverse effect of anesthesia 2020    grandfather unexpectedly  from anesthesia, poor experience in  with last surgery    Amputation of left middle finger     osteomyelitis    Anesthesia     difficulty waking up with hysterectomy and last surgery in , hallucinations    Atherosclerosis of both carotid arteries 2022     Dec. 2021: Mild plaque right internal carotid and moderate plaque left carotid bifurcation    Bowel habit changes     Hx cdiff    C. difficile diarrhea 2016    fecal transplant    Cancer ([facility]) 1947    Tongue CA (surgically removed)    Chronic back pain     hands, shoulders    CKD (chronic kidney disease) stage 3, GFR 30-59 ml/min ([facility])     Constipation 2020    rarely    Decreased hearing of both ears 2022    Delayed emergence from general anesthesia     Dense breast tissue on mammogram 2019    Dental disorder     lower retainer    Depression     Elbow fracture, left     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 choroidal neovascularization ([facility]) 2022    Heart burn     High cholesterol     History of anemia     History of DVT (deep vein thrombosis)     unsure of history    Hypothyroid     MRSA (methicillin resistant Staphylococcus aureus)     RIGHT foot    Multilevel degenerative disc disease     Osteoarthritis     Osteoporosis 2019    DEXA Aug. 2017: T score -3.4 right forearm and -1.6 left  "hip DEXA Aug. 2019: T score forearm -4.6 and hip -1.4; DEXA Nov. 2020: T score right forearm -4.0 and right hip -1.7    Pain     shoulders, feet, back    Peripheral edema     PVC (premature ventricular contraction)     Raynaud's disease     Rheumatoid arthritis (HCC) 09/26/2014    Stroke ([facility]) 1990's?    \"mini\" no residual symptoms    Urinary incontinence     occasional      Past Surgical History:   Procedure Laterality Date    CO TOTAL HIP ARTHROPLASTY Left 2/21/2023    Procedure: LEFT TOTAL HIP ARTHROPLASTY, AND REMOVAL OF SCREWS FROM TOP OF FEMUR PLATE;  Surgeon: Rom Chinchilla M.D.;  Location: [facility] [place];  Service: Orthopedics    HARDWARE REMOVAL ORTHO Left 2/21/2023    Procedure: REMOVAL, HARDWARE;  Surgeon: Rom Chinchilla M.D.;  Location: [facility];  Service: Orthopedics    PB DEGROOT W/O FACETEC FORAMOT/DSKC 1/2 VRT SEG, TH* N/A 1/16/2020    Procedure: LAMINECTOMY, SPINE, THORACIC;  Surgeon: Sang Nelson M.D.;  Location: SURGERY [facility];  Service: Neurosurgery    THORACIC FUSION O-ARM N/A 1/16/2020    Procedure: FUSION, SPINE, THORACIC, POSTERIOR APPROACH - T9-L3;  Surgeon: Sang Nelson M.D.;  Location: [facility];  Service: Neurosurgery    PB RECONSTR TOTAL SHOULDER IMPLANT Left 4/30/2019    Procedure: ARTHROPLASTY, SHOULDER, TOTAL - REVERSE;  Surgeon: Daniella Camargo M.D.;  Location: [facility];  Service: Orthopedics    SHOULDER ARTHROPLASTY TOTAL Right 1/9/2018    Procedure: SHOULDER ARTHROPLASTY TOTAL - REVERSE;  Surgeon: Daniella Camargo M.D.;  Location: SURGERY [facility];  Service: Orthopedics    COLONOSCOPY - ENDO N/A 3/7/2016    Procedure: COLONOSCOPY - ENDO;  Surgeon: Estiven Ayers M.D.;  Location: ENDOSCOPY [facility];  Service:     FECAL TRANSPLANT N/A 3/7/2016    Procedure: FECAL TRANSPLANT;  Surgeon: Estiven Ayers M.D.;  Location: ENDOSCOPY [facility];  Service:     OTHER ORTHOPEDIC SURGERY Left 2012    Left " "Elbow fx repair    BLEPHAROPLASTY  3/31/2011    Performed by [facility] D[facility]Z [facility]    FOOT SURGERY Right 2010    OTHER ORTHOPEDIC SURGERY Left 2006    finger- removal of digit Left middle finger    CHOLECYSTECTOMY  2000    HYSTERECTOMY LAPAROSCOPY  2000    HYSTERECTOMY RADICAL  1985    OTHER Bilateral ,     feet- repair torn tendons    OTHER ORTHOPEDIC SURGERY Left 1970s    femur fx      Social History     Socioeconomic History    Marital status:      Spouse name: Not on file    Number of children: Not on file    Years of education: Not on file    Highest education level: Not on file   Occupational History    Not on file   Tobacco Use    Smoking status: Former     Packs/day: 1.00     Years: 20.00     Pack years: 20.00     Types: Cigarettes     Quit date: 1985     Years since quittin.9    Smokeless tobacco: Never   Vaping Use    Vaping Use: Never used   Substance and Sexual Activity    Alcohol use: Not Currently     Alcohol/week: 1.2 oz     Types: 2 Glasses of wine per week     Comment: 1 per day    Drug use: Never    Sexual activity: Not on file   Other Topics Concern    Not on file   Social History Narrative    Not on file     Social Determinants of Health     Financial Resource Strain: Not on file   Food Insecurity: Not on file   Transportation Needs: Not on file   Physical Activity: Not on file   Stress: Not on file   Social Connections: Not on file   Intimate Partner Violence: Not on file   Housing Stability: Not on file         Objective     /50   Pulse 64   Temp 36.6 °C (97.8 °F) (Temporal)   Resp 16   Ht 1.651 m (5' 5\")   Wt 56.7 kg (125 lb)   SpO2 92%   BMI 20.80 kg/m²      Physical Exam  Vitals and nursing note reviewed.   Constitutional:       Appearance: Normal appearance. She is normal weight.   HENT:      Head: Normocephalic and atraumatic.      Nose: Nose normal.      Mouth/Throat:      Mouth: Mucous membranes are moist.      Pharynx: Oropharynx " is clear.   Eyes:      Extraocular Movements: Extraocular movements intact.      Pupils: Pupils are equal, round, and reactive to light.   Cardiovascular:      Rate and Rhythm: Normal rate and regular rhythm.   Pulmonary:      Effort: Pulmonary effort is normal.   Abdominal:      Tenderness: There is no right CVA tenderness or left CVA tenderness.   Musculoskeletal:         General: Normal range of motion.      Cervical back: Normal range of motion.   Skin:     General: Skin is warm and dry.      Capillary Refill: Capillary refill takes less than 2 seconds.   Neurological:      General: No focal deficit present.      Mental Status: She is alert and oriented to person, place, and time. Mental status is at baseline.   Psychiatric:         Mood and Affect: Mood normal.         Speech: Speech normal.         Thought Content: Thought content normal.         Judgment: Judgment normal.                Lab Results   Component Value Date/Time    POCCOLOR yellow 03/16/2023 10:56 AM    POCAPPEAR cloudy 03/16/2023 10:56 AM    POCLEUKEST large 03/16/2023 10:56 AM    POCNITRITE positive 03/16/2023 10:56 AM    POCUROBILIGE 1.0 03/16/2023 10:56 AM    POCPROTEIN 30 03/16/2023 10:56 AM    POCURPH 7.0 03/16/2023 10:56 AM    POCBLOOD small 03/16/2023 10:56 AM    POCSPGRV 1.020 03/16/2023 10:56 AM    POCKETONES negative 03/16/2023 10:56 AM    POCBILIRUBIN negative 03/16/2023 10:56 AM    POCGLUCUA negative 03/16/2023 10:56 AM                 Assessment & Plan        1. Dysuria  - POCT Urinalysis    2. Urinary tract infection with hematuria, site unspecified  - Urine Culture; Future  - doxycycline (VIBRAMYCIN) 100 MG Tab; Take 1 Tablet by mouth 2 times a day for 7 days.  Dispense: 14 Tablet; Refill: 0    3. History of chronic kidney disease    4. History of Clostridium difficile infection    Due to her abx allergies and hx of C diff, will start her on a course of doxy and wait for urine culture results  Will contact patient via [employer] if  I need to change abx based on urine culture result  Tylenol for any discomfort  Red flags discussed and when to seek care in the ER  Supportive care, differential diagnoses, and indications for immediate follow-up discussed with patient.    Pathogenesis of diagnosis discussed including typical length and natural progression.    Instructed to return to  or nearest emergency department if symptoms fail to improve, for any change in condition, further concerns, or new concerning symptoms.  Patient states understanding of the plan of care and discharge instructions.

## 2023-03-17 ENCOUNTER — HOME CARE VISIT (OUTPATIENT)
Dept: HOME HEALTH SERVICES | Facility: HOME HEALTHCARE | Age: 88
End: 2023-03-17
Payer: MEDICARE

## 2023-03-17 VITALS
DIASTOLIC BLOOD PRESSURE: 75 MMHG | RESPIRATION RATE: 18 BRPM | TEMPERATURE: 97.8 F | HEART RATE: 78 BPM | SYSTOLIC BLOOD PRESSURE: 125 MMHG

## 2023-03-17 PROCEDURE — G0299 HHS/HOSPICE OF RN EA 15 MIN: HCPCS

## 2023-03-17 ASSESSMENT — ENCOUNTER SYMPTOMS
PAIN SEVERITY GOAL: 0/10
SUBJECTIVE PAIN PROGRESSION: UNCHANGED
HIGHEST PAIN SEVERITY IN PAST 24 HOURS: 3/10
LOWEST PAIN SEVERITY IN PAST 24 HOURS: 3/10

## 2023-03-18 SDOH — ECONOMIC STABILITY: HOUSING INSECURITY
HOME SAFETY: PT HAS DTR STAYING WITH HER TILL SHE IS INDEPENDENT AND OTHERWISE PT LIVES ALONE, SON COMES OVER ON THE WEEKENDS TO RELIEVE DTR

## 2023-03-18 ASSESSMENT — BALANCE ASSESSMENTS
EYES CLOSED AT MAXIMUM POSITION NUDGED: 0 - UNSTEADY
ATTEMPTS TO ARISE: 1 - ABLE, REQUIRES MORE THAN ONE ATTEMPT
NUDGED: 2 - STEADY
STANDING BALANCE: 1 - STEADY BUT WIDE STANCE AND USES CANE OR OTHER SUPPORT
ARISING SCORE: 1
IMMEDIATE STANDING BALANCE FIRST 5 SECONDS: 1 - STEADY BUT USES WALKER OR OTHER SUPPORT
ARISES: 1 - ABLE, USES ARMS TO HELP
TURNING 360 DEGREES STEPS: 0 - DISCONTINUOUS STEPS
SITTING DOWN: 1 - USES ARMS OR NOT SMOOTH MOTION
BALANCE SCORE: 9
SITTING BALANCE: 1 - STEADY, SAFE
NUDGED SCORE: 2

## 2023-03-18 ASSESSMENT — ACTIVITIES OF DAILY LIVING (ADL)
CURRENT_FUNCTION: STAND BY ASSIST
AMBULATION ASSISTANCE ON FLAT SURFACES: 1
AMBULATION ASSISTANCE: CONTACT GUARD ASSIST
FEEDING_COMMENTS: SEE OT EVAL FOR MORE DETAILS.
FEEDING_WITHIN_DEFINED_LIMITS: 1
BATHING_COMMENTS: SEE OT EVAL FOR MORE DETAILS.
AMBULATION ASSISTANCE: 1
PHYSICAL TRANSFERS ASSESSED: 1
CURRENT_FUNCTION: CONTACT GUARD ASSIST
AMBULATION ASSISTANCE: STAND BY ASSIST
GROOMING_COMMENTS: SEE OT EVAL FOR MORE DETAILS.
ADLS_COMMENTS: SEE OT EVAL FOR MORE DETAILS.

## 2023-03-18 ASSESSMENT — ENCOUNTER SYMPTOMS
MUSCLE WEAKNESS: 1
PERSON REPORTING PAIN: PATIENT
PAIN LOCATION - PAIN QUALITY: ACHES
PAIN: 1
PAIN SEVERITY GOAL: 1/10
STOOL FREQUENCY: DAILY
LOWEST PAIN SEVERITY IN PAST 24 HOURS: 3/10
PAIN LOCATION - PAIN SEVERITY: 4/10
PAIN LOCATION - PAIN FREQUENCY: FREQUENT
NAUSEA: DENIES
VOMITING: DENIES
PAIN LOCATION - RELIEVING FACTORS: PAIN MEDS
SUBJECTIVE PAIN PROGRESSION: WAXING AND WANING
LAST BOWEL MOVEMENT: 66550
BOWEL PATTERN NORMAL: 1
PAIN LOCATION - EXACERBATING FACTORS: WALKING OR MOVEMENT
PAIN LOCATION: LEFT LEG
HIGHEST PAIN SEVERITY IN PAST 24 HOURS: 7/10

## 2023-03-18 ASSESSMENT — GAIT ASSESSMENTS
PATH: 1 - MILD/MODERATE DEVIATION OR USES WALKING AID
BALANCE AND GAIT SCORE: 15
STEP SYMMETRY: 1 - RIGHT AND LEFT STEP LENGTH APPEAR EQUAL
PATH SCORE: 1
TRUNK: 0 - MARKED SWAY OR USES WALKING AID
INITIATION OF GAIT IMMEDIATELY AFTER GO: 0 - ANY HESITANCY OR MULTIPLE ATTEMPTS TO START
TRUNK SCORE: 0
WALKING STANCE: 0 - HEELS APART
STEP CONTINUITY: 0 - STOPPING OR DISCONTINUITY BETWEEN STEPS
GAIT SCORE: 6

## 2023-03-20 ENCOUNTER — HOME CARE VISIT (OUTPATIENT)
Dept: HOME HEALTH SERVICES | Facility: HOME HEALTHCARE | Age: 88
End: 2023-03-20
Payer: MEDICARE

## 2023-03-20 VITALS
HEART RATE: 70 BPM | SYSTOLIC BLOOD PRESSURE: 148 MMHG | RESPIRATION RATE: 16 BRPM | TEMPERATURE: 96.2 F | DIASTOLIC BLOOD PRESSURE: 56 MMHG

## 2023-03-20 PROCEDURE — G0156 HHCP-SVS OF AIDE,EA 15 MIN: HCPCS

## 2023-03-20 PROCEDURE — G0493 RN CARE EA 15 MIN HH/HOSPICE: HCPCS

## 2023-03-21 ENCOUNTER — HOME CARE VISIT (OUTPATIENT)
Dept: HOME HEALTH SERVICES | Facility: HOME HEALTHCARE | Age: 88
End: 2023-03-21
Payer: MEDICARE

## 2023-03-21 ENCOUNTER — OFFICE VISIT (OUTPATIENT)
Dept: MEDICAL GROUP | Facility: PHYSICIAN GROUP | Age: 88
End: 2023-03-21
Payer: MEDICARE

## 2023-03-21 VITALS
RESPIRATION RATE: 16 BRPM | HEART RATE: 70 BPM | DIASTOLIC BLOOD PRESSURE: 56 MMHG | TEMPERATURE: 96.2 F | SYSTOLIC BLOOD PRESSURE: 148 MMHG

## 2023-03-21 VITALS
SYSTOLIC BLOOD PRESSURE: 118 MMHG | TEMPERATURE: 97.4 F | DIASTOLIC BLOOD PRESSURE: 64 MMHG | RESPIRATION RATE: 18 BRPM | HEART RATE: 74 BPM | OXYGEN SATURATION: 97 %

## 2023-03-21 VITALS
TEMPERATURE: 97.1 F | BODY MASS INDEX: 19.99 KG/M2 | SYSTOLIC BLOOD PRESSURE: 110 MMHG | WEIGHT: 120 LBS | HEART RATE: 55 BPM | OXYGEN SATURATION: 95 % | DIASTOLIC BLOOD PRESSURE: 60 MMHG | HEIGHT: 65 IN | RESPIRATION RATE: 12 BRPM

## 2023-03-21 DIAGNOSIS — H35.3222 EXUDATIVE AGE-RELATED MACULAR DEGENERATION OF LEFT EYE WITH INACTIVE CHOROIDAL NEOVASCULARIZATION (HCC): ICD-10-CM

## 2023-03-21 DIAGNOSIS — I47.10 SVT (SUPRAVENTRICULAR TACHYCARDIA) (HCC): ICD-10-CM

## 2023-03-21 DIAGNOSIS — E03.8 OTHER SPECIFIED HYPOTHYROIDISM: ICD-10-CM

## 2023-03-21 DIAGNOSIS — M80.059A HIP FRACTURE DUE TO OSTEOPOROSIS, INITIAL ENCOUNTER (HCC): ICD-10-CM

## 2023-03-21 DIAGNOSIS — N39.0 URINARY TRACT INFECTION WITHOUT HEMATURIA, SITE UNSPECIFIED: ICD-10-CM

## 2023-03-21 DIAGNOSIS — Z96.642 STATUS POST LEFT HIP REPLACEMENT: ICD-10-CM

## 2023-03-21 PROCEDURE — G0151 HHCP-SERV OF PT,EA 15 MIN: HCPCS

## 2023-03-21 PROCEDURE — 99214 OFFICE O/P EST MOD 30 MIN: CPT | Performed by: INTERNAL MEDICINE

## 2023-03-21 ASSESSMENT — ENCOUNTER SYMPTOMS
PAIN LOCATION - PAIN FREQUENCY: INTERMITTENT
PERSON REPORTING PAIN: PATIENT
SUBJECTIVE PAIN PROGRESSION: GRADUALLY IMPROVING
LAST BOWEL MOVEMENT: 66553
LIMITED RANGE OF MOTION: 1
VOMITING: DENIES
PAIN LOCATION: L HIP
MUSCLE WEAKNESS: 1
PAIN SEVERITY GOAL: 1/10
PAIN LOCATION - PAIN QUALITY: ACHE
PAIN LOCATION - PAIN SEVERITY: 3/10
NAUSEA: DENIES
PAIN: 1
LOWEST PAIN SEVERITY IN PAST 24 HOURS: 1/10
ARTHRALGIAS: 1
MENTAL STATUS CHANGE: 0
STOOL FREQUENCY: LESS THAN DAILY
HIGHEST PAIN SEVERITY IN PAST 24 HOURS: 3/10

## 2023-03-21 ASSESSMENT — ACTIVITIES OF DAILY LIVING (ADL)
CURRENT_FUNCTION: ONE PERSON
AMBULATION ASSISTANCE: ONE PERSON

## 2023-03-21 ASSESSMENT — FIBROSIS 4 INDEX: FIB4 SCORE: 1.26

## 2023-03-21 NOTE — PROGRESS NOTES
CC: Hip fracture, UTI    HPI:  Elizabeth presents with the following    1. 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 choroidal neovascularization (formerly [facility])  New onset.  November 2022.  Stable.  Followed by her ophthalmologist at  [facility].  Patient will follow-up for her second injection on December 1.  Patient has noticed a decline in her vision    2. Hip fracture due to osteoporosis, initial encounter (formerly [facility])  New onset.  Stable.  Patient with a PMH of recent left hip replacement in February 2023 by Dr. Chinchilla, presented to the emergency room on March 10 with a sudden onset of left hip pain.  CT of the hip showed a new nondisplaced greater trochanteric fracture adjacent to the knee prosthesis.  Nonoperative.  Patient receiving home physical therapy and progressing.  Patient is requiring tramadol 50 mg every morning, oxycodone 5 mg every afternoon.    3. Urinary tract infection without hematuria, site unspecified  Patient presented to the urgent care on March 16 secondary to dysuria.  Diagnosed with UTI and started on doxycycline and has 2 days of treatment left.  Patient still complains of cloudy, odorous urine.  Patient was catheterized during her hospitalization on March 10.    4. Other specified hypothyroidism  Patient with hypothyroidism, currently taking levothyroxine 1 1 2 mcg daily, Monday through Friday.  She has been on this dose for 1 year.  TSH level has progressively increased over this last year and most recently is at TSH 8.2.    5. Status post left hip replacement  Status post left hip replacement on 2/21/2023 with Dr. Kuo.    6. SVT (supraventricular tachycardia) (formerly [facility])  Chronic.  Stable.  History of SVT, followed by cardiology.  Treated with Toprol-XL 25 mg, half a tablet daily.      Patient Active Problem List    Diagnosis Date Noted    SVT (supraventricular tachycardia) (formerly [facility]) 03/21/2023    Closed fracture of greater trochanter of left femur (formerly [facility]) 03/11/2023    Hip  fracture due to osteoporosis, initial encounter ([facility]) 03/11/2023    Acute blood loss anemia 03/10/2023    Status post left hip replacement 02/21/2023    Primary osteoarthritis of left hip 02/01/2023    Retained orthopedic hardware 02/01/2023    Decreased hearing of both ears 11/17/2022    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 choroidal neovascularization ([facility]) 11/17/2022    Multilevel degenerative disc disease 10/18/2022    Osteoarthritis of multiple joints 10/18/2022    Long-term use of hydroxychloroquine 10/18/2022    Nonspecific abnormal electrocardiogram (ECG) (EKG) 06/03/2022    PVC (premature ventricular contraction) 06/03/2022    Chronic diarrhea 06/02/2022    DNR (do not resuscitate) 05/19/2022    Chronic left hip pain 04/01/2022    Insomnia 04/01/2022    BMI 21.0-21.9, adult 03/21/2022    Hypercholesterolemia 03/21/2022    Atherosclerosis of both carotid arteries 01/24/2022    History of DVT (deep vein thrombosis) 12/01/2021    History of compression fracture of spine, Jan. 2020 (T12 and L1) 05/12/2021    Age-related osteoporosis without current pathological fracture 11/12/2020    Anemia 01/19/2020    Peripheral edema 01/15/2020    Spinal stenosis of lumbar region with neurogenic claudication 01/10/2020    Ex-cigarette smoker 07/29/2019    History of recurrent UTIs 03/27/2019    Hypothyroidism 02/10/2019    History of anemia 01/12/2018    Generalized weakness 01/12/2018    Raynaud disease 11/11/2017    CKD (chronic kidney disease) stage 3, GFR 30-59 ml/min ([facility]) 11/11/2017    History of falling 11/11/2017    History of Clostridium difficile 02/21/2016    Depression 02/21/2016    Seronegative rheumatoid arthritis ([facility]) 09/26/2014    History of sciatica 09/26/2014       Current Outpatient Medications   Medication Sig Dispense Refill    OXYCODONE HCL PO Take  by mouth. Indications: pain      doxycycline (VIBRAMYCIN) 100 MG Tab Take 1 Tablet by mouth 2 times a day for 7 days. 14  Tablet 0    lidocaine (LIDODERM) 5 % Patch Place 1 Patch on the skin every 24 hours for 15 days. 15 Patch 1    acetaminophen (TYLENOL) 500 MG Tab Take 500-1,000 mg by mouth every 6 hours as needed. Indications: Pain      Naproxen Sodium 220 MG Cap Take 220 mg by mouth 2 times a day as needed (Pain). Indications: Pain      cholestyramine (QUESTRAN) 4 g packet Take 4 g by mouth as needed (supplement). 100 Each 3    traMADol (ULTRAM) 50 MG Tab Take 50 mg by mouth every 6 hours as needed for Moderate Pain. Indications: Pain      potassium chloride SA (K-DUR) 10 MEQ Tab CR Take 10 mEq by mouth every day. Indications: Low Amount of Potassium in the Blood      rosuvastatin (CRESTOR) 10 MG Tab Take 10 mg by mouth every day. Indications: High Amount of Fats in the Blood      Probiotic Product (PROBIOTIC-10 PO) Take 1 Tablet by mouth every day. Indications: nutritional support      SYNTHROID 112 MCG Tab Take 1 Tablet by mouth every morning on an empty stomach. 100 Tablet 1    FLUoxetine (PROZAC) 10 MG Cap Take 2 Capsules by mouth every morning. 180 Capsule 2    furosemide (LASIX) 40 MG Tab Take 0.5 Tablets by mouth 1 time a day as needed (lower leg swelling). 100 Tablet 0    traZODone (DESYREL) 50 MG Tab Take 0.5 Tablets by mouth at bedtime. 100 Tablet 2    metoprolol SR (TOPROL XL) 25 MG TABLET SR 24 HR Take 0.5 Tablets by mouth every day. 90 Tablet 3    spironolactone (ALDACTONE) 25 MG Tab Take 1 Tablet by mouth every day. 100 Tablet 2    ipratropium (ATROVENT) 0.06 % Solution Administer 1 Spray into affected nostril(S) 2 times a day. 15 mL 2    hydroxychloroquine (PLAQUENIL) 200 MG Tab TAKE ONE TABLET BY MOUTH ONE TIME DAILY 100 Tablet 3    COENZYME Q10 PO Take 1 Capsule by mouth every day. Indications: High Blood Pressure Disorder 100 Capsule 3    cycloSPORINE (RESTASIS) 0.05 % ophthalmic emulsion Administer 1 Drop into both eyes 2 times a day. Pharmacist - please dispense 180 single use vials (72 ml) 72 mL 3    Multiple  Vitamins-Minerals (PRESERVISION AREDS 2 PO) Take 1 Tablet by mouth every day. Indications: nutritional support- macular degeneration      CALCIUM CITRATE PO Take 1 Tablet by mouth every evening. Indications: nutritional support      therapeutic multivitamin-minerals (THERAGRAN-M) Tab Take 1 Tablet by mouth every day. Indications: nutritional support      denosumab (PROLIA) 60 MG/ML Solution Inject 1 mL under the skin every 6 months. Indications: Decreased Bone Mineral Density, Osteoporosis       No current facility-administered medications for this visit.         Allergies as of 2023 - Reviewed 2023   Allergen Reaction Noted    Azithromycin Unspecified 2017    Cefuroxime Itching and Vomiting 2016    Ciprofloxacin Itching and Vomiting 2017    Clindamycin Itching and Vomiting 2016    Daptomycin  2016    Erythromycin Unspecified 2017    Rifampin  2016    Codeine Itching 2012    Flagyl [metronidazole] Rash, Vomiting, and Nausea 2016    Macrodantin [nitrofurantoin macrocrystal] Rash     Morphine Itching 2017    Sulfa drugs Swelling 2012    Nitrofurantoin Vomiting and Nausea 2016    Premarin Rash and Unspecified 2017        Social History     Socioeconomic History    Marital status:      Spouse name: Not on file    Number of children: Not on file    Years of education: Not on file    Highest education level: Not on file   Occupational History    Not on file   Tobacco Use    Smoking status: Former     Packs/day: 1.00     Years: 20.00     Pack years: 20.00     Types: Cigarettes     Quit date: 1985     Years since quittin.9    Smokeless tobacco: Never   Vaping Use    Vaping Use: Never used   Substance and Sexual Activity    Alcohol use: Not Currently     Alcohol/week: 1.2 oz     Types: 2 Glasses of wine per week     Comment: 1 per day    Drug use: Never    Sexual activity: Not on file   Other Topics Concern    Not on  file   Social History Narrative    Not on file     Social Determinants of Health     Financial Resource Strain: Not on file   Food Insecurity: Not on file   Transportation Needs: Not on file   Physical Activity: Not on file   Stress: Not on file   Social Connections: Not on file   Intimate Partner Violence: Not on file   Housing Stability: Not on file       Family History   Problem Relation Age of Onset    Non-contributory Mother     Osteoporosis Mother     Arthritis Mother     Non-contributory Father     Narcolepsy Father     Lung Disease Father         Asthma    Heart Disease Father     Anesthesia Paternal Grandfather         death       Past Surgical History:   Procedure Laterality Date    HI TOTAL HIP ARTHROPLASTY Left 2/21/2023    Procedure: LEFT TOTAL HIP ARTHROPLASTY, AND REMOVAL OF SCREWS FROM TOP OF FEMUR PLATE;  Surgeon: Rom Chinchilla M.D.;  Location: [facility];  Service: Orthopedics    HARDWARE REMOVAL ORTHO Left 2/21/2023    Procedure: REMOVAL, HARDWARE;  Surgeon: Rom Chinchilla M.D.;  Location: [facility];  Service: Orthopedics    PB DEGROOT W/O FACETEC FORAMOT/DSKC 1/2 VRT SEG, TH* N/A 1/16/2020    Procedure: LAMINECTOMY, SPINE, THORACIC;  Surgeon: Sang Nelson M.D.;  Location: [facility];  Service: Neurosurgery    THORACIC FUSION O-ARM N/A 1/16/2020    Procedure: FUSION, SPINE, THORACIC, POSTERIOR APPROACH - T9-L3;  Surgeon: Sang Nelson M.D.;  Location: [facility];  Service: Neurosurgery    PB RECONSTR TOTAL SHOULDER IMPLANT Left 4/30/2019    Procedure: ARTHROPLASTY, SHOULDER, TOTAL - REVERSE;  Surgeon: Daniella Camargo M.D.;  Location: [facility];  Service: Orthopedics    SHOULDER ARTHROPLASTY TOTAL Right 1/9/2018    Procedure: SHOULDER ARTHROPLASTY TOTAL - REVERSE;  Surgeon: Daniella Camargo M.D.;  Location: [facility];  Service: Orthopedics    COLONOSCOPY - ENDO N/A 3/7/2016    Procedure: COLONOSCOPY - ENDO;   "Surgeon: Estiven Ayers M.D.;  Location: [facility]  Service:     FECAL TRANSPLANT N/A 3/7/2016    Procedure: FECAL TRANSPLANT;  Surgeon: Estiven Ayers M.D.;  [facility] [facility]  Service:     OTHER ORTHOPEDIC SURGERY Left 2012    Left Elbow fx repair    BLEPHAROPLASTY  3/31/2011    Performed by ORACIO DIAZ at [facility]    FOOT SURGERY Right 2010    OTHER ORTHOPEDIC SURGERY Left 2006    finger- removal of digit Left middle finger    CHOLECYSTECTOMY  2000    HYSTERECTOMY LAPAROSCOPY  2000    HYSTERECTOMY RADICAL  1985    OTHER Bilateral 2010, 2008    feet- repair torn tendons    OTHER ORTHOPEDIC SURGERY Left 1970s    femur fx       ROS: Positive ROS per HPI.  Denies any Headache,Chest pain,  Shortness of breath,  Abdominal pain, Changes of bowel , Lower ext edema, Fevers, Nights sweats, Weight Changes, Focal weakness or numbness.  All other systems are negative.    /60 (BP Location: Right arm, Patient Position: Sitting, BP Cuff Size: Adult)   Pulse (!) 55   Temp 36.2 °C (97.1 °F) (Temporal)   Resp 12   Ht 1.651 m (5' 5\")   Wt 54.4 kg (120 lb)   SpO2 95%   BMI 19.97 kg/m²      Constitutional: Alert, no distress, well-groomed.  Skin: Warm, dry, good turgor, no rashes in visible areas.  Eye: Equal, round and reactive, conjunctiva clear, lids normal.  ENMT: Lips without lesions, good dentition, moist mucous membranes.  Neck: Trachea midline, no masses, no thyromegaly.  Respiratory: Unlabored respiratory effort, no cough.  Abdomen: Soft, no gross masses.  MSK: Slowed steady gait with four-wheel walker., moves all extremities.  Neuro: Grossly non-focal. No cranial nerve deficit. Strength and sensation intact.   Psych: Alert and oriented x3, normal affect and mood.      Assessment and Plan.   87 y.o. female presenting with the following.     1. 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 choroidal neovascularization (HCC)  Chronic.  " Stable.  Continue plan of care per ophthalmology.    2. Hip fracture due to osteoporosis, initial encounter (HCC)  Chronic.  Stable.  Continue with home health physical therapy.  Follow-up with orthopedics as scheduled for repeat imaging and follow-up.  Continue with pain management.    3. Urinary tract infection without hematuria, site unspecified  Complete course of doxycycline.  Repeat UA next week.  Push fluids.    4. Other specified hypothyroidism  Patient will increase her levothyroxine 1 1 2mcg to 6 days/week.  Recheck thyroid function panel in 2 to 3 months.  - TSH WITH REFLEX TO FT4; Future    5. Status post left hip replacement  Stable.  Follow-up with orthopedics.    6. SVT (supraventricular tachycardia) (HCC)  Chronic.  Stable.  Continue plan of care per cardiology.    My total time spent caring for the patient on the day of the encounter was 35 minutes.   This does not include time spent on separately billable procedures/tests.

## 2023-03-21 NOTE — NURSING NOTE
History pertinent to today's visit: L SUGAR and newer fx to L trochanter. Skilled need: SN for Full body assessment, education, med management and disease process education and monitoring, and wound care/observation. Pt/Cg response to the services provided: A&O, LCTA, no s/s of infection, pt had shower with HHA earlier this am, states L hip pain 3/10.  Ivanna SNV well.  States understanding of all education.  Medication reconciliation done. No new medications at this time. All questions and concerns addressed Plan for the next visit: continue current POC. Case communication: none.

## 2023-03-22 ENCOUNTER — HOME CARE VISIT (OUTPATIENT)
Dept: HOME HEALTH SERVICES | Facility: HOME HEALTHCARE | Age: 88
End: 2023-03-22
Payer: MEDICARE

## 2023-03-22 VITALS
TEMPERATURE: 98.4 F | RESPIRATION RATE: 16 BRPM | SYSTOLIC BLOOD PRESSURE: 118 MMHG | HEART RATE: 72 BPM | DIASTOLIC BLOOD PRESSURE: 64 MMHG | OXYGEN SATURATION: 93 %

## 2023-03-22 PROCEDURE — RXMED WILLOW AMBULATORY MEDICATION CHARGE: Performed by: FAMILY MEDICINE

## 2023-03-22 PROCEDURE — G0152 HHCP-SERV OF OT,EA 15 MIN: HCPCS

## 2023-03-22 NOTE — Clinical Note
Noted.  ----- Message -----  From: Chanel Wharton OT  Sent: 3/23/2023  10:13 AM PDT  To: Tigist West, Katie Ramirez R.N., *      OT completed initial evaluation 3/22/23.  OT will see client 1W4 for ADLs, transfers, and fall risk reduction techniques.

## 2023-03-23 ENCOUNTER — HOME CARE VISIT (OUTPATIENT)
Dept: HOME HEALTH SERVICES | Facility: HOME HEALTHCARE | Age: 88
End: 2023-03-23
Payer: MEDICARE

## 2023-03-23 VITALS
TEMPERATURE: 97.4 F | HEART RATE: 74 BPM | SYSTOLIC BLOOD PRESSURE: 110 MMHG | DIASTOLIC BLOOD PRESSURE: 65 MMHG | RESPIRATION RATE: 18 BRPM

## 2023-03-23 VITALS
SYSTOLIC BLOOD PRESSURE: 110 MMHG | RESPIRATION RATE: 18 BRPM | DIASTOLIC BLOOD PRESSURE: 65 MMHG | TEMPERATURE: 97.6 F | HEART RATE: 76 BPM

## 2023-03-23 PROCEDURE — G0151 HHCP-SERV OF PT,EA 15 MIN: HCPCS

## 2023-03-23 PROCEDURE — G0156 HHCP-SVS OF AIDE,EA 15 MIN: HCPCS

## 2023-03-23 PROCEDURE — G0495 RN CARE TRAIN/EDU IN HH: HCPCS

## 2023-03-23 ASSESSMENT — ACTIVITIES OF DAILY LIVING (ADL)
CURRENT_FUNCTION: MINIMUM ASSIST
WASHING_LB_CURRENT_FUNCTION: ONE PERSON
PHYSICAL TRANSFERS ASSESSED: 1
WASHING_UPB_CURRENT_FUNCTION: SUPERVISION
BATHING EQUIPMENT USED: SHOWER CHAIR, GRAB BARS
DRESSING_LB_CURRENT_FUNCTION: MODERATE ASSIST
TOILETING: INDEPENDENT
WASHING_LB_CURRENT_FUNCTION: MODERATE ASSIST
FEEDING ASSESSED: 1
BATHING ASSESSED: 1
GROOMING_CURRENT_FUNCTION: INDEPENDENT
DRESSING_UB_CURRENT_FUNCTION: INDEPENDENT
GROOMING ASSESSED: 1
AMBULATION ASSISTANCE: SUPERVISION
TOILETING: 1
AMBULATION ASSISTANCE: 1
BATHING_CURRENT_FUNCTION: MODERATE ASSIST
GROOMING_WITHIN_DEFINED_LIMITS: 1
FEEDING: INDEPENDENT
FEEDING_WITHIN_DEFINED_LIMITS: 1

## 2023-03-23 ASSESSMENT — ENCOUNTER SYMPTOMS
VOMITING: DENIES
PAIN LOCATION: LEFT HIP
PAIN LOCATION - PAIN FREQUENCY: FREQUENT
SUBJECTIVE PAIN PROGRESSION: WAXING AND WANING
LAST BOWEL MOVEMENT: 66555
PAIN LOCATION - PAIN QUALITY: ACHES
LOWEST PAIN SEVERITY IN PAST 24 HOURS: 3/10
PAIN LOCATION - EXACERBATING FACTORS: WALKING OR SITTING TOO LONG
PAIN: 1
MUSCLE WEAKNESS: 1
PAIN LOCATION: LEFT HIP
PAIN SEVERITY GOAL: 2/10
DRY SKIN: 1
PAIN: 1
HIGHEST PAIN SEVERITY IN PAST 24 HOURS: 5/10
PAIN: 1
PAIN SEVERITY GOAL: 2/10
FATIGUES EASILY: 1
PERSON REPORTING PAIN: PATIENT
HIGHEST PAIN SEVERITY IN PAST 24 HOURS: 6/10
STOOL FREQUENCY: LESS THAN DAILY
SUBJECTIVE PAIN PROGRESSION: WAXING AND WANING
HIGHEST PAIN SEVERITY IN PAST 24 HOURS: 4/10
PAIN LOCATION - PAIN FREQUENCY: INTERMITTENT
SUBJECTIVE PAIN PROGRESSION: WAXING AND WANING
BOWEL PATTERN NORMAL: 1
LOWEST PAIN SEVERITY IN PAST 24 HOURS: 3/10
PAIN LOCATION - RELIEVING FACTORS: PAIN MED
LOWEST PAIN SEVERITY IN PAST 24 HOURS: 2/10
PAIN SEVERITY GOAL: 1/10
PAIN LOCATION: LEFT HIP
PAIN SEVERITY GOAL: 0/10
PAIN LOCATION - PAIN QUALITY: ACHY
PAIN LOCATION - PAIN FREQUENCY: WITH ACTIVITY
PAIN LOCATION - PAIN SEVERITY: 4/10
PAIN LOCATION - RELIEVING FACTORS: PAIN MEDS
ASSOCIATED SYMPTOMS: DENIES
PAIN LOCATION - PAIN SEVERITY: 3/10
PAIN LOCATION: LEFT HIP
PERSON REPORTING PAIN: PATIENT
PERSON REPORTING PAIN: PATIENT
NAUSEA: DENIES
PAIN LOCATION - PAIN SEVERITY: 3/10
SUBJECTIVE PAIN PROGRESSION: WAXING AND WANING
PAIN: 1
LOWEST PAIN SEVERITY IN PAST 24 HOURS: 4/10
HIGHEST PAIN SEVERITY IN PAST 24 HOURS: 5/10
PERSON REPORTING PAIN: PATIENT
PAIN LOCATION - RELIEVING FACTORS: PAIN MEDS
PAIN LOCATION - PAIN QUALITY: ACHES
LIMITED RANGE OF MOTION: 1

## 2023-03-23 NOTE — CASE COMMUNICATION
OT completed initial evaluation 3/22/23.  OT will see client 1W4 for ADLs, transfers, and fall risk reduction techniques.

## 2023-03-24 ENCOUNTER — PHARMACY VISIT (OUTPATIENT)
Dept: PHARMACY | Facility: MEDICAL CENTER | Age: 88
End: 2023-03-24
Payer: COMMERCIAL

## 2023-03-24 ENCOUNTER — HOME CARE VISIT (OUTPATIENT)
Dept: HOME HEALTH SERVICES | Facility: HOME HEALTHCARE | Age: 88
End: 2023-03-24
Payer: MEDICARE

## 2023-03-27 ENCOUNTER — HOME CARE VISIT (OUTPATIENT)
Dept: HOME HEALTH SERVICES | Facility: HOME HEALTHCARE | Age: 88
End: 2023-03-27
Payer: MEDICARE

## 2023-03-27 VITALS
DIASTOLIC BLOOD PRESSURE: 68 MMHG | SYSTOLIC BLOOD PRESSURE: 108 MMHG | RESPIRATION RATE: 16 BRPM | OXYGEN SATURATION: 93 % | HEART RATE: 68 BPM | TEMPERATURE: 98.2 F

## 2023-03-27 VITALS
RESPIRATION RATE: 16 BRPM | OXYGEN SATURATION: 93 % | DIASTOLIC BLOOD PRESSURE: 68 MMHG | HEART RATE: 68 BPM | SYSTOLIC BLOOD PRESSURE: 108 MMHG | TEMPERATURE: 98.2 F

## 2023-03-27 PROCEDURE — G0156 HHCP-SVS OF AIDE,EA 15 MIN: HCPCS

## 2023-03-27 PROCEDURE — G0152 HHCP-SERV OF OT,EA 15 MIN: HCPCS

## 2023-03-27 PROCEDURE — G0493 RN CARE EA 15 MIN HH/HOSPICE: HCPCS

## 2023-03-27 PROCEDURE — 665001 SOC-HOME HEALTH

## 2023-03-27 ASSESSMENT — ENCOUNTER SYMPTOMS
SUBJECTIVE PAIN PROGRESSION: WAXING AND WANING
PAIN LOCATION: LEFT BUTTOCK
PAIN: 1
PAIN SEVERITY GOAL: 0/10
PERSON REPORTING PAIN: PATIENT
LOWEST PAIN SEVERITY IN PAST 24 HOURS: 2/10
HIGHEST PAIN SEVERITY IN PAST 24 HOURS: 5/10

## 2023-03-28 ENCOUNTER — HOSPITAL ENCOUNTER (OUTPATIENT)
Facility: MEDICAL CENTER | Age: 88
End: 2023-03-28
Attending: INTERNAL MEDICINE
Payer: MEDICARE

## 2023-03-28 ENCOUNTER — NON-PROVIDER VISIT (OUTPATIENT)
Dept: MEDICAL GROUP | Facility: PHYSICIAN GROUP | Age: 88
End: 2023-03-28
Payer: MEDICARE

## 2023-03-28 VITALS
SYSTOLIC BLOOD PRESSURE: 108 MMHG | DIASTOLIC BLOOD PRESSURE: 65 MMHG | OXYGEN SATURATION: 93 % | HEART RATE: 68 BPM | TEMPERATURE: 98.2 F | RESPIRATION RATE: 16 BRPM

## 2023-03-28 DIAGNOSIS — N39.0 URINARY TRACT INFECTION WITHOUT HEMATURIA, SITE UNSPECIFIED: ICD-10-CM

## 2023-03-28 DIAGNOSIS — N30.00 ACUTE CYSTITIS WITHOUT HEMATURIA: ICD-10-CM

## 2023-03-28 LAB
AMBIGUOUS DTTM AMBI4: NORMAL
SIGNIFICANT IND 70042: NORMAL
SITE SITE: NORMAL
SOURCE SOURCE: NORMAL

## 2023-03-28 PROCEDURE — 87086 URINE CULTURE/COLONY COUNT: CPT

## 2023-03-28 PROCEDURE — 87186 SC STD MICRODIL/AGAR DIL: CPT

## 2023-03-28 PROCEDURE — 87077 CULTURE AEROBIC IDENTIFY: CPT

## 2023-03-28 ASSESSMENT — ENCOUNTER SYMPTOMS
PAIN SEVERITY GOAL: 2/10
PAIN LOCATION: LT HIP
PAIN LOCATION - PAIN SEVERITY: 2/10
HIGHEST PAIN SEVERITY IN PAST 24 HOURS: 9/10
SUBJECTIVE PAIN PROGRESSION: WAXING AND WANING
VOMITING: DENIES
PAIN: 1
HYPERTENSION: 1
LOWEST PAIN SEVERITY IN PAST 24 HOURS: 2/10
DEBILITATING PAIN: 1
LIMITED RANGE OF MOTION: 1
BOWEL PATTERN NORMAL: 1
LAST BOWEL MOVEMENT: 66559
FATIGUES EASILY: 1
MUSCLE WEAKNESS: 1
NAUSEA: DENIES
PERSON REPORTING PAIN: PATIENT

## 2023-03-29 ENCOUNTER — HOME CARE VISIT (OUTPATIENT)
Dept: HOME HEALTH SERVICES | Facility: HOME HEALTHCARE | Age: 88
End: 2023-03-29
Payer: MEDICARE

## 2023-03-29 VITALS
HEART RATE: 68 BPM | DIASTOLIC BLOOD PRESSURE: 58 MMHG | TEMPERATURE: 98.2 F | RESPIRATION RATE: 16 BRPM | SYSTOLIC BLOOD PRESSURE: 128 MMHG | OXYGEN SATURATION: 94 %

## 2023-03-29 PROCEDURE — G0157 HHC PT ASSISTANT EA 15: HCPCS | Mod: CQ

## 2023-03-29 PROCEDURE — G0155 HHCP-SVS OF CSW,EA 15 MIN: HCPCS

## 2023-03-29 ASSESSMENT — ENCOUNTER SYMPTOMS
HIGHEST PAIN SEVERITY IN PAST 24 HOURS: 2/10
PAIN: 1
LOWEST PAIN SEVERITY IN PAST 24 HOURS: 0/10
SUBJECTIVE PAIN PROGRESSION: GRADUALLY IMPROVING
PAIN SEVERITY GOAL: 0/10
PERSON REPORTING PAIN: PATIENT

## 2023-03-29 ASSESSMENT — ACTIVITIES OF DAILY LIVING (ADL)
LAUNDRY_REQUIRES_ASSISTANCE: 1
AMBULATION_REQUIRES_ASSISTANCE: 1
SHOPPING_REQUIRES_ASSISTANCE: 1
BATHING_REQUIRES_ASSISTANCE: 1

## 2023-03-30 ENCOUNTER — HOME CARE VISIT (OUTPATIENT)
Dept: HOME HEALTH SERVICES | Facility: HOME HEALTHCARE | Age: 88
End: 2023-03-30
Payer: MEDICARE

## 2023-03-30 PROCEDURE — G0299 HHS/HOSPICE OF RN EA 15 MIN: HCPCS

## 2023-03-30 RX ORDER — GABAPENTIN 300 MG/1
300 CAPSULE ORAL 3 TIMES DAILY
COMMUNITY
End: 2023-03-30 | Stop reason: SDUPTHER

## 2023-03-30 NOTE — CASE COMMUNICATION
Korina is cont to progress she did very well today wtih making adjustments with her amb increasing her stride and TEO. Will cont with POC to increase her functional mob and ind to prevent further decline in IND. S/B Mari Garvey PT

## 2023-04-02 VITALS
TEMPERATURE: 97.4 F | HEART RATE: 69 BPM | DIASTOLIC BLOOD PRESSURE: 70 MMHG | SYSTOLIC BLOOD PRESSURE: 124 MMHG | RESPIRATION RATE: 16 BRPM

## 2023-04-02 ASSESSMENT — ENCOUNTER SYMPTOMS
LAST BOWEL MOVEMENT: 66563
PAIN LOCATION - RELIEVING FACTORS: NO
PAIN LOCATION - EXACERBATING FACTORS: NO
PERSON REPORTING PAIN: PATIENT
PAIN LOCATION - PAIN DURATION: SINCE LAST NIGHT
PAIN LOCATION - PAIN SEVERITY: 6/10
HIGHEST PAIN SEVERITY IN PAST 24 HOURS: 6/10
PAIN LOCATION - PAIN FREQUENCY: CONSTANT
PAIN SEVERITY GOAL: 0/10
SUBJECTIVE PAIN PROGRESSION: RAPIDLY WORSENING
LOWEST PAIN SEVERITY IN PAST 24 HOURS: 5/10
PAIN: 1

## 2023-04-02 ASSESSMENT — SOCIAL DETERMINANTS OF HEALTH (SDOH): IS CONCERNED ABOUT INCOME: N

## 2023-04-03 ENCOUNTER — HOME CARE VISIT (OUTPATIENT)
Dept: HOME HEALTH SERVICES | Facility: HOME HEALTHCARE | Age: 88
End: 2023-04-03
Payer: MEDICARE

## 2023-04-03 VITALS
OXYGEN SATURATION: 92 % | TEMPERATURE: 97.9 F | RESPIRATION RATE: 16 BRPM | DIASTOLIC BLOOD PRESSURE: 60 MMHG | SYSTOLIC BLOOD PRESSURE: 122 MMHG | HEART RATE: 60 BPM

## 2023-04-03 PROCEDURE — G0151 HHCP-SERV OF PT,EA 15 MIN: HCPCS

## 2023-04-03 ASSESSMENT — ENCOUNTER SYMPTOMS
PAIN SEVERITY GOAL: 2/10
PAIN LOCATION - RELIEVING FACTORS: PAIN MEDS
HIGHEST PAIN SEVERITY IN PAST 24 HOURS: 5/10
PAIN LOCATION - PAIN SEVERITY: 3/10
PAIN LOCATION: LEFT HIP
PAIN LOCATION - PAIN FREQUENCY: INTERMITTENT
LOWEST PAIN SEVERITY IN PAST 24 HOURS: 3/10
PERSON REPORTING PAIN: PATIENT
SUBJECTIVE PAIN PROGRESSION: WAXING AND WANING
PAIN LOCATION - PAIN QUALITY: ACHES
PAIN: 1

## 2023-04-04 RX ORDER — GABAPENTIN 300 MG/1
300 CAPSULE ORAL 3 TIMES DAILY
Qty: 300 CAPSULE | Refills: 3 | Status: SHIPPED | OUTPATIENT
Start: 2023-04-04 | End: 2023-06-22 | Stop reason: SDUPTHER

## 2023-04-05 ENCOUNTER — HOME CARE VISIT (OUTPATIENT)
Dept: HOME HEALTH SERVICES | Facility: HOME HEALTHCARE | Age: 88
End: 2023-04-05
Payer: MEDICARE

## 2023-04-05 VITALS
RESPIRATION RATE: 16 BRPM | HEART RATE: 61 BPM | TEMPERATURE: 97.4 F | DIASTOLIC BLOOD PRESSURE: 64 MMHG | OXYGEN SATURATION: 92 % | SYSTOLIC BLOOD PRESSURE: 116 MMHG

## 2023-04-05 PROCEDURE — G0151 HHCP-SERV OF PT,EA 15 MIN: HCPCS

## 2023-04-06 PROCEDURE — RXMED WILLOW AMBULATORY MEDICATION CHARGE: Performed by: INTERNAL MEDICINE

## 2023-04-07 ENCOUNTER — PHARMACY VISIT (OUTPATIENT)
Dept: PHARMACY | Facility: MEDICAL CENTER | Age: 88
End: 2023-04-07
Payer: COMMERCIAL

## 2023-04-07 ASSESSMENT — ENCOUNTER SYMPTOMS
PAIN LOCATION - PAIN FREQUENCY: FREQUENT
PERSON REPORTING PAIN: PATIENT
LOWEST PAIN SEVERITY IN PAST 24 HOURS: 3/10
PAIN LOCATION: LEFT LEG
PAIN LOCATION - PAIN SEVERITY: 3/10
PAIN SEVERITY GOAL: 2/10
SUBJECTIVE PAIN PROGRESSION: WAXING AND WANING
PAIN LOCATION - PAIN QUALITY: ACHES
HIGHEST PAIN SEVERITY IN PAST 24 HOURS: 5/10
PAIN LOCATION - RELIEVING FACTORS: PAIN MEDS
PAIN: 1

## 2023-04-08 ENCOUNTER — HOME CARE VISIT (OUTPATIENT)
Dept: HOME HEALTH SERVICES | Facility: HOME HEALTHCARE | Age: 88
End: 2023-04-08
Payer: MEDICARE

## 2023-04-10 ENCOUNTER — HOME CARE VISIT (OUTPATIENT)
Dept: HOME HEALTH SERVICES | Facility: HOME HEALTHCARE | Age: 88
End: 2023-04-10
Payer: MEDICARE

## 2023-04-11 ENCOUNTER — HOME CARE VISIT (OUTPATIENT)
Dept: HOME HEALTH SERVICES | Facility: HOME HEALTHCARE | Age: 88
End: 2023-04-11
Payer: MEDICARE

## 2023-04-11 VITALS
HEART RATE: 64 BPM | OXYGEN SATURATION: 92 % | TEMPERATURE: 97.9 F | DIASTOLIC BLOOD PRESSURE: 68 MMHG | SYSTOLIC BLOOD PRESSURE: 110 MMHG | RESPIRATION RATE: 16 BRPM

## 2023-04-11 PROCEDURE — G0152 HHCP-SERV OF OT,EA 15 MIN: HCPCS

## 2023-04-11 ASSESSMENT — ENCOUNTER SYMPTOMS
LOWEST PAIN SEVERITY IN PAST 24 HOURS: 0/10
SUBJECTIVE PAIN PROGRESSION: UNCHANGED
PAIN SEVERITY GOAL: 0/10
PERSON REPORTING PAIN: PATIENT
HIGHEST PAIN SEVERITY IN PAST 24 HOURS: 1/10
PAIN: 1
PAIN LOCATION: BACK

## 2023-04-11 ASSESSMENT — ACTIVITIES OF DAILY LIVING (ADL)
GROOMING_CURRENT_FUNCTION: INDEPENDENT
DRESSING_LB_CURRENT_FUNCTION: INDEPENDENT
GROOMING ASSESSED: 1
FEEDING: INDEPENDENT
BATHING ASSESSED: 1
AMBULATION ASSISTANCE: 1
CURRENT_FUNCTION: ONE PERSON
DRESSING_UB_CURRENT_FUNCTION: INDEPENDENT
AMBULATION ASSISTANCE: INDEPENDENT
TOILETING: 1
BATHING_CURRENT_FUNCTION: INDEPENDENT
PREPARING MEALS: NEEDS ASSISTANCE
TOILETING: INDEPENDENT
FEEDING ASSESSED: 1
PHYSICAL TRANSFERS ASSESSED: 1

## 2023-04-11 NOTE — CASE COMMUNICATION
OT DISCHAGE SUMMARY:  The patient was seen for 3 home health occupational  therapy sessions.  Goals met.  The patient was discharged to self care  .  STATUS AT DISCHARGE:  Ambulation and Mobility: Independent  Patient Equipment: walker for ambulation.  Transferring: Independent  Bathing: Independent  Dressing: Independent  Special equipment used: shower chair, grab bars, FWW  Medication management: Able to take independently  Frequency  of pain interfering with activity or movement: (drop downs)  - No pain and - Less often than daily  When is the patient dyspneic or noticeably Short of Breath: (drop downs)  - When walking more than 20 feet/stairs

## 2023-04-11 NOTE — Clinical Note
noted   ----- Message -----  From: Chanel Wharton OT  Sent: 4/11/2023   4:15 PM PDT  To: Tigist West, Abbie Caldwell, Luis Padilla R.N., *      OT DISCHAGE SUMMARY:  The patient was seen for 3 home health occupational  therapy sessions.  Goals met.  The patient was discharged to self care  .  STATUS AT DISCHARGE:  Ambulation and Mobility: Independent  Patient Equipment: walker for ambulation.  Transferring: Independent  Bathing: Independent  Dressing: Independent  Special equipment used: shower chair, grab bars, FWW  Medication management: Able to take independently  Frequency of pain interfering with activity or movement: (drop downs)  - No pain and - Less often than daily  When is the patient dyspneic or noticeably Short of Breath: (drop downs)  - When walking more than 20 feet/stairs

## 2023-04-12 ENCOUNTER — HOME CARE VISIT (OUTPATIENT)
Dept: HOME HEALTH SERVICES | Facility: HOME HEALTHCARE | Age: 88
End: 2023-04-12
Payer: MEDICARE

## 2023-04-12 VITALS
RESPIRATION RATE: 16 BRPM | HEART RATE: 68 BPM | TEMPERATURE: 97.6 F | SYSTOLIC BLOOD PRESSURE: 116 MMHG | DIASTOLIC BLOOD PRESSURE: 56 MMHG

## 2023-04-12 PROCEDURE — G0151 HHCP-SERV OF PT,EA 15 MIN: HCPCS

## 2023-04-13 ENCOUNTER — HOME CARE VISIT (OUTPATIENT)
Dept: HOME HEALTH SERVICES | Facility: HOME HEALTHCARE | Age: 88
End: 2023-04-13
Payer: MEDICARE

## 2023-04-13 PROCEDURE — G0155 HHCP-SVS OF CSW,EA 15 MIN: HCPCS

## 2023-04-13 SDOH — ECONOMIC STABILITY: HOUSING INSECURITY: HOME SAFETY: PT LIVES ALONE AND DTR TREYNA IS STAYING WITH HER TILL SHE IS INDEP GETTING CG SETTING WITH PRIVATE CARE

## 2023-04-13 ASSESSMENT — ACTIVITIES OF DAILY LIVING (ADL)
CURRENT_FUNCTION: SUPERVISION
BATHING_WITHIN_DEFINED_LIMITS: 1
AMBULATION_REQUIRES_ASSISTANCE: 1
PHYSICAL TRANSFERS ASSESSED: 1
GROOMING_WITHIN_DEFINED_LIMITS: 1
AMBULATION ASSISTANCE: 1
AMBULATION ASSISTANCE ON FLAT SURFACES: 1
ADLS_COMMENTS: SEE OT EVAL FOR MORE DETAILS.
FEEDING_WITHIN_DEFINED_LIMITS: 1
AMBULATION ASSISTANCE: SUPERVISION
SHOPPING_REQUIRES_ASSISTANCE: 1
LAUNDRY_REQUIRES_ASSISTANCE: 1
BATHING_REQUIRES_ASSISTANCE: 1

## 2023-04-13 ASSESSMENT — BALANCE ASSESSMENTS
TURNING 360 DEGREES STEPS: 0 - DISCONTINUOUS STEPS
NUDGED SCORE: 2
ARISES: 1 - ABLE, USES ARMS TO HELP
BALANCE SCORE: 11
ATTEMPTS TO ARISE: 2 - ABLE TO RISE, ONE ATTEMPT
NUDGED: 2 - STEADY
SITTING BALANCE: 1 - STEADY, SAFE
EYES CLOSED AT MAXIMUM POSITION NUDGED: 0 - UNSTEADY
STANDING BALANCE: 1 - STEADY BUT WIDE STANCE AND USES CANE OR OTHER SUPPORT
ARISING SCORE: 1
SITTING DOWN: 1 - USES ARMS OR NOT SMOOTH MOTION
IMMEDIATE STANDING BALANCE FIRST 5 SECONDS: 2 - STEADY WITHOUT WALKER OR OTHER SUPPORT

## 2023-04-13 ASSESSMENT — ENCOUNTER SYMPTOMS
PAIN LOCATION - PAIN SEVERITY: 3/10
SUBJECTIVE PAIN PROGRESSION: GRADUALLY IMPROVING
HIGHEST PAIN SEVERITY IN PAST 24 HOURS: 5/10
LOWEST PAIN SEVERITY IN PAST 24 HOURS: 2/10
PAIN LOCATION: LEFT LEG
PAIN SEVERITY GOAL: 0/10
PAIN LOCATION - PAIN FREQUENCY: INTERMITTENT
PAIN LOCATION - PAIN QUALITY: SCIATIC
PAIN: 1
PERSON REPORTING PAIN: PATIENT
PAIN LOCATION - RELIEVING FACTORS: PAIN MEDS

## 2023-04-13 ASSESSMENT — GAIT ASSESSMENTS
TRUNK: 0 - MARKED SWAY OR USES WALKING AID
PATH SCORE: 1
PATH: 1 - MILD/MODERATE DEVIATION OR USES WALKING AID
STEP CONTINUITY: 0 - STOPPING OR DISCONTINUITY BETWEEN STEPS
INITIATION OF GAIT IMMEDIATELY AFTER GO: 0 - ANY HESITANCY OR MULTIPLE ATTEMPTS TO START
WALKING STANCE: 1 - HEELS ALMOST TOUCHING WHILE WALKING
TRUNK SCORE: 0
GAIT SCORE: 7
STEP SYMMETRY: 1 - RIGHT AND LEFT STEP LENGTH APPEAR EQUAL
BALANCE AND GAIT SCORE: 18

## 2023-04-17 ENCOUNTER — HOME CARE VISIT (OUTPATIENT)
Dept: HOME HEALTH SERVICES | Facility: HOME HEALTHCARE | Age: 88
End: 2023-04-17
Payer: MEDICARE

## 2023-04-17 VITALS
TEMPERATURE: 97.7 F | RESPIRATION RATE: 16 BRPM | DIASTOLIC BLOOD PRESSURE: 60 MMHG | SYSTOLIC BLOOD PRESSURE: 114 MMHG | HEART RATE: 61 BPM

## 2023-04-17 PROCEDURE — G0151 HHCP-SERV OF PT,EA 15 MIN: HCPCS

## 2023-04-18 ENCOUNTER — APPOINTMENT (OUTPATIENT)
Dept: RHEUMATOLOGY | Facility: MEDICAL CENTER | Age: 88
End: 2023-04-18
Attending: STUDENT IN AN ORGANIZED HEALTH CARE EDUCATION/TRAINING PROGRAM
Payer: MEDICARE

## 2023-04-18 ASSESSMENT — ENCOUNTER SYMPTOMS
SUBJECTIVE PAIN PROGRESSION: WAXING AND WANING
PAIN LOCATION - PAIN QUALITY: ACHES
PAIN LOCATION - PAIN FREQUENCY: INTERMITTENT
LOWEST PAIN SEVERITY IN PAST 24 HOURS: 3/10
PERSON REPORTING PAIN: PATIENT
PAIN LOCATION - PAIN SEVERITY: 3/10
PAIN LOCATION: LEFT HIP
PAIN LOCATION - RELIEVING FACTORS: PAIN MEDS
PAIN SEVERITY GOAL: 2/10
HIGHEST PAIN SEVERITY IN PAST 24 HOURS: 6/10
PAIN: 1

## 2023-04-19 ENCOUNTER — HOME CARE VISIT (OUTPATIENT)
Dept: HOME HEALTH SERVICES | Facility: HOME HEALTHCARE | Age: 88
End: 2023-04-19
Payer: MEDICARE

## 2023-04-19 VITALS
DIASTOLIC BLOOD PRESSURE: 62 MMHG | RESPIRATION RATE: 16 BRPM | TEMPERATURE: 97.6 F | SYSTOLIC BLOOD PRESSURE: 104 MMHG | OXYGEN SATURATION: 91 % | HEART RATE: 62 BPM

## 2023-04-19 DIAGNOSIS — M06.9 RHEUMATOID ARTHRITIS, INVOLVING UNSPECIFIED SITE, UNSPECIFIED WHETHER RHEUMATOID FACTOR PRESENT (HCC): ICD-10-CM

## 2023-04-19 PROCEDURE — G0151 HHCP-SERV OF PT,EA 15 MIN: HCPCS

## 2023-04-19 PROCEDURE — RXMED WILLOW AMBULATORY MEDICATION CHARGE: Performed by: FAMILY MEDICINE

## 2023-04-19 RX ORDER — HYDROXYCHLOROQUINE SULFATE 200 MG/1
TABLET, FILM COATED ORAL
Qty: 100 TABLET | Refills: 3 | Status: SHIPPED | OUTPATIENT
Start: 2023-04-19

## 2023-04-20 ENCOUNTER — PHARMACY VISIT (OUTPATIENT)
Dept: PHARMACY | Facility: MEDICAL CENTER | Age: 88
End: 2023-04-20
Payer: COMMERCIAL

## 2023-04-20 PROCEDURE — RXMED WILLOW AMBULATORY MEDICATION CHARGE: Performed by: INTERNAL MEDICINE

## 2023-04-21 ENCOUNTER — HOME CARE VISIT (OUTPATIENT)
Dept: HOME HEALTH SERVICES | Facility: HOME HEALTHCARE | Age: 88
End: 2023-04-21
Payer: MEDICARE

## 2023-04-21 ASSESSMENT — ENCOUNTER SYMPTOMS
PERSON REPORTING PAIN: PATIENT
PAIN: 1
PAIN LOCATION - RELIEVING FACTORS: PAIN MEDS
PAIN SEVERITY GOAL: 0/10
PAIN LOCATION - PAIN FREQUENCY: FREQUENT
PAIN LOCATION - PAIN QUALITY: ACHES
HIGHEST PAIN SEVERITY IN PAST 24 HOURS: 7/10
LOWEST PAIN SEVERITY IN PAST 24 HOURS: 2/10
PAIN LOCATION - PAIN SEVERITY: 3/10
PAIN LOCATION: LEFT HIP
SUBJECTIVE PAIN PROGRESSION: WAXING AND WANING

## 2023-04-21 ASSESSMENT — BALANCE ASSESSMENTS
IMMEDIATE STANDING BALANCE FIRST 5 SECONDS: 2 - STEADY WITHOUT WALKER OR OTHER SUPPORT
TURNING 360 DEGREES STEPS: 0 - DISCONTINUOUS STEPS
ARISES: 1 - ABLE, USES ARMS TO HELP
EYES CLOSED AT MAXIMUM POSITION NUDGED: 0 - UNSTEADY
STANDING BALANCE: 1 - STEADY BUT WIDE STANCE AND USES CANE OR OTHER SUPPORT
BALANCE SCORE: 11
ARISING SCORE: 1
SITTING BALANCE: 1 - STEADY, SAFE
NUDGED: 2 - STEADY
SITTING DOWN: 1 - USES ARMS OR NOT SMOOTH MOTION
ATTEMPTS TO ARISE: 2 - ABLE TO RISE, ONE ATTEMPT
NUDGED SCORE: 2

## 2023-04-21 ASSESSMENT — GAIT ASSESSMENTS
BALANCE AND GAIT SCORE: 18
STEP CONTINUITY: 0 - STOPPING OR DISCONTINUITY BETWEEN STEPS
TRUNK: 0 - MARKED SWAY OR USES WALKING AID
GAIT SCORE: 7
PATH SCORE: 1
INITIATION OF GAIT IMMEDIATELY AFTER GO: 1 - NO HESITANCY
TRUNK SCORE: 0
WALKING STANCE: 0 - HEELS APART
STEP SYMMETRY: 1 - RIGHT AND LEFT STEP LENGTH APPEAR EQUAL
PATH: 1 - MILD/MODERATE DEVIATION OR USES WALKING AID

## 2023-04-21 ASSESSMENT — ACTIVITIES OF DAILY LIVING (ADL)
OASIS_M1830: 01
HOME_HEALTH_OASIS: 00

## 2023-04-21 NOTE — CASE COMMUNICATION
Quality Review for DC OASIS by HARDEEP Rico, RN on  April 21, 2023       Edits completed by HARDEEP Rico, RN:  1.  E is yes per plan of care  2. [id number] is one Per discussion with supervisor,  C is none as documentation shows no fall causing injury

## 2023-04-22 NOTE — CASE COMMUNICATION
I agree with changes.     Sandy Barraza PT, DPT    ----- Message -----  From: Silva Rico R.N.  Sent: 4/21/2023  10:52 AM PDT  To: Sandy Barraza PT      Quality Review for DC OASIS by HARDEEP Rico, RN on  April 21, 2023       Edits completed by HARDEEP Rico, RN:  1.  E is yes per plan of care  2. [id number] is one Per discussion with supervisor,  C is none as documentation shows no fall causing injury

## 2023-05-05 DIAGNOSIS — I65.23 ATHEROSCLEROSIS OF BOTH CAROTID ARTERIES: ICD-10-CM

## 2023-05-05 DIAGNOSIS — E78.00 HYPERCHOLESTEROLEMIA: ICD-10-CM

## 2023-05-05 RX ORDER — ROSUVASTATIN CALCIUM 10 MG/1
10 TABLET, COATED ORAL EVERY EVENING
Qty: 100 TABLET | Refills: 2 | OUTPATIENT
Start: 2023-05-05

## 2023-05-10 ENCOUNTER — TELEPHONE (OUTPATIENT)
Dept: HEALTH INFORMATION MANAGEMENT | Facility: OTHER | Age: 88
End: 2023-05-10
Payer: MEDICARE

## 2023-05-18 ENCOUNTER — TELEPHONE (OUTPATIENT)
Dept: CARDIOLOGY | Facility: MEDICAL CENTER | Age: 88
End: 2023-05-18
Payer: MEDICARE

## 2023-05-18 DIAGNOSIS — I65.23 ATHEROSCLEROSIS OF BOTH CAROTID ARTERIES: ICD-10-CM

## 2023-05-18 DIAGNOSIS — E78.00 HYPERCHOLESTEROLEMIA: ICD-10-CM

## 2023-05-18 NOTE — TELEPHONE ENCOUNTER
VR    Caller: Elizabeth Celis     Medication Name and Dosage: rosuvastatin (CRESTOR) 10 MG     Medication amount left: 7 left     Preferred Pharmacy: [facility] pharmacy     Other questions (Topic): N/A    Callback Number (Will only call for issues): [phone] (home)       Thank you,     Roman PASTRANA

## 2023-05-19 RX ORDER — ROSUVASTATIN CALCIUM 10 MG/1
10 TABLET, COATED ORAL DAILY
Qty: 90 TABLET | Refills: 1 | Status: SHIPPED | OUTPATIENT
Start: 2023-05-19

## 2023-05-19 NOTE — TELEPHONE ENCOUNTER
Refill sent to pharmacy per pt request  Mozio message to pt     rosuvastatin (CRESTOR) 10 MG Tab 90 Tablet 1/1 5/19/2023     Sig - Route: Take 1 Tablet by mouth every day. Indications: High Amount of Fats in the Blood - Oral    Sent to pharmacy as: Rosuvastatin Calcium 10 MG Oral Tablet (CRESTOR)    Cosign for Ordering: Required by Johnny Hernandez M.D.      Pharmacy    [facility]

## 2023-05-23 ENCOUNTER — OFFICE VISIT (OUTPATIENT)
Dept: MEDICAL GROUP | Facility: MEDICAL CENTER | Age: 88
End: 2023-05-23
Payer: MEDICARE

## 2023-05-23 VITALS
HEIGHT: 65 IN | TEMPERATURE: 97.9 F | BODY MASS INDEX: 19.49 KG/M2 | SYSTOLIC BLOOD PRESSURE: 146 MMHG | OXYGEN SATURATION: 93 % | WEIGHT: 117 LBS | HEART RATE: 58 BPM | DIASTOLIC BLOOD PRESSURE: 45 MMHG | RESPIRATION RATE: 14 BRPM

## 2023-05-23 DIAGNOSIS — M54.6 CHRONIC LEFT-SIDED THORACIC BACK PAIN: ICD-10-CM

## 2023-05-23 DIAGNOSIS — M54.2 NECK PAIN: ICD-10-CM

## 2023-05-23 DIAGNOSIS — G89.29 CHRONIC LEFT-SIDED THORACIC BACK PAIN: ICD-10-CM

## 2023-05-23 PROCEDURE — 3077F SYST BP >= 140 MM HG: CPT | Performed by: FAMILY MEDICINE

## 2023-05-23 PROCEDURE — 99214 OFFICE O/P EST MOD 30 MIN: CPT | Performed by: FAMILY MEDICINE

## 2023-05-23 PROCEDURE — 3078F DIAST BP <80 MM HG: CPT | Performed by: FAMILY MEDICINE

## 2023-05-23 RX ORDER — METAXALONE 800 MG/1
400 TABLET ORAL 3 TIMES DAILY
Qty: 90 TABLET | Refills: 1 | Status: SHIPPED | OUTPATIENT
Start: 2023-05-23

## 2023-05-23 ASSESSMENT — FIBROSIS 4 INDEX: FIB4 SCORE: 1.26

## 2023-05-23 NOTE — ASSESSMENT & PLAN NOTE
Chronic, unstable.  Physical exam reveals muscular tension along the left side of neck down thoracic spine proximal to the left shoulder.  Increase in tenderness upon palpation.  Suspect muscle strain secondary to increase in physical therapy sessions.  Okay to use naproxen to help decrease inflammation.  Did prescribe patient Skelaxin 400 mg 3 times a day as needed for discomfort.  Reviewed patient's medication list in addition to the muscle relaxant.  Discussed with the patient that combination with this medication and a few others may cause CNS depression.  Recommended to use this medication with caution.  Discussed the increased risk for falls associated with this medication usage.  Recommended to use Skelaxin only when the discomfort is significant enough otherwise gentle stretches, exercises, and naproxen should help with discomfort.  A referral to physical therapy sent to address her neck and thoracic discomfort while she is also being seen for her hip.

## 2023-05-23 NOTE — PROGRESS NOTES
Elizabeth Celis is a pleasant 87 y.o. female here for   Chief Complaint   Patient presents with    Neck Pain      HPI:   Problem   Neck Pain    Going on for the last few months, left posterior and down to thoracic spine proximal to the left shoulder.  Was at therapy this am, but had to stop due to the pain.  Increase in therapy sessions in which she has to twist band all the way across her body.  At times the pain will make it difficult to turn her head and will have increase in pain with head turning to right then other times when she turns to the left.   Not taking anything for the pain as the worse pain started this am.  Taking gabapentin at night for other pain she is having.   Applied heat to her neck, helps but doesn't make it go away completely.   Pain this am 10/10, but now 4/10.  Feels like tension or a nerve acting up            Current Medicines (including changes today)  Current Outpatient Medications   Medication Sig Dispense Refill    metaxalone (SKELAXIN) 800 MG Tab Take 0.5 Tablets by mouth 3 times a day. 90 Tablet 1    rosuvastatin (CRESTOR) 10 MG Tab Take 1 Tablet by mouth every day. Indications: High Amount of Fats in the Blood 90 Tablet 1    hydroxychloroquine (PLAQUENIL) 200 MG Tab TAKE ONE TABLET BY MOUTH ONE TIME DAILY 100 Tablet 3    gabapentin (NEURONTIN) 300 MG Cap Take 1 Capsule by mouth 3 times a day. 300 Capsule 3    acetaminophen (TYLENOL) 500 MG Tab Take 500-1,000 mg by mouth every 6 hours as needed. Indications: Pain      Naproxen Sodium 220 MG Cap Take 220 mg by mouth 2 times a day as needed (Pain). Indications: Pain      cholestyramine (QUESTRAN) 4 g packet Take 4 g by mouth as needed (supplement). 100 Each 3    potassium chloride SA (K-DUR) 10 MEQ Tab CR Take 10 mEq by mouth every day. Indications: Low Amount of Potassium in the Blood      Probiotic Product (PROBIOTIC-10 PO) Take 1 Tablet by mouth every day. Indications: nutritional support      SYNTHROID 112 MCG Tab Take 1  Tablet by mouth every morning on an empty stomach. 100 Tablet 1    FLUoxetine (PROZAC) 10 MG Cap Take 2 Capsules by mouth every morning. 180 Capsule 2    furosemide (LASIX) 40 MG Tab Take 0.5 Tablets by mouth 1 time a day as needed (lower leg swelling). 100 Tablet 0    traZODone (DESYREL) 50 MG Tab Take 0.5 Tablets by mouth at bedtime. 100 Tablet 2    metoprolol SR (TOPROL XL) 25 MG TABLET SR 24 HR Take 0.5 Tablets by mouth every day. 90 Tablet 3    spironolactone (ALDACTONE) 25 MG Tab Take 1 Tablet by mouth every day. 100 Tablet 2    ipratropium (ATROVENT) 0.06 % Solution Administer 1 Spray into affected nostril(S) 2 times a day. 15 mL 2    COENZYME Q10 PO Take 1 Capsule by mouth every day. Indications: High Blood Pressure Disorder 100 Capsule 3    cycloSPORINE (RESTASIS) 0.05 % ophthalmic emulsion Administer 1 Drop into both eyes 2 times a day. Pharmacist - please dispense 180 single use vials (72 ml) 72 mL 3    Multiple Vitamins-Minerals (PRESERVISION AREDS 2 PO) Take 1 Tablet by mouth every day. Indications: nutritional support- macular degeneration      CALCIUM CITRATE PO Take 1 Tablet by mouth every evening. Indications: nutritional support      therapeutic multivitamin-minerals (THERAGRAN-M) Tab Take 1 Tablet by mouth every day. Indications: nutritional support      denosumab (PROLIA) 60 MG/ML Solution Inject 1 mL under the skin every 6 months. Indications: Decreased Bone Mineral Density, Osteoporosis       No current facility-administered medications for this visit.     Past Medical/ Surgical History  She  has a past medical history of Adverse effect of anesthesia (Jan. 2020), Amputation of left middle finger, Anesthesia, Atherosclerosis of both carotid arteries (01/24/2022), Bowel habit changes, C. difficile diarrhea (03/2016), Cancer ([facility]) (1947), Chronic back pain, CKD (chronic kidney disease) stage 3, GFR 30-59 ml/min ([facility]), Constipation (01/21/2020), Decreased hearing of both ears (11/17/2022), Delayed  "emergence from general anesthesia, Dense breast tissue on mammogram (07/25/2019), Dental disorder, Depression, Elbow fracture, left, 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 choroidal neovascularization (HCC) (11/17/2022), Heart burn, High cholesterol, History of anemia, History of DVT (deep vein thrombosis) (2017), Hypothyroid, MRSA (methicillin resistant Staphylococcus aureus) (2012), Multilevel degenerative disc disease, Osteoarthritis, Osteoporosis (03/27/2019), Pain, Peripheral edema, PVC (premature ventricular contraction), Raynaud's disease, Rheumatoid arthritis (HCC) (09/26/2014), Stroke (HCC) (1990's?), SVT (supraventricular tachycardia) ([facility]) (3/21/2023), and Urinary incontinence.  She  has a past surgical history that includes blepharoplasty (3/31/2011); cholecystectomy (2000); colonoscopy - endo (N/A, 3/7/2016); fecal transplant (N/A, 3/7/2016); hysterectomy radical (1985); other (Bilateral, 2010, 2008); other orthopedic surgery (Left, 1970s); other orthopedic surgery (Left, 2006); other orthopedic surgery (Left, 2012); hysterectomy laparoscopy (2000); foot surgery (Right, 2010); shoulder arthroplasty total (Right, 1/9/2018); pr reconstr total shoulder implant (Left, 4/30/2019); pr hooper w/o facetec foramot/dskc 1/2 vrt seg, th* (N/A, 1/16/2020); thoracic fusion o-arm (N/A, 1/16/2020); pr total hip arthroplasty (Left, 2/21/2023); and hardware removal ortho (Left, 2/21/2023).     Objective:     BP (!) 146/45 (BP Location: Left arm, Patient Position: Sitting, BP Cuff Size: Adult)   Pulse (!) 58   Temp 36.6 °C (97.9 °F) (Temporal)   Resp 14   Ht 1.651 m (5' 5\")   Wt 53.1 kg (117 lb)   SpO2 93%  Body mass index is 19.47 kg/m².    Physical Exam  Constitutional:       General: She is not in acute distress.  HENT:      Head: Normocephalic and atraumatic.   Eyes:      Conjunctiva/sclera: Conjunctivae normal.      Pupils: Pupils are equal, round, and reactive to light.   Neck: "     Pulmonary:      Effort: Pulmonary effort is normal. No respiratory distress.   Abdominal:      General: There is no distension.   Musculoskeletal:      Cervical back: Normal range of motion and neck supple. No erythema, signs of trauma or crepitus. Muscular tenderness present. No pain with movement.      Thoracic back: Tenderness present.        Back:    Skin:     General: Skin is warm and dry.      Findings: No rash.   Neurological:      Mental Status: She is alert and oriented to person, place, and time.      Gait: Gait is intact.   Psychiatric:         Mood and Affect: Affect normal.          Imaging:  No new imaging    Labs  No updated labs    Assessment and Plan:   The following treatment plan was discussed     Problem List Items Addressed This Visit       Neck pain     Chronic, unstable.  Physical exam reveals muscular tension along the left side of neck down thoracic spine proximal to the left shoulder.  Increase in tenderness upon palpation.  Suspect muscle strain secondary to increase in physical therapy sessions.  Okay to use naproxen to help decrease inflammation.  Did prescribe patient Skelaxin 400 mg 3 times a day as needed for discomfort.  Reviewed patient's medication list in addition to the muscle relaxant.  Discussed with the patient that combination with this medication and a few others may cause CNS depression.  Recommended to use this medication with caution.  Discussed the increased risk for falls associated with this medication usage.  Recommended to use Skelaxin only when the discomfort is significant enough otherwise gentle stretches, exercises, and naproxen should help with discomfort.  A referral to physical therapy sent to address her neck and thoracic discomfort while she is also being seen for her hip.           Relevant Medications    metaxalone (SKELAXIN) 800 MG Tab    Other Relevant Orders    Referral to Physical Therapy     Other Visit Diagnoses       Chronic left-sided thoracic  back pain        Relevant Medications    metaxalone (SKELAXIN) 800 MG Tab    Other Relevant Orders    Referral to Physical Therapy             Followup: Return if symptoms worsen or fail to improve.    Please note that this dictation was created using voice recognition software. I have made every reasonable attempt to correct obvious errors, but I expect that there are errors of grammar and possibly content that I did not discover before finalizing the note.

## 2023-06-06 ENCOUNTER — PHARMACY VISIT (OUTPATIENT)
Dept: PHARMACY | Facility: MEDICAL CENTER | Age: 88
End: 2023-06-06
Payer: COMMERCIAL

## 2023-06-06 PROCEDURE — RXMED WILLOW AMBULATORY MEDICATION CHARGE: Performed by: FAMILY MEDICINE

## 2023-06-13 PROCEDURE — RXMED WILLOW AMBULATORY MEDICATION CHARGE: Performed by: INTERNAL MEDICINE

## 2023-06-13 RX ORDER — FUROSEMIDE 40 MG/1
20 TABLET ORAL
Qty: 100 TABLET | Refills: 2 | Status: SHIPPED | OUTPATIENT
Start: 2023-06-13 | End: 2023-10-23

## 2023-06-14 ENCOUNTER — PHARMACY VISIT (OUTPATIENT)
Dept: PHARMACY | Facility: MEDICAL CENTER | Age: 88
End: 2023-06-14
Payer: COMMERCIAL

## 2023-06-20 ENCOUNTER — HOSPITAL ENCOUNTER (OUTPATIENT)
Dept: LAB | Facility: MEDICAL CENTER | Age: 88
End: 2023-06-20
Attending: INTERNAL MEDICINE
Payer: MEDICARE

## 2023-06-20 DIAGNOSIS — E03.8 OTHER SPECIFIED HYPOTHYROIDISM: ICD-10-CM

## 2023-06-20 LAB — TSH SERPL DL<=0.005 MIU/L-ACNC: 1.93 UIU/ML (ref 0.38–5.33)

## 2023-06-20 PROCEDURE — 84443 ASSAY THYROID STIM HORMONE: CPT

## 2023-06-20 PROCEDURE — 36415 COLL VENOUS BLD VENIPUNCTURE: CPT

## 2023-06-21 ENCOUNTER — HOSPITAL ENCOUNTER (OUTPATIENT)
Facility: MEDICAL CENTER | Age: 88
End: 2023-06-21
Attending: INTERNAL MEDICINE
Payer: MEDICARE

## 2023-06-21 ENCOUNTER — OFFICE VISIT (OUTPATIENT)
Dept: MEDICAL GROUP | Facility: PHYSICIAN GROUP | Age: 88
End: 2023-06-21
Payer: MEDICARE

## 2023-06-21 VITALS
SYSTOLIC BLOOD PRESSURE: 98 MMHG | TEMPERATURE: 96.7 F | WEIGHT: 119.6 LBS | DIASTOLIC BLOOD PRESSURE: 58 MMHG | RESPIRATION RATE: 16 BRPM | HEIGHT: 66 IN | HEART RATE: 64 BPM | BODY MASS INDEX: 19.22 KG/M2

## 2023-06-21 DIAGNOSIS — R30.0 DYSURIA: ICD-10-CM

## 2023-06-21 DIAGNOSIS — E03.9 ACQUIRED HYPOTHYROIDISM: ICD-10-CM

## 2023-06-21 DIAGNOSIS — M54.2 NECK PAIN: ICD-10-CM

## 2023-06-21 DIAGNOSIS — S72.112A CLOSED DISPLACED FRACTURE OF GREATER TROCHANTER OF LEFT FEMUR, INITIAL ENCOUNTER (HCC): ICD-10-CM

## 2023-06-21 PROBLEM — Z89.022: Status: ACTIVE | Noted: 2023-06-21

## 2023-06-21 PROBLEM — M80.00XA AGE-RELATED OSTEOPOROSIS WITH CURRENT PATHOLOGICAL FRACTURE: Status: ACTIVE | Noted: 2020-11-12

## 2023-06-21 PROBLEM — F33.42 RECURRENT MAJOR DEPRESSIVE DISORDER, IN FULL REMISSION (HCC): Status: ACTIVE | Noted: 2023-06-21

## 2023-06-21 PROBLEM — G31.9 CEREBRAL ATROPHY (HCC): Status: ACTIVE | Noted: 2023-06-21

## 2023-06-21 PROBLEM — M54.50 LOW BACK PAIN: Status: ACTIVE | Noted: 2023-06-21

## 2023-06-21 PROBLEM — I73.9 PVD (PERIPHERAL VASCULAR DISEASE) (HCC): Status: ACTIVE | Noted: 2023-06-21

## 2023-06-21 PROBLEM — I70.0 AORTIC ATHEROSCLEROSIS (HCC): Status: ACTIVE | Noted: 2023-06-21

## 2023-06-21 LAB
AMBIGUOUS DTTM AMBI4: NORMAL
APPEARANCE UR: NORMAL
BILIRUB UR STRIP-MCNC: NEGATIVE MG/DL
COLOR UR AUTO: YELLOW
GLUCOSE UR STRIP.AUTO-MCNC: NEGATIVE MG/DL
KETONES UR STRIP.AUTO-MCNC: NEGATIVE MG/DL
LEUKOCYTE ESTERASE UR QL STRIP.AUTO: NORMAL
NITRITE UR QL STRIP.AUTO: NEGATIVE
PH UR STRIP.AUTO: 7.5 [PH] (ref 5–8)
PROT UR QL STRIP: NORMAL MG/DL
RBC UR QL AUTO: NORMAL
SP GR UR STRIP.AUTO: 1.01
UROBILINOGEN UR STRIP-MCNC: 0.2 MG/DL

## 2023-06-21 PROCEDURE — 87086 URINE CULTURE/COLONY COUNT: CPT

## 2023-06-21 PROCEDURE — 87186 SC STD MICRODIL/AGAR DIL: CPT

## 2023-06-21 PROCEDURE — 3078F DIAST BP <80 MM HG: CPT | Performed by: INTERNAL MEDICINE

## 2023-06-21 PROCEDURE — 87077 CULTURE AEROBIC IDENTIFY: CPT

## 2023-06-21 PROCEDURE — 3074F SYST BP LT 130 MM HG: CPT | Performed by: INTERNAL MEDICINE

## 2023-06-21 PROCEDURE — 99214 OFFICE O/P EST MOD 30 MIN: CPT | Performed by: INTERNAL MEDICINE

## 2023-06-21 PROCEDURE — 81002 URINALYSIS NONAUTO W/O SCOPE: CPT | Performed by: INTERNAL MEDICINE

## 2023-06-21 ASSESSMENT — FIBROSIS 4 INDEX: FIB4 SCORE: 1.26

## 2023-06-21 NOTE — PROGRESS NOTES
CC: Dysuria, neck pain, follow-up labs.        HPI:  There  ssa presents with the following    1. Dysuria  Patient complains of ongoing cloudy urine and dysuria for the last 3 to 4 weeks.  She had a UTI diagnosed in March and was treated with doxycycline however patient feels that her UTI never completely resolved.  Patient denies any fevers chills, abdominal or flank pain, no hematuria.    UA: Cloudy, leukocyte Estrace large, nitrite negative, large blood.    2. Acquired hypothyroidism  3/21/2023:Patient with hypothyroidism, currently taking levothyroxine 1 1 2 mcg daily, Monday through Friday.  She has been on this dose for 1 year.  TSH level has progressively increased over this last year and most recently is at TSH 8.2.    6/21/2023:.  Patient increased her levothyroxine to 1 one 2 mcg 6 days/week.  TSH 1.9, down from 8.2.  Asymptomatic.    3. Neck pain  Patient with a PMH of left hip replacement was undergoing physical therapy and tweaked her neck.  She followed up with urgent care.  Cervical neck pillow was recommended as well as massage therapy.  Patient was also recommended to follow-up with therapist at [facility].  Patient discontinued physical therapy for her hip with this ongoing neck pain.  Slowly resolving.    4. Closed displaced fracture of greater trochanter of left femur, initial encounter ([facility])  Chronic.  Stable.  Patient with a PMH of left hip replacement in February 2023 by Dr. Chinchilla was doing well however follow-up in the emergency room with increased pelvic pain and was diagnosed with a nondisplaced greater trochanteric fracture next to her prosthesis.  Patient has been undergoing physical therapy and has been gaining strength.  Using four-wheel walker at this time.  No pain.      Patient Active Problem List    Diagnosis Date Noted    Body mass index (BMI) 19.9 or less, adult 06/21/2023    Aortic atherosclerosis (HCC) 06/21/2023    Cerebral atrophy ([facility]) 06/21/2023    Recurrent major depressive  disorder, in full remission ([facility]) 06/21/2023    PVD (peripheral vascular disease) ([facility]) 06/21/2023    Neck pain 05/23/2023    SVT (supraventricular tachycardia) ([facility]) 03/21/2023    Closed fracture of greater trochanter of left femur ([facility]) 03/11/2023    Hip fracture due to osteoporosis, initial encounter ([facility]) 03/11/2023    Acute blood loss anemia 03/10/2023    Status post left hip replacement 02/21/2023    Primary osteoarthritis of left hip 02/01/2023    Retained orthopedic hardware 02/01/2023    Decreased hearing of both ears 11/17/2022    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 choroidal neovascularization ([facility]) 11/17/2022    Multilevel degenerative disc disease 10/18/2022    Osteoarthritis of multiple joints 10/18/2022    Long-term use of hydroxychloroquine 10/18/2022    Nonspecific abnormal electrocardiogram (ECG) (EKG) 06/03/2022    PVC (premature ventricular contraction) 06/03/2022    Chronic diarrhea 06/02/2022    DNR (do not resuscitate) 05/19/2022    Chronic left hip pain 04/01/2022    Insomnia 04/01/2022    BMI 21.0-21.9, adult 03/21/2022    Hypercholesterolemia 03/21/2022    Atherosclerosis of both carotid arteries 01/24/2022    History of DVT (deep vein thrombosis) 12/01/2021    History of compression fracture of spine, Jan. 2020 (T12 and L1) 05/12/2021    Age-related osteoporosis without current pathological fracture 11/12/2020    Anemia 01/19/2020    Peripheral edema 01/15/2020    Spinal stenosis of lumbar region with neurogenic claudication 01/10/2020    Ex-cigarette smoker 07/29/2019    History of recurrent UTIs 03/27/2019    Hypothyroidism 02/10/2019    History of anemia 01/12/2018    Generalized weakness 01/12/2018    Raynaud disease 11/11/2017    CKD (chronic kidney disease) stage 3, GFR 30-59 ml/min ([facility]) 11/11/2017    History of falling 11/11/2017    History of Clostridium difficile 02/21/2016    Depression 02/21/2016    Seronegative rheumatoid arthritis ([facility])  09/26/2014    History of sciatica 09/26/2014       Current Outpatient Medications   Medication Sig Dispense Refill    furosemide (LASIX) 40 MG Tab Take 0.5 Tablets by mouth 1 time a day as needed (lower leg swelling). 100 Tablet 2    rosuvastatin (CRESTOR) 10 MG Tab Take 1 Tablet by mouth every day. Indications: High Amount of Fats in the Blood 90 Tablet 1    hydroxychloroquine (PLAQUENIL) 200 MG Tab TAKE ONE TABLET BY MOUTH ONE TIME DAILY 100 Tablet 3    gabapentin (NEURONTIN) 300 MG Cap Take 1 Capsule by mouth 3 times a day. 300 Capsule 3    acetaminophen (TYLENOL) 500 MG Tab Take 500-1,000 mg by mouth every 6 hours as needed. Indications: Pain      cholestyramine (QUESTRAN) 4 g packet Take 4 g by mouth as needed (supplement). 100 Each 3    potassium chloride SA (K-DUR) 10 MEQ Tab CR Take 10 mEq by mouth every day. Indications: Low Amount of Potassium in the Blood      Probiotic Product (PROBIOTIC-10 PO) Take 1 Tablet by mouth every day. Indications: nutritional support      SYNTHROID 112 MCG Tab Take 1 Tablet by mouth every morning on an empty stomach. 100 Tablet 1    FLUoxetine (PROZAC) 10 MG Cap Take 2 Capsules by mouth every morning. 180 Capsule 2    traZODone (DESYREL) 50 MG Tab Take 0.5 Tablets by mouth at bedtime. 100 Tablet 2    metoprolol SR (TOPROL XL) 25 MG TABLET SR 24 HR Take 0.5 Tablets by mouth every day. 90 Tablet 3    spironolactone (ALDACTONE) 25 MG Tab Take 1 Tablet by mouth every day. 100 Tablet 2    ipratropium (ATROVENT) 0.06 % Solution Administer 1 Spray into affected nostril(S) 2 times a day. 15 mL 2    COENZYME Q10 PO Take 1 Capsule by mouth every day. Indications: High Blood Pressure Disorder 100 Capsule 3    cycloSPORINE (RESTASIS) 0.05 % ophthalmic emulsion Administer 1 Drop into both eyes 2 times a day. Pharmacist - please dispense 180 single use vials (72 ml) 72 mL 3    Multiple Vitamins-Minerals (PRESERVISION AREDS 2 PO) Take 1 Tablet by mouth every day. Indications: nutritional  support- macular degeneration      CALCIUM CITRATE PO Take 1 Tablet by mouth every evening. Indications: nutritional support      therapeutic multivitamin-minerals (THERAGRAN-M) Tab Take 1 Tablet by mouth every day. Indications: nutritional support      denosumab (PROLIA) 60 MG/ML Solution Inject 1 mL under the skin every 6 months. Indications: Decreased Bone Mineral Density, Osteoporosis      aspirin (ASA) 81 MG Chew Tab chewable tablet Chew 81 mg every day.      metaxalone (SKELAXIN) 800 MG Tab Take 0.5 Tablets by mouth 3 times a day. (Patient not taking: Reported on 2023) 90 Tablet 1    Naproxen Sodium 220 MG Cap Take 220 mg by mouth 2 times a day as needed (Pain). Indications: Pain (Patient not taking: Reported on 2023)       No current facility-administered medications for this visit.         Allergies as of 2023 - Reviewed 2023   Allergen Reaction Noted    Azithromycin Unspecified 2017    Cefuroxime Itching and Vomiting 2016    Ciprofloxacin Itching and Vomiting 2017    Clindamycin Itching and Vomiting 2016    Daptomycin  2016    Erythromycin Unspecified 2017    Rifampin  2016    Codeine Itching 2012    Flagyl [metronidazole] Rash, Vomiting, and Nausea 2016    Macrodantin [nitrofurantoin macrocrystal] Rash     Morphine Itching 2017    Sulfa drugs Swelling 2012    Conjugated estrogens  2023    Nitrofurantoin Vomiting and Nausea 2016    Premarin Rash and Unspecified 2017        Social History     Socioeconomic History    Marital status:      Spouse name: Not on file    Number of children: Not on file    Years of education: Not on file    Highest education level: Not on file   Occupational History    Not on file   Tobacco Use    Smoking status: Former     Packs/day: 1.00     Years: 20.00     Pack years: 20.00     Types: Cigarettes     Quit date: 1985     Years since quittin.2    Smokeless  tobacco: Never   Vaping Use    Vaping Use: Never used   Substance and Sexual Activity    Alcohol use: Not Currently     Alcohol/week: 1.2 oz     Types: 2 Glasses of wine per week     Comment: 1 per day    Drug use: Never    Sexual activity: Not on file   Other Topics Concern    Not on file   Social History Narrative    Not on file     Social Determinants of Health     Financial Resource Strain: Not on file   Food Insecurity: No Food Insecurity (1/10/2020)    Hunger Vital Sign     Worried About Running Out of Food in the Last Year: Never true     Ran Out of Food in the Last Year: Never true   Transportation Needs: No Transportation Needs (1/10/2020)    PRAPARE - Transportation     Lack of Transportation (Medical): No     Lack of Transportation (Non-Medical): No   Physical Activity: Not on file   Stress: Not on file   Social Connections: Not on file   Intimate Partner Violence: Not on file   Housing Stability: Not on file       Family History   Problem Relation Age of Onset    Non-contributory Mother     Osteoporosis Mother     Arthritis Mother     Non-contributory Father     Narcolepsy Father     Lung Disease Father         Asthma    Heart Disease Father     Anesthesia Paternal Grandfather         death       Past Surgical History:   Procedure Laterality Date    WA TOTAL HIP ARTHROPLASTY Left 2/21/2023    Procedure: LEFT TOTAL HIP ARTHROPLASTY, AND REMOVAL OF SCREWS FROM TOP OF FEMUR PLATE;  Surgeon: Rom Chinchilla M.D.;  Location: SURGERY [facility];  Service: Orthopedics    HARDWARE REMOVAL ORTHO Left 2/21/2023    Procedure: REMOVAL, HARDWARE;  Surgeon: Rom Chinchilla M.D.;  Location: [facility];  Service: Orthopedics    PB DEGROOT W/O FACETEC FORAMOT/DSKC 1/2 VRT SEG, TH* N/A 1/16/2020    Procedure: LAMINECTOMY, SPINE, THORACIC;  Surgeon: Sang Nelson M.D.;  Location: [facility];  Service: Neurosurgery    THORACIC FUSION O-ARM N/A 1/16/2020    Procedure: FUSION, SPINE, THORACIC,  "POSTERIOR APPROACH - T9-L3;  Surgeon: Sang Nelson M.D.;  Location: SURGERY [facility];  Service: Neurosurgery    PB RECONSTR TOTAL SHOULDER IMPLANT Left 4/30/2019    Procedure: ARTHROPLASTY, SHOULDER, TOTAL - REVERSE;  Surgeon: Daniella Camargo M.D.;  Location: SURGERY [facility];  Service: Orthopedics    SHOULDER ARTHROPLASTY TOTAL Right 1/9/2018    Procedure: SHOULDER ARTHROPLASTY TOTAL - REVERSE;  Surgeon: Daniella Camargo M.D.;  Location: SURGERY [facility];  Service: Orthopedics    COLONOSCOPY - ENDO N/A 3/7/2016    Procedure: COLONOSCOPY - ENDO;  Surgeon: Estiven Ayers M.D.;  Location: ENDOSCOPY [facility];  Service:     FECAL TRANSPLANT N/A 3/7/2016    Procedure: FECAL TRANSPLANT;  Surgeon: Estiven Ayers M.D.;  Location: ENDOSCOPY [facility];  Service:     OTHER ORTHOPEDIC SURGERY Left 2012    Left Elbow fx repair    BLEPHAROPLASTY  3/31/2011    Performed by ORACIO DIAZ at [facility]    FOOT SURGERY Right 2010    OTHER ORTHOPEDIC SURGERY Left 2006    finger- removal of digit Left middle finger    CHOLECYSTECTOMY  2000    HYSTERECTOMY LAPAROSCOPY  2000    HYSTERECTOMY RADICAL  1985    OTHER Bilateral 2010, 2008    feet- repair torn tendons    OTHER ORTHOPEDIC SURGERY Left 1970s    femur fx       ROS: Positive ROS per HPI . denies any Headache,Chest pain,  Shortness of breath,  Abdominal pain, Changes of bowel or bladder, Lower ext edema, Fevers, Nights sweats, Weight Changes, Focal weakness or numbness.  All other systems are negative.    BP 98/58   Pulse 64   Temp 35.9 °C (96.7 °F) (Temporal)   Resp 16   Ht 1.664 m (5' 5.5\")   Wt 54.3 kg (119 lb 9.6 oz)   BMI 19.60 kg/m²      Constitutional: Alert, no distress, well-groomed.  Skin: Warm, dry, good turgor, no rashes in visible areas.  Eye: Equal, round and reactive, conjunctiva clear, lids normal.  ENMT: Lips without lesions, good dentition, moist mucous membranes.  Neck: Trachea midline, no " masses, no thyromegaly.  Respiratory: Unlabored respiratory effort, no cough.  Abdomen: Soft, no gross masses.  MSK: Patient is able to rise up out of her chair with good support, using her four-wheel walker.  Ambulating on her own.  Neuro: Grossly non-focal. No cranial nerve deficit. Strength and sensation intact.   Psych: Alert and oriented x3, normal affect and mood.      Assessment and Plan.   87 y.o. female presenting with the following.     1. Dysuria  Keep well-hydrated.  Send urine for culture and sensitivity and notify patient with treatment.  - POCT Urinalysis  - URINE CULTURE(NEW); Future    2. Acquired hypothyroidism  Continue current dose of levothyroxine 1 1 2 mcg 6 days a week.  Asymptomatic.    3. Neck pain  Patient to follow-up with massage therapy/physical therapy.    4. Closed displaced fracture of greater trochanter of left femur, initial encounter ([facility])  Chronic.  Stable.  Patient will resume physical therapy after her neck issues are resolved.  Follow-up with orthopedics as needed.    My total time spent caring for the patient on the day of the encounter was 34 minutes.   This does not include time spent on separately billable procedures/tests.

## 2023-06-22 PROCEDURE — RXMED WILLOW AMBULATORY MEDICATION CHARGE: Performed by: INTERNAL MEDICINE

## 2023-06-22 PROCEDURE — RXMED WILLOW AMBULATORY MEDICATION CHARGE: Performed by: FAMILY MEDICINE

## 2023-06-23 ENCOUNTER — PHARMACY VISIT (OUTPATIENT)
Dept: PHARMACY | Facility: MEDICAL CENTER | Age: 88
End: 2023-06-23
Payer: COMMERCIAL

## 2023-06-23 RX ORDER — GABAPENTIN 300 MG/1
300 CAPSULE ORAL 3 TIMES DAILY
Qty: 300 CAPSULE | Refills: 3 | Status: SHIPPED | OUTPATIENT
Start: 2023-06-23 | End: 2023-11-15

## 2023-06-25 RX ORDER — NITROFURANTOIN 25; 75 MG/1; MG/1
100 CAPSULE ORAL 2 TIMES DAILY
Qty: 14 CAPSULE | Refills: 0 | Status: SHIPPED | OUTPATIENT
Start: 2023-06-25

## 2023-07-12 DIAGNOSIS — N39.0 URINARY TRACT INFECTION WITHOUT HEMATURIA, SITE UNSPECIFIED: ICD-10-CM

## 2023-07-12 PROCEDURE — RXMED WILLOW AMBULATORY MEDICATION CHARGE: Performed by: PHYSICIAN ASSISTANT

## 2023-07-12 RX ORDER — DOXYCYCLINE HYCLATE 100 MG
100 TABLET ORAL 2 TIMES DAILY
Qty: 14 TABLET | Refills: 0 | Status: SHIPPED | OUTPATIENT
Start: 2023-07-12

## 2023-07-13 ENCOUNTER — PHARMACY VISIT (OUTPATIENT)
Dept: PHARMACY | Facility: MEDICAL CENTER | Age: 88
End: 2023-07-13
Payer: COMMERCIAL

## 2023-07-21 ENCOUNTER — HOSPITAL ENCOUNTER (OUTPATIENT)
Facility: MEDICAL CENTER | Age: 88
End: 2023-07-21
Attending: INTERNAL MEDICINE
Payer: MEDICARE

## 2023-07-21 LAB
APPEARANCE UR: CLEAR
BACTERIA #/AREA URNS HPF: NEGATIVE /HPF
BILIRUB UR QL STRIP.AUTO: NEGATIVE
CAOX CRY #/AREA URNS HPF: ABNORMAL /HPF
COLOR UR: YELLOW
EPI CELLS #/AREA URNS HPF: ABNORMAL /HPF
GLUCOSE UR STRIP.AUTO-MCNC: NEGATIVE MG/DL
HYALINE CASTS #/AREA URNS LPF: ABNORMAL /LPF
KETONES UR STRIP.AUTO-MCNC: NEGATIVE MG/DL
LEUKOCYTE ESTERASE UR QL STRIP.AUTO: ABNORMAL
MICRO URNS: ABNORMAL
NITRITE UR QL STRIP.AUTO: NEGATIVE
PH UR STRIP.AUTO: 6.5 [PH] (ref 5–8)
PROT UR QL STRIP: 30 MG/DL
RBC # URNS HPF: ABNORMAL /HPF
RBC UR QL AUTO: NEGATIVE
SP GR UR STRIP.AUTO: 1.02
UROBILINOGEN UR STRIP.AUTO-MCNC: 0.2 MG/DL
WBC #/AREA URNS HPF: ABNORMAL /HPF

## 2023-07-21 PROCEDURE — 81001 URINALYSIS AUTO W/SCOPE: CPT

## 2023-07-21 PROCEDURE — 87077 CULTURE AEROBIC IDENTIFY: CPT

## 2023-07-21 PROCEDURE — 87086 URINE CULTURE/COLONY COUNT: CPT

## 2023-07-23 LAB
BACTERIA UR CULT: NORMAL
SIGNIFICANT IND 70042: NORMAL
SITE SITE: NORMAL
SOURCE SOURCE: NORMAL

## 2023-07-26 ENCOUNTER — TELEPHONE (OUTPATIENT)
Dept: MEDICAL GROUP | Facility: PHYSICIAN GROUP | Age: 88
End: 2023-07-26

## 2023-07-26 NOTE — TELEPHONE ENCOUNTER
1. Caller Name: Elizabeth Celis June                        Call Back Number: [phone]      How would the patient prefer to be contacted with a response: Phone call OK to leave a detailed message    Good afternoon Korina Terry requests to be called back to review the results of the urinalysis she completed on July 21.    Thank you.

## 2023-08-03 ENCOUNTER — PHARMACY VISIT (OUTPATIENT)
Dept: PHARMACY | Facility: MEDICAL CENTER | Age: 88
End: 2023-08-03
Payer: COMMERCIAL

## 2023-08-03 PROCEDURE — RXMED WILLOW AMBULATORY MEDICATION CHARGE: Performed by: FAMILY MEDICINE

## 2023-08-21 PROCEDURE — RXMED WILLOW AMBULATORY MEDICATION CHARGE: Performed by: INTERNAL MEDICINE

## 2023-08-23 ENCOUNTER — PHARMACY VISIT (OUTPATIENT)
Dept: PHARMACY | Facility: MEDICAL CENTER | Age: 88
End: 2023-08-23
Payer: COMMERCIAL

## 2023-08-29 NOTE — PROGRESS NOTES
Pt up to cardiac chair for meals. Up to sink this AM to brush teeth and wash face. Up to bathroom. Pt had two mucousy BM's and two brown,loose BM's. MD updated. Pt's daughter at bedside on and off. Pt down to xray earlier. Back to bed after xray. Sleeping throughout the afternoon. Due meds given. CNA's at bedside to change and clean pt of incontinence, mucousy BM and urine. No other needs/complaints, up to chair for dinner, will CTM.   Yes

## 2023-09-18 ENCOUNTER — PHARMACY VISIT (OUTPATIENT)
Dept: PHARMACY | Facility: MEDICAL CENTER | Age: 88
End: 2023-09-18
Payer: COMMERCIAL

## 2023-09-18 PROCEDURE — RXMED WILLOW AMBULATORY MEDICATION CHARGE: Performed by: INTERNAL MEDICINE

## 2023-09-18 PROCEDURE — RXMED WILLOW AMBULATORY MEDICATION CHARGE: Performed by: FAMILY MEDICINE

## 2023-09-27 ENCOUNTER — OFFICE VISIT (OUTPATIENT)
Dept: MEDICAL GROUP | Facility: PHYSICIAN GROUP | Age: 88
End: 2023-09-27
Payer: MEDICARE

## 2023-09-27 VITALS
HEART RATE: 77 BPM | HEIGHT: 65 IN | WEIGHT: 120.2 LBS | DIASTOLIC BLOOD PRESSURE: 58 MMHG | TEMPERATURE: 97.6 F | SYSTOLIC BLOOD PRESSURE: 100 MMHG | BODY MASS INDEX: 20.03 KG/M2

## 2023-09-27 DIAGNOSIS — E03.9 HYPOTHYROIDISM (ACQUIRED): ICD-10-CM

## 2023-09-27 DIAGNOSIS — Z23 NEED FOR VACCINATION: ICD-10-CM

## 2023-09-27 DIAGNOSIS — E03.8 OTHER SPECIFIED HYPOTHYROIDISM: ICD-10-CM

## 2023-09-27 DIAGNOSIS — S99.911A INJURY OF RIGHT ANKLE, INITIAL ENCOUNTER: ICD-10-CM

## 2023-09-27 DIAGNOSIS — M62.838 NECK MUSCLE SPASM: ICD-10-CM

## 2023-09-27 PROCEDURE — 3074F SYST BP LT 130 MM HG: CPT | Performed by: INTERNAL MEDICINE

## 2023-09-27 PROCEDURE — 3078F DIAST BP <80 MM HG: CPT | Performed by: INTERNAL MEDICINE

## 2023-09-27 PROCEDURE — G0008 ADMIN INFLUENZA VIRUS VAC: HCPCS | Performed by: INTERNAL MEDICINE

## 2023-09-27 PROCEDURE — 90662 IIV NO PRSV INCREASED AG IM: CPT | Performed by: INTERNAL MEDICINE

## 2023-09-27 PROCEDURE — 99214 OFFICE O/P EST MOD 30 MIN: CPT | Mod: 25 | Performed by: INTERNAL MEDICINE

## 2023-09-27 RX ORDER — LEVOTHYROXINE SODIUM 112 MCG
112 TABLET ORAL
Qty: 100 TABLET | Refills: 2 | Status: SHIPPED | OUTPATIENT
Start: 2023-09-27 | End: 2023-10-05

## 2023-09-27 ASSESSMENT — FIBROSIS 4 INDEX: FIB4 SCORE: 1.26

## 2023-09-27 NOTE — PROGRESS NOTES
CC: Requesting medication refill, ankle injury    HPI:  Elizabeth presents with the following    1. Need for vaccination  Requesting flu vaccine    2. Other specified hypothyroidism  Requesting refill on Synthroid 112 mcg's daily.  TSH level 1.9.  Asymptomatic.    3. Injury of right ankle, initial encounter  Patient was shopping at [employer] yesterday and somebody hit her left leg above her ankle with a cart.  She has been elevating and icing it overnight.  Minimal pain.  No fall.  Patient uses a walker.    4. Neck muscle spasm  Patient goes regularly to physical therapy and was working on upper body strengthening and somehow caused a spasm in her neck.    5.  Disposition.  Patient currently lives alone and uses an Uber or drives to her appointments.  Her children live out of town, daughter in [place], [place].  She is considering moving into [facility] assisted living.      Patient Active Problem List    Diagnosis Date Noted    Body mass index (BMI) 19.9 or less, adult 06/21/2023    Aortic atherosclerosis ([facility]) 06/21/2023    Cerebral atrophy ([facility]) 06/21/2023    Recurrent major depressive disorder, in full remission ([facility]) 06/21/2023    PVD (peripheral vascular disease) ([facility]) 06/21/2023    Low back pain 06/21/2023    Status post amputation of finger, left 06/21/2023    Neck pain 05/23/2023    SVT (supraventricular tachycardia) ([facility]) 03/21/2023    Closed fracture of greater trochanter of left femur ([facility]) 03/11/2023    Hip fracture due to osteoporosis, initial encounter ([facility]) 03/11/2023    Acute blood loss anemia 03/10/2023    Status post left hip replacement 02/21/2023    Primary osteoarthritis of left hip 02/01/2023    Retained orthopedic hardware 02/01/2023    Decreased hearing of both ears 11/17/2022    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 choroidal neovascularization ([facility]) 11/17/2022    Multilevel degenerative disc disease 10/18/2022    Osteoarthritis of multiple joints  10/18/2022    Long-term use of hydroxychloroquine 10/18/2022    Nonspecific abnormal electrocardiogram (ECG) (EKG) 06/03/2022    PVC (premature ventricular contraction) 06/03/2022    Chronic diarrhea 06/02/2022    DNR (do not resuscitate) 05/19/2022    Chronic left hip pain 04/01/2022    Insomnia 04/01/2022    BMI 21.0-21.9, adult 03/21/2022    Hypercholesterolemia 03/21/2022    Atherosclerosis of both carotid arteries 01/24/2022    History of DVT (deep vein thrombosis) 12/01/2021    History of compression fracture of spine, Jan. 2020 (T12 and L1) 05/12/2021    Age-related osteoporosis with current pathological fracture 11/12/2020    Anemia 01/19/2020    Peripheral edema 01/15/2020    Spinal stenosis of lumbar region with neurogenic claudication 01/10/2020    Ex-cigarette smoker 07/29/2019    Osteoporosis 03/27/2019    History of recurrent UTIs 03/27/2019    Hypothyroidism 02/10/2019    History of anemia 01/12/2018    Generalized weakness 01/12/2018    Raynaud disease 11/11/2017    CKD (chronic kidney disease) stage 3, GFR 30-59 ml/min ([facility]) 11/11/2017    History of falling 11/11/2017    History of Clostridium difficile 02/21/2016    Depression 02/21/2016    Seronegative rheumatoid arthritis ([facility]) 09/26/2014    History of sciatica 09/26/2014       Current Outpatient Medications   Medication Sig Dispense Refill    SYNTHROID 112 MCG Tab Take 1 Tablet by mouth every morning on an empty stomach. 100 Tablet 2    ketoconazole (NIZORAL) 2 % shampoo Apply to scalp and ears 2-3 x weekly while showering, allow to sit for 5 minutes prior to rinsing 120 mL 8    mometasone (ELOCON) 0.1 % lotion Apply topically to affected areas on scalp once a day up to four days a week as needed for itch. 60 mL 3    triamcinolone acetonide (KENALOG) 0.1 % Cream Apply topically to affected areas on neck, arms, and legs twice a day up to 4 days a week as needed for itch. Do not use on the face or groin 454 g 5    doxycycline (VIBRAMYCIN) 100 MG  Tab Take 1 Tablet by mouth 2 times a day. 14 Tablet 0    nitrofurantoin (MACROBID) 100 MG Cap Take 1 Capsule by mouth 2 times a day. 14 Capsule 0    gabapentin (NEURONTIN) 300 MG Cap Take 1 Capsule by mouth 3 times a day. 300 Capsule 3    aspirin (ASA) 81 MG Chew Tab chewable tablet Chew 81 mg every day.      furosemide (LASIX) 40 MG Tab Take 0.5 Tablets by mouth 1 time a day as needed (lower leg swelling). 100 Tablet 2    metaxalone (SKELAXIN) 800 MG Tab Take 0.5 Tablets by mouth 3 times a day. 90 Tablet 1    rosuvastatin (CRESTOR) 10 MG Tab Take 1 Tablet by mouth every day. Indications: High Amount of Fats in the Blood 90 Tablet 1    hydroxychloroquine (PLAQUENIL) 200 MG Tab TAKE ONE TABLET BY MOUTH ONE TIME DAILY 100 Tablet 3    acetaminophen (TYLENOL) 500 MG Tab Take 500-1,000 mg by mouth every 6 hours as needed. Indications: Pain      Naproxen Sodium 220 MG Cap Take 220 mg by mouth 2 times a day as needed (Pain). Indications: Pain      cholestyramine (QUESTRAN) 4 g packet Take 4 g by mouth as needed (supplement). 100 Each 3    potassium chloride SA (K-DUR) 10 MEQ Tab CR Take 10 mEq by mouth every day. Indications: Low Amount of Potassium in the Blood      Probiotic Product (PROBIOTIC-10 PO) Take 1 Tablet by mouth every day. Indications: nutritional support      FLUoxetine (PROZAC) 10 MG Cap Take 2 Capsules by mouth every morning. 180 Capsule 2    traZODone (DESYREL) 50 MG Tab Take 0.5 Tablets by mouth at bedtime. 100 Tablet 2    metoprolol SR (TOPROL XL) 25 MG TABLET SR 24 HR Take 0.5 Tablets by mouth every day. 90 Tablet 3    spironolactone (ALDACTONE) 25 MG Tab Take 1 Tablet by mouth every day. 100 Tablet 2    ipratropium (ATROVENT) 0.06 % Solution Administer 1 Spray into affected nostril(S) 2 times a day. 15 mL 2    COENZYME Q10 PO Take 1 Capsule by mouth every day. Indications: High Blood Pressure Disorder 100 Capsule 3    cycloSPORINE (RESTASIS) 0.05 % ophthalmic emulsion Administer 1 Drop into both eyes 2  times a day. Pharmacist - please dispense 180 single use vials (72 ml) 72 mL 3    Multiple Vitamins-Minerals (PRESERVISION AREDS 2 PO) Take 1 Tablet by mouth every day. Indications: nutritional support- macular degeneration      CALCIUM CITRATE PO Take 1 Tablet by mouth every evening. Indications: nutritional support      therapeutic multivitamin-minerals (THERAGRAN-M) Tab Take 1 Tablet by mouth every day. Indications: nutritional support      denosumab (PROLIA) 60 MG/ML Solution Inject 1 mL under the skin every 6 months. Indications: Decreased Bone Mineral Density, Osteoporosis       No current facility-administered medications for this visit.         Allergies as of 2023 - Reviewed 2023   Allergen Reaction Noted    Azithromycin Unspecified 2017    Cefuroxime Itching and Vomiting 2016    Ciprofloxacin Itching and Vomiting 2017    Clindamycin Itching and Vomiting 2016    Daptomycin  2016    Erythromycin Unspecified 2017    Rifampin  2016    Codeine Itching 2012    Flagyl [metronidazole] Rash, Vomiting, and Nausea 2016    Macrodantin [nitrofurantoin macrocrystal] Rash     Morphine Itching 2017    Sulfa drugs Swelling 2012    Conjugated estrogens  2023    Nitrofurantoin Vomiting and Nausea 2016    Premarin Rash and Unspecified 2017        Social History     Socioeconomic History    Marital status:      Spouse name: Not on file    Number of children: Not on file    Years of education: Not on file    Highest education level: Not on file   Occupational History    Not on file   Tobacco Use    Smoking status: Former     Current packs/day: 0.00     Average packs/day: 1 pack/day for 20.0 years (20.0 ttl pk-yrs)     Types: Cigarettes     Start date: 1965     Quit date: 1985     Years since quittin.5    Smokeless tobacco: Never   Vaping Use    Vaping Use: Never used   Substance and Sexual Activity    Alcohol use:  Not Currently     Alcohol/week: 1.2 oz     Types: 2 Glasses of wine per week     Comment: 1 per day    Drug use: Never    Sexual activity: Not on file   Other Topics Concern    Not on file   Social History Narrative    Not on file     Social Determinants of Health     Financial Resource Strain: Not on file   Food Insecurity: No Food Insecurity (1/10/2020)    Hunger Vital Sign     Worried About Running Out of Food in the Last Year: Never true     Ran Out of Food in the Last Year: Never true   Transportation Needs: No Transportation Needs (1/10/2020)    PRAPARE - Transportation     Lack of Transportation (Medical): No     Lack of Transportation (Non-Medical): No   Physical Activity: Not on file   Stress: Not on file   Social Connections: Feeling Socially Integrated (4/19/2023)    OASIS : Social Isolation     Frequency of experiencing loneliness or isolation: Rarely   Intimate Partner Violence: Not on file   Housing Stability: Not on file       Family History   Problem Relation Age of Onset    Non-contributory Mother     Osteoporosis Mother     Arthritis Mother     Non-contributory Father     Narcolepsy Father     Lung Disease Father         Asthma    Heart Disease Father     Anesthesia Paternal Grandfather         death       Past Surgical History:   Procedure Laterality Date    TN TOTAL HIP ARTHROPLASTY Left 2/21/2023    Procedure: LEFT TOTAL HIP ARTHROPLASTY, AND REMOVAL OF SCREWS FROM TOP OF FEMUR PLATE;  Surgeon: Rom Chinchilla M.D.;  Location: SURGERY [facility];  Service: Orthopedics    HARDWARE REMOVAL ORTHO Left 2/21/2023    Procedure: REMOVAL, HARDWARE;  Surgeon: Rom Chinchilla M.D.;  Location: SURGERY [facility];  Service: Orthopedics    PB DEGROOT W/O FACETEC FORAMOT/DSKC 1/2 VRT SEG, TH* N/A 1/16/2020    Procedure: LAMINECTOMY, SPINE, THORACIC;  Surgeon: Sang Nelson M.D.;  Location: [facility];  Service: Neurosurgery    THORACIC FUSION O-ARM N/A 1/16/2020    Procedure: FUSION,  "SPINE, THORACIC, POSTERIOR APPROACH - T9-L3;  Surgeon: Sang Nelson M.D.;  Location: SURGERY [facility];  Service: Neurosurgery    PB RECONSTR TOTAL SHOULDER IMPLANT Left 4/30/2019    Procedure: ARTHROPLASTY, SHOULDER, TOTAL - REVERSE;  Surgeon: Daniella Camargo M.D.;  Location: SURGERY [facility];  Service: Orthopedics    SHOULDER ARTHROPLASTY TOTAL Right 1/9/2018    Procedure: SHOULDER ARTHROPLASTY TOTAL - REVERSE;  Surgeon: Daniella Camargo M.D.;  Location: SURGERY [facility];  Service: Orthopedics    COLONOSCOPY - ENDO N/A 3/7/2016    Procedure: COLONOSCOPY - ENDO;  Surgeon: Estiven Ayers M.D.;  Location: ENDOSCOPY [facility];  Service:     FECAL TRANSPLANT N/A 3/7/2016    Procedure: FECAL TRANSPLANT;  Surgeon: Estiven Ayers M.D.;  Location: ENDOSCOPY [facility];  Service:     OTHER ORTHOPEDIC SURGERY Left 2012    Left Elbow fx repair    BLEPHAROPLASTY  3/31/2011    Performed by ORACIO DIAZ at [facility]    FOOT SURGERY Right 2010    OTHER ORTHOPEDIC SURGERY Left 2006    finger- removal of digit Left middle finger    CHOLECYSTECTOMY  2000    HYSTERECTOMY LAPAROSCOPY  2000    HYSTERECTOMY RADICAL  1985    OTHER Bilateral 2010, 2008    feet- repair torn tendons    OTHER ORTHOPEDIC SURGERY Left 1970s    femur fx       ROS: Positive ROS per HPI.  Denies any Headache,Chest pain,  Shortness of breath,  Abdominal pain, Changes of bowel or bladder, Lower ext edema, Fevers, Nights sweats, Weight Changes, Focal weakness or numbness.  All other systems are negative.    /58 (BP Location: Left arm, Patient Position: Sitting, BP Cuff Size: Adult)   Pulse 77   Temp 36.4 °C (97.6 °F) (Temporal)   Ht 1.651 m (5' 5\")   Wt 54.5 kg (120 lb 3.2 oz)   BMI 20.00 kg/m²      Constitutional: Alert, no distress, well-groomed.  Skin: Warm, dry, good turgor, no rashes in visible areas.  Eye: Equal, round and reactive, conjunctiva clear, lids normal.  ENMT: Lips without " lesions, good dentition, moist mucous membranes.  Neck: Trachea midline, no masses, no thyromegaly.  Respiratory: Unlabored respiratory effort, no cough.  Abdomen: Soft, no gross masses.  MSK: A 3 cm x 2 cm bruising/mild hematoma noted above right ankle.  No cellulitis.  No streaking.  No tenderness to touch.  Neuro: Grossly non-focal. No cranial nerve deficit. Strength and sensation intact.   Psych: Alert and oriented x3, normal affect and mood.      Assessment and Plan.   87 y.o. female presenting with the following.     1. Need for vaccination    - Influenza Vaccine, High Dose (65+ Only)    2. Other specified hypothyroidism  Refill Synthroid as requested.  Sent to [facility] pharmacy for delivery.    3. Injury of right ankle, initial encounter  Injury is stable at this time.  Patient will continue to elevate and use a cool pack.  She is allergic to multiple antibiotics.  UC/ER precautions provided.    4. Neck muscle spasm  Recommend thermic care/heat.  Muscle relaxer only as needed.  Gentle stretch.    5. Hypothyroidism (acquired)    - SYNTHROID 112 MCG Tab; Take 1 Tablet by mouth every morning on an empty stomach.  Dispense: 100 Tablet; Refill: 2      My total time spent caring for the patient on the day of the encounter was 32 minutes.   This does not include time spent on separately billable procedures/tests.

## 2023-09-28 ENCOUNTER — TELEPHONE (OUTPATIENT)
Dept: MEDICAL GROUP | Facility: PHYSICIAN GROUP | Age: 88
End: 2023-09-28
Payer: MEDICARE

## 2023-09-28 NOTE — TELEPHONE ENCOUNTER
Fax from Pharmacy states Prescription Clam was rejected due to medication not on formulary. Please update with a new prescription.

## 2023-10-02 ENCOUNTER — TELEPHONE (OUTPATIENT)
Dept: CARDIOLOGY | Facility: MEDICAL CENTER | Age: 88
End: 2023-10-02
Payer: MEDICARE

## 2023-10-02 NOTE — TELEPHONE ENCOUNTER
Left voicemail in regards to procedure that has not been completed yet and that she can [facility] to get that [facility]  or let us know if she gets it done at a outside facility

## 2023-10-03 ENCOUNTER — TELEPHONE (OUTPATIENT)
Dept: MEDICAL GROUP | Facility: PHYSICIAN GROUP | Age: 88
End: 2023-10-03
Payer: MEDICARE

## 2023-10-03 NOTE — TELEPHONE ENCOUNTER
DOCUMENTATION OF PAR STATUS:    1. Name of Medication & Dose: SYNTHROID     2. Name of Prescription Coverage Company & phone #: [employer]    3. Date Prior Auth Submitted: 10/3    4. What information was given to obtain insurance decision? NONE    5. Prior Auth Status? approved    6. Patient Notified: no

## 2023-10-05 ENCOUNTER — TELEPHONE (OUTPATIENT)
Dept: HEALTH INFORMATION MANAGEMENT | Facility: OTHER | Age: 88
End: 2023-10-05
Payer: MEDICARE

## 2023-10-05 RX ORDER — LEVOTHYROXINE SODIUM 112 UG/1
112 TABLET ORAL
Qty: 100 TABLET | Refills: 0 | Status: SHIPPED | OUTPATIENT
Start: 2023-10-05 | End: 2024-01-09 | Stop reason: SDUPTHER

## 2023-10-05 NOTE — TELEPHONE ENCOUNTER
Elizabeth called in and stated she is out of SYNTHROID 112 MCG Tab I did call the pharmacy they stated since the medication is name brand they will need a prior auth please advise thank you.

## 2023-10-17 RX ORDER — METOPROLOL SUCCINATE 25 MG/1
12.5 TABLET, EXTENDED RELEASE ORAL DAILY
Qty: 90 TABLET | Refills: 3 | Status: SHIPPED | OUTPATIENT
Start: 2023-10-17

## 2023-10-17 NOTE — TELEPHONE ENCOUNTER
Received request via: Pharmacy    Was the patient seen in the last year in this department? Yes  9/27/2023  Does the patient have an active prescription (recently filled or refills available) for medication(s) requested? No    Does the patient have retirement Plus and need 100 day supply (blood pressure, diabetes and cholesterol meds only)? Yes, quantity updated to 100 days

## 2023-10-18 DIAGNOSIS — H04.123 DRY EYES, BILATERAL: ICD-10-CM

## 2023-10-18 PROCEDURE — RXMED WILLOW AMBULATORY MEDICATION CHARGE: Performed by: INTERNAL MEDICINE

## 2023-10-19 ENCOUNTER — PHARMACY VISIT (OUTPATIENT)
Dept: PHARMACY | Facility: MEDICAL CENTER | Age: 88
End: 2023-10-19
Payer: COMMERCIAL

## 2023-10-19 RX ORDER — CYCLOSPORINE 0.5 MG/ML
1 EMULSION OPHTHALMIC 2 TIMES DAILY
Qty: 72 ML | Refills: 3 | Status: SHIPPED | OUTPATIENT
Start: 2023-10-19

## 2023-10-23 ENCOUNTER — OFFICE VISIT (OUTPATIENT)
Dept: CARDIOLOGY | Facility: MEDICAL CENTER | Age: 88
End: 2023-10-23
Attending: INTERNAL MEDICINE
Payer: MEDICARE

## 2023-10-23 VITALS
SYSTOLIC BLOOD PRESSURE: 112 MMHG | RESPIRATION RATE: 16 BRPM | DIASTOLIC BLOOD PRESSURE: 62 MMHG | HEART RATE: 61 BPM | OXYGEN SATURATION: 98 % | BODY MASS INDEX: 20.33 KG/M2 | HEIGHT: 65 IN | WEIGHT: 122 LBS

## 2023-10-23 DIAGNOSIS — I47.10 SVT (SUPRAVENTRICULAR TACHYCARDIA) (HCC): ICD-10-CM

## 2023-10-23 DIAGNOSIS — I70.0 AORTIC ATHEROSCLEROSIS (HCC): ICD-10-CM

## 2023-10-23 DIAGNOSIS — I73.9 PVD (PERIPHERAL VASCULAR DISEASE) (HCC): ICD-10-CM

## 2023-10-23 DIAGNOSIS — R60.0 LEG EDEMA: ICD-10-CM

## 2023-10-23 DIAGNOSIS — I65.23 ATHEROSCLEROSIS OF BOTH CAROTID ARTERIES: ICD-10-CM

## 2023-10-23 DIAGNOSIS — I49.3 PVC (PREMATURE VENTRICULAR CONTRACTION): ICD-10-CM

## 2023-10-23 PROCEDURE — 99214 OFFICE O/P EST MOD 30 MIN: CPT | Performed by: INTERNAL MEDICINE

## 2023-10-23 PROCEDURE — 99213 OFFICE O/P EST LOW 20 MIN: CPT | Performed by: INTERNAL MEDICINE

## 2023-10-23 PROCEDURE — 3074F SYST BP LT 130 MM HG: CPT | Performed by: INTERNAL MEDICINE

## 2023-10-23 PROCEDURE — 3078F DIAST BP <80 MM HG: CPT | Performed by: INTERNAL MEDICINE

## 2023-10-23 RX ORDER — FUROSEMIDE 20 MG/1
20 TABLET ORAL
Qty: 50 TABLET | Refills: 3 | Status: SHIPPED | OUTPATIENT
Start: 2023-10-23 | End: 2023-11-15

## 2023-10-23 ASSESSMENT — FIBROSIS 4 INDEX: FIB4 SCORE: 1.26

## 2023-10-23 ASSESSMENT — ENCOUNTER SYMPTOMS
WEIGHT LOSS: 0
DECREASED APPETITE: 0
BLURRED VISION: 0
SHORTNESS OF BREATH: 0
FLANK PAIN: 0
IRREGULAR HEARTBEAT: 0
PALPITATIONS: 0
ORTHOPNEA: 0
FEVER: 0
HEARTBURN: 0
PND: 0
CONSTIPATION: 0
BACK PAIN: 0
ABDOMINAL PAIN: 0
ALTERED MENTAL STATUS: 0
SYNCOPE: 0
DYSPNEA ON EXERTION: 0
NAUSEA: 0
COUGH: 0
DIZZINESS: 0
DEPRESSION: 0
DIARRHEA: 0
NEAR-SYNCOPE: 0
WEIGHT GAIN: 0
VOMITING: 0
CLAUDICATION: 0

## 2023-10-23 NOTE — PROGRESS NOTES
Cardiology Note    atherosclerosis    History of Present Illness: Elizabeth Celis is a 87 y.o. female PMH PVCs, HLD, atherosclerosis who presents for follow up visit.     Greatest complaints musculoskeletal. Walks with walker. She was adjusting her bed with remote and dropped on her cheekbone which has since left a bruise. No cardiac complaints. She does have RLE edema however related to prior trauma and appears dependent. Improves with elevation and compression stockings. Does still take lasix 20mg daily for this edema. Dizziness resolved.    Review of Systems   Constitutional: Negative for decreased appetite, fever, malaise/fatigue, weight gain and weight loss.   HENT:  Negative for congestion and nosebleeds.    Eyes:  Negative for blurred vision.   Cardiovascular:  Negative for chest pain, claudication, dyspnea on exertion, irregular heartbeat, leg swelling, near-syncope, orthopnea, palpitations, paroxysmal nocturnal dyspnea and syncope.   Respiratory:  Negative for cough and shortness of breath.    Endocrine: Negative for cold intolerance and heat intolerance.   Skin:  Negative for rash.   Musculoskeletal:  Negative for back pain.   Gastrointestinal:  Negative for abdominal pain, constipation, diarrhea, heartburn, melena, nausea and vomiting.   Genitourinary:  Negative for dysuria, flank pain and hematuria.   Neurological:  Negative for dizziness.   Psychiatric/Behavioral:  Negative for altered mental status and depression.          Past Medical History:   Diagnosis Date    Adverse effect of anesthesia 2020    grandfather unexpectedly  from anesthesia, poor experience in  with last surgery    Amputation of left middle finger     osteomyelitis    Anesthesia     difficulty waking up with hysterectomy and last surgery in , hallucinations    Atherosclerosis of both carotid arteries 2022     Dec. 2021: Mild plaque right internal carotid and moderate plaque left carotid bifurcation     "Bowel habit changes     Hx cdiff    C. difficile diarrhea 03/2016    fecal transplant    Cancer ([facility]) 1947    Tongue CA (surgically removed)    Chronic back pain     hands, shoulders    CKD (chronic kidney disease) stage 3, GFR 30-59 ml/min ([facility])     Constipation 01/21/2020    rarely    Decreased hearing of both ears 11/17/2022    Delayed emergence from general anesthesia     Dense breast tissue on mammogram 07/25/2019    Dental disorder     lower retainer    Depression     Elbow fracture, left     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 choroidal neovascularization ([facility]) 11/17/2022    Heart burn     High cholesterol     History of anemia     History of DVT (deep vein thrombosis) 2017    unsure of history    Hypothyroid     MRSA (methicillin resistant Staphylococcus aureus) 2012    RIGHT foot    Multilevel degenerative disc disease     Osteoarthritis     Osteoporosis 03/27/2019    DEXA Aug. 2017: T score -3.4 right forearm and -1.6 left hip DEXA Aug. 2019: T score forearm -4.6 and hip -1.4; DEXA Nov. 2020: T score right forearm -4.0 and right hip -1.7    Pain     shoulders, feet, back    Peripheral edema     PVC (premature ventricular contraction)     Raynaud's disease     Rheumatoid arthritis ([facility]) 09/26/2014    Stroke ([facility]) 1990's?    \"mini\" no residual symptoms    SVT (supraventricular tachycardia) 3/21/2023    Urinary incontinence     occasional         Past Surgical History:   Procedure Laterality Date    NC TOTAL HIP ARTHROPLASTY Left 2/21/2023    Procedure: LEFT TOTAL HIP ARTHROPLASTY, AND REMOVAL OF SCREWS FROM TOP OF FEMUR PLATE;  Surgeon: Rom Chinchilla M.D.;  Location: SURGERY [facility];  Service: Orthopedics    HARDWARE REMOVAL ORTHO Left 2/21/2023    Procedure: REMOVAL, HARDWARE;  Surgeon: Rom Chinchilla M.D.;  Location: SURGERY [facility];  Service: Orthopedics    PB DEGROOT W/O FACETEC FORAMOT/DSKC 1/2 VRT SEG, TH* N/A 1/16/2020    Procedure: LAMINECTOMY, SPINE, " THORACIC;  Surgeon: Sang Nelson M.D.;  Location: SURGERY [facility];  Service: Neurosurgery    THORACIC FUSION O-ARM N/A 1/16/2020    Procedure: FUSION, SPINE, THORACIC, POSTERIOR APPROACH - T9-L3;  Surgeon: Sang Nelson M.D.;  Location: SURGERY [facility];  Service: Neurosurgery    PB RECONSTR TOTAL SHOULDER IMPLANT Left 4/30/2019    Procedure: ARTHROPLASTY, SHOULDER, TOTAL - REVERSE;  Surgeon: Daniella Camargo M.D.;  Location: SURGERY [facility];  Service: Orthopedics    SHOULDER ARTHROPLASTY TOTAL Right 1/9/2018    Procedure: SHOULDER ARTHROPLASTY TOTAL - REVERSE;  Surgeon: Daniella Camargo M.D.;  Location: SURGERY [facility];  Service: Orthopedics    COLONOSCOPY - ENDO N/A 3/7/2016    Procedure: COLONOSCOPY - ENDO;  Surgeon: Estiven Ayers M.D.;  Location: ENDOSCOPY [facility];  Service:     FECAL TRANSPLANT N/A 3/7/2016    Procedure: FECAL TRANSPLANT;  Surgeon: Estiven Ayers M.D.;  Location: ENDOSCOPY [facility];  Service:     OTHER ORTHOPEDIC SURGERY Left 2012    Left Elbow fx repair    BLEPHAROPLASTY  3/31/2011    Performed by ORACIO DIAZ at [facility]    FOOT SURGERY Right 2010    OTHER ORTHOPEDIC SURGERY Left 2006    finger- removal of digit Left middle finger    CHOLECYSTECTOMY  2000    HYSTERECTOMY LAPAROSCOPY  2000    HYSTERECTOMY RADICAL  1985    OTHER Bilateral 2010, 2008    feet- repair torn tendons    OTHER ORTHOPEDIC SURGERY Left 1970s    femur fx         Current Outpatient Medications   Medication Sig Dispense Refill    furosemide (LASIX) 20 MG Tab Take 1 Tablet by mouth every 48 hours as needed (leg swelling). 50 Tablet 3    cycloSPORINE (RESTASIS) 0.05 % ophthalmic emulsion Administer 1 Drop into both eyes 2 times a day. Pharmacist - please dispense 180 single use vials (72 ml)  Indications: Drying and Inflammation of Cornea and Conjunctiva of Eyes 72 mL 3    metoprolol SR (TOPROL XL) 25 MG TABLET SR 24 HR Take 0.5 tablets by mouth  every day. 90 Tablet 3    levothyroxine (SYNTHROID) 112 MCG Tab Take 1 Tablet by mouth every morning on an empty stomach. 100 Tablet 0    ketoconazole (NIZORAL) 2 % shampoo Apply to scalp and ears 2-3 x weekly while showering, allow to sit for 5 minutes prior to rinsing 120 mL 8    mometasone (ELOCON) 0.1 % lotion Apply topically to affected areas on scalp once a day up to four days a week as needed for itch. 60 mL 3    triamcinolone acetonide (KENALOG) 0.1 % Cream Apply topically to affected areas on neck, arms, and legs twice a day up to 4 days a week as needed for itch. Do not use on the face or groin 454 g 5    doxycycline (VIBRAMYCIN) 100 MG Tab Take 1 Tablet by mouth 2 times a day. 14 Tablet 0    nitrofurantoin (MACROBID) 100 MG Cap Take 1 Capsule by mouth 2 times a day. 14 Capsule 0    gabapentin (NEURONTIN) 300 MG Cap Take 1 Capsule by mouth 3 times a day. 300 Capsule 3    aspirin (ASA) 81 MG Chew Tab chewable tablet Chew 81 mg every day.      metaxalone (SKELAXIN) 800 MG Tab Take 0.5 Tablets by mouth 3 times a day. 90 Tablet 1    rosuvastatin (CRESTOR) 10 MG Tab Take 1 Tablet by mouth every day. Indications: High Amount of Fats in the Blood 90 Tablet 1    hydroxychloroquine (PLAQUENIL) 200 MG Tab TAKE ONE TABLET BY MOUTH ONE TIME DAILY 100 Tablet 3    acetaminophen (TYLENOL) 500 MG Tab Take 500-1,000 mg by mouth every 6 hours as needed. Indications: Pain      Naproxen Sodium 220 MG Cap Take 220 mg by mouth 2 times a day as needed (Pain). Indications: Pain      cholestyramine (QUESTRAN) 4 g packet Take 4 g by mouth as needed (supplement). 100 Each 3    Probiotic Product (PROBIOTIC-10 PO) Take 1 Tablet by mouth every day. Indications: nutritional support      FLUoxetine (PROZAC) 10 MG Cap Take 2 Capsules by mouth every morning. 180 Capsule 2    traZODone (DESYREL) 50 MG Tab Take 0.5 Tablets by mouth at bedtime. 100 Tablet 2    spironolactone (ALDACTONE) 25 MG Tab Take 1 Tablet by mouth every day. 100 Tablet 2     ipratropium (ATROVENT) 0.06 % Solution Administer 1 Spray into affected nostril(S) 2 times a day. 15 mL 2    COENZYME Q10 PO Take 1 Capsule by mouth every day. Indications: High Blood Pressure Disorder 100 Capsule 3    Multiple Vitamins-Minerals (PRESERVISION AREDS 2 PO) Take 1 Tablet by mouth every day. Indications: nutritional support- macular degeneration      CALCIUM CITRATE PO Take 1 Tablet by mouth every evening. Indications: nutritional support      therapeutic multivitamin-minerals (THERAGRAN-M) Tab Take 1 Tablet by mouth every day. Indications: nutritional support      denosumab (PROLIA) 60 MG/ML Solution Inject 1 mL under the skin every 6 months. Indications: Decreased Bone Mineral Density, Osteoporosis       No current facility-administered medications for this visit.         Allergies   Allergen Reactions    Azithromycin Unspecified     Matti Johnsons syndrome    Cefuroxime Itching and Vomiting     They gave me C-Diff    Ciprofloxacin Itching and Vomiting     They gave me C-Diff    Clindamycin Itching and Vomiting     They gave me c-diff    Daptomycin      Matti colleen syndrome      Erythromycin Unspecified     Matti Johnsons syndrome    Rifampin      Matti colleen syndrome    Codeine Itching    Flagyl [Metronidazole] Rash, Vomiting and Nausea     rash    Macrodantin [Nitrofurantoin Macrocrystal] Rash     Other reaction(s): Decreased Blood Pressure    Morphine Itching     Tolerates oxycodone/hydromorphone    Sulfa Drugs Swelling    Conjugated Estrogens     Nitrofurantoin Vomiting and Nausea    Premarin Rash and Unspecified     burning         Family History   Problem Relation Age of Onset    Non-contributory Mother     Osteoporosis Mother     Arthritis Mother     Non-contributory Father     Narcolepsy Father     Lung Disease Father         Asthma    Heart Disease Father     Anesthesia Paternal Grandfather         death         Social History     Socioeconomic History    Marital status:      " Spouse name: Not on file    Number of children: Not on file    Years of education: Not on file    Highest education level: Not on file   Occupational History    Not on file   Tobacco Use    Smoking status: Former     Current packs/day: 0.00     Average packs/day: 1 pack/day for 20.0 years (20.0 ttl pk-yrs)     Types: Cigarettes     Start date: 1965     Quit date: 1985     Years since quittin.5    Smokeless tobacco: Never   Vaping Use    Vaping Use: Never used   Substance and Sexual Activity    Alcohol use: Not Currently     Alcohol/week: 1.2 oz     Types: 2 Glasses of wine per week     Comment: 1 per day    Drug use: Never    Sexual activity: Not on file   Other Topics Concern    Not on file   Social History Narrative    Not on file     Social Determinants of Health     Financial Resource Strain: Not on file   Food Insecurity: No Food Insecurity (1/10/2020)    Hunger Vital Sign     Worried About Running Out of Food in the Last Year: Never true     Ran Out of Food in the Last Year: Never true   Transportation Needs: No Transportation Needs (1/10/2020)    PRAPARE - Transportation     Lack of Transportation (Medical): No     Lack of Transportation (Non-Medical): No   Physical Activity: Not on file   Stress: Not on file   Social Connections: Feeling Socially Integrated (2023)    OASIS : Social Isolation     Frequency of experiencing loneliness or isolation: Rarely   Intimate Partner Violence: Not on file   Housing Stability: Not on file         Physical Exam:  Ambulatory Vitals  /62 (BP Location: Left arm, Patient Position: Sitting, BP Cuff Size: Adult)   Pulse 61   Resp 16   Ht 1.651 m (5' 5\")   Wt 55.3 kg (122 lb)   SpO2 98%    BP Readings from Last 4 Encounters:   10/23/23 112/62   23 100/58   23 98/58   23 98/68     Weight/BMI:   Vitals:    10/23/23 0859   BP: 112/62   Weight: 55.3 kg (122 lb)   Height: 1.651 m (5' 5\")      Body mass index is 20.3 kg/m².  Wt " Readings from Last 4 Encounters:   10/23/23 55.3 kg (122 lb)   09/27/23 54.5 kg (120 lb 3.2 oz)   06/21/23 54.3 kg (119 lb 9.6 oz)   06/21/23 54.1 kg (119 lb 3.2 oz)       Physical Exam  Constitutional:       General: She is not in acute distress.  HENT:      Head: Normocephalic and atraumatic.   Eyes:      Conjunctiva/sclera: Conjunctivae normal.      Pupils: Pupils are equal, round, and reactive to light.   Neck:      Vascular: No JVD.   Cardiovascular:      Rate and Rhythm: Normal rate and regular rhythm.      Heart sounds: Normal heart sounds. No murmur heard.     No friction rub. No gallop.   Pulmonary:      Effort: Pulmonary effort is normal. No respiratory distress.      Breath sounds: Normal breath sounds. No wheezing or rales.   Chest:      Chest wall: No tenderness.   Abdominal:      General: Bowel sounds are normal. There is no distension.      Palpations: Abdomen is soft.   Musculoskeletal:      Cervical back: Normal range of motion and neck supple.   Skin:     General: Skin is warm and dry.   Neurological:      Mental Status: She is alert and oriented to person, place, and time.   Psychiatric:         Mood and Affect: Affect normal.         Judgment: Judgment normal.         Lab Data Review:  Lab Results   Component Value Date/Time    CHOLSTRLTOT 163 05/26/2022 10:21 AM    LDL 84 05/26/2022 10:21 AM    HDL 59 05/26/2022 10:21 AM    TRIGLYCERIDE 102 05/26/2022 10:21 AM       Lab Results   Component Value Date/Time    SODIUM 136 03/12/2023 03:21 AM    POTASSIUM 4.4 03/12/2023 03:21 AM    CHLORIDE 106 03/12/2023 03:21 AM    CO2 17 (L) 03/12/2023 03:21 AM    GLUCOSE 77 03/12/2023 03:21 AM    BUN 29 (H) 03/12/2023 03:21 AM    CREATININE 0.88 03/12/2023 03:21 AM    CREATININE 1.1 06/29/2007 04:23 PM     CrCl cannot be calculated (Patient's most recent lab result is older than the maximum 7 days allowed.).  Lab Results   Component Value Date/Time    ALKPHOSPHAT 115 (H) 03/10/2023 05:57 PM    ASTSGOT 25  "03/10/2023 05:57 PM    ALTSGPT 20 03/10/2023 05:57 PM    TBILIRUBIN 0.3 03/10/2023 05:57 PM      Lab Results   Component Value Date/Time    WBC 6.2 03/12/2023 03:21 AM     Lab Results   Component Value Date/Time    HBA1C 5.5 02/10/2019 10:22 PM     No components found for: \"TROP\"      Cardiac Imaging and Procedures Review:      EKG 6/3/22 interpreted by me sinus 88, frequent ventricular couplets, borderline qtc setting frequent ventricular ectopy, nonspecific T wave changes    TTE 1/2020  CONCLUSIONS  No prior study is available for comparison.   Left ventricular ejection fraction is visually estimated to be greater   than 70%.  Unable to estimate pulmonary artery pressure due to an inadequate   tricuspid regurgitant jet.    Vascular screening 12/2021  CONCLUSIONS   Right. Mild plaque of the carotid artery with less than 50% stenosis of the    internal carotid.   Left. Moderate plaque of the carotid bifurcation with velocities consistent    with greater than 50% stenosis of the internal carotid.    No aneurysm of the abdominal aorta.    Lower extremity arterial perfusion  is normal at rest.    Procedure: zio monitor; 13d 19h; 6/9//22   Indication: PVCs   Quality: Good   Findings:   Underlying rhythm: Predominantly sinus rhythm with average rate 78 bpm. No atrial fibrillation nor flutter detected.   Atrial events: Rare ectopy. Three episodes supraventricular tachycardia (SVT); fastest 144bpm is also longest at 17 beats.   Ventricular events: Frequent ventricular ectopy 22.6% burden. 16 episodes nonsustained ventricular tachycardia (NSVT); fastest 116 bpm and longest 5 beats.   Patient events: Asymptomatic.     Impressions:   Predominantly sinus rhythm.   Frequent, asymptomatic ventricular ectopy.   Asymptomatic, short SVT and NSVT.     TTE 9/22/22  CONCLUSIONS  Normal left ventricular size, wall thickness, and systolic function.  Normal right ventricular size and systolic function.  Normal left atrial size.  Mild " mitral annular calcification.  Normal pericardium without effusion.    Medical Decision Making:  Problem List Items Addressed This Visit       Atherosclerosis of both carotid arteries    Relevant Medications    furosemide (LASIX) 20 MG Tab    PVC (premature ventricular contraction)    Relevant Medications    furosemide (LASIX) 20 MG Tab    SVT (supraventricular tachycardia)    Relevant Medications    furosemide (LASIX) 20 MG Tab    Aortic atherosclerosis (HCC)    Relevant Medications    furosemide (LASIX) 20 MG Tab    Other Relevant Orders    US-CAROTID DOPPLER BILAT    PVD (peripheral vascular disease) (HCC)    Relevant Medications    furosemide (LASIX) 20 MG Tab    Other Relevant Orders    US-CAROTID DOPPLER BILAT    Leg edema    Relevant Medications    furosemide (LASIX) 20 MG Tab    Other Relevant Orders    Basic Metabolic Panel    proBrain Natriuretic Peptide, NT     Orthostasis resolved.    PVCs asymptomatic. Stable. Continue metoprolol.    Leg edema - venous insufficiency. Focus on leg elevation and compression stockings. Reduce lasix to every other day. D/c potassium. Continue spironolactone. Repeat labs.     HLD / carotid stenosis - threhold goal LDL <70. continue rosuvastatin. Annual lipids with PCP. Repeat carotid ultrasound.    It was my pleasure to meet with Ms. Celis.

## 2023-10-23 NOTE — PATIENT INSTRUCTIONS
Reduce lasix to every other day.  Can stop potassium.  Repeat blood work in two weeks.  Check ultrasound carotid artery.

## 2023-11-06 ENCOUNTER — HOSPITAL ENCOUNTER (OUTPATIENT)
Dept: LAB | Facility: MEDICAL CENTER | Age: 88
End: 2023-11-06
Attending: INTERNAL MEDICINE
Payer: MEDICARE

## 2023-11-06 DIAGNOSIS — R60.0 LEG EDEMA: ICD-10-CM

## 2023-11-06 LAB
ANION GAP SERPL CALC-SCNC: 15 MMOL/L (ref 7–16)
BUN SERPL-MCNC: 21 MG/DL (ref 8–22)
CALCIUM SERPL-MCNC: 9.9 MG/DL (ref 8.5–10.5)
CHLORIDE SERPL-SCNC: 105 MMOL/L (ref 96–112)
CO2 SERPL-SCNC: 19 MMOL/L (ref 20–33)
CREAT SERPL-MCNC: 0.82 MG/DL (ref 0.5–1.4)
GFR SERPLBLD CREATININE-BSD FMLA CKD-EPI: 69 ML/MIN/1.73 M 2
GLUCOSE SERPL-MCNC: 78 MG/DL (ref 65–99)
NT-PROBNP SERPL IA-MCNC: 714 PG/ML (ref 0–125)
POTASSIUM SERPL-SCNC: 4.3 MMOL/L (ref 3.6–5.5)
SODIUM SERPL-SCNC: 139 MMOL/L (ref 135–145)

## 2023-11-06 PROCEDURE — 80048 BASIC METABOLIC PNL TOTAL CA: CPT

## 2023-11-06 PROCEDURE — 83880 ASSAY OF NATRIURETIC PEPTIDE: CPT

## 2023-11-06 PROCEDURE — 36415 COLL VENOUS BLD VENIPUNCTURE: CPT

## 2023-11-08 ENCOUNTER — APPOINTMENT (OUTPATIENT)
Dept: ONCOLOGY | Facility: MEDICAL CENTER | Age: 88
End: 2023-11-08
Attending: INTERNAL MEDICINE
Payer: MEDICARE

## 2023-11-09 PROCEDURE — RXMED WILLOW AMBULATORY MEDICATION CHARGE: Performed by: INTERNAL MEDICINE

## 2023-11-10 PROCEDURE — RXMED WILLOW AMBULATORY MEDICATION CHARGE: Performed by: PHYSICIAN ASSISTANT

## 2023-11-10 PROCEDURE — RXMED WILLOW AMBULATORY MEDICATION CHARGE: Performed by: STUDENT IN AN ORGANIZED HEALTH CARE EDUCATION/TRAINING PROGRAM

## 2023-11-10 RX ORDER — SPIRONOLACTONE 25 MG/1
25 TABLET ORAL DAILY
Qty: 100 TABLET | Refills: 3 | Status: SHIPPED | OUTPATIENT
Start: 2023-11-10

## 2023-11-13 ENCOUNTER — PHARMACY VISIT (OUTPATIENT)
Dept: PHARMACY | Facility: MEDICAL CENTER | Age: 88
End: 2023-11-13
Payer: COMMERCIAL

## 2023-11-15 ENCOUNTER — TELEPHONE (OUTPATIENT)
Dept: HEALTH INFORMATION MANAGEMENT | Facility: OTHER | Age: 88
End: 2023-11-15

## 2023-11-15 ENCOUNTER — TELEPHONE (OUTPATIENT)
Dept: MEDICAL GROUP | Facility: PHYSICIAN GROUP | Age: 88
End: 2023-11-15

## 2023-11-15 ENCOUNTER — OUTPATIENT INFUSION SERVICES (OUTPATIENT)
Dept: ONCOLOGY | Facility: MEDICAL CENTER | Age: 88
End: 2023-11-15
Attending: INTERNAL MEDICINE
Payer: MEDICARE

## 2023-11-15 VITALS
BODY MASS INDEX: 20.18 KG/M2 | WEIGHT: 121.25 LBS | RESPIRATION RATE: 18 BRPM | SYSTOLIC BLOOD PRESSURE: 101 MMHG | OXYGEN SATURATION: 92 % | DIASTOLIC BLOOD PRESSURE: 69 MMHG | HEART RATE: 69 BPM | TEMPERATURE: 97.2 F

## 2023-11-15 DIAGNOSIS — Z87.81 HISTORY OF COMPRESSION FRACTURE OF SPINE: ICD-10-CM

## 2023-11-15 DIAGNOSIS — M80.00XD AGE-RELATED OSTEOPOROSIS WITH CURRENT PATHOLOGICAL FRACTURE WITH ROUTINE HEALING, SUBSEQUENT ENCOUNTER: ICD-10-CM

## 2023-11-15 ASSESSMENT — FIBROSIS 4 INDEX: FIB4 SCORE: 1.26

## 2023-11-15 ASSESSMENT — PAIN DESCRIPTION - PAIN TYPE: TYPE: CHRONIC PAIN

## 2023-11-15 NOTE — TELEPHONE ENCOUNTER
Patient called as she was at the hospital and needed to speak with pcp regarding an approval for a test. Tried to transfer to pcp office and patient disconnected the call.

## 2023-11-15 NOTE — TELEPHONE ENCOUNTER
Message received that patient had questions about lab orders. Left voicemail requesting call back.

## 2023-11-16 NOTE — PROGRESS NOTES
[facility] for Prolia injection for osteoporosis. Pt denies recent/pending oral surgery. POC reviewed. Unfortunately, Pt changed providers and her order is no longer valid. Call placed to her new provider Xiomara Wheat x 2, message sent in volt. No response. Pt called MD office and was informed that Dr. Fatima is on vacation. Elizabeth agrees to f/u with Dr. Fatima and to call scheduling for new appointment. Elizabeth was discharged to self care in no acute distress.

## 2023-12-06 DIAGNOSIS — M81.0 AGE-RELATED OSTEOPOROSIS WITHOUT CURRENT PATHOLOGICAL FRACTURE: ICD-10-CM

## 2023-12-06 NOTE — OR NURSING
1332 To PACU from OR via bed,  respirations spontaneous and non-labored Icepack applied over c/d/i left hip surgical dressings.     1347  resting quietly, vss    1400 radiology here for post op xray    1415  resting quietly, rouses to verbal stim, vss    1430 medicated for c/o pain    1445 sleeping, rouses easily, states pain better now, vss, called family with update             DATE OF PROCEDURE: 12/06/2023    PREOPERATIVE DIAGNOSES:   1. menorrhagia  2. Enlarged uterus  3. Pelvic pain       POSTOPERATIVE DIAGNOSES:   1. same      PROCEDURE: Single-site TLH, bilateral salpingectomy, RSO    SURGEON: Jeremiah Valdivia MD    ASSISTANT: Jennifer Bailey    ANESTHESIA: general    ESTIMATED BLOOD LOSS: 400 mL.     COMPLICATIONS: None.     FINDINGS: uterus sounds to 12 cm    PROCEDURE IN DETAIL:   Following informed consent the patient was taken to the operating room where general anesthesia was administered without difficulty. She was prepped and draped in usual sterile fashion placed dorsolithotomy position. A weighted speculum was placed the vagina and the anterior lip cervix grasped single-toothed tenaculum. The uterus was gently sounded and found to be 8 cm in length. A V care was then inserted into the uterus for adequate manipulation. Attention was then turned to the abdomen where a 3 mm incision was made in the umbilicus with the scalpel. The fascial incision was entered with the Escalona scissors and extended laterally. The peritoneum was entered manually with ease. The single site gel port was then introduced to the abdomen with ease through the umbilical incision and the pneumoperitoneum achieved with 3 L of CO2 gas. The patient was then placed in deep Trendelenburg position and a pelvic exam with the findings noted above. The IP ligament was grasped with the Ligasure device , cauterized and  transected with hemostasis assured removing the right ovary.. The round ligament on the right was clamped cauterized transected hemostasis found be satisfactory. Anterior posterior leafs of the broad ligament were entered using the scissors and bladder flap created to midline with ease. The steps were then repeated on the left; however, the left ovary was left intact with hemostasis found be satisfactory. The uterus and cervix were then amputated using the J-hook along the co-ring and the specimen  delivered through the vagina with ease. The vaginal cuff was then closed with number 1V LOC starting the right angle suturing across to left and back in the midline. The vaginal cuff was found be hemostatic and airtight. The pelvis was then copiously  irrigated with normal saline dissatisfaction. The right and left ureters were identified and found to be peristalsing nicely. The pneumoperitoneum evacuated and the gel port removed from the umbilicus. The fascial incision was repaired with 0 Vicryl in running fashion. The skin was closed with 4-0 Vicryl and Surgicel. The patient was given Flurosene iv and an intraoperative cystoscopy. Performed with eflux of urine noted from both the right and left ureters. All instrument removed from the patient was waking general anesthesia and taken cover room stable condition. At the end of the procedure all sponge lap needle and instrument counts correct ×2

## 2023-12-08 ENCOUNTER — HOSPITAL ENCOUNTER (OUTPATIENT)
Dept: RADIOLOGY | Facility: MEDICAL CENTER | Age: 88
End: 2023-12-08
Attending: INTERNAL MEDICINE
Payer: MEDICARE

## 2023-12-08 DIAGNOSIS — M81.0 AGE-RELATED OSTEOPOROSIS WITHOUT CURRENT PATHOLOGICAL FRACTURE: ICD-10-CM

## 2023-12-08 PROCEDURE — 77080 DXA BONE DENSITY AXIAL: CPT

## 2023-12-18 PROCEDURE — RXMED WILLOW AMBULATORY MEDICATION CHARGE: Performed by: INTERNAL MEDICINE

## 2023-12-19 ENCOUNTER — HOSPITAL ENCOUNTER (OUTPATIENT)
Dept: RADIOLOGY | Facility: MEDICAL CENTER | Age: 88
End: 2023-12-19
Attending: INTERNAL MEDICINE
Payer: MEDICARE

## 2023-12-19 DIAGNOSIS — I73.9 PVD (PERIPHERAL VASCULAR DISEASE) (HCC): ICD-10-CM

## 2023-12-19 DIAGNOSIS — Z12.31 VISIT FOR SCREENING MAMMOGRAM: ICD-10-CM

## 2023-12-19 DIAGNOSIS — I70.0 AORTIC ATHEROSCLEROSIS (HCC): ICD-10-CM

## 2023-12-19 PROCEDURE — RXMED WILLOW AMBULATORY MEDICATION CHARGE: Performed by: INTERNAL MEDICINE

## 2023-12-19 PROCEDURE — 93880 EXTRACRANIAL BILAT STUDY: CPT

## 2023-12-19 PROCEDURE — 77063 BREAST TOMOSYNTHESIS BI: CPT

## 2023-12-19 RX ORDER — FLUOXETINE 10 MG/1
20 CAPSULE ORAL EVERY MORNING
Qty: 100 CAPSULE | Refills: 3 | Status: SHIPPED | OUTPATIENT
Start: 2023-12-19

## 2023-12-19 RX ORDER — TRAZODONE HYDROCHLORIDE 50 MG/1
25 TABLET ORAL
Qty: 100 TABLET | Refills: 3 | Status: SHIPPED | OUTPATIENT
Start: 2023-12-19

## 2023-12-21 ENCOUNTER — PHARMACY VISIT (OUTPATIENT)
Dept: PHARMACY | Facility: MEDICAL CENTER | Age: 88
End: 2023-12-21
Payer: COMMERCIAL

## 2024-01-08 ENCOUNTER — OUTPATIENT INFUSION SERVICES (OUTPATIENT)
Dept: ONCOLOGY | Facility: MEDICAL CENTER | Age: 89
End: 2024-01-08
Attending: INTERNAL MEDICINE
Payer: MEDICARE

## 2024-01-08 VITALS
WEIGHT: 123.46 LBS | HEART RATE: 65 BPM | DIASTOLIC BLOOD PRESSURE: 64 MMHG | BODY MASS INDEX: 21.88 KG/M2 | SYSTOLIC BLOOD PRESSURE: 136 MMHG | TEMPERATURE: 96.8 F | OXYGEN SATURATION: 98 % | HEIGHT: 63 IN | RESPIRATION RATE: 18 BRPM

## 2024-01-08 DIAGNOSIS — M80.00XD AGE-RELATED OSTEOPOROSIS WITH CURRENT PATHOLOGICAL FRACTURE WITH ROUTINE HEALING, SUBSEQUENT ENCOUNTER: ICD-10-CM

## 2024-01-08 DIAGNOSIS — Z87.81 HISTORY OF COMPRESSION FRACTURE OF SPINE: ICD-10-CM

## 2024-01-08 LAB
CA-I BLD ISE-SCNC: 1.06 MMOL/L (ref 1.1–1.3)
CREAT BLD-MCNC: 0.9 MG/DL (ref 0.5–1.4)

## 2024-01-08 PROCEDURE — 700111 HCHG RX REV CODE 636 W/ 250 OVERRIDE (IP): Mod: JZ | Performed by: INTERNAL MEDICINE

## 2024-01-08 PROCEDURE — 96372 THER/PROPH/DIAG INJ SC/IM: CPT

## 2024-01-08 PROCEDURE — 82565 ASSAY OF CREATININE: CPT

## 2024-01-08 PROCEDURE — 36415 COLL VENOUS BLD VENIPUNCTURE: CPT

## 2024-01-08 PROCEDURE — 82330 ASSAY OF CALCIUM: CPT

## 2024-01-08 RX ADMIN — DENOSUMAB 60 MG: 60 INJECTION SUBCUTANEOUS at 14:17

## 2024-01-08 ASSESSMENT — PAIN DESCRIPTION - PAIN TYPE: TYPE: CHRONIC PAIN

## 2024-01-08 ASSESSMENT — FIBROSIS 4 INDEX: FIB4 SCORE: 1.27

## 2024-01-08 NOTE — PROGRESS NOTES
Pt arrived ambulatory to [facility] for Q 6 month Prolia injection for osteoporosis. POC discussed with pt and she agrees with plan. Pt denies oral surgery in the past 4 weeks.    I-stat labs drawn as ordered. Resulst reviewed, ok to proceed with treatment. I-stat ionized calcium 1.06 today. Pt denies any muscle spasms and no paresthesias. Pt states she will increase her PO calcium.     Pt medicated per MAR. Prolia given left back arm. Pt tolerated treatment without s/s adverse reaction. Pt discharged to self care, NAD. Scheduling emailed from next a6 month appt. Pt will monitor My Chart for appt info.

## 2024-01-09 PROCEDURE — RXMED WILLOW AMBULATORY MEDICATION CHARGE: Performed by: OTOLARYNGOLOGY

## 2024-01-10 PROCEDURE — RXMED WILLOW AMBULATORY MEDICATION CHARGE: Performed by: INTERNAL MEDICINE

## 2024-01-10 RX ORDER — CHOLESTYRAMINE 4 G/9G
1 POWDER, FOR SUSPENSION ORAL PRN
Qty: 100 EACH | Refills: 3 | Status: SHIPPED | OUTPATIENT
Start: 2024-01-10

## 2024-01-10 RX ORDER — LEVOTHYROXINE SODIUM 112 UG/1
112 TABLET ORAL
Qty: 100 TABLET | Refills: 3 | Status: SHIPPED | OUTPATIENT
Start: 2024-01-10

## 2024-01-11 PROCEDURE — RXMED WILLOW AMBULATORY MEDICATION CHARGE: Performed by: INTERNAL MEDICINE

## 2024-01-16 ENCOUNTER — PHARMACY VISIT (OUTPATIENT)
Dept: PHARMACY | Facility: MEDICAL CENTER | Age: 89
End: 2024-01-16
Payer: COMMERCIAL

## 2024-02-19 PROCEDURE — RXMED WILLOW AMBULATORY MEDICATION CHARGE: Performed by: INTERNAL MEDICINE

## 2024-02-19 PROCEDURE — RXMED WILLOW AMBULATORY MEDICATION CHARGE: Performed by: STUDENT IN AN ORGANIZED HEALTH CARE EDUCATION/TRAINING PROGRAM

## 2024-02-20 PROCEDURE — RXMED WILLOW AMBULATORY MEDICATION CHARGE: Performed by: INTERNAL MEDICINE

## 2024-02-21 PROCEDURE — RXMED WILLOW AMBULATORY MEDICATION CHARGE: Performed by: INTERNAL MEDICINE

## 2024-02-21 RX ORDER — FUROSEMIDE 40 MG/1
20 TABLET ORAL
Qty: 50 TABLET | Refills: 3 | Status: SHIPPED | OUTPATIENT
Start: 2024-02-21

## 2024-02-22 ENCOUNTER — PHARMACY VISIT (OUTPATIENT)
Dept: PHARMACY | Facility: MEDICAL CENTER | Age: 89
End: 2024-02-22
Payer: COMMERCIAL

## 2024-03-05 NOTE — CARE PLAN
Problem: Communication  Goal: The ability to communicate needs accurately and effectively will improve  Outcome: PROGRESSING AS EXPECTED     Problem: Safety  Goal: Will remain free from injury  Outcome: PROGRESSING AS EXPECTED  Goal: Will remain free from falls  Outcome: PROGRESSING AS EXPECTED     Problem: Infection  Goal: Will remain free from infection  Outcome: PROGRESSING AS EXPECTED     Problem: Venous Thromboembolism (VTW)/Deep Vein Thrombosis (DVT) Prevention:  Goal: Patient will participate in Venous Thrombosis (VTE)/Deep Vein Thrombosis (DVT)Prevention Measures  Outcome: PROGRESSING AS EXPECTED     Problem: Bowel/Gastric:  Goal: Normal bowel function is maintained or improved  Outcome: PROGRESSING AS EXPECTED  Goal: Will not experience complications related to bowel motility  Outcome: PROGRESSING AS EXPECTED     Problem: Knowledge Deficit  Goal: Knowledge of disease process/condition, treatment plan, diagnostic tests, and medications will improve  Outcome: PROGRESSING AS EXPECTED  Goal: Knowledge of the prescribed therapeutic regimen will improve  Outcome: PROGRESSING AS EXPECTED     Problem: Discharge Barriers/Planning  Goal: Patient's continuum of care needs will be met  Outcome: PROGRESSING AS EXPECTED     Problem: Fluid Volume:  Goal: Will maintain balanced intake and output  Outcome: PROGRESSING AS EXPECTED     Problem: Mobility  Goal: Risk for activity intolerance will decrease  Outcome: PROGRESSING AS EXPECTED     Problem: Pain Management  Goal: Pain level will decrease to patient's comfort goal  Outcome: PROGRESSING AS EXPECTED     Problem: Urinary Elimination:  Goal: Ability to reestablish a normal urinary elimination pattern will improve  Outcome: PROGRESSING AS EXPECTED     Problem: Skin Integrity  Goal: Risk for impaired skin integrity will decrease  Outcome: PROGRESSING AS EXPECTED     Problem: Psychosocial Needs:  Goal: Level of anxiety will decrease  Outcome: PROGRESSING AS EXPECTED      Problem: Safety - Medical Restraint  Goal: Remains free of injury from restraints (Restraint for Interference with Medical Device)  Description  INTERVENTIONS:  1. Determine that other, less restrictive measures have been tried or would not be effective before applying the restraint  2. Evaluate the patient's condition at the time of restraint application  3. Inform patient/family regarding the reason for restraint  4. Q2H: Monitor safety, psychosocial status, comfort, nutrition and hydration  Outcome: PROGRESSING AS EXPECTED  Goal: Free from restraint(s) (Restraint for Interference with Medical Device)  Description  INTERVENTIONS:  1. ONCE/SHIFT or MINIMUM Q12H: Assess and document the continuing need for restraints  2. Q24H: Continued use of restraint requires LIP to perform face to face examination and written order  3. Identify and implement measures to help patient regain control  Outcome: PROGRESSING AS EXPECTED     Problem: Respiratory:  Goal: Respiratory status will improve  Outcome: PROGRESSING AS EXPECTED      Show Inventory Tab: Hide X Size Of Lesion In Cm (Optional): 0 Ndc# For Kenalog Only: [id number] Kenalog Type Of Vial: Single Dose Concentration Of Kenalog Solution Injected (Mg/Ml): 5.0 Include Z78.9 (Other Specified Conditions Influencing Health Status) As An Associated Diagnosis?: No Expiration Date For Kenalog (Optional): 12/2025 Detail Level: Detailed Consent: The risks of atrophy were reviewed with the patient. Lot # For Kenalog (Optional): [id number] Kenalog Preparation: Kenalog Administered By (Optional): Dr. Cueto Medical Necessity Clause: This procedure was medically necessary because the lesions that were treated were: Validate Note Data When Using Inventory: Yes Total Volume (Ccs): 1

## 2024-03-18 ENCOUNTER — PHARMACY VISIT (OUTPATIENT)
Dept: PHARMACY | Facility: MEDICAL CENTER | Age: 89
End: 2024-03-18
Payer: COMMERCIAL

## 2024-03-21 ENCOUNTER — OFFICE VISIT (OUTPATIENT)
Dept: MEDICAL GROUP | Facility: PHYSICIAN GROUP | Age: 89
End: 2024-03-21
Payer: MEDICARE

## 2024-03-21 VITALS
TEMPERATURE: 97.4 F | SYSTOLIC BLOOD PRESSURE: 112 MMHG | HEART RATE: 61 BPM | WEIGHT: 124.1 LBS | RESPIRATION RATE: 16 BRPM | HEIGHT: 62 IN | BODY MASS INDEX: 22.84 KG/M2 | DIASTOLIC BLOOD PRESSURE: 60 MMHG | OXYGEN SATURATION: 99 %

## 2024-03-21 DIAGNOSIS — R53.83 OTHER FATIGUE: ICD-10-CM

## 2024-03-21 DIAGNOSIS — Z13.220 SCREENING CHOLESTEROL LEVEL: ICD-10-CM

## 2024-03-21 DIAGNOSIS — M54.2 NECK PAIN: ICD-10-CM

## 2024-03-21 DIAGNOSIS — D64.9 LOW HEMOGLOBIN: ICD-10-CM

## 2024-03-21 DIAGNOSIS — E55.9 VITAMIN D DEFICIENCY: ICD-10-CM

## 2024-03-21 DIAGNOSIS — F33.42 RECURRENT MAJOR DEPRESSIVE DISORDER, IN FULL REMISSION (HCC): ICD-10-CM

## 2024-03-21 DIAGNOSIS — E03.9 ACQUIRED HYPOTHYROIDISM: ICD-10-CM

## 2024-03-21 PROBLEM — R53.1 GENERALIZED WEAKNESS: Status: RESOLVED | Noted: 2018-01-12 | Resolved: 2024-03-21

## 2024-03-21 PROCEDURE — 99214 OFFICE O/P EST MOD 30 MIN: CPT | Performed by: INTERNAL MEDICINE

## 2024-03-21 PROCEDURE — 3078F DIAST BP <80 MM HG: CPT | Performed by: INTERNAL MEDICINE

## 2024-03-21 PROCEDURE — 3074F SYST BP LT 130 MM HG: CPT | Performed by: INTERNAL MEDICINE

## 2024-03-21 ASSESSMENT — PATIENT HEALTH QUESTIONNAIRE - PHQ9
6. FEELING BAD ABOUT YOURSELF - OR THAT YOU ARE A FAILURE OR HAVE LET YOURSELF OR YOUR FAMILY DOWN: NOT AL ALL
2. FEELING DOWN, DEPRESSED, IRRITABLE, OR HOPELESS: NOT AT ALL
4. FEELING TIRED OR HAVING LITTLE ENERGY: SEVERAL DAYS
3. TROUBLE FALLING OR STAYING ASLEEP OR SLEEPING TOO MUCH: SEVERAL DAYS
SUM OF ALL RESPONSES TO PHQ9 QUESTIONS 1 AND 2: 0
9. THOUGHTS THAT YOU WOULD BE BETTER OFF DEAD, OR OF HURTING YOURSELF: NOT AT ALL
SUM OF ALL RESPONSES TO PHQ QUESTIONS 1-9: 2
5. POOR APPETITE OR OVEREATING: NOT AT ALL
1. LITTLE INTEREST OR PLEASURE IN DOING THINGS: NOT AT ALL
7. TROUBLE CONCENTRATING ON THINGS, SUCH AS READING THE NEWSPAPER OR WATCHING TELEVISION: NOT AT ALL
8. MOVING OR SPEAKING SO SLOWLY THAT OTHER PEOPLE COULD HAVE NOTICED. OR THE OPPOSITE, BEING SO FIGETY OR RESTLESS THAT YOU HAVE BEEN MOVING AROUND A LOT MORE THAN USUAL: NOT AT ALL

## 2024-03-21 ASSESSMENT — FIBROSIS 4 INDEX: FIB4 SCORE: 1.27

## 2024-03-21 NOTE — LETTER
[facility]  Xiomara Wheat M.D.  [address]  [place] [address]  Fax: [phone]   Authorization for Release/Disclosure of   Protected Health Information   Name: ELIZABETH CELIS : 1935 SSN: [id number]   Address: [address] [place] [address] Phone:    [phone] (home)    I authorize the entity listed below to release/disclose the PHI below to:   [facility]/Xiomara Wheat M.D. and Xiomara Wheat M.D.   Provider or Entity Name:     Address   City, State, [facility]   Phone:      Fax:     Reason for request: continuity of care   Information to be released:    [  ] LAST COLONOSCOPY,  including any PATH REPORT and follow-up  [  ] LAST FIT/COLOGUARD RESULT [  ] LAST DEXA  [  ] LAST MAMMOGRAM  [  ] LAST PAP  [  ] LAST LABS [  ] RETINA EXAM REPORT  [  ] IMMUNIZATION RECORDS  [  ] Release all info      [  ] Check here and initial the line next to each item to release ALL health information INCLUDING  _____ Care and treatment for drug and / or alcohol abuse  _____ HIV testing, infection status, or AIDS  _____ Genetic Testing    DATES OF SERVICE OR TIME PERIOD TO BE DISCLOSED: _____________  I understand and acknowledge that:  * This Authorization may be revoked at any time by you in writing, except if your health information has already been used or disclosed.  * Your health information that will be used or disclosed as a result of you signing this authorization could be re-disclosed by the recipient. If this occurs, your re-disclosed health information may no longer be protected by State or Federal laws.  * You may refuse to sign this Authorization. Your refusal will not affect your ability to obtain treatment.  * This Authorization becomes effective upon signing and will  on (date) __________.      If no date is indicated, this Authorization will  one (1) year from the signature date.    Name: Elizabeth Celis  Signature: Date:    3/21/2024     PLEASE FAX REQUESTED RECORDS BACK TO: [phone]

## 2024-03-28 PROBLEM — G89.29 CHRONIC LEFT HIP PAIN: Status: RESOLVED | Noted: 2022-04-01 | Resolved: 2024-03-28

## 2024-03-28 PROBLEM — R60.0 LEG EDEMA: Status: RESOLVED | Noted: 2023-10-23 | Resolved: 2024-03-28

## 2024-03-28 PROBLEM — R60.9 PERIPHERAL EDEMA: Status: RESOLVED | Noted: 2020-01-15 | Resolved: 2024-03-28

## 2024-03-28 PROBLEM — R60.0 PERIPHERAL EDEMA: Status: RESOLVED | Noted: 2020-01-15 | Resolved: 2024-03-28

## 2024-03-28 PROBLEM — R94.31 NONSPECIFIC ABNORMAL ELECTROCARDIOGRAM (ECG) (EKG): Status: RESOLVED | Noted: 2022-06-03 | Resolved: 2024-03-28

## 2024-03-28 PROBLEM — Z96.9 RETAINED ORTHOPEDIC HARDWARE: Status: RESOLVED | Noted: 2023-02-01 | Resolved: 2024-03-28

## 2024-03-28 PROBLEM — M25.552 CHRONIC LEFT HIP PAIN: Status: RESOLVED | Noted: 2022-04-01 | Resolved: 2024-03-28

## 2024-03-28 PROBLEM — M54.2 NECK PAIN: Status: RESOLVED | Noted: 2023-05-23 | Resolved: 2024-03-28

## 2024-03-28 NOTE — PROGRESS NOTES
CC: Neck pain, physical therapy referral, fatigue.    HPI:  Elizabeth presents with the following    1. Neck pain  Patient with PMH of cervical degenerative disc disease would like a physical therapy referral so she may start treatment at her assisted living, [facility].    2. Other fatigue  Patient has noticed that she has been extremely fatigued which is not her usual state.  Requesting lab work.    3. Acquired hypothyroidism  Patient currently taking Synthroid 112 mcg's daily.  Patient has been very tired.  TSH 1.9 in June 2023.    4. Recurrent major depressive disorder, in full remission ([facility])  Chronic.  Stable.  Taking Prozac 10 mg, 2 capsules daily.  Patient recently moved into [facility] assisted living.  Doing well.        Patient Active Problem List    Diagnosis Date Noted    Body mass index (BMI) 19.9 or less, adult 06/21/2023    Aortic atherosclerosis ([facility]) 06/21/2023    Cerebral atrophy ([facility]) 06/21/2023    Recurrent major depressive disorder, in full remission ([facility]) 06/21/2023    PVD (peripheral vascular disease) ([facility]) 06/21/2023    Low back pain 06/21/2023    Status post amputation of finger, left 06/21/2023    SVT (supraventricular tachycardia) 03/21/2023    Closed fracture of greater trochanter of left femur ([facility]) 03/11/2023    Hip fracture due to osteoporosis, initial encounter ([facility]) 03/11/2023    Acute blood loss anemia 03/10/2023    Status post left hip replacement 02/21/2023    Primary osteoarthritis of left hip 02/01/2023    Decreased hearing of both ears 11/17/2022    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 choroidal neovascularization ([facility]) 11/17/2022    Multilevel degenerative disc disease 10/18/2022    Osteoarthritis of multiple joints 10/18/2022    Long-term use of hydroxychloroquine 10/18/2022    PVC (premature ventricular contraction) 06/03/2022    Chronic diarrhea 06/02/2022    DNR (do not resuscitate) 05/19/2022    Insomnia 04/01/2022    BMI 21.0-21.9,  adult 03/21/2022    Hypercholesterolemia 03/21/2022    Atherosclerosis of both carotid arteries 01/24/2022    History of DVT (deep vein thrombosis) 12/01/2021    History of compression fracture of spine, Jan. 2020 (T12 and L1) 05/12/2021    Age-related osteoporosis with current pathological fracture 11/12/2020    Anemia 01/19/2020    Spinal stenosis of lumbar region with neurogenic claudication 01/10/2020    Ex-cigarette smoker 07/29/2019    Osteoporosis 03/27/2019    History of recurrent UTIs 03/27/2019    Hypothyroidism 02/10/2019    History of anemia 01/12/2018    Raynaud disease 11/11/2017    CKD (chronic kidney disease) stage 3, GFR 30-59 ml/min ([facility]) 11/11/2017    History of falling 11/11/2017    History of Clostridium difficile 02/21/2016    Depression 02/21/2016    Seronegative rheumatoid arthritis ([facility]) 09/26/2014    History of sciatica 09/26/2014       Current Outpatient Medications   Medication Sig Dispense Refill    NON SPECIFIED Requesting physical therapy for neck range of motion exercises muscle tension relief in the neck area. 12 Each 1    furosemide (LASIX) 40 MG Tab Take 0.5 Tablets by mouth 1 time a day as needed (lower leg swelling). 50 Tablet 3    cholestyramine (QUESTRAN) 4 g packet Mix 4 g in liquid by mouth as needed (supplement). 100 Each 3    levothyroxine (SYNTHROID) 112 MCG Tab Take 1 Tablet by mouth every morning on an empty stomach. 100 Tablet 3    lidocaine (LIDODERM) 5 % Patch Place 1 patch on the skin every 24 hours for 15 days. Not CVRD. 15 Patch 1    gabapentin (NEURONTIN) 300 MG Cap Take 1 capsule by mouth 3 times a day 300 Capsule 3    ipratropium (ATROVENT) 0.06 % Solution Instill 2 sprays into each nostril twice daily as needed for runny nose 15 mL 1    traZODone (DESYREL) 50 MG Tab Take 0.5 tablets by mouth at bedtime. 100 Tablet 3    FLUoxetine (PROZAC) 10 MG Cap Take 2 capsules by mouth every morning. 100 Capsule 3    denosumab (PROLIA) 60 MG/ML Solution Prefilled Syringe  injection Inject 1 mL under the skin every 6 months. 1 mL 1    spironolactone (ALDACTONE) 25 MG Tab Take 1 Tablet by mouth every day. 100 Tablet 3    cycloSPORINE (RESTASIS) 0.05 % ophthalmic emulsion Administer 1 Drop into both eyes 2 times a day. Pharmacist - please dispense 180 single use vials (72 ml)  Indications: Drying and Inflammation of Cornea and Conjunctiva of Eyes 72 mL 3    metoprolol SR (TOPROL XL) 25 MG TABLET SR 24 HR Take 0.5 tablets by mouth every day. 90 Tablet 3    ketoconazole (NIZORAL) 2 % shampoo Apply to scalp and ears 2-3 x weekly while showering, allow to sit for 5 minutes prior to rinsing 120 mL 8    mometasone (ELOCON) 0.1 % lotion Apply topically to affected areas on scalp once a day up to four days a week as needed for itch. 60 mL 3    triamcinolone acetonide (KENALOG) 0.1 % Cream Apply topically to affected areas on neck, arms, and legs twice a day up to 4 days a week as needed for itch. Do not use on the face or groin 454 g 5    doxycycline (VIBRAMYCIN) 100 MG Tab Take 1 Tablet by mouth 2 times a day. 14 Tablet 0    nitrofurantoin (MACROBID) 100 MG Cap Take 1 Capsule by mouth 2 times a day. 14 Capsule 0    aspirin (ASA) 81 MG Chew Tab chewable tablet Chew 81 mg every day.      metaxalone (SKELAXIN) 800 MG Tab Take 0.5 Tablets by mouth 3 times a day. 90 Tablet 1    rosuvastatin (CRESTOR) 10 MG Tab Take 1 Tablet by mouth every day. Indications: High Amount of Fats in the Blood 90 Tablet 1    hydroxychloroquine (PLAQUENIL) 200 MG Tab TAKE ONE TABLET BY MOUTH ONE TIME DAILY 100 Tablet 3    acetaminophen (TYLENOL) 500 MG Tab Take 500-1,000 mg by mouth every 6 hours as needed. Indications: Pain      Probiotic Product (PROBIOTIC-10 PO) Take 1 Tablet by mouth every day. Indications: nutritional support      ipratropium (ATROVENT) 0.06 % Solution Administer 1 Spray into affected nostril(S) 2 times a day. 15 mL 2    COENZYME Q10 PO Take 1 Capsule by mouth every day. Indications: High Blood  Pressure Disorder 100 Capsule 3    Multiple Vitamins-Minerals (PRESERVISION AREDS 2 PO) Take 1 Tablet by mouth every day. Indications: nutritional support- macular degeneration      CALCIUM CITRATE PO Take 1 Tablet by mouth every evening. Indications: nutritional support      therapeutic multivitamin-minerals (THERAGRAN-M) Tab Take 1 Tablet by mouth every day. Indications: nutritional support      denosumab (PROLIA) 60 MG/ML Solution Inject 1 mL under the skin every 6 months. Indications: Decreased Bone Mineral Density, Osteoporosis       No current facility-administered medications for this visit.         Allergies as of 2024 - Reviewed 2024   Allergen Reaction Noted    Azithromycin Unspecified 2017    Cefuroxime Itching and Vomiting 2016    Ciprofloxacin Itching and Vomiting 2017    Clindamycin Itching and Vomiting 2016    Daptomycin  2016    Erythromycin Unspecified 2017    Rifampin  2016    Codeine Itching 2012    Flagyl [metronidazole] Rash, Vomiting, and Nausea 2016    Macrodantin [nitrofurantoin macrocrystal] Rash     Morphine Itching 2017    Sulfa drugs Swelling 2012    Conjugated estrogens  2023    Nitrofurantoin Vomiting and Nausea 2016    Premarin Rash and Unspecified 2017        Social History     Socioeconomic History    Marital status:      Spouse name: Not on file    Number of children: Not on file    Years of education: Not on file    Highest education level: Not on file   Occupational History    Not on file   Tobacco Use    Smoking status: Former     Current packs/day: 0.00     Average packs/day: 1 pack/day for 20.0 years (20.0 ttl pk-yrs)     Types: Cigarettes     Start date: 1965     Quit date: 1985     Years since quittin.0    Smokeless tobacco: Never   Vaping Use    Vaping Use: Never used   Substance and Sexual Activity    Alcohol use: Not Currently     Alcohol/week: 1.2 oz      Types: 2 Glasses of wine per week     Comment: 1 per day    Drug use: Never    Sexual activity: Not on file   Other Topics Concern    Not on file   Social History Narrative    Not on file     Social Determinants of Health     Financial Resource Strain: Not on file   Food Insecurity: No Food Insecurity (1/10/2020)    Hunger Vital Sign     Worried About Running Out of Food in the Last Year: Never true     Ran Out of Food in the Last Year: Never true   Transportation Needs: No Transportation Needs (1/10/2020)    PRAPARE - Transportation     Lack of Transportation (Medical): No     Lack of Transportation (Non-Medical): No   Physical Activity: Not on file   Stress: Not on file   Social Connections: Feeling Socially Integrated (4/19/2023)    OASIS : Social Isolation     Frequency of experiencing loneliness or isolation: Rarely   Intimate Partner Violence: Not on file   Housing Stability: Not on file       Family History   Problem Relation Age of Onset    Non-contributory Mother     Osteoporosis Mother     Arthritis Mother     Non-contributory Father     Narcolepsy Father     Lung Disease Father         Asthma    Heart Disease Father     Anesthesia Paternal Grandfather         death       Past Surgical History:   Procedure Laterality Date    AR TOTAL HIP ARTHROPLASTY Left 2/21/2023    Procedure: LEFT TOTAL HIP ARTHROPLASTY, AND REMOVAL OF SCREWS FROM TOP OF FEMUR PLATE;  Surgeon: Rom Chinchilla M.D.;  Location: SURGERY [facility];  Service: Orthopedics    HARDWARE REMOVAL ORTHO Left 2/21/2023    Procedure: REMOVAL, HARDWARE;  Surgeon: Rom Chinchilla M.D.;  Location: SURGERY [facility];  Service: Orthopedics    PB DEGROOT W/O FACETEC FORAMOT/DSKC 1/2 VRT SEG, TH* N/A 1/16/2020    Procedure: LAMINECTOMY, SPINE, THORACIC;  Surgeon: Sang Nelson M.D.;  Location: [facility];  Service: Neurosurgery    THORACIC FUSION O-ARM N/A 1/16/2020    Procedure: FUSION, SPINE, THORACIC, POSTERIOR APPROACH - T9-L3;   "Surgeon: Sang Nelson M.D.;  Location: SURGERY [facility];  Service: Neurosurgery    PB RECONSTR TOTAL SHOULDER IMPLANT Left 4/30/2019    Procedure: ARTHROPLASTY, SHOULDER, TOTAL - REVERSE;  Surgeon: Daniella Camargo M.D.;  Location: SURGERY [facility];  Service: Orthopedics    SHOULDER ARTHROPLASTY TOTAL Right 1/9/2018    Procedure: SHOULDER ARTHROPLASTY TOTAL - REVERSE;  Surgeon: Daniella Camargo M.D.;  Location: SURGERY [facility];  Service: Orthopedics    COLONOSCOPY - ENDO N/A 3/7/2016    Procedure: COLONOSCOPY - ENDO;  Surgeon: Estiven Ayers M.D.;  Location: ENDOSCOPY [facility];  Service:     FECAL TRANSPLANT N/A 3/7/2016    Procedure: FECAL TRANSPLANT;  Surgeon: Estiven Ayers M.D.;  Location: ENDOSCOPY [facility];  Service:     OTHER ORTHOPEDIC SURGERY Left 2012    Left Elbow fx repair    BLEPHAROPLASTY  3/31/2011    Performed by ORACIO DIAZ at SURGERY [facility]    FOOT SURGERY Right 2010    OTHER ORTHOPEDIC SURGERY Left 2006    finger- removal of digit Left middle finger    CHOLECYSTECTOMY  2000    HYSTERECTOMY LAPAROSCOPY  2000    HYSTERECTOMY RADICAL  1985    OTHER Bilateral 2010, 2008    feet- repair torn tendons    OTHER ORTHOPEDIC SURGERY Left 1970s    femur fx       ROS: Positive ROS per HPI.  Denies any Headache,Chest pain,  Shortness of breath,  Abdominal pain, Changes of bowel or bladder, Lower ext edema, Fevers, Nights sweats, Weight Changes, Focal weakness or numbness.  All other systems are negative.    /60 (BP Location: Left arm, Patient Position: Sitting)   Pulse 61   Temp 36.3 °C (97.4 °F) (Temporal)   Resp 16   Ht 1.575 m (5' 2\")   Wt 56.3 kg (124 lb 1.6 oz)   SpO2 99%   BMI 22.70 kg/m²      Constitutional: Alert, no distress, well-groomed.  Skin: Warm, dry, good turgor, no rashes in visible areas.  Eye: Equal, round and reactive, conjunctiva clear, lids normal.  ENMT: Lips without lesions, good dentition, moist mucous " membranes.  Neck: Trachea midline, no masses, no thyromegaly.  Respiratory: Unlabored respiratory effort, no cough.  Abdomen: Soft, no gross masses.  MSK: Normal gait, moves all extremities.  Neuro: Grossly non-focal. No cranial nerve deficit. Strength and sensation intact.   Psych: Alert and oriented x3, normal affect and mood.      Assessment and Plan.   88 y.o. female presenting with the following.     1. Neck pain  Orders for physical therapy to be sent to [facility]    2. Other fatigue    - CBC WITH DIFFERENTIAL; Future  - Comp Metabolic Panel; Future  - VITAMIN B12; Future  - FOLATE; Future    3. Acquired hypothyroidism  Continue Synthroid 112 mcg's daily.  - TSH WITH REFLEX TO FT4; Future    4. Recurrent major depressive disorder, in full remission ([facility])  Chronic.  Stable.  Continue with Prozac.    5. Low hemoglobin    - Comp Metabolic Panel; Future  - VITAMIN B12; Future  - FOLATE; Future  - IRON/TOTAL IRON BIND; Future  - FERRITIN; Future  - TRANSFERRIN SATURATION    6. Vitamin D deficiency    - VITAMIN D,25 HYDROXY (DEFICIENCY); Future    7. Screening cholesterol level    - Lipid Profile; Future      My total time spent caring for the patient on the day of the encounter was 33 minutes.   This does not include time spent on separately billable procedures/tests.

## 2024-04-24 DIAGNOSIS — M06.9 RHEUMATOID ARTHRITIS, INVOLVING UNSPECIFIED SITE, UNSPECIFIED WHETHER RHEUMATOID FACTOR PRESENT (HCC): ICD-10-CM

## 2024-04-24 PROCEDURE — RXMED WILLOW AMBULATORY MEDICATION CHARGE: Performed by: INTERNAL MEDICINE

## 2024-04-24 RX ORDER — HYDROXYCHLOROQUINE SULFATE 200 MG/1
TABLET, FILM COATED ORAL
Qty: 100 TABLET | Refills: 3 | Status: SHIPPED | OUTPATIENT
Start: 2024-04-24

## 2024-04-26 ENCOUNTER — PHARMACY VISIT (OUTPATIENT)
Dept: PHARMACY | Facility: MEDICAL CENTER | Age: 89
End: 2024-04-26
Payer: COMMERCIAL

## 2024-05-06 PROCEDURE — RXMED WILLOW AMBULATORY MEDICATION CHARGE: Performed by: INTERNAL MEDICINE

## 2024-05-07 ENCOUNTER — PHARMACY VISIT (OUTPATIENT)
Dept: PHARMACY | Facility: MEDICAL CENTER | Age: 89
End: 2024-05-07
Payer: COMMERCIAL

## 2024-05-23 ENCOUNTER — APPOINTMENT (OUTPATIENT)
Dept: RADIOLOGY | Facility: MEDICAL CENTER | Age: 89
End: 2024-05-23
Attending: EMERGENCY MEDICINE
Payer: MEDICARE

## 2024-05-23 ENCOUNTER — HOSPITAL ENCOUNTER (EMERGENCY)
Facility: MEDICAL CENTER | Age: 89
End: 2024-05-23
Attending: EMERGENCY MEDICINE
Payer: MEDICARE

## 2024-05-23 VITALS
TEMPERATURE: 97.6 F | BODY MASS INDEX: 21.66 KG/M2 | SYSTOLIC BLOOD PRESSURE: 152 MMHG | DIASTOLIC BLOOD PRESSURE: 65 MMHG | HEIGHT: 65 IN | HEART RATE: 65 BPM | RESPIRATION RATE: 16 BRPM | OXYGEN SATURATION: 100 % | WEIGHT: 130 LBS

## 2024-05-23 DIAGNOSIS — S51.012A SKIN TEAR OF LEFT ELBOW WITHOUT COMPLICATION, INITIAL ENCOUNTER: ICD-10-CM

## 2024-05-23 DIAGNOSIS — S51.012A LACERATION OF LEFT ELBOW, INITIAL ENCOUNTER: ICD-10-CM

## 2024-05-23 RX ORDER — CEPHALEXIN 500 MG/1
1000 CAPSULE ORAL 2 TIMES DAILY
Qty: 36 CAPSULE | Refills: 0 | Status: ACTIVE | OUTPATIENT
Start: 2024-05-23 | End: 2024-06-02

## 2024-05-23 RX ORDER — LIDOCAINE HYDROCHLORIDE AND EPINEPHRINE BITARTRATE 20; .01 MG/ML; MG/ML
10 INJECTION, SOLUTION SUBCUTANEOUS ONCE
Status: COMPLETED | OUTPATIENT
Start: 2024-05-23 | End: 2024-05-23

## 2024-05-23 RX ORDER — CEPHALEXIN 250 MG/1
1000 CAPSULE ORAL ONCE
Status: COMPLETED | OUTPATIENT
Start: 2024-05-23 | End: 2024-05-23

## 2024-05-23 RX ADMIN — Medication 6 ML: at 15:22

## 2024-05-23 RX ADMIN — CLOSTRIDIUM TETANI TOXOID ANTIGEN (FORMALDEHYDE INACTIVATED), CORYNEBACTERIUM DIPHTHERIAE TOXOID ANTIGEN (FORMALDEHYDE INACTIVATED), BORDETELLA PERTUSSIS TOXOID ANTIGEN (GLUTARALDEHYDE INACTIVATED), BORDETELLA PERTUSSIS FILAMENTOUS HEMAGGLUTININ ANTIGEN (FORMALDEHYDE INACTIVATED), BORDETELLA PERTUSSIS PERTACTIN ANTIGEN, AND BORDETELLA PERTUSSIS FIMBRIAE 2/3 ANTIGEN 0.5 ML: 5; 2; 2.5; 5; 3; 5 INJECTION, SUSPENSION INTRAMUSCULAR at 16:13

## 2024-05-23 RX ADMIN — LIDOCAINE HYDROCHLORIDE,EPINEPHRINE BITARTRATE 10 ML: 20; .01 INJECTION, SOLUTION INFILTRATION; PERINEURAL at 16:15

## 2024-05-23 RX ADMIN — CEPHALEXIN 1000 MG: 250 CAPSULE ORAL at 16:14

## 2024-05-23 ASSESSMENT — FIBROSIS 4 INDEX: FIB4 SCORE: 1.27

## 2024-05-23 ASSESSMENT — PAIN DESCRIPTION - PAIN TYPE: TYPE: ACUTE PAIN

## 2024-05-23 NOTE — ED TRIAGE NOTES
"Chief Complaint   Patient presents with    T-5000 GLF     Fell, tripping on curb. Left elbow taking brunt of contact with ground, 7in laceration to deep tissue, also sustained skin tears to left shin. C/o 2/10 pain currently. No AC, only baby ASA. Unknown last tetanus.   No head strike, no LOC.      BP (!) 169/60   Pulse (!) 59   Temp 36.4 °C (97.6 °F) (Temporal)   Resp 16   Ht 1.651 m (5' 5\")   Wt 59 kg (130 lb)   SpO2 94%   BMI 21.63 kg/m²   "

## 2024-05-23 NOTE — ED PROVIDER NOTES
ER Provider Note    Scribed for Baltazar Cuevas M.d. by Baltazar Cuevas M.D.. 2024  2:58 PM    Primary Care Provider: Xiomara Wheat M.D.    CHIEF COMPLAINT  Chief Complaint   Patient presents with    T-5000 GLF     Fell, tripping on curb. Left elbow taking brunt of contact with ground, 7in laceration to deep tissue, also sustained skin tears to left shin. C/o 2/10 pain currently. No AC, only baby ASA. Unknown last tetanus.   No head strike, no LOC.      EXTERNAL RECORDS REVIEWED  Records show prior orthopedic surgery with hardware to the affected elbow.  Outside records show history of orthopedic surgery to the affected elbow, with hardware.    HPI/ROS  LIMITATION TO HISTORY       OUTSIDE HISTORIAN(S):  EMS Contributes to history on arrival.    Elizabeth Celis is a 88 y.o. female who presents to the ED complaining of a ground-level fall onto her left lower lateral leg and left elbow.  She has had previous orthopedic surgery to the left elbow.  There is surgical hardware visible through the wound.  She did not hit her head is not on blood thinners, awake and alert, calm, pleasant and cooperative, very well-appearing for her age.  She says that she uses a walker, and did not see a curb, and simply had a mechanical fall, no prodrome of illness.     PAST MEDICAL HISTORY  Past Medical History:   Diagnosis Date    Adverse effect of anesthesia 2020    grandfather unexpectedly  from anesthesia, poor experience in  with last surgery    Amputation of left middle finger     osteomyelitis    Anesthesia     difficulty waking up with hysterectomy and last surgery in , hallucinations    Atherosclerosis of both carotid arteries 2022     Dec. 2021: Mild plaque right internal carotid and moderate plaque left carotid bifurcation    Bowel habit changes     Hx cdiff    C. difficile diarrhea 2016    fecal transplant    Cancer (HCC) 1947    Tongue CA (surgically removed)    Chronic back pain   "   hands, shoulders    CKD (chronic kidney disease) stage 3, GFR 30-59 ml/min (Formerly [facility])     Constipation 01/21/2020    rarely    Decreased hearing of both ears 11/17/2022    Delayed emergence from general anesthesia     Dense breast tissue on mammogram 07/25/2019    Dental disorder     lower retainer    Depression     Elbow fracture, left     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 choroidal neovascularization (HCC) 11/17/2022    Heart burn     High cholesterol     History of anemia     History of DVT (deep vein thrombosis) 2017    unsure of history    Hypothyroid     MRSA (methicillin resistant Staphylococcus aureus) 2012    RIGHT foot    Multilevel degenerative disc disease     Osteoarthritis     Osteoporosis 03/27/2019    DEXA Aug. 2017: T score -3.4 right forearm and -1.6 left hip DEXA Aug. 2019: T score forearm -4.6 and hip -1.4; DEXA Nov. 2020: T score right forearm -4.0 and right hip -1.7    Pain     shoulders, feet, back    Peripheral edema     PVC (premature ventricular contraction)     Raynaud's disease     Rheumatoid arthritis (Formerly [facility]) 09/26/2014    Stroke (Formerly [facility]) 1990's?    \"mini\" no residual symptoms    SVT (supraventricular tachycardia) 3/21/2023    Urinary incontinence     occasional       SURGICAL HISTORY  Past Surgical History:   Procedure Laterality Date    AR TOTAL HIP ARTHROPLASTY Left 2/21/2023    Procedure: LEFT TOTAL HIP ARTHROPLASTY, AND REMOVAL OF SCREWS FROM TOP OF FEMUR PLATE;  Surgeon: Rom Chinchilla M.D.;  Location: SURGERY [facility];  Service: Orthopedics    HARDWARE REMOVAL ORTHO Left 2/21/2023    Procedure: REMOVAL, HARDWARE;  Surgeon: Rom Chinchilla M.D.;  Location: SURGERY [facility];  Service: Orthopedics    PB DEGROOT W/O FACETEC FORAMOT/DSKC 1/2 VRT SEG, TH* N/A 1/16/2020    Procedure: LAMINECTOMY, SPINE, THORACIC;  Surgeon: Sang Nelson M.D.;  Location: [facility];  Service: Neurosurgery    THORACIC FUSION O-ARM N/A 1/16/2020    Procedure: " FUSION, SPINE, THORACIC, POSTERIOR APPROACH - T9-L3;  Surgeon: Sang Nelson M.D.;  Location: SURGERY [facility];  Service: Neurosurgery    PB RECONSTR TOTAL SHOULDER IMPLANT Left 4/30/2019    Procedure: ARTHROPLASTY, SHOULDER, TOTAL - REVERSE;  Surgeon: Daniella Camargo M.D.;  Location: SURGERY [facility];  Service: Orthopedics    SHOULDER ARTHROPLASTY TOTAL Right 1/9/2018    Procedure: SHOULDER ARTHROPLASTY TOTAL - REVERSE;  Surgeon: Daniella Camargo M.D.;  Location: SURGERY [facility];  Service: Orthopedics    COLONOSCOPY - ENDO N/A 3/7/2016    Procedure: COLONOSCOPY - ENDO;  Surgeon: Estiven Ayers M.D.;  Location: ENDOSCOPY [facility];  Service:     FECAL TRANSPLANT N/A 3/7/2016    Procedure: FECAL TRANSPLANT;  Surgeon: Estiven Ayers M.D.;  Location: ENDOSCOPY [facility];  Service:     OTHER ORTHOPEDIC SURGERY Left 2012    Left Elbow fx repair    BLEPHAROPLASTY  3/31/2011    Performed by ORACIO DIAZ at [facility]    FOOT SURGERY Right 2010    OTHER ORTHOPEDIC SURGERY Left 2006    finger- removal of digit Left middle finger    CHOLECYSTECTOMY  2000    HYSTERECTOMY LAPAROSCOPY  2000    HYSTERECTOMY RADICAL  1985    OTHER Bilateral 2010, 2008    feet- repair torn tendons    OTHER ORTHOPEDIC SURGERY Left 1970s    femur fx       FAMILY HISTORY  Family History   Problem Relation Age of Onset    Non-contributory Mother     Osteoporosis Mother     Arthritis Mother     Non-contributory Father     Narcolepsy Father     Lung Disease Father         Asthma    Heart Disease Father     Anesthesia Paternal Grandfather         death       SOCIAL HISTORY   reports that she quit smoking about 39 years ago. Her smoking use included cigarettes. She started smoking about 59 years ago. She has a 20 pack-year smoking history. She has never used smokeless tobacco. She reports that she does not currently use alcohol after a past usage of about 1.2 oz of alcohol per week. She reports  that she does not use drugs.    CURRENT MEDICATIONS  Previous Medications    ACETAMINOPHEN (TYLENOL) 500 MG TAB    Take 500-1,000 mg by mouth every 6 hours as needed. Indications: Pain    ASPIRIN (ASA) 81 MG CHEW TAB CHEWABLE TABLET    Chew 81 mg every day.    CALCIUM CITRATE PO    Take 1 Tablet by mouth every evening. Indications: nutritional support    CHOLESTYRAMINE (QUESTRAN) 4 G PACKET    Mix 4 g in liquid by mouth as needed (supplement).    COENZYME Q10 PO    Take 1 Capsule by mouth every day. Indications: High Blood Pressure Disorder    CYCLOSPORINE (RESTASIS) 0.05 % OPHTHALMIC EMULSION    Administer 1 Drop into both eyes 2 times a day. Pharmacist - please dispense 180 single use vials (72 ml)  Indications: Drying and Inflammation of Cornea and Conjunctiva of Eyes    DENOSUMAB (PROLIA) 60 MG/ML SOLUTION    Inject 1 mL under the skin every 6 months. Indications: Decreased Bone Mineral Density, Osteoporosis    DENOSUMAB (PROLIA) 60 MG/ML SOLUTION PREFILLED SYRINGE INJECTION    Inject 1 mL under the skin every 6 months.    DOXYCYCLINE (VIBRAMYCIN) 100 MG TAB    Take 1 Tablet by mouth 2 times a day.    FLUOXETINE (PROZAC) 10 MG CAP    Take 2 capsules by mouth every morning.    FUROSEMIDE (LASIX) 40 MG TAB    Take 0.5 Tablets by mouth 1 time a day as needed (lower leg swelling).    GABAPENTIN (NEURONTIN) 300 MG CAP    Take 1 capsule by mouth 3 times a day    HYDROXYCHLOROQUINE (PLAQUENIL) 200 MG TAB    TAKE ONE TABLET BY MOUTH ONE TIME DAILY    IPRATROPIUM (ATROVENT) 0.06 % SOLUTION    Administer 1 Spray into affected nostril(S) 2 times a day.    IPRATROPIUM (ATROVENT) 0.06 % SOLUTION    Instill 2 sprays into each nostril twice daily as needed for runny nose    IPRATROPIUM (ATROVENT) 0.06 % SOLUTION    Instill 2 sprays into each nostril twice daily as needed for runny nose    KETOCONAZOLE (NIZORAL) 2 % SHAMPOO    Apply to scalp and ears 2-3 x weekly while showering, allow to sit for 5 minutes prior to rinsing     LEVOTHYROXINE (SYNTHROID) 112 MCG TAB    Take 1 Tablet by mouth every morning on an empty stomach.    LIDOCAINE (LIDODERM) 5 % PATCH    Place 1 patch on the skin every 24 hours for 15 days. Not CVRD.    METAXALONE (SKELAXIN) 800 MG TAB    Take 0.5 Tablets by mouth 3 times a day.    METOPROLOL SR (TOPROL XL) 25 MG TABLET SR 24 HR    Take 0.5 tablets by mouth every day.    MOMETASONE (ELOCON) 0.1 % LOTION    Apply topically to affected areas on scalp once a day up to four days a week as needed for itch.    MULTIPLE VITAMINS-MINERALS (PRESERVISION AREDS 2 PO)    Take 1 Tablet by mouth every day. Indications: nutritional support- macular degeneration    NITROFURANTOIN (MACROBID) 100 MG CAP    Take 1 Capsule by mouth 2 times a day.    NON SPECIFIED    Requesting physical therapy for neck range of motion exercises muscle tension relief in the neck area.    NON SPECIFIED    Requesting physical therapy for Korina Serrett at the [facility] assisted living.  Requesting muscle tension relief, cervical range of motion exercises.    PROBIOTIC PRODUCT (PROBIOTIC-10 PO)    Take 1 Tablet by mouth every day. Indications: nutritional support    ROSUVASTATIN (CRESTOR) 10 MG TAB    Take 1 Tablet by mouth every day. Indications: High Amount of Fats in the Blood    SPIRONOLACTONE (ALDACTONE) 25 MG TAB    Take 1 Tablet by mouth every day.    THERAPEUTIC MULTIVITAMIN-MINERALS (THERAGRAN-M) TAB    Take 1 Tablet by mouth every day. Indications: nutritional support    TRAZODONE (DESYREL) 50 MG TAB    Take 0.5 tablets by mouth at bedtime.    TRIAMCINOLONE ACETONIDE (KENALOG) 0.1 % CREAM    Apply topically to affected areas on neck, arms, and legs twice a day up to 4 days a week as needed for itch. Do not use on the face or groin       ALLERGIES  Azithromycin, Cefuroxime, Ciprofloxacin, Clindamycin, Daptomycin, Erythromycin, Rifampin, Codeine, Flagyl [metronidazole], Macrodantin [nitrofurantoin macrocrystal], Morphine, Sulfa drugs, Conjugated  "estrogens, Nitrofurantoin, and Premarin    PHYSICAL EXAM  VITAL SIGNS: BP (!) 169/60   Pulse (!) 59   Temp 36.4 °C (97.6 °F) (Temporal)   Resp 16   Ht 1.651 m (5' 5\")   Wt 59 kg (130 lb)   SpO2 94%   BMI 21.63 kg/m²   Pulse ox interpretation: I interpret this pulse ox as normal.  Constitutional: Alert in no apparent distress.  HENT: No signs of trauma, Bilateral external ears normal, Nose normal.   Eyes: Conjunctiva normal, Non-icteric.   Neck: Normal range of motion, Supple, No stridor.   Lymphatic: No lymphadenopathy noted.   Cardiovascular: Regular rate and rhythm, no murmurs.   Thorax & Lungs: Normal breath sounds, No respiratory distress, No wheezing  Abdomen: Bowel sounds normal, Soft, No tenderness, No masses, No pulsatile masses. No peritoneal signs.  Skin: 10 cm, large irregular combination of very superficial all the way to full-thickness skin tear and laceration to the left elbow, exposing orthopedic hardware over the olecranon.  No active bleeding.  Full range of motion at the elbow without pain.  Superficial abrasions and 1 small skin tear to lateral left lower leg.  No active bleeding.  Extremities: Intact distal pulses, No edema, No cyanosis.  Musculoskeletal: Good range of motion in all major joints. No or major deformities noted.   Neurologic: Alert , Normal motor function, Normal sensory function, No focal deficits noted.   Psychiatric: Affect normal, Judgment normal, Mood normal.     DIAGNOSTIC STUDIES    Labs:   Labs Reviewed - No data to display    EKG:   I have independently interpreted this EKG  Results for orders placed or performed in visit on 02/10/23   ECG   Result Value Ref Range    Report       [facility]    Test Date:  2023-02-10  Pt Name:    RODO CABELLO             Department: [facility]  MRN:        [id number]                      Room:  Gender:     Female                       Technician: [id number]  :        [id number]                   Requested By:NORMAN SANTOYO " JUDITH  Order #:    [id number]                    Reading MD: Nj Snyder MD    Measurements  Intervals                                Axis  Rate:       59                           P:  DE:                                      QRS:        36  QRSD:       79                           T:          64  QT:         507  QTc:        503    Interpretive Statements  SINUS RHYTHM  Prolonged QT interval  Electronically Signed On 2- 17:21:37 PST by Nj Snyder MD           Radiology:   The attending emergency physician has independently interpreted the diagnostic imaging associated with this visit and am waiting the final reading from the radiologist.   Preliminary interpretation is a follows: Negative for fracture.    Radiologist interpretation:   DX-ELBOW-COMPLETE 3+ LEFT   Final Result      1.  No evidence of acute fracture or dislocation.      2.  Surgical fixation of the proximal ulna.      3.  Old posttraumatic changes of the proximal radius.      4.  Osteoarthritis of the elbow joint.          COURSE & MEDICAL DECISION MAKING     INITIAL ASSESSMENT, COURSE AND PLAN  Care Narrative:     2:58 PM Patient presents to the ED with ground-level fall, skin tears to the left elbow and left lower leg, with a portion of the elbow that is a deeper full-thickness laceration, exposing underlying hardware. Patient evaluated at bedside and discussed plan of care, including x-rays of the elbow, and consultation with orthopedics regarding exposed hardware. Patient will be treated with initiation of oral antibiotics, Keflex, per prophylaxis.  She will also need a laceration repair.. Ordered for x-rays of the left elbow to evaluate her symptoms. Differential diagnoses include but not limited to: Skin tears, elbow laceration, with or without underlying fracture.    3:36 PM I spoke with Dr. Wbeb, orthopedics, and we discussed the abrasion to the hardware itself.  Since the wound is not grossly contaminated, we  agreed to irrigate the wound and close it primarily and have the patient followed up for reevaluation.  She will receive a gram of oral Keflex, starting 5 days 1 g twice daily.  X-ray results pending.    Laceration Repair Procedure Note    Indication: Laceration    Procedure: The patient was placed in the appropriate position and anesthesia around the laceration was obtained using 30 minutes of topical L ET, than the wound irrigation, then injection with 2% lidocaine with epinephrine. The area was then irrigated with normal saline.  Small please of dangling subcutaneous tissue at the olecranon was debrided with 1 cut from scissors.  The laceration was closed with 6-0 Ethilon using interrupted sutures and steri strips.  4 simple interrupted sutures were placed over and just distal to the olecranon, then a shallow section was closed with Steri-Strips, slightly deeper more distal section was closed with 1 additional suture, then further distally, additional closures were made with Steri-Strips.  There were no additional lacerations requiring repair. The wound area was then dressed with a bandage.      Total repaired wound length: 10 cm.     Other Items: Suture count: 5    The patient tolerated the procedure well.    Complications: None    DISPOSITION AND DISCUSSIONS  I have discussed management of the patient with the following physicians and ROMARIO's: Orthopedics, as above, she will follow-up with them.      Escalation of care considered, and ultimately not performed: Laboratory analysis.  Considered, but ultimately unnecessary, as this was very clearly a mechanical fall with no prodrome of illness, and the patient appears well.      Decision tools and prescription drugs considered including, but not limited to: Antibiotics were prescribed for prophylaxis at the recommendation of orthopedics. .     The patient will return for new or worsening symptoms and is stable at the time of discharge.    The patient is referred to  a primary physician for blood pressure management, diabetic screening, and for all other preventative health concerns.      DISPOSITION:  Patient will be discharged home in stable condition.    FOLLOW UP:  Jon Velazco M.D.  [address] [place] [id number] [phone]    Schedule an appointment as soon as possible for a visit       [facility], Emergency Dept  [address]  [place] [id number] [phone]  In 10 days  For suture removal      OUTPATIENT MEDICATIONS:  New Prescriptions    CEPHALEXIN (KEFLEX) 500 MG CAP    Take 2 Capsules by mouth 2 times a day for 9 days.         FINAL DIAGNOSIS  1. Laceration of left elbow, initial encounter    2. Skin tear of left elbow without complication, initial encounter          Baltazar WHEATLEY M.D. (Scribe), am scribing for, and in the presence of, Baltazar Cuevas M.D..    Electronically signed by: Baltazar Cuevas M.D. (Scribe), 5/23/2024    IBaltazar M.D. personally performed the services described in this documentation, as scribed by Baltazar Cuevas M.D. in my presence, and it is both accurate and complete.

## 2024-05-23 NOTE — DISCHARGE INSTRUCTIONS
Your next dose of antibiotics is due either tonight, or tomorrow before lunch. Finish this prescription though it is directed to prevent infection.    Leave our dressing in place for 48 hours.  You can then take it off to have baths or showers, but do not scrub the affected area directly, and after showers or baths, keep it covered for the next week.  Use the contact information provided here to schedule follow-up with orthopedics.  These of the same people who have done your surgeries.    Your 5 stitches should be removed in 7 to 10 days.  That can be done here or by orthopedics.

## 2024-05-24 ENCOUNTER — PHARMACY VISIT (OUTPATIENT)
Dept: PHARMACY | Facility: MEDICAL CENTER | Age: 89
End: 2024-05-24
Payer: COMMERCIAL

## 2024-05-24 PROCEDURE — RXMED WILLOW AMBULATORY MEDICATION CHARGE: Performed by: EMERGENCY MEDICINE

## 2024-05-24 NOTE — DISCHARGE PLANNING
note:  RN asked for transportation assistance.   CM called [facility] and confirmed with Debra that pt is their resident.  CM called [facility].   Reservation #[id number]  Cost $191.58  Time is at 630 pm.  Confirmed with RN that pt is not on O2.   ([facility] transport not available)

## 2024-05-24 NOTE — ED NOTES
Pt off unit with [facility] transport back home. All belongings and paperwork sent with patient. Pt off unit without incident.

## 2024-05-24 NOTE — ED NOTES
Discharge instructions provided, questions answered. Non-emergency medical transport arranged for 1830.

## 2024-05-30 PROCEDURE — RXMED WILLOW AMBULATORY MEDICATION CHARGE: Performed by: INTERNAL MEDICINE

## 2024-05-30 PROCEDURE — RXMED WILLOW AMBULATORY MEDICATION CHARGE: Performed by: OTOLARYNGOLOGY

## 2024-05-30 PROCEDURE — RXMED WILLOW AMBULATORY MEDICATION CHARGE: Performed by: STUDENT IN AN ORGANIZED HEALTH CARE EDUCATION/TRAINING PROGRAM

## 2024-05-31 ENCOUNTER — OFFICE VISIT (OUTPATIENT)
Dept: URGENT CARE | Facility: PHYSICIAN GROUP | Age: 89
End: 2024-05-31
Payer: MEDICARE

## 2024-05-31 VITALS
HEART RATE: 61 BPM | BODY MASS INDEX: 20.09 KG/M2 | SYSTOLIC BLOOD PRESSURE: 124 MMHG | WEIGHT: 125 LBS | RESPIRATION RATE: 18 BRPM | HEIGHT: 66 IN | TEMPERATURE: 98.3 F | DIASTOLIC BLOOD PRESSURE: 70 MMHG | OXYGEN SATURATION: 97 %

## 2024-05-31 DIAGNOSIS — T81.30XA WOUND DEHISCENCE: ICD-10-CM

## 2024-05-31 DIAGNOSIS — L08.9 WOUND INFECTION: ICD-10-CM

## 2024-05-31 DIAGNOSIS — T14.8XXA WOUND INFECTION: ICD-10-CM

## 2024-05-31 PROCEDURE — RXMED WILLOW AMBULATORY MEDICATION CHARGE

## 2024-05-31 RX ORDER — DOXYCYCLINE HYCLATE 100 MG
100 TABLET ORAL 2 TIMES DAILY
Qty: 14 TABLET | Refills: 0 | Status: SHIPPED | OUTPATIENT
Start: 2024-05-31 | End: 2024-06-07

## 2024-05-31 ASSESSMENT — FIBROSIS 4 INDEX: FIB4 SCORE: 1.27

## 2024-05-31 ASSESSMENT — ENCOUNTER SYMPTOMS: FEVER: 0

## 2024-05-31 NOTE — PROGRESS NOTES
Subjective:   Elizabeth Celis is a 88 y.o. female who presents for Wound Check (X 1 week. Lower right leg appears black, open wound. Another open wound on left arm, states had stiches put in but unsure how many. )      HPI: This is an 88-year-old female who presents today for wound check.  The patient was seen and evaluated in the emergency department after having a ground-level fall and suffering a laceration to her left elbow.  The patient had an x-ray of her left elbow which was negative for any acute fracture.  The laceration did expose some underlying orthopedic hardware.  The laceration was closed by ED physician.  The patient was treated empirically with course of cephalexin.  She reports that she has been taking the antibiotic as prescribed.  She reports that she bumped her left elbow and reports some dehiscence of the wound.  She denies any fevers.  The patient was referred to Ortho.  She has not been evaluated by Ortho.    Review of Systems   Constitutional:  Negative for fever.       Medications:    Current Outpatient Medications on File Prior to Visit   Medication Sig Dispense Refill    cephALEXin (KEFLEX) 500 MG Cap Take 2 Capsules by mouth 2 times a day for 9 days. 36 Capsule 0    ipratropium (ATROVENT) 0.06 % Solution Instill 2 sprays into each nostril twice daily as needed for runny nose 15 mL 4    hydroxychloroquine (PLAQUENIL) 200 MG Tab TAKE ONE TABLET BY MOUTH ONE TIME DAILY 100 Tablet 3    furosemide (LASIX) 40 MG Tab Take 0.5 Tablets by mouth 1 time a day as needed (lower leg swelling). 50 Tablet 3    levothyroxine (SYNTHROID) 112 MCG Tab Take 1 Tablet by mouth every morning on an empty stomach. 100 Tablet 3    lidocaine (LIDODERM) 5 % Patch Place 1 patch on the skin every 24 hours for 15 days. Not CVRD. 15 Patch 1    gabapentin (NEURONTIN) 300 MG Cap Take 1 capsule by mouth 3 times a day 300 Capsule 3    ipratropium (ATROVENT) 0.06 % Solution Instill 2 sprays into each nostril twice  daily as needed for runny nose 15 mL 1    traZODone (DESYREL) 50 MG Tab Take 0.5 tablets by mouth at bedtime. 100 Tablet 3    denosumab (PROLIA) 60 MG/ML Solution Prefilled Syringe injection Inject 1 mL under the skin every 6 months. 1 mL 1    spironolactone (ALDACTONE) 25 MG Tab Take 1 Tablet by mouth every day. 100 Tablet 3    cycloSPORINE (RESTASIS) 0.05 % ophthalmic emulsion Administer 1 Drop into both eyes 2 times a day. Pharmacist - please dispense 180 single use vials (72 ml)  Indications: Drying and Inflammation of Cornea and Conjunctiva of Eyes 72 mL 3    metoprolol SR (TOPROL XL) 25 MG TABLET SR 24 HR Take 0.5 tablets by mouth every day. 90 Tablet 3    ketoconazole (NIZORAL) 2 % shampoo Apply to scalp and ears 2-3 x weekly while showering, allow to sit for 5 minutes prior to rinsing 120 mL 8    mometasone (ELOCON) 0.1 % lotion Apply topically to affected areas on scalp once a day up to four days a week as needed for itch. 60 mL 3    triamcinolone acetonide (KENALOG) 0.1 % Cream Apply topically to affected areas on neck, arms, and legs twice a day up to 4 days a week as needed for itch. Do not use on the face or groin 454 g 5    doxycycline (VIBRAMYCIN) 100 MG Tab Take 1 Tablet by mouth 2 times a day. 14 Tablet 0    nitrofurantoin (MACROBID) 100 MG Cap Take 1 Capsule by mouth 2 times a day. 14 Capsule 0    aspirin (ASA) 81 MG Chew Tab chewable tablet Chew 81 mg every day.      metaxalone (SKELAXIN) 800 MG Tab Take 0.5 Tablets by mouth 3 times a day. 90 Tablet 1    rosuvastatin (CRESTOR) 10 MG Tab Take 1 Tablet by mouth every day. Indications: High Amount of Fats in the Blood 90 Tablet 1    acetaminophen (TYLENOL) 500 MG Tab Take 500-1,000 mg by mouth every 6 hours as needed. Indications: Pain      Probiotic Product (PROBIOTIC-10 PO) Take 1 Tablet by mouth every day. Indications: nutritional support      ipratropium (ATROVENT) 0.06 % Solution Administer 1 Spray into affected nostril(S) 2 times a day. 15 mL 2     COENZYME Q10 PO Take 1 Capsule by mouth every day. Indications: High Blood Pressure Disorder 100 Capsule 3    Multiple Vitamins-Minerals (PRESERVISION AREDS 2 PO) Take 1 Tablet by mouth every day. Indications: nutritional support- macular degeneration      CALCIUM CITRATE PO Take 1 Tablet by mouth every evening. Indications: nutritional support      therapeutic multivitamin-minerals (THERAGRAN-M) Tab Take 1 Tablet by mouth every day. Indications: nutritional support      denosumab (PROLIA) 60 MG/ML Solution Inject 1 mL under the skin every 6 months. Indications: Decreased Bone Mineral Density, Osteoporosis      NON SPECIFIED Requesting physical therapy for Korina Sharaett at the [facility] assisted living.  Requesting muscle tension relief, cervical range of motion exercises. (Patient not taking: Reported on 5/31/2024) 12 Each 0    NON SPECIFIED Requesting physical therapy for neck range of motion exercises muscle tension relief in the neck area. (Patient not taking: Reported on 5/31/2024) 12 Each 1    cholestyramine (QUESTRAN) 4 g packet Mix 4 g in liquid by mouth as needed (supplement). 100 Each 3    FLUoxetine (PROZAC) 10 MG Cap Take 2 capsules by mouth every morning. 100 Capsule 3     No current facility-administered medications on file prior to visit.        Allergies:   Azithromycin, Cefuroxime, Ciprofloxacin, Clindamycin, Daptomycin, Erythromycin, Rifampin, Codeine, Flagyl [metronidazole], Macrodantin [nitrofurantoin macrocrystal], Morphine, Sulfa drugs, Conjugated estrogens, Nitrofurantoin, and Premarin    Problem List:   Patient Active Problem List   Diagnosis    Seronegative rheumatoid arthritis (HCC)    History of sciatica    History of Clostridium difficile    Depression    Raynaud disease    CKD (chronic kidney disease) stage 3, GFR 30-59 ml/min    History of falling    History of anemia    Hypothyroidism    Osteoporosis    History of recurrent UTIs    Ex-cigarette smoker    Spinal stenosis of  lumbar region with neurogenic claudication    Anemia    Age-related osteoporosis with current pathological fracture    History of compression fracture of spine, Jan. 2020 (T12 and L1)    History of DVT (deep vein thrombosis)    Atherosclerosis of both carotid arteries    BMI 21.0-21.9, adult    Hypercholesterolemia    Insomnia    DNR (do not resuscitate)    Chronic diarrhea    PVC (premature ventricular contraction)    Multilevel degenerative disc disease    Osteoarthritis of multiple joints    Long-term use of hydroxychloroquine    Decreased hearing of both ears    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 choroidal neovascularization (HCC)    Primary osteoarthritis of left hip    Status post left hip replacement    Acute blood loss anemia    Closed fracture of greater trochanter of left femur (HCC)    Hip fracture due to osteoporosis, initial encounter ([facility])    SVT (supraventricular tachycardia) ([facility])    Body mass index (BMI) 19.9 or less, adult    Aortic atherosclerosis ([facility])    Cerebral atrophy ([facility])    Recurrent major depressive disorder, in full remission ([facility])    PVD (peripheral vascular disease) ([facility])    Low back pain    Status post amputation of finger, left        Surgical History:  Past Surgical History:   Procedure Laterality Date    WI TOTAL HIP ARTHROPLASTY Left 2/21/2023    Procedure: LEFT TOTAL HIP ARTHROPLASTY, AND REMOVAL OF SCREWS FROM TOP OF FEMUR PLATE;  Surgeon: Rom Chinchilla M.D.;  Location: SURGERY [facility];  Service: Orthopedics    HARDWARE REMOVAL ORTHO Left 2/21/2023    Procedure: REMOVAL, HARDWARE;  Surgeon: Rom Chinchilla M.D.;  Location: [facility];  Service: Orthopedics    PB DEGROOT W/O FACETEC FORAMOT/DSKC 1/2 VRT SEG, TH* N/A 1/16/2020    Procedure: LAMINECTOMY, SPINE, THORACIC;  Surgeon: Sang Nelson M.D.;  Location: [facility];  Service: Neurosurgery    THORACIC FUSION O-ARM N/A 1/16/2020    Procedure: FUSION, SPINE,  THORACIC, POSTERIOR APPROACH - T9-L3;  Surgeon: Sang Nelson M.D.;  Location: SURGERY [facility];  Service: Neurosurgery    PB RECONSTR TOTAL SHOULDER IMPLANT Left 2019    Procedure: ARTHROPLASTY, SHOULDER, TOTAL - REVERSE;  Surgeon: Dainella Camargo M.D.;  Location: SURGERY [facility];  Service: Orthopedics    SHOULDER ARTHROPLASTY TOTAL Right 2018    Procedure: SHOULDER ARTHROPLASTY TOTAL - REVERSE;  Surgeon: Daniella Camargo M.D.;  Location: SURGERY [facility];  Service: Orthopedics    COLONOSCOPY - ENDO N/A 3/7/2016    Procedure: COLONOSCOPY - ENDO;  Surgeon: Estiven Ayers M.D.;  Location: ENDOSCOPY [facility];  Service:     FECAL TRANSPLANT N/A 3/7/2016    Procedure: FECAL TRANSPLANT;  Surgeon: Estiven Ayers M.D.;  Location: ENDOSCOPY [facility];  Service:     OTHER ORTHOPEDIC SURGERY Left     Left Elbow fx repair    BLEPHAROPLASTY  3/31/2011    Performed by ORACIO DIAZ at SURGERY [facility]    FOOT SURGERY Right 2010    OTHER ORTHOPEDIC SURGERY Left 2006    finger- removal of digit Left middle finger    CHOLECYSTECTOMY  2000    HYSTERECTOMY LAPAROSCOPY  2000    HYSTERECTOMY RADICAL  1985    OTHER Bilateral ,     feet- repair torn tendons    OTHER ORTHOPEDIC SURGERY Left 1970s    femur fx       Past Social Hx:   Social History     Tobacco Use    Smoking status: Former     Current packs/day: 0.00     Average packs/day: 1 pack/day for 20.0 years (20.0 ttl pk-yrs)     Types: Cigarettes     Start date: 1965     Quit date: 1985     Years since quittin.1    Smokeless tobacco: Never   Vaping Use    Vaping status: Never Used   Substance Use Topics    Alcohol use: Not Currently     Alcohol/week: 1.2 oz     Types: 2 Glasses of wine per week     Comment: 1 per day    Drug use: Never          Problem list, medications, and allergies reviewed by myself today in Epic.     Objective:     /70 (BP Location: Left arm, Patient Position:  "Sitting, BP Cuff Size: Adult)   Pulse 61   Temp 36.8 °C (98.3 °F) (Temporal)   Resp 18   Ht 1.664 m (5' 5.5\")   Wt 56.7 kg (125 lb)   SpO2 (!) 87%   BMI 20.48 kg/m²     Physical Exam  Vitals and nursing note reviewed.   Constitutional:       General: She is not in acute distress.     Appearance: She is normal weight. She is not ill-appearing, toxic-appearing or diaphoretic.   Cardiovascular:      Rate and Rhythm: Normal rate and regular rhythm.      Pulses: Normal pulses.      Heart sounds: Normal heart sounds. No murmur heard.     No friction rub. No gallop.   Pulmonary:      Effort: Pulmonary effort is normal. No respiratory distress.      Breath sounds: Normal breath sounds. No stridor. No wheezing, rhonchi or rales.   Chest:      Chest wall: No tenderness.   Skin:     General: Skin is warm and dry.      Capillary Refill: Capillary refill takes less than 2 seconds.          Neurological:      Mental Status: She is alert.          Assessment/Plan:     Diagnosis and associated orders:   1. Wound dehiscence  doxycycline (VIBRAMYCIN) 100 MG Tab    Referral to Wound Clinic    Referral to Orthopedics      2. Wound infection  Referral to Wound Clinic    Referral to Orthopedics             Comments/MDM:   Pt is clinically stable at today's acute urgent care visit.  No acute distress noted. Appropriate for outpatient management at this time.     Acute problem.  The patient presents today for wound check.  She is noted to have some dehiscence of the laceration to her left forearm that was previously closed in the emergency department.  The laceration does have some mild surrounding erythema.  She is currently on Keflex.  The patient's vital signs are stable in clinic, she is afebrile.  I will prescribe doxycycline to be taken in conjunction with current Keflex.  An urgent referral was placed to wound care and Ortho.  Patient was given strict ER precautions for any worsening in her condition.         Discussed DDx, " management options (risks,benefits, and alternatives to planned treatment), natural progression and supportive care.  Expressed understanding and the treatment plan was agreed upon. Questions were encouraged and answered   Return to urgent care prn if new or worsening sx or if there is no improvement in condition prn.    Educated in Red flags and indications to immediately call 911 or present to the Emergency Department.   Advised the patient to follow-up with the primary care physician for recheck, reevaluation, and consideration of further management.    I personally reviewed prior external notes and test results pertinent to today's visit.  I have independently reviewed and interpreted all diagnostics ordered during this urgent care acute visit.         Please note that this dictation was created using voice recognition software. I have made a reasonable attempt to correct obvious errors, but I expect that there are errors of grammar and possibly content that I did not discover before finalizing the note.    This note was electronically signed by DARIELA Toro

## 2024-06-03 ENCOUNTER — PHARMACY VISIT (OUTPATIENT)
Dept: PHARMACY | Facility: MEDICAL CENTER | Age: 89
End: 2024-06-03
Payer: COMMERCIAL

## 2024-06-10 ENCOUNTER — HOSPITAL ENCOUNTER (OUTPATIENT)
Dept: LAB | Facility: MEDICAL CENTER | Age: 89
End: 2024-06-10
Attending: INTERNAL MEDICINE
Payer: MEDICARE

## 2024-06-10 DIAGNOSIS — Z13.220 SCREENING CHOLESTEROL LEVEL: ICD-10-CM

## 2024-06-10 DIAGNOSIS — E55.9 VITAMIN D DEFICIENCY: ICD-10-CM

## 2024-06-10 DIAGNOSIS — E03.9 ACQUIRED HYPOTHYROIDISM: ICD-10-CM

## 2024-06-10 DIAGNOSIS — R53.83 OTHER FATIGUE: ICD-10-CM

## 2024-06-10 DIAGNOSIS — D64.9 LOW HEMOGLOBIN: ICD-10-CM

## 2024-06-10 LAB
25(OH)D3 SERPL-MCNC: 55 NG/ML (ref 30–100)
ALBUMIN SERPL BCP-MCNC: 4.2 G/DL (ref 3.2–4.9)
ALBUMIN/GLOB SERPL: 1.4 G/DL
ALP SERPL-CCNC: 252 U/L (ref 30–99)
ALT SERPL-CCNC: 27 U/L (ref 2–50)
ANION GAP SERPL CALC-SCNC: 17 MMOL/L (ref 7–16)
AST SERPL-CCNC: 35 U/L (ref 12–45)
BASOPHILS # BLD AUTO: 0.9 % (ref 0–1.8)
BASOPHILS # BLD: 0.04 K/UL (ref 0–0.12)
BILIRUB SERPL-MCNC: 0.3 MG/DL (ref 0.1–1.5)
BUN SERPL-MCNC: 48 MG/DL (ref 8–22)
CALCIUM ALBUM COR SERPL-MCNC: 9.1 MG/DL (ref 8.5–10.5)
CALCIUM SERPL-MCNC: 9.3 MG/DL (ref 8.5–10.5)
CHLORIDE SERPL-SCNC: 99 MMOL/L (ref 96–112)
CHOLEST SERPL-MCNC: 181 MG/DL (ref 100–199)
CO2 SERPL-SCNC: 16 MMOL/L (ref 20–33)
CREAT SERPL-MCNC: 1.17 MG/DL (ref 0.5–1.4)
EOSINOPHIL # BLD AUTO: 0.14 K/UL (ref 0–0.51)
EOSINOPHIL NFR BLD: 3.1 % (ref 0–6.9)
ERYTHROCYTE [DISTWIDTH] IN BLOOD BY AUTOMATED COUNT: 50 FL (ref 35.9–50)
FERRITIN SERPL-MCNC: 145 NG/ML (ref 10–291)
FOLATE SERPL-MCNC: 37.1 NG/ML
GFR SERPLBLD CREATININE-BSD FMLA CKD-EPI: 45 ML/MIN/1.73 M 2
GLOBULIN SER CALC-MCNC: 3.1 G/DL (ref 1.9–3.5)
GLUCOSE SERPL-MCNC: 77 MG/DL (ref 65–99)
HCT VFR BLD AUTO: 37 % (ref 37–47)
HDLC SERPL-MCNC: 42 MG/DL
HGB BLD-MCNC: 12.2 G/DL (ref 12–16)
IMM GRANULOCYTES # BLD AUTO: 0.03 K/UL (ref 0–0.11)
IMM GRANULOCYTES NFR BLD AUTO: 0.7 % (ref 0–0.9)
IRON SATN MFR SERPL: 26 % (ref 15–55)
IRON SERPL-MCNC: 64 UG/DL (ref 40–170)
LDLC SERPL CALC-MCNC: 112 MG/DL
LYMPHOCYTES # BLD AUTO: 1.58 K/UL (ref 1–4.8)
LYMPHOCYTES NFR BLD: 34.4 % (ref 22–41)
MCH RBC QN AUTO: 32.6 PG (ref 27–33)
MCHC RBC AUTO-ENTMCNC: 33 G/DL (ref 32.2–35.5)
MCV RBC AUTO: 98.9 FL (ref 81.4–97.8)
MONOCYTES # BLD AUTO: 0.21 K/UL (ref 0–0.85)
MONOCYTES NFR BLD AUTO: 4.6 % (ref 0–13.4)
NEUTROPHILS # BLD AUTO: 2.59 K/UL (ref 1.82–7.42)
NEUTROPHILS NFR BLD: 56.3 % (ref 44–72)
NRBC # BLD AUTO: 0 K/UL
NRBC BLD-RTO: 0 /100 WBC (ref 0–0.2)
PLATELET # BLD AUTO: 402 K/UL (ref 164–446)
PMV BLD AUTO: 9.1 FL (ref 9–12.9)
POTASSIUM SERPL-SCNC: 4.3 MMOL/L (ref 3.6–5.5)
PROT SERPL-MCNC: 7.3 G/DL (ref 6–8.2)
RBC # BLD AUTO: 3.74 M/UL (ref 4.2–5.4)
SODIUM SERPL-SCNC: 132 MMOL/L (ref 135–145)
TIBC SERPL-MCNC: 247 UG/DL (ref 250–450)
TRIGL SERPL-MCNC: 137 MG/DL (ref 0–149)
TSH SERPL DL<=0.005 MIU/L-ACNC: 4.33 UIU/ML (ref 0.38–5.33)
UIBC SERPL-MCNC: 183 UG/DL (ref 110–370)
VIT B12 SERPL-MCNC: 1166 PG/ML (ref 211–911)
WBC # BLD AUTO: 4.6 K/UL (ref 4.8–10.8)

## 2024-06-10 PROCEDURE — 82607 VITAMIN B-12: CPT

## 2024-06-10 PROCEDURE — 36415 COLL VENOUS BLD VENIPUNCTURE: CPT

## 2024-06-10 PROCEDURE — 82746 ASSAY OF FOLIC ACID SERUM: CPT

## 2024-06-10 PROCEDURE — 82728 ASSAY OF FERRITIN: CPT

## 2024-06-10 PROCEDURE — 83550 IRON BINDING TEST: CPT

## 2024-06-10 PROCEDURE — 82306 VITAMIN D 25 HYDROXY: CPT

## 2024-06-10 PROCEDURE — 80061 LIPID PANEL: CPT

## 2024-06-10 PROCEDURE — 83540 ASSAY OF IRON: CPT

## 2024-06-10 PROCEDURE — 85025 COMPLETE CBC W/AUTO DIFF WBC: CPT

## 2024-06-10 PROCEDURE — 80053 COMPREHEN METABOLIC PANEL: CPT

## 2024-06-10 PROCEDURE — 84443 ASSAY THYROID STIM HORMONE: CPT

## 2024-06-12 ENCOUNTER — APPOINTMENT (OUTPATIENT)
Dept: MEDICAL GROUP | Facility: PHYSICIAN GROUP | Age: 89
End: 2024-06-12
Payer: MEDICARE

## 2024-07-08 PROCEDURE — RXMED WILLOW AMBULATORY MEDICATION CHARGE: Performed by: INTERNAL MEDICINE

## 2024-07-09 ENCOUNTER — PHARMACY VISIT (OUTPATIENT)
Dept: PHARMACY | Facility: MEDICAL CENTER | Age: 89
End: 2024-07-09
Payer: COMMERCIAL

## 2024-07-11 ENCOUNTER — OUTPATIENT INFUSION SERVICES (OUTPATIENT)
Dept: ONCOLOGY | Facility: MEDICAL CENTER | Age: 89
End: 2024-07-11
Attending: INTERNAL MEDICINE
Payer: MEDICARE

## 2024-07-11 ENCOUNTER — HOSPITAL ENCOUNTER (OUTPATIENT)
Dept: LAB | Facility: MEDICAL CENTER | Age: 89
End: 2024-07-11
Attending: INTERNAL MEDICINE
Payer: MEDICARE

## 2024-07-11 VITALS
DIASTOLIC BLOOD PRESSURE: 51 MMHG | RESPIRATION RATE: 18 BRPM | OXYGEN SATURATION: 97 % | HEART RATE: 67 BPM | TEMPERATURE: 97 F | WEIGHT: 128.53 LBS | HEIGHT: 64 IN | BODY MASS INDEX: 21.94 KG/M2 | SYSTOLIC BLOOD PRESSURE: 142 MMHG

## 2024-07-11 DIAGNOSIS — M80.00XD AGE-RELATED OSTEOPOROSIS WITH CURRENT PATHOLOGICAL FRACTURE WITH ROUTINE HEALING, SUBSEQUENT ENCOUNTER: ICD-10-CM

## 2024-07-11 DIAGNOSIS — Z87.81 HISTORY OF COMPRESSION FRACTURE OF SPINE: ICD-10-CM

## 2024-07-11 DIAGNOSIS — N39.0 URINARY TRACT INFECTION WITHOUT HEMATURIA, SITE UNSPECIFIED: ICD-10-CM

## 2024-07-11 LAB
APPEARANCE UR: ABNORMAL
BACTERIA #/AREA URNS HPF: ABNORMAL /HPF
BILIRUB UR QL STRIP.AUTO: NEGATIVE
CA-I BLD ISE-SCNC: 1.2 MMOL/L (ref 1.1–1.3)
COLOR UR: YELLOW
CREAT BLD-MCNC: 1 MG/DL (ref 0.5–1.4)
EPI CELLS #/AREA URNS HPF: ABNORMAL /HPF
GLUCOSE UR STRIP.AUTO-MCNC: NEGATIVE MG/DL
HYALINE CASTS #/AREA URNS LPF: ABNORMAL /LPF
KETONES UR STRIP.AUTO-MCNC: ABNORMAL MG/DL
LEUKOCYTE ESTERASE UR QL STRIP.AUTO: ABNORMAL
MICRO URNS: ABNORMAL
NITRITE UR QL STRIP.AUTO: NEGATIVE
PH UR STRIP.AUTO: 6 [PH] (ref 5–8)
PROT UR QL STRIP: NEGATIVE MG/DL
RBC # URNS HPF: ABNORMAL /HPF
RBC UR QL AUTO: ABNORMAL
SP GR UR STRIP.AUTO: >=1.03
UROBILINOGEN UR STRIP.AUTO-MCNC: 0.2 MG/DL
WBC #/AREA URNS HPF: ABNORMAL /HPF

## 2024-07-11 PROCEDURE — 96372 THER/PROPH/DIAG INJ SC/IM: CPT

## 2024-07-11 PROCEDURE — 82330 ASSAY OF CALCIUM: CPT

## 2024-07-11 PROCEDURE — 82565 ASSAY OF CREATININE: CPT

## 2024-07-11 PROCEDURE — 81001 URINALYSIS AUTO W/SCOPE: CPT

## 2024-07-11 PROCEDURE — 700111 HCHG RX REV CODE 636 W/ 250 OVERRIDE (IP): Mod: JZ,JG | Performed by: INTERNAL MEDICINE

## 2024-07-11 PROCEDURE — 87186 SC STD MICRODIL/AGAR DIL: CPT

## 2024-07-11 PROCEDURE — 36415 COLL VENOUS BLD VENIPUNCTURE: CPT

## 2024-07-11 PROCEDURE — 87077 CULTURE AEROBIC IDENTIFY: CPT

## 2024-07-11 PROCEDURE — 87086 URINE CULTURE/COLONY COUNT: CPT

## 2024-07-11 RX ADMIN — DENOSUMAB 60 MG: 60 INJECTION SUBCUTANEOUS at 10:37

## 2024-07-11 ASSESSMENT — FIBROSIS 4 INDEX: FIB4 SCORE: 1.47

## 2024-07-19 ENCOUNTER — OFFICE VISIT (OUTPATIENT)
Dept: MEDICAL GROUP | Facility: PHYSICIAN GROUP | Age: 89
End: 2024-07-19
Payer: MEDICARE

## 2024-07-19 VITALS
SYSTOLIC BLOOD PRESSURE: 120 MMHG | DIASTOLIC BLOOD PRESSURE: 68 MMHG | OXYGEN SATURATION: 97 % | WEIGHT: 130 LBS | TEMPERATURE: 97.4 F | BODY MASS INDEX: 23.04 KG/M2 | HEART RATE: 68 BPM | HEIGHT: 63 IN | RESPIRATION RATE: 16 BRPM

## 2024-07-19 DIAGNOSIS — R39.89 SUSPECTED UTI: ICD-10-CM

## 2024-07-19 LAB
APPEARANCE UR: CLEAR
BILIRUB UR STRIP-MCNC: NEGATIVE MG/DL
COLOR UR AUTO: YELLOW
GLUCOSE UR STRIP.AUTO-MCNC: NEGATIVE MG/DL
KETONES UR STRIP.AUTO-MCNC: NEGATIVE MG/DL
LEUKOCYTE ESTERASE UR QL STRIP.AUTO: ABNORMAL
NITRITE UR QL STRIP.AUTO: NEGATIVE
PH UR STRIP.AUTO: 7 [PH] (ref 5–8)
PROT UR QL STRIP: ABNORMAL MG/DL
RBC UR QL AUTO: NEGATIVE
SP GR UR STRIP.AUTO: 1.02
UROBILINOGEN UR STRIP-MCNC: 0.2 MG/DL

## 2024-07-19 PROCEDURE — 81002 URINALYSIS NONAUTO W/O SCOPE: CPT | Performed by: INTERNAL MEDICINE

## 2024-07-19 PROCEDURE — 99213 OFFICE O/P EST LOW 20 MIN: CPT | Performed by: INTERNAL MEDICINE

## 2024-07-19 PROCEDURE — 3078F DIAST BP <80 MM HG: CPT | Performed by: INTERNAL MEDICINE

## 2024-07-19 PROCEDURE — 3074F SYST BP LT 130 MM HG: CPT | Performed by: INTERNAL MEDICINE

## 2024-07-19 ASSESSMENT — FIBROSIS 4 INDEX: FIB4 SCORE: 1.47

## 2024-07-22 ENCOUNTER — TELEPHONE (OUTPATIENT)
Dept: HEALTH INFORMATION MANAGEMENT | Facility: OTHER | Age: 89
End: 2024-07-22
Payer: MEDICARE

## 2024-07-29 PROCEDURE — RXMED WILLOW AMBULATORY MEDICATION CHARGE: Performed by: INTERNAL MEDICINE

## 2024-07-30 ENCOUNTER — PHARMACY VISIT (OUTPATIENT)
Dept: PHARMACY | Facility: MEDICAL CENTER | Age: 89
End: 2024-07-30
Payer: COMMERCIAL

## 2024-07-30 ENCOUNTER — TELEPHONE (OUTPATIENT)
Dept: HEALTH INFORMATION MANAGEMENT | Facility: OTHER | Age: 89
End: 2024-07-30
Payer: MEDICARE

## 2024-07-30 ASSESSMENT — ENCOUNTER SYMPTOMS: GENERAL WELL-BEING: GOOD

## 2024-07-30 ASSESSMENT — PATIENT HEALTH QUESTIONNAIRE - PHQ9
2. FEELING DOWN, DEPRESSED, IRRITABLE, OR HOPELESS: NOT AT ALL
1. LITTLE INTEREST OR PLEASURE IN DOING THINGS: NOT AT ALL

## 2024-07-30 ASSESSMENT — ACTIVITIES OF DAILY LIVING (ADL): BATHING_REQUIRES_ASSISTANCE: 0

## 2024-07-31 ENCOUNTER — APPOINTMENT (OUTPATIENT)
Dept: FAMILY PLANNING/WOMEN'S HEALTH CLINIC | Facility: PHYSICIAN GROUP | Age: 89
End: 2024-07-31
Attending: FAMILY MEDICINE
Payer: MEDICARE

## 2024-07-31 ASSESSMENT — PATIENT HEALTH QUESTIONNAIRE - PHQ9: CLINICAL INTERPRETATION OF PHQ2 SCORE: 0

## 2024-08-06 PROBLEM — E26.1 SECONDARY ALDOSTERONISM (HCC): Status: ACTIVE | Noted: 2024-08-06

## 2024-08-06 NOTE — ASSESSMENT & PLAN NOTE
Chronic, stable. Continues on aspirin and statin therapy daily. Denies chest pain and claudication. Followed by vascular medicine, Dr. Sawyer.

## 2024-08-06 NOTE — ASSESSMENT & PLAN NOTE
Chronic, ongoing. Currently using cyclosporine twice daily. Reports that she gets injections every six weeks with progressive changes in vision. Followed by ophthalmology, Dr. Blackman, and  [facility].

## 2024-08-06 NOTE — ASSESSMENT & PLAN NOTE
Chronic, stable. Currently taking hydroxychloroquine 200 mg daily. Reports that pain is well-managed with is acetaminophen and voltaren gel. Followed by primary care provider.

## 2024-08-06 NOTE — ASSESSMENT & PLAN NOTE
Chronic, stable. Currently taking trazodone 25 mg nightly. Reports an average of 6-8 hours of sleep per night. Denies excessive sedation and amnesia.     Yes

## 2024-08-06 NOTE — ASSESSMENT & PLAN NOTE
Chronic, stable. Reviewed MRI of the brain from 2019. Denies visual disturbances and changes in mentation.

## 2024-08-06 NOTE — ASSESSMENT & PLAN NOTE
Chronic, stable. Reviewed lipid profile from June 2024. Currently taking rosuvastatin 10 mg daily. Reports that she goes to the gym 3-5 times a week. Denies chest pain and claudication.

## 2024-08-06 NOTE — ASSESSMENT & PLAN NOTE
Chronic, stable. Currently taking metoprolol SR 12.5 mg daily. Denies lightheadedness, palpitations, shortness of breath. Followed by cardiology, Dr. Hernandez.     [___] : [unfilled] [LVEF ___%] : LVEF [unfilled]% [___] : [unfilled]

## 2024-08-06 NOTE — ASSESSMENT & PLAN NOTE
Chronic, stable. Currently taking levothyroxine 112 mcg daily as prescribed. Denies fatigue, palpitations, hair and skin changes, temperature intolerance, changes in bowel habits, and weight loss or weight gain.

## 2024-08-06 NOTE — ASSESSMENT & PLAN NOTE
Chronic, stable. Currently taking fluoxetine 20 mg daily. In-office depression screening is negative. Reports that mood is good. Denies SI/HI.

## 2024-08-06 NOTE — ASSESSMENT & PLAN NOTE
Chronic, stable. Reviewed ultrasound carotid doppler from December 2023. Continues on aspirin and statin therapy daily.

## 2024-08-06 NOTE — ASSESSMENT & PLAN NOTE
"Chronic, stable. Reviewed DEXA scan from December 2023: \"according to the World Health Organization classification, bone mineral density of this patient is osteoporotic in the distal left forearm and osteopenic in the proximal right femur.\" Denies recent falls or fractures.    "

## 2024-08-07 ENCOUNTER — OFFICE VISIT (OUTPATIENT)
Dept: FAMILY PLANNING/WOMEN'S HEALTH CLINIC | Facility: PHYSICIAN GROUP | Age: 89
End: 2024-08-07
Payer: MEDICARE

## 2024-08-07 VITALS
DIASTOLIC BLOOD PRESSURE: 48 MMHG | BODY MASS INDEX: 21.68 KG/M2 | SYSTOLIC BLOOD PRESSURE: 100 MMHG | WEIGHT: 127 LBS | HEIGHT: 64 IN

## 2024-08-07 DIAGNOSIS — I73.9 PVD (PERIPHERAL VASCULAR DISEASE) (HCC): ICD-10-CM

## 2024-08-07 DIAGNOSIS — M06.00 SERONEGATIVE RHEUMATOID ARTHRITIS (HCC): ICD-10-CM

## 2024-08-07 DIAGNOSIS — E26.1 SECONDARY ALDOSTERONISM (HCC): ICD-10-CM

## 2024-08-07 DIAGNOSIS — M80.00XD AGE-RELATED OSTEOPOROSIS WITH CURRENT PATHOLOGICAL FRACTURE WITH ROUTINE HEALING, SUBSEQUENT ENCOUNTER: ICD-10-CM

## 2024-08-07 DIAGNOSIS — I47.10 SVT (SUPRAVENTRICULAR TACHYCARDIA) (HCC): ICD-10-CM

## 2024-08-07 DIAGNOSIS — F33.42 RECURRENT MAJOR DEPRESSIVE DISORDER, IN FULL REMISSION (HCC): ICD-10-CM

## 2024-08-07 DIAGNOSIS — F51.01 PRIMARY INSOMNIA: ICD-10-CM

## 2024-08-07 DIAGNOSIS — E78.00 HYPERCHOLESTEROLEMIA: ICD-10-CM

## 2024-08-07 DIAGNOSIS — H35.3222 EXUDATIVE AGE-RELATED MACULAR DEGENERATION OF LEFT EYE WITH INACTIVE CHOROIDAL NEOVASCULARIZATION (HCC): ICD-10-CM

## 2024-08-07 DIAGNOSIS — G31.9 CEREBRAL ATROPHY (HCC): ICD-10-CM

## 2024-08-07 DIAGNOSIS — E03.8 OTHER SPECIFIED HYPOTHYROIDISM: ICD-10-CM

## 2024-08-07 DIAGNOSIS — I70.0 AORTIC ATHEROSCLEROSIS (HCC): ICD-10-CM

## 2024-08-07 PROBLEM — S72.112A CLOSED FRACTURE OF GREATER TROCHANTER OF LEFT FEMUR (HCC): Status: RESOLVED | Noted: 2023-03-11 | Resolved: 2024-08-07

## 2024-08-07 PROCEDURE — 1125F AMNT PAIN NOTED PAIN PRSNT: CPT

## 2024-08-07 PROCEDURE — G0439 PPPS, SUBSEQ VISIT: HCPCS

## 2024-08-07 PROCEDURE — 3074F SYST BP LT 130 MM HG: CPT

## 2024-08-07 PROCEDURE — 3078F DIAST BP <80 MM HG: CPT

## 2024-08-07 SDOH — ECONOMIC STABILITY: FOOD INSECURITY: WITHIN THE PAST 12 MONTHS, YOU WORRIED THAT YOUR FOOD WOULD RUN OUT BEFORE YOU GOT MONEY TO BUY MORE.: NEVER TRUE

## 2024-08-07 SDOH — ECONOMIC STABILITY: FOOD INSECURITY: WITHIN THE PAST 12 MONTHS, THE FOOD YOU BOUGHT JUST DIDN'T LAST AND YOU DIDN'T HAVE MONEY TO GET MORE.: NEVER TRUE

## 2024-08-07 SDOH — ECONOMIC STABILITY: INCOME INSECURITY: HOW HARD IS IT FOR YOU TO PAY FOR THE VERY BASICS LIKE FOOD, HOUSING, MEDICAL CARE, AND HEATING?: NOT HARD AT ALL

## 2024-08-07 ASSESSMENT — FIBROSIS 4 INDEX: FIB4 SCORE: 1.47

## 2024-08-07 ASSESSMENT — ACTIVITIES OF DAILY LIVING (ADL): BATHING_REQUIRES_ASSISTANCE: 0

## 2024-08-07 ASSESSMENT — ENCOUNTER SYMPTOMS: GENERAL WELL-BEING: GOOD

## 2024-08-07 ASSESSMENT — PATIENT HEALTH QUESTIONNAIRE - PHQ9: CLINICAL INTERPRETATION OF PHQ2 SCORE: 0

## 2024-08-07 ASSESSMENT — PAIN SCALES - GENERAL: PAINLEVEL: 2=MINIMAL-SLIGHT

## 2024-08-07 NOTE — PROGRESS NOTES
Comprehensive Health Assessment Program     Elizabeth Celis is a 88 y.o. here for her comprehensive health assessment.    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[facility]) 08/06/2024    Body mass index (BMI) 19.9 or less, adult 06/21/2023    Aortic atherosclerosis ([facility]) 06/21/2023    Cerebral atrophy ([facility]) 06/21/2023    Recurrent major depressive disorder, in full remission ([facility]) 06/21/2023    PVD (peripheral vascular disease) ([facility]) 06/21/2023    Low back pain 06/21/2023    Status post amputation of finger, left 06/21/2023    SVT (supraventricular tachycardia) ([facility]) 03/21/2023    Hip fracture due to osteoporosis, initial encounter ([facility]) 03/11/2023    Acute blood loss anemia 03/10/2023    Status post left hip replacement 02/21/2023    Primary osteoarthritis of left hip 02/01/2023    Decreased hearing of both ears 11/17/2022    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 choroidal neovascularization ([facility]) 11/17/2022    Multilevel degenerative disc disease 10/18/2022    Osteoarthritis of multiple joints 10/18/2022    Long-term use of hydroxychloroquine 10/18/2022    PVC (premature ventricular contraction) 06/03/2022    Chronic diarrhea 06/02/2022    DNR (do not resuscitate) 05/19/2022    Insomnia 04/01/2022    BMI 21.0-21.9, adult 03/21/2022    Hypercholesterolemia 03/21/2022    Atherosclerosis of both carotid arteries 01/24/2022    History of DVT (deep vein thrombosis) 12/01/2021    History of compression fracture of spine, Jan. 2020 (T12 and L1) 05/12/2021    Age-related osteoporosis with current pathological fracture 11/12/2020    Anemia 01/19/2020    Spinal stenosis of lumbar region with neurogenic claudication 01/10/2020    Ex-cigarette smoker 07/29/2019    Osteoporosis 03/27/2019    History of recurrent UTIs 03/27/2019    Hypothyroidism 02/10/2019    History of anemia 01/12/2018    Raynaud disease 11/11/2017    CKD (chronic kidney disease)  stage 3, GFR 30-59 ml/min 11/11/2017    History of falling 11/11/2017    History of Clostridium difficile 02/21/2016    Depression 02/21/2016    Seronegative rheumatoid arthritis (HCC) 09/26/2014    History of sciatica 09/26/2014       Current Outpatient Medications   Medication Sig Dispense Refill    ipratropium (ATROVENT) 0.06 % Solution Instill 2 sprays into each nostril twice daily as needed for runny nose 15 mL 4    hydroxychloroquine (PLAQUENIL) 200 MG Tab TAKE ONE TABLET BY MOUTH ONE TIME DAILY 100 Tablet 3    NON SPECIFIED Requesting physical therapy for Korina Celis at the [facility].  Requesting muscle tension relief, cervical range of motion exercises. 12 Each 0    NON SPECIFIED Requesting physical therapy for neck range of motion exercises muscle tension relief in the neck area. 12 Each 1    furosemide (LASIX) 40 MG Tab Take 0.5 Tablets by mouth 1 time a day as needed (lower leg swelling). 50 Tablet 3    cholestyramine (QUESTRAN) 4 g packet Mix 4 g in liquid by mouth as needed (supplement). 100 Each 3    levothyroxine (SYNTHROID) 112 MCG Tab Take 1 Tablet by mouth every morning on an empty stomach. 100 Tablet 3    lidocaine (LIDODERM) 5 % Patch Place 1 patch on the skin every 24 hours for 15 days. Not CVRD. 15 Patch 1    ipratropium (ATROVENT) 0.06 % Solution Instill 2 sprays into each nostril twice daily as needed for runny nose 15 mL 1    traZODone (DESYREL) 50 MG Tab Take 0.5 tablets by mouth at bedtime. 100 Tablet 3    FLUoxetine (PROZAC) 10 MG Cap Take 2 capsules by mouth every morning. 100 Capsule 3    denosumab (PROLIA) 60 MG/ML Solution Prefilled Syringe injection Inject 1 mL under the skin every 6 months. 1 mL 1    spironolactone (ALDACTONE) 25 MG Tab Take 1 Tablet by mouth every day. 100 Tablet 3    cycloSPORINE (RESTASIS) 0.05 % ophthalmic emulsion Administer 1 Drop into both eyes 2 times a day. Pharmacist - please dispense 180 single use vials (72 ml)  Indications: Drying  and Inflammation of Cornea and Conjunctiva of Eyes 72 mL 3    metoprolol SR (TOPROL XL) 25 MG TABLET SR 24 HR Take 0.5 tablets by mouth every day. 90 Tablet 3    ketoconazole (NIZORAL) 2 % shampoo Apply to scalp and ears 2-3 x weekly while showering, allow to sit for 5 minutes prior to rinsing 120 mL 8    mometasone (ELOCON) 0.1 % lotion Apply topically to affected areas on scalp once a day up to four days a week as needed for itch. 60 mL 3    triamcinolone acetonide (KENALOG) 0.1 % Cream Apply topically to affected areas on neck, arms, and legs twice a day up to 4 days a week as needed for itch. Do not use on the face or groin 454 g 5    aspirin (ASA) 81 MG Chew Tab chewable tablet Chew 81 mg every day.      rosuvastatin (CRESTOR) 10 MG Tab Take 1 Tablet by mouth every day. Indications: High Amount of Fats in the Blood 90 Tablet 1    acetaminophen (TYLENOL) 500 MG Tab Take 500-1,000 mg by mouth every 6 hours as needed. Indications: Pain      Probiotic Product (PROBIOTIC-10 PO) Take 1 Tablet by mouth every day. Indications: nutritional support      ipratropium (ATROVENT) 0.06 % Solution Administer 1 Spray into affected nostril(S) 2 times a day. 15 mL 2    COENZYME Q10 PO Take 1 Capsule by mouth every day. Indications: High Blood Pressure Disorder 100 Capsule 3    Multiple Vitamins-Minerals (PRESERVISION AREDS 2 PO) Take 1 Tablet by mouth every day. Indications: nutritional support- macular degeneration      CALCIUM CITRATE PO Take 1 Tablet by mouth every evening. Indications: nutritional support      therapeutic multivitamin-minerals (THERAGRAN-M) Tab Take 1 Tablet by mouth every day. Indications: nutritional support      denosumab (PROLIA) 60 MG/ML Solution Inject 1 mL under the skin every 6 months. Indications: Decreased Bone Mineral Density, Osteoporosis      gabapentin (NEURONTIN) 300 MG Cap Take 1 capsule by mouth 3 times a day (Patient not taking: Reported on 8/7/2024) 300 Capsule 3    metaxalone (SKELAXIN)  800 MG Tab Take 0.5 Tablets by mouth 3 times a day. (Patient not taking: Reported on 2024) 90 Tablet 1     No current facility-administered medications for this visit.          Current supplements as per medication list.     Allergies:   Azithromycin, Cefuroxime, Ciprofloxacin, Clindamycin, Daptomycin, Erythromycin, Rifampin, Codeine, Flagyl [metronidazole], Macrodantin [nitrofurantoin macrocrystal], Morphine, Sulfa drugs, Conjugated estrogens, Nitrofurantoin, and Premarin  Social History     Tobacco Use    Smoking status: Former     Current packs/day: 0.00     Average packs/day: 1 pack/day for 20.0 years (20.0 ttl pk-yrs)     Types: Cigarettes     Start date: 1965     Quit date: 1985     Years since quittin.3    Smokeless tobacco: Never   Vaping Use    Vaping status: Never Used   Substance Use Topics    Alcohol use: Not Currently     Alcohol/week: 1.2 oz     Types: 2 Glasses of wine per week     Comment: 2 drinks a week.    Drug use: Never     Family History   Problem Relation Age of Onset    Non-contributory Mother     Osteoporosis Mother     Arthritis Mother     Non-contributory Father     Narcolepsy Father     Lung Disease Father         Asthma    Heart Disease Father     Anesthesia Paternal Grandfather         death     Elizabeth  has a past medical history of Adverse effect of anesthesia (2020), Amputation of left middle finger, Anesthesia, Atherosclerosis of both carotid arteries (2022), Bowel habit changes, C. difficile diarrhea (2016), Cancer ([facility]) ([facility]), Chronic back pain, CKD (chronic kidney disease) stage 3, GFR 30-59 ml/min, Closed fracture of greater trochanter of left femur ([facility]) (2023), Constipation (2020), Decreased hearing of both ears (2022), Delayed emergence from general anesthesia, Dense breast tissue on mammogram (2019), Dental disorder, Depression, Elbow fracture, left, 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  choroidal neovascularization (HCC) (11/17/2022), Heart burn, High cholesterol, History of anemia, History of DVT (deep vein thrombosis) (2017), Hypothyroid, MRSA (methicillin resistant Staphylococcus aureus) (2012), Multilevel degenerative disc disease, Osteoarthritis, Osteoporosis (03/27/2019), Pain, Peripheral edema, PVC (premature ventricular contraction), Raynaud's disease, Rheumatoid arthritis (HCC) (09/26/2014), Stroke (HCC) (1990's?), SVT (supraventricular tachycardia) ([facility]) (03/21/2023), and Urinary incontinence.   Past Surgical History:   Procedure Laterality Date    VA TOTAL HIP ARTHROPLASTY Left 2/21/2023    Procedure: LEFT TOTAL HIP ARTHROPLASTY, AND REMOVAL OF SCREWS FROM TOP OF FEMUR PLATE;  Surgeon: Rom Chinchilla M.D.;  Location: [facility];  Service: Orthopedics    HARDWARE REMOVAL ORTHO Left 2/21/2023    Procedure: REMOVAL, HARDWARE;  Surgeon: Rom Chinchilla M.D.;  Location: [facility];  Service: Orthopedics    PB DEGROOT W/O FACETEC FORAMOT/DSKC 1/2 VRT SEG, TH* N/A 1/16/2020    Procedure: LAMINECTOMY, SPINE, THORACIC;  Surgeon: Sang Nelson M.D.;  Location: SURGERY [facility];  Service: Neurosurgery    THORACIC FUSION O-ARM N/A 1/16/2020    Procedure: FUSION, SPINE, THORACIC, POSTERIOR APPROACH - T9-L3;  Surgeon: Sang Nelson M.D.;  Location: SURGERY [facility];  Service: Neurosurgery    PB RECONSTR TOTAL SHOULDER IMPLANT Left 4/30/2019    Procedure: ARTHROPLASTY, SHOULDER, TOTAL - REVERSE;  Surgeon: Daniella Camargo M.D.;  Location: SURGERY [facility];  Service: Orthopedics    SHOULDER ARTHROPLASTY TOTAL Right 1/9/2018    Procedure: SHOULDER ARTHROPLASTY TOTAL - REVERSE;  Surgeon: Daniella Camargo M.D.;  Location: SURGERY [facility];  Service: Orthopedics    COLONOSCOPY - ENDO N/A 3/7/2016    Procedure: COLONOSCOPY - ENDO;  Surgeon: Estiven Ayers M.D.;  Location: ENDOSCOPY [facility];  Service:     FECAL TRANSPLANT N/A 3/7/2016     Procedure: FECAL TRANSPLANT;  Surgeon: Estiven Ayers M.D.;  Location: [facility];  Service:     OTHER ORTHOPEDIC SURGERY Left 2012    Left Elbow fx repair    BLEPHAROPLASTY  3/31/2011    Performed by ORACIO DIAZ at [facility]    FOOT SURGERY Right 2010    OTHER ORTHOPEDIC SURGERY Left 2006    finger- removal of digit Left middle finger    CHOLECYSTECTOMY  2000    HYSTERECTOMY LAPAROSCOPY  2000    HYSTERECTOMY RADICAL  1985    OTHER Bilateral 2010, 2008    feet- repair torn tendons    OTHER ORTHOPEDIC SURGERY Left 1970s    femur fx       Screening:  In the last six months have you experienced any leakage of urine? Yes, reports occasional leakage.    Depression Screening  Little interest or pleasure in doing things?  0 - not at all  Feeling down, depressed , or hopeless? 0 - not at all  Patient Health Questionnaire Score:  0    If depressive symptoms identified deferred to follow up visit unless specifically addressed in assessment and plan.    Interpretation of PHQ-9 Total Score   Score Severity   1-4 No Depression   5-9 Mild Depression   10-14 Moderate Depression   15-19 Moderately Severe Depression   20-27 Severe Depression    Screening for Cognitive Impairment  Do you or any of your friends or family members have any concern about your memory? No  Three Minute Recall (Leader, Season, Table) 3/3    Eric clock face with all 12 numbers and set the hands to show 10 minutes after 11.  Yes    Cognitive concerns identified deferred for follow up unless specifically addressed in assessment and plan.    Fall Risk Assessment  Has the patient had two or more falls in the last year or any fall with injury in the last year?  No    Safety Assessment  Do you always wear your seatbelt?  Yes  Any changes to home needed to function safely? No  Difficulty hearing.  Yes  Patient counseled about all safety risks that were identified.    Functional Assessment ADLs  Are there any barriers preventing you  from cooking for yourself or meeting nutritional needs?  No. Lives in senior home which provides cooked meals.  Are there any barriers preventing you from driving safely or obtaining transportation?  Yes. Pt doesn't not drive.  Are there any barriers preventing you from using a telephone or calling for help?  No    Are there any barriers preventing you from shopping?  No.    Are there any barriers preventing you from taking care of your own finances?  No    Are there any barriers preventing you from managing your medications?  No    Are there any barriers preventing you from showering, bathing or dressing yourself? No    Are there any barriers preventing you from doing housework or laundry? No    Are there any barriers preventing you from using the toilet?No    Are you currently engaging in any exercise or physical activity?  Yes. Walks, gym    Self-Assessment of Health  What is your perception of your health? Good    Do you sleep more than six hours a night? Yes    In the past 7 days, how much did pain keep you from doing your normal work? None    Do you spend quality time with family or friends (virtually or in person)? Yes    Do you usually eat a heart healthy diet that constists of a variety of fruits, vegetables, whole grains and fiber? Yes    Do you eat foods high in fat and/or Fast Food more than three times per week? No    How concerned are you that your medical conditions are not being well managed? Not at all    Are you worried that in the next 2 months, you may not have stable housing that you own, rent, or stay in as part of a household? No        Advance Care Planning  Do you have an Advance Directive, Living Will, Durable Power of , or POLST? Yes  Advance Directive Living Will     is on file      Health Maintenance Summary            Overdue - COVID-19 Vaccine (5 - 2023-24 season) Overdue since 9/1/2023 09/21/2022  Imm Admin: PFIZER BIVALENT SARS-COV-2 VACCINE (12+)    10/08/2021  Imm  Admin: PFIZER PURPLE CAP SARS-COV-2 VACCINATION (12+)    02/13/2021  Imm Admin: PFIZER PURPLE CAP SARS-COV-2 VACCINATION (12+)    01/23/2021  Imm Admin: PFIZER PURPLE CAP SARS-COV-2 VACCINATION (12+)              Influenza Vaccine (1) Next due on 9/1/2024 09/27/2023  Imm Admin: Influenza Vaccine Adult HD    09/21/2022  Imm Admin: Influenza, Unspecified - HISTORICAL DATA    09/29/2021  Imm Admin: Influenza Vaccine Adult HD    10/23/2020  Imm Admin: Influenza Vaccine Adult HD    10/14/2019  Imm Admin: Influenza Vaccine Adult HD    Only the first 5 history entries have been loaded, but more history exists.              Annual Wellness Visit (Yearly) Next due on 8/7/2025 08/07/2024  Done - Comprehensive Health Assessment    08/07/2024  Level of Service: ANNUAL WELLNESS VISIT-INCLUDES PPPS SUBSEQUE*    06/21/2023  Level of Service: DC ANNUAL WELLNESS VISIT-INCLUDES PPPS SUBSEQUE*    03/21/2022  Level of Service: DC ANNUAL WELLNESS VISIT-INCLUDES PPPS SUBSEQUE*    05/17/2021  Visit Dx: Medicare annual wellness visit, subsequent    Only the first 5 history entries have been loaded, but more history exists.              Bone Density Scan (Every 5 Years) Next due on 12/8/2028 12/08/2023  DS-BONE DENSITY STUDY (DEXA)    11/13/2020  DS-BONE DENSITY STUDY (DEXA)    08/15/2019  DS-BONE DENSITY STUDY (DEXA)    08/14/2017  DS-BONE DENSITY STUDY (DEXA)    03/16/2005  DS-BONE DENSITY STUDY (DEXA)    Only the first 5 history entries have been loaded, but more history exists.              IMM DTaP/Tdap/Td Vaccine (4 - Td or Tdap) Next due on 5/23/2034 05/23/2024  Imm Admin: Tdap Vaccine    01/11/2015  Imm Admin: Dtap Vaccine    01/11/2015  Imm Admin: Tdap Vaccine              Zoster (Shingles) Vaccines (Series Information) Completed      01/04/2019  Imm Admin: Zoster Vaccine Recombinant (RZV) (SHINGRIX)    09/11/2018  Imm Admin: Zoster Vaccine Recombinant (RZV) (SHINGRIX)    12/12/2012  Imm Admin: Zoster Vaccine  Live (ZVL) (Zostavax) - HISTORICAL DATA              Hepatitis A Vaccine (Hep A) (Series Information) Aged Out      No completion history exists for this topic.              HPV Vaccines (Series Information) Aged Out      No completion history exists for this topic.              Polio Vaccine (Inactivated Polio) (Series Information) Aged Out      No completion history exists for this topic.              Meningococcal Immunization (Series Information) Aged Out      No completion history exists for this topic.              Discontinued - Mammogram  Discontinued        Frequency changed to Never automatically (Topic No Longer Applies)    12/19/2023  MA-SCREENING MAMMO BILAT W/TOMOSYNTHESIS W/CAD    11/21/2022  MA-SCREENING MAMMO BILAT W/TOMOSYNTHESIS W/CAD    11/18/2021  MA-SCREENING MAMMO BILAT W/TOMOSYNTHESIS W/CAD    08/27/2020  MA-SCREENING MAMMO BILAT W/TOMOSYNTHESIS W/CAD    Only the first 5 history entries have been loaded, but more history exists.              Discontinued - Colorectal Cancer Screening  Discontinued        Frequency changed to Never automatically (Topic No Longer Applies)    03/07/2016  Surgical Procedure: COLONOSCOPY - ENDO              Discontinued - Hepatitis B Vaccine (Hep B)  Discontinued      No completion history exists for this topic.              Discontinued - Pneumococcal Vaccine: 65+ Years  Discontinued      07/27/2016  Imm Admin: Pneumococcal Vaccine (PCV7) - HISTORICAL DATA    07/26/2016  Imm Admin: Pneumococcal Conjugate Vaccine (Prevnar/PCV-13)    01/13/2015  Imm Admin: Pneumococcal polysaccharide vaccine (PPSV-23)    01/12/2015  Imm Admin: Pneumococcal polysaccharide vaccine (PPSV-23)                    Patient Care Team:  Xiomara Wheat M.D. as PCP - General (Internal Medicine)  [facility] as Home Health Provider    Financial Resource Strain: Low Risk  (8/7/2024)    Overall Financial Resource Strain (CARDIA)     Difficulty of Paying Living Expenses: Not hard at all     "  Transportation Needs: No Transportation Needs (8/7/2024)    PRAPARE - Transportation     Lack of Transportation (Medical): No     Lack of Transportation (Non-Medical): No      Food Insecurity: No Food Insecurity (8/7/2024)    Hunger Vital Sign     Worried About Running Out of Food in the Last Year: Never true     Ran Out of Food in the Last Year: Never true        Encounter Vitals  Blood Pressure : 100/48  Weight: 57.6 kg (127 lb)  Height: 162.6 cm (5' 4\")  BMI (Calculated): 21.8  Pain Score: 2=Minimal-Slight     Alert, oriented in no acute distress.  Eye contact is good, speech goal directed, affect calm.    Assessment and Plan. The following treatment and monitoring plan is recommended:    Age-related osteoporosis with current pathological fracture  Chronic, stable. Reviewed DEXA scan from December 2023: \"according to the World Health Organization classification, bone mineral density of this patient is osteoporotic in the distal left forearm and osteopenic in the proximal right femur.\" Denies recent falls or fractures.    Aortic atherosclerosis (HCC)  Chronic, stable. Reviewed ultrasound carotid doppler from December 2023. Continues on aspirin and statin therapy daily.    Cerebral atrophy (HCC)  Chronic, stable. Reviewed MRI of the brain from 2019. Denies visual disturbances and changes in mentation.    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 choroidal neovascularization (HCC)  Chronic, ongoing. Currently using cyclosporine twice daily. Reports that she gets injections every six weeks-- continues to have  progressive changes in vision. Followed by ophthalmology, Dr. Blackman, and  [facility].     Hypercholesterolemia  Chronic, stable. Reviewed lipid profile from June 2024. Currently taking rosuvastatin 10 mg daily. Reports that she goes to the gym 3-5 times a week. Denies chest pain and claudication.    Hypothyroidism  Chronic, stable. Currently taking levothyroxine 112 mcg daily as " prescribed. Denies fatigue, palpitations, hair and skin changes, temperature intolerance, changes in bowel habits, and weight loss or weight gain.      Insomnia  Chronic, stable. Currently taking trazodone 25 mg nightly. Reports an average of 6-8 hours of sleep per night. Denies excessive sedation and amnesia.    PVD (peripheral vascular disease) ([facility])  Chronic, stable. Continues on aspirin and statin therapy daily. Denies chest pain and claudication. Previously followed by vascular medicine, Dr. Sawyer.     Recurrent major depressive disorder, in full remission ([facility])  Chronic, stable. Currently taking fluoxetine 20 mg daily. In-office depression screening is negative. Reports that mood is good. Denies SI/HI.    Secondary aldosteronism ([facility])  Chronic, stable. Currently taking furosemide 20 mg as needed and spironolactone 25 mg daily. Followed by cardiology, Dr. Hernandez.    Seronegative rheumatoid arthritis ([facility])  Chronic, stable. Currently taking hydroxychloroquine 200 mg daily. Reports that pain is well-managed with is acetaminophen and voltaren gel. Followed by primary care provider.    SVT (supraventricular tachycardia) ([facility])  Chronic, stable. Currently taking metoprolol SR 12.5 mg daily. Denies lightheadedness, palpitations, and shortness of breath. Followed by cardiology, Dr. Hernandez.        Services suggested: No services needed at this time  Health Care Screening: Age-appropriate preventive services recommended by USPTF and ACIP covered by Medicare were discussed today. Services ordered if indicated and agreed upon by the patient.  Referrals offered: Community-based lifestyle interventions to reduce health risks and promote self-management and wellness, fall prevention, nutrition, physical activity, tobacco-use cessation, weight loss, and mental health services as per orders if indicated.    Discussion today about general wellness and lifestyle habits:    Prevent falls and reduce trip hazards; Cautioned  about securing or removing rugs.  Have a working fire alarm and carbon monoxide detector.  Engage in regular physical activity and social activities.    Follow-up: Return for appointment with Primary Care Provider as needed.

## 2024-08-14 PROCEDURE — RXMED WILLOW AMBULATORY MEDICATION CHARGE: Performed by: OTOLARYNGOLOGY

## 2024-08-15 ENCOUNTER — PHARMACY VISIT (OUTPATIENT)
Dept: PHARMACY | Facility: MEDICAL CENTER | Age: 89
End: 2024-08-15
Payer: COMMERCIAL

## 2024-09-03 PROCEDURE — RXMED WILLOW AMBULATORY MEDICATION CHARGE: Performed by: INTERNAL MEDICINE

## 2024-09-03 PROCEDURE — RXMED WILLOW AMBULATORY MEDICATION CHARGE: Performed by: STUDENT IN AN ORGANIZED HEALTH CARE EDUCATION/TRAINING PROGRAM

## 2024-09-04 PROCEDURE — RXMED WILLOW AMBULATORY MEDICATION CHARGE: Performed by: INTERNAL MEDICINE

## 2024-09-04 RX ORDER — FLUOXETINE 10 MG/1
20 CAPSULE ORAL EVERY MORNING
Qty: 100 CAPSULE | Refills: 3 | Status: SHIPPED | OUTPATIENT
Start: 2024-09-04

## 2024-09-05 ENCOUNTER — APPOINTMENT (OUTPATIENT)
Dept: MEDICAL GROUP | Facility: PHYSICIAN GROUP | Age: 89
End: 2024-09-05
Payer: MEDICARE

## 2024-09-05 ENCOUNTER — PHARMACY VISIT (OUTPATIENT)
Dept: PHARMACY | Facility: MEDICAL CENTER | Age: 89
End: 2024-09-05
Payer: COMMERCIAL

## 2024-09-05 VITALS
HEART RATE: 61 BPM | HEIGHT: 64 IN | DIASTOLIC BLOOD PRESSURE: 60 MMHG | TEMPERATURE: 97.6 F | OXYGEN SATURATION: 92 % | RESPIRATION RATE: 18 BRPM | BODY MASS INDEX: 22.26 KG/M2 | SYSTOLIC BLOOD PRESSURE: 120 MMHG | WEIGHT: 130.4 LBS

## 2024-09-05 DIAGNOSIS — M54.2 NECK PAIN: Primary | ICD-10-CM

## 2024-09-05 DIAGNOSIS — N18.30 STAGE 3 CHRONIC KIDNEY DISEASE, UNSPECIFIED WHETHER STAGE 3A OR 3B CKD: ICD-10-CM

## 2024-09-05 DIAGNOSIS — H91.90 DECREASED HEARING, UNSPECIFIED LATERALITY: ICD-10-CM

## 2024-09-05 DIAGNOSIS — H35.3222 EXUDATIVE AGE-RELATED MACULAR DEGENERATION OF LEFT EYE WITH INACTIVE CHOROIDAL NEOVASCULARIZATION (HCC): ICD-10-CM

## 2024-09-05 DIAGNOSIS — Z79.899 MEDICATION MANAGEMENT: ICD-10-CM

## 2024-09-05 DIAGNOSIS — K58.2 IRRITABLE BOWEL SYNDROME WITH BOTH CONSTIPATION AND DIARRHEA: ICD-10-CM

## 2024-09-05 PROCEDURE — 3074F SYST BP LT 130 MM HG: CPT | Performed by: INTERNAL MEDICINE

## 2024-09-05 PROCEDURE — 99214 OFFICE O/P EST MOD 30 MIN: CPT | Performed by: INTERNAL MEDICINE

## 2024-09-05 PROCEDURE — 3078F DIAST BP <80 MM HG: CPT | Performed by: INTERNAL MEDICINE

## 2024-09-05 RX ORDER — FUROSEMIDE 40 MG
20 TABLET ORAL
Qty: 50 TABLET | Refills: 3 | Status: SHIPPED | OUTPATIENT
Start: 2024-09-05

## 2024-09-05 RX ORDER — METOPROLOL SUCCINATE 25 MG/1
12.5 TABLET, EXTENDED RELEASE ORAL DAILY
Qty: 90 TABLET | Refills: 3 | Status: SHIPPED | OUTPATIENT
Start: 2024-09-05

## 2024-09-05 RX ORDER — SPIRONOLACTONE 25 MG/1
25 TABLET ORAL DAILY
Qty: 100 TABLET | Refills: 3 | Status: SHIPPED | OUTPATIENT
Start: 2024-09-05

## 2024-09-05 ASSESSMENT — FIBROSIS 4 INDEX: FIB4 SCORE: 1.47

## 2024-09-05 NOTE — PROGRESS NOTES
CC: Neck pain, medications okay    HPI:  Elizabeth presents with the following    1. Stage 3 chronic kidney disease, unspecified whether stage 3a or 3b CKD  Chronic.  Most recent GFR 45 in June.  Prior GFR within normal limits.    2. Neck pain  Patient with PMH of cervical degenerative disc disease complains of ongoing stiff neck pain.  She has completed physical therapy which did not help her greatly.  No recent imaging.  Feels that it is muscular in nature.  Using a heating pad.  Declines medications.    3. Decreased hearing, unspecified laterality  Patient has been using her hearing aids that she received from [employer] since in June.  She is having more trouble understanding speech, not a volume issue.  Patient also has trouble leaving as her vision is continued to decline.    4. 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 choroidal neovascularization (HCC)  Chronic.  Stable.  Followed by ophthalmology.  Patient has severe vision loss in her right eye and her left eye is progressively worsening.  Patient follows up with a retinal specialist.    5. Irritable bowel syndrome with both constipation and diarrhea  Patient complains of intermittent constipation and diarrhea.  Mostly stress related and occasionally correlated with certain dietary changes.  Patient continues to take Questran as prescribed.    6. Medication management  Patient will need medications refilled through the pharmacy.      Patient Active Problem List    Diagnosis Date Noted    Irritable bowel syndrome with both constipation and diarrhea 09/05/2024    Secondary aldosteronism (HCC) 08/06/2024    Body mass index (BMI) 19.9 or less, adult 06/21/2023    Aortic atherosclerosis (HCC) 06/21/2023    Cerebral atrophy ([facility]) 06/21/2023    Recurrent major depressive disorder, in full remission ([facility]) 06/21/2023    PVD (peripheral vascular disease) ([facility]) 06/21/2023    Low back pain 06/21/2023    Status post amputation of finger, left  06/21/2023    SVT (supraventricular tachycardia) ([facility]) 03/21/2023    Hip fracture due to osteoporosis, initial encounter ([facility]) 03/11/2023    Acute blood loss anemia 03/10/2023    Status post left hip replacement 02/21/2023    Primary osteoarthritis of left hip 02/01/2023    Decreased hearing of both ears 11/17/2022    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 choroidal neovascularization ([facility]) 11/17/2022    Multilevel degenerative disc disease 10/18/2022    Osteoarthritis of multiple joints 10/18/2022    Long-term use of hydroxychloroquine 10/18/2022    PVC (premature ventricular contraction) 06/03/2022    Chronic diarrhea 06/02/2022    DNR (do not resuscitate) 05/19/2022    Insomnia 04/01/2022    BMI 21.0-21.9, adult 03/21/2022    Hypercholesterolemia 03/21/2022    Atherosclerosis of both carotid arteries 01/24/2022    History of DVT (deep vein thrombosis) 12/01/2021    History of compression fracture of spine, Jan. 2020 (T12 and L1) 05/12/2021    Age-related osteoporosis with current pathological fracture 11/12/2020    Anemia 01/19/2020    Spinal stenosis of lumbar region with neurogenic claudication 01/10/2020    Ex-cigarette smoker 07/29/2019    Osteoporosis 03/27/2019    History of recurrent UTIs 03/27/2019    Hypothyroidism 02/10/2019    History of anemia 01/12/2018    Raynaud disease 11/11/2017    CKD (chronic kidney disease) stage 3, GFR 30-59 ml/min 11/11/2017    History of falling 11/11/2017    History of Clostridium difficile 02/21/2016    Depression 02/21/2016    Seronegative rheumatoid arthritis ([facility]) 09/26/2014    History of sciatica 09/26/2014       Current Outpatient Medications   Medication Sig Dispense Refill    FLUoxetine (PROZAC) 10 MG Cap Take 2 capsules by mouth every morning. 100 Capsule 3    hydroxychloroquine (PLAQUENIL) 200 MG Tab TAKE ONE TABLET BY MOUTH ONE TIME DAILY 100 Tablet 3    furosemide (LASIX) 40 MG Tab Take 0.5 Tablets by mouth 1 time a day as needed  (lower leg swelling). 50 Tablet 3    cholestyramine (QUESTRAN) 4 g packet Mix 4 g in liquid by mouth as needed (supplement). 100 Each 3    levothyroxine (SYNTHROID) 112 MCG Tab Take 1 Tablet by mouth every morning on an empty stomach. 100 Tablet 3    lidocaine (LIDODERM) 5 % Patch Place 1 patch on the skin every 24 hours for 15 days. Not CVRD. 15 Patch 1    ipratropium (ATROVENT) 0.06 % Solution Instill 2 sprays into each nostril twice daily as needed for runny nose 15 mL 1    traZODone (DESYREL) 50 MG Tab Take 0.5 tablets by mouth at bedtime. 100 Tablet 3    denosumab (PROLIA) 60 MG/ML Solution Prefilled Syringe injection Inject 1 mL under the skin every 6 months. 1 mL 1    spironolactone (ALDACTONE) 25 MG Tab Take 1 Tablet by mouth every day. 100 Tablet 3    cycloSPORINE (RESTASIS) 0.05 % ophthalmic emulsion Administer 1 Drop into both eyes 2 times a day. Pharmacist - please dispense 180 single use vials (72 ml)  Indications: Drying and Inflammation of Cornea and Conjunctiva of Eyes 72 mL 3    metoprolol SR (TOPROL XL) 25 MG TABLET SR 24 HR Take 0.5 tablets by mouth every day. 90 Tablet 3    ketoconazole (NIZORAL) 2 % shampoo Apply to scalp and ears 2-3 x weekly while showering, allow to sit for 5 minutes prior to rinsing 120 mL 8    mometasone (ELOCON) 0.1 % lotion Apply topically to affected areas on scalp once a day up to four days a week as needed for itch. 60 mL 3    triamcinolone acetonide (KENALOG) 0.1 % Cream Apply topically to affected areas on neck, arms, and legs twice a day up to 4 days a week as needed for itch. Do not use on the face or groin 454 g 5    aspirin (ASA) 81 MG Chew Tab chewable tablet Chew 81 mg every day.      rosuvastatin (CRESTOR) 10 MG Tab Take 1 Tablet by mouth every day. Indications: High Amount of Fats in the Blood 90 Tablet 1    acetaminophen (TYLENOL) 500 MG Tab Take 500-1,000 mg by mouth every 6 hours as needed. Indications: Pain      Probiotic Product (PROBIOTIC-10 PO) Take 1  Tablet by mouth every day. Indications: nutritional support      COENZYME Q10 PO Take 1 Capsule by mouth every day. Indications: High Blood Pressure Disorder 100 Capsule 3    Multiple Vitamins-Minerals (PRESERVISION AREDS 2 PO) Take 1 Tablet by mouth every day. Indications: nutritional support- macular degeneration      CALCIUM CITRATE PO Take 1 Tablet by mouth every evening. Indications: nutritional support      therapeutic multivitamin-minerals (THERAGRAN-M) Tab Take 1 Tablet by mouth every day. Indications: nutritional support      denosumab (PROLIA) 60 MG/ML Solution Inject 1 mL under the skin every 6 months. Indications: Decreased Bone Mineral Density, Osteoporosis      ipratropium (ATROVENT) 0.06 % Solution Instill 2 sprays into each nostril twice daily as needed for runny nose 15 mL 4    NON SPECIFIED Requesting physical therapy for Korina Vimal at the [facility].  Requesting muscle tension relief, cervical range of motion exercises. 12 Each 0    NON SPECIFIED Requesting physical therapy for neck range of motion exercises muscle tension relief in the neck area. 12 Each 1    gabapentin (NEURONTIN) 300 MG Cap Take 1 capsule by mouth 3 times a day (Patient not taking: Reported on 8/7/2024) 300 Capsule 3    metaxalone (SKELAXIN) 800 MG Tab Take 0.5 Tablets by mouth 3 times a day. (Patient not taking: Reported on 8/7/2024) 90 Tablet 1    ipratropium (ATROVENT) 0.06 % Solution Administer 1 Spray into affected nostril(S) 2 times a day. 15 mL 2     No current facility-administered medications for this visit.         Allergies as of 09/05/2024 - Reviewed 09/05/2024   Allergen Reaction Noted    Azithromycin Unspecified 05/01/2017    Cefuroxime Itching and Vomiting 02/21/2016    Ciprofloxacin Itching and Vomiting 05/01/2017    Clindamycin Itching and Vomiting 02/21/2016    Daptomycin  02/21/2016    Erythromycin Unspecified 05/01/2017    Rifampin  02/21/2016    Codeine Itching 06/28/2012    Flagyl  [metronidazole] Rash, Vomiting, and Nausea 2016    Macrodantin [nitrofurantoin macrocrystal] Rash     Morphine Itching 2017    Sulfa drugs Swelling 2012    Conjugated estrogens  2023    Nitrofurantoin Vomiting and Nausea 2016    Premarin Rash and Unspecified 2017        Social History     Socioeconomic History    Marital status:      Spouse name: Not on file    Number of children: Not on file    Years of education: Not on file    Highest education level: Not on file   Occupational History    Not on file   Tobacco Use    Smoking status: Former     Current packs/day: 0.00     Average packs/day: 1 pack/day for 20.0 years (20.0 ttl pk-yrs)     Types: Cigarettes     Start date: 1965     Quit date: 1985     Years since quittin.4    Smokeless tobacco: Never   Vaping Use    Vaping status: Never Used   Substance and Sexual Activity    Alcohol use: Not Currently     Alcohol/week: 1.2 oz     Types: 2 Glasses of wine per week     Comment: 2 drinks a week.    Drug use: Never    Sexual activity: Not on file   Other Topics Concern    Not on file   Social History Narrative    Not on file     Social Determinants of Health     Financial Resource Strain: Low Risk  (2024)    Overall Financial Resource Strain (CARDIA)     Difficulty of Paying Living Expenses: Not hard at all   Food Insecurity: No Food Insecurity (2024)    Hunger Vital Sign     Worried About Running Out of Food in the Last Year: Never true     Ran Out of Food in the Last Year: Never true   Transportation Needs: No Transportation Needs (2024)    PRAPARE - Transportation     Lack of Transportation (Medical): No     Lack of Transportation (Non-Medical): No   Physical Activity: Not on file   Stress: Not on file   Social Connections: Feeling Socially Integrated (2023)    OASIS : Social Isolation     Frequency of experiencing loneliness or isolation: Rarely   Intimate Partner Violence: Not on file    Housing Stability: Not on file       Family History   Problem Relation Age of Onset    Non-contributory Mother     Osteoporosis Mother     Arthritis Mother     Non-contributory Father     Narcolepsy Father     Lung Disease Father         Asthma    Heart Disease Father     Anesthesia Paternal Grandfather         death       Past Surgical History:   Procedure Laterality Date    NV TOTAL HIP ARTHROPLASTY Left 2/21/2023    Procedure: LEFT TOTAL HIP ARTHROPLASTY, AND REMOVAL OF SCREWS FROM TOP OF FEMUR PLATE;  Surgeon: Rom Chinchilla M.D.;  Location: [facility];  Service: Orthopedics    HARDWARE REMOVAL ORTHO Left 2/21/2023    Procedure: REMOVAL, HARDWARE;  Surgeon: Rom Chinchilla M.D.;  Location: [facility];  Service: Orthopedics    PB DEGROOT W/O FACETEC FORAMOT/DSKC 1/2 VRT SEG, TH* N/A 1/16/2020    Procedure: LAMINECTOMY, SPINE, THORACIC;  Surgeon: Sang Nelson M.D.;  Location: [facility];  Service: Neurosurgery    THORACIC FUSION O-ARM N/A 1/16/2020    Procedure: FUSION, SPINE, THORACIC, POSTERIOR APPROACH - T9-L3;  Surgeon: Sang Nelson M.D.;  Location: [facility];  Service: Neurosurgery    PB RECONSTR TOTAL SHOULDER IMPLANT Left 4/30/2019    Procedure: ARTHROPLASTY, SHOULDER, TOTAL - REVERSE;  Surgeon: Daniella Camargo M.D.;  Location: [facility];  Service: Orthopedics    SHOULDER ARTHROPLASTY TOTAL Right 1/9/2018    Procedure: SHOULDER ARTHROPLASTY TOTAL - REVERSE;  Surgeon: Daniella Camargo M.D.;  Location: [facility];  Service: Orthopedics    COLONOSCOPY - ENDO N/A 3/7/2016    Procedure: COLONOSCOPY - ENDO;  Surgeon: Estiven Ayers M.D.;  Location: ENDOSCOPY [facility];  Service:     FECAL TRANSPLANT N/A 3/7/2016    Procedure: FECAL TRANSPLANT;  Surgeon: Estiven Ayers M.D.;  Location: ENDOSCOPY [facility];  Service:     OTHER ORTHOPEDIC SURGERY Left 2012    Left Elbow fx repair    BLEPHAROPLASTY  3/31/2011     "Performed by ORACIO DIAZ at [facility]    FOOT SURGERY Right 2010    OTHER ORTHOPEDIC SURGERY Left 2006    finger- removal of digit Left middle finger    CHOLECYSTECTOMY  2000    HYSTERECTOMY LAPAROSCOPY  2000    HYSTERECTOMY RADICAL  1985    OTHER Bilateral 2010, 2008    feet- repair torn tendons    OTHER ORTHOPEDIC SURGERY Left 1970s    femur fx       ROS: Positive ROS per HPI.  Denies any Headache,Chest pain,  Shortness of breath,  Abdominal pain, Changes of  bladder, Lower ext edema, Fevers, Nights sweats, Weight Changes, Focal weakness or numbness.  All other systems are negative.    /60 (BP Location: Right arm, Patient Position: Sitting)   Pulse 61   Temp 36.4 °C (97.6 °F) (Temporal)   Resp 18   Ht 1.626 m (5' 4\")   Wt 59.1 kg (130 lb 6.4 oz)   SpO2 92%   BMI 22.38 kg/m²      Constitutional: Alert, no distress, well-groomed.  Skin: Warm, dry, good turgor, no rashes in visible areas.  Eye: Equal, round and reactive, conjunctiva clear, lids normal.  ENMT: Lips without lesions, good dentition, moist mucous membranes.  Neck: Trachea midline, no masses, no thyromegaly.  Respiratory: Unlabored respiratory effort, no cough.  Abdomen: Soft, no gross masses.  MSK: Normal gait, moves all extremities.  Neuro: Grossly non-focal. No cranial nerve deficit. Strength and sensation intact.   Psych: Alert and oriented x3, normal affect and mood.      Assessment and Plan.   88 y.o. female presenting with the following.     1. Stage 3 chronic kidney disease, unspecified whether stage 3a or 3b CKD  Keep well-hydrated.  Avoid nephrotoxins.  - Comp Metabolic Panel; Future    2. Neck pain    - Referral to Orthopedics    3. Decreased hearing, unspecified laterality  Patient will follow-up with audiology.    4. 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 choroidal neovascularization (HCC)  Chronic.  Stable.  Follow-up with ophthalmology.    5. Irritable bowel syndrome with both " constipation and diarrhea  Continue fluoxetine for stress management.  Increase fiber in diet.  Continue Questran.    6. Medication management  Patient will call with list of medications that need to be refilled through mail in pharmacy.  Medications reviewed.

## 2024-09-07 NOTE — TELEPHONE ENCOUNTER
"Elizabeth called to reschedule her upcoming appointment with her provider Mari LYLE due to an appointment conflict. I did advise Elizabeth that Makayla LYLE is leaving [facility]. I did schedule Elizabeth with Dr. Wheat on 11/17/22 at 8:50 am at [address] Per Elizabeth \"no\" to the covid screening. Elizabeth also wanted to see if she had any labs ordered. I did confirm that she does have labs ordered. Elizabeth will go in as a walk-in for her labs. HIPAA verified, no other questions or concerns.  " "Regardin y.o. 217 pounds/sore throat/runny nose/fatigue/medication questions  ----- Message from Barbra GARRETT sent at 2024  1:49 PM EDT -----  Pt's mother stated, \"My son is 217 pounds. He has a sore throat, runny nose and fatigue. I want to make sure I am giving him the proper amount of medication based on his size.\"    "

## 2024-09-19 ENCOUNTER — TELEPHONE (OUTPATIENT)
Dept: HEALTH INFORMATION MANAGEMENT | Facility: OTHER | Age: 89
End: 2024-09-19

## 2024-09-19 PROCEDURE — RXMED WILLOW AMBULATORY MEDICATION CHARGE: Performed by: INTERNAL MEDICINE

## 2024-09-19 NOTE — TELEPHONE ENCOUNTER
Good Afternoon,    I received a call from Elizabeth who is requesting Home Health orders. Elizabeth states she needs help with walking, especially up and down stairs. Elizabeth said she has been very unstable lately.         Thank You,  [facility] Team

## 2024-09-20 ENCOUNTER — PHARMACY VISIT (OUTPATIENT)
Dept: PHARMACY | Facility: MEDICAL CENTER | Age: 89
End: 2024-09-20
Payer: COMMERCIAL

## 2024-09-26 DIAGNOSIS — R54 AGE-RELATED PHYSICAL DEBILITY: ICD-10-CM

## 2024-09-27 ENCOUNTER — HOME HEALTH ADMISSION (OUTPATIENT)
Dept: HOME HEALTH SERVICES | Facility: HOME HEALTHCARE | Age: 89
End: 2024-09-27
Payer: MEDICARE

## 2024-10-01 PROCEDURE — RXMED WILLOW AMBULATORY MEDICATION CHARGE: Performed by: INTERNAL MEDICINE

## 2024-10-04 ENCOUNTER — PHARMACY VISIT (OUTPATIENT)
Dept: PHARMACY | Facility: MEDICAL CENTER | Age: 89
End: 2024-10-04
Payer: COMMERCIAL

## 2024-10-14 PROCEDURE — RXMED WILLOW AMBULATORY MEDICATION CHARGE: Performed by: INTERNAL MEDICINE

## 2024-10-16 ENCOUNTER — HOME CARE VISIT (OUTPATIENT)
Dept: HOME HEALTH SERVICES | Facility: HOME HEALTHCARE | Age: 89
End: 2024-10-16

## 2024-10-16 PROCEDURE — RXMED WILLOW AMBULATORY MEDICATION CHARGE: Performed by: OTOLARYNGOLOGY

## 2024-10-16 PROCEDURE — RXMED WILLOW AMBULATORY MEDICATION CHARGE: Performed by: INTERNAL MEDICINE

## 2024-10-17 ENCOUNTER — PHARMACY VISIT (OUTPATIENT)
Dept: PHARMACY | Facility: MEDICAL CENTER | Age: 89
End: 2024-10-17
Payer: COMMERCIAL

## 2024-11-04 ENCOUNTER — PHARMACY VISIT (OUTPATIENT)
Dept: PHARMACY | Facility: MEDICAL CENTER | Age: 89
End: 2024-11-04
Payer: COMMERCIAL

## 2024-11-04 PROCEDURE — RXMED WILLOW AMBULATORY MEDICATION CHARGE: Performed by: INTERNAL MEDICINE

## 2024-11-11 PROCEDURE — RXMED WILLOW AMBULATORY MEDICATION CHARGE: Performed by: OPHTHALMOLOGY

## 2024-11-14 ENCOUNTER — PHARMACY VISIT (OUTPATIENT)
Dept: PHARMACY | Facility: MEDICAL CENTER | Age: 89
End: 2024-11-14
Payer: COMMERCIAL

## 2024-11-18 PROCEDURE — RXMED WILLOW AMBULATORY MEDICATION CHARGE: Performed by: INTERNAL MEDICINE

## 2024-11-20 ENCOUNTER — PHARMACY VISIT (OUTPATIENT)
Dept: PHARMACY | Facility: MEDICAL CENTER | Age: 89
End: 2024-11-20
Payer: COMMERCIAL

## 2024-12-13 ENCOUNTER — OFFICE VISIT (OUTPATIENT)
Dept: URGENT CARE | Facility: PHYSICIAN GROUP | Age: 89
End: 2024-12-13
Payer: MEDICARE

## 2024-12-13 ENCOUNTER — HOSPITAL ENCOUNTER (EMERGENCY)
Facility: MEDICAL CENTER | Age: 89
End: 2024-12-13
Attending: STUDENT IN AN ORGANIZED HEALTH CARE EDUCATION/TRAINING PROGRAM
Payer: MEDICARE

## 2024-12-13 ENCOUNTER — APPOINTMENT (OUTPATIENT)
Dept: RADIOLOGY | Facility: MEDICAL CENTER | Age: 89
End: 2024-12-13
Attending: STUDENT IN AN ORGANIZED HEALTH CARE EDUCATION/TRAINING PROGRAM
Payer: MEDICARE

## 2024-12-13 VITALS
SYSTOLIC BLOOD PRESSURE: 156 MMHG | HEIGHT: 62 IN | WEIGHT: 133.6 LBS | BODY MASS INDEX: 24.59 KG/M2 | HEART RATE: 57 BPM | DIASTOLIC BLOOD PRESSURE: 71 MMHG | RESPIRATION RATE: 18 BRPM | TEMPERATURE: 97.7 F | OXYGEN SATURATION: 91 %

## 2024-12-13 VITALS
RESPIRATION RATE: 18 BRPM | HEIGHT: 62 IN | DIASTOLIC BLOOD PRESSURE: 74 MMHG | OXYGEN SATURATION: 95 % | SYSTOLIC BLOOD PRESSURE: 126 MMHG | BODY MASS INDEX: 23.92 KG/M2 | HEART RATE: 88 BPM | TEMPERATURE: 97.3 F | WEIGHT: 130 LBS

## 2024-12-13 DIAGNOSIS — L08.9 INFECTION OF GREAT TOE: ICD-10-CM

## 2024-12-13 DIAGNOSIS — S91.112A LACERATION OF LEFT GREAT TOE WITHOUT FOREIGN BODY PRESENT OR DAMAGE TO NAIL, INITIAL ENCOUNTER: ICD-10-CM

## 2024-12-13 DIAGNOSIS — L03.116 CELLULITIS OF LEFT FOOT: Primary | ICD-10-CM

## 2024-12-13 DIAGNOSIS — L03.116 CELLULITIS OF LEFT FOOT: ICD-10-CM

## 2024-12-13 LAB
ALBUMIN SERPL BCP-MCNC: 4.2 G/DL (ref 3.2–4.9)
ALBUMIN/GLOB SERPL: 1.2 G/DL
ALP SERPL-CCNC: 96 U/L (ref 30–99)
ALT SERPL-CCNC: 34 U/L (ref 2–50)
ANION GAP SERPL CALC-SCNC: 15 MMOL/L (ref 7–16)
AST SERPL-CCNC: 44 U/L (ref 12–45)
BASOPHILS # BLD AUTO: 0.7 % (ref 0–1.8)
BASOPHILS # BLD: 0.04 K/UL (ref 0–0.12)
BILIRUB SERPL-MCNC: 0.4 MG/DL (ref 0.1–1.5)
BUN SERPL-MCNC: 45 MG/DL (ref 8–22)
CALCIUM ALBUM COR SERPL-MCNC: 10 MG/DL (ref 8.5–10.5)
CALCIUM SERPL-MCNC: 10.2 MG/DL (ref 8.4–10.2)
CHLORIDE SERPL-SCNC: 99 MMOL/L (ref 96–112)
CO2 SERPL-SCNC: 23 MMOL/L (ref 20–33)
CREAT SERPL-MCNC: 1.1 MG/DL (ref 0.5–1.4)
CRP SERPL HS-MCNC: 4.06 MG/DL (ref 0–0.75)
EOSINOPHIL # BLD AUTO: 0.07 K/UL (ref 0–0.51)
EOSINOPHIL NFR BLD: 1.3 % (ref 0–6.9)
ERYTHROCYTE [DISTWIDTH] IN BLOOD BY AUTOMATED COUNT: 47 FL (ref 35.9–50)
ERYTHROCYTE [SEDIMENTATION RATE] IN BLOOD BY WESTERGREN METHOD: 50 MM/HOUR (ref 0–25)
GFR SERPLBLD CREATININE-BSD FMLA CKD-EPI: 48 ML/MIN/1.73 M 2
GLOBULIN SER CALC-MCNC: 3.4 G/DL (ref 1.9–3.5)
GLUCOSE SERPL-MCNC: 97 MG/DL (ref 65–99)
HCT VFR BLD AUTO: 35.5 % (ref 37–47)
HGB BLD-MCNC: 11.7 G/DL (ref 12–16)
IMM GRANULOCYTES # BLD AUTO: 0.01 K/UL (ref 0–0.11)
IMM GRANULOCYTES NFR BLD AUTO: 0.2 % (ref 0–0.9)
LYMPHOCYTES # BLD AUTO: 1.17 K/UL (ref 1–4.8)
LYMPHOCYTES NFR BLD: 21.2 % (ref 22–41)
MCH RBC QN AUTO: 32.2 PG (ref 27–33)
MCHC RBC AUTO-ENTMCNC: 33 G/DL (ref 32.2–35.5)
MCV RBC AUTO: 97.8 FL (ref 81.4–97.8)
MONOCYTES # BLD AUTO: 0.4 K/UL (ref 0–0.85)
MONOCYTES NFR BLD AUTO: 7.3 % (ref 0–13.4)
NEUTROPHILS # BLD AUTO: 3.82 K/UL (ref 1.82–7.42)
NEUTROPHILS NFR BLD: 69.3 % (ref 44–72)
NRBC # BLD AUTO: 0 K/UL
NRBC BLD-RTO: 0 /100 WBC (ref 0–0.2)
PLATELET # BLD AUTO: 292 K/UL (ref 164–446)
PMV BLD AUTO: 9.1 FL (ref 9–12.9)
POTASSIUM SERPL-SCNC: 4.3 MMOL/L (ref 3.6–5.5)
PROT SERPL-MCNC: 7.6 G/DL (ref 6–8.2)
RBC # BLD AUTO: 3.63 M/UL (ref 4.2–5.4)
SODIUM SERPL-SCNC: 137 MMOL/L (ref 135–145)
WBC # BLD AUTO: 5.5 K/UL (ref 4.8–10.8)

## 2024-12-13 PROCEDURE — 80053 COMPREHEN METABOLIC PANEL: CPT

## 2024-12-13 PROCEDURE — 73630 X-RAY EXAM OF FOOT: CPT | Mod: LT

## 2024-12-13 PROCEDURE — 3074F SYST BP LT 130 MM HG: CPT | Performed by: PHYSICIAN ASSISTANT

## 2024-12-13 PROCEDURE — 36415 COLL VENOUS BLD VENIPUNCTURE: CPT

## 2024-12-13 PROCEDURE — 700102 HCHG RX REV CODE 250 W/ 637 OVERRIDE(OP): Performed by: STUDENT IN AN ORGANIZED HEALTH CARE EDUCATION/TRAINING PROGRAM

## 2024-12-13 PROCEDURE — 99284 EMERGENCY DEPT VISIT MOD MDM: CPT

## 2024-12-13 PROCEDURE — 3078F DIAST BP <80 MM HG: CPT | Performed by: PHYSICIAN ASSISTANT

## 2024-12-13 PROCEDURE — A9270 NON-COVERED ITEM OR SERVICE: HCPCS | Performed by: STUDENT IN AN ORGANIZED HEALTH CARE EDUCATION/TRAINING PROGRAM

## 2024-12-13 PROCEDURE — 86140 C-REACTIVE PROTEIN: CPT

## 2024-12-13 PROCEDURE — 85652 RBC SED RATE AUTOMATED: CPT

## 2024-12-13 PROCEDURE — 99214 OFFICE O/P EST MOD 30 MIN: CPT | Performed by: PHYSICIAN ASSISTANT

## 2024-12-13 PROCEDURE — 85025 COMPLETE CBC W/AUTO DIFF WBC: CPT

## 2024-12-13 RX ORDER — DOXYCYCLINE 100 MG/1
100 CAPSULE ORAL 2 TIMES DAILY
Qty: 10 CAPSULE | Refills: 0 | Status: SHIPPED | OUTPATIENT
Start: 2024-12-13 | End: 2024-12-13

## 2024-12-13 RX ORDER — DOXYCYCLINE 100 MG/1
100 TABLET ORAL ONCE
Status: COMPLETED | OUTPATIENT
Start: 2024-12-13 | End: 2024-12-13

## 2024-12-13 RX ORDER — DOXYCYCLINE 100 MG/1
100 CAPSULE ORAL 2 TIMES DAILY
Qty: 10 CAPSULE | Refills: 0 | Status: ACTIVE | OUTPATIENT
Start: 2024-12-13 | End: 2024-12-22

## 2024-12-13 RX ADMIN — AMOXICILLIN AND CLAVULANATE POTASSIUM 1 TABLET: 875; 125 TABLET, FILM COATED ORAL at 19:36

## 2024-12-13 RX ADMIN — DOXYCYCLINE 100 MG: 100 TABLET, FILM COATED ORAL at 19:36

## 2024-12-13 ASSESSMENT — FIBROSIS 4 INDEX
FIB4 SCORE: 1.47
FIB4 SCORE: 1.47

## 2024-12-13 ASSESSMENT — ENCOUNTER SYMPTOMS
EYE DISCHARGE: 0
FEVER: 0
EYE REDNESS: 0

## 2024-12-13 NOTE — PROGRESS NOTES
"Subjective     Elizabeth Celis is a 88 y.o. female who presents with Toe Pain ((L) big toe. Irritation, redness, pain X 3-4 days. Skin is peeling and toe appears infected in area.)            HPI    This is a new problem.   The patient presents to clinic complaining of a possible infection to her left great toe x 1 week.  The patient states she recently had \"callus remover\" applied to her left great toe.  The patient states that the skin to her left great toe subsequently peeled away revealing an open wound.  The patient states this wound appears \"wet\".  The patient reports associated pain, swelling, and redness of the left great toe, which extends to the left midfoot.  She reports no associated fever.  The patient has not taken any OTC medications for her current symptoms.  The patient reports multiple allergies to outpatient antibiotics.    PMH:  has a past medical history of Adverse effect of anesthesia (01/2020), Amputation of left middle finger, Anesthesia, Atherosclerosis of both carotid arteries (01/24/2022), Bowel habit changes, C. difficile diarrhea (03/2016), Cancer ([facility]) (1947), Chronic back pain, CKD (chronic kidney disease) stage 3, GFR 30-59 ml/min, Closed fracture of greater trochanter of left femur ([facility]) (03/11/2023), Constipation (01/21/2020), Decreased hearing of both ears (11/17/2022), Delayed emergence from general anesthesia, Dense breast tissue on mammogram (07/25/2019), Dental disorder, Depression, Elbow fracture, left, 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 choroidal neovascularization ([facility]) (11/17/2022), Heart burn, High cholesterol, History of anemia, History of DVT (deep vein thrombosis) (2017), Hypothyroid, Irritable bowel syndrome with both constipation and diarrhea (09/05/2024), MRSA (methicillin resistant Staphylococcus aureus) (2012), Multilevel degenerative disc disease, Osteoarthritis, Osteoporosis (03/27/2019), Pain, Peripheral edema, PVC " (premature ventricular contraction), Raynaud's disease, Rheumatoid arthritis ([facility]) (09/26/2014), Stroke ([facility]) (1990's?), SVT (supraventricular tachycardia) ([facility]) (03/21/2023), and Urinary incontinence.  MEDS:   Current Outpatient Medications:     furosemide (LASIX) 40 MG Tab, Take 0.5 Tablets by mouth 1 time a day as needed (lower leg swelling)., Disp: 50 Tablet, Rfl: 3    metoprolol SR (TOPROL XL) 25 MG TABLET SR 24 HR, Take 0.5 tablets by mouth every day., Disp: 90 Tablet, Rfl: 3    spironolactone (ALDACTONE) 25 MG Tab, Take 1 Tablet by mouth every day., Disp: 100 Tablet, Rfl: 3    FLUoxetine (PROZAC) 10 MG Cap, Take 2 capsules by mouth every morning., Disp: 100 Capsule, Rfl: 3    NON SPECIFIED, Requesting physical therapy for Korina Celis at the [facility] living. Requesting muscle tension relief, cervical range of motion exercises., Disp: 12 Each, Rfl: 0    levothyroxine (SYNTHROID) 112 MCG Tab, Take 1 Tablet by mouth every morning on an empty stomach., Disp: 100 Tablet, Rfl: 3    rosuvastatin (CRESTOR) 10 MG Tab, Take 1 Tablet by mouth every day. Indications: High Amount of Fats in the Blood, Disp: 90 Tablet, Rfl: 1    Probiotic Product (PROBIOTIC-10 PO), Take 1 Tablet by mouth every day. Indications: nutritional support, Disp: , Rfl:     COENZYME Q10 PO, Take 1 Capsule by mouth every day. Indications: High Blood Pressure Disorder, Disp: 100 Capsule, Rfl: 3    Multiple Vitamins-Minerals (PRESERVISION AREDS 2 PO), Take 1 Tablet by mouth every day. Indications: nutritional support- macular degeneration, Disp: , Rfl:     CALCIUM CITRATE PO, Take 1 Tablet by mouth every evening. Indications: nutritional support, Disp: , Rfl:     therapeutic multivitamin-minerals (THERAGRAN-M) Tab, Take 1 Tablet by mouth every day. Indications: nutritional support, Disp: , Rfl:     denosumab (PROLIA) 60 MG/ML Solution, Inject 1 mL under the skin every 6 months. Indications: Decreased Bone Mineral Density, Osteoporosis,  Disp: , Rfl:     ipratropium (ATROVENT) 0.06 % Solution, Instill 2 sprays into each nostril twice daily as needed for runny nose, Disp: 15 mL, Rfl: 4    hydroxychloroquine (PLAQUENIL) 200 MG Tab, TAKE ONE TABLET BY MOUTH ONE TIME DAILY, Disp: 100 Tablet, Rfl: 3    NON SPECIFIED, Requesting physical therapy for neck range of motion exercises muscle tension relief in the neck area., Disp: 12 Each, Rfl: 1    cholestyramine (QUESTRAN) 4 g packet, Mix 4 g in liquid by mouth as needed (supplement)., Disp: 100 Each, Rfl: 3    lidocaine (LIDODERM) 5 % Patch, Place 1 patch on the skin every 24 hours for 15 days. Not CVRD. (Patient not taking: Reported on 12/13/2024), Disp: 15 Patch, Rfl: 1    gabapentin (NEURONTIN) 300 MG Cap, Take 1 capsule by mouth 3 times a day (Patient not taking: Reported on 12/13/2024), Disp: 300 Capsule, Rfl: 3    ipratropium (ATROVENT) 0.06 % Solution, Instill 2 sprays into each nostril twice daily as needed for runny nose, Disp: 15 mL, Rfl: 1    traZODone (DESYREL) 50 MG Tab, Take 0.5 tablets by mouth at bedtime. (Patient not taking: Reported on 12/13/2024), Disp: 100 Tablet, Rfl: 3    denosumab (PROLIA) 60 MG/ML Solution Prefilled Syringe injection, Inject 1 mL under the skin every 6 months. (Patient not taking: Reported on 12/13/2024), Disp: 1 mL, Rfl: 1    cycloSPORINE (RESTASIS) 0.05 % ophthalmic emulsion, Administer 1 Drop into both eyes 2 times a day. Pharmacist - please dispense 180 single use vials (72 ml)  Indications: Drying and Inflammation of Cornea and Conjunctiva of Eyes (Patient not taking: Reported on 12/13/2024), Disp: 72 mL, Rfl: 3    ketoconazole (NIZORAL) 2 % shampoo, Apply to scalp and ears 2-3 x weekly while showering, allow to sit for 5 minutes prior to rinsing, Disp: 120 mL, Rfl: 8    mometasone (ELOCON) 0.1 % lotion, Apply topically to affected areas on scalp once a day up to four days a week as needed for itch. (Patient not taking: Reported on 12/13/2024), Disp: 60 mL, Rfl:  3    triamcinolone acetonide (KENALOG) 0.1 % Cream, Apply topically to affected areas on neck, arms, and legs twice a day up to 4 days a week as needed for itch. Do not use on the face or groin (Patient not taking: Reported on 12/13/2024), Disp: 454 g, Rfl: 5    aspirin (ASA) 81 MG Chew Tab chewable tablet, Chew 81 mg every day. (Patient not taking: Reported on 12/13/2024), Disp: , Rfl:     metaxalone (SKELAXIN) 800 MG Tab, Take 0.5 Tablets by mouth 3 times a day. (Patient not taking: Reported on 12/13/2024), Disp: 90 Tablet, Rfl: 1    acetaminophen (TYLENOL) 500 MG Tab, Take 500-1,000 mg by mouth every 6 hours as needed. Indications: Pain (Patient not taking: Reported on 12/13/2024), Disp: , Rfl:     ipratropium (ATROVENT) 0.06 % Solution, Administer 1 Spray into affected nostril(S) 2 times a day., Disp: 15 mL, Rfl: 2  ALLERGIES:   Allergies   Allergen Reactions    Azithromycin Unspecified     Matti Johnsons syndrome    Cefuroxime Itching and Vomiting     They gave me C-Diff    Ciprofloxacin Itching and Vomiting     They gave me C-Diff    Clindamycin Itching and Vomiting     They gave me c-diff    Daptomycin      Matti colleen syndrome      Erythromycin Unspecified     Matti Johnsons syndrome    Rifampin      Matti colleen syndrome    Codeine Itching    Flagyl [Metronidazole] Rash, Vomiting and Nausea     rash    Macrodantin [Nitrofurantoin Macrocrystal] Rash     Other reaction(s): Decreased Blood Pressure    Morphine Itching     Tolerates oxycodone/hydromorphone    Sulfa Drugs Swelling    Conjugated Estrogens     Nitrofurantoin Vomiting and Nausea    Premarin Rash and Unspecified     burning     SURGHX:   Past Surgical History:   Procedure Laterality Date    MS TOTAL HIP ARTHROPLASTY Left 2/21/2023    Procedure: LEFT TOTAL HIP ARTHROPLASTY, AND REMOVAL OF SCREWS FROM TOP OF FEMUR PLATE;  Surgeon: Rom Chinchilla M.D.;  Location: SURGERY [facility];  Service: Orthopedics    HARDWARE REMOVAL ORTHO Left  2/21/2023    Procedure: REMOVAL, HARDWARE;  Surgeon: Rom Chinchilla M.D.;  Location: SURGERY [facility];  Service: Orthopedics    PB DEGROOT W/O FACETEC FORAMOT/DSKC 1/2 VRT SEG, TH* N/A 1/16/2020    Procedure: LAMINECTOMY, SPINE, THORACIC;  Surgeon: Sang Nelson M.D.;  Location: SURGERY [facility];  Service: Neurosurgery    THORACIC FUSION O-ARM N/A 1/16/2020    Procedure: FUSION, SPINE, THORACIC, POSTERIOR APPROACH - T9-L3;  Surgeon: Sang Nelson M.D.;  Location: SURGERY [facility];  Service: Neurosurgery    PB RECONSTR TOTAL SHOULDER IMPLANT Left 4/30/2019    Procedure: ARTHROPLASTY, SHOULDER, TOTAL - REVERSE;  Surgeon: Daniella Camargo M.D.;  Location: SURGERY [facility];  Service: Orthopedics    SHOULDER ARTHROPLASTY TOTAL Right 1/9/2018    Procedure: SHOULDER ARTHROPLASTY TOTAL - REVERSE;  Surgeon: Daniella Camargo M.D.;  Location: SURGERY [facility];  Service: Orthopedics    COLONOSCOPY - ENDO N/A 3/7/2016    Procedure: COLONOSCOPY - ENDO;  Surgeon: Estiven Ayers M.D.;  Location: ENDOSCOPY [facility];  Service:     FECAL TRANSPLANT N/A 3/7/2016    Procedure: FECAL TRANSPLANT;  Surgeon: Estiven Ayers M.D.;  Location: ENDOSCOPY [facility];  Service:     OTHER ORTHOPEDIC SURGERY Left 2012    Left Elbow fx repair    BLEPHAROPLASTY  3/31/2011    Performed by ORACIO DIAZ at SURGERY SURGICAL [facility]    FOOT SURGERY Right 2010    OTHER ORTHOPEDIC SURGERY Left 2006    finger- removal of digit Left middle finger    CHOLECYSTECTOMY  2000    HYSTERECTOMY LAPAROSCOPY  2000    HYSTERECTOMY RADICAL  1985    OTHER Bilateral 2010, 2008    feet- repair torn tendons    OTHER ORTHOPEDIC SURGERY Left 1970s    femur fx     SOCHX:  reports that she quit smoking about 39 years ago. Her smoking use included cigarettes. She started smoking about 59 years ago. She has a 20 pack-year smoking history. She has never used smokeless tobacco. She reports that she does not currently use  "alcohol after a past usage of about 1.2 oz of alcohol per week. She reports that she does not use drugs.  FH: Family history was reviewed, no pertinent findings to report    Review of Systems   Constitutional:  Negative for fever.   Eyes:  Negative for discharge and redness.   Skin:         + infection of left great toe              Objective     /74 (BP Location: Left arm, Patient Position: Sitting, BP Cuff Size: Adult long)   Pulse 88   Temp 36.3 °C (97.3 °F) (Temporal)   Resp 18   Ht 1.575 m (5' 2\")   Wt 59 kg (130 lb)   SpO2 95%   BMI 23.78 kg/m²      Physical Exam  Constitutional:       General: She is not in acute distress.     Appearance: Normal appearance. She is well-developed. She is not ill-appearing.   HENT:      Head: Normocephalic and atraumatic.      Right Ear: External ear normal.      Left Ear: External ear normal.      Nose: Nose normal.   Eyes:      Extraocular Movements: Extraocular movements intact.      Conjunctiva/sclera: Conjunctivae normal.   Cardiovascular:      Rate and Rhythm: Normal rate.   Pulmonary:      Effort: Pulmonary effort is normal.   Musculoskeletal:      Cervical back: Normal range of motion and neck supple.      Comments:   Left Foot:  An open wound is present to the distal aspect of the left great toe with complete sloughing of the skin with weeping of serous fluid.  A black eschar is present to the distal dorsal aspect of the left great toe.  No purulent discharge/drainage.  Tenderness to the left great toe extending to the mid aspect of the left foot with associated swelling, erythema, and increased warmth.  No signs of lymphangitis.     Skin:     General: Skin is warm and dry.   Neurological:      Mental Status: She is alert and oriented to person, place, and time.       Clinical Media:                                Assessment & Plan        Assessment & Plan  Cellulitis of left foot         Infection of great toe                 The patient's presenting " symptoms and physical exam findings are consistent with a cellulitis of the left foot with a significant infection of the left great toe with an associated open wound.  Based on the patient's presenting symptoms and physical exam findings, I believe the patient would benefit from further evaluation and a higher level of care.  Therefore, I recommend the patient be transferred to the [facility] ED for further evaluation and management.  Advised the associated risks of not seeking further care for her current symptoms, and she verbalized understanding.  The patient is stable for transfer via private vehicle.    Plan:  Transfer patient to the [facility] for further evaluation and management.    I personally reviewed prior external notes and test results pertinent to today's visit.  I have independently reviewed and interpreted all diagnostics ordered during this urgent care visit.     Please note that this dictation was created using voice recognition software. I have made every reasonable attempt to correct obvious errors, but I expect that there may be errors of grammar and possibly content that I did not discover before finalizing the note.     This note was electronically signed by Grecia Kaba PA-C

## 2024-12-14 NOTE — DISCHARGE INSTRUCTIONS
You are seen and evaluated the emergency department for your left great toe wound.  There is a signs of some ongoing infection there.  Your labs and vital signs do not necessitate that you need IV antibiotics at this time.  He will be prescribed oral antibiotics, you are given your first dose in the emergency department.  Please take these as prescribed, if you notice worsening symptoms despite being compliant with medications please return to the emergency department for further evaluation as you may need IV antibiotics.  Please follow-up with your primary care physician in 2 days or in this emergency department for wound check.

## 2024-12-14 NOTE — DISCHARGE PLANNING
Medical Social Work    MSW received report from [facility] [place] that [facility] ER is requesting GMT be arranged for pt to return to [facility].  Per chart pt has difficulty ambulating due to cellulitis.  MSW contacted [facility] to confirm pt's room there (250) and that they will accept pt back.  MSW contacted Tyaa with [employer] to arrange wheelchair transport for pt for [phone] (trip # [id number]; cost $ 191.58).  [facility] notified of ETA for GMT.
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
## 2024-12-14 NOTE — ED TRIAGE NOTES
"Chief Complaint   Patient presents with    Toe Injury     Left greater toe laceration and swelling. Noticed 3d ago during a soak at a foot spa. The  soaked it in epsom salts and feels it may have made it worse.      BP (!) 151/67   Pulse 78   Temp 36.1 °C (97 °F) (Temporal)   Resp 18   Ht 1.575 m (5' 2\")   Wt 60.6 kg (133 lb 9.6 oz)   SpO2 92%   BMI 24.44 kg/m²   "

## 2024-12-14 NOTE — ED NOTES
Maricruz pineda. Pt discharged and transferred home with discharge instructions.  [facility] sent to [facility].

## 2024-12-16 PROCEDURE — RXMED WILLOW AMBULATORY MEDICATION CHARGE: Performed by: STUDENT IN AN ORGANIZED HEALTH CARE EDUCATION/TRAINING PROGRAM

## 2024-12-17 ENCOUNTER — PHARMACY VISIT (OUTPATIENT)
Dept: PHARMACY | Facility: MEDICAL CENTER | Age: 89
End: 2024-12-17
Payer: COMMERCIAL

## 2024-12-19 ENCOUNTER — APPOINTMENT (OUTPATIENT)
Dept: RADIOLOGY | Facility: MEDICAL CENTER | Age: 89
End: 2024-12-19
Attending: INTERNAL MEDICINE
Payer: MEDICARE

## 2024-12-19 DIAGNOSIS — Z12.31 VISIT FOR SCREENING MAMMOGRAM: ICD-10-CM

## 2024-12-27 DIAGNOSIS — H04.123 DRY EYES, BILATERAL: ICD-10-CM

## 2024-12-27 RX ORDER — IPRATROPIUM BROMIDE 42 UG/1
SPRAY, METERED NASAL
Qty: 15 ML | Refills: 1 | Status: CANCELLED | OUTPATIENT
Start: 2024-12-27

## 2024-12-30 ENCOUNTER — OFFICE VISIT (OUTPATIENT)
Dept: CARDIOLOGY | Facility: MEDICAL CENTER | Age: 89
End: 2024-12-30
Attending: INTERNAL MEDICINE
Payer: MEDICARE

## 2024-12-30 VITALS
DIASTOLIC BLOOD PRESSURE: 60 MMHG | HEART RATE: 61 BPM | BODY MASS INDEX: 24.66 KG/M2 | HEIGHT: 62 IN | SYSTOLIC BLOOD PRESSURE: 110 MMHG | WEIGHT: 134 LBS | OXYGEN SATURATION: 100 %

## 2024-12-30 DIAGNOSIS — I47.10 SVT (SUPRAVENTRICULAR TACHYCARDIA) (HCC): ICD-10-CM

## 2024-12-30 DIAGNOSIS — I49.3 PVC (PREMATURE VENTRICULAR CONTRACTION): ICD-10-CM

## 2024-12-30 DIAGNOSIS — I70.0 AORTIC ATHEROSCLEROSIS (HCC): ICD-10-CM

## 2024-12-30 PROBLEM — I73.9 PVD (PERIPHERAL VASCULAR DISEASE) (HCC): Status: RESOLVED | Noted: 2023-06-21 | Resolved: 2024-12-30

## 2024-12-30 PROCEDURE — 99213 OFFICE O/P EST LOW 20 MIN: CPT | Performed by: INTERNAL MEDICINE

## 2024-12-30 ASSESSMENT — ENCOUNTER SYMPTOMS
WEIGHT LOSS: 0
ORTHOPNEA: 0
FEVER: 0
PND: 0
IRREGULAR HEARTBEAT: 0
NEAR-SYNCOPE: 0
BACK PAIN: 0
NAUSEA: 0
ABDOMINAL PAIN: 0
DYSPNEA ON EXERTION: 0
HEARTBURN: 0
WEIGHT GAIN: 0
COUGH: 0
CLAUDICATION: 0
DEPRESSION: 0
DIZZINESS: 0
PALPITATIONS: 0
SYNCOPE: 0
BLURRED VISION: 0
CONSTIPATION: 0
VOMITING: 0
DIARRHEA: 0
DECREASED APPETITE: 0
ALTERED MENTAL STATUS: 0
SHORTNESS OF BREATH: 0
FLANK PAIN: 0

## 2024-12-30 ASSESSMENT — FIBROSIS 4 INDEX: FIB4 SCORE: 2.3

## 2024-12-30 NOTE — PROGRESS NOTES
Cardiology Note    atherosclerosis    History of Present Illness: Elizabeth Celis is a 87 y.o. female PMH PVCs, HLD, atherosclerosis who presents for follow up visit.     Feels well this visit. No cardiac complaints. No light headedness/dizziness. Compliant with medications and denies adverse effects. Uses lasix sparingly.       Review of Systems   Constitutional: Negative for decreased appetite, fever, malaise/fatigue, weight gain and weight loss.   HENT:  Negative for congestion and nosebleeds.    Eyes:  Negative for blurred vision.   Cardiovascular:  Negative for chest pain, claudication, dyspnea on exertion, irregular heartbeat, leg swelling, near-syncope, orthopnea, palpitations, paroxysmal nocturnal dyspnea and syncope.   Respiratory:  Negative for cough and shortness of breath.    Endocrine: Negative for cold intolerance and heat intolerance.   Skin:  Negative for rash.   Musculoskeletal:  Negative for back pain.   Gastrointestinal:  Negative for abdominal pain, constipation, diarrhea, heartburn, melena, nausea and vomiting.   Genitourinary:  Negative for dysuria, flank pain and hematuria.   Neurological:  Negative for dizziness.   Psychiatric/Behavioral:  Negative for altered mental status and depression.          Past Medical History:   Diagnosis Date    Adverse effect of anesthesia 2020    grandfather unexpectedly  from anesthesia, poor experience in  with last surgery    Amputation of left middle finger     osteomyelitis    Anesthesia     difficulty waking up with hysterectomy and last surgery in , hallucinations    Atherosclerosis of both carotid arteries 2022     Dec. 2021: Mild plaque right internal carotid and moderate plaque left carotid bifurcation    Bowel habit changes     Hx cdiff    C. difficile diarrhea 2016    fecal transplant    Cancer (HCC) 194    Tongue CA (surgically removed)    Chronic back pain     hands, shoulders    CKD (chronic kidney disease) stage 3,  "GFR 30-59 ml/min     Closed fracture of greater trochanter of left femur ([facility]) 03/11/2023    Constipation 01/21/2020    rarely    Decreased hearing of both ears 11/17/2022    Delayed emergence from general anesthesia     Dense breast tissue on mammogram 07/25/2019    Dental disorder     lower retainer    Depression     Elbow fracture, left     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 choroidal neovascularization ([facility]) 11/17/2022    Heart burn     High cholesterol     History of anemia     History of DVT (deep vein thrombosis) 2017    unsure of history    Hypothyroid     Irritable bowel syndrome with both constipation and diarrhea 09/05/2024    MRSA (methicillin resistant Staphylococcus aureus) 2012    RIGHT foot    Multilevel degenerative disc disease     Osteoarthritis     Osteoporosis 03/27/2019    DEXA Aug. 2017: T score -3.4 right forearm and -1.6 left hip DEXA Aug. 2019: T score forearm -4.6 and hip -1.4; DEXA Nov. 2020: T score right forearm -4.0 and right hip -1.7    Pain     shoulders, feet, back    Peripheral edema     PVC (premature ventricular contraction)     Raynaud's disease     Rheumatoid arthritis ([facility]) 09/26/2014    Stroke ([facility]) 1990's?    \"mini\" no residual symptoms    SVT (supraventricular tachycardia) ([facility]) 03/21/2023    Urinary incontinence     occasional         Past Surgical History:   Procedure Laterality Date    GA TOTAL HIP ARTHROPLASTY Left 2/21/2023    Procedure: LEFT TOTAL HIP ARTHROPLASTY, AND REMOVAL OF SCREWS FROM TOP OF FEMUR PLATE;  Surgeon: Rom Chinchilla M.D.;  Location: SURGERY [facility];  Service: Orthopedics    HARDWARE REMOVAL ORTHO Left 2/21/2023    Procedure: REMOVAL, HARDWARE;  Surgeon: Rom Chinchilla M.D.;  Location: [facility];  Service: Orthopedics    PB DEGROOT W/O FACETEC FORAMOT/DSKC 1/2 VRT SEG, TH* N/A 1/16/2020    Procedure: LAMINECTOMY, SPINE, THORACIC;  Surgeon: Sang Nelson M.D.;  Location: [facility];  " Service: Neurosurgery    THORACIC FUSION O-ARM N/A 1/16/2020    Procedure: FUSION, SPINE, THORACIC, POSTERIOR APPROACH - T9-L3;  Surgeon: Sang Nelson M.D.;  Location: SURGERY [facility];  Service: Neurosurgery    PB RECONSTR TOTAL SHOULDER IMPLANT Left 4/30/2019    Procedure: ARTHROPLASTY, SHOULDER, TOTAL - REVERSE;  Surgeon: Daniella Camargo M.D.;  Location: SURGERY [facility];  Service: Orthopedics    SHOULDER ARTHROPLASTY TOTAL Right 1/9/2018    Procedure: SHOULDER ARTHROPLASTY TOTAL - REVERSE;  Surgeon: Daniella Camargo M.D.;  Location: SURGERY [facility];  Service: Orthopedics    COLONOSCOPY - ENDO N/A 3/7/2016    Procedure: COLONOSCOPY - ENDO;  Surgeon: Estiven Ayers M.D.;  Location: ENDOSCOPY [facility];  Service:     FECAL TRANSPLANT N/A 3/7/2016    Procedure: FECAL TRANSPLANT;  Surgeon: Estiven Ayers M.D.;  Location: ENDOSCOPY [facility];  Service:     OTHER ORTHOPEDIC SURGERY Left 2012    Left Elbow fx repair    BLEPHAROPLASTY  3/31/2011    Performed by ORACIO DIAZ at SURGERY [facility]    FOOT SURGERY Right 2010    OTHER ORTHOPEDIC SURGERY Left 2006    finger- removal of digit Left middle finger    CHOLECYSTECTOMY  2000    HYSTERECTOMY LAPAROSCOPY  2000    HYSTERECTOMY RADICAL  1985    OTHER Bilateral 2010, 2008    feet- repair torn tendons    OTHER ORTHOPEDIC SURGERY Left 1970s    femur fx         Current Outpatient Medications   Medication Sig Dispense Refill    furosemide (LASIX) 40 MG Tab Take 0.5 Tablets by mouth 1 time a day as needed (lower leg swelling). 50 Tablet 3    spironolactone (ALDACTONE) 25 MG Tab Take 1 Tablet by mouth every day. 100 Tablet 3    FLUoxetine (PROZAC) 10 MG Cap Take 2 capsules by mouth every morning. 100 Capsule 3    ipratropium (ATROVENT) 0.06 % Solution Instill 2 sprays into each nostril twice daily as needed for runny nose 15 mL 4    cholestyramine (QUESTRAN) 4 g packet Mix 4 g in liquid by mouth as needed (supplement). 100  Each 3    levothyroxine (SYNTHROID) 112 MCG Tab Take 1 Tablet by mouth every morning on an empty stomach. 100 Tablet 3    ipratropium (ATROVENT) 0.06 % Solution Instill 2 sprays into each nostril twice daily as needed for runny nose 15 mL 1    traZODone (DESYREL) 50 MG Tab Take 0.5 tablets by mouth at bedtime. 100 Tablet 3    denosumab (PROLIA) 60 MG/ML Solution Prefilled Syringe injection Inject 1 mL under the skin every 6 months. 1 mL 1    cycloSPORINE (RESTASIS) 0.05 % ophthalmic emulsion Administer 1 Drop into both eyes 2 times a day. Pharmacist - please dispense 180 single use vials (72 ml)  Indications: Drying and Inflammation of Cornea and Conjunctiva of Eyes 72 mL 3    ketoconazole (NIZORAL) 2 % shampoo Apply to scalp and ears 2-3 x weekly while showering, allow to sit for 5 minutes prior to rinsing 120 mL 8    mometasone (ELOCON) 0.1 % lotion Apply topically to affected areas on scalp once a day up to four days a week as needed for itch. 60 mL 3    triamcinolone acetonide (KENALOG) 0.1 % Cream Apply topically to affected areas on neck, arms, and legs twice a day up to 4 days a week as needed for itch. Do not use on the face or groin 454 g 5    acetaminophen (TYLENOL) 500 MG Tab Take 500-1,000 mg by mouth every 6 hours as needed. Indications: Pain      Probiotic Product (PROBIOTIC-10 PO) Take 1 Tablet by mouth every day. Indications: nutritional support      ipratropium (ATROVENT) 0.06 % Solution Administer 1 Spray into affected nostril(S) 2 times a day. 15 mL 2    Multiple Vitamins-Minerals (PRESERVISION AREDS 2 PO) Take 1 Tablet by mouth every day. Indications: nutritional support- macular degeneration      CALCIUM CITRATE PO Take 1 Tablet by mouth every evening. Indications: nutritional support      therapeutic multivitamin-minerals (THERAGRAN-M) Tab Take 1 Tablet by mouth every day. Indications: nutritional support      denosumab (PROLIA) 60 MG/ML Solution Inject 1 mL under the skin every 6 months.  Indications: Decreased Bone Mineral Density, Osteoporosis      rosuvastatin (CRESTOR) 10 MG Tab Take 1 Tablet by mouth every day. Indications: High Amount of Fats in the Blood 90 Tablet 1    COENZYME Q10 PO Take 1 Capsule by mouth every day. Indications: High Blood Pressure Disorder 100 Capsule 3     No current facility-administered medications for this visit.         Allergies   Allergen Reactions    Azithromycin Unspecified     Matti Johnsons syndrome    Cefuroxime Itching and Vomiting     They gave me C-Diff    Ciprofloxacin Itching and Vomiting     They gave me C-Diff    Clindamycin Itching and Vomiting     They gave me c-diff    Daptomycin      Matti colleen syndrome      Erythromycin Unspecified     Matti Johnsons syndrome    Rifampin      Matti colleen syndrome    Codeine Itching    Flagyl [Metronidazole] Rash, Vomiting and Nausea     rash    Macrodantin [Nitrofurantoin Macrocrystal] Rash     Other reaction(s): Decreased Blood Pressure    Morphine Itching     Tolerates oxycodone/hydromorphone    Sulfa Drugs Swelling    Conjugated Estrogens     Nitrofurantoin Vomiting and Nausea    Premarin Rash and Unspecified     burning         Family History   Problem Relation Age of Onset    Non-contributory Mother     Osteoporosis Mother     Arthritis Mother     Non-contributory Father     Narcolepsy Father     Lung Disease Father         Asthma    Heart Disease Father     Anesthesia Paternal Grandfather         death         Social History     Socioeconomic History    Marital status:      Spouse name: Not on file    Number of children: Not on file    Years of education: Not on file    Highest education level: Not on file   Occupational History    Not on file   Tobacco Use    Smoking status: Former     Current packs/day: 0.00     Average packs/day: 1 pack/day for 20.0 years (20.0 ttl pk-yrs)     Types: Cigarettes     Start date: 1965     Quit date: 1985     Years since quittin.7    Smokeless  "tobacco: Never   Vaping Use    Vaping status: Never Used   Substance and Sexual Activity    Alcohol use: Yes     Alcohol/week: 1.2 oz     Types: 2 Glasses of wine per week     Comment: 2 drinks a week.    Drug use: Never    Sexual activity: Not on file   Other Topics Concern    Not on file   Social History Narrative    Not on file     Social Drivers of Health     Financial Resource Strain: Low Risk  (8/7/2024)    Overall Financial Resource Strain (CARDIA)     Difficulty of Paying Living Expenses: Not hard at all   Food Insecurity: No Food Insecurity (8/7/2024)    Hunger Vital Sign     Worried About Running Out of Food in the Last Year: Never true     Ran Out of Food in the Last Year: Never true   Transportation Needs: No Transportation Needs (8/7/2024)    PRAPARE - Transportation     Lack of Transportation (Medical): No     Lack of Transportation (Non-Medical): No   Physical Activity: Not on file   Stress: Not on file   Social Connections: Feeling Socially Integrated (4/19/2023)    OASIS : Social Isolation     Frequency of experiencing loneliness or isolation: Rarely   Intimate Partner Violence: Not on file   Housing Stability: Not on file         Physical Exam:  Ambulatory Vitals  /60 (BP Location: Left arm, Patient Position: Sitting)   Pulse 61   Ht 1.575 m (5' 2\")   Wt 60.8 kg (134 lb)   SpO2 100%    BP Readings from Last 4 Encounters:   12/30/24 110/60   12/13/24 (!) 156/71   12/13/24 126/74   09/05/24 120/60     Weight/BMI:   Vitals:    12/30/24 1305 12/30/24 1321   BP: (!) 86/48 110/60   Weight: 60.8 kg (134 lb)    Height: 1.575 m (5' 2\")       Body mass index is 24.51 kg/m².  Wt Readings from Last 4 Encounters:   12/30/24 60.8 kg (134 lb)   12/13/24 60.6 kg (133 lb 9.6 oz)   12/13/24 59 kg (130 lb)   11/01/24 59 kg (130 lb)       Physical Exam  Constitutional:       General: She is not in acute distress.  HENT:      Head: Normocephalic and atraumatic.   Eyes:      Conjunctiva/sclera: " "Conjunctivae normal.      Pupils: Pupils are equal, round, and reactive to light.   Neck:      Vascular: No JVD.   Cardiovascular:      Rate and Rhythm: Normal rate and regular rhythm.      Heart sounds: Normal heart sounds. No murmur heard.     No friction rub. No gallop.   Pulmonary:      Effort: Pulmonary effort is normal. No respiratory distress.      Breath sounds: Normal breath sounds. No wheezing or rales.   Chest:      Chest wall: No tenderness.   Abdominal:      General: Bowel sounds are normal. There is no distension.      Palpations: Abdomen is soft.   Musculoskeletal:      Cervical back: Normal range of motion and neck supple.   Skin:     General: Skin is warm and dry.   Neurological:      Mental Status: She is alert and oriented to person, place, and time.   Psychiatric:         Mood and Affect: Affect normal.         Judgment: Judgment normal.         Lab Data Review:  Lab Results   Component Value Date/Time    CHOLSTRLTOT 181 06/10/2024 10:35 AM     (H) 06/10/2024 10:35 AM    HDL 42 06/10/2024 10:35 AM    TRIGLYCERIDE 137 06/10/2024 10:35 AM       Lab Results   Component Value Date/Time    SODIUM 137 12/13/2024 06:10 PM    POTASSIUM 4.3 12/13/2024 06:10 PM    CHLORIDE 99 12/13/2024 06:10 PM    CO2 23 12/13/2024 06:10 PM    GLUCOSE 97 12/13/2024 06:10 PM    BUN 45 (H) 12/13/2024 06:10 PM    CREATININE 1.10 12/13/2024 06:10 PM    CREATININE 1.1 06/29/2007 04:23 PM     CrCl cannot be calculated (Patient's most recent lab result is older than the maximum 7 days allowed.).  Lab Results   Component Value Date/Time    ALKPHOSPHAT 96 12/13/2024 06:10 PM    ASTSGOT 44 12/13/2024 06:10 PM    ALTSGPT 34 12/13/2024 06:10 PM    TBILIRUBIN 0.4 12/13/2024 06:10 PM      Lab Results   Component Value Date/Time    WBC 5.5 12/13/2024 06:10 PM     Lab Results   Component Value Date/Time    HBA1C 5.5 02/10/2019 10:22 PM     No components found for: \"TROP\"      Cardiac Imaging and Procedures Review:      EKG 6/3/22 " interpreted by me sinus 88, frequent ventricular couplets, borderline qtc setting frequent ventricular ectopy, nonspecific T wave changes    TTE 1/2020  CONCLUSIONS  No prior study is available for comparison.   Left ventricular ejection fraction is visually estimated to be greater   than 70%.  Unable to estimate pulmonary artery pressure due to an inadequate   tricuspid regurgitant jet.    Vascular screening 12/2021  CONCLUSIONS   Right. Mild plaque of the carotid artery with less than 50% stenosis of the    internal carotid.   Left. Moderate plaque of the carotid bifurcation with velocities consistent    with greater than 50% stenosis of the internal carotid.    No aneurysm of the abdominal aorta.    Lower extremity arterial perfusion  is normal at rest.    Procedure: zio monitor; 13d 19h; 6/9//22   Indication: PVCs   Quality: Good   Findings:   Underlying rhythm: Predominantly sinus rhythm with average rate 78 bpm. No atrial fibrillation nor flutter detected.   Atrial events: Rare ectopy. Three episodes supraventricular tachycardia (SVT); fastest 144bpm is also longest at 17 beats.   Ventricular events: Frequent ventricular ectopy 22.6% burden. 16 episodes nonsustained ventricular tachycardia (NSVT); fastest 116 bpm and longest 5 beats.   Patient events: Asymptomatic.     Impressions:   Predominantly sinus rhythm.   Frequent, asymptomatic ventricular ectopy.   Asymptomatic, short SVT and NSVT.     TTE 9/22/22  CONCLUSIONS  Normal left ventricular size, wall thickness, and systolic function.  Normal right ventricular size and systolic function.  Normal left atrial size.  Mild mitral annular calcification.  Normal pericardium without effusion.    Carotid ultrasound 12/2023  IMPRESSION:  1.  There is mild amount of atherosclerotic plaque.  Plaque is located in carotid bulbs and proximal internal carotid arteries.  Plaque characterization:  calcific and echolucent  2. There is no hemodynamically significant stenosis.  Diameter reduction in the internal carotid arteries: less than 50%. There is no evidence of carotid occlusion.  3.  Vertebral arteries demonstrate antegrade flow.      Medical Decision Making:  Problem List Items Addressed This Visit       PVC (premature ventricular contraction)    SVT (supraventricular tachycardia) (HCC)    Aortic atherosclerosis (HCC)       Orthostasis resolved.    PVCs asymptomatic. Stable. Continue metoprolol.    Leg edema - venous insufficiency. Focus on leg elevation and compression stockings. Lasix sparingly as needed. Continue spironolactone.     HLD / carotid stenosis - minimal plaque. Threhold goal LDL <70. continue rosuvastatin. Annual lipids with primary.     It was my pleasure to meet with Ms. Celis.

## 2024-12-31 RX ORDER — TRAZODONE HYDROCHLORIDE 50 MG/1
25 TABLET, FILM COATED ORAL
Qty: 100 TABLET | Refills: 3 | Status: SHIPPED | OUTPATIENT
Start: 2024-12-31

## 2024-12-31 RX ORDER — CYCLOSPORINE 0.5 MG/ML
1 EMULSION OPHTHALMIC 2 TIMES DAILY
Qty: 72 ML | Refills: 3 | Status: SHIPPED | OUTPATIENT
Start: 2024-12-31

## 2025-01-06 ENCOUNTER — HOSPITAL ENCOUNTER (OUTPATIENT)
Facility: MEDICAL CENTER | Age: OVER 89
End: 2025-01-06
Attending: PODIATRIST
Payer: MEDICARE

## 2025-01-06 PROCEDURE — 87070 CULTURE OTHR SPECIMN AEROBIC: CPT

## 2025-01-06 PROCEDURE — 87186 SC STD MICRODIL/AGAR DIL: CPT

## 2025-01-06 PROCEDURE — RXMED WILLOW AMBULATORY MEDICATION CHARGE: Performed by: PODIATRIST

## 2025-01-06 PROCEDURE — 87077 CULTURE AEROBIC IDENTIFY: CPT | Mod: 91

## 2025-01-06 PROCEDURE — 87205 SMEAR GRAM STAIN: CPT

## 2025-01-07 ENCOUNTER — PHARMACY VISIT (OUTPATIENT)
Dept: PHARMACY | Facility: MEDICAL CENTER | Age: OVER 89
End: 2025-01-07
Payer: COMMERCIAL

## 2025-01-07 LAB
GRAM STN SPEC: NORMAL
SIGNIFICANT IND 70042: NORMAL
SITE SITE: NORMAL
SOURCE SOURCE: NORMAL

## 2025-01-09 ENCOUNTER — TELEPHONE (OUTPATIENT)
Dept: WOUND CARE | Facility: MEDICAL CENTER | Age: OVER 89
End: 2025-01-09
Payer: MEDICARE

## 2025-01-09 NOTE — TELEPHONE ENCOUNTER
Phone call from Dr. Ady Ocasio, referring provider, who stated he was going to change patient's antibiotics based on the culture results he received. Fax from Dr. Ocasio scanned into media for review.

## 2025-01-10 LAB
BACTERIA WND AEROBE CULT: ABNORMAL
GRAM STN SPEC: ABNORMAL
SIGNIFICANT IND 70042: ABNORMAL
SITE SITE: ABNORMAL
SOURCE SOURCE: ABNORMAL

## 2025-01-10 PROCEDURE — RXMED WILLOW AMBULATORY MEDICATION CHARGE: Performed by: OTOLARYNGOLOGY

## 2025-01-10 PROCEDURE — RXMED WILLOW AMBULATORY MEDICATION CHARGE: Performed by: INTERNAL MEDICINE

## 2025-01-13 ENCOUNTER — OFFICE VISIT (OUTPATIENT)
Dept: WOUND CARE | Facility: MEDICAL CENTER | Age: OVER 89
End: 2025-01-13
Attending: PODIATRIST
Payer: MEDICARE

## 2025-01-13 ENCOUNTER — OUTPATIENT INFUSION SERVICES (OUTPATIENT)
Dept: ONCOLOGY | Facility: MEDICAL CENTER | Age: OVER 89
End: 2025-01-13
Attending: INTERNAL MEDICINE
Payer: MEDICARE

## 2025-01-13 VITALS
HEART RATE: 63 BPM | BODY MASS INDEX: 24.11 KG/M2 | WEIGHT: 131.84 LBS | DIASTOLIC BLOOD PRESSURE: 49 MMHG | SYSTOLIC BLOOD PRESSURE: 139 MMHG | RESPIRATION RATE: 18 BRPM | TEMPERATURE: 96.5 F | OXYGEN SATURATION: 92 %

## 2025-01-13 VITALS
SYSTOLIC BLOOD PRESSURE: 102 MMHG | OXYGEN SATURATION: 97 % | DIASTOLIC BLOOD PRESSURE: 68 MMHG | HEART RATE: 50 BPM | TEMPERATURE: 96.6 F | RESPIRATION RATE: 16 BRPM

## 2025-01-13 DIAGNOSIS — M79.671 RIGHT FOOT PAIN: ICD-10-CM

## 2025-01-13 DIAGNOSIS — T14.8XXA WOUND INFECTION: ICD-10-CM

## 2025-01-13 DIAGNOSIS — L84 FOOT CALLUS: ICD-10-CM

## 2025-01-13 DIAGNOSIS — M80.00XD AGE-RELATED OSTEOPOROSIS WITH CURRENT PATHOLOGICAL FRACTURE WITH ROUTINE HEALING, SUBSEQUENT ENCOUNTER: ICD-10-CM

## 2025-01-13 DIAGNOSIS — L08.9 WOUND INFECTION: ICD-10-CM

## 2025-01-13 DIAGNOSIS — L97.522 SKIN ULCER OF LEFT GREAT TOE WITH FAT LAYER EXPOSED (HCC): ICD-10-CM

## 2025-01-13 DIAGNOSIS — Z87.81 HISTORY OF COMPRESSION FRACTURE OF SPINE: ICD-10-CM

## 2025-01-13 DIAGNOSIS — M06.00 SERONEGATIVE RHEUMATOID ARTHRITIS (HCC): ICD-10-CM

## 2025-01-13 LAB
CA-I BLD ISE-SCNC: 1.07 MMOL/L (ref 1.1–1.3)
CREAT BLD-MCNC: 1.3 MG/DL (ref 0.5–1.4)

## 2025-01-13 PROCEDURE — 99215 OFFICE O/P EST HI 40 MIN: CPT

## 2025-01-13 PROCEDURE — 36415 COLL VENOUS BLD VENIPUNCTURE: CPT

## 2025-01-13 PROCEDURE — 11055 PARING/CUTG B9 HYPRKER LES 1: CPT

## 2025-01-13 PROCEDURE — 82330 ASSAY OF CALCIUM: CPT

## 2025-01-13 PROCEDURE — 99214 OFFICE O/P EST MOD 30 MIN: CPT

## 2025-01-13 PROCEDURE — 96372 THER/PROPH/DIAG INJ SC/IM: CPT

## 2025-01-13 PROCEDURE — 11055 PARING/CUTG B9 HYPRKER LES 1: CPT | Mod: 59 | Performed by: NURSE PRACTITIONER

## 2025-01-13 PROCEDURE — 11042 DBRDMT SUBQ TIS 1ST 20SQCM/<: CPT

## 2025-01-13 PROCEDURE — 82565 ASSAY OF CREATININE: CPT

## 2025-01-13 PROCEDURE — 99215 OFFICE O/P EST HI 40 MIN: CPT | Mod: 25 | Performed by: NURSE PRACTITIONER

## 2025-01-13 PROCEDURE — 3078F DIAST BP <80 MM HG: CPT | Performed by: NURSE PRACTITIONER

## 2025-01-13 PROCEDURE — 3074F SYST BP LT 130 MM HG: CPT | Performed by: NURSE PRACTITIONER

## 2025-01-13 PROCEDURE — 700111 HCHG RX REV CODE 636 W/ 250 OVERRIDE (IP): Mod: JZ,TB | Performed by: INTERNAL MEDICINE

## 2025-01-13 PROCEDURE — 11042 DBRDMT SUBQ TIS 1ST 20SQCM/<: CPT | Performed by: NURSE PRACTITIONER

## 2025-01-13 RX ADMIN — DENOSUMAB 60 MG: 60 INJECTION SUBCUTANEOUS at 10:32

## 2025-01-13 ASSESSMENT — ENCOUNTER SYMPTOMS
COUGH: 0
FALLS: 0
NERVOUS/ANXIOUS: 0
FEVER: 0
DIAPHORESIS: 0
VOMITING: 0
WEAKNESS: 0
CLAUDICATION: 0
CHILLS: 0
BACK PAIN: 0
NAUSEA: 0
DEPRESSION: 0
SHORTNESS OF BREATH: 0
PALPITATIONS: 0
EYES NEGATIVE: 1
WHEEZING: 0

## 2025-01-13 ASSESSMENT — FIBROSIS 4 INDEX: FIB4 SCORE: 2.3

## 2025-01-13 NOTE — PROGRESS NOTES
Pt. Arrived to \[facility]\"" for Prolia injection. Pt. Confirms taking vitamin D and calcium supplements and denies any recent or planned dental procedures. Labs drawn from right forearm, gauze and coban applied. Lab results within parameters for Prolia injection. Injection given to back of left arm, well tolerated. Emailed schedulers for next appointment, pt. Left home in stable condition.

## 2025-01-13 NOTE — PROGRESS NOTES
OrthoPro prescription and signed release of information form for [facility] scanned into [employer]. Signed release faxed to [facility] office.    Home wound care orders placed as TBD, as patient may be receiving HH.

## 2025-01-13 NOTE — PROGRESS NOTES
Provider Encounter- Full Thickness wound    HISTORY OF PRESENT ILLNESS  Wound History:    START OF CARE IN CLINIC: 1/13/25    REFERRING PROVIDER: Ady Ocasio         HISTORY:  Presents to wound clinic with L great toe distal tip and left dorsal IP ulcer.  She also has a callus to right first MTH that is painful.  Patient had foot soak and callus removal treatment by  around 12/10/2024 and developed blisters to her left great toe that eventually ulcerated.  She followed up with urgent care 12/13/2024 and was referred to [facility] ED for further workup.  She was discharged on Augmentin and doxycycline.  Patient completed antibiotics.  Patient followed up with podiatrist Dr. Ocasio for further treatment and dressing changes.  Patient unable to perform her own dressing changes.  Patient was seen by podiatrist 1/7/2025.  Wound culture was collected of left toe ulcers and referral to [facility] wound care clinic was placed.  She was started on doxycycline that was eventually changed to ampicillin and Cipro based off of wound culture results.  Patient also has a callus to her right first MTH for several years.  Reported callus worsened after anterior tibialis repair. Followed with Dr. Moon in past for bilateral foot surgical procedures. Patient does have orthotics that are more than 1-year-old that she received initially from [facility]  but then later was modified by Debra.          Pertinent Medical History: RA, no DM, chronic kidney disease stage III    TOBACCO USE:  no   Occ alcohol use      Patient's problem list, allergies, and current medications reviewed and updated in [facility].      Interval History:  1/13/2025: Initial wound evaluation, initial provider Clinic visit with Nola LYLE, FNP-BC, CWON, CFCN.  Pt denies fevers, chills, nausea, vomiting.  Patient reports she did complete a course of doxycycline.  She recently had wound  cultures collected at podiatrist office on 1/7/2025.  She was prescribed ampicillin 4 times daily and Cipro twice daily both for 14 days.  Patient reports she has a Cipro allergy and has not started either antibiotic yet.  Wound culture results requested from podiatrist office.  Positive for E faecalis, MSSA, PSAR.  There is some mild erythema to dorsal great toe however no warmth.  Distal toe ulcer with no erythema, no edema.  Will initiate antimicrobial dressings and continue to monitor.  Rx to [facility] given for evaluation for orthotics        REVIEW OF SYSTEMS:   Review of Systems   Constitutional:  Negative for chills, diaphoresis and fever.   HENT:  Positive for hearing loss.    Eyes: Negative.    Respiratory:  Negative for cough, shortness of breath and wheezing.    Cardiovascular:  Negative for chest pain, palpitations and claudication.   Gastrointestinal:  Negative for nausea and vomiting.   Musculoskeletal:  Negative for back pain, falls and joint pain.   Skin:  Negative for itching and rash.        Left toe wounds, right bottom of foot callus and pain   Neurological:  Negative for weakness.   Psychiatric/Behavioral:  Negative for depression. The patient is not nervous/anxious.        PHYSICAL EXAMINATION:   /68   Pulse (!) 50 Comment: RN notified   Temp 35.9 °C (96.6 °F) (Temporal)   Resp 16   SpO2 97%     Physical Exam  Vitals reviewed.   HENT:      Head:      Comments: Hard of hearing  Cardiovascular:      Rate and Rhythm: Normal rate.      Pulses: Normal pulses.      Comments: Difficulty palpating pedal pulses secondary to edema.  Mono PT, DP biphasic and brisk to left foot    DP biphasic, PT audible, brisk to right foot  Pulmonary:      Effort: Pulmonary effort is normal.   Musculoskeletal:         General: Swelling, tenderness and deformity present.      Right lower leg: Edema present.      Left lower leg: Edema present.      Comments: Does have BLE edema, +4 pitting  Hammertoes/claw toes  bilaterally 1 through 5   Skin:     Comments: Left dorsal IP ulcer: Full-thickness, dried exudate with slough underneath, thick callus, no evidence of infection  Left plantar great toe distal tip: Full-thickness, thick callus, senescent tissue, no evidence of infection    Right plantar first MTH thick callus with pain upon palpation    Purple discoloration to hands   Neurological:      General: No focal deficit present.      Mental Status: She is alert.      Comments: With monofilament, patient able to sense 10 out of 10 on left foot, sense 7 out of 10 on right foot   Psychiatric:         Mood and Affect: Mood normal.         WOUND ASSESSMENT  Wound 01/13/25 Full Thickness Wound Toe, Hallux Dorsal Left (Active)   Wound Image    01/13/25 1426   Site Assessment Pink;Red 01/13/25 1426   Periwound Assessment Flaky;Edema;Fragile 01/13/25 1426   Margins Attached edges 01/13/25 1426   Drainage Amount Small 01/13/25 1426   Drainage Description Serosanguineous 01/13/25 1426   Treatments Topical Lidocaine;Provider debridement;Cleansed;Site care 01/13/25 1426   Wound Cleansing Hypochlorus Acid 01/13/25 1426   Periwound Protectant Skin Protectant Wipes to Periwound 01/13/25 1426   Dressing Cleansing/Solutions Not Applicable 01/13/25 1426   Dressing Options Hydrofera Blue Ready;Hypafix Tape 01/13/25 1426   Dressing Change/Treatment Frequency Every 72 hrs, and As Needed 01/13/25 1426   Wound Team Following Weekly 01/13/25 1426   Non-staged Wound Description Full thickness 01/13/25 1426   Post-Procedure Length (cm) 0.6 cm 01/13/25 1426   Post-Procedure Width (cm) 1.4 cm 01/13/25 1426   Post-Procedure Depth (cm) 0.2 cm 01/13/25 1426   Post-Procedure Surface Area (cm^2) 0.84 cm^2 01/13/25 1426   Post-Procedure Volume (cm^3) 0.168 cm^3 01/13/25 1426   Tunneling (cm) 0 cm 01/13/25 1426   Undermining (cm) 0 cm 01/13/25 1426   Wound Odor None 01/13/25 1426   Pulses Right;Left;DP;PT;Doppler 01/13/25 1426   Right Foot Monofilament  10-point exam (Sensate) 7/10 01/13/25 1426   Left Foot Monofilament 10-point exam (Sensate) 10/10 01/13/25 1426   Exposed Structures None 01/13/25 1426   Number of days: 0       Wound 01/13/25 Full Thickness Wound Toe, Hallux Distal Left (Active)   Wound Image    01/13/25 1426   Site Assessment Red 01/13/25 1426   Periwound Assessment Callused;Edema 01/13/25 1426   Margins Unattached edges 01/13/25 1426   Drainage Amount Small 01/13/25 1426   Drainage Description Serosanguineous 01/13/25 1426   Treatments Topical Lidocaine;Provider debridement;Cleansed;Site care 01/13/25 1426   Wound Cleansing Hypochlorus Acid 01/13/25 1426   Periwound Protectant Skin Protectant Wipes to Periwound 01/13/25 1426   Dressing Cleansing/Solutions Not Applicable 01/13/25 1426   Dressing Options Hydrofera Blue Ready;Hypafix Tape 01/13/25 1426   Dressing Change/Treatment Frequency Every 72 hrs, and As Needed 01/13/25 1426   Wound Team Following Weekly 01/13/25 1426   Non-staged Wound Description Full thickness 01/13/25 1426   Post-Procedure Length (cm) 0.5 cm 01/13/25 1426   Post-Procedure Width (cm) 0.5 cm 01/13/25 1426   Post-Procedure Depth (cm) 0.1 cm 01/13/25 1426   Post-Procedure Surface Area (cm^2) 0.25 cm^2 01/13/25 1426   Post-Procedure Volume (cm^3) 0.025 cm^3 01/13/25 1426   Tunneling (cm) 0 cm 01/13/25 1426   Undermining (cm) 0 cm 01/13/25 1426   Wound Odor None 01/13/25 1426   Exposed Structures None 01/13/25 1426   Number of days: 0       PROCEDURE: Debridement of left great toe ulcers  -2% viscous lidocaine applied topically to wound bed for approximately 5 minutes prior to debridement  -Scalpel, curette used to debride wound beds.  Excisional debridement was performed to remove devitalized tissue until healthy, bleeding tissue was visualized.   Entire surface of both wound, 1.09 cm2 debrided.  Tissue debrided into the subcutaneous layer.    -Bleeding controlled with manual pressure.    -Wound care completed by wound RN,  refer to flowsheet  -Patient tolerated the procedure well, without c/o pain or discomfort.       Procedure: Callus debridement right first MTH  - Using scalpel, excised hyper keratinized tissue from right first MTH revealing intact skin.  No ulceration.  Total area debrided less than 20 cm² to skin level.  No bleeding.  Skin care completed by RN.    Predebridement    Postdebridement      Pertinent Labs and Diagnostics:    Labs:     A1c:   Lab Results   Component Value Date/Time    HBA1C 5.5 02/10/2019 10:22 PM          IMAGING: Left foot x-ray 12/13/2024 1.  Chronic deformities of the second third and fifth digits. No acute fracture.  2.  Mild soft tissue swelling over the dorsum of the forefoot, nonspecific.  3.  Calcific arteriopathy.    VASCULAR STUDIES: None    LAST  WOUND CULTURE:  DATE :   Lab Results   Component Value Date/Time    CULTRSULT - (A) 01/06/2025 11:27 AM    CULTRSULT Enterococcus faecalis  Heavy growth   (A) 01/06/2025 11:27 AM    CULTRSULT Staphylococcus aureus  Heavy growth   (A) 01/06/2025 11:27 AM    CULTRSULT Pseudomonas aeruginosa  Light growth   (A) 01/06/2025 11:27 AM         ASSESSMENT AND PLAN:     1. Skin ulcer of left great toe with fat layer exposed (HCC)  1/13/2025: Initial evaluation, initial provider visit.  Initial ulcers noted by patient around 12/10/2024 after she had foot soak and callus removal treatment by   - Reviewed previous photos from initial injury.  Ulcer has made significant progress  -Minimal slough, thick callus to both dorsal left IP ulcer and left great toe distal tip ulcer  - Excisional debridement performed today.  Medically necessary to promote wound healing.  -Patient to follow-up weekly at wound care clinic  -Referral to home health placed.  Lives at assisted living facility.  Unable to perform dressing changes independently secondary to multiple comorbidities.  Is dependent on others for transportation and care  -Recommend patient be followed by  home health 1 time per week  -Tubigrip    Wound care: Hydrofera Blue, Hypafix tape    2. Foot callus  3. Right foot pain  1/13/2025: Right plantar first MTH callus with pain upon palpation.  At risk for ulceration  -Debridement of callus performed.  Medically necessary to prevent ulceration  -No ulceration noted postdebridement of callus  -Rx provided to patient [facility] for evaluation of new orthotics and to adjust current orthotics while new orthotics are in process    4. Seronegative rheumatoid arthritis (HCC)  1/13/2025: Complicating factor.  Follows with Dr. Moon for patient has had bilateral foot surgeries to correct deformities  -Receiving Prolia injections    5. Wound infection    1/13/2025: Wound culture collected at podiatrist office on 1/7/2025.  Positive for E faecalis, MSSA, PSA ER.  Podiatrist prescribed ampicillin 250 mg 4 times daily for 14 days and Cipro 750 mg twice daily for 14 days  - Patient reports that she is allergic to Cipro and has not started the antibiotic.  She also has not started the ampicillin.  - Prior to this patient did initially receive a course of doxycycline which she completed in December 2024.  - Reviewed previous photos from initial injury.  Ulcer has made significant progress  - She does have mild inflammation to the dorsal aspect of the left great toe.  At this point Puracyn spray soak was initiated and advanced antimicrobial dressings were initiated  - Continue to monitor          PATIENT EDUCATION  - Importance of adequate nutrition for wound healing  -Advised to go to ER for any increased redness, swelling, drainage, or odor, or if patient develops fever, chills, nausea or vomiting.     My total time spent caring for the patient on the day of the encounter was 40 minutes.   This does not include time spent on separately billable procedures/tests.       Please note that this note may have been created using voice recognition software. I have worked with technical  experts from [facility] to optimize the interface.  I have made every reasonable attempt to correct obvious errors, but there may be errors of grammar and possibly content that I did not discover before finalizing the note.    N

## 2025-01-13 NOTE — PATIENT INSTRUCTIONS
-Keep your wound dressing clean, dry, and intact. Change dressing every 3 days AND if it's over saturated, soiled or falls off.     -On the days you are not changing your dressings, avoid getting the dressings wet. It is not harmful to get your wound wet when you are washing your wound before applying a new dressing. If you need to change your dressings at home: Wash your wound with normal saline, wound cleanser, or unscented soap and water prior to applying your new dressings. Please avoid cleansing with hydrogen peroxide or rubbing alcohol. Hydrogen peroxide and rubbing alcohol are toxic to new tissue and skin cells.    -Should you experience any significant changes in your wound(s), such as infection (redness, swelling, localized heat, increased pain, fever > 101 F, chills) or have any questions regarding your home care instructions, please contact the wound center at [phone]. If after hours, contact your primary care physician or go to the hospital emergency room.  If you are admitted to any hospital, you will need a new referral to come back to the wound clinic. Any scheduled appointments that you already have may be cancelled.      -If you are 5 or more minutes late for an appointment, we reserve the right to cancel and reschedule that appointment. Additionally, if you are habitually late or not showing (3 late cancellations and/or no shows), we reserve the right to cancel your remaining appointments and it will be your responsibility to obtain a new referral if services are still needed.

## 2025-01-14 ENCOUNTER — PHARMACY VISIT (OUTPATIENT)
Dept: PHARMACY | Facility: MEDICAL CENTER | Age: OVER 89
End: 2025-01-14
Payer: COMMERCIAL

## 2025-01-14 ENCOUNTER — HOME HEALTH ADMISSION (OUTPATIENT)
Dept: HOME HEALTH SERVICES | Facility: HOME HEALTHCARE | Age: OVER 89
End: 2025-01-14
Payer: MEDICARE

## 2025-01-18 ENCOUNTER — HOME CARE VISIT (OUTPATIENT)
Dept: HOME HEALTH SERVICES | Facility: HOME HEALTHCARE | Age: OVER 89
End: 2025-01-18
Payer: MEDICARE

## 2025-01-18 PROCEDURE — 665001 SOC-HOME HEALTH

## 2025-01-18 PROCEDURE — 665005 NO-PAY RAP - HOME HEALTH

## 2025-01-18 PROCEDURE — G0299 HHS/HOSPICE OF RN EA 15 MIN: HCPCS

## 2025-01-18 PROCEDURE — 665999 HH PPS REVENUE DEBIT

## 2025-01-18 PROCEDURE — 665998 HH PPS REVENUE CREDIT

## 2025-01-18 ASSESSMENT — ENCOUNTER SYMPTOMS
BOWEL PATTERN NORMAL: 1
PAIN LOCATION: RIGHT NECK/SHOULDER
PAIN LOCATION - PAIN SEVERITY: 4/10
STOOL FREQUENCY: DAILY
PAIN SEVERITY GOAL: 2/10
LOWEST PAIN SEVERITY IN PAST 24 HOURS: 4/10
PAIN LOCATION: LEFT FOOT
PAIN LOCATION - PAIN SEVERITY: 0/10
PAIN LOCATION - PAIN QUALITY: STINGING
LAST BOWEL MOVEMENT: 67223
VOMITING: DENIES
HIGHEST PAIN SEVERITY IN PAST 24 HOURS: 6/10
PAIN LOCATION - PAIN FREQUENCY: CONSTANT
PAIN LOCATION - RELIEVING FACTORS: REST, MEDS
PAIN LOCATION - EXACERBATING FACTORS: MOVEMENT
PAIN LOCATION - PAIN QUALITY: ACHE
NAUSEA: DENIES
PAIN LOCATION - EXACERBATING FACTORS: WALKING
SUBJECTIVE PAIN PROGRESSION: WAXING AND WANING
PAIN: 1
PAIN LOCATION - PAIN FREQUENCY: INTERMITTENT

## 2025-01-18 NOTE — CASE COMMUNICATION
Primary dx/Skilled need: 88 yo female admitted to  for wound care of Left great toe.  PMH: RA, HTN, hypotension, HLD, [place], PVD, DVT, osteoporosis, Raynaud's disease, depression, neuropathic pain, and UTIs.  SN frequency: 1w2, 2w2 (Wound Center appointments 1/21/25, 1/27/25, 2/3/25, 2/10/25)  Zip code: [id number]  Disciplines ordered:   Insurance and authorization: [facility]  Certification period: 1/18/25 - 3/18/25  Special considerations: [place] [facility] resident

## 2025-01-19 PROCEDURE — 665998 HH PPS REVENUE CREDIT

## 2025-01-19 PROCEDURE — 665999 HH PPS REVENUE DEBIT

## 2025-01-20 VITALS
DIASTOLIC BLOOD PRESSURE: 58 MMHG | SYSTOLIC BLOOD PRESSURE: 102 MMHG | HEART RATE: 66 BPM | TEMPERATURE: 97.8 F | RESPIRATION RATE: 16 BRPM

## 2025-01-20 PROCEDURE — 665999 HH PPS REVENUE DEBIT

## 2025-01-20 PROCEDURE — 665998 HH PPS REVENUE CREDIT

## 2025-01-20 ASSESSMENT — ENCOUNTER SYMPTOMS
MENTAL STATUS CHANGE: 0
DEBILITATING PAIN: 1

## 2025-01-21 ENCOUNTER — OFFICE VISIT (OUTPATIENT)
Dept: WOUND CARE | Facility: MEDICAL CENTER | Age: OVER 89
End: 2025-01-21
Attending: PODIATRIST
Payer: MEDICARE

## 2025-01-21 DIAGNOSIS — M06.00 SERONEGATIVE RHEUMATOID ARTHRITIS (HCC): ICD-10-CM

## 2025-01-21 DIAGNOSIS — L08.9 WOUND INFECTION: ICD-10-CM

## 2025-01-21 DIAGNOSIS — L84 FOOT CALLUS: ICD-10-CM

## 2025-01-21 DIAGNOSIS — T14.8XXA WOUND INFECTION: ICD-10-CM

## 2025-01-21 DIAGNOSIS — M79.671 RIGHT FOOT PAIN: ICD-10-CM

## 2025-01-21 DIAGNOSIS — L97.522 SKIN ULCER OF LEFT GREAT TOE WITH FAT LAYER EXPOSED (HCC): ICD-10-CM

## 2025-01-21 PROCEDURE — 11042 DBRDMT SUBQ TIS 1ST 20SQCM/<: CPT | Performed by: NURSE PRACTITIONER

## 2025-01-21 PROCEDURE — 11042 DBRDMT SUBQ TIS 1ST 20SQCM/<: CPT

## 2025-01-21 PROCEDURE — 665998 HH PPS REVENUE CREDIT

## 2025-01-21 PROCEDURE — 665999 HH PPS REVENUE DEBIT

## 2025-01-21 PROCEDURE — 99213 OFFICE O/P EST LOW 20 MIN: CPT

## 2025-01-21 PROCEDURE — 99213 OFFICE O/P EST LOW 20 MIN: CPT | Mod: 25 | Performed by: NURSE PRACTITIONER

## 2025-01-21 NOTE — PROGRESS NOTES
Updated home wound care orders placed for [facility] via [facility].    Right plantar MTH1 not debrided this visit. Remains callused but sore per patient. Covered with non-adhesive foam and tape for protection.

## 2025-01-21 NOTE — PROGRESS NOTES
Provider Encounter- Full Thickness wound    HISTORY OF PRESENT ILLNESS  Wound History:    START OF CARE IN CLINIC: 1/13/25    REFERRING PROVIDER: Ady Ocasio      WOUND: Full-thickness   LOCATION: Left distal hallux     WOUND: Full-thickness   LOCATION: Left dorsal hallux, over IP joint       WOUND: Callus   LOCATION: Right plantar first MTH      HISTORY:  Presents to wound clinic with L great toe distal tip and left dorsal IP ulcer.  She also has a callus to right first MTH that is painful.  Patient had foot soak and callus removal treatment by  around 12/10/2024 and developed blisters to her left great toe that eventually ulcerated.  She followed up with urgent care 12/13/2024 and was referred to [facility] ED for further workup.  She was discharged on Augmentin and doxycycline.  Patient completed antibiotics.  Patient followed up with podiatrist Dr. Ocasio for further treatment and dressing changes.  Patient unable to perform her own dressing changes.  Patient was seen by podiatrist 1/7/2025.  Wound culture was collected of left toe ulcers and referral to [facility] wound care clinic was placed.  She was started on doxycycline that was eventually changed to ampicillin and Cipro based off of wound culture results.  Patient also has a callus to her right first MTH for several years.  Reported callus worsened after anterior tibialis repair. Followed with Dr. Moon in past for bilateral foot surgical procedures. Patient does have orthotics that are more than 1-year-old that she received initially from [facility]  but then later was modified by Debra.          Pertinent Medical History: RA, no DM, chronic kidney disease stage III    TOBACCO USE:  no   Occ alcohol use      Patient's problem list, allergies, and current medications reviewed and updated in [facility].      Interval History:  1/13/2025: Initial wound evaluation, initial provider Clinic visit  with RUTH Villalobos, KIKI, JEREMY.  Pt denies fevers, chills, nausea, vomiting.  Patient reports she did complete a course of doxycycline.  She recently had wound cultures collected at podiatrist office on 1/7/2025.  She was prescribed ampicillin 4 times daily and Cipro twice daily both for 14 days.  Patient reports she has a Cipro allergy and has not started either antibiotic yet.  Wound culture results requested from podiatrist office.  Positive for E faecalis, MSSA, PSAR.  There is some mild erythema to dorsal great toe however no warmth.  Distal toe ulcer with no erythema, no edema.  Will initiate antimicrobial dressings and continue to monitor.  Rx to [facility] given for evaluation for orthotics      1/21/2025 : Clinic visit with DARIELA Yost FNP-BC, JOSESITO, JEREMY.   Patient states she is feeling well overall.  Her wounds show some improvement.  Callus to right plantar first MTH stable, has not built up much since last visit.  She has not yet started process for new shoes and inserts, was provided with Rx last week.        REVIEW OF SYSTEMS:   Unchanged from previous clinic visit on 1/13/2025, except as documented in interval history above    PHYSICAL EXAMINATION:   There were no vitals taken for this visit.    Physical Exam  Vitals reviewed.   HENT:      Head:      Comments: Hard of hearing  Cardiovascular:      Rate and Rhythm: Normal rate.      Pulses: Normal pulses.      Comments: DP pulses palpable, unable to palpate PT pulses.  Both feet are warm to touch  Pulmonary:      Effort: Pulmonary effort is normal.   Musculoskeletal:         General: Tenderness and deformity present.      Right lower leg: Edema present.      Left lower leg: Edema present.      Comments: Dependent edema bilateral lower extremities, +4  Hammertoes/claw toes bilaterally 1 through 5  Rheumatoid arthritic changes to both hands   Skin:     Comments: Left dorsal IP ulcer: Full-thickness.  Crusting to periwound.  Thin  layer of slough to wound bed.  Scant drainage, no odor.  No periwound erythema or induration    Left plantar great toe distal tip: Full-thickness,.  Measures smaller.  Adherent brown tissue to wound bed.  Periwound callused.  Scant drainage.  No evidence of infection    Right plantar first MTH callus- stable.  No significant buildup     Neurological:      General: No focal deficit present.      Mental Status: She is alert.      Comments: Sensation intact to both feet         WOUND ASSESSMENT  Wound 01/13/25 Full Thickness Wound Toe, Hallux Dorsal Left (Active)   Wound Image    01/21/25 1339   Site Assessment [place] 01/21/25 1339   Periwound Assessment Dry;Flaky 01/21/25 1339   Margins Attached edges 01/21/25 1339   Drainage Amount Small 01/21/25 1339   Drainage Description Serosanguineous 01/21/25 1339   Treatments Cleansed;Topical Lidocaine;Provider debridement;Site care 01/21/25 1339   Wound Cleansing Hypochlorus Acid 01/21/25 1339   Periwound Protectant Skin Protectant Wipes to Periwound 01/21/25 1339   Dressing Cleansing/Solutions Not Applicable 01/21/25 1339   Dressing Options Hydrofera Blue Ready;Hypafix Tape 01/21/25 1339   Dressing Change/Treatment Frequency Every 72 hrs, and As Needed 01/21/25 1339   Wound Team Following Weekly 01/21/25 1339   Non-staged Wound Description Full thickness 01/21/25 1339   Post-Procedure Length (cm) 0.4 cm 01/21/25 1339   Post-Procedure Width (cm) 0.5 cm 01/21/25 1339   Post-Procedure Depth (cm) 0.1 cm 01/21/25 1339   Post-Procedure Surface Area (cm^2) 0.2 cm^2 01/21/25 1339   Post-Procedure Volume (cm^3) 0.02 cm^3 01/21/25 1339   Tunneling (cm) 0 cm 01/21/25 1339   Undermining (cm) 0 cm 01/21/25 1339   Wound Odor None 01/21/25 1339   Pulses Right;Left;DP;PT;Doppler 01/13/25 1426   Right Foot Monofilament 10-point exam (Sensate) 7/10 01/13/25 1426   Left Foot Monofilament 10-point exam (Sensate) 10/10 01/13/25 1426   Exposed Structures None 01/21/25 1339   Number of days: 8        Wound 01/13/25 Full Thickness Wound Toe, Hallux Distal Left (Active)   Wound Image    01/21/25 1339   Site Assessment Red 01/21/25 1339   Periwound Assessment Callused 01/21/25 1339   Margins Unattached edges 01/21/25 1339   Drainage Amount Small 01/21/25 1339   Drainage Description Serosanguineous 01/21/25 1339   Treatments Cleansed;Topical Lidocaine;Provider debridement;Site care 01/21/25 1339   Wound Cleansing Hypochlorus Acid 01/21/25 1339   Periwound Protectant Skin Protectant Wipes to Periwound 01/21/25 1339   Dressing Cleansing/Solutions Not Applicable 01/21/25 1339   Dressing Options Hydrofera Blue Ready;Hypafix Tape 01/21/25 1339   Dressing Change/Treatment Frequency Every 72 hrs, and As Needed 01/21/25 1339   Wound Team Following Weekly 01/21/25 1339   Non-staged Wound Description Full thickness 01/21/25 1339   Post-Procedure Length (cm) 0.5 cm 01/21/25 1339   Post-Procedure Width (cm) 0.7 cm 01/21/25 1339   Post-Procedure Depth (cm) 0.1 cm 01/21/25 1339   Post-Procedure Surface Area (cm^2) 0.35 cm^2 01/21/25 1339   Post-Procedure Volume (cm^3) 0.035 cm^3 01/21/25 1339   Tunneling (cm) 0 cm 01/21/25 1339   Undermining (cm) 0 cm 01/21/25 1339   Wound Odor None 01/21/25 1339   Exposed Structures None 01/21/25 1339   Number of days: 8       Wound 01/18/25 Other (comment) Foot Plantar Right (Active)   Number of days: 3       PROCEDURE: Debridement of left great toe ulcers x 2  -2% viscous lidocaine applied topically to wound bed for approximately 5 minutes prior to debridement  -Curette used to debride wound beds.  Excisional debridement was performed to remove devitalized tissue until healthy, bleeding tissue was visualized.   Entire surface of both wound, 0.55 cm² debrided.  Tissue debrided into the subcutaneous layer.    -Bleeding controlled with manual pressure.    -Wound care completed by wound RN, refer to flowsheet  -Patient tolerated the procedure well, without c/o pain or discomfort.          Pertinent Labs and Diagnostics:    Labs:     A1c:   Lab Results   Component Value Date/Time    HBA1C 5.5 02/10/2019 10:22 PM          IMAGING: Left foot x-ray 12/13/2024 1.  Chronic deformities of the second third and fifth digits. No acute fracture.  2.  Mild soft tissue swelling over the dorsum of the forefoot, nonspecific.  3.  Calcific arteriopathy.    VASCULAR STUDIES: None    LAST  WOUND CULTURE:  DATE :   Lab Results   Component Value Date/Time    CULTRSULT - (A) 01/06/2025 11:27 AM    CULTRSULT Enterococcus faecalis  Heavy growth   (A) 01/06/2025 11:27 AM    CULTRSULT Staphylococcus aureus  Heavy growth   (A) 01/06/2025 11:27 AM    CULTRSULT Pseudomonas aeruginosa  Light growth   (A) 01/06/2025 11:27 AM         ASSESSMENT AND PLAN:     1. Skin ulcer of left great toe with fat layer exposed (HCC)    1/21/2025: Ulcers noted by patient around 12/10/2024 after she had foot soak and callus removal treatment by .  Both wounds measure smaller today    - Excisional debridement performed today.  Medically necessary to promote wound healing.  -Patient to return to clinic weekly for assessment and debridement  -Home health to see patient 1 time per week in between clinic visits for dressing changes  -Healing of these ulcers complicated by severe deformities of toes and feet due to RA      Wound care: Hydrofera Blue, Hypafix tape    2. Foot callus  3. Right foot pain    1/21/2025: Right plantar first MTH callus with pain upon palpation.  At risk for ulceration  -No significant buildup of callus over the past week  -Monitor each clinic visit, debride as indicated  -Patient encouraged to begin process for new orthotic shoes and inserts.  Rx provided last visit    4. Seronegative rheumatoid arthritis (HCC)    1/21/2025: Complicating factor.  Follows with Dr. Moon for patient has had bilateral foot surgeries to correct deformities in both feet  -Receiving Prolia injections    5. Wound  infection    1/21/2025: Wound culture collected at podiatrist office on 1/7/2025.  Positive for E faecalis, MSSA, PSA ER.  Podiatrist prescribed ampicillin 250 mg 4 times daily for 14 days and Cipro 750 mg twice daily for 14 days  - Patient reports that she is allergic to Cipro and has not started the antibiotic.  She also has not started the ampicillin.  - Prior to this patient did initially receive a course of doxycycline which she completed in December 2024.  -No evidence of soft tissue infection today  -Monitor for signs and symptoms of infection each clinic visit          PATIENT EDUCATION  - Importance of adequate nutrition for wound healing  -Advised to go to ER for any increased redness, swelling, drainage, or odor, or if patient develops fever, chills, nausea or vomiting.     My total time spent caring for the patient on the day of the encounter was 20 minutes.   This does not include time spent on separately billable procedures/tests.       Please note that this note may have been created using voice recognition software. I have worked with technical experts from [facility] to optimize the interface.  I have made every reasonable attempt to correct obvious errors, but there may be errors of grammar and possibly content that I did not discover before finalizing the note.    N

## 2025-01-21 NOTE — PATIENT INSTRUCTIONS
-Keep your wound dressing clean, dry, and intact. Change dressing every 3 days AND if it's over saturated, soiled or falls off.     -On the days you are not changing your dressings, avoid getting the dressings wet. It is not harmful to get your wound wet when you are washing your wound before applying a new dressing. If you need to change your dressings at home: Wash your wound with normal saline, wound cleanser, or unscented soap and water prior to applying your new dressings. Please avoid cleansing with hydrogen peroxide or rubbing alcohol. Hydrogen peroxide and rubbing alcohol are toxic to new tissue and skin cells.    -Should you experience any significant changes in your wound(s), such as infection (redness, swelling, localized heat, increased pain, fever > 101 F, chills) or have any questions regarding your home care instructions, please contact the wound center at [phone]. If after hours, contact your primary care physician or go to the hospital emergency room.  If you are admitted to any hospital, you will need a new referral to come back to the wound clinic. Any scheduled appointments that you already have may be cancelled.      -If you are 5 or more minutes late for an appointment, we reserve the right to cancel and reschedule that appointment. Additionally, if you are habitually late or not showing (3 late cancellations and/or no shows), we reserve the right to cancel your remaining appointments and it will be your responsibility to obtain a new referral if services are still needed.

## 2025-01-22 PROCEDURE — 665998 HH PPS REVENUE CREDIT

## 2025-01-22 PROCEDURE — 665999 HH PPS REVENUE DEBIT

## 2025-01-23 ENCOUNTER — HOME CARE VISIT (OUTPATIENT)
Dept: HOME HEALTH SERVICES | Facility: HOME HEALTHCARE | Age: OVER 89
End: 2025-01-23
Payer: MEDICARE

## 2025-01-23 PROCEDURE — 665999 HH PPS REVENUE DEBIT

## 2025-01-23 PROCEDURE — 665998 HH PPS REVENUE CREDIT

## 2025-01-23 PROCEDURE — G0299 HHS/HOSPICE OF RN EA 15 MIN: HCPCS

## 2025-01-24 ENCOUNTER — APPOINTMENT (OUTPATIENT)
Dept: MEDICAL GROUP | Facility: PHYSICIAN GROUP | Age: OVER 89
End: 2025-01-24
Payer: MEDICARE

## 2025-01-24 VITALS
SYSTOLIC BLOOD PRESSURE: 136 MMHG | TEMPERATURE: 98.2 F | HEART RATE: 62 BPM | RESPIRATION RATE: 16 BRPM | DIASTOLIC BLOOD PRESSURE: 60 MMHG

## 2025-01-24 PROCEDURE — 665998 HH PPS REVENUE CREDIT

## 2025-01-24 PROCEDURE — 665999 HH PPS REVENUE DEBIT

## 2025-01-24 ASSESSMENT — ENCOUNTER SYMPTOMS
BOWEL PATTERN NORMAL: 1
PAIN LOCATION: LEFT FOOT
PAIN LOCATION - PAIN SEVERITY: 5/10
PAIN: 1
PAIN LOCATION - PAIN QUALITY: ACHY
LAST BOWEL MOVEMENT: 67229
PAIN LOCATION - PAIN FREQUENCY: INTERMITTENT
LOWEST PAIN SEVERITY IN PAST 24 HOURS: 0/10
NAUSEA: DENIES
SUBJECTIVE PAIN PROGRESSION: UNCHANGED
PAIN LOCATION - EXACERBATING FACTORS: WALKING
HIGHEST PAIN SEVERITY IN PAST 24 HOURS: 5/10
VOMITING: DENIES
PAIN SEVERITY GOAL: 0/10
PAIN LOCATION - RELIEVING FACTORS: REST

## 2025-01-25 PROCEDURE — 665998 HH PPS REVENUE CREDIT

## 2025-01-25 PROCEDURE — 665999 HH PPS REVENUE DEBIT

## 2025-01-26 PROCEDURE — 665999 HH PPS REVENUE DEBIT

## 2025-01-26 PROCEDURE — 665998 HH PPS REVENUE CREDIT

## 2025-01-27 ENCOUNTER — OFFICE VISIT (OUTPATIENT)
Dept: WOUND CARE | Facility: MEDICAL CENTER | Age: OVER 89
End: 2025-01-27
Attending: PODIATRIST
Payer: MEDICARE

## 2025-01-27 ENCOUNTER — HOME CARE VISIT (OUTPATIENT)
Dept: HOME HEALTH SERVICES | Facility: HOME HEALTHCARE | Age: OVER 89
End: 2025-01-27
Payer: MEDICARE

## 2025-01-27 VITALS
DIASTOLIC BLOOD PRESSURE: 62 MMHG | OXYGEN SATURATION: 95 % | RESPIRATION RATE: 18 BRPM | HEART RATE: 70 BPM | SYSTOLIC BLOOD PRESSURE: 126 MMHG | TEMPERATURE: 97.1 F

## 2025-01-27 DIAGNOSIS — M79.671 RIGHT FOOT PAIN: ICD-10-CM

## 2025-01-27 DIAGNOSIS — L08.9 WOUND INFECTION: ICD-10-CM

## 2025-01-27 DIAGNOSIS — L97.522 SKIN ULCER OF LEFT GREAT TOE WITH FAT LAYER EXPOSED (HCC): ICD-10-CM

## 2025-01-27 DIAGNOSIS — M06.00 SERONEGATIVE RHEUMATOID ARTHRITIS (HCC): ICD-10-CM

## 2025-01-27 DIAGNOSIS — L84 FOOT CALLUS: ICD-10-CM

## 2025-01-27 DIAGNOSIS — T14.8XXA WOUND INFECTION: ICD-10-CM

## 2025-01-27 PROCEDURE — 3074F SYST BP LT 130 MM HG: CPT | Performed by: STUDENT IN AN ORGANIZED HEALTH CARE EDUCATION/TRAINING PROGRAM

## 2025-01-27 PROCEDURE — 3078F DIAST BP <80 MM HG: CPT | Performed by: STUDENT IN AN ORGANIZED HEALTH CARE EDUCATION/TRAINING PROGRAM

## 2025-01-27 PROCEDURE — 11042 DBRDMT SUBQ TIS 1ST 20SQCM/<: CPT | Performed by: STUDENT IN AN ORGANIZED HEALTH CARE EDUCATION/TRAINING PROGRAM

## 2025-01-27 PROCEDURE — 11042 DBRDMT SUBQ TIS 1ST 20SQCM/<: CPT

## 2025-01-27 PROCEDURE — 665998 HH PPS REVENUE CREDIT

## 2025-01-27 PROCEDURE — 665999 HH PPS REVENUE DEBIT

## 2025-01-27 ASSESSMENT — ACTIVITIES OF DAILY LIVING (ADL): OASIS_M1830: 03

## 2025-01-27 NOTE — PROGRESS NOTES
Provider Encounter- Full Thickness wound    HISTORY OF PRESENT ILLNESS  Wound History:    START OF CARE IN CLINIC: 1/13/25    REFERRING PROVIDER: Ady Ocasio      WOUND: Full-thickness   LOCATION: Left distal hallux     WOUND: Full-thickness   LOCATION: Left dorsal hallux, over IP joint       WOUND: Callus   LOCATION: Right plantar first MTH      HISTORY:  Presents to wound clinic with L great toe distal tip and left dorsal IP ulcer.  She also has a callus to right first MTH that is painful.  Patient had foot soak and callus removal treatment by  around 12/10/2024 and developed blisters to her left great toe that eventually ulcerated.  She followed up with urgent care 12/13/2024 and was referred to [facility] ED for further workup.  She was discharged on Augmentin and doxycycline.  Patient completed antibiotics.  Patient followed up with podiatrist Dr. Ocasio for further treatment and dressing changes.  Patient unable to perform her own dressing changes.  Patient was seen by podiatrist 1/7/2025.  Wound culture was collected of left toe ulcers and referral to [facility] wound care clinic was placed.  She was started on doxycycline that was eventually changed to ampicillin and Cipro based off of wound culture results.  Patient also has a callus to her right first MTH for several years.  Reported callus worsened after anterior tibialis repair. Followed with Dr. Moon in past for bilateral foot surgical procedures. Patient does have orthotics that are more than 1-year-old that she received initially from [facility]  but then later was modified by Debra.    Pertinent Medical History: RA, no DM, chronic kidney disease stage III    TOBACCO USE:  no   Occ alcohol use      Patient's problem list, allergies, and current medications reviewed and updated in [facility].      Interval History:  1/13/2025: Initial wound evaluation, initial provider Clinic visit with  Nola LYLE, ROLANDO-BC, KIKI, CFCN.  Pt denies fevers, chills, nausea, vomiting.  Patient reports she did complete a course of doxycycline.  She recently had wound cultures collected at podiatrist office on 1/7/2025.  She was prescribed ampicillin 4 times daily and Cipro twice daily both for 14 days.  Patient reports she has a Cipro allergy and has not started either antibiotic yet.  Wound culture results requested from podiatrist office.  Positive for E faecalis, MSSA, PSAR.  There is some mild erythema to dorsal great toe however no warmth.  Distal toe ulcer with no erythema, no edema.  Will initiate antimicrobial dressings and continue to monitor.  Rx to [facility] given for evaluation for orthotics    1/21/2025 : Clinic visit with DARIELA Yost, RUTH, JOSESITO, JEREMY.   Patient states she is feeling well overall.  Her wounds show some improvement.  Callus to right plantar first MTH stable, has not built up much since last visit.  She has not yet started process for new shoes and inserts, was provided with Rx last week.    1/27/2025: Clinic visit with Alexandre Wilkerson MD. Patient reports doing ok, denies any acute issues. No signs or symptoms of infection. Dorsal left toe wound near resolved, distal smaller. Calluses remain stable. Patient reports she has has appointment later today to discuss and be fitted for new custom inserts.    REVIEW OF SYSTEMS:   Unchanged from previous clinic visit on 1/21/2025, except as documented in interval history above    PHYSICAL EXAMINATION:   /62   Pulse 70   Temp 36.2 °C (97.1 °F) (Temporal)   Resp 18   SpO2 95%     Physical Exam  Vitals reviewed.   HENT:      Head:      Comments: Hard of hearing  Cardiovascular:      Rate and Rhythm: Normal rate.      Pulses: Normal pulses.      Comments: DP pulses palpable, unable to palpate PT pulses.  Both feet are warm to touch  Pulmonary:      Effort: Pulmonary effort is normal.   Musculoskeletal:         General:  Tenderness and deformity present.      Right lower leg: Edema present.      Left lower leg: Edema present.      Comments: Dependent edema bilateral lower extremities, +4  Hammertoes/claw toes bilaterally 1 through 5  Rheumatoid arthritic changes to both hands   Skin:     Comments: Left dorsal IP ulcer: Wound smaller, near resolution. Thin fragile epithelium forming. No evidence of infection.    Left plantar great toe distal tip: Wound appears to be improving, superficial with new callus starting to form. No evidence of infection.    Right plantar first MTH callus- Stable     Neurological:      General: No focal deficit present.      Mental Status: She is alert.      Comments: Sensation intact to both feet         WOUND ASSESSMENT  Wound 01/13/25 Full Thickness Wound Toe, Hallux Dorsal Left (Active)   Wound Image    01/27/25 1000   Site Assessment [place] 01/27/25 1000   Periwound Assessment Flaky;Dry 01/27/25 1000   Margins Attached edges 01/27/25 1000   Drainage Amount Scant 01/27/25 1000   Drainage Description Serosanguineous 01/27/25 1000   Treatments Cleansed;Topical Lidocaine;Provider debridement;Site care 01/27/25 1000   Wound Cleansing Hypochlorus Acid 01/27/25 1000   Periwound Protectant Skin Protectant Wipes to Periwound 01/27/25 1000   Dressing Cleansing/Solutions Not Applicable 01/27/25 1000   Dressing Options Hydrofera Blue Ready;Hypafix Tape 01/27/25 1000   Dressing Change/Treatment Frequency Every 72 hrs, and As Needed 01/27/25 1000   Wound Team Following Weekly 01/27/25 1000   Non-staged Wound Description Full thickness 01/27/25 1000   Post-Procedure Length (cm) 0.2 cm 01/27/25 1000   Post-Procedure Width (cm) 0.2 cm 01/27/25 1000   Post-Procedure Depth (cm) 0.1 cm 01/27/25 1000   Post-Procedure Surface Area (cm^2) 0.04 cm^2 01/27/25 1000   Post-Procedure Volume (cm^3) 0.004 cm^3 01/27/25 1000   Tunneling (cm) 0 cm 01/27/25 1000   Undermining (cm) 0 cm 01/27/25 1000   Wound Odor None 01/27/25 1000    Pulses Right;Left;DP;PT;Doppler 01/13/25 1426   Right Foot Monofilament 10-point exam (Sensate) 7/10 01/13/25 1426   Left Foot Monofilament 10-point exam (Sensate) 10/10 01/13/25 1426   Exposed Structures None 01/27/25 1000   Number of days: 14       Wound 01/13/25 Full Thickness Wound Toe, Hallux Distal Left (Active)   Wound Image    01/27/25 1000   Site Assessment Brown;Red;Callus 01/27/25 1000   Periwound Assessment Callused 01/27/25 1000   Margins Unattached edges 01/21/25 1339   Drainage Amount None 01/27/25 1000   Drainage Description Serosanguineous 01/21/25 1339   Treatments Cleansed;Topical Lidocaine;Provider debridement;Site care 01/27/25 1000   Wound Cleansing Hypochlorus Acid 01/27/25 1000   Periwound Protectant Skin Protectant Wipes to Periwound 01/27/25 1000   Dressing Cleansing/Solutions Not Applicable 01/27/25 1000   Dressing Options Hydrofera Blue Ready;Hypafix Tape 01/27/25 1000   Dressing Change/Treatment Frequency Every 72 hrs, and As Needed 01/27/25 1000   Wound Team Following Weekly 01/27/25 1000   Non-staged Wound Description Full thickness 01/21/25 1339   Post-Procedure Length (cm) 0.5 cm 01/21/25 1339   Post-Procedure Width (cm) 0.7 cm 01/21/25 1339   Post-Procedure Depth (cm) 0.1 cm 01/21/25 1339   Post-Procedure Surface Area (cm^2) 0.35 cm^2 01/21/25 1339   Post-Procedure Volume (cm^3) 0.035 cm^3 01/21/25 1339   Tunneling (cm) 0 cm 01/27/25 1000   Undermining (cm) 0 cm 01/27/25 1000   Wound Odor None 01/27/25 1000   Exposed Structures None 01/27/25 1000   Number of days: 14       Wound 01/18/25 Other (comment) Foot Plantar Right (Active)   Number of days: 9       PROCEDURE: Excisional debridement of dorsal left hallux and distal left hallux wounds  -2% viscous lidocaine applied topically to wound bed for approximately 5 minutes prior to debridement  -Curette used to debride wound beds.  Excisional debridement was performed to remove devitalized tissue until healthy, bleeding tissue was  visualized.   Entire surface of both wound, 0.39 cm² debrided.  Tissue debrided into the subcutaneous layer.    -Bleeding controlled with manual pressure.    -Wound care completed by wound RN, refer to flowsheet  -Patient tolerated the procedure well, without c/o pain or discomfort.     Pertinent Labs and Diagnostics:    Labs:     A1c:   Lab Results   Component Value Date/Time    HBA1C 5.5 02/10/2019 10:22 PM          IMAGING: Left foot x-ray 12/13/2024 1.  Chronic deformities of the second third and fifth digits. No acute fracture.  2.  Mild soft tissue swelling over the dorsum of the forefoot, nonspecific.  3.  Calcific arteriopathy.    VASCULAR STUDIES: None    LAST  WOUND CULTURE:  DATE :   Lab Results   Component Value Date/Time    CULTRSULT - (A) 01/06/2025 11:27 AM    CULTRSULT Enterococcus faecalis  Heavy growth   (A) 01/06/2025 11:27 AM    CULTRSULT Staphylococcus aureus  Heavy growth   (A) 01/06/2025 11:27 AM    CULTRSULT Pseudomonas aeruginosa  Light growth   (A) 01/06/2025 11:27 AM         ASSESSMENT AND PLAN:     1. Skin ulcer of left great toe with fat layer exposed (HCC)    1/27/2025: Ulcers noted by patient around 12/10/2024 after she had foot soak and callus removal treatment by . Wounds continue to improve, smaller.  - Excisional debridement performed today.  Medically necessary to promote wound healing.  -Patient to return to clinic weekly for assessment and debridement  -Home health to see patient 1 time per week in between clinic visits for dressing changes  -Healing of these ulcers complicated by severe deformities of toes and feet due to RA    Wound care: Hydrofera Blue, Hypafix tape    2. Foot callus  3. Right foot pain    1/27/2025: Right plantar first MTH callus with pain upon palpation.  At risk for ulceration  -No significant buildup of callus over the past week  -Monitor each clinic visit, debride as indicated  -Patient reports that she has appointment with orthotist later  today to discuss new custom insoles.    4. Seronegative rheumatoid arthritis (HCC)    1/27/2025: Complicating factor.  Follows with Dr. Moon for patient has had bilateral foot surgeries to correct deformities in both feet  -Receiving Prolia injections    5. Wound infection    1/27/2025: Wound culture collected at podiatrist office on 1/7/2025.  Positive for E faecalis, MSSA, PSA ER.  Podiatrist prescribed ampicillin 250 mg 4 times daily for 14 days and Cipro 750 mg twice daily for 14 days  - Patient reports that she is allergic to Cipro and has not started the antibiotic.  She also has not started the ampicillin.  - Prior to this patient did initially receive a course of doxycycline which she completed in December 2024.  -No evidence of soft tissue infection today  -Monitor for signs and symptoms of infection each clinic visit      PATIENT EDUCATION  - Importance of adequate nutrition for wound healing  -Advised to go to ER for any increased redness, swelling, drainage, or odor, or if patient develops fever, chills, nausea or vomiting.       Please note that this note may have been created using voice recognition software. I have worked with technical experts from [employer] to optimize the interface.  I have made every reasonable attempt to correct obvious errors, but there may be errors of grammar and possibly content that I did not discover before finalizing the note.    N

## 2025-01-27 NOTE — PATIENT INSTRUCTIONS
- Keep dressings clean and dry. Change dressings every 3-4 days, and if they become over saturated, soiled or fall off.     - If you need to change your dressings at home: Wash your wound with normal saline, wound cleanser, or unscented soap and water prior to applying your new dressings. Please avoid cleansing with hydrogen peroxide or rubbing alcohol. Hydrogen peroxide and rubbing alcohol are toxic to new tissue and skin cells.    - Should you experience any significant changes in your wound(s), such as infection (redness, swelling, localized heat, increased pain, fever > 101 F, chills) or have any questions regarding your home care instructions, please contact the wound center at [phone]. If after hours, contact your primary care physician or go to the hospital emergency room.     - If you are admitted to any hospital, you will need a new referral to come back to the wound clinic and any scheduled appointments that you already have, may be cancelled.    - If you are 5 or more minutes late for an appointment, we reserve the right to cancel and reschedule that appointment. Additionally, if you are habitually late or not showing (3 late cancellations and/or no shows), we reserve the right to cancel your remaining appointments and it will be your responsibility to obtain a new referral if services are still needed.

## 2025-01-27 NOTE — PROGRESS NOTES
Home health wound care orders entered into [facility] for [facility].

## 2025-01-27 NOTE — CASE COMMUNICATION
Quality Review for 1.18.25 SOC OASIS performed on by LEISA Gatica RN on 1.272025:    Edits completed by LEISA Gatica RN:  1.  and  dx coding updated per chart review.   2. Changed  to 1.18.25 per the LSOC order  3. Changed  to D per dxs  4. Changed , , ,  to 2,  to 3 per narrative pt needs supervision with showering and dressing.  Changed  to 2 per SNV on 1.23.25 per SNV reporting pt needing a ssistive equipment for gait and transfers. Changed  to 1.23.25 date of the SNV, data used as part of the collaboration convention. Changed  to 2 per [id number] A response of 4  5. Changed  to 3 per ambulation score   6. Added low cholesterol diet to nutritional requirements

## 2025-01-28 ENCOUNTER — APPOINTMENT (OUTPATIENT)
Dept: WOUND CARE | Facility: MEDICAL CENTER | Age: OVER 89
End: 2025-01-28
Attending: PODIATRIST
Payer: MEDICARE

## 2025-01-28 PROCEDURE — 665999 HH PPS REVENUE DEBIT

## 2025-01-28 PROCEDURE — 665998 HH PPS REVENUE CREDIT

## 2025-01-28 PROCEDURE — G0180 MD CERTIFICATION HHA PATIENT: HCPCS | Performed by: INTERNAL MEDICINE

## 2025-01-29 ENCOUNTER — HOME CARE VISIT (OUTPATIENT)
Dept: HOME HEALTH SERVICES | Facility: HOME HEALTHCARE | Age: OVER 89
End: 2025-01-29
Payer: MEDICARE

## 2025-01-29 PROCEDURE — 665998 HH PPS REVENUE CREDIT

## 2025-01-29 PROCEDURE — 665999 HH PPS REVENUE DEBIT

## 2025-01-30 PROCEDURE — RXMED WILLOW AMBULATORY MEDICATION CHARGE: Performed by: INTERNAL MEDICINE

## 2025-01-30 PROCEDURE — 665998 HH PPS REVENUE CREDIT

## 2025-01-30 PROCEDURE — 665999 HH PPS REVENUE DEBIT

## 2025-01-30 RX ORDER — LEVOTHYROXINE SODIUM 112 UG/1
112 TABLET ORAL
Qty: 100 TABLET | Refills: 3 | Status: SHIPPED | OUTPATIENT
Start: 2025-01-30

## 2025-01-31 ENCOUNTER — HOME CARE VISIT (OUTPATIENT)
Dept: HOME HEALTH SERVICES | Facility: HOME HEALTHCARE | Age: OVER 89
End: 2025-01-31
Payer: MEDICARE

## 2025-01-31 ENCOUNTER — PHARMACY VISIT (OUTPATIENT)
Dept: PHARMACY | Facility: MEDICAL CENTER | Age: OVER 89
End: 2025-01-31
Payer: COMMERCIAL

## 2025-01-31 VITALS
RESPIRATION RATE: 16 BRPM | DIASTOLIC BLOOD PRESSURE: 64 MMHG | TEMPERATURE: 98.4 F | HEART RATE: 68 BPM | SYSTOLIC BLOOD PRESSURE: 118 MMHG

## 2025-01-31 PROCEDURE — G0299 HHS/HOSPICE OF RN EA 15 MIN: HCPCS

## 2025-01-31 ASSESSMENT — PATIENT HEALTH QUESTIONNAIRE - PHQ9: CLINICAL INTERPRETATION OF PHQ2 SCORE: 0

## 2025-01-31 ASSESSMENT — ENCOUNTER SYMPTOMS
VOMITING: DENIES
STOOL FREQUENCY: DAILY
NAUSEA: DENIES
BOWEL PATTERN NORMAL: 1
LAST BOWEL MOVEMENT: 67236
DENIES PAIN: 1

## 2025-02-03 ENCOUNTER — OFFICE VISIT (OUTPATIENT)
Dept: WOUND CARE | Facility: MEDICAL CENTER | Age: OVER 89
End: 2025-02-03
Attending: PODIATRIST
Payer: MEDICARE

## 2025-02-03 VITALS
DIASTOLIC BLOOD PRESSURE: 72 MMHG | RESPIRATION RATE: 18 BRPM | SYSTOLIC BLOOD PRESSURE: 138 MMHG | HEART RATE: 65 BPM | TEMPERATURE: 97.2 F | OXYGEN SATURATION: 93 %

## 2025-02-03 DIAGNOSIS — M06.00 SERONEGATIVE RHEUMATOID ARTHRITIS (HCC): ICD-10-CM

## 2025-02-03 DIAGNOSIS — M79.671 RIGHT FOOT PAIN: ICD-10-CM

## 2025-02-03 DIAGNOSIS — L08.9 WOUND INFECTION: ICD-10-CM

## 2025-02-03 DIAGNOSIS — T14.8XXA WOUND INFECTION: ICD-10-CM

## 2025-02-03 DIAGNOSIS — L84 FOOT CALLUS: ICD-10-CM

## 2025-02-03 DIAGNOSIS — L97.522 SKIN ULCER OF LEFT GREAT TOE WITH FAT LAYER EXPOSED (HCC): ICD-10-CM

## 2025-02-03 PROCEDURE — 11042 DBRDMT SUBQ TIS 1ST 20SQCM/<: CPT

## 2025-02-03 PROCEDURE — 11042 DBRDMT SUBQ TIS 1ST 20SQCM/<: CPT | Performed by: STUDENT IN AN ORGANIZED HEALTH CARE EDUCATION/TRAINING PROGRAM

## 2025-02-03 PROCEDURE — 11056 PARNG/CUTG B9 HYPRKR LES 2-4: CPT

## 2025-02-03 PROCEDURE — 11057 PARNG/CUTG B9 HYPRKR LES >4: CPT | Mod: 59 | Performed by: STUDENT IN AN ORGANIZED HEALTH CARE EDUCATION/TRAINING PROGRAM

## 2025-02-03 PROCEDURE — 99214 OFFICE O/P EST MOD 30 MIN: CPT | Mod: 25 | Performed by: STUDENT IN AN ORGANIZED HEALTH CARE EDUCATION/TRAINING PROGRAM

## 2025-02-03 NOTE — PROGRESS NOTES
Provider Encounter- Full Thickness wound    HISTORY OF PRESENT ILLNESS  Wound History:    START OF CARE IN CLINIC: 1/13/25    REFERRING PROVIDER: Ady Ocasio      WOUND: Full-thickness   LOCATION: Left distal hallux     WOUND: Full-thickness   LOCATION: Left dorsal hallux, over IP joint       WOUND: Callus   LOCATION: Right plantar first MTH      HISTORY:  Presents to wound clinic with L great toe distal tip and left dorsal IP ulcer.  She also has a callus to right first MTH that is painful.  Patient had foot soak and callus removal treatment by  around 12/10/2024 and developed blisters to her left great toe that eventually ulcerated.  She followed up with urgent care 12/13/2024 and was referred to [facility] ED for further workup.  She was discharged on Augmentin and doxycycline.  Patient completed antibiotics.  Patient followed up with podiatrist Dr. Ocasio for further treatment and dressing changes.  Patient unable to perform her own dressing changes.  Patient was seen by podiatrist 1/7/2025.  Wound culture was collected of left toe ulcers and referral to [facility] wound care clinic was placed.  She was started on doxycycline that was eventually changed to ampicillin and Cipro based off of wound culture results.  Patient also has a callus to her right first MTH for several years.  Reported callus worsened after anterior tibialis repair. Followed with Dr. Moon in past for bilateral foot surgical procedures. Patient does have orthotics that are more than 1-year-old that she received initially from [facility]  but then later was modified by Debra.    Pertinent Medical History: RA, no DM, chronic kidney disease stage III    TOBACCO USE:  no   Occ alcohol use      Patient's problem list, allergies, and current medications reviewed and updated in [facility].      Interval History:  1/13/2025: Initial wound evaluation, initial provider Clinic visit with  Nola LYLE, ROLANDO-BC, KIKI, CFCN.  Pt denies fevers, chills, nausea, vomiting.  Patient reports she did complete a course of doxycycline.  She recently had wound cultures collected at podiatrist office on 1/7/2025.  She was prescribed ampicillin 4 times daily and Cipro twice daily both for 14 days.  Patient reports she has a Cipro allergy and has not started either antibiotic yet.  Wound culture results requested from podiatrist office.  Positive for E faecalis, MSSA, PSAR.  There is some mild erythema to dorsal great toe however no warmth.  Distal toe ulcer with no erythema, no edema.  Will initiate antimicrobial dressings and continue to monitor.  Rx to [facility] given for evaluation for orthotics    1/21/2025 : Clinic visit with DARIELA Yost, RUTH, JOSESITO, JEREMY.   Patient states she is feeling well overall.  Her wounds show some improvement.  Callus to right plantar first MTH stable, has not built up much since last visit.  She has not yet started process for new shoes and inserts, was provided with Rx last week.    1/27/2025: Clinic visit with Alexandre Wilkerson MD. Patient reports doing ok, denies any acute issues. No signs or symptoms of infection. Dorsal left toe wound near resolved, distal smaller. Calluses remain stable. Patient reports she has has appointment later today to discuss and be fitted for new custom inserts.    2/3/2025: Clinic visit with Alexandre Wilkerson MD. Patient reports that she attended appointment with orthotist for new insoles and reports current insoles adjusted. She reports increased pain with ambulation of right and left plantar foot due to callus.    REVIEW OF SYSTEMS:   Unchanged from previous clinic visit on 1/27/2025, except as documented in interval history above    PHYSICAL EXAMINATION:   /72   Pulse 65   Temp 36.2 °C (97.2 °F) (Temporal)   Resp 18   SpO2 93%     Physical Exam  Vitals reviewed.   HENT:      Head:      Comments: Hard of  hearing  Cardiovascular:      Rate and Rhythm: Normal rate.      Pulses: Normal pulses.      Comments: DP pulses palpable, unable to palpate PT pulses.  Both feet are warm to touch  Pulmonary:      Effort: Pulmonary effort is normal.   Musculoskeletal:         General: Tenderness and deformity present.      Right lower leg: Edema present.      Left lower leg: Edema present.      Comments: Dependent edema bilateral lower extremities, +4  Hammertoes/claw toes bilaterally 1 through 5  Rheumatoid arthritic changes to both hands   Skin:     Comments: Left dorsal IP ulcer: Wound near resolved, pink new epithelium forming.    Left plantar great toe distal tip: Wound resolved under thin callus    Right plantar first MTH callus- Thick painful stained callus  Right 5th toe callus - Thick callus without wound  Left 1st MTH callus - Moderately thick callus  Left 5th MTH callus - Moderately thick callus.     Neurological:      General: No focal deficit present.      Mental Status: She is alert.      Comments: Sensation intact to both feet         WOUND ASSESSMENT  Wound 01/13/25 Full Thickness Wound Toe, Hallux Dorsal Left (Active)   Wound Image    02/03/25 1045   Site Assessment Callus 02/03/25 1045   Periwound Assessment Blanchable erythema;Flaky 02/03/25 1045   Margins Attached edges 02/03/25 1045   Drainage Amount None 02/03/25 1045   Drainage Description Serosanguineous 01/27/25 1000   Treatments Topical Lidocaine;Cleansed;Provider debridement;Site care 02/03/25 1045   Wound Cleansing Hypochlorus Acid 02/03/25 1045   Periwound Protectant Skin Protectant Wipes to Periwound;Skin Moisturizer 02/03/25 1045   Dressing Status Intact 02/03/25 1045   Dressing Changed Changed 02/03/25 1045   Dressing Cleansing/Solutions Not Applicable 02/03/25 1045   Dressing Options Hydrofera Blue Ready;Hypafix Tape 02/03/25 1045   Dressing Change/Treatment Frequency Every 72 hrs, and As Needed 02/03/25 1045   Wound Team Following Weekly 02/03/25  1045   Non-staged Wound Description Full thickness 02/03/25 1045   Post-Procedure Length (cm) 0.2 cm 02/03/25 1045   Post-Procedure Width (cm) 0.2 cm 02/03/25 1045   Post-Procedure Depth (cm) 0.1 cm 02/03/25 1045   Post-Procedure Surface Area (cm^2) 0.04 cm^2 02/03/25 1045   Post-Procedure Volume (cm^3) 0.004 cm^3 02/03/25 1045   Tunneling (cm) 0 cm 02/03/25 1045   Undermining (cm) 0 cm 02/03/25 1045   Wound Odor None 02/03/25 1045   Pulses Right;Left;DP;PT;Doppler 01/13/25 1426   Right Foot Monofilament 10-point exam (Sensate) 7/10 01/13/25 1426   Left Foot Monofilament 10-point exam (Sensate) 10/10 01/13/25 1426   Exposed Structures None 02/03/25 1045   Number of days: 21       Wound 01/13/25 Full Thickness Wound Toe, Hallux Distal Left (Active)   Wound Image    02/03/25 1045   Site Assessment Red;Callus 02/03/25 1045   Periwound Assessment Callused 02/03/25 1045   Margins Attached edges 02/03/25 1045   Drainage Amount None 02/03/25 1045   Drainage Description Serosanguineous 01/21/25 1339   Treatments Cleansed;Topical Lidocaine;Provider debridement;Site care 01/27/25 1000   Wound Cleansing Hypochlorus Acid 01/27/25 1000   Periwound Protectant Skin Protectant Wipes to Periwound 01/27/25 1000   Dressing Cleansing/Solutions Not Applicable 02/03/25 1045   Dressing Options Hydrofera Blue Ready;Hypafix Tape 01/27/25 1000   Dressing Change/Treatment Frequency Every 72 hrs, and As Needed 02/03/25 1045   Wound Team Following Weekly 02/03/25 1045   Non-staged Wound Description Full thickness 02/03/25 1045   Post-Procedure Length (cm) 0.5 cm 01/21/25 1339   Post-Procedure Width (cm) 0.7 cm 01/21/25 1339   Post-Procedure Depth (cm) 0.1 cm 01/21/25 1339   Post-Procedure Surface Area (cm^2) 0.35 cm^2 01/21/25 1339   Post-Procedure Volume (cm^3) 0.035 cm^3 01/21/25 1339   Tunneling (cm) 0 cm 02/03/25 1045   Undermining (cm) 0 cm 02/03/25 1045   Wound Odor None 02/03/25 1045   Exposed Structures None 02/03/25 1045   Number of  days: 21       Wound 01/18/25 Other (comment) Foot Plantar Right (Active)   Number of days: 16       PROCEDURE: Excisional debridement of dorsal left hallux wound  -2% viscous lidocaine applied topically to wound bed for approximately 5 minutes prior to debridement  -Curette used to debride wound beds.  Excisional debridement was performed to remove devitalized tissue until healthy, bleeding tissue was visualized.   Entire surface of both wound, 0.04 cm² debrided.  Tissue debrided into the subcutaneous layer.    -Bleeding controlled with manual pressure.    -Wound care completed by wound RN, refer to flowsheet  -Patient tolerated the procedure well, without c/o pain or discomfort.     PROCEDURE: Debridement of hyperkeratinized skin and callus: R 1st MTH, R plantar 5th toe, L 1st MTH, L 5th MTH  - Patient with thick callus causing her discomfort when walking and high risk for developing ulcerations  - Used curette to pare down callus to level of skin  - Smoothed rest of callus and skin with saumya board  - Tolerated procedure without complication or discomfort.    Pertinent Labs and Diagnostics:    Labs:     A1c:   Lab Results   Component Value Date/Time    HBA1C 5.5 02/10/2019 10:22 PM          IMAGING: Left foot x-ray 12/13/2024 1.  Chronic deformities of the second third and fifth digits. No acute fracture.  2.  Mild soft tissue swelling over the dorsum of the forefoot, nonspecific.  3.  Calcific arteriopathy.    VASCULAR STUDIES: None    LAST  WOUND CULTURE:  DATE :   Lab Results   Component Value Date/Time    CULTRSULT - (A) 01/06/2025 11:27 AM    CULTRSULT Enterococcus faecalis  Heavy growth   (A) 01/06/2025 11:27 AM    CULTRSULT Staphylococcus aureus  Heavy growth   (A) 01/06/2025 11:27 AM    CULTRSULT Pseudomonas aeruginosa  Light growth   (A) 01/06/2025 11:27 AM         ASSESSMENT AND PLAN:     1. Skin ulcer of left great toe with fat layer exposed (HCC)    2/3/2025: Ulcers noted by patient around 12/10/2024  after she had foot soak and callus removal treatment by . Wounds continue to improve, distal toe wound appears resolved, dorsal wound smaller and near resolution.  - Excisional debridement performed today.  Medically necessary to promote wound healing.  -Patient to return to clinic weekly for assessment and debridement  -Home health to see patient 1 time per week in between clinic visits for dressing changes  -Healing of these ulcers complicated by severe deformities of toes and feet due to RA  Wound care: Hydrofera Blue, Hypafix tape    2. Foot callus  3. Right foot pain    2/3/2025: Patient right foot and left foot painful with ambulation due to thick callus formation  - Debrided callus and hyperkeratinized skin from R 1st MTH, R 5th toe, L 1st MTH, L 5th MTH  - Tolerated without complication  - Has new inserts, continue to wear.    4. Seronegative rheumatoid arthritis (HCC)    2/3/2025: Complicating factor.  Follows with Dr. Moon for patient has had bilateral foot surgeries to correct deformities in both feet  -Receiving Prolia injections    5. Wound infection    2/3/2025:   -No evidence of soft tissue infection today  -Monitor for signs and symptoms of infection each clinic visit      PATIENT EDUCATION  - Importance of adequate nutrition for wound healing  -Advised to go to ER for any increased redness, swelling, drainage, or odor, or if patient develops fever, chills, nausea or vomiting.       Please note that this note may have been created using voice recognition software. I have worked with technical experts from [employer] to optimize the interface.  I have made every reasonable attempt to correct obvious errors, but there may be errors of grammar and possibly content that I did not discover before finalizing the note.    N

## 2025-02-03 NOTE — PATIENT INSTRUCTIONS
- Keep dressings clean and dry. Change dressings every 2-3 days, and if they become over saturated, soiled or fall off.     - If you need to change your dressings at home: Wash your wound with normal saline, wound cleanser, or unscented soap and water prior to applying your new dressings. Please avoid cleansing with hydrogen peroxide or rubbing alcohol. Hydrogen peroxide and rubbing alcohol are toxic to new tissue and skin cells.    -- Wear your orthotics any time you are up walking.    - Never walk around the house barefoot. Always wear a rubber soled slipper when walking around the house.    - Should you experience any significant changes in your wound(s), such as infection (redness, swelling, localized heat, increased pain, fever > 101 F, chills) or have any questions regarding your home care instructions, please contact the wound center at [phone]. If after hours, contact your primary care physician or go to the hospital emergency room.     - If you are admitted to any hospital, you will need a new referral to come back to the wound clinic and any scheduled appointments that you already have, may be cancelled.    - If you are 5 or more minutes late for an appointment, we reserve the right to cancel and reschedule that appointment. Additionally, if you are habitually late or not showing (3 late cancellations and/or no shows), we reserve the right to cancel your remaining appointments and it will be your responsibility to obtain a new referral if services are still needed.

## 2025-02-04 ENCOUNTER — APPOINTMENT (OUTPATIENT)
Dept: WOUND CARE | Facility: MEDICAL CENTER | Age: OVER 89
End: 2025-02-04
Attending: PODIATRIST
Payer: MEDICARE

## 2025-02-05 ENCOUNTER — HOME CARE VISIT (OUTPATIENT)
Dept: HOME HEALTH SERVICES | Facility: HOME HEALTHCARE | Age: OVER 89
End: 2025-02-05
Payer: MEDICARE

## 2025-02-06 ENCOUNTER — HOME CARE VISIT (OUTPATIENT)
Dept: HOME HEALTH SERVICES | Facility: HOME HEALTHCARE | Age: OVER 89
End: 2025-02-06
Payer: MEDICARE

## 2025-02-06 VITALS
HEART RATE: 68 BPM | SYSTOLIC BLOOD PRESSURE: 120 MMHG | TEMPERATURE: 98.1 F | RESPIRATION RATE: 16 BRPM | DIASTOLIC BLOOD PRESSURE: 54 MMHG

## 2025-02-06 PROCEDURE — G0299 HHS/HOSPICE OF RN EA 15 MIN: HCPCS

## 2025-02-06 ASSESSMENT — ENCOUNTER SYMPTOMS
VOMITING: NO
DENIES PAIN: 1
LOWER EXTREMITY EDEMA: 1
LAST BOWEL MOVEMENT: 67241
MUSCLE WEAKNESS: 1
NAUSEA: NO
LIMITED RANGE OF MOTION: 1

## 2025-02-10 ENCOUNTER — APPOINTMENT (OUTPATIENT)
Dept: WOUND CARE | Facility: MEDICAL CENTER | Age: OVER 89
End: 2025-02-10
Attending: PODIATRIST
Payer: MEDICARE

## 2025-02-11 ENCOUNTER — APPOINTMENT (OUTPATIENT)
Dept: WOUND CARE | Facility: MEDICAL CENTER | Age: OVER 89
End: 2025-02-11
Attending: PODIATRIST
Payer: MEDICARE

## 2025-02-11 DIAGNOSIS — L08.9 WOUND INFECTION: ICD-10-CM

## 2025-02-11 DIAGNOSIS — L84 FOOT CALLUS: ICD-10-CM

## 2025-02-11 DIAGNOSIS — T14.8XXA WOUND INFECTION: ICD-10-CM

## 2025-02-11 DIAGNOSIS — L97.522 SKIN ULCER OF LEFT GREAT TOE WITH FAT LAYER EXPOSED (HCC): ICD-10-CM

## 2025-02-11 DIAGNOSIS — M79.671 RIGHT FOOT PAIN: ICD-10-CM

## 2025-02-11 DIAGNOSIS — M06.00 SERONEGATIVE RHEUMATOID ARTHRITIS (HCC): ICD-10-CM

## 2025-02-11 PROCEDURE — 99213 OFFICE O/P EST LOW 20 MIN: CPT

## 2025-02-11 PROCEDURE — 11043 DBRDMT MUSC&/FSCA 1ST 20/<: CPT | Mod: 59 | Performed by: NURSE PRACTITIONER

## 2025-02-11 PROCEDURE — 11055 PARING/CUTG B9 HYPRKER LES 1: CPT | Performed by: NURSE PRACTITIONER

## 2025-02-11 PROCEDURE — 99213 OFFICE O/P EST LOW 20 MIN: CPT | Mod: 25 | Performed by: NURSE PRACTITIONER

## 2025-02-11 PROCEDURE — 11055 PARING/CUTG B9 HYPRKER LES 1: CPT

## 2025-02-11 PROCEDURE — 11043 DBRDMT MUSC&/FSCA 1ST 20/<: CPT

## 2025-02-11 PROCEDURE — 11042 DBRDMT SUBQ TIS 1ST 20SQCM/<: CPT

## 2025-02-11 NOTE — PATIENT INSTRUCTIONS
-Keep your wound dressing clean, dry, and intact. Change dressing every 3 days AND if it's over saturated, soiled or falls off.     -On the days you are not changing your dressings, avoid getting the dressings wet. It is not harmful to get your wound wet when you are washing your wound before applying a new dressing. If you need to change your dressings at home: Wash your wound with normal saline, wound cleanser, or unscented soap and water prior to applying your new dressings. Please avoid cleansing with hydrogen peroxide or rubbing alcohol. Hydrogen peroxide and rubbing alcohol are toxic to new tissue and skin cells.    -Should you experience any significant changes in your wound(s), such as infection (redness, swelling, localized heat, increased pain, fever > 101 F, chills) or have any questions regarding your home care instructions, please contact the wound center at [phone]. If after hours, contact your primary care physician or go to the hospital emergency room.  If you are admitted to any hospital, you will need a new referral to come back to the wound clinic. Any scheduled appointments that you already have may be cancelled.      -If you are 5 or more minutes late for an appointment, we reserve the right to cancel and reschedule that appointment. Additionally, if you are habitually late or not showing (3 late cancellations and/or no shows), we reserve the right to cancel your remaining appointments and it will be your responsibility to obtain a new referral if services are still needed.

## 2025-02-11 NOTE — PROGRESS NOTES
Provider Encounter- Full Thickness wound    HISTORY OF PRESENT ILLNESS  Wound History:    START OF CARE IN CLINIC: 1/13/25    REFERRING PROVIDER: Ady Ocasio D.P.M.     WOUND: Full-thickness   LOCATION: Left distal hallux     WOUND: Full-thickness   LOCATION: Left dorsal hallux, over IP joint       WOUND: Callus   LOCATION: Right plantar first MTH      HISTORY:  Presents to wound clinic with L great toe distal tip and left dorsal IP ulcer.  She also has a callus to right first MTH that is painful.  Patient had foot soak and callus removal treatment by  around 12/10/2024 and developed blisters to her left great toe that eventually ulcerated.  She followed up with urgent care 12/13/2024 and was referred to [facility] ED for further workup.  She was discharged on Augmentin and doxycycline.  Patient completed antibiotics.  Patient followed up with podiatrist Dr. Ocasio for further treatment and dressing changes.  Patient unable to perform her own dressing changes.  Patient was seen by podiatrist 1/7/2025.  Wound culture was collected of left toe ulcers and referral to [facility] wound care clinic was placed.  She was started on doxycycline that was eventually changed to ampicillin and Cipro based off of wound culture results.  Patient also has a callus to her right first MTH for several years.  Reported callus worsened after anterior tibialis repair. Followed with Dr. Moon in past for bilateral foot surgical procedures. Patient does have orthotics that are more than 1-year-old that she received initially from [facility]  but then later was modified by Debra.    Pertinent Medical History: RA, no DM, chronic kidney disease stage III    TOBACCO USE:  no   Occ alcohol use      Patient's problem list, allergies, and current medications reviewed and updated in [facility].      Interval History:  1/13/2025: Initial wound evaluation, initial provider Clinic visit  with RUTH Villalobos, JEREMY SWANSON.  Pt denies fevers, chills, nausea, vomiting.  Patient reports she did complete a course of doxycycline.  She recently had wound cultures collected at podiatrist office on 1/7/2025.  She was prescribed ampicillin 4 times daily and Cipro twice daily both for 14 days.  Patient reports she has a Cipro allergy and has not started either antibiotic yet.  Wound culture results requested from podiatrist office.  Positive for E faecalis, MSSA, PSAR.  There is some mild erythema to dorsal great toe however no warmth.  Distal toe ulcer with no erythema, no edema.  Will initiate antimicrobial dressings and continue to monitor.  Rx to [facility] given for evaluation for orthotics    1/21/2025 : Clinic visit with DARIELA Yost FNP-BC, JEREMY BELLAMY.   Patient states she is feeling well overall.  Her wounds show some improvement.  Callus to right plantar first MTH stable, has not built up much since last visit.  She has not yet started process for new shoes and inserts, was provided with Rx last week.    1/27/2025: Clinic visit with Alexandre Wilkerson MD. Patient reports doing ok, denies any acute issues. No signs or symptoms of infection. Dorsal left toe wound near resolved, distal smaller. Calluses remain stable. Patient reports she has has appointment later today to discuss and be fitted for new custom inserts.    2/3/2025: Clinic visit with Alexandre Wilkerson MD. Patient reports that she attended appointment with orthotist for new insoles and reports current insoles adjusted. She reports increased pain with ambulation of right and left plantar foot due to callus.    2/11/2025: Clinic visit with RUTH Villalobos, JEREMY SWANSON.  Pt denies fevers, chills, nausea, vomiting.  Thick callus to left great toe distal tip.  Left dorsal IP ulcer with pinpoint opening however callused with undermining.  Probes to joint.      REVIEW OF SYSTEMS:   Unchanged from previous clinic  visit on 2/3/2025, except as documented in interval history above    PHYSICAL EXAMINATION:   There were no vitals taken for this visit.    Physical Exam  Vitals reviewed.   HENT:      Head:      Comments: Hard of hearing  Cardiovascular:      Rate and Rhythm: Normal rate.      Pulses: Normal pulses.      Comments: DP pulses palpable, unable to palpate PT pulses.  Both feet are warm to touch  Pulmonary:      Effort: Pulmonary effort is normal.   Musculoskeletal:         General: Tenderness and deformity present.      Right lower leg: Edema present.      Left lower leg: Edema present.      Comments: Dependent edema bilateral lower extremities, +4  Hammertoes/claw toes bilaterally 1 through 5  Rheumatoid arthritic changes to both hands   Skin:     Comments: Left dorsal IP ulcer: Pinpoint opening with thin epithelium/callus and circumferential undermining.  Probes to joint/bone.  Senescent tissue to wound bed once removing callused tissue exposing undermining.  Mild chronic erythema without warmth over IP J    Left plantar great toe distal tip: Wound resolved with thick callus      Left first, third, fifth MTH: Thin callus stained.  No pain with assessment.   Neurological:      General: No focal deficit present.      Mental Status: She is alert.      Comments: Sensation intact to both feet         WOUND ASSESSMENT  Wound 01/13/25 Full Thickness Wound Toe, Hallux Dorsal Left (Active)   Wound Image    02/11/25 1347   Site Assessment Red 02/11/25 1347   Periwound Assessment Callused;Flaky;Edema 02/11/25 1347   Margins Attached edges 02/11/25 1347   Drainage Amount Scant 02/11/25 1347   Drainage Description Serosanguineous 02/11/25 1347   Treatments Cleansed;Topical Lidocaine;Provider debridement;Site care 02/11/25 1347   Wound Cleansing Hypochlorus Acid 02/11/25 1347   Periwound Protectant No-sting Skin Prep;Skin Moisturizer 02/11/25 1347   Dressing Status Intact 02/03/25 1045   Dressing Changed Changed 02/03/25 1045    Dressing Cleansing/Solutions Not Applicable 02/11/25 1347   Dressing Options Hydrofera Blue Ready;Hypafix Tape 02/11/25 1347   Dressing Change/Treatment Frequency Every 72 hrs, and As Needed 02/11/25 1347   Wound Team Following Weekly 02/11/25 1347   Non-staged Wound Description Full thickness 02/11/25 1347   Post-Procedure Length (cm) 0.2 cm 02/11/25 1347   Post-Procedure Width (cm) 0.2 cm 02/11/25 1347   Post-Procedure Depth (cm) 0.2 cm 02/11/25 1347   Post-Procedure Surface Area (cm^2) 0.04 cm^2 02/11/25 1347   Post-Procedure Volume (cm^3) 0.008 cm^3 02/11/25 1347   Tunneling (cm) 0 cm 02/11/25 1347   Undermining (cm) 0 cm 02/11/25 1347   Wound Odor None 02/11/25 1347   Pulses Right;Left;DP;PT;Doppler 01/13/25 1426   Right Foot Monofilament 10-point exam (Sensate) 7/10 01/13/25 1426   Left Foot Monofilament 10-point exam (Sensate) 10/10 01/13/25 1426   Exposed Structures None 02/11/25 1347   Number of days: 29       Wound 01/13/25 Full Thickness Wound Toe, Hallux Distal Left (Active)   Wound Image   02/11/25 1347   Site Assessment Red;Callus 02/11/25 1347   Periwound Assessment Callused;Edema 02/11/25 1347   Margins Attached edges 02/11/25 1347   Drainage Amount None 02/11/25 1347   Drainage Description ELICEO 02/11/25 1347   Treatments Cleansed;Topical Lidocaine;Callus removal/paring;Site care 02/11/25 1347   Wound Cleansing Hypochlorus Acid 02/11/25 1347   Periwound Protectant No-sting Skin Prep;Skin Moisturizer 02/11/25 1347   Dressing Cleansing/Solutions Not Applicable 02/11/25 1347   Dressing Options Hydrofera Blue Classic;Hypafix Tape 02/11/25 1347   Dressing Change/Treatment Frequency Every 72 hrs, and As Needed 02/11/25 1347   Wound Team Following Weekly 02/11/25 1347   Non-staged Wound Description Full thickness 02/11/25 1347   Post-Procedure Length (cm) 0.5 cm 01/21/25 1339   Post-Procedure Width (cm) 0.7 cm 01/21/25 1339   Post-Procedure Depth (cm) 0.1 cm 01/21/25 1339   Post-Procedure Surface Area  (cm^2) 0.35 cm^2 01/21/25 1339   Post-Procedure Volume (cm^3) 0.035 cm^3 01/21/25 1339   Tunneling (cm) 0 cm 02/11/25 1347   Undermining (cm) 0 cm 02/11/25 1347   Wound Odor None 02/11/25 1347   Exposed Structures None 02/11/25 1347   Number of days: 29       Wound 01/18/25 Other (comment) Foot Plantar Right (Active)   Number of days: 24       PROCEDURE: Excisional debridement of dorsal left hallux wound  -2% viscous lidocaine applied topically to wound bed for approximately 5 minutes prior to debridement  -Using scissors, forceps, excised thin callus/nonfunctional epithelium exposing previous undermined tissue.  Curette used to debride wound bed.  Excisional debridement was performed to remove devitalized tissue until healthy, bleeding tissue was visualized.   Entire surface of wound, 0.04 cm² debrided.  Tissue debrided into the muscle/joint layer.    -Bleeding controlled with manual pressure.    -Wound care completed by wound RN, refer to flowsheet  -Patient tolerated the procedure well, without c/o pain or discomfort.     PROCEDURE: Debridement of hyperkeratinized skin and callus: Left great toe distal tip  - Patient with thick callus causing her discomfort when walking and high risk for developing ulcerations  - Used curette to debride callus to level of skin removing hyper keratinized tissue.  -Total area debrided less than 20 cm² skin level  - Tolerated procedure   -Skin care completed by RN.  See flowsheet.    Pertinent Labs and Diagnostics:    Labs:     A1c:   Lab Results   Component Value Date/Time    HBA1C 5.5 02/10/2019 10:22 PM          IMAGING: Left foot x-ray 12/13/2024 1.  Chronic deformities of the second third and fifth digits. No acute fracture.  2.  Mild soft tissue swelling over the dorsum of the forefoot, nonspecific.  3.  Calcific arteriopathy.    VASCULAR STUDIES: None    LAST  WOUND CULTURE:  DATE :   Lab Results   Component Value Date/Time    CULTRSULT - (A) 01/06/2025 11:27 AM    CULTRSULT  Enterococcus faecalis  Heavy growth   (A) 01/06/2025 11:27 AM    CULTRSULT Staphylococcus aureus  Heavy growth   (A) 01/06/2025 11:27 AM    CULTRSULT Pseudomonas aeruginosa  Light growth   (A) 01/06/2025 11:27 AM         ASSESSMENT AND PLAN:     1. Skin ulcer of left great toe with fat layer exposed (HCC)  Ulcers noted by patient around 12/10/2024 after she had foot soak and callus removal treatment by .    2/11/2025:   Left dorsal IP ulcer with pinpoint opening however callused with undermining.  Once removed, ulceration remains, probes to joint.  - Excisional debridement performed today.  Medically necessary to promote wound healing.  -Patient to return to clinic weekly for assessment and debridement  -Home health to see patient 1 time per week in between clinic visits for dressing changes  -Healing of these ulcers complicated by severe deformities of toes and feet due to RA  Wound care: Hydrofera Blue, Hypafix tape    2. Foot callus  3. Right foot pain    2/11/2025: Thick callus to left great toe distal tip.  Painful to distal tip   -Callus debrided left great toe.  Ulcer remains resolved.  Continue to monitor each visit  -Calluses to right foot debrided at previous provider visit.    - Has new inserts, continue to wear.    4. Seronegative rheumatoid arthritis (HCC)    2/11/2025: Complicating factor.  Follows with Dr. Moon for patient has had bilateral foot surgeries to correct deformities in both feet  -Receiving Prolia injections    5. Wound infection    2/11/2025:   -No evidence of soft tissue infection today  -Monitor for signs and symptoms of infection each clinic visit      PATIENT EDUCATION  - Importance of adequate nutrition for wound healing  -Advised to go to ER for any increased redness, swelling, drainage, or odor, or if patient develops fever, chills, nausea or vomiting.       My total time spent caring for the patient on the day of the encounter was 20 minutes, reviewing history,  assessment, counseling and education, and coordination of care.  This does not include time spent on separately billable procedures/tests.    Please note that this note may have been created using voice recognition software. I have worked with technical [facility] from [facility] to optimize the interface.  I have made every reasonable attempt to correct obvious errors, but there may be errors of grammar and possibly content that I did not discover before finalizing the note.    N

## 2025-02-11 NOTE — PROGRESS NOTES
Updated home wound care orders placed for [facility] via [facility].

## 2025-02-14 ENCOUNTER — HOME CARE VISIT (OUTPATIENT)
Dept: HOME HEALTH SERVICES | Facility: HOME HEALTHCARE | Age: OVER 89
End: 2025-02-14
Payer: MEDICARE

## 2025-02-14 PROCEDURE — G0299 HHS/HOSPICE OF RN EA 15 MIN: HCPCS

## 2025-02-15 VITALS
DIASTOLIC BLOOD PRESSURE: 70 MMHG | HEART RATE: 64 BPM | RESPIRATION RATE: 16 BRPM | TEMPERATURE: 97.9 F | SYSTOLIC BLOOD PRESSURE: 128 MMHG

## 2025-02-15 ASSESSMENT — ENCOUNTER SYMPTOMS
NAUSEA: DENIES
LAST BOWEL MOVEMENT: 67250
DENIES PAIN: 1
BOWEL PATTERN NORMAL: 1
VOMITING: DENIES

## 2025-02-18 ENCOUNTER — OFFICE VISIT (OUTPATIENT)
Dept: WOUND CARE | Facility: MEDICAL CENTER | Age: OVER 89
End: 2025-02-18
Attending: PODIATRIST
Payer: MEDICARE

## 2025-02-18 DIAGNOSIS — L84 FOOT CALLUS: ICD-10-CM

## 2025-02-18 DIAGNOSIS — M79.671 RIGHT FOOT PAIN: ICD-10-CM

## 2025-02-18 DIAGNOSIS — L08.9 WOUND INFECTION: ICD-10-CM

## 2025-02-18 DIAGNOSIS — T14.8XXA WOUND INFECTION: ICD-10-CM

## 2025-02-18 DIAGNOSIS — M06.00 SERONEGATIVE RHEUMATOID ARTHRITIS (HCC): ICD-10-CM

## 2025-02-18 DIAGNOSIS — L97.522 SKIN ULCER OF LEFT GREAT TOE WITH FAT LAYER EXPOSED (HCC): ICD-10-CM

## 2025-02-18 PROCEDURE — 11042 DBRDMT SUBQ TIS 1ST 20SQCM/<: CPT

## 2025-02-18 PROCEDURE — 11055 PARING/CUTG B9 HYPRKER LES 1: CPT

## 2025-02-18 PROCEDURE — 11055 PARING/CUTG B9 HYPRKER LES 1: CPT | Mod: 59 | Performed by: STUDENT IN AN ORGANIZED HEALTH CARE EDUCATION/TRAINING PROGRAM

## 2025-02-18 PROCEDURE — 11042 DBRDMT SUBQ TIS 1ST 20SQCM/<: CPT | Performed by: STUDENT IN AN ORGANIZED HEALTH CARE EDUCATION/TRAINING PROGRAM

## 2025-02-18 NOTE — PROGRESS NOTES
Home Health wound care orders entered into [facility] for [facility].

## 2025-02-18 NOTE — PROGRESS NOTES
Provider Encounter- Full Thickness wound    HISTORY OF PRESENT ILLNESS  Wound History:    START OF CARE IN CLINIC: 1/13/25    REFERRING PROVIDER: Ady Ocasio D.P.M.     WOUND: Full-thickness   LOCATION: Left distal hallux     WOUND: Full-thickness   LOCATION: Left dorsal hallux, over IP joint       WOUND: Callus   LOCATION: Right plantar first MTH      HISTORY:  Presents to wound clinic with L great toe distal tip and left dorsal IP ulcer.  She also has a callus to right first MTH that is painful.  Patient had foot soak and callus removal treatment by  around 12/10/2024 and developed blisters to her left great toe that eventually ulcerated.  She followed up with urgent care 12/13/2024 and was referred to [facility] ED for further workup.  She was discharged on Augmentin and doxycycline.  Patient completed antibiotics.  Patient followed up with podiatrist Dr. Ocasio for further treatment and dressing changes.  Patient unable to perform her own dressing changes.  Patient was seen by podiatrist 1/7/2025.  Wound culture was collected of left toe ulcers and referral to [facility] wound care clinic was placed.  She was started on doxycycline that was eventually changed to ampicillin and Cipro based off of wound culture results.  Patient also has a callus to her right first MTH for several years.  Reported callus worsened after anterior tibialis repair. Followed with Dr. Moon in past for bilateral foot surgical procedures. Patient does have orthotics that are more than 1-year-old that she received initially from [facility]  but then later was modified by Debra.    Pertinent Medical History: RA, no DM, chronic kidney disease stage III    TOBACCO USE:  no   Occ alcohol use      Patient's problem list, allergies, and current medications reviewed and updated in [facility].      Interval History:  1/13/2025: Initial wound evaluation, initial provider Clinic visit  with RUTH Villalobos, JEREMY SWANSON.  Pt denies fevers, chills, nausea, vomiting.  Patient reports she did complete a course of doxycycline.  She recently had wound cultures collected at podiatrist office on 1/7/2025.  She was prescribed ampicillin 4 times daily and Cipro twice daily both for 14 days.  Patient reports she has a Cipro allergy and has not started either antibiotic yet.  Wound culture results requested from podiatrist office.  Positive for E faecalis, MSSA, PSAR.  There is some mild erythema to dorsal great toe however no warmth.  Distal toe ulcer with no erythema, no edema.  Will initiate antimicrobial dressings and continue to monitor.  Rx to [facility] given for evaluation for orthotics    1/21/2025 : Clinic visit with DARIELA Yost FNP-BC, JEREMY BELLAMY.   Patient states she is feeling well overall.  Her wounds show some improvement.  Callus to right plantar first MTH stable, has not built up much since last visit.  She has not yet started process for new shoes and inserts, was provided with Rx last week.    1/27/2025: Clinic visit with Alexandre Wilkerson MD. Patient reports doing ok, denies any acute issues. No signs or symptoms of infection. Dorsal left toe wound near resolved, distal smaller. Calluses remain stable. Patient reports she has has appointment later today to discuss and be fitted for new custom inserts.    2/3/2025: Clinic visit with Alexandre Wilkerson MD. Patient reports that she attended appointment with orthotist for new insoles and reports current insoles adjusted. She reports increased pain with ambulation of right and left plantar foot due to callus.    2/11/2025: Clinic visit with RUTH Villalobos, JEREMY SWANSON.  Pt denies fevers, chills, nausea, vomiting.  Thick callus to left great toe distal tip.  Left dorsal IP ulcer with pinpoint opening however callused with undermining.  Probes to joint.    2/18/2025: Clinic visit with Alexandre Wilkerson MD. Patient  reports doing well. Left hallux distal toe remains stained callus. Left dorsal IP wound improving and pinpoint. She continues to have stained callus right plantar 1st MTH which is causing some discomfort with ambulation, will debride callus in clinic today. Patient also asked to have right posterior lower extremity evaluated, reports some discomfort from where she was struck by a shopping cart, some dried scabs without open wound.    REVIEW OF SYSTEMS:   Unchanged from previous clinic visit on 2/11/2025, except as documented in interval history above    PHYSICAL EXAMINATION:   There were no vitals taken for this visit.    Physical Exam  Vitals reviewed.   HENT:      Head:      Comments: Hard of hearing  Cardiovascular:      Rate and Rhythm: Normal rate.      Pulses: Normal pulses.      Comments: DP pulses palpable, unable to palpate PT pulses.  Both feet are warm to touch  Pulmonary:      Effort: Pulmonary effort is normal.   Musculoskeletal:         General: Tenderness and deformity present.      Right lower leg: Edema present.      Left lower leg: Edema present.      Comments: Dependent edema bilateral lower extremities, +4  Hammertoes/claw toes bilaterally 1 through 5  Rheumatoid arthritic changes to both hands   Skin:     Comments: Left dorsal IP ulcer: Wound continues to improve, pinpoint. May probe to fascia / joint capsule but continues to improve. No evidence of infection.    Left plantar great toe distal tip: Continues to have stained callus    Right posterior lower extremity: Scattered dried scabs, no open wound. No infection.    Right plantar 1st MTH stained callus: Stained thick callus causing discomfort when ambulating.   Neurological:      General: No focal deficit present.      Mental Status: She is alert.      Comments: Sensation intact to both feet         WOUND ASSESSMENT  Wound 01/13/25 Full Thickness Wound Toe, Hallux Dorsal Left (Active)   Wound Image    02/18/25 1000   Site Assessment Media  02/18/25 1000   Periwound Assessment Callused;Flaky;Edema 02/18/25 1000   Margins Attached edges 02/18/25 1000   Drainage Amount None 02/18/25 1000   Drainage Description Serosanguineous 02/11/25 1347   Treatments Cleansed;Topical Lidocaine;Provider debridement;Site care 02/18/25 1000   Wound Cleansing Hypochlorus Acid 02/18/25 1000   Periwound Protectant No-sting Skin Prep 02/18/25 1000   Dressing Status Intact 02/03/25 1045   Dressing Changed Changed 02/03/25 1045   Dressing Cleansing/Solutions Not Applicable 02/18/25 1000   Dressing Options Hydrofera Blue Ready;Hypafix Tape 02/18/25 1000   Dressing Change/Treatment Frequency Every 72 hrs, and As Needed 02/18/25 1000   Wound Team Following Weekly 02/18/25 1000   Non-staged Wound Description Full thickness 02/18/25 1000   Post-Procedure Length (cm) 0.1 cm 02/18/25 1000   Post-Procedure Width (cm) 0.1 cm 02/18/25 1000   Post-Procedure Depth (cm) 0.1 cm 02/18/25 1000   Post-Procedure Surface Area (cm^2) 0.01 cm^2 02/18/25 1000   Post-Procedure Volume (cm^3) 0.001 cm^3 02/18/25 1000   Tunneling (cm) 0 cm 02/18/25 1000   Undermining (cm) 0 cm 02/18/25 1000   Wound Odor None 02/18/25 1000   Pulses Right;Left;DP;PT;Doppler 01/13/25 1426   Right Foot Monofilament 10-point exam (Sensate) 7/10 01/13/25 1426   Left Foot Monofilament 10-point exam (Sensate) 10/10 01/13/25 1426   Exposed Structures None 02/18/25 1000   Number of days: 36       Wound 01/13/25 Full Thickness Wound Toe, Hallux Distal Left (Active)   Wound Image    02/18/25 1000   Site Assessment Callus 02/18/25 1000   Periwound Assessment Callused 02/18/25 1000   Margins Attached edges 02/11/25 1347   Drainage Amount None 02/18/25 1000   Drainage Description ELICEO 02/11/25 1347   Treatments Cleansed;Topical Lidocaine;Provider debridement;Site care 02/18/25 1000   Wound Cleansing Hypochlorus Acid 02/18/25 1000   Periwound Protectant No-sting Skin Prep 02/18/25 1000   Dressing Cleansing/Solutions Not Applicable  02/18/25 1000   Dressing Options Hydrofera Blue Classic;Hypafix Tape 02/11/25 1347   Dressing Change/Treatment Frequency Every 72 hrs, and As Needed 02/18/25 1000   Wound Team Following Weekly 02/18/25 1000   Non-staged Wound Description Full thickness 02/11/25 1347   Post-Procedure Length (cm) 0.5 cm 01/21/25 1339   Post-Procedure Width (cm) 0.7 cm 01/21/25 1339   Post-Procedure Depth (cm) 0.1 cm 01/21/25 1339   Post-Procedure Surface Area (cm^2) 0.35 cm^2 01/21/25 1339   Post-Procedure Volume (cm^3) 0.035 cm^3 01/21/25 1339   Tunneling (cm) 0 cm 02/18/25 1000   Undermining (cm) 0 cm 02/18/25 1000   Wound Odor None 02/18/25 1000   Exposed Structures None 02/18/25 1000   Number of days: 36       Wound 01/18/25 Other (comment) Foot Plantar Right (Active)   Number of days: 31       Wound 02/18/25 Tibia Proximal;Posterior Right (Active)   Wound Image   02/18/25 1000   Site Assessment Fragile;Scabbed 02/18/25 1000   Periwound Assessment Dry;Intact;Edema;Flaky 02/18/25 1000   Closure Secondary intention 02/18/25 1000   Drainage Amount None 02/18/25 1000   Treatments Cleansed;Site care 02/18/25 1000   Wound Cleansing Hypochlorus Acid 02/18/25 1000   Periwound Protectant No-sting Skin Prep 02/18/25 1000   Dressing Changed New 02/18/25 1000   Dressing Cleansing/Solutions Not Applicable 02/18/25 1000   Dressing Options Silicone Adhesive Foam 02/18/25 1000   Dressing Change/Treatment Frequency As Needed 02/18/25 1000   Wound Team Following Weekly 02/18/25 1000   Wound Length (cm) 0 cm 02/18/25 1000   Wound Width (cm) 0 cm 02/18/25 1000   Wound Depth (cm) 0 cm 02/18/25 1000   Wound Surface Area (cm^2) 0 cm^2 02/18/25 1000   Wound Volume (cm^3) 0 cm^3 02/18/25 1000   Post-Procedure Length (cm) 0 cm 02/18/25 1000   Post-Procedure Width (cm) 0 cm 02/18/25 1000   Post-Procedure Depth (cm) 0 cm 02/18/25 1000   Post-Procedure Surface Area (cm^2) 0 cm^2 02/18/25 1000   Post-Procedure Volume (cm^3) 0 cm^3 02/18/25 1000   Tunneling  (cm) 0 cm 02/18/25 1000   Undermining (cm) 0 cm 02/18/25 1000   Wound Odor None 02/18/25 1000   Exposed Structures None 02/18/25 1000   Number of days: 0       PROCEDURE: Excisional debridement of dorsal left hallux wound  -2% viscous lidocaine applied topically to wound bed for approximately 5 minutes prior to debridement  - Curette used to debride wound bed.  Excisional debridement was performed to remove devitalized tissue until healthy, bleeding tissue was visualized.   Entire surface of wound, 0.01 cm² debrided.  Tissue debrided into subcutaneous tissue  -Bleeding controlled with manual pressure.    -Wound care completed by wound RN, refer to flowsheet  -Patient tolerated the procedure well, without c/o pain or discomfort.     PROCEDURE: Debridement of hyperkeratinized skin / callus Right plantar 1st MTH  - Patient with thick stained callus causing discomfort with ambulation  - Used curette to debrided hyperkeratinized skin to skin level  - Used saumya board to smooth down skin  - Tolerated without complication or discomfort.    Pertinent Labs and Diagnostics:    Labs:     A1c:   Lab Results   Component Value Date/Time    HBA1C 5.5 02/10/2019 10:22 PM          IMAGING: Left foot x-ray 12/13/2024 1.  Chronic deformities of the second third and fifth digits. No acute fracture.  2.  Mild soft tissue swelling over the dorsum of the forefoot, nonspecific.  3.  Calcific arteriopathy.    VASCULAR STUDIES: None    LAST  WOUND CULTURE:  DATE :   Lab Results   Component Value Date/Time    CULTRSULT - (A) 01/06/2025 11:27 AM    CULTRSULT Enterococcus faecalis  Heavy growth   (A) 01/06/2025 11:27 AM    CULTRSULT Staphylococcus aureus  Heavy growth   (A) 01/06/2025 11:27 AM    CULTRSULT Pseudomonas aeruginosa  Light growth   (A) 01/06/2025 11:27 AM         ASSESSMENT AND PLAN:     1. Skin ulcer of left great toe with fat layer exposed (HCC)  Ulcers noted by patient around 12/10/2024 after she had foot soak and callus removal  treatment by .    2/18/2025: Left dorsal IP joint ulcer continues to improve, pinpoint.  - Excisional debridement performed today.  Medically necessary to promote wound healing.  -Patient to return to clinic weekly for assessment and debridement  -Home health to see patient 1 time per week in between clinic visits for dressing changes  -Healing of these ulcers complicated by severe deformities of toes and feet due to RA  Wound care: Hydrofera Blue, Hypafix tape    2. Foot callus  3. Right foot pain    2/18/2025: Thick callus to right plantar 1st MTH  - Debrided callus right plantar 1st MTH, no open wound  - Has new inserts, continue to wear.    4. Seronegative rheumatoid arthritis (HCC)    2/18/2025: Complicating factor.  Follows with Dr. Moon for patient has had bilateral foot surgeries to correct deformities in both feet  -Receiving Prolia injections    5. Wound infection    2/18/2025:   -No evidence of soft tissue infection today  -Monitor for signs and symptoms of infection each clinic visit    PATIENT EDUCATION  - Importance of adequate nutrition for wound healing  -Advised to go to ER for any increased redness, swelling, drainage, or odor, or if patient develops fever, chills, nausea or vomiting.     Please note that this note may have been created using voice recognition software. I have worked with technical experts from [employer] to optimize the interface.  I have made every reasonable attempt to correct obvious errors, but there may be errors of grammar and possibly content that I did not discover before finalizing the note.    N

## 2025-02-18 NOTE — PATIENT INSTRUCTIONS
- Keep dressings clean and dry. Change dressings every 3-4 days, and if they become over saturated, soiled or fall off.     - If you need to change your dressings at home: Wash your wound with normal saline, wound cleanser, or unscented soap and water prior to applying your new dressings. Please avoid cleansing with hydrogen peroxide or rubbing alcohol. Hydrogen peroxide and rubbing alcohol are toxic to new tissue and skin cells.    - Should you experience any significant changes in your wound(s), such as infection (redness, swelling, localized heat, increased pain, fever > 101 F, chills) or have any questions regarding your home care instructions, please contact the wound center at [phone]. If after hours, contact your primary care physician or go to the hospital emergency room.     - If you are admitted to any hospital, you will need a new referral to come back to the wound clinic and any scheduled appointments that you already have, may be cancelled.    - If you are 5 or more minutes late for an appointment, we reserve the right to cancel and reschedule that appointment. Additionally, if you are habitually late or not showing (3 late cancellations and/or no shows), we reserve the right to cancel your remaining appointments and it will be your responsibility to obtain a new referral if services are still needed.

## 2025-02-21 ENCOUNTER — HOME CARE VISIT (OUTPATIENT)
Dept: HOME HEALTH SERVICES | Facility: HOME HEALTHCARE | Age: OVER 89
End: 2025-02-21
Payer: MEDICARE

## 2025-02-21 VITALS
HEART RATE: 68 BPM | DIASTOLIC BLOOD PRESSURE: 62 MMHG | SYSTOLIC BLOOD PRESSURE: 128 MMHG | TEMPERATURE: 98.1 F | RESPIRATION RATE: 16 BRPM

## 2025-02-21 PROCEDURE — G0299 HHS/HOSPICE OF RN EA 15 MIN: HCPCS

## 2025-02-21 ASSESSMENT — ENCOUNTER SYMPTOMS
LAST BOWEL MOVEMENT: 67257
BOWEL PATTERN NORMAL: 1
DENIES PAIN: 1

## 2025-02-24 ENCOUNTER — OFFICE VISIT (OUTPATIENT)
Dept: WOUND CARE | Facility: MEDICAL CENTER | Age: OVER 89
End: 2025-02-24
Attending: PODIATRIST
Payer: MEDICARE

## 2025-02-24 DIAGNOSIS — L08.9 WOUND INFECTION: ICD-10-CM

## 2025-02-24 DIAGNOSIS — T14.8XXA WOUND INFECTION: ICD-10-CM

## 2025-02-24 DIAGNOSIS — L84 FOOT CALLUS: ICD-10-CM

## 2025-02-24 DIAGNOSIS — L97.522 SKIN ULCER OF LEFT GREAT TOE WITH FAT LAYER EXPOSED (HCC): ICD-10-CM

## 2025-02-24 DIAGNOSIS — M06.00 SERONEGATIVE RHEUMATOID ARTHRITIS (HCC): ICD-10-CM

## 2025-02-24 DIAGNOSIS — M79.671 RIGHT FOOT PAIN: ICD-10-CM

## 2025-02-24 PROCEDURE — 99213 OFFICE O/P EST LOW 20 MIN: CPT | Performed by: STUDENT IN AN ORGANIZED HEALTH CARE EDUCATION/TRAINING PROGRAM

## 2025-02-24 PROCEDURE — 99213 OFFICE O/P EST LOW 20 MIN: CPT

## 2025-02-24 NOTE — PATIENT INSTRUCTIONS
- Keep dressings clean and dry. Change dressings every 3-4 days, and if they become over saturated, soiled or fall off.     - If you need to change your dressings at home: Wash your wound with normal saline, wound cleanser, or unscented soap and water prior to applying your new dressings. Please avoid cleansing with hydrogen peroxide or rubbing alcohol. Hydrogen peroxide and rubbing alcohol are toxic to new tissue and skin cells.    - Avoid prolonged standing or sitting without elevating your legs.    - Never walk around the house barefoot. Always wear a rubber soled slipper when walking around the house.    - Should you experience any significant changes in your wound(s), such as infection (redness, swelling, localized heat, increased pain, fever > 101 F, chills) or have any questions regarding your home care instructions, please contact the wound center at [phone]. If after hours, contact your primary care physician or go to the hospital emergency room.     - If you are admitted to any hospital, you will need a new referral to come back to the wound clinic and any scheduled appointments that you already have, may be cancelled.    - If you are 5 or more minutes late for an appointment, we reserve the right to cancel and reschedule that appointment. Additionally, if you are habitually late or not showing (3 late cancellations and/or no shows), we reserve the right to cancel your remaining appointments and it will be your responsibility to obtain a new referral if services are still needed.    - Resolved wound be fragile for a few days, bathe and dry area gently, only ever regains a maximum of 80% of the tensile strength of the surrounding skin, remodeling of scar can continue for 6mo - a year. Contact PCP for a referral back her if any problems with area opening and draining again.
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
## 2025-02-27 ENCOUNTER — HOME CARE VISIT (OUTPATIENT)
Dept: HOME HEALTH SERVICES | Facility: HOME HEALTHCARE | Age: OVER 89
End: 2025-02-27
Payer: MEDICARE

## 2025-03-03 ENCOUNTER — APPOINTMENT (OUTPATIENT)
Dept: WOUND CARE | Facility: MEDICAL CENTER | Age: OVER 89
End: 2025-03-03
Attending: PODIATRIST
Payer: MEDICARE

## 2025-03-06 ENCOUNTER — HOME CARE VISIT (OUTPATIENT)
Dept: HOME HEALTH SERVICES | Facility: HOME HEALTHCARE | Age: OVER 89
End: 2025-03-06
Payer: MEDICARE

## 2025-03-10 ENCOUNTER — APPOINTMENT (OUTPATIENT)
Dept: WOUND CARE | Facility: MEDICAL CENTER | Age: OVER 89
End: 2025-03-10
Attending: PODIATRIST
Payer: MEDICARE

## 2025-03-10 RX ORDER — KETOCONAZOLE 20 MG/ML
SHAMPOO, SUSPENSION TOPICAL
Qty: 120 ML | Refills: 8 | Status: CANCELLED | OUTPATIENT
Start: 2025-01-24

## 2025-03-11 ENCOUNTER — HOME CARE VISIT (OUTPATIENT)
Dept: HOME HEALTH SERVICES | Facility: HOME HEALTHCARE | Age: OVER 89
End: 2025-03-11
Payer: MEDICARE

## 2025-03-11 PROCEDURE — RXMED WILLOW AMBULATORY MEDICATION CHARGE: Performed by: INTERNAL MEDICINE

## 2025-03-12 ENCOUNTER — PHARMACY VISIT (OUTPATIENT)
Dept: PHARMACY | Facility: MEDICAL CENTER | Age: OVER 89
End: 2025-03-12
Payer: COMMERCIAL

## 2025-03-12 ASSESSMENT — ACTIVITIES OF DAILY LIVING (ADL)
HOME_HEALTH_OASIS: 00
OASIS_M1830: 00

## 2025-03-17 ENCOUNTER — APPOINTMENT (OUTPATIENT)
Dept: WOUND CARE | Facility: MEDICAL CENTER | Age: OVER 89
End: 2025-03-17
Attending: PODIATRIST
Payer: MEDICARE

## 2025-03-25 SDOH — HEALTH STABILITY: MENTAL HEALTH
STRESS IS WHEN SOMEONE FEELS TENSE, NERVOUS, ANXIOUS, OR CAN'T SLEEP AT NIGHT BECAUSE THEIR MIND IS TROUBLED. HOW STRESSED ARE YOU?: TO SOME EXTENT

## 2025-03-25 SDOH — ECONOMIC STABILITY: HOUSING INSECURITY
IN THE LAST 12 MONTHS, WAS THERE A TIME WHEN YOU DID NOT HAVE A STEADY PLACE TO SLEEP OR SLEPT IN A SHELTER (INCLUDING NOW)?: NO

## 2025-03-25 SDOH — HEALTH STABILITY: PHYSICAL HEALTH: ON AVERAGE, HOW MANY MINUTES DO YOU ENGAGE IN EXERCISE AT THIS LEVEL?: 30 MIN

## 2025-03-25 SDOH — ECONOMIC STABILITY: TRANSPORTATION INSECURITY
IN THE PAST 12 MONTHS, HAS LACK OF RELIABLE TRANSPORTATION KEPT YOU FROM MEDICAL APPOINTMENTS, MEETINGS, WORK OR FROM GETTING THINGS NEEDED FOR DAILY LIVING?: YES

## 2025-03-25 SDOH — ECONOMIC STABILITY: INCOME INSECURITY: HOW HARD IS IT FOR YOU TO PAY FOR THE VERY BASICS LIKE FOOD, HOUSING, MEDICAL CARE, AND HEATING?: NOT VERY HARD

## 2025-03-25 SDOH — HEALTH STABILITY: PHYSICAL HEALTH: ON AVERAGE, HOW MANY DAYS PER WEEK DO YOU ENGAGE IN MODERATE TO STRENUOUS EXERCISE (LIKE A BRISK WALK)?: 3 DAYS

## 2025-03-25 SDOH — ECONOMIC STABILITY: FOOD INSECURITY: WITHIN THE PAST 12 MONTHS, YOU WORRIED THAT YOUR FOOD WOULD RUN OUT BEFORE YOU GOT MONEY TO BUY MORE.: NEVER TRUE

## 2025-03-25 SDOH — ECONOMIC STABILITY: INCOME INSECURITY: IN THE LAST 12 MONTHS, WAS THERE A TIME WHEN YOU WERE NOT ABLE TO PAY THE MORTGAGE OR RENT ON TIME?: NO

## 2025-03-25 SDOH — ECONOMIC STABILITY: FOOD INSECURITY: WITHIN THE PAST 12 MONTHS, THE FOOD YOU BOUGHT JUST DIDN'T LAST AND YOU DIDN'T HAVE MONEY TO GET MORE.: NEVER TRUE

## 2025-03-25 SDOH — ECONOMIC STABILITY: TRANSPORTATION INSECURITY
IN THE PAST 12 MONTHS, HAS THE LACK OF TRANSPORTATION KEPT YOU FROM MEDICAL APPOINTMENTS OR FROM GETTING MEDICATIONS?: YES

## 2025-03-25 SDOH — ECONOMIC STABILITY: TRANSPORTATION INSECURITY
IN THE PAST 12 MONTHS, HAS LACK OF TRANSPORTATION KEPT YOU FROM MEETINGS, WORK, OR FROM GETTING THINGS NEEDED FOR DAILY LIVING?: YES

## 2025-03-25 ASSESSMENT — LIFESTYLE VARIABLES
SKIP TO QUESTIONS 9-10: 0
HOW OFTEN DO YOU HAVE SIX OR MORE DRINKS ON ONE OCCASION: LESS THAN MONTHLY
HOW MANY STANDARD DRINKS CONTAINING ALCOHOL DO YOU HAVE ON A TYPICAL DAY: 1 OR 2
HOW OFTEN DO YOU HAVE A DRINK CONTAINING ALCOHOL: 2-3 TIMES A WEEK
AUDIT-C TOTAL SCORE: 4

## 2025-03-25 ASSESSMENT — SOCIAL DETERMINANTS OF HEALTH (SDOH)
IN A TYPICAL WEEK, HOW MANY TIMES DO YOU TALK ON THE PHONE WITH FAMILY, FRIENDS, OR NEIGHBORS?: ONCE A WEEK
HOW MANY DRINKS CONTAINING ALCOHOL DO YOU HAVE ON A TYPICAL DAY WHEN YOU ARE DRINKING: 1 OR 2
HOW OFTEN DO YOU HAVE A DRINK CONTAINING ALCOHOL: 2-3 TIMES A WEEK
WITHIN THE PAST 12 MONTHS, YOU WORRIED THAT YOUR FOOD WOULD RUN OUT BEFORE YOU GOT THE MONEY TO BUY MORE: NEVER TRUE
DO YOU BELONG TO ANY CLUBS OR ORGANIZATIONS SUCH AS CHURCH GROUPS UNIONS, FRATERNAL OR ATHLETIC GROUPS, OR SCHOOL GROUPS?: YES
HOW OFTEN DO YOU HAVE SIX OR MORE DRINKS ON ONE OCCASION: LESS THAN MONTHLY
IN A TYPICAL WEEK, HOW MANY TIMES DO YOU TALK ON THE PHONE WITH FAMILY, FRIENDS, OR NEIGHBORS?: ONCE A WEEK
DO YOU BELONG TO ANY CLUBS OR ORGANIZATIONS SUCH AS CHURCH GROUPS UNIONS, FRATERNAL OR ATHLETIC GROUPS, OR SCHOOL GROUPS?: YES
IN THE PAST 12 MONTHS, HAS THE ELECTRIC, GAS, OIL, OR WATER COMPANY THREATENED TO SHUT OFF SERVICE IN YOUR HOME?: NO
HOW HARD IS IT FOR YOU TO PAY FOR THE VERY BASICS LIKE FOOD, HOUSING, MEDICAL CARE, AND HEATING?: NOT VERY HARD

## 2025-03-28 ENCOUNTER — OFFICE VISIT (OUTPATIENT)
Dept: MEDICAL GROUP | Facility: PHYSICIAN GROUP | Age: OVER 89
End: 2025-03-28
Payer: MEDICARE

## 2025-03-28 VITALS
HEART RATE: 83 BPM | WEIGHT: 132.5 LBS | HEIGHT: 64 IN | TEMPERATURE: 97.2 F | RESPIRATION RATE: 14 BRPM | OXYGEN SATURATION: 90 % | BODY MASS INDEX: 22.62 KG/M2 | DIASTOLIC BLOOD PRESSURE: 64 MMHG | SYSTOLIC BLOOD PRESSURE: 138 MMHG

## 2025-03-28 DIAGNOSIS — I73.00 RAYNAUD'S DISEASE WITHOUT GANGRENE: ICD-10-CM

## 2025-03-28 DIAGNOSIS — N18.31 CHRONIC KIDNEY DISEASE, STAGE 3A: ICD-10-CM

## 2025-03-28 DIAGNOSIS — F33.42 RECURRENT MAJOR DEPRESSIVE DISORDER, IN FULL REMISSION (HCC): ICD-10-CM

## 2025-03-28 DIAGNOSIS — D63.8 ANEMIA IN OTHER CHRONIC DISEASES CLASSIFIED ELSEWHERE: ICD-10-CM

## 2025-03-28 DIAGNOSIS — E78.00 HYPERCHOLESTEROLEMIA: ICD-10-CM

## 2025-03-28 DIAGNOSIS — R53.82 CHRONIC FATIGUE: ICD-10-CM

## 2025-03-28 DIAGNOSIS — H35.3222 EXUDATIVE AGE-RELATED MACULAR DEGENERATION OF LEFT EYE WITH INACTIVE CHOROIDAL NEOVASCULARIZATION (HCC): ICD-10-CM

## 2025-03-28 DIAGNOSIS — M06.00 SERONEGATIVE RHEUMATOID ARTHRITIS (HCC): ICD-10-CM

## 2025-03-28 PROBLEM — K52.9 CHRONIC DIARRHEA: Status: RESOLVED | Noted: 2022-06-02 | Resolved: 2025-03-28

## 2025-03-28 PROBLEM — D64.9 ANEMIA: Status: RESOLVED | Noted: 2020-01-19 | Resolved: 2025-03-28

## 2025-03-28 PROBLEM — I70.0 AORTIC ATHEROSCLEROSIS (HCC): Status: RESOLVED | Noted: 2023-06-21 | Resolved: 2025-03-28

## 2025-03-28 PROBLEM — M16.12 PRIMARY OSTEOARTHRITIS OF LEFT HIP: Status: RESOLVED | Noted: 2023-02-01 | Resolved: 2025-03-28

## 2025-03-28 PROBLEM — M54.50 LOW BACK PAIN: Status: RESOLVED | Noted: 2023-06-21 | Resolved: 2025-03-28

## 2025-03-28 PROBLEM — Z96.642 STATUS POST LEFT HIP REPLACEMENT: Status: RESOLVED | Noted: 2023-02-21 | Resolved: 2025-03-28

## 2025-03-28 PROBLEM — Z87.891 EX-CIGARETTE SMOKER: Status: RESOLVED | Noted: 2019-07-29 | Resolved: 2025-03-28

## 2025-03-28 PROBLEM — Z79.899 LONG-TERM USE OF HYDROXYCHLOROQUINE: Status: RESOLVED | Noted: 2022-10-18 | Resolved: 2025-03-28

## 2025-03-28 PROBLEM — I49.3 PVC (PREMATURE VENTRICULAR CONTRACTION): Status: RESOLVED | Noted: 2022-06-03 | Resolved: 2025-03-28

## 2025-03-28 PROBLEM — K58.2 IRRITABLE BOWEL SYNDROME WITH BOTH CONSTIPATION AND DIARRHEA: Status: RESOLVED | Noted: 2024-09-05 | Resolved: 2025-03-28

## 2025-03-28 PROBLEM — M80.059A HIP FRACTURE DUE TO OSTEOPOROSIS, INITIAL ENCOUNTER (HCC): Status: RESOLVED | Noted: 2023-03-11 | Resolved: 2025-03-28

## 2025-03-28 PROBLEM — Z87.440 HISTORY OF RECURRENT UTIS: Status: RESOLVED | Noted: 2019-03-27 | Resolved: 2025-03-28

## 2025-03-28 PROBLEM — G31.9 CEREBRAL ATROPHY (HCC): Status: RESOLVED | Noted: 2023-06-21 | Resolved: 2025-03-28

## 2025-03-28 PROBLEM — D62 ACUTE BLOOD LOSS ANEMIA: Status: RESOLVED | Noted: 2023-03-10 | Resolved: 2025-03-28

## 2025-03-28 PROBLEM — I47.10 SVT (SUPRAVENTRICULAR TACHYCARDIA) (HCC): Status: RESOLVED | Noted: 2023-03-21 | Resolved: 2025-03-28

## 2025-03-28 PROCEDURE — 99214 OFFICE O/P EST MOD 30 MIN: CPT

## 2025-03-28 PROCEDURE — 3075F SYST BP GE 130 - 139MM HG: CPT

## 2025-03-28 PROCEDURE — 3078F DIAST BP <80 MM HG: CPT

## 2025-03-28 RX ORDER — NIFEDIPINE 10 MG/1
10 CAPSULE ORAL 3 TIMES DAILY
Qty: 90 CAPSULE | Refills: 1 | Status: SHIPPED | OUTPATIENT
Start: 2025-03-28

## 2025-03-28 RX ORDER — KETOCONAZOLE 20 MG/ML
SHAMPOO, SUSPENSION TOPICAL
Qty: 120 ML | Refills: 8 | Status: SHIPPED | OUTPATIENT
Start: 2025-03-28

## 2025-03-28 ASSESSMENT — FIBROSIS 4 INDEX: FIB4 SCORE: 2.3

## 2025-03-28 ASSESSMENT — ENCOUNTER SYMPTOMS
HEARTBURN: 0
HEADACHES: 0
WEIGHT LOSS: 0
INSOMNIA: 0
COUGH: 0
DIARRHEA: 0
CONSTIPATION: 0
BACK PAIN: 0
DEPRESSION: 0

## 2025-03-28 NOTE — ASSESSMENT & PLAN NOTE
-Chronic, untreated.  Patient is never taken medication for this but states that she has symptoms daily and they are barely bothersome   -Discussed risks and benefits related to initiating nifedipine pharmacotherapy for her symptoms.  She is agreeable to this   -Rx sent for nifedipine 10 mg 3 times daily   -Will follow-up in 1 month   -See plan above for seronegative rheumatoid arthritis  Orders:    NIFEdipine IR (PROCARDIA) 10 MG Cap; Take 1 Capsule by mouth 3 times a day.    Referral to Rheumatology

## 2025-03-28 NOTE — ASSESSMENT & PLAN NOTE
-Chronic, is established with ophthalmology and retinal specialist.  -Encouraged to continue following with specialist interventions and recommendations

## 2025-03-28 NOTE — ASSESSMENT & PLAN NOTE
-Chronic, symptomatic but stable.  Patient was on hydroxychloroquine for many years however stopped several years ago.  She is continuing to have progression of disease.  -She is desiring to reestablish with rheumatology to discuss options.  -Rheumatology referral submitted today  Orders:    Referral to Rheumatology

## 2025-03-28 NOTE — ASSESSMENT & PLAN NOTE
-Chronic, stable.  Most recent GFR is 48.  -Counseled on adequate hydration and avoidance of nephrotoxic agents  -Labs per orders  Orders:    Comp Metabolic Panel; Future

## 2025-03-28 NOTE — ASSESSMENT & PLAN NOTE
-Chronic, status unknown.  Patient discontinued use of Crestor 20 mg a number of years ago.  -Labs per orders  Orders:    Lipid Profile; Future

## 2025-03-28 NOTE — PROGRESS NOTES
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    HISTORY OF THE PRESENT ILLNESS: Patient is a 89 y.o. female. This pleasant patient is here today to establish care and discuss her Raynaud's syndrome, lab work, and rheumatology referral.     Problem   Chronic Kidney Disease, Stage 3a    Labs: GFR 48.00 (ML/MIN/1.73M2) on 12/13/2024     Exudative Age-Related Macular Degeneration of Left Eye With Inactive Choroidal Neovascularization (Hcc)    Is established with opthalomology/retina specialist     Hypercholesterolemia    Risk-Enhancing Factor(s):  HTN   Clinical ASCVD: None  LDL >190:  No  ASCVD risk: Aged out    Current med: Crestor 10 mg  Side effects: None    Lab Results   Component Value Date/Time    CHOLSTRLTOT 181 06/10/2024 1035    TRIGLYCERIDE 137 06/10/2024 1035    HDL 42 06/10/2024 1035     (H) 06/10/2024 1035        Osteoporosis    DEXA Aug. 2017: T score -3.4 right forearm and -1.6 left hip  DEXA Aug. 2019: T score -4.6 right forearm and -1.4 right hip (prolia started)  DEXA Nov. 2020 T score -4.0 right forearm and -1.7 right hip (on prolia)    DEXA 2023: T-score -5.7 and -1.6  Osteoporotic in the distal left forearm and osteopenic in the proximal right femur.     10-year Probability of Fracture:  Major Osteoporotic     31.1%  Hip     20.2%     Hypothyroidism     Chronic, currently on Synthroid 112 mcg every morning. Discussed taking the 112 mcg 5 days a week.  States that she tried going on a lower dose in the past and then her levels went too high.  Denies palpitations, heat intolerance, however states she does have diarrhea.     Anemia in Other Chronic Diseases Classified Elsewhere    Patient is fatigued and most recent hemoglobin is 11.7     Recurrent Major Depressive Disorder, in Full Remission (formerly [facility])    >>OVERVIEW FOR DEPRESSION WRITTEN ON 2022  3:15 PM BY BETH NUNEZ M.D.    She denies any serious depressive history, states she  started celexa when her  was dx'd with LBD, has just stayed on it. Feels it helps her but denies depression. He  7y ago.  She is currently on 20 mg/day, her QTC is 459, I suggested she lowered to 10 mg daily and I will recheck an EKG when she follows up next month.  She decided she would like to stay on the medication as it does help with her energy. 22 still w/ prolonged qtc 537. Will stop celexa, refer to cards and can try prozac.      Seronegative Rheumatoid Arthritis (Hcc)    Stable, continue current plan of care with current medications. Her pain is fairly well controlled, is primarily in her hands, uses topical meds as well. Sever hand deformities bilaterally.  Does regular eye exams. Will refer to rheum.      Irritable Bowel Syndrome With Both Constipation and Diarrhea (Resolved)   Body Mass Index (Bmi) 19.9 Or Less, Adult (Resolved)   Low Back Pain (Resolved)   Hip Fracture Due to Osteoporosis, Initial Encounter (formerly [facility]) (Resolved)   Acute Blood Loss Anemia (Resolved)   Status Post Left Hip Replacement (Resolved)   Primary Osteoarthritis of Left Hip (Resolved)    Added automatically from request for surgery [id number]     Long-Term Use of Hydroxychloroquine (Resolved)   Pvc (Premature Ventricular Contraction) (Resolved)   Chronic Diarrhea (Resolved)    Chronic, states she does not have diarrhea. States that  "stress induced diarrhea. Takes Questran as needed which is helpful.      Bmi 21.0-21.9, Adult (Resolved)   Anemia (Resolved)   Ex-Cigarette Smoker (Resolved)    Quit 1985 after smoking 1 PPD for 20 years     History of Recurrent Utis (Resolved)   History of Sciatica (Resolved)       Health Maintenance: Completed    ROS:   Review of Systems   Constitutional:  Negative for weight loss.   Respiratory:  Negative for cough.    Cardiovascular:  Negative for leg swelling.   Gastrointestinal:  Negative for constipation, diarrhea and heartburn.   Genitourinary:  Negative for dysuria, frequency and urgency.   Musculoskeletal:  Negative for back pain.   Neurological:  Negative for headaches.   Psychiatric/Behavioral:  Negative for depression. The patient does not have insomnia.          Objective:       Exam: /64   Pulse 83   Temp 36.2 °C (97.2 °F) (Temporal)   Resp 14   Ht 1.626 m (5' 4\")   Wt 60.1 kg (132 lb 7.9 oz)   SpO2 90%  Body mass index is 22.74 kg/m².    Physical Exam  Constitutional:       General: She is not in acute distress.     Appearance: Normal appearance. She is not ill-appearing.   HENT:      Head: Normocephalic and atraumatic.      Right Ear: Tympanic membrane and ear canal normal. There is no impacted cerumen.      Left Ear: Tympanic membrane and ear canal normal. There is no impacted cerumen.      Nose: Nose normal.      Mouth/Throat:      Mouth: Mucous membranes are moist.      Pharynx: Oropharynx is clear. No posterior oropharyngeal erythema.   Eyes:      Extraocular Movements: Extraocular movements intact.      Conjunctiva/sclera: Conjunctivae normal.      Pupils: Pupils are equal, round, and reactive to light.   Cardiovascular:      Rate and Rhythm: Normal rate and regular rhythm.      Heart sounds: No murmur heard.  Pulmonary:      Effort: Pulmonary effort is normal. No respiratory distress.      Breath sounds: Normal breath sounds. No wheezing.   Abdominal:      General: Abdomen is flat. " There is no distension.      Palpations: Abdomen is soft.      Tenderness: There is no abdominal tenderness.   Musculoskeletal:         General: Normal range of motion.      Right hand: Deformity and tenderness present. No swelling. Normal pulse.      Left hand: Deformity and tenderness present. No swelling. Normal pulse.      Cervical back: Normal range of motion.      Right lower leg: No edema.      Left lower leg: No edema.      Comments: Severe rheumatoid deformities of both hands all finger joints impacted.  Left middle finger amputation present.  There is discoloration at the tips of her fingers with a bluish hue and they are cool to touch.  Pulses intact.   Lymphadenopathy:      Cervical: No cervical adenopathy.   Skin:     General: Skin is warm and dry.      Capillary Refill: Capillary refill takes less than 2 seconds.   Neurological:      General: No focal deficit present.      Mental Status: She is alert and oriented to person, place, and time.      Deep Tendon Reflexes: Reflexes normal.   Psychiatric:         Mood and Affect: Mood normal.             Assessment & Plan:   89 y.o. female with the following -    Assessment & Plan  Seronegative rheumatoid arthritis (HCC)  -Chronic, symptomatic but stable.  Patient was on hydroxychloroquine for many years however stopped several years ago.  She is continuing to have progression of disease.  -She is desiring to reestablish with rheumatology to discuss options.  -Rheumatology referral submitted today  Orders:    Referral to Rheumatology    Raynaud's disease without gangrene  -Chronic, untreated.  Patient is never taken medication for this but states that she has symptoms daily and they are barely bothersome   -Discussed risks and benefits related to initiating nifedipine pharmacotherapy for her symptoms.  She is agreeable to this   -Rx sent for nifedipine 10 mg 3 times daily   -Will follow-up in 1 month   -See plan above for seronegative rheumatoid  arthritis  Orders:    NIFEdipine IR (PROCARDIA) 10 MG Cap; Take 1 Capsule by mouth 3 times a day.    Referral to Rheumatology    Chronic kidney disease, stage 3a  -Chronic, stable.  Most recent GFR is 48.  -Counseled on adequate hydration and avoidance of nephrotoxic agents  -Labs per orders  Orders:    Comp Metabolic Panel; Future    Hypercholesterolemia  -Chronic, status unknown.  Patient discontinued use of Crestor 20 mg a number of years ago.  -Labs per orders  Orders:    Lipid Profile; Future    Recurrent major depressive disorder, in full remission (HCC)  -Chronic, stable.  Currently on 20 mg of Prozac  -Will continue this current medication regimen.  -Patient is deferring behavioral health counseling at this time.       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  -Encouraged to continue following with specialist interventions and recommendations       Anemia in other chronic diseases classified elsewhere  -Chronic, likely because of chronic disease.  Is not receiving treatment for this.  Although she may be symptomatic.  -Labs per orders  Orders:    CBC WITH DIFFERENTIAL; Future    Chronic fatigue  -Chronic and ongoing  -Labs per orders  Orders:    VITAMIN D,25 HYDROXY (DEFICIENCY); Future    TSH WITH REFLEX TO FT4; Future        HCC Gap Form    Diagnosis to address: N18.31 - Chronic kidney disease, stage 3a  Assessment and plan: Chronic, stable. Continue with current defined treatment plan: Adequate hydration and avoidance of nephrotoxic agents. Follow-up at least annually.  Last edited 03/28/25 14:58 PDT by Joseph Tse D.O.         Return in about 1 month (around 4/28/2025) for f/u [facility]ri.    Please note that this dictation was created using voice recognition software. I have made every reasonable attempt to correct obvious errors, but I expect that there are errors of  grammar and possibly content that I did not discover before finalizing the note.        Joseph Tse D.O.

## 2025-03-28 NOTE — ASSESSMENT & PLAN NOTE
-Chronic, likely because of chronic disease.  Is not receiving treatment for this.  Although she may be symptomatic.  -Labs per orders  Orders:    CBC WITH DIFFERENTIAL; Future

## 2025-03-28 NOTE — ASSESSMENT & PLAN NOTE
-Chronic, stable.  Currently on 20 mg of Prozac  -Will continue this current medication regimen.  -Patient is deferring behavioral health counseling at this time.

## 2025-04-04 RX ORDER — IPRATROPIUM BROMIDE 42 UG/1
SPRAY, METERED NASAL
Qty: 15 ML | Refills: 0 | Status: SHIPPED | OUTPATIENT
Start: 2025-04-04 | End: 2025-04-23 | Stop reason: SDUPTHER

## 2025-04-04 NOTE — TELEPHONE ENCOUNTER
Received request via: Patient    Was the patient seen in the last year in this department? Yes    Does the patient have an active prescription (recently filled or refills available) for medication(s) requested? No    Pharmacy Name: [facility]    Does the patient have [facility] and need 100-day supply? (This applies to ALL medications) Yes, quantity updated to 100 days

## 2025-04-09 PROCEDURE — RXMED WILLOW AMBULATORY MEDICATION CHARGE: Performed by: INTERNAL MEDICINE

## 2025-04-09 PROCEDURE — RXMED WILLOW AMBULATORY MEDICATION CHARGE

## 2025-04-14 ENCOUNTER — PHARMACY VISIT (OUTPATIENT)
Dept: PHARMACY | Facility: MEDICAL CENTER | Age: OVER 89
End: 2025-04-14
Payer: COMMERCIAL

## 2025-04-18 ENCOUNTER — HOSPITAL ENCOUNTER (OUTPATIENT)
Dept: LAB | Facility: MEDICAL CENTER | Age: OVER 89
End: 2025-04-18
Payer: MEDICARE

## 2025-04-18 DIAGNOSIS — R53.82 CHRONIC FATIGUE: ICD-10-CM

## 2025-04-18 DIAGNOSIS — N18.31 CHRONIC KIDNEY DISEASE, STAGE 3A: ICD-10-CM

## 2025-04-18 DIAGNOSIS — D63.8 ANEMIA IN OTHER CHRONIC DISEASES CLASSIFIED ELSEWHERE: ICD-10-CM

## 2025-04-18 DIAGNOSIS — E78.00 HYPERCHOLESTEROLEMIA: ICD-10-CM

## 2025-04-18 LAB
25(OH)D3 SERPL-MCNC: 45 NG/ML (ref 30–100)
ALBUMIN SERPL BCP-MCNC: 4.2 G/DL (ref 3.2–4.9)
ALBUMIN/GLOB SERPL: 1.4 G/DL
ALP SERPL-CCNC: 60 U/L (ref 30–99)
ALT SERPL-CCNC: 22 U/L (ref 2–50)
ANION GAP SERPL CALC-SCNC: 13 MMOL/L (ref 7–16)
AST SERPL-CCNC: 31 U/L (ref 12–45)
BASOPHILS # BLD AUTO: 1 % (ref 0–1.8)
BASOPHILS # BLD: 0.04 K/UL (ref 0–0.12)
BILIRUB SERPL-MCNC: 0.4 MG/DL (ref 0.1–1.5)
BUN SERPL-MCNC: 30 MG/DL (ref 8–22)
CALCIUM ALBUM COR SERPL-MCNC: 9.9 MG/DL (ref 8.5–10.5)
CALCIUM SERPL-MCNC: 10.1 MG/DL (ref 8.5–10.5)
CHLORIDE SERPL-SCNC: 104 MMOL/L (ref 96–112)
CHOLEST SERPL-MCNC: 235 MG/DL (ref 100–199)
CO2 SERPL-SCNC: 19 MMOL/L (ref 20–33)
CREAT SERPL-MCNC: 1.05 MG/DL (ref 0.5–1.4)
EOSINOPHIL # BLD AUTO: 0.22 K/UL (ref 0–0.51)
EOSINOPHIL NFR BLD: 5.3 % (ref 0–6.9)
ERYTHROCYTE [DISTWIDTH] IN BLOOD BY AUTOMATED COUNT: 46.5 FL (ref 35.9–50)
GFR SERPLBLD CREATININE-BSD FMLA CKD-EPI: 51 ML/MIN/1.73 M 2
GLOBULIN SER CALC-MCNC: 3 G/DL (ref 1.9–3.5)
GLUCOSE SERPL-MCNC: 97 MG/DL (ref 65–99)
HCT VFR BLD AUTO: 35.3 % (ref 37–47)
HDLC SERPL-MCNC: 43 MG/DL
HGB BLD-MCNC: 12.3 G/DL (ref 12–16)
IMM GRANULOCYTES # BLD AUTO: 0.02 K/UL (ref 0–0.11)
IMM GRANULOCYTES NFR BLD AUTO: 0.5 % (ref 0–0.9)
LDLC SERPL CALC-MCNC: 157 MG/DL
LYMPHOCYTES # BLD AUTO: 1.28 K/UL (ref 1–4.8)
LYMPHOCYTES NFR BLD: 30.5 % (ref 22–41)
MCH RBC QN AUTO: 32.5 PG (ref 27–33)
MCHC RBC AUTO-ENTMCNC: 34.8 G/DL (ref 32.2–35.5)
MCV RBC AUTO: 93.4 FL (ref 81.4–97.8)
MONOCYTES # BLD AUTO: 0.3 K/UL (ref 0–0.85)
MONOCYTES NFR BLD AUTO: 7.2 % (ref 0–13.4)
NEUTROPHILS # BLD AUTO: 2.33 K/UL (ref 1.82–7.42)
NEUTROPHILS NFR BLD: 55.5 % (ref 44–72)
NRBC # BLD AUTO: 0 K/UL
NRBC BLD-RTO: 0 /100 WBC (ref 0–0.2)
PLATELET # BLD AUTO: 271 K/UL (ref 164–446)
PMV BLD AUTO: 9.3 FL (ref 9–12.9)
POTASSIUM SERPL-SCNC: 4.1 MMOL/L (ref 3.6–5.5)
PROT SERPL-MCNC: 7.2 G/DL (ref 6–8.2)
RBC # BLD AUTO: 3.78 M/UL (ref 4.2–5.4)
SODIUM SERPL-SCNC: 136 MMOL/L (ref 135–145)
T4 FREE SERPL-MCNC: 1.51 NG/DL (ref 0.93–1.7)
TRIGL SERPL-MCNC: 173 MG/DL (ref 0–149)
TSH SERPL DL<=0.005 MIU/L-ACNC: 0.33 UIU/ML (ref 0.38–5.33)
WBC # BLD AUTO: 4.2 K/UL (ref 4.8–10.8)

## 2025-04-18 PROCEDURE — 80053 COMPREHEN METABOLIC PANEL: CPT

## 2025-04-18 PROCEDURE — 80061 LIPID PANEL: CPT

## 2025-04-18 PROCEDURE — 82306 VITAMIN D 25 HYDROXY: CPT

## 2025-04-18 PROCEDURE — 84443 ASSAY THYROID STIM HORMONE: CPT

## 2025-04-18 PROCEDURE — 85025 COMPLETE CBC W/AUTO DIFF WBC: CPT

## 2025-04-18 PROCEDURE — 36415 COLL VENOUS BLD VENIPUNCTURE: CPT

## 2025-04-18 PROCEDURE — 84439 ASSAY OF FREE THYROXINE: CPT

## 2025-04-24 NOTE — TELEPHONE ENCOUNTER
Received request via: Pharmacy    Was the patient seen in the last year in this department? Yes    Does the patient have an active prescription (recently filled or refills available) for medication(s) requested? No    Pharmacy Name: [facility]    Does the patient have [facility] and need 100-day supply? (This applies to ALL medications) Yes, quantity updated to 100 days

## 2025-04-28 RX ORDER — IPRATROPIUM BROMIDE 42 UG/1
SPRAY, METERED NASAL
Qty: 15 ML | Refills: 0 | Status: SHIPPED | OUTPATIENT
Start: 2025-04-28

## 2025-04-30 ENCOUNTER — OFFICE VISIT (OUTPATIENT)
Dept: MEDICAL GROUP | Facility: PHYSICIAN GROUP | Age: OVER 89
End: 2025-04-30
Payer: MEDICARE

## 2025-04-30 VITALS
RESPIRATION RATE: 14 BRPM | OXYGEN SATURATION: 97 % | HEART RATE: 90 BPM | HEIGHT: 64 IN | SYSTOLIC BLOOD PRESSURE: 120 MMHG | WEIGHT: 133.16 LBS | BODY MASS INDEX: 22.73 KG/M2 | DIASTOLIC BLOOD PRESSURE: 84 MMHG | TEMPERATURE: 97.9 F

## 2025-04-30 DIAGNOSIS — I73.00 RAYNAUD'S DISEASE WITHOUT GANGRENE: ICD-10-CM

## 2025-04-30 PROCEDURE — RXMED WILLOW AMBULATORY MEDICATION CHARGE: Performed by: INTERNAL MEDICINE

## 2025-04-30 RX ORDER — NIFEDIPINE 10 MG/1
10 CAPSULE ORAL 3 TIMES DAILY
Qty: 90 CAPSULE | Refills: 1 | Status: SHIPPED | OUTPATIENT
Start: 2025-04-30

## 2025-04-30 ASSESSMENT — FIBROSIS 4 INDEX: FIB4 SCORE: 2.17

## 2025-04-30 NOTE — PROGRESS NOTES
"Verbal consent was acquired by the patient to use [employer] ambient listening note generation during this visit     Subjective:     HPI:   History of Present Illness  The patient presents for a follow-up on Raynaud's disease.    Raynaud's Disease  She reports not receiving her prescribed medication for Raynaud's disease despite contacting the pharmacy multiple times. This condition has not been previously treated and is described as bothersome.  - Character: Described as bothersome.  - Alleviating/Aggravating Factors: Not receiving prescribed medication despite contacting the pharmacy multiple times.    Recent lab results were discussed. Her cholesterol levels are noted to be abnormal, with the bad cholesterol being the highest it has been. She is currently on thyroid medication, and her thyroid levels are within the normal range. An improvement in her blood counts was noted, as she is no longer anemic. Kidney function has also improved, although it remains slightly decreased. Liver function and vitamin D levels are reported to be within normal ranges.    Abnormal Cholesterol Levels  Her cholesterol levels are noted to be abnormal, with the bad cholesterol being the highest it has been.  - Character: Abnormal cholesterol levels, with the bad cholesterol being the highest it has been.    Dietary habits include the consumption of red meat, grains, sauces, bread, beef, pork, and fish at least once a week. She also consumes avocados and potatoes daily. A past experience with fish oil supplements was mentioned, which she discontinued due to adverse effects.    Health Maintenance: Completed    Objective:     Exam:  /84   Pulse 90   Temp 36.6 °C (97.9 °F) (Temporal)   Resp 14   Ht 1.626 m (5' 4\")   Wt 60.4 kg (133 lb 2.5 oz)   BMI 22.86 kg/m²  Body mass index is 22.86 kg/m².    Physical Exam  Constitutional:       General: She is not in acute distress.     Appearance: Normal appearance. She is not " ill-appearing.   Eyes:      Conjunctiva/sclera: Conjunctivae normal.   Cardiovascular:      Rate and Rhythm: Normal rate and regular rhythm.      Heart sounds: No murmur heard.  Pulmonary:      Effort: Pulmonary effort is normal. No respiratory distress.      Breath sounds: Normal breath sounds. No wheezing or rhonchi.   Skin:     General: Skin is warm and dry.      Capillary Refill: Capillary refill takes less than 2 seconds.   Neurological:      General: No focal deficit present.      Mental Status: She is alert and oriented to person, place, and time.   Psychiatric:         Mood and Affect: Mood normal.             Results  Labs   - TSH: Slightly abnormal   - Free Thyroid: Normal   - Complete Blood Count: No anemia   - Metabolic Panel: Good kidney and liver function   - Vitamin D: Spectacular   - Cholesterol: Abnormal, high LDL    Assessment & Plan:     1. Raynaud's disease without gangrene  NIFEdipine IR (PROCARDIA) 10 MG Cap          Assessment & Plan  1. Raynaud's disease: Acute.  - Medication for Raynaud's disease will be re-sent to [facility] on [address].    2. Hypercholesterolemia: Chronic.  on 04/04/2024.  - Replace one or two servings of red meat per week with fish.  - Consume more avocados.  - Fish oil supplement, specifically omega-3 fatty acids, recommended.    3. Thyroid disorder: Stable.  - Continue current thyroid medication regimen.    4. Anemia: Resolved.    Follow-up  - Scheduled in 1 month to assess the effectiveness of the medication for Raynaud's disease.      Return in about 1 month (around 5/30/2025) for f/u [facility] check.    Please note that this dictation was created using voice recognition software. I have made every reasonable attempt to correct obvious errors, but I expect that there are errors of grammar and possibly content that I did not discover before finalizing the note.

## 2025-05-02 ENCOUNTER — PHARMACY VISIT (OUTPATIENT)
Dept: PHARMACY | Facility: MEDICAL CENTER | Age: OVER 89
End: 2025-05-02
Payer: COMMERCIAL

## 2025-05-02 PROCEDURE — RXMED WILLOW AMBULATORY MEDICATION CHARGE

## 2025-05-26 PROCEDURE — RXMED WILLOW AMBULATORY MEDICATION CHARGE: Performed by: INTERNAL MEDICINE

## 2025-05-27 ENCOUNTER — PHARMACY VISIT (OUTPATIENT)
Dept: PHARMACY | Facility: MEDICAL CENTER | Age: OVER 89
End: 2025-05-27
Payer: COMMERCIAL

## 2025-05-30 ENCOUNTER — APPOINTMENT (OUTPATIENT)
Dept: RADIOLOGY | Facility: MEDICAL CENTER | Age: OVER 89
End: 2025-05-30
Attending: EMERGENCY MEDICINE
Payer: MEDICARE

## 2025-05-30 ENCOUNTER — HOSPITAL ENCOUNTER (OUTPATIENT)
Dept: RADIOLOGY | Facility: MEDICAL CENTER | Age: OVER 89
End: 2025-05-30
Attending: PHYSICIAN ASSISTANT
Payer: MEDICARE

## 2025-05-30 ENCOUNTER — PHARMACY VISIT (OUTPATIENT)
Dept: PHARMACY | Facility: MEDICAL CENTER | Age: OVER 89
End: 2025-05-30
Payer: COMMERCIAL

## 2025-05-30 ENCOUNTER — HOSPITAL ENCOUNTER (EMERGENCY)
Facility: MEDICAL CENTER | Age: OVER 89
End: 2025-05-30
Attending: EMERGENCY MEDICINE
Payer: MEDICARE

## 2025-05-30 ENCOUNTER — OFFICE VISIT (OUTPATIENT)
Dept: URGENT CARE | Facility: PHYSICIAN GROUP | Age: OVER 89
End: 2025-05-30
Payer: MEDICARE

## 2025-05-30 VITALS
SYSTOLIC BLOOD PRESSURE: 118 MMHG | WEIGHT: 135 LBS | OXYGEN SATURATION: 99 % | RESPIRATION RATE: 14 BRPM | HEART RATE: 73 BPM | HEIGHT: 65 IN | TEMPERATURE: 98.1 F | DIASTOLIC BLOOD PRESSURE: 70 MMHG | BODY MASS INDEX: 22.49 KG/M2

## 2025-05-30 VITALS
OXYGEN SATURATION: 93 % | RESPIRATION RATE: 18 BRPM | HEIGHT: 64 IN | HEART RATE: 67 BPM | WEIGHT: 132.5 LBS | TEMPERATURE: 97.3 F | DIASTOLIC BLOOD PRESSURE: 72 MMHG | SYSTOLIC BLOOD PRESSURE: 172 MMHG | BODY MASS INDEX: 22.62 KG/M2

## 2025-05-30 DIAGNOSIS — M54.6 ACUTE LEFT-SIDED THORACIC BACK PAIN: ICD-10-CM

## 2025-05-30 DIAGNOSIS — R94.31 EKG ABNORMALITY: ICD-10-CM

## 2025-05-30 DIAGNOSIS — R07.9 CHEST PAIN, UNSPECIFIED TYPE: Primary | ICD-10-CM

## 2025-05-30 DIAGNOSIS — R23.8 SKIN SENSITIVITY: ICD-10-CM

## 2025-05-30 DIAGNOSIS — R07.89 LEFT-SIDED CHEST WALL PAIN: Primary | ICD-10-CM

## 2025-05-30 DIAGNOSIS — R07.89 LEFT-SIDED CHEST WALL PAIN: ICD-10-CM

## 2025-05-30 LAB
ALBUMIN SERPL BCP-MCNC: 4.5 G/DL (ref 3.2–4.9)
ALBUMIN/GLOB SERPL: 1.5 G/DL
ALP SERPL-CCNC: 81 U/L (ref 30–99)
ALT SERPL-CCNC: 24 U/L (ref 2–50)
ANION GAP SERPL CALC-SCNC: 18 MMOL/L (ref 7–16)
AST SERPL-CCNC: 38 U/L (ref 12–45)
BASOPHILS # BLD AUTO: 0.7 % (ref 0–1.8)
BASOPHILS # BLD: 0.04 K/UL (ref 0–0.12)
BILIRUB SERPL-MCNC: 0.4 MG/DL (ref 0.1–1.5)
BUN SERPL-MCNC: 45 MG/DL (ref 8–22)
CALCIUM ALBUM COR SERPL-MCNC: 9.6 MG/DL (ref 8.5–10.5)
CALCIUM SERPL-MCNC: 10 MG/DL (ref 8.5–10.5)
CHLORIDE SERPL-SCNC: 102 MMOL/L (ref 96–112)
CO2 SERPL-SCNC: 16 MMOL/L (ref 20–33)
CREAT SERPL-MCNC: 0.99 MG/DL (ref 0.5–1.4)
CRP SERPL HS-MCNC: 0.4 MG/DL (ref 0–0.75)
EKG IMPRESSION: NORMAL
EOSINOPHIL # BLD AUTO: 0.26 K/UL (ref 0–0.51)
EOSINOPHIL NFR BLD: 4.2 % (ref 0–6.9)
ERYTHROCYTE [DISTWIDTH] IN BLOOD BY AUTOMATED COUNT: 48.8 FL (ref 35.9–50)
GFR SERPLBLD CREATININE-BSD FMLA CKD-EPI: 54 ML/MIN/1.73 M 2
GLOBULIN SER CALC-MCNC: 3 G/DL (ref 1.9–3.5)
GLUCOSE SERPL-MCNC: 79 MG/DL (ref 65–99)
HCT VFR BLD AUTO: 39.8 % (ref 37–47)
HGB BLD-MCNC: 13 G/DL (ref 12–16)
IMM GRANULOCYTES # BLD AUTO: 0.02 K/UL (ref 0–0.11)
IMM GRANULOCYTES NFR BLD AUTO: 0.3 % (ref 0–0.9)
LYMPHOCYTES # BLD AUTO: 2.03 K/UL (ref 1–4.8)
LYMPHOCYTES NFR BLD: 33 % (ref 22–41)
MCH RBC QN AUTO: 31.6 PG (ref 27–33)
MCHC RBC AUTO-ENTMCNC: 32.7 G/DL (ref 32.2–35.5)
MCV RBC AUTO: 96.6 FL (ref 81.4–97.8)
MONOCYTES # BLD AUTO: 0.29 K/UL (ref 0–0.85)
MONOCYTES NFR BLD AUTO: 4.7 % (ref 0–13.4)
NEUTROPHILS # BLD AUTO: 3.51 K/UL (ref 1.82–7.42)
NEUTROPHILS NFR BLD: 57.1 % (ref 44–72)
NRBC # BLD AUTO: 0 K/UL
NRBC BLD-RTO: 0 /100 WBC (ref 0–0.2)
NT-PROBNP SERPL IA-MCNC: 563 PG/ML (ref 0–125)
PLATELET # BLD AUTO: 304 K/UL (ref 164–446)
PMV BLD AUTO: 8.7 FL (ref 9–12.9)
POTASSIUM SERPL-SCNC: 4 MMOL/L (ref 3.6–5.5)
PROT SERPL-MCNC: 7.5 G/DL (ref 6–8.2)
RBC # BLD AUTO: 4.12 M/UL (ref 4.2–5.4)
SODIUM SERPL-SCNC: 136 MMOL/L (ref 135–145)
TROPONIN T SERPL-MCNC: 25 NG/L (ref 6–19)
TROPONIN T SERPL-MCNC: 26 NG/L (ref 6–19)
WBC # BLD AUTO: 6.2 K/UL (ref 4.8–10.8)

## 2025-05-30 PROCEDURE — 80053 COMPREHEN METABOLIC PANEL: CPT

## 2025-05-30 PROCEDURE — 83880 ASSAY OF NATRIURETIC PEPTIDE: CPT

## 2025-05-30 PROCEDURE — 71046 X-RAY EXAM CHEST 2 VIEWS: CPT

## 2025-05-30 PROCEDURE — RXMED WILLOW AMBULATORY MEDICATION CHARGE: Performed by: PHYSICIAN ASSISTANT

## 2025-05-30 PROCEDURE — 71045 X-RAY EXAM CHEST 1 VIEW: CPT

## 2025-05-30 PROCEDURE — 36415 COLL VENOUS BLD VENIPUNCTURE: CPT

## 2025-05-30 PROCEDURE — 85025 COMPLETE CBC W/AUTO DIFF WBC: CPT

## 2025-05-30 PROCEDURE — 93005 ELECTROCARDIOGRAM TRACING: CPT | Mod: TC | Performed by: EMERGENCY MEDICINE

## 2025-05-30 PROCEDURE — 700101 HCHG RX REV CODE 250: Performed by: EMERGENCY MEDICINE

## 2025-05-30 PROCEDURE — 99285 EMERGENCY DEPT VISIT HI MDM: CPT

## 2025-05-30 PROCEDURE — 86140 C-REACTIVE PROTEIN: CPT

## 2025-05-30 PROCEDURE — 84484 ASSAY OF TROPONIN QUANT: CPT | Mod: 91

## 2025-05-30 PROCEDURE — 93005 ELECTROCARDIOGRAM TRACING: CPT | Mod: TC

## 2025-05-30 RX ORDER — LIDOCAINE 4 G/G
1 PATCH TOPICAL ONCE
Status: DISCONTINUED | OUTPATIENT
Start: 2025-05-30 | End: 2025-05-30 | Stop reason: HOSPADM

## 2025-05-30 RX ORDER — VALACYCLOVIR HYDROCHLORIDE 1 G/1
1000 TABLET, FILM COATED ORAL 3 TIMES DAILY
Qty: 21 TABLET | Refills: 0 | Status: SHIPPED | OUTPATIENT
Start: 2025-05-30 | End: 2025-06-06

## 2025-05-30 RX ORDER — GABAPENTIN 300 MG/1
300 CAPSULE ORAL 3 TIMES DAILY
Qty: 21 CAPSULE | Refills: 0 | Status: SHIPPED | OUTPATIENT
Start: 2025-05-30 | End: 2025-06-06

## 2025-05-30 RX ORDER — CHLORAL HYDRATE 500 MG
1000 CAPSULE ORAL DAILY
COMMUNITY

## 2025-05-30 RX ORDER — LIDOCAINE 4 G/G
1 PATCH TOPICAL EVERY 24 HOURS
Qty: 10 PATCH | Refills: 0 | Status: SHIPPED | OUTPATIENT
Start: 2025-05-30 | End: 2025-06-04 | Stop reason: SDUPTHER

## 2025-05-30 RX ADMIN — LIDOCAINE 1 PATCH: 4 PATCH TOPICAL at 18:19

## 2025-05-30 ASSESSMENT — ENCOUNTER SYMPTOMS
DIARRHEA: 0
WHEEZING: 0
PAIN: 1
EYE DISCHARGE: 0
EYE REDNESS: 0
PALPITATIONS: 0
SHORTNESS OF BREATH: 0
FEVER: 0
COUGH: 0
SORE THROAT: 0
VOMITING: 0
BACK PAIN: 1

## 2025-05-30 ASSESSMENT — HEART SCORE
HISTORY: SLIGHTLY SUSPICIOUS
AGE: 65+
TROPONIN: 1-3 TIMES NORMAL LIMIT
ECG: NORMAL
RISK FACTORS: 1-2 RISK FACTORS
HEART SCORE: 4

## 2025-05-30 ASSESSMENT — FIBROSIS 4 INDEX
FIB4 SCORE: 2.17
FIB4 SCORE: 2.17

## 2025-05-30 NOTE — PROGRESS NOTES
"Subjective     Elizabethwilner Celis is a 89 y.o. female who presents with Pain (Under left arm and up into the left breast/Pain started roughly 10 days ago /)            Patient is an 89-year-old female presents to urgent care with left sided chest discomfort extending to her left side back.  Patient admits that originally she thought she scratched herself with her bra approximately 10 days ago and since then the pain has spread.  Patient readily admits that she is so sensitive currently she is unable to wear a bra or have any significant pressure over her breast left upper side or back.  Patient reports movement along with any touching to this region all worsen symptoms.  She denies independent shortness of breath but does report that the pain will take her breath away.  Patient admits that the pain is more like a \"hot poker \"and often a nagging pain.  She denies any skin changes she is aware of her rash.  She did receive a shingles vaccine several years ago.  She denies other specific fall, trauma or noted injury.  She does report she is status post left shoulder surgery however does not feel that this is localized to her shoulder and reports it is mainly into the left breast-chest into the left side and back.    Pain  This is a new problem. The current episode started 1 to 4 weeks ago. The problem occurs constantly. The problem has been waxing and waning. Associated symptoms include chest pain. Pertinent negatives include no congestion, coughing, fever, rash, sore throat or vomiting. Exacerbated by: As above.       Review of Systems   Constitutional:  Positive for malaise/fatigue. Negative for fever.   HENT:  Negative for congestion and sore throat.    Eyes:  Negative for discharge and redness.   Respiratory:  Negative for cough, shortness of breath and wheezing.    Cardiovascular:  Positive for chest pain. Negative for palpitations and leg swelling.   Gastrointestinal:  Negative for diarrhea and vomiting. " "  Musculoskeletal:  Positive for back pain.   Skin:  Negative for itching and rash.              Objective     /70 (BP Location: Right arm, Patient Position: Sitting, BP Cuff Size: Adult)   Pulse 73   Temp 36.7 °C (98.1 °F) (Temporal)   Resp 14   Ht 1.651 m (5' 5\")   Wt 61.2 kg (135 lb)   SpO2 99%   BMI 22.47 kg/m²    PMH:  has a past medical history of Acute blood loss anemia (03/10/2023), Adverse effect of anesthesia (01/2020), Amputation of left middle finger, Anemia (01/19/2020), Anesthesia, Aortic atherosclerosis ([facility]) (06/21/2023), Atherosclerosis of both carotid arteries (01/24/2022), BMI 21.0-21.9, adult (03/21/2022), Body mass index (BMI) 19.9 or less, adult (06/21/2023), Bowel habit changes, C. difficile diarrhea (03/2016), Cancer ([facility]) (1947), Cerebral atrophy ([facility]) (06/21/2023), Chronic back pain, Chronic diarrhea (06/02/2022), CKD (chronic kidney disease) stage 3, GFR 30-59 ml/min, Closed fracture of greater trochanter of left femur ([facility]) (03/11/2023), Constipation (01/21/2020), Decreased hearing of both ears (11/17/2022), Delayed emergence from general anesthesia, Dense breast tissue on mammogram (07/25/2019), Dental disorder, Depression, Elbow fracture, left, Ex-cigarette smoker (07/29/2019), 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 choroidal neovascularization ([facility]) (11/17/2022), Heart burn, High cholesterol, Hip fracture due to osteoporosis, initial encounter ([facility]) (03/11/2023), History of anemia, History of DVT (deep vein thrombosis) (2017), History of recurrent UTIs (03/27/2019), History of sciatica (09/26/2014), Hypothyroid, Irritable bowel syndrome with both constipation and diarrhea (09/05/2024), Irritable bowel syndrome with both constipation and diarrhea (09/05/2024), Long-term use of hydroxychloroquine (10/18/2022), Low back pain (06/21/2023), MRSA (methicillin resistant Staphylococcus aureus) (2012), Multilevel degenerative disc disease, " Osteoarthritis, Osteoporosis (03/27/2019), Pain, Peripheral edema, Primary osteoarthritis of left hip (02/01/2023), PVC (premature ventricular contraction), PVC (premature ventricular contraction) (06/03/2022), Raynaud's disease, Rheumatoid arthritis (HCC) (09/26/2014), Status post left hip replacement (02/21/2023), Stroke (HCC) (1990's?), SVT (supraventricular tachycardia) ([facility]) (03/21/2023), SVT (supraventricular tachycardia) ([facility]) (03/21/2023), and Urinary incontinence.  MEDS: Reviewed .   ALLERGIES: Allergies[1]  SURGHX: Past Surgical History[2]  SOCHX:  reports that she quit smoking about 40 years ago. Her smoking use included cigarettes. She started smoking about 60 years ago. She has a 20 pack-year smoking history. She has never used smokeless tobacco. She reports current alcohol use of about 1.2 oz of alcohol per week. She reports that she does not use drugs.  FH: Family history was reviewed, no pertinent findings to report    Physical Exam  Vitals reviewed.   Constitutional:       General: She is not in acute distress.     Appearance: She is well-developed.   HENT:      Head: Normocephalic and atraumatic.   Eyes:      Conjunctiva/sclera: Conjunctivae normal.      Pupils: Pupils are equal, round, and reactive to light.   Neck:      Trachea: No tracheal deviation.   Cardiovascular:      Rate and Rhythm: Normal rate and regular rhythm.   Pulmonary:      Effort: Pulmonary effort is normal.      Breath sounds: Normal breath sounds.       Chest:      Chest wall: Tenderness present.          Comments: See as above diffuse tenderness even with light touch to the area above.  Without localized bony step-off, deformity or crepitus.  Without skin changes noted or swelling.    Abdominal:      Tenderness: There is no left CVA tenderness.      Comments: Abdomen completely nontender.   Musculoskeletal:      Cervical back: Normal range of motion and neck supple.      Comments: Specifically left shoulder is nontender  without any bony step-off-or localized bony tenderness.  Hands with noted discoloration consistent with Raynaud's along with deformity.   Skin:     General: Skin is warm.      Findings: No rash.      Comments: No rash to area exposed during the visit today.    Neurological:      Mental Status: She is alert and oriented to person, place, and time.      Coordination: Coordination normal.   Psychiatric:         Mood and Affect: Mood normal.         Behavior: Behavior normal.                                    CXR:    1.  Hyperinflation consistent with COPD.  2.  No pneumonia or pneumothorax.    EKG: Normal sinus rhythm at a rate of 69.  T wave inversion in aVL, V1, V2.  Without other specific ST changes.  Normal axis.  Compared to old EKG February 13, 2023-T wave inversions are new.  Assessment & Plan  Left-sided chest wall pain  The ASCVD Risk score (Keren TAYLOR, et al., 2019) failed to calculate for the following reasons:    The 2019 ASCVD risk score is only valid for ages 40 to 79    Risk score cannot be calculated because patient has a medical history suggesting prior/existing ASCVD      Orders:    DX-CHEST-2 VIEWS; Future    EKG - Clinic Performed    gabapentin (NEURONTIN) 300 MG Cap; Take 1 Capsule by mouth 3 times a day for 7 days.    valacyclovir (VALTREX) 1 GM Tab; Take 1 Tablet by mouth 3 times a day for 7 days.    Acute left-sided thoracic back pain    Orders:    DX-CHEST-2 VIEWS; Future    EKG - Clinic Performed    gabapentin (NEURONTIN) 300 MG Cap; Take 1 Capsule by mouth 3 times a day for 7 days.    valacyclovir (VALTREX) 1 GM Tab; Take 1 Tablet by mouth 3 times a day for 7 days.    Skin sensitivity    Orders:    DX-CHEST-2 VIEWS; Future    EKG - Clinic Performed    gabapentin (NEURONTIN) 300 MG Cap; Take 1 Capsule by mouth 3 times a day for 7 days.    valacyclovir (VALTREX) 1 GM Tab; Take 1 Tablet by mouth 3 times a day for 7 days.    EKG abnormality              Lengthy discussion with the patient today  as patient's pain closely mimics pain consistent with zoster.  Patient's pain does not cross the midline and is without trauma, fall or noted injury.  Chest x-ray was noncontributory.  Discussed EKG unfortunately patient does have EKG changes from February 2023 with new T wave inversion in aVL along with V1 and V2-low suspicion for cardiac etiology with current symptoms although given patient's age and history I do feel that patient warrants current cardiac workup.  Even in the absence of rash still have zoster high on differential of which patient would like to initiate medications for such at this time.  Patient was still however agreeable for further cardiac workup at [facility] of which I called transfer center and made them aware.  I will also initiate the above-previous utilization of gabapentin in the past of which patient denies significant side effect or sedation with such.  Discussed also that typically we like to utilize antivirals toward onset of symptoms although patient is adamant that she would like to trial such at this time as well.  Of note patient was stable throughout the duration of her care today and is agreeable to go to [facility] for further cardiac workup.  DDX: ACS, rib abnormality, costochondritis, renal stone, AAA.   I have personally reviewed prior external notes and test results pertinent to today's visit.  I have independently reviewed and interpreted all diagnostics ordered during this urgent care acute visit.   Discussed management options (risks,benefits, and alternatives to treatment).    The patient and/or guardian demonstrated a good understanding and agreed with the treatment plan. And all questions were answered.  Please note that this dictation was created using voice recognition software. I have made a reasonable attempt to correct obvious errors, but I expect that there are errors of grammar and possibly content that I did not discover before finalizing the  note.                         [1]   Allergies  Allergen Reactions    Azithromycin Unspecified     Matti Johnsons syndrome    Cefuroxime Itching and Vomiting     They gave me C-Diff    Ciprofloxacin Itching and Vomiting     They gave me C-Diff    Clindamycin Itching and Vomiting     They gave me c-diff    Daptomycin      Matti colleen syndrome      Erythromycin Unspecified     Matti Johnsons syndrome    Rifampin      Matti colleen syndrome    Codeine Itching    Flagyl [Metronidazole] Rash, Vomiting and Nausea     rash    Macrodantin [Nitrofurantoin Macrocrystal] Rash     Other reaction(s): Decreased Blood Pressure    Morphine Itching     Tolerates oxycodone/hydromorphone    Sulfa Drugs Swelling    Conjugated Estrogens     Nitrofurantoin Vomiting and Nausea    Premarin Rash and Unspecified     burning   [2]   Past Surgical History:  Procedure Laterality Date    TN TOTAL HIP ARTHROPLASTY Left 2/21/2023    Procedure: LEFT TOTAL HIP ARTHROPLASTY, AND REMOVAL OF SCREWS FROM TOP OF FEMUR PLATE;  Surgeon: Rom Chinchilla M.D.;  Location: [facility];  Service: Orthopedics    HARDWARE REMOVAL ORTHO Left 2/21/2023    Procedure: REMOVAL, HARDWARE;  Surgeon: Rom Chinchilla M.D.;  Location: [facility];  Service: Orthopedics    PB DEGROOT W/O FACETEC FORAMOT/DSKC 1/2 VRT SEG, TH* N/A 1/16/2020    Procedure: LAMINECTOMY, SPINE, THORACIC;  Surgeon: Sang Nelson M.D.;  Location: [facility];  Service: Neurosurgery    THORACIC FUSION O-ARM N/A 1/16/2020    Procedure: FUSION, SPINE, THORACIC, POSTERIOR APPROACH - T9-L3;  Surgeon: Sang Nelson M.D.;  Location: [facility];  Service: Neurosurgery    PB RECONSTR TOTAL SHOULDER IMPLANT Left 4/30/2019    Procedure: ARTHROPLASTY, SHOULDER, TOTAL - REVERSE;  Surgeon: Daniella Camargo M.D.;  Location: [facility];  Service: Orthopedics    SHOULDER ARTHROPLASTY TOTAL Right 1/9/2018    Procedure: SHOULDER ARTHROPLASTY TOTAL -  REVERSE;  Surgeon: Daniella Camargo M.D.;  Location: [facility];  Service: Orthopedics    COLONOSCOPY - ENDO N/A 3/7/2016    Procedure: COLONOSCOPY - ENDO;  Surgeon: Estiven Ayers M.D.;  Location: [facility]COPY [facility];  Service:     FECAL TRANSPLANT N/A 3/7/2016    Procedure: FECAL TRANSPLANT;  Surgeon: Estiven Ayers M.D.;  Location: ENDOSCOPY [facility];  Service:     OTHER ORTHOPEDIC SURGERY Left 2012    Left Elbow fx repair    BLEPHAROPLASTY  3/31/2011    Performed by ORACIO DIAZ at [facility]    FOOT SURGERY Right 2010    OTHER ORTHOPEDIC SURGERY Left 2006    finger- removal of digit Left middle finger    CHOLECYSTECTOMY  2000    HYSTERECTOMY LAPAROSCOPY  2000    HYSTERECTOMY RADICAL  1985    OTHER Bilateral 2010, 2008    feet- repair torn tendons    OTHER ORTHOPEDIC SURGERY Left 1970s    femur fx

## 2025-05-30 NOTE — ED PROVIDER NOTES
ED Provider Note    CHIEF COMPLAINT  Chief Complaint   Patient presents with    Chest Pain     Started about 8-10 days now  L side of chest that rads around in her arm pit into her back area   no rash   unsure cause    was sent here by PCP to r/o possible shingles however done not have rash to said site         HPI  Elizabeth Celis is a 89 y.o. female who presents relation of left-sided chest pain that wraps around her left axillary region and to the back.  Patient notes the pain has been there for at least 2 weeks and possibly up to 3.  She notes the pain is worse in the left breast and axillary region where even touching the skin exacerbates the sharp burning pain that she is feeling.  She notes no shortness of breath and no radiation of the pain to the neck or jaw.  She has no external pressure or tightness and feels that is very unlikely to be related to her heart.  She was seen at urgent care and prompted to come to the emergency department out of concern for possible EKG changes.  Patient additionally notes that she has not developed any kind of rash and has no overlying skin changes to the breast, chest, axillary region, or back.  She notes that turning and moving position also exacerbates the pain.  EXTERNAL RECORDS REVIEWED  Reviewed urgent care visit.  Patient has already been prescribed Neurontin as well as Valtrex for presumed shingles on the left.  ROS  Constitutional: No fevers or chills  Skin: No rashes  HEENT: No sore throat, or runny nose  Neck: No neck pain  Chest: Chest pain as noted  Pulm: No shortness of breath, cough, wheezing, stridor, or pain with inspiration/expiration  Gastrointestinal: No nausea, vomiting, diarrhea, or abdominal pain.  Genitourinary: No dysuria or hematuria  Musculoskeletal: No pain, swelling, or focal weakness  Neurologic: No sensory or focal motor changes to extremities. No confusion or disorientation.  Heme: No bleeding or bruising problems.   Immuno: No hx of  recurrent infections        LIMITATION TO HISTORY   None  OUTSIDE HISTORIAN(S):  None        PAST FAM HISTORY  Family History   Problem Relation Age of Onset    Non-contributory Mother     Osteoporosis Mother     Arthritis Mother     Non-contributory Father     Narcolepsy Father     Lung Disease Father         Asthma    Heart Disease Father     Anesthesia Paternal Grandfather         death       PAST MEDICAL HISTORY   has a past medical history of Acute blood loss anemia (03/10/2023), Adverse effect of anesthesia (01/2020), Amputation of left middle finger, Anemia (01/19/2020), Anesthesia, Aortic atherosclerosis ([facility]) (06/21/2023), Atherosclerosis of both carotid arteries (01/24/2022), BMI 21.0-21.9, adult (03/21/2022), Body mass index (BMI) 19.9 or less, adult (06/21/2023), Bowel habit changes, C. difficile diarrhea (03/2016), Cancer ([facility]) (1947), Cerebral atrophy ([facility]) (06/21/2023), Chronic back pain, Chronic diarrhea (06/02/2022), CKD (chronic kidney disease) stage 3, GFR 30-59 ml/min, Closed fracture of greater trochanter of left femur ([facility]) (03/11/2023), Constipation (01/21/2020), Decreased hearing of both ears (11/17/2022), Delayed emergence from general anesthesia, Dense breast tissue on mammogram (07/25/2019), Dental disorder, Depression, Elbow fracture, left, Ex-cigarette smoker (07/29/2019), 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 choroidal neovascularization ([facility]) (11/17/2022), Heart burn, High cholesterol, Hip fracture due to osteoporosis, initial encounter ([facility]) (03/11/2023), History of anemia, History of DVT (deep vein thrombosis) (2017), History of recurrent UTIs (03/27/2019), History of sciatica (09/26/2014), Hypothyroid, Irritable bowel syndrome with both constipation and diarrhea (09/05/2024), Irritable bowel syndrome with both constipation and diarrhea (09/05/2024), Long-term use of hydroxychloroquine (10/18/2022), Low back pain (06/21/2023), MRSA (methicillin  resistant Staphylococcus aureus) (), Multilevel degenerative disc disease, Osteoarthritis, Osteoporosis (2019), Pain, Peripheral edema, Primary osteoarthritis of left hip (2023), PVC (premature ventricular contraction), PVC (premature ventricular contraction) (2022), Raynaud's disease, Rheumatoid arthritis (HCC) (2014), Status post left hip replacement (2023), Stroke (HCC) (?), SVT (supraventricular tachycardia) ([facility]) (2023), SVT (supraventricular tachycardia) ([facility]) (2023), and Urinary incontinence.    SOCIAL HISTORY  Social History     Tobacco Use    Smoking status: Former     Current packs/day: 0.00     Average packs/day: 1 pack/day for 20.0 years (20.0 ttl pk-yrs)     Types: Cigarettes     Start date: 1965     Quit date: 1985     Years since quittin.1    Smokeless tobacco: Never   Vaping Use    Vaping status: Never Used   Substance and Sexual Activity    Alcohol use: Yes     Alcohol/week: 1.2 oz     Types: 2 Glasses of wine per week     Comment: 2 drinks a week.    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    CURRENT MEDICATIONS  Home Medications       Reviewed by Renetta L. Casillas, PhT (Pharmacy Tech) on 25 at 1636  Med List Status:  "Complete     Medication Last Dose Status   CALCIUM CITRATE PO 5/29/2025 Active   COENZYME Q10 PO 5/29/2025 Active   cycloSPORINE (RESTASIS) 0.05 % ophthalmic emulsion 5/30/2025 Active   denosumab (PROLIA) 60 MG/ML Solution Prefilled Syringe injection Unknown Active   FLUoxetine (PROZAC) 20 MG Cap 5/28/2025 Active   furosemide (LASIX) 40 MG Tab Unknown Active   gabapentin (NEURONTIN) 300 MG Cap New Rx Active   ipratropium (ATROVENT) 0.06 % Solution 5/30/2025 Active   ketoconazole (NIZORAL) 2 % shampoo 5/27/2025 Active   levothyroxine (SYNTHROID) 112 MCG Tab 5/30/2025 Active   mometasone (ELOCON) 0.1 % lotion 5/28/2025 Active   Multiple Vitamins-Minerals (PRESERVISION AREDS 2 PO) 5/29/2025 Active   NIFEdipine IR (PROCARDIA) 10 MG Cap Not Taking Active   Omega-3 Fatty Acids (FISH OIL) 1000 MG Cap capsule 5/29/2025 Active   Probiotic Product (PROBIOTIC-10 PO) 5/29/2025 Active   spironolactone (ALDACTONE) 25 MG Tab Unknown Active   therapeutic multivitamin-minerals (THERAGRAN-M) Tab 5/29/2025 Active   traZODone (DESYREL) 50 MG Tab 5/29/2025 Active   valacyclovir (VALTREX) 1 GM Tab New Rx Active                     ALLERGIES  Allergies[1]    PHYSICAL EXAM  VITAL SIGNS: BP (!) 172/72   Pulse 67   Temp 36.3 °C (97.3 °F) (Temporal)   Resp 18   Ht 1.626 m (5' 4\")   Wt 60.1 kg (132 lb 7.9 oz)   SpO2 93%   BMI 22.74 kg/m²    Gen: Alert in no apparent distress.  Patient grimaces when turning or with palpation of the left axillary region.  HEENT: No signs of trauma, Bilateral external ears normal, Nose normal. Conjunctiva normal, Non-icteric.   Neck:  No tenderness, Supple, No masses  Lymphatic: No cervical lymphadenopathy noted.   Cardiovascular: Regular rate and rhythm.  Capillary refill less than 3 seconds to all extremities, 2+ distal pulses.  Thorax & Lungs: Normal breath sounds, No respiratory distress, No wheezing bilateral chest rise  Abdomen: Bowel sounds normal, Soft, No tenderness  Skin: Warm, Dry, No erythema, " No rash noted to exposed areas.  Paresthesia noted to the left mid axillary line extending toward the left lateral and anterior portion of the breast.  No overlying skin changes or rashes noted to this area.  No induration or significant adenopathy to the left axilla.  Back: No bony tenderness, No CVA tenderness.   Extremities: Intact distal pulses, No edema  Neurologic: Alert , no facial droop, grossly normal coordination and strength  Psychiatric: Affect pleasant    INITIAL IMPRESSION  Patient arrives for evaluation of several weeks of chest pain and hyperesthesia highly suggestive of a neuropathic pain however the patient is noting she has never developed a rash to suggest this is zoster.  She has not had any recent trauma no recent infectious prodrome.  She has no overlying skin changes and appears nontoxic.  She does grimace with any palpation of the skin or area around the left breast but there are no changes to the breast itself to suggest the need for further imaging.  Laboratory evaluation does seem reasonable and chest x-ray for screening reasons will be reassuring.  Patient states understanding of this.  I feel repeating the troponin will also be necessary but provided there is no change in the troponins and she does not develop any new symptoms, I feel she will be dischargeable from an ACS standpoint with or without additional treatment for zoster.  At this point prednisone seems unlikely to benefit her but lidocaine patches may help.  Patient states understanding of this and is comfortable with this plan.    ED observation? No    LABS  Results for orders placed or performed during the hospital encounter of 05/30/25   EKG    Collection Time: 05/30/25  1:52 PM   Result Value Ref Range    Report       [facility] Emergency Dept.    Test Date:  2025-05-30  Pt Name:    RODO CABELLO             Department: [facility]  MRN:        [id number]                      Room:  Gender:     Female                        Technician: [id number]  :        [id number]                   Requested By:ER TRIAGE PROTOCOL  Order #:    [id number]                    Reading MD:    Measurements  Intervals                                Axis  Rate:       67                           P:          67  OH:         179                          QRS:        34  QRSD:       89                           T:          74  QT:         441  QTc:        466    Interpretive Statements  Sinus rhythm  Nonspecific T abnrm, anterolateral leads  Compared to ECG 02/10/2023 15:14:52  Prolonged QT interval no longer present     CBC with Differential    Collection Time: 25  2:55 PM   Result Value Ref Range    WBC 6.2 4.8 - 10.8 K/uL    RBC 4.12 (L) 4.20 - 5.40 M/uL    Hemoglobin 13.0 12.0 - 16.0 g/dL    Hematocrit 39.8 37.0 - 47.0 %    MCV 96.6 81.4 - 97.8 fL    MCH 31.6 27.0 - 33.0 pg    MCHC 32.7 32.2 - 35.5 g/dL    RDW 48.8 35.9 - 50.0 fL    Platelet Count 304 164 - 446 K/uL    MPV 8.7 (L) 9.0 - 12.9 fL    Neutrophils-Polys 57.10 44.00 - 72.00 %    Lymphocytes 33.00 22.00 - 41.00 %    Monocytes 4.70 0.00 - 13.40 %    Eosinophils 4.20 0.00 - 6.90 %    Basophils 0.70 0.00 - 1.80 %    Immature Granulocytes 0.30 0.00 - 0.90 %    Nucleated RBC 0.00 0.00 - 0.20 /100 WBC    Neutrophils (Absolute) 3.51 1.82 - 7.42 K/uL    Lymphs (Absolute) 2.03 1.00 - 4.80 K/uL    Monos (Absolute) 0.29 0.00 - 0.85 K/uL    Eos (Absolute) 0.26 0.00 - 0.51 K/uL    Baso (Absolute) 0.04 0.00 - 0.12 K/uL    Immature Granulocytes (abs) 0.02 0.00 - 0.11 K/uL    NRBC (Absolute) 0.00 K/uL   Complete Metabolic Panel (CMP)    Collection Time: 25  2:55 PM   Result Value Ref Range    Sodium 136 135 - 145 mmol/L    Potassium 4.0 3.6 - 5.5 mmol/L    Chloride 102 96 - 112 mmol/L    Co2 16 (L) 20 - 33 mmol/L    Anion Gap 18.0 (H) 7.0 - 16.0    Glucose 79 65 - 99 mg/dL    Bun 45 (H) 8 - 22 mg/dL    Creatinine 0.99 0.50 - 1.40 mg/dL    Calcium 10.0 8.5 - 10.5 mg/dL    Correct Calcium  9.6 8.5 - 10.5 mg/dL    AST(SGOT) 38 12 - 45 U/L    ALT(SGPT) 24 2 - 50 U/L    Alkaline Phosphatase 81 30 - 99 U/L    Total Bilirubin 0.4 0.1 - 1.5 mg/dL    Albumin 4.5 3.2 - 4.9 g/dL    Total Protein 7.5 6.0 - 8.2 g/dL    Globulin 3.0 1.9 - 3.5 g/dL    A-G Ratio 1.5 g/dL   proBrain Natriuretic Peptide, NT (BNP)    Collection Time: 05/30/25  2:55 PM   Result Value Ref Range    NT-proBNP 563 (H) 0 - 125 pg/mL   Troponins NOW    Collection Time: 05/30/25  2:55 PM   Result Value Ref Range    Troponin T 25 (H) 6 - 19 ng/L   CRP QUANTITIVE (NON-CARDIAC)    Collection Time: 05/30/25  2:55 PM   Result Value Ref Range    Stat C-Reactive Protein 0.40 0.00 - 0.75 mg/dL   ESTIMATED GFR    Collection Time: 05/30/25  2:55 PM   Result Value Ref Range    GFR (CKD-EPI) 54 (A) >60 mL/min/1.73 m 2   Troponins in two (2) hours    Collection Time: 05/30/25  4:26 PM   Result Value Ref Range    Troponin T 26 (H) 6 - 19 ng/L     *Note: Due to a large number of results and/or encounters for the requested time period, some results have not been displayed. A complete set of results can be found in Results Review.     I have independently interpreted this EKG  Sinus rhythm rate of 67, there are no convincing ST or T wave changes to suggest acute ischemia, and there is no ectopy.  Intervals are reassuring and compared to an EKG performed in 2023, there are no significant changes.  I have independently interpreted the diagnostic imaging associated with this visit and am waiting the final reading from the radiologist.   My preliminary interpretation is a follows: Chest x-ray: There are no convincing pulmonary opacities to suggest lobar infiltrates or effusions.  Cardiomediastinal silhouette appears to be within normal limits.  RADIOLOGY  DX-CHEST-PORTABLE (1 VIEW)   Final Result      Patchy left basilar opacities, atelectasis or infection.            ASSESSMENT, COURSE AND PLAN  Care Narrative: Patient's labs were generally reassuring and her  "repeat troponin was not significantly elevated from the first.  They are both above the limit of normal however she is also 89 and the number likely represents her baseline.  She does not have any convincing ACS symptoms and, given her evaluation, I feel that is reasonable to continue symptomatic treatment with Neurontin, Valtrex, and lidocaine patches as needed for what is likely subclinical zoster.  I considered alternative such as pleurisy, and I did note that the radiologist saw \"patchy left basilar opacities\" , but this does not correlate with any respiratory symptoms and findings alone do not explain the severity or character of pain the patient is experiencing.  I do not feel CT imaging will benefit the patient or  I do not suspect PE.  Patient states understanding of the plan for treatment and will watch her symptoms closely at home.  If they worsen or change she will return for reevaluation and otherwise will follow-up with her primary care physician if they persist.  CHEST PAIN:   HEART Score for Major Cardiac Events  HEART Score     History: Slightly suspicious  ECG: Normal  Age: 65+  Risk Factors: 1-2 risk factors  Troponin: 1-3 times normal limit    Heart Score: 4    Total Score   0-3 Points = Low Score, risk of MACE 0.9-1.7%.  4-6 Points = Moderate Score, risk of MACE 12-16.6%  7-10 Points = High Score, risk of MACE 50-65%      I have discussed management of the patient with the following physicians and ROMARIO's:      Escalation of care considered, and ultimately not performed:acute inpatient care management, however at this time, the patient is most appropriate for outpatient management    Barriers to care at this time, including but not limited to: none.     Decision tools and Rx drugs considered including, but not limited to : Lidocaine patches, Valtrex, Neurontin     Discussion of management with other QHP or appropriate source(s): none    The patient will not drink alcohol nor drive " with prescribed medications. The patient will return for worsening symptoms and is stable at the time of discharge. The patient verbalizes understanding and will comply.    FINAL IMPRESSION  1. Chest pain, unspecified type        Electronically signed by: Bari Van M.D., 5/30/2025 3:17 PM          [1]   Allergies  Allergen Reactions    Azithromycin Unspecified     Matti Johnsons syndrome    Cefuroxime Itching and Vomiting     They gave me C-Diff    Ciprofloxacin Itching and Vomiting     They gave me C-Diff    Clindamycin Itching and Vomiting     They gave me c-diff    Daptomycin      Matti colleen syndrome      Erythromycin Unspecified     Matti Johnsons syndrome    Rifampin      Matti colleen syndrome    Codeine Itching    Flagyl [Metronidazole] Rash, Vomiting and Nausea     rash    Macrodantin [Nitrofurantoin Macrocrystal] Rash     Other reaction(s): Decreased Blood Pressure    Morphine Itching     Tolerates oxycodone/hydromorphone    Sulfa Drugs Swelling    Conjugated Estrogens     Nitrofurantoin Vomiting and Nausea    Premarin Rash and Unspecified     burning

## 2025-05-30 NOTE — ED NOTES
Per ERP pt does not need an IV at this time, ok for just a straight stick. Repeat troponin collected and sent to lab

## 2025-05-30 NOTE — ED TRIAGE NOTES
"/77   Pulse 68   Temp 36.3 °C (97.3 °F) (Temporal)   Resp 16   Ht 1.626 m (5' 4\")   Wt 60.1 kg (132 lb 7.9 oz)   SpO2 98%   BMI 22.74 kg/m²   Chief Complaint   Patient presents with    Chest Pain     Started about 8-10 days now  L side of chest that rads around in her arm pit into her back area   no rash   unsure cause    was sent here by PCP to r/o possible shingles however done not have rash to said site       Was dropped off by [facility] transportation   here for evaluation of pain to L side chest and back area  "

## 2025-05-30 NOTE — DISCHARGE PLANNING
TCN following. HTH/SCP chart review completed. Note pt currently in ED 2' to complaints of chest pain. As noted, patient  arrived to ED by [employer] transportation. Patient is noted to have a [facility] primary care provider with primary care visit scheduled Established Patient with Physician Joseph Tse D.O. Wednesday July 30 10:25 AM. Per review, patient with noted participation in [facility] services earlier this  year with discharge from [facility] 3/11/25.     At this time, patient anticipated discharge is pending patient medical course and physician recommendations (either directly from ED or after admission to [facility] if warranted). If patient admits to [facility], TCN will continue to monitor and assist with SCP/HTH authorization needs for post acute services if indicated. Please reach out to TCN via VOALTE if post acute transitional care needs are warranted for dc planning.

## 2025-05-31 NOTE — DISCHARGE INSTRUCTIONS
Please take your medications as prescribed and follow-up with your primary care physician if symptoms persist.  Return if your symptoms worsen or change in any way.  At this point your symptoms do not appear to be related to a cardiac cause.

## 2025-05-31 NOTE — DISCHARGE PLANNING
note:  IONA received notification from RN  that pt needs her medications tonight but they were sent to [facility].     IONA received confirmation from LEONIDAS Herrera that pt A/Ox3 and able to ambulate .     IONA discussed with SAMEER Bravo CM Manager and she is agreeable that we can pay for the Uber to [facility] and then to [facility].     IONA called Crystal at [facility] and they will have pt's medications ready.

## 2025-05-31 NOTE — ED NOTES
Lidocaine patch applied to left chest wall. CM set pt up with Uber ride to [facility] on [address] and gave taxi voucher to get back to [facility] after picking up Rx for lidocaine patches. Discharge instructions provided. Pt verbalized understanding of discharge instructions to follow up with PCP and to return to ER if condition worsens. Pt ambulated out of ER without difficulty.

## 2025-05-31 NOTE — DISCHARGE PLANNING
note:  CM personally assisted pt to the [employer] Car. [id number] with  [employer]. Cost $20.93 ( Pt has SCP insurance). Taxi voucher given to pt so after she picks up medications then she can call Taxi for her ride to [facility]. Pt also will talk to Uber  if she could pay him to take her to [facility].     Pt is A/O x3 and pleasant. Able to ambulate well with her rolator walker.

## 2025-06-04 ENCOUNTER — OFFICE VISIT (OUTPATIENT)
Dept: MEDICAL GROUP | Facility: PHYSICIAN GROUP | Age: OVER 89
End: 2025-06-04
Payer: MEDICARE

## 2025-06-04 VITALS
WEIGHT: 132 LBS | HEIGHT: 65 IN | SYSTOLIC BLOOD PRESSURE: 98 MMHG | OXYGEN SATURATION: 97 % | TEMPERATURE: 97.5 F | DIASTOLIC BLOOD PRESSURE: 60 MMHG | BODY MASS INDEX: 21.99 KG/M2 | HEART RATE: 91 BPM

## 2025-06-04 DIAGNOSIS — S29.011S INTERCOSTAL MUSCLE STRAIN, SEQUELA: ICD-10-CM

## 2025-06-04 DIAGNOSIS — R07.9 CHEST PAIN, UNSPECIFIED TYPE: ICD-10-CM

## 2025-06-04 PROCEDURE — 99214 OFFICE O/P EST MOD 30 MIN: CPT | Performed by: STUDENT IN AN ORGANIZED HEALTH CARE EDUCATION/TRAINING PROGRAM

## 2025-06-04 PROCEDURE — 3078F DIAST BP <80 MM HG: CPT | Performed by: STUDENT IN AN ORGANIZED HEALTH CARE EDUCATION/TRAINING PROGRAM

## 2025-06-04 PROCEDURE — 3074F SYST BP LT 130 MM HG: CPT | Performed by: STUDENT IN AN ORGANIZED HEALTH CARE EDUCATION/TRAINING PROGRAM

## 2025-06-04 RX ORDER — LIDOCAINE 4 G/G
1 PATCH TOPICAL EVERY 24 HOURS
Qty: 10 PATCH | Refills: 0 | Status: SHIPPED | OUTPATIENT
Start: 2025-06-04 | End: 2025-06-14

## 2025-06-04 ASSESSMENT — FIBROSIS 4 INDEX: FIB4 SCORE: 2.27

## 2025-06-04 NOTE — PROGRESS NOTES
Subjective:   Verbal consent was acquired by the patient to use [employer] ambient listening note generation during this visit Yes     CC: ER follow-up    HPI:  Ms. Celis is a pleasant 89-year-old established patient of Dr. UGARTE who presents today for ER follow-up.  Patient reports that she was evaluated in the ER on 5/30/2025 for chest pain.  She reports having a burning stabbing pain in her left axillary region that radiates down below the left breast into the back.  Patient states that she had a cardiac workup completed which returned normal.  She is currently being treated for possible shingles infection.  Patient reports that since the emergency room visit the pain has slightly decreased.    She has been taking gabapentin once daily due to side effects of drowsiness and continues to take Valtrex.  She was unable to get the lidocaine patches that were prescribed.    She reports that she believes that the pain may also have been triggered by trying on different bras and possibly pulling a muscle.      Patient Active Problem List    Diagnosis Date Noted    Chronic kidney disease, stage 3a 03/28/2025    Secondary aldosteronism (HCC) 08/06/2024    Status post amputation of finger, left 06/21/2023    Decreased hearing of both ears 11/17/2022    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 choroidal neovascularization (HCC) 11/17/2022    Multilevel degenerative disc disease 10/18/2022    Osteoarthritis of multiple joints 10/18/2022    DNR (do not resuscitate) 05/19/2022    Insomnia 04/01/2022    Hypercholesterolemia 03/21/2022    Atherosclerosis of both carotid arteries 01/24/2022    History of DVT (deep vein thrombosis) 12/01/2021    History of compression fracture of spine, Jan. 2020 (T12 and L1) 05/12/2021    Age-related osteoporosis with current pathological fracture 11/12/2020    Spinal stenosis of lumbar region with neurogenic claudication 01/10/2020    Osteoporosis 03/27/2019     "Hypothyroidism 02/10/2019    Anemia in other chronic diseases classified elsewhere 01/12/2018    Raynaud disease 11/11/2017    CKD (chronic kidney disease) stage 3, GFR 30-59 ml/min 11/11/2017    History of falling 11/11/2017    History of Clostridium difficile 02/21/2016    Recurrent major depressive disorder, in full remission (HCC) 02/21/2016    Seronegative rheumatoid arthritis (HCC) 09/26/2014       Health Maintenance: Completed    ROS: Negative, except as noted above      Objective:     Exam:  BP 98/60 (BP Location: Left arm, Patient Position: Sitting, BP Cuff Size: Adult)   Pulse 91   Temp 36.4 °C (97.5 °F) (Temporal)   Ht 1.651 m (5' 5\")   Wt 59.9 kg (132 lb)   SpO2 97%   BMI 21.97 kg/m²  Body mass index is 21.97 kg/m².    Physical Exam  Constitutional:       Appearance: Normal appearance.   Cardiovascular:      Rate and Rhythm: Normal rate and regular rhythm.      Heart sounds: Normal heart sounds.   Pulmonary:      Effort: Pulmonary effort is normal. No respiratory distress.      Breath sounds: Normal breath sounds. No stridor. No wheezing, rhonchi or rales.   Skin:     Comments: No visible rash noted in the left axillary region, below left breast, and on the left shoulder. Tenderness to palpation appreciated.   Neurological:      Mental Status: She is alert.           Assessment & Plan:     89 y.o. female with the following -     1. Chest pain, unspecified type  2. Intercostal muscle strain, sequela  Acute, improving.  Patient was evaluated in the emergency department on 5/30/2025 for left-sided chest pain.  Per ER note patient did not have convincing ACS symptoms and it was thought that she may have subclinical shingles virus.  Patient's ACS workup was negative.  I believe patient's pain may also be due to intercostal muscle strain while she was trying on different bras.  She very well could have subclinical zoster, in the setting of having tenderness to palpation and burning and stabbing pain.  " Patient was advised to continue her prescription of Valtrex and to continue gabapentin.  I have placed a new order for lidocaine patches.  Patient to notify us if her symptoms do not resolve in the next few days.  - lidocaine (ASPERFLEX) 4 % Patch; Place 1 Patch on the skin every 24 hours for 10 days.  Dispense: 10 Patch; Refill: 0      My total time spent caring for the patient on the day of the encounter was 31 minutes.   This does not include time spent on separately billable procedures/tests.        Return if symptoms worsen or fail to improve.    Please note that this dictation was created using voice recognition software. I have made every reasonable attempt to correct obvious errors, but I expect that there are errors of grammar and possibly content that I did not discover before finalizing the note.

## 2025-07-14 ENCOUNTER — APPOINTMENT (OUTPATIENT)
Dept: ONCOLOGY | Facility: MEDICAL CENTER | Age: OVER 89
End: 2025-07-14
Attending: INTERNAL MEDICINE
Payer: MEDICARE

## 2025-07-22 ENCOUNTER — APPOINTMENT (OUTPATIENT)
Dept: RHEUMATOLOGY | Facility: MEDICAL CENTER | Age: OVER 89
End: 2025-07-22
Payer: MEDICARE

## 2025-07-30 ENCOUNTER — APPOINTMENT (OUTPATIENT)
Dept: MEDICAL GROUP | Facility: PHYSICIAN GROUP | Age: OVER 89
End: 2025-07-30
Payer: MEDICARE

## 2025-07-30 VITALS
HEART RATE: 70 BPM | SYSTOLIC BLOOD PRESSURE: 110 MMHG | OXYGEN SATURATION: 91 % | DIASTOLIC BLOOD PRESSURE: 64 MMHG | BODY MASS INDEX: 22.26 KG/M2 | HEIGHT: 65 IN | WEIGHT: 133.6 LBS | RESPIRATION RATE: 16 BRPM | TEMPERATURE: 97.1 F

## 2025-07-30 DIAGNOSIS — I73.00 RAYNAUD'S DISEASE WITHOUT GANGRENE: Primary | ICD-10-CM

## 2025-07-30 DIAGNOSIS — G89.29 CHRONIC NECK PAIN: ICD-10-CM

## 2025-07-30 DIAGNOSIS — H90.3 SENSORINEURAL HEARING LOSS (SNHL) OF BOTH EARS: ICD-10-CM

## 2025-07-30 DIAGNOSIS — M54.2 CHRONIC NECK PAIN: ICD-10-CM

## 2025-07-30 RX ORDER — TRAZODONE HYDROCHLORIDE 50 MG/1
25 TABLET ORAL
Qty: 100 TABLET | Refills: 3 | Status: SHIPPED | OUTPATIENT
Start: 2025-07-30

## 2025-07-30 RX ORDER — FUROSEMIDE 40 MG/1
20 TABLET ORAL
Qty: 50 TABLET | Refills: 3 | Status: SHIPPED | OUTPATIENT
Start: 2025-07-30

## 2025-07-30 RX ORDER — AMLODIPINE BESYLATE 5 MG/1
5 TABLET ORAL DAILY
Qty: 100 TABLET | Refills: 3 | Status: SHIPPED | OUTPATIENT
Start: 2025-07-30 | End: 2026-09-03

## 2025-07-30 RX ORDER — IPRATROPIUM BROMIDE 42 UG/1
SPRAY, METERED NASAL
Qty: 15 ML | Refills: 0 | Status: SHIPPED | OUTPATIENT
Start: 2025-07-30

## 2025-07-30 ASSESSMENT — FIBROSIS 4 INDEX: FIB4 SCORE: 2.27

## 2025-07-30 NOTE — PROGRESS NOTES
Verbal consent was acquired by the patient to use back[employer]ch ambient listening note generation during this visit     Subjective:     HPI:   History of Present Illness  The patient presents for Raynaud's disease, chronic neck pain, osteoporosis, hearing loss, macular degeneration, and medication management.    Raynaud's Disease  She reports that the nifedipine prescribed for Raynaud's disease has not been effective in alleviating her symptoms. She was unable to consult with her rheumatologist as he was unavailable during her last visit a few days ago. She continues to experience symptoms of Raynaud's disease, which have become more pronounced in the current warm weather. Additionally, her arthritis has been causing increased discomfort.  - Onset: Symptoms have become more pronounced in the current warm weather.  - Character: Persistent symptoms despite nifedipine treatment.  - Alleviating/Aggravating Factors: Nifedipine has not been effective; symptoms worsened in warm weather.    Chronic Neck Pain  She sought emergency care approximately a month ago due to shoulder pain, which has since improved. However, she now experiences neck stiffness and pain, particularly when turning her head to the left. The pain extends from the top of her head down to her eye. She recalls that these symptoms began after starting physical therapy and performing certain exercises. She is interested in finding a new physical therapist closer to her current location.  - Onset: Neck stiffness and pain began after starting physical therapy and performing certain exercises.  - Location: Neck, extending from the top of her head down to her eye.  - Duration: Ongoing for approximately a month.  - Character: Stiffness and pain, particularly when turning her head to the left.  - Alleviating/Aggravating Factors: Symptoms began after physical therapy exercises.    Osteoporosis  She is currently on Prolia for osteoporosis and has been receiving this  "treatment for some time. Her appointment for the Prolia injection last week was canceled due to insurance issues.  - Alleviating/Aggravating Factors: Prolia treatment; appointment canceled due to insurance issues.  - Timing: Ongoing treatment with Prolia.    Hearing Loss  She requests a referral to an audiologist for a hearing test.  - Timing: Seeking referral for hearing test.    Macular Degeneration  She has been diagnosed with macular degeneration and is interested in obtaining a high-quality magnifying glass.  - Timing: Diagnosed with macular degeneration; interested in magnifying glass.    Medication Management  She requests a refill of her nasal spray prescription, which she uses for a persistent runny nose.  - Character: Persistent runny nose.  - Timing: Requests refill of nasal spray prescription.    Social History:  Living Condition: She has recently moved to a new location.    Health Maintenance: Completed    Objective:     Exam:  /64   Pulse 70   Temp 36.2 °C (97.1 °F) (Temporal)   Resp 16   Ht 1.651 m (5' 5\")   Wt 60.6 kg (133 lb 9.6 oz)   SpO2 91%   BMI 22.23 kg/m²  Body mass index is 22.23 kg/m².    Physical Exam  Constitutional:       General: She is not in acute distress.     Appearance: Normal appearance. She is not ill-appearing.   Eyes:      Conjunctiva/sclera: Conjunctivae normal.   Cardiovascular:      Rate and Rhythm: Normal rate and regular rhythm.      Heart sounds: No murmur heard.  Pulmonary:      Effort: Pulmonary effort is normal. No respiratory distress.      Breath sounds: Normal breath sounds. No wheezing or rhonchi.   Skin:     General: Skin is warm and dry.      Capillary Refill: Capillary refill takes less than 2 seconds.   Neurological:      General: No focal deficit present.      Mental Status: She is alert and oriented to person, place, and time.   Psychiatric:         Mood and Affect: Mood normal.             Results      Assessment & Plan:     1. Raynaud's disease " without gangrene        2. Chronic neck pain  Referral to Pain Management    Referral to Physical Therapy      3. Sensorineural hearing loss (SNHL) of both ears  Referral to Audiology          Assessment & Plan  1. Raynaud's Disease: Chronic.  - Nifedipine is not effectively managing symptoms.  - Prescribe a different medication within the same class.  - Monitor symptoms to assess the effectiveness of the new medication.    2. Chronic Neck Pain.  - Referral to pain management for evaluation and treatment options.  - Referral to a physical therapist closer to the current location.    3. Osteoporosis.  - Missed recent Prolia appointment due to insurance issues.  - Ensure arrangements for continued Prolia injections.  - Monitor osteoporosis progression.    4. Hearing Loss.  - Referral to an audiologist for a comprehensive hearing assessment.    5. Macular Degeneration.  - Recommend trying [employer], [employer], or [employer] for a magnifying glass.  - Monitor vision changes.    6. Medication Management.  - Refill current medications.  - Await call with the name of the nasal spray for runny nose and refill accordingly.  - Monitor medication effectiveness and side effects.    Follow-up  - Follow up in 6 months.    Please note that this dictation was created using voice recognition software. I have made every reasonable attempt to correct obvious errors, but I expect that there are errors of grammar and possibly content that I did not discover before finalizing the note.    Billing : Secondary to the complexity of this patient's illnesses and their interactions.  See assessment and plan above for the comprehensive evaluation and management of the patient's acute and chronic concerns.  As the patient's PCP, I am the continued focal point for all health care services for the patient's needs and ongoing subsequent medical care.  All problems listed were discussed during the office visit, medications were  evaluated and complexities were discussed, as well as plan for the future.

## 2025-08-06 ENCOUNTER — OFFICE VISIT (OUTPATIENT)
Dept: PHYSICAL MEDICINE AND REHAB | Facility: MEDICAL CENTER | Age: OVER 89
End: 2025-08-06
Payer: MEDICARE

## 2025-08-06 VITALS
WEIGHT: 130.73 LBS | TEMPERATURE: 96.5 F | BODY MASS INDEX: 21.78 KG/M2 | HEIGHT: 65 IN | DIASTOLIC BLOOD PRESSURE: 60 MMHG | SYSTOLIC BLOOD PRESSURE: 122 MMHG

## 2025-08-06 DIAGNOSIS — M54.12 CERVICAL RADICULOPATHY: ICD-10-CM

## 2025-08-06 DIAGNOSIS — Z91.81 RISK FOR FALLS: ICD-10-CM

## 2025-08-06 DIAGNOSIS — S32.020D CLOSED WEDGE COMPRESSION FRACTURE OF L2 VERTEBRA WITH ROUTINE HEALING, SUBSEQUENT ENCOUNTER: ICD-10-CM

## 2025-08-06 DIAGNOSIS — R26.9 ABNORMALITY OF GAIT AND MOBILITY: ICD-10-CM

## 2025-08-06 DIAGNOSIS — M47.9 SPONDYLOSIS: ICD-10-CM

## 2025-08-06 DIAGNOSIS — Z98.890 H/O LAMINECTOMY: Primary | ICD-10-CM

## 2025-08-06 DIAGNOSIS — Z96.642 HISTORY OF TOTAL LEFT HIP ARTHROPLASTY: ICD-10-CM

## 2025-08-06 DIAGNOSIS — Z99.89 WALKER AS AMBULATION AID: ICD-10-CM

## 2025-08-06 DIAGNOSIS — M47.812 ARTHROPATHY OF CERVICAL FACET JOINT: ICD-10-CM

## 2025-08-06 PROCEDURE — G2211 COMPLEX E/M VISIT ADD ON: HCPCS | Performed by: GENERAL PRACTICE

## 2025-08-06 PROCEDURE — 99204 OFFICE O/P NEW MOD 45 MIN: CPT | Performed by: GENERAL PRACTICE

## 2025-08-06 PROCEDURE — 1125F AMNT PAIN NOTED PAIN PRSNT: CPT | Performed by: GENERAL PRACTICE

## 2025-08-06 PROCEDURE — 3074F SYST BP LT 130 MM HG: CPT | Performed by: GENERAL PRACTICE

## 2025-08-06 PROCEDURE — 3078F DIAST BP <80 MM HG: CPT | Performed by: GENERAL PRACTICE

## 2025-08-06 ASSESSMENT — PATIENT HEALTH QUESTIONNAIRE - PHQ9: CLINICAL INTERPRETATION OF PHQ2 SCORE: 0

## 2025-08-06 ASSESSMENT — FIBROSIS 4 INDEX: FIB4 SCORE: 2.27

## 2025-08-06 ASSESSMENT — PAIN SCALES - GENERAL: PAINLEVEL_OUTOF10: 6=MODERATE PAIN

## 2025-08-07 PROCEDURE — RXMED WILLOW AMBULATORY MEDICATION CHARGE

## 2025-08-07 PROCEDURE — RXMED WILLOW AMBULATORY MEDICATION CHARGE: Performed by: INTERNAL MEDICINE

## 2025-08-11 ENCOUNTER — PHARMACY VISIT (OUTPATIENT)
Dept: PHARMACY | Facility: MEDICAL CENTER | Age: OVER 89
End: 2025-08-11
Payer: COMMERCIAL

## 2025-08-20 PROCEDURE — RXMED WILLOW AMBULATORY MEDICATION CHARGE

## 2025-08-21 ENCOUNTER — PHARMACY VISIT (OUTPATIENT)
Dept: PHARMACY | Facility: MEDICAL CENTER | Age: OVER 89
End: 2025-08-21
Payer: COMMERCIAL

## 2025-08-27 ENCOUNTER — HOSPITAL ENCOUNTER (OUTPATIENT)
Facility: REHABILITATION | Age: OVER 89
End: 2025-08-27
Attending: STUDENT IN AN ORGANIZED HEALTH CARE EDUCATION/TRAINING PROGRAM | Admitting: STUDENT IN AN ORGANIZED HEALTH CARE EDUCATION/TRAINING PROGRAM
Payer: MEDICARE

## 2025-08-27 ENCOUNTER — APPOINTMENT (OUTPATIENT)
Dept: RADIOLOGY | Facility: REHABILITATION | Age: OVER 89
End: 2025-08-27
Attending: STUDENT IN AN ORGANIZED HEALTH CARE EDUCATION/TRAINING PROGRAM
Payer: MEDICARE

## 2025-08-27 VITALS
WEIGHT: 130.07 LBS | SYSTOLIC BLOOD PRESSURE: 157 MMHG | BODY MASS INDEX: 21.67 KG/M2 | DIASTOLIC BLOOD PRESSURE: 74 MMHG | TEMPERATURE: 97.2 F | RESPIRATION RATE: 14 BRPM | HEIGHT: 65 IN | HEART RATE: 82 BPM | OXYGEN SATURATION: 97 %

## 2025-08-27 PROCEDURE — 700117 HCHG RX CONTRAST REV CODE 255: Mod: JZ

## 2025-08-27 PROCEDURE — A9579 GAD-BASE MR CONTRAST NOS,1ML: HCPCS | Mod: JZ

## 2025-08-27 PROCEDURE — 62321 NJX INTERLAMINAR CRV/THRC: CPT

## 2025-08-27 PROCEDURE — 700111 HCHG RX REV CODE 636 W/ 250 OVERRIDE (IP): Mod: JZ

## 2025-08-27 PROCEDURE — 700101 HCHG RX REV CODE 250

## 2025-08-27 RX ORDER — DEXAMETHASONE SODIUM PHOSPHATE 10 MG/ML
INJECTION, SOLUTION INTRAMUSCULAR; INTRAVENOUS
Status: COMPLETED
Start: 2025-08-27 | End: 2025-08-27

## 2025-08-27 RX ORDER — GADOTERIDOL 279.3 MG/ML
INJECTION INTRAVENOUS
Status: COMPLETED
Start: 2025-08-27 | End: 2025-08-27

## 2025-08-27 RX ORDER — SODIUM CHLORIDE 9 MG/ML
INJECTION, SOLUTION INTRAMUSCULAR; INTRAVENOUS; SUBCUTANEOUS
Status: COMPLETED
Start: 2025-08-27 | End: 2025-08-27

## 2025-08-27 RX ORDER — LIDOCAINE HYDROCHLORIDE 10 MG/ML
INJECTION, SOLUTION EPIDURAL; INFILTRATION; INTRACAUDAL; PERINEURAL
Status: COMPLETED
Start: 2025-08-27 | End: 2025-08-27

## 2025-08-27 RX ADMIN — GADOTERIDOL 10 ML: 279.3 INJECTION, SOLUTION INTRAVENOUS at 13:24

## 2025-08-27 RX ADMIN — SODIUM CHLORIDE 10 ML: 9 INJECTION, SOLUTION INTRAMUSCULAR; INTRAVENOUS; SUBCUTANEOUS at 13:24

## 2025-08-27 RX ADMIN — LIDOCAINE HYDROCHLORIDE 10 ML: 10 INJECTION, SOLUTION EPIDURAL; INFILTRATION; INTRACAUDAL at 13:23

## 2025-08-27 RX ADMIN — DEXAMETHASONE SODIUM PHOSPHATE 10 MG: 10 INJECTION, SOLUTION INTRAMUSCULAR; INTRAVENOUS at 13:24

## 2025-08-27 ASSESSMENT — PAIN DESCRIPTION - PAIN TYPE: TYPE: CHRONIC PAIN

## 2025-08-27 ASSESSMENT — FIBROSIS 4 INDEX: FIB4 SCORE: 2.27

## (undated) DEVICE — TUBE E-T HI-LO CUFF 7.0MM (10EA/PK)

## (undated) DEVICE — TOWELS CLOTH SURGICAL - (4/PK 20PK/CA)

## (undated) DEVICE — SET EXTENSION WITH 2 PORTS (48EA/CA) ***PART #2C8610 IS A SUBSTITUTE*****

## (undated) DEVICE — SODIUM CHL. IRRIGATION 0.9% 3000ML (4EA/CA 65CA/PF)

## (undated) DEVICE — SPIDER SHOULDER HOLDER (12EA/BX)

## (undated) DEVICE — BAG, SPONGE COUNT 50600

## (undated) DEVICE — Device

## (undated) DEVICE — HANDPIECE 10FT INTPLS SCT PLS IRRIGATION HAND CONTROL SET (6/PK)

## (undated) DEVICE — DRAPE LARGE 3 QUARTER - (20/CA)

## (undated) DEVICE — CHLORAPREP 26 ML APPLICATOR - ORANGE TINT(25/CA)

## (undated) DEVICE — PROTECTOR ULNA NERVE - (36PR/CA)

## (undated) DEVICE — SODIUM CHL IRRIGATION 0.9% 1000ML (12EA/CA)

## (undated) DEVICE — HUMID-VENT HEAT AND MOISTURE EXCHANGE- (50/BX)

## (undated) DEVICE — GLOVE BIOGEL SZ 7 SURGICAL PF LTX - (50PR/BX 4BX/CA)

## (undated) DEVICE — DRAPE SURG STERI-DRAPE 7X11OD - (40EA/CA)

## (undated) DEVICE — GLOVE BIOGEL SZ 8 SURGICAL PF LTX - (50PR/BX 4BX/CA)

## (undated) DEVICE — CANISTER SUCTION RIGID RED 1500CC (40EA/CA)

## (undated) DEVICE — BAG SPONGE COUNT 10.25 X 32 - BLUE (250/CA)

## (undated) DEVICE — GLOVE BIOGEL SZ 8.5 SURGICAL PF LTX - (50PR/BX 4BX/CA)

## (undated) DEVICE — DRILL BIT 3.2MM

## (undated) DEVICE — NEPTUNE 4 PORT MANIFOLD - (20/PK)

## (undated) DEVICE — KIT ANESTHESIA W/CIRCUIT & 3/LT BAG W/FILTER (20EA/CA)

## (undated) DEVICE — SUCTION INSTRUMENT YANKAUER BULBOUS TIP W/O VENT (50EA/CA)

## (undated) DEVICE — STOCKINETTE, TUBULAR 6

## (undated) DEVICE — GLOVE 7.0 LF PF PROTEXIS (50PR/BX)

## (undated) DEVICE — KIT ROOM DECONTAMINATION

## (undated) DEVICE — ELECTRODE 850 FOAM ADHESIVE - HYDROGEL RADIOTRNSPRNT (50/PK)

## (undated) DEVICE — LACTATED RINGERS INJ. 500 ML - (24EA/CA)

## (undated) DEVICE — GLOVE BIOGEL PI INDICATOR SZ 7.0 SURGICAL PF LF - (50/BX 4BX/CA)

## (undated) DEVICE — ELECTRODE DUAL RETURN W/ CORD - (50/PK)

## (undated) DEVICE — COVER MAYO STAND X-LG - (22EA/CA)

## (undated) DEVICE — GOWN SURGEONS X-LARGE - DISP. (30/CA)

## (undated) DEVICE — MASK ANESTHESIA ADULT  - (100/CA)

## (undated) DEVICE — FIBERWIRE 2.0 (12EA/BX)

## (undated) DEVICE — DRAPE U ORTHOPEDIC - (10/BX)

## (undated) DEVICE — BLADE 90X18X1.27MM SAW SAGITTAL

## (undated) DEVICE — GOWN SURGICAL XX-LARGE - (28EA/CA) SIRUS NON REINFORCED

## (undated) DEVICE — PAD UNIVERSAL MULTI USE (1/EA)

## (undated) DEVICE — NEEDLE MAYO CATGUT TPR POINT - (2EA/PK20PK/BX)

## (undated) DEVICE — BOVIE BLADE COATED 6 - (50EA/PK)

## (undated) DEVICE — FORCEP BIPOLAR ISOCOOL 8.5 1.0MM TIP"

## (undated) DEVICE — SUTURE GENERAL

## (undated) DEVICE — SUTURE 5 ETHIBOND V-37 (12PK/BX)

## (undated) DEVICE — GLOVE BIOGEL INDICATOR SZ 7SURGICAL PF LTX - (50/BX 4BX/CA)

## (undated) DEVICE — IMMOBILIZER, SHOULDER (UNIVERS)

## (undated) DEVICE — SUTURE 2-0 MONOCRYL PLUS UNDYED CT-1 1 X 36 (36EA/BX)"

## (undated) DEVICE — LACTATED RINGERS INJ 1000 ML - (14EA/CA 60CA/PF)

## (undated) DEVICE — DRESSING XEROFORM 1X8 - (50/BX 4BX/CA)

## (undated) DEVICE — GOWN WARMING STANDARD FLEX - (30/CA)

## (undated) DEVICE — SENSOR SPO2 NEO LNCS ADHESIVE (20/BX) SEE USER NOTES

## (undated) DEVICE — CANISTER SUCTION 3000ML MECHANICAL FILTER AUTO SHUTOFF MEDI-VAC NONSTERILE LF DISP  (40EA/CA)

## (undated) DEVICE — CORDS BIPOLAR COAGULATION - 12FT STERILE DISP. (10EA/BX)

## (undated) DEVICE — HEADREST PRONEVIEW LARGE - (10/CA)

## (undated) DEVICE — BLADE SAGITTAL SYSTEM 18MM

## (undated) DEVICE — TUBING C&T SET FLYING LEADS DRAIN TUBING (10EA/BX)

## (undated) DEVICE — SUTURE 3-0 MONOCRYL PLUS PS-1 - 27 INCH (36/BX)

## (undated) DEVICE — TOOL DISSECT MATCH HEAD

## (undated) DEVICE — STOCKINETTE IMPERVIOUS 12X48 - STERILELF (10/CA)"

## (undated) DEVICE — SUTURE 0 VICRYL PLUS CT-1 - 8 X 18 INCH (12/BX)

## (undated) DEVICE — SLEEVE VASO CALF MED - (10PR/CA)

## (undated) DEVICE — GLOVE SZ 6.5 BIOGEL PI MICRO - PF LF (50PR/BX)

## (undated) DEVICE — DRAPE C ARMOR (12EA/CA)

## (undated) DEVICE — KIT SURGIFLO W/OUT THROMBIN - (6EA/CA)

## (undated) DEVICE — SET LEADWIRE 5 LEAD BEDSIDE DISPOSABLE ECG (1SET OF 5/EA)

## (undated) DEVICE — GLOVE BIOGEL INDICATOR SZ 7.5 SURGICAL PF LTX - (50PR/BX 4BX/CA)

## (undated) DEVICE — GLOVE BIOGEL INDICATOR SZ 8 SURGICAL PF LTX - (50/BX 4BX/CA)

## (undated) DEVICE — ARMREST CRADLE FOAM - (2PR/PK 12PR/CA)

## (undated) DEVICE — GLOVE, LITE (PAIR)

## (undated) DEVICE — GLOVE BIOGEL PI INDICATOR SZ 8.5 SURGICAL PF LF - (50PR/BX 4BX/CA)

## (undated) DEVICE — DISSECT TOOL MIDAS REX 9MH30

## (undated) DEVICE — BONE BIOPSY DEVICE

## (undated) DEVICE — TIP INTPLS HFLO ML ORFC BTRY - (12/CS)  FOR SURGILAV

## (undated) DEVICE — TUBE CONNECTING SUCTION - CLEAR PLASTIC STERILE 72 IN (50EA/CA)

## (undated) DEVICE — TAPE CLOTH MEDIPORE 6 INCH - (12RL/CA)

## (undated) DEVICE — MASK, LARYNGEAL AIRWAY #4

## (undated) DEVICE — SUTURE 4-0 NUROLON CR/8 TF - (12/BX) ETHICON

## (undated) DEVICE — SUTURE 2-0 VICRYL PLUS CT-1 36 (36PK/BX)"

## (undated) DEVICE — TUBING CLEARLINK DUO-VENT - C-FLO (48EA/CA)

## (undated) DEVICE — KIT EVACUATER 3 SPRING PVC LF 1/8 DRAIN SIZE (10EA/CA)"

## (undated) DEVICE — HEAD HOLDER JUNIOR/ADULT

## (undated) DEVICE — PACK MAJOR ORTHO - (2EA/CA)

## (undated) DEVICE — GLOVE SZ 7.5 LF PROTEXIS (50PR/BX)

## (undated) DEVICE — WATER IRRIGATION STERILE 1000ML (12EA/CA)

## (undated) DEVICE — TUBE CONNECT SUCTION CLEAR 120 X 1/4" (50EA/CA)"

## (undated) DEVICE — PACK TOTAL HIP - (1/CA)

## (undated) DEVICE — NEEDLE W/FACET TIP DULL VERSION W/STIMULATION CABLE SONOPLEX 21G X 4 (10/EA)"

## (undated) DEVICE — DRAPETIBURON SHOULDER W/POUCH - (5EA/CA)

## (undated) DEVICE — BLOCK

## (undated) DEVICE — NEEDLE SPINAL NON-SAFETY 18 GA X 3 IN (25EA/BX)

## (undated) DEVICE — SUTURE 2-0 VICRYL PLUS SH - 8 X 18 INCH (12/BX)

## (undated) DEVICE — SPONGE GAUZE STER 4X4 8-PL - (2/PK 50PK/BX 12BX/CS)

## (undated) DEVICE — PAD PREP 24 X 48 CUFFED - (100/CA)

## (undated) DEVICE — BOVIE BLADE COATED - (50/PK)

## (undated) DEVICE — BOVIE BLADE COATED &INSULATED - 25/PK

## (undated) DEVICE — DRESSING AQUACEL AG ADVANTAGE 3.5 X 10" (10EA/BX)"

## (undated) DEVICE — SENSOR OXIMETER ADULT SPO2 RD SET (20EA/BX)

## (undated) DEVICE — DRAPE IOBAN II INCISE 23X17 - (10EA/BX 4BX/CA)

## (undated) DEVICE — DRAPE LAPAROTOMY T SHEET - (12EA/CA)

## (undated) DEVICE — PACK NEURO - (2EA/CA)